# Patient Record
Sex: FEMALE | Race: WHITE | Employment: UNEMPLOYED | RURAL
[De-identification: names, ages, dates, MRNs, and addresses within clinical notes are randomized per-mention and may not be internally consistent; named-entity substitution may affect disease eponyms.]

---

## 2017-01-03 ENCOUNTER — OFFICE VISIT (OUTPATIENT)
Dept: FAMILY MEDICINE CLINIC | Age: 37
End: 2017-01-03

## 2017-01-03 VITALS
OXYGEN SATURATION: 95 % | BODY MASS INDEX: 34.49 KG/M2 | RESPIRATION RATE: 16 BRPM | SYSTOLIC BLOOD PRESSURE: 147 MMHG | DIASTOLIC BLOOD PRESSURE: 80 MMHG | HEART RATE: 100 BPM | TEMPERATURE: 98.6 F | WEIGHT: 207 LBS | HEIGHT: 65 IN

## 2017-01-03 DIAGNOSIS — J02.9 SORE THROAT: ICD-10-CM

## 2017-01-03 DIAGNOSIS — J01.10 ACUTE NON-RECURRENT FRONTAL SINUSITIS: Primary | ICD-10-CM

## 2017-01-03 DIAGNOSIS — R10.32 LEFT LOWER QUADRANT PAIN: ICD-10-CM

## 2017-01-03 LAB
S PYO AG THROAT QL: NEGATIVE
VALID INTERNAL CONTROL?: YES

## 2017-01-03 RX ORDER — CEFDINIR 300 MG/1
300 CAPSULE ORAL 2 TIMES DAILY
Qty: 20 CAP | Refills: 0 | Status: SHIPPED | OUTPATIENT
Start: 2017-01-03 | End: 2017-01-10 | Stop reason: ALTCHOICE

## 2017-01-03 NOTE — PATIENT INSTRUCTIONS
Sinusitis: Care Instructions  Your Care Instructions    Sinusitis is an infection of the lining of the sinus cavities in your head. Sinusitis often follows a cold. It causes pain and pressure in your head and face. In most cases, sinusitis gets better on its own in 1 to 2 weeks. But some mild symptoms may last for several weeks. Sometimes antibiotics are needed. Follow-up care is a key part of your treatment and safety. Be sure to make and go to all appointments, and call your doctor if you are having problems. It's also a good idea to know your test results and keep a list of the medicines you take. How can you care for yourself at home? · Take an over-the-counter pain medicine, such as acetaminophen (Tylenol), ibuprofen (Advil, Motrin), or naproxen (Aleve). Read and follow all instructions on the label. · If the doctor prescribed antibiotics, take them as directed. Do not stop taking them just because you feel better. You need to take the full course of antibiotics. · Be careful when taking over-the-counter cold or flu medicines and Tylenol at the same time. Many of these medicines have acetaminophen, which is Tylenol. Read the labels to make sure that you are not taking more than the recommended dose. Too much acetaminophen (Tylenol) can be harmful. · Breathe warm, moist air from a steamy shower, a hot bath, or a sink filled with hot water. Avoid cold, dry air. Using a humidifier in your home may help. Follow the directions for cleaning the machine. · Use saline (saltwater) nasal washes to help keep your nasal passages open and wash out mucus and bacteria. You can buy saline nose drops at a grocery store or drugstore. Or you can make your own at home by adding 1 teaspoon of salt and 1 teaspoon of baking soda to 2 cups of distilled water. If you make your own, fill a bulb syringe with the solution, insert the tip into your nostril, and squeeze gently. Aure Hals your nose.   · Put a hot, wet towel or a warm gel pack on your face 3 or 4 times a day for 5 to 10 minutes each time. · Try a decongestant nasal spray like oxymetazoline (Afrin). Do not use it for more than 3 days in a row. Using it for more than 3 days can make your congestion worse. When should you call for help? Call your doctor now or seek immediate medical care if:  · You have new or worse swelling or redness in your face or around your eyes. · You have a new or higher fever. Watch closely for changes in your health, and be sure to contact your doctor if:  · You have new or worse facial pain. · The mucus from your nose becomes thicker (like pus) or has new blood in it. · You are not getting better as expected. Where can you learn more? Go to http://hodan-eugene.info/. Enter T528 in the search box to learn more about \"Sinusitis: Care Instructions. \"  Current as of: July 29, 2016  Content Version: 11.1  © [id number] [employer]. Care instructions adapted under license by [employer] (which disclaims liability or warranty for this information). If you have questions about a medical condition or this instruction, always ask your healthcare professional. [place] [address] any warranty or liability for your use of this information.

## 2017-01-03 NOTE — PROGRESS NOTES
Identified pt with two pt identifiers(name and ). Chief Complaint   Patient presents with    Cold Symptoms    Sinus Pain    Sore Throat      Please print Rx's today. Health Maintenance Due   Topic    EYE EXAM RETINAL OR DILATED Q1     HEMOGLOBIN A1C Q6M        Wt Readings from Last 3 Encounters:   17 207 lb (93.9 kg)   16 207 lb (93.9 kg)   16 202 lb (91.6 kg)     Temp Readings from Last 3 Encounters:   17 98.6 °F (37 °C) (Oral)   16 98.6 °F (37 °C) (Oral)   16 98.3 °F (36.8 °C) (Oral)     BP Readings from Last 3 Encounters:   17 147/80   16 120/78   16 118/74     Pulse Readings from Last 3 Encounters:   17 100   16 (!) 112   16 (!) 114         Learning Assessment:  :     Learning Assessment 2014   PRIMARY LEARNER Patient   HIGHEST LEVEL OF EDUCATION - PRIMARY LEARNER  GRADUATED HIGH SCHOOL OR GED   BARRIERS PRIMARY LEARNER NONE   PRIMARY LANGUAGE ENGLISH   LEARNER PREFERENCE PRIMARY READING   ANSWERED BY patient   RELATIONSHIP SELF       Depression Screening:  :     PHQ 2 / 9, over the last two weeks 1/3/2017   Little interest or pleasure in doing things Not at all   Feeling down, depressed or hopeless Not at all   Total Score PHQ 2 0           Abuse Screening:  :     Abuse Screening Questionnaire 2016   Do you ever feel afraid of your partner? N   Are you in a relationship with someone who physically or mentally threatens you? N   Is it safe for you to go home? Y       Coordination of Care Questionnaire:  :     1) Have you been to an emergency room, urgent care clinic since your last visit? no   Hospitalized since your last visit? no             2) Have you seen or consulted any other health care providers outside of [address] [place] [address] since your last visit? no  (Include any pap smears or colon screenings in this section.)    3) Do you have an Advance Directive on file?  no  Are you interested in receiving information about Advance Directives? no    Patient is accompanied by self I have received verbal consent from Lupis Rucker to discuss any/all medical information while they are present in the room. Reviewed record in preparation for visit and have obtained necessary documentation. Medication reconciliation up to date and corrected with patient at this time.

## 2017-01-03 NOTE — MR AVS SNAPSHOT
Visit Information Date & Time Provider Department Dept. Phone Encounter #  
 1/3/2017  8:30 AM Zamzam Villa  [facility] [phone] [id number] Follow-up Instructions Return if symptoms worsen or fail to improve. Your Appointments 1/10/2017  8:00 AM  
ROUTINE CARE with Zamzam Villa  [facility] ([facility]) Appt Note: 3 month check; r/s from 12/22/16 - F/up DM, refill DM Rx,  Fasting blood work [place] [address] [address] [facility] [phone] [facility] 13 539 [address] Upcoming Health Maintenance Date Due  
 EYE EXAM RETINAL OR DILATED Q1 9/1/2014 HEMOGLOBIN A1C Q6M 1/14/2017 FOOT EXAM Q1 2/5/2017 MICROALBUMIN Q1 2/5/2017 LIPID PANEL Q1 7/14/2017 DTaP/Tdap/Td series (2 - Td) 1/1/2018 PAP AKA CERVICAL CYTOLOGY 6/15/2019 Allergies as of 1/3/2017  Review Complete On: 1/3/2017 By: Zamzam Villa NP Severity Noted Reaction Type Reactions Green Pepper  06/28/2012    Hives Current Immunizations  Reviewed on 3/18/2014 Name Date Influenza Vaccine 10/26/2013 TDAP Vaccine 1/1/2008 Not reviewed this visit You Were Diagnosed With   
  
 Codes Comments Acute non-recurrent frontal sinusitis    -  Primary ICD-10-CM: J01.10 ICD-9-CM: 665.9 Left lower quadrant pain     ICD-10-CM: R10.32 
ICD-9-CM: 789.04 Sore throat     ICD-10-CM: J02.9 ICD-9-CM: 604 Vitals BP Pulse Temp Resp Height(growth percentile) Weight(growth percentile) 147/80 100 98.6 °F (37 °C) (Oral) 16 5' 5\" (1.651 m) 207 lb (93.9 kg) LMP SpO2 BMI OB Status Smoking Status 01/25/2012 95% 34.45 kg/m2 Hysterectomy Never Smoker BMI and BSA Data Body Mass Index Body Surface Area 34.45 kg/m 2 2.08 m 2 Preferred Pharmacy Pharmacy Name Phone [facility] #[id number] - Amrik Flores - [address] [facility] [address] [phone] Your Updated Medication List  
  
   
This list is accurate as of: 1/3/17  8:35 AM.  Always use your most recent med list.  
  
  
  
  
 * albuterol 2.5 mg /3 mL (0.083 %) nebulizer solution Commonly known as:  PROVENTIL VENTOLIN  
3 mL by Nebulization route every four (4) hours as needed for Wheezing for up to 30 doses. * albuterol 90 mcg/actuation inhaler Commonly known as:  VENTOLIN HFA INHALE 2 PUFFS BY MOUTH EVERY 4 HOURS AS NEEDED FOR WHEEZING  
  
 atorvastatin 20 mg tablet Commonly known as:  LIPITOR  
TAKE 1 TABLET BY MOUTH EVERY DAY Blood-Glucose Meter monitoring kit Please give one kit. butalbital-acetaminophen-caffeine -40 mg per tablet Commonly known as:  Louanna Small Take 1 Tab by mouth every six (6) hours as needed for Pain. Max Daily Amount: 4 Tabs. cefdinir 300 mg capsule Commonly known as:  OMNICEF Take 1 Cap by mouth two (2) times a day for 10 days. ciprofloxacin HCl 250 mg tablet Commonly known as:  CIPRO Take 1 Tab by mouth every twelve (12) hours for 7 days. ergocalciferol 50,000 unit capsule Commonly known as:  ERGOCALCIFEROL Take 1 Cap by mouth every seven (7) days. fluticasone 50 mcg/actuation nasal spray Commonly known as:  FLONASE  
2 SQUIRTS DAILY AS NEEDED. INVOKANA 300 mg tablet Generic drug:  canagliflozin Take 1 Tab by mouth Daily (before breakfast). JANUVIA 100 mg tablet Generic drug:  SITagliptin Take 1 Tab by mouth once over twenty-four (24) hours. Lancets Misc Commonly known as:  MICROLET LANCET  
USE TO CHECK BLOOD SUGAR ONE TO TWO TIMES DAILY  
  
 metFORMIN 1,000 mg tablet Commonly known as:  GLUCOPHAGE  
TAKE 1 TABLET BY MOUTH TWICE A DAY  
  
 naratriptan 2.5 mg Tab Commonly known as:  Dearl Hallmark Take 1 Tab by mouth once as needed for up to 1 dose. For migraine. Can repeat 1 tab in 4 hours if needed. Limit: 2 tabs in 24 hours Nebulizer & Compressor machine Use as needed for wheezing, cough  
  
 omeprazole 20 mg capsule Commonly known as:  PRILOSEC Take 1 Cap by mouth two (2) times a day. ondansetron 8 mg disintegrating tablet Commonly known as:  ZOFRAN ODT Take 1 Tab by mouth every eight (8) hours as needed for Nausea. simvastatin 20 mg tablet Commonly known as:  ZOCOR Take  by mouth nightly. topiramate 50 mg tablet Commonly known as:  TOPAMAX Take 1 tab twice a day for migraine prevention  
  
 triamcinolone acetonide 0.1 % topical cream  
Commonly known as:  KENALOG Apply  to affected area two (2) times a day. use thin layer * TRUETEST TEST STRIPS strip Generic drug:  glucose blood VI test strips TO BE USED AS DIRECTED * glucose blood VI test strips strip Commonly known as:  Ascensia CONTOUR  
TO BE USED AS DIRECTED * Notice: This list has 4 medication(s) that are the same as other medications prescribed for you. Read the directions carefully, and ask your doctor or other care provider to review them with you. Prescriptions Printed Refills  
 cefdinir (OMNICEF) 300 mg capsule 0 Sig: Take 1 Cap by mouth two (2) times a day for 10 days. Class: Print Route: Oral  
  
Follow-up Instructions Return if symptoms worsen or fail to improve. Referral Information Referral ID Referred By Referred To  
  
 [id number] Maryan Rucker, [facility] [address] [place] [address] [phone] Ani Visits Status Start Date End Date 1 New Request 1/3/17 1/3/18 If your referral has a status of pending review or denied, additional information will be sent to support the outcome of this decision. Patient Instructions Sinusitis: Care Instructions Your Care Instructions Sinusitis is an infection of the lining of the sinus cavities in your head. Sinusitis often follows a cold. It causes pain and pressure in your head and face. In most cases, sinusitis gets better on its own in 1 to 2 weeks. But some mild symptoms may last for several weeks. Sometimes antibiotics are needed. Follow-up care is a key part of your treatment and safety. Be sure to make and go to all appointments, and call your doctor if you are having problems. It's also a good idea to know your test results and keep a list of the medicines you take. How can you care for yourself at home? · Take an over-the-counter pain medicine, such as acetaminophen (Tylenol), ibuprofen (Advil, Motrin), or naproxen (Aleve). Read and follow all instructions on the label. · If the doctor prescribed antibiotics, take them as directed. Do not stop taking them just because you feel better. You need to take the full course of antibiotics. · Be careful when taking over-the-counter cold or flu medicines and Tylenol at the same time. Many of these medicines have acetaminophen, which is Tylenol. Read the labels to make sure that you are not taking more than the recommended dose. Too much acetaminophen (Tylenol) can be harmful. · Breathe warm, moist air from a steamy shower, a hot bath, or a sink filled with hot water. Avoid cold, dry air. Using a humidifier in your home may help. Follow the directions for cleaning the machine. · Use saline (saltwater) nasal washes to help keep your nasal passages open and wash out mucus and bacteria. You can buy saline nose drops at a grocery store or drugstore. Or you can make your own at home by adding 1 teaspoon of salt and 1 teaspoon of baking soda to 2 cups of distilled water. If you make your own, fill a bulb syringe with the solution, insert the tip into your nostril, and squeeze gently. Margeorgea Zaldivar your nose. · Put a hot, wet towel or a warm gel pack on your face 3 or 4 times a day for 5 to 10 minutes each time. · Try a decongestant nasal spray like oxymetazoline (Afrin). Do not use it for more than 3 days in a row. Using it for more than 3 days can make your congestion worse. When should you call for help? Call your doctor now or seek immediate medical care if: 
· You have new or worse swelling or redness in your face or around your eyes. · You have a new or higher fever. Watch closely for changes in your health, and be sure to contact your doctor if: 
· You have new or worse facial pain. · The mucus from your nose becomes thicker (like pus) or has new blood in it. · You are not getting better as expected. Where can you learn more? Go to http://hodan-eugene.info/. Enter G608 in the search box to learn more about \"Sinusitis: Care Instructions. \" Current as of: July 29, 2016 Content Version: 11.1 © 2438-1389 [employer]. Care instructions adapted under license by [employer] (which disclaims liability or warranty for this information). If you have questions about a medical condition or this instruction, always ask your healthcare professional. [place] [address] any warranty or liability for your use of this information. Introducing [address]! [facility] introduces [employer] patient portal. Now you can access parts of your medical record, email your doctor's office, and request medication refills online. 1. In your internet browser, go to [email or web address] 2. Click on the First Time User? Click Here link in the Sign In box. You will see the New Member Sign Up page. 3. Enter your [employer] Access Code exactly as it appears below. You will not need to use this code after youve completed the sign-up process. If you do not sign up before the expiration date, you must request a new code. · [employer] Access Code: [email or web address] Expires: 3/29/2017  1:30 PM 
 
 4. Enter the last four digits of your Social Security Number (xxxx) and Date of Birth (mm/dd/yyyy) as indicated and click Submit. You will be taken to the next sign-up page. 5. Create a [employer] ID. This will be your [employer] login ID and cannot be changed, so think of one that is secure and easy to remember. 6. Create a [employer] password. You can change your password at any time. 7. Enter your Password Reset Question and Answer. This can be used at a later time if you forget your password. 8. Enter your e-mail address. You will receive e-mail notification when new information is available in [address] 9. Click Sign Up. You can now view and download portions of your medical record. 10. Click the Download Summary menu link to download a portable copy of your medical information. If you have questions, please visit the Frequently Asked Questions section of the [employer] website. Remember, [employer] is NOT to be used for urgent needs. For medical emergencies, dial 911. Now available from your iPhone and Android! Please provide this summary of care documentation to your next provider. Your primary care clinician is listed as [address]. If you have any questions after today's visit, please call [phone].

## 2017-01-03 NOTE — PROGRESS NOTES
HISTORY OF PRESENT ILLNESS  Tabitha Rodrigues is a 43 y.o. female. HPI  Pt presents with \"cold symptoms, sinus pain and sore throat\"    4 days ago, patient states that she woke up feeling horrible. Sinus congestion and pressire  Congestion has been working from the sinuses, into the chest.  Sore throat  Cough: she is coughing up mucous  Low grade fever, off and on all weekend. OTC: Maritza Wise sinus medicaiton    Pt was seen previously, on 12/29, for left lower quadrant pain. We believed that this may be a UTI, based on dysuria, and was placed on cipro. Pt states that she has been feeling better, with the cipro. Culture did return, and was negative. Review of Systems   Constitutional: Positive for fever. HENT: Positive for congestion and sore throat. Respiratory: Positive for cough and sputum production. Gastrointestinal: Negative for nausea and vomiting. Physical Exam   Constitutional: She is oriented to person, place, and time. She appears well-developed and well-nourished. HENT:   Head: Normocephalic and atraumatic. Right Ear: Hearing, tympanic membrane, external ear and ear canal normal.   Left Ear: Hearing, tympanic membrane, external ear and ear canal normal.   Nose: Mucosal edema present. Right sinus exhibits frontal sinus tenderness. Left sinus exhibits frontal sinus tenderness. Mouth/Throat: Oropharynx is clear and moist.   Neck: Normal range of motion. Neck supple. Cardiovascular: Normal rate, regular rhythm and normal heart sounds. Pulmonary/Chest: Effort normal and breath sounds normal. She has no decreased breath sounds. She has no wheezes. She has no rhonchi. She has no rales. Lymphadenopathy:     She has no cervical adenopathy. Neurological: She is alert and oriented to person, place, and time. Skin: Skin is warm and dry. Psychiatric: She has a normal mood and affect. Her behavior is normal.       ASSESSMENT and PLAN    ICD-10-CM ICD-9-CM    1.  Acute non-recurrent frontal sinusitis J01.10 461.1 cefdinir (OMNICEF) 300 mg capsule   2. Left lower quadrant pain R10.32 789.04 REFERRAL TO GASTROENTEROLOGY   3. Sore throat J02.9 462 AMB POC RAPID STREP A     Informed patient that I have sent medication to the pharmacy, and she should take as prescribed. Educated about taking with food, to decrease the risk of stomach upset. Educated about staying well hydrated, by pushing fluids as much as possible. Informed patient that should abdominal pain return, she should be seen by GI. Educated about calling should her symptoms return. Pt informed to return to office with worsening of symptoms, or PRN with any questions or concerns. Pt verbalizes understanding of plan of care and denies further questions or concerns at this time.

## 2017-01-10 ENCOUNTER — OFFICE VISIT (OUTPATIENT)
Dept: FAMILY MEDICINE CLINIC | Age: 37
End: 2017-01-10

## 2017-01-10 VITALS
HEIGHT: 65 IN | RESPIRATION RATE: 16 BRPM | WEIGHT: 206 LBS | DIASTOLIC BLOOD PRESSURE: 78 MMHG | SYSTOLIC BLOOD PRESSURE: 122 MMHG | TEMPERATURE: 98.2 F | HEART RATE: 78 BPM | OXYGEN SATURATION: 99 % | BODY MASS INDEX: 34.32 KG/M2

## 2017-01-10 DIAGNOSIS — I10 ESSENTIAL HYPERTENSION: ICD-10-CM

## 2017-01-10 DIAGNOSIS — Z13.29 SCREENING FOR THYROID DISORDER: ICD-10-CM

## 2017-01-10 DIAGNOSIS — E11.9 TYPE 2 DIABETES MELLITUS WITHOUT COMPLICATION, WITHOUT LONG-TERM CURRENT USE OF INSULIN (HCC): Primary | ICD-10-CM

## 2017-01-10 DIAGNOSIS — E78.5 DYSLIPIDEMIA (HIGH LDL; LOW HDL): ICD-10-CM

## 2017-01-10 DIAGNOSIS — Z91.09 ENVIRONMENTAL ALLERGIES: ICD-10-CM

## 2017-01-10 DIAGNOSIS — E55.9 VITAMIN D DEFICIENCY: ICD-10-CM

## 2017-01-10 RX ORDER — MONTELUKAST SODIUM 10 MG/1
10 TABLET ORAL DAILY
Qty: 90 TAB | Refills: 1 | Status: SHIPPED | OUTPATIENT
Start: 2017-01-10 | End: 2017-03-15 | Stop reason: SDUPTHER

## 2017-01-10 NOTE — PROGRESS NOTES
Subjective:     Tabitha Rodrigues is a 43 y.o. female seen for follow up of diabetes. She also has hypertension and hyperlipidemia. Diabetic Review of Systems - medication compliance: compliant all of the time, diabetic diet compliance: compliant most of the time. Other symptoms and concerns: Follow up and labs. Pt is focusing on losing weight, and is hoping to start losing some in the new year. Pt is requesting rx for allergy medication, in relation to chronic allergies and recurrent sinus infections. Patient Active Problem List    Diagnosis Date Noted    Screening for thyroid disorder 01/10/2017    Acute non-recurrent frontal sinusitis 01/03/2017    Left lower quadrant pain 12/29/2016    Left ear pain 12/02/2016    Strep throat 11/09/2016    Nausea 10/07/2016    Right acute serous otitis media 07/14/2016    Rash 07/14/2016    Gastroesophageal reflux disease without esophagitis 07/14/2016    Environmental allergies 07/14/2016    Right ear pain 06/21/2016    Diabetes ([address] 07/18/2014    Sore throat 05/06/2014    Migraine headache 01/03/2012    Vitamin D deficiency 11/06/2011    Dyslipidemia (high LDL; low HDL) 01/24/2011    Hypertension 01/06/2011    Asthma 01/06/2011     Current Outpatient Prescriptions   Medication Sig Dispense Refill    montelukast (SINGULAIR) 10 mg tablet Take 1 Tab by mouth daily. 90 Tab 1    naratriptan (AMERGE) 2.5 mg tab Take 1 Tab by mouth once as needed for up to 1 dose. For migraine. Can repeat 1 tab in 4 hours if needed. Limit: 2 tabs in 24 hours 9 Tab 1    simvastatin (ZOCOR) 20 mg tablet Take  by mouth nightly.  topiramate (TOPAMAX) 50 mg tablet Take 1 tab twice a day for migraine prevention 60 Tab 0    ondansetron (ZOFRAN ODT) 8 mg disintegrating tablet Take 1 Tab by mouth every eight (8) hours as needed for Nausea.  12 Tab 0    butalbital-acetaminophen-caffeine (FIORICET, ESGIC) -40 mg per tablet Take 1 Tab by mouth every six (6) hours as needed for Pain. Max Daily Amount: 4 Tabs. 20 Tab 0    metFORMIN (GLUCOPHAGE) 1,000 mg tablet TAKE 1 TABLET BY MOUTH TWICE A  Tab 1    INVOKANA 300 mg tablet Take 1 Tab by mouth Daily (before breakfast). 90 Tab 1    atorvastatin (LIPITOR) 20 mg tablet TAKE 1 TABLET BY MOUTH EVERY DAY 90 Tab 1    JANUVIA 100 mg tablet Take 1 Tab by mouth once over twenty-four (24) hours. 90 Tab 1    albuterol (PROVENTIL VENTOLIN) 2.5 mg /3 mL (0.083 %) nebulizer solution 3 mL by Nebulization route every four (4) hours as needed for Wheezing for up to 30 doses. 100 Each 2    fluticasone (FLONASE) 50 mcg/actuation nasal spray 2 SQUIRTS DAILY AS NEEDED. 16 g 3    albuterol (VENTOLIN HFA) 90 mcg/actuation inhaler INHALE 2 PUFFS BY MOUTH EVERY 4 HOURS AS NEEDED FOR WHEEZING 1 Inhaler 5    ergocalciferol (ERGOCALCIFEROL) 50,000 unit capsule Take 1 Cap by mouth every seven (7) days. 30 Cap 5    glucose blood VI test strips (ASCENSIA CONTOUR) strip TO BE USED AS DIRECTED 50 Strip 5    Nebulizer & Compressor machine Use as needed for wheezing, cough 1 Each 0    omeprazole (PRILOSEC) 20 mg capsule Take 1 Cap by mouth two (2) times a day. 180 Cap 1    triamcinolone acetonide (KENALOG) 0.1 % topical cream Apply  to affected area two (2) times a day. use thin layer 15 g 2    TRUETEST TEST STRIPS strip TO BE USED AS DIRECTED 50 Strip 5    Blood-Glucose Meter monitoring kit Please give one kit.  1 Kit 0    Lancets (MICROLET LANCET) Misc USE TO CHECK BLOOD SUGAR ONE TO TWO TIMES DAILY 100 Each 0     Allergies   Allergen Reactions    Green Pepper Hives     Past Medical History   Diagnosis Date    Anxiety     Asthma     Diabetes ([address] 2008    Headache     Headache(784.0)     Hypertension     Rash 7/14/2016    S/P dilatation and curettage      Past Surgical History   Procedure Laterality Date    Hx cholecystectomy  2001    Hx appendectomy  2/2008    Hx orthopaedic  9/2006     ruptured discs    Hx hernia repair 2/2009    Hx gyn  3/2008     tubal ligation    Hx dilation and curettage      Hx hysterectomy  03/2012    Hx lumbar diskectomy       2006     Family History   Problem Relation Age of Onset    Heart Disease Mother     Hypertension Father     Hypertension Sister      Social History   Substance Use Topics    Smoking status: Never Smoker    Smokeless tobacco: Never Used    Alcohol use No        Lab Results  Component Value Date/Time   WBC 11.0 07/14/2016 08:39 AM   HGB 15.1 07/14/2016 08:39 AM   HCT 46.4 07/14/2016 08:39 AM   PLATELET 425 17/70/0757 08:39 AM   MCV 87 07/14/2016 08:39 AM       Lab Results  Component Value Date/Time   Hemoglobin A1c 6.4 07/14/2016 08:39 AM   Hemoglobin A1c 7.1 02/05/2016 10:37 AM   Hemoglobin A1c 7.1 07/18/2014 09:05 AM   Glucose 113 07/14/2016 08:39 AM   Glucose (POC) 160 05/21/2014 10:29 AM   Glucose  09/05/2013 01:12 PM   Microalb/Creat ratio (ug/mg creat.) 9.6 11/19/2013 12:00 AM   LDL, calculated 69 07/14/2016 08:39 AM   Creatinine (POC) 0.7 06/04/2013 09:57 AM   Creatinine 0.73 07/14/2016 08:39 AM      Lab Results  Component Value Date/Time   Cholesterol, total 150 07/14/2016 08:39 AM   Cholesterol, Total 169 02/05/2016 10:37 AM   HDL Cholesterol 38 07/14/2016 08:39 AM   LDL, calculated 69 07/14/2016 08:39 AM   Triglyceride 214 07/14/2016 08:39 AM       Lab Results  Component Value Date/Time   GFR est  07/14/2016 08:39 AM   GFR est non- 07/14/2016 08:39 AM   Creatinine (POC) 0.7 06/04/2013 09:57 AM   Creatinine 0.73 07/14/2016 08:39 AM   BUN 8 07/14/2016 08:39 AM   Sodium 140 07/14/2016 08:39 AM   Potassium 4.6 07/14/2016 08:39 AM   Chloride 101 07/14/2016 08:39 AM   CO2 21 07/14/2016 08:39 AM         Review of Systems  Pertinent items are noted in HPI.     Objective:     Visit Vitals    /78    Pulse 78    Temp 98.2 °F (36.8 °C) (Oral)    Resp 16    Ht 5' 5\" (1.651 m)    Wt 206 lb (93.4 kg)    LMP 01/25/2012    SpO2 99%    BMI 34.28 kg/m2 Appearance: alert, well appearing, and in no distress. Exam: heart sounds normal rate, regular rhythm, normal S1, S2, no murmurs, rubs, clicks or gallops, no hepatosplenomegaly  Lab review: orders written for new lab studies as appropriate; see orders. Assessment/Plan:     diabetes stable, hypertension stable, hyperlipidemia stable. Diabetic issues reviewed with her: diabetic diet discussed in detail, written exchange diet given, low cholesterol diet, weight control and daily exercise discussed, home glucose monitoring emphasized and all medications, side effects and compliance discussed carefully. ICD-10-CM ICD-9-CM    1. Type 2 diabetes mellitus without complication, without long-term current use of insulin (HCC) E11.9 250.00 CBC W/O DIFF      METABOLIC PANEL, COMPREHENSIVE      HEMOGLOBIN A1C WITH EAG   2. Essential hypertension I10 401.9    3. Dyslipidemia (high LDL; low HDL) E78.4 272.4 LIPID PANEL   4. Vitamin D deficiency E55.9 268.9 VITAMIN D, 25 HYDROXY   5. Environmental allergies Z91.09 V15.09 montelukast (SINGULAIR) 10 mg tablet   6. Screening for thyroid disorder Z13.29 V77.0 THYROID CASCADE PROFILE     Informed patient that we will notify her when her labs return, and inform her of any change in plan of care at that time. Educated about focusing on low carb, mediterranean diet. Pt informed to return to office with worsening of symptoms, or PRN with any questions or concerns. Pt verbalizes understanding of plan of care and denies further questions or concerns at this time.

## 2017-01-10 NOTE — MR AVS SNAPSHOT
Visit Information Date & Time Provider Department Dept. Phone Encounter #  
 1/10/2017  8:00 AM Lobo RamirezObed [address] [phone] [id number] Follow-up Instructions Return in about 6 months (around 7/10/2017), or if symptoms worsen or fail to improve. Upcoming Health Maintenance Date Due  
 EYE EXAM RETINAL OR DILATED Q1 9/1/2014 HEMOGLOBIN A1C Q6M 1/14/2017 FOOT EXAM Q1 2/5/2017 MICROALBUMIN Q1 2/5/2017 LIPID PANEL Q1 7/14/2017 DTaP/Tdap/Td series (2 - Td) 1/1/2018 PAP AKA CERVICAL CYTOLOGY 6/15/2019 Allergies as of 1/10/2017  Review Complete On: 1/10/2017 By: Lobo Ramirez NP Severity Noted Reaction Type Reactions Green Pepper  06/28/2012    Hives Current Immunizations  Reviewed on 3/18/2014 Name Date Influenza Vaccine 10/26/2013 TDAP Vaccine 1/1/2008 Not reviewed this visit You Were Diagnosed With   
  
 Codes Comments Type 2 diabetes mellitus without complication, without long-term current use of insulin (HCC)    -  Primary ICD-10-CM: E11.9 ICD-9-CM: 250.00 Essential hypertension     ICD-10-CM: I10 
ICD-9-CM: 401.9 Dyslipidemia (high LDL; low HDL)     ICD-10-CM: E78.4 ICD-9-CM: 272.4 Vitamin D deficiency     ICD-10-CM: E55.9 ICD-9-CM: 268.9 Environmental allergies     ICD-10-CM: Z91.09 
ICD-9-CM: V15.09 Screening for thyroid disorder     ICD-10-CM: Z13.29 ICD-9-CM: V77.0 Vitals BP Pulse Temp Resp Height(growth percentile) Weight(growth percentile) 122/78 78 98.2 °F (36.8 °C) (Oral) 16 5' 5\" (1.651 m) 206 lb (93.4 kg) LMP SpO2 BMI OB Status Smoking Status 01/25/2012 99% 34.28 kg/m2 Hysterectomy Never Smoker BMI and BSA Data Body Mass Index Body Surface Area  
 34.28 kg/m 2 2.07 m 2 Preferred Pharmacy Pharmacy Name Phone [facility]ARMACY #[id number] - [place]  [address] [phone] Your Updated Medication List  
  
 This list is accurate as of: 1/10/17  8:22 AM.  Always use your most recent med list.  
  
  
  
  
 * albuterol 2.5 mg /3 mL (0.083 %) nebulizer solution Commonly known as:  PROVENTIL VENTOLIN  
3 mL by Nebulization route every four (4) hours as needed for Wheezing for up to 30 doses. * albuterol 90 mcg/actuation inhaler Commonly known as:  VENTOLIN HFA INHALE 2 PUFFS BY MOUTH EVERY 4 HOURS AS NEEDED FOR WHEEZING  
  
 atorvastatin 20 mg tablet Commonly known as:  LIPITOR  
TAKE 1 TABLET BY MOUTH EVERY DAY Blood-Glucose Meter monitoring kit Please give one kit. butalbital-acetaminophen-caffeine -40 mg per tablet Commonly known as:  [place] Pardon Take 1 Tab by mouth every six (6) hours as needed for Pain. Max Daily Amount: 4 Tabs.  
  
 ergocalciferol 50,000 unit capsule Commonly known as:  ERGOCALCIFEROL Take 1 Cap by mouth every seven (7) days. fluticasone 50 mcg/actuation nasal spray Commonly known as:  FLONASE  
2 SQUIRTS DAILY AS NEEDED. INVOKANA 300 mg tablet Generic drug:  canagliflozin Take 1 Tab by mouth Daily (before breakfast). JANUVIA 100 mg tablet Generic drug:  SITagliptin Take 1 Tab by mouth once over twenty-four (24) hours. Lancets Misc Commonly known as:  MICROLET LANCET  
USE TO CHECK BLOOD SUGAR ONE TO TWO TIMES DAILY  
  
 metFORMIN 1,000 mg tablet Commonly known as:  GLUCOPHAGE  
TAKE 1 TABLET BY MOUTH TWICE A DAY  
  
 naratriptan 2.5 mg Tab Commonly known as:  Joseph Sleek Take 1 Tab by mouth once as needed for up to 1 dose. For migraine. Can repeat 1 tab in 4 hours if needed. Limit: 2 tabs in 24 hours Nebulizer & Compressor machine Use as needed for wheezing, cough  
  
 omeprazole 20 mg capsule Commonly known as:  PRILOSEC Take 1 Cap by mouth two (2) times a day. ondansetron 8 mg disintegrating tablet Commonly known as:  ZOFRAN ODT  
 Take 1 Tab by mouth every eight (8) hours as needed for Nausea. simvastatin 20 mg tablet Commonly known as:  ZOCOR Take  by mouth nightly. topiramate 50 mg tablet Commonly known as:  TOPAMAX Take 1 tab twice a day for migraine prevention  
  
 triamcinolone acetonide 0.1 % topical cream  
Commonly known as:  KENALOG Apply  to affected area two (2) times a day. use thin layer * TRUETEST TEST STRIPS strip Generic drug:  glucose blood VI test strips TO BE USED AS DIRECTED * glucose blood VI test strips strip Commonly known as:  Ascensia CONTOUR  
TO BE USED AS DIRECTED * Notice: This list has 4 medication(s) that are the same as other medications prescribed for you. Read the directions carefully, and ask your doctor or other care provider to review them with you. We Performed the Following CBC W/O DIFF [56528 CPT(R)] HEMOGLOBIN A1C WITH EAG [65728 CPT(R)] LIPID PANEL [27972 CPT(R)] METABOLIC PANEL, COMPREHENSIVE [10303 CPT(R)] THYROID CASCADE PROFILE [[id number] Custom] VITAMIN D, 25 HYDROXY [id number] CPT(R)] Follow-up Instructions Return in about 6 months (around 7/10/2017), or if symptoms worsen or fail to improve. Patient Instructions Learning About Diabetes Food Guidelines Your Care Instructions Meal planning is important to manage diabetes. It helps keep your blood sugar at a target level (which you set with your doctor). You don't have to eat special foods. You can eat what your family eats, including sweets once in a while. But you do have to pay attention to how often you eat and how much you eat of certain foods. You may want to work with a dietitian or a certified diabetes educator (CDE) to help you plan meals and snacks. A dietitian or CDE can also help you lose weight if that is one of your goals. What should you know about eating carbs? Managing the amount of carbohydrate (carbs) you eat is an important part of healthy meals when you have diabetes. Carbohydrate is found in many foods. · Learn which foods have carbs. And learn the amounts of carbs in different foods. ¨ Bread, cereal, pasta, and rice have about 15 grams of carbs in a serving. A serving is 1 slice of bread (1 ounce), ½ cup of cooked cereal, or 1/3 cup of cooked pasta or rice. ¨ Fruits have 15 grams of carbs in a serving. A serving is 1 small fresh fruit, such as an apple or orange; ½ of a banana; ½ cup of cooked or canned fruit; ½ cup of fruit juice; 1 cup of melon or raspberries; or 2 tablespoons of dried fruit. ¨ Milk and no-sugar-added yogurt have 15 grams of carbs in a serving. A serving is 1 cup of milk or 2/3 cup of no-sugar-added yogurt. ¨ Starchy vegetables have 15 grams of carbs in a serving. A serving is ½ cup of mashed potatoes or sweet potato; 1 cup winter squash; ½ of a small baked potato; ½ cup of cooked beans; or ½ cup cooked corn or green peas. · Learn how much carbs to eat each day and at each meal. A dietitian or CDE can teach you how to keep track of the amount of carbs you eat. This is called carbohydrate counting. · If you are not sure how to count carbohydrate grams, use the Plate Method to plan meals. It is a good, quick way to make sure that you have a balanced meal. It also helps you spread carbs throughout the day. ¨ Divide your plate by types of foods. Put non-starchy vegetables on half the plate, meat or other protein food on one-quarter of the plate, and a grain or starchy vegetable in the final quarter of the plate. To this you can add a small piece of fruit and 1 cup of milk or yogurt, depending on how many carbs you are supposed to eat at a meal. 
· Try to eat about the same amount of carbs at each meal. Do not \"save up\" your daily allowance of carbs to eat at one meal. 
· Proteins have very little or no carbs per serving.  Examples of proteins are beef, chicken, turkey, fish, eggs, tofu, cheese, cottage cheese, and peanut butter. A serving size of meat is 3 ounces, which is about the size of a deck of cards. Examples of meat substitute serving sizes (equal to 1 ounce of meat) are 1/4 cup of cottage cheese, 1 egg, 1 tablespoon of peanut butter, and ½ cup of tofu. How can you eat out and still eat healthy? · Learn to estimate the serving sizes of foods that have carbohydrate. If you measure food at home, it will be easier to estimate the amount in a serving of restaurant food. · If the meal you order has too much carbohydrate (such as potatoes, corn, or baked beans), ask to have a low-carbohydrate food instead. Ask for a salad or green vegetables. · If you use insulin, check your blood sugar before and after eating out to help you plan how much to eat in the future. · If you eat more carbohydrate at a meal than you had planned, take a walk or do other exercise. This will help lower your blood sugar. What else should you know? · Limit saturated fat, such as the fat from meat and dairy products. This is a healthy choice because people who have diabetes are at higher risk of heart disease. So choose lean cuts of meat and nonfat or low-fat dairy products. Use olive or canola oil instead of butter or shortening when cooking. · Don't skip meals. Your blood sugar may drop too low if you skip meals and take insulin or certain medicines for diabetes. · Check with your doctor before you drink alcohol. Alcohol can cause your blood sugar to drop too low. Alcohol can also cause a bad reaction if you take certain diabetes medicines. Follow-up care is a key part of your treatment and safety. Be sure to make and go to all appointments, and call your doctor if you are having problems. It's also a good idea to know your test results and keep a list of the medicines you take. Where can you learn more? Go to http://hodan-eugene.info/. Enter K325 in the search box to learn more about \"Learning About Diabetes Food Guidelines. \" Current as of: May 23, 2016 Content Version: 11.1 © 3756-6910 [employer]. Care instructions adapted under license by [employer] (which disclaims liability or warranty for this information). If you have questions about a medical condition or this instruction, always ask your healthcare professional. [address] any warranty or liability for your use of this information. Introducing [facility]! Naun Peralta introduces Vig[employer] patient portal. Now you can access parts of your medical record, email your doctor's office, and request medication refills online. 1. In your internet browser, go to [email or web address] 2. Click on the First Time User? Click Here link in the Sign In box. You will see the New Member Sign Up page. 3. Enter your Vigster Access Code exactly as it appears below. You will not need to use this code after youve completed the sign-up process. If you do not sign up before the expiration date, you must request a new code. · [employer] Access Code: [email or web address] Expires: 3/29/2017  1:30 PM 
 
4. Enter the last four digits of your Social Security Number (xxxx) and Date of Birth (mm/dd/yyyy) as indicated and click Submit. You will be taken to the next sign-up page. 5. Create a Vigster ID. This will be your Vigster login ID and cannot be changed, so think of one that is secure and easy to remember. 6. Create a Vigster password. You can change your password at any time. 7. Enter your Password Reset Question and Answer. This can be used at a later time if you forget your password. 8. Enter your e-mail address. You will receive e-mail notification when new information is available in [address] 9. Click Sign Up. You can now view and download portions of your medical record. 10. Click the Download Summary menu link to download a portable copy of your medical information. If you have questions, please visit the Frequently Asked Questions section of the [employer] website. Remember, [employer] is NOT to be used for urgent needs. For medical emergencies, dial 911. Now available from your iPhone and Android! Please provide this summary of care documentation to your next provider. Your primary care clinician is listed as [address]. If you have any questions after today's visit, please call [phone].

## 2017-01-10 NOTE — PATIENT INSTRUCTIONS

## 2017-01-10 NOTE — PROGRESS NOTES
Identified pt with two pt identifiers(name and ). Chief Complaint   Patient presents with    Diabetes     follow up & fasting    Sinus Pain     cheeks & head        Health Maintenance Due   Topic    EYE EXAM RETINAL OR DILATED Q1     HEMOGLOBIN A1C Q6M     FOOT EXAM Q1     MICROALBUMIN Q1        Wt Readings from Last 3 Encounters:   01/10/17 206 lb (93.4 kg)   17 207 lb (93.9 kg)   16 207 lb (93.9 kg)     Temp Readings from Last 3 Encounters:   01/10/17 98.2 °F (36.8 °C) (Oral)   17 98.6 °F (37 °C) (Oral)   16 98.6 °F (37 °C) (Oral)     BP Readings from Last 3 Encounters:   01/10/17 122/78   17 147/80   16 120/78     Pulse Readings from Last 3 Encounters:   01/10/17 78   17 100   16 (!) 112         Learning Assessment:  :     Learning Assessment 2014   PRIMARY LEARNER Patient   HIGHEST LEVEL OF EDUCATION - PRIMARY LEARNER  GRADUATED HIGH SCHOOL OR GED   BARRIERS PRIMARY LEARNER NONE   PRIMARY LANGUAGE ENGLISH   LEARNER PREFERENCE PRIMARY READING   ANSWERED BY patient   RELATIONSHIP SELF       Depression Screening:  :     PHQ 2 / 9, over the last two weeks 1/10/2017   Little interest or pleasure in doing things Not at all   Feeling down, depressed or hopeless Not at all   Total Score PHQ 2 0           Abuse Screening:  :     Abuse Screening Questionnaire 2016   Do you ever feel afraid of your partner? N   Are you in a relationship with someone who physically or mentally threatens you? N   Is it safe for you to go home? Y       Coordination of Care Questionnaire:  :     1) Have you been to an emergency room, urgent care clinic since your last visit? no   Hospitalized since your last visit? no             2) Have you seen or consulted any other health care providers outside of [address] [place] [address] since your last visit? no  (Include any pap smears or colon screenings in this section.)    3) Do you have an Advance Directive on file?  no  Are you interested in receiving information about Advance Directives? no    Patient is accompanied by self I have received verbal consent from [facility] to discuss any/all medical information while they are present in the room. Reviewed record in preparation for visit and have obtained necessary documentation. Medication reconciliation up to date and corrected with patient at this time.

## 2017-01-11 ENCOUNTER — TELEPHONE (OUTPATIENT)
Dept: FAMILY MEDICINE CLINIC | Age: 37
End: 2017-01-11

## 2017-01-11 DIAGNOSIS — Z79.4 TYPE 2 DIABETES MELLITUS WITHOUT COMPLICATION, WITH LONG-TERM CURRENT USE OF INSULIN (HCC): ICD-10-CM

## 2017-01-11 DIAGNOSIS — E55.9 VITAMIN D DEFICIENCY: ICD-10-CM

## 2017-01-11 DIAGNOSIS — E11.9 TYPE 2 DIABETES MELLITUS WITHOUT COMPLICATION, UNSPECIFIED LONG TERM INSULIN USE STATUS: ICD-10-CM

## 2017-01-11 DIAGNOSIS — E11.9 TYPE 2 DIABETES MELLITUS WITHOUT COMPLICATION, WITH LONG-TERM CURRENT USE OF INSULIN (HCC): ICD-10-CM

## 2017-01-11 DIAGNOSIS — E78.5 DYSLIPIDEMIA (HIGH LDL; LOW HDL): ICD-10-CM

## 2017-01-11 DIAGNOSIS — J45.20 MILD INTERMITTENT ASTHMA WITHOUT COMPLICATION: ICD-10-CM

## 2017-01-11 LAB
25(OH)D3+25(OH)D2 SERPL-MCNC: 11 NG/ML (ref 30–100)
ALBUMIN SERPL-MCNC: 4.2 G/DL (ref 3.5–5.5)
ALBUMIN/GLOB SERPL: 1.6 {RATIO} (ref 1.1–2.5)
ALP SERPL-CCNC: 54 IU/L (ref 39–117)
ALT SERPL-CCNC: 23 IU/L (ref 0–32)
AST SERPL-CCNC: 16 IU/L (ref 0–40)
BILIRUB SERPL-MCNC: 0.4 MG/DL (ref 0–1.2)
BUN SERPL-MCNC: 8 MG/DL (ref 6–20)
BUN/CREAT SERPL: 14 (ref 8–20)
CALCIUM SERPL-MCNC: 9.4 MG/DL (ref 8.7–10.2)
CHLORIDE SERPL-SCNC: 101 MMOL/L (ref 96–106)
CHOLEST SERPL-MCNC: 174 MG/DL (ref 100–199)
CO2 SERPL-SCNC: 23 MMOL/L (ref 18–29)
CREAT SERPL-MCNC: 0.59 MG/DL (ref 0.57–1)
ERYTHROCYTE [DISTWIDTH] IN BLOOD BY AUTOMATED COUNT: 13.9 % (ref 12.3–15.4)
EST. AVERAGE GLUCOSE BLD GHB EST-MCNC: 146 MG/DL
GLOBULIN SER CALC-MCNC: 2.6 G/DL (ref 1.5–4.5)
GLUCOSE SERPL-MCNC: 116 MG/DL (ref 65–99)
HBA1C MFR BLD: 6.7 % (ref 4.8–5.6)
HCT VFR BLD AUTO: 43.5 % (ref 34–46.6)
HDLC SERPL-MCNC: 43 MG/DL
HGB BLD-MCNC: 15.4 G/DL (ref 11.1–15.9)
INTERPRETATION, 910389: NORMAL
LDLC SERPL CALC-MCNC: 84 MG/DL (ref 0–99)
MCH RBC QN AUTO: 29.5 PG (ref 26.6–33)
MCHC RBC AUTO-ENTMCNC: 35.4 G/DL (ref 31.5–35.7)
MCV RBC AUTO: 83 FL (ref 79–97)
PLATELET # BLD AUTO: 257 X10E3/UL (ref 150–379)
POTASSIUM SERPL-SCNC: 4.4 MMOL/L (ref 3.5–5.2)
PROT SERPL-MCNC: 6.8 G/DL (ref 6–8.5)
RBC # BLD AUTO: 5.22 X10E6/UL (ref 3.77–5.28)
SODIUM SERPL-SCNC: 140 MMOL/L (ref 134–144)
TRIGL SERPL-MCNC: 236 MG/DL (ref 0–149)
TSH SERPL DL<=0.005 MIU/L-ACNC: 1.03 UIU/ML (ref 0.45–4.5)
VLDLC SERPL CALC-MCNC: 47 MG/DL (ref 5–40)
WBC # BLD AUTO: 10.8 X10E3/UL (ref 3.4–10.8)

## 2017-01-11 RX ORDER — ALBUTEROL SULFATE 0.83 MG/ML
2.5 SOLUTION RESPIRATORY (INHALATION)
Qty: 100 EACH | Refills: 2
Start: 2017-01-11 | End: 2017-07-25 | Stop reason: SDUPTHER

## 2017-01-11 RX ORDER — OMEPRAZOLE 20 MG/1
20 CAPSULE, DELAYED RELEASE ORAL 2 TIMES DAILY
Qty: 180 CAP | Refills: 1 | Status: SHIPPED | OUTPATIENT
Start: 2017-01-11 | End: 2017-01-13 | Stop reason: SDUPTHER

## 2017-01-11 RX ORDER — ERGOCALCIFEROL 1.25 MG/1
50000 CAPSULE ORAL
Qty: 30 CAP | Refills: 5 | Status: SHIPPED | OUTPATIENT
Start: 2017-01-11 | End: 2017-03-15 | Stop reason: SDUPTHER

## 2017-01-11 RX ORDER — CANAGLIFLOZIN 300 MG/1
300 TABLET, FILM COATED ORAL
Qty: 90 TAB | Refills: 1 | Status: SHIPPED | OUTPATIENT
Start: 2017-01-11 | End: 2017-03-15 | Stop reason: SDUPTHER

## 2017-01-11 RX ORDER — METFORMIN HYDROCHLORIDE 1000 MG/1
TABLET ORAL
Qty: 180 TAB | Refills: 1 | Status: SHIPPED | OUTPATIENT
Start: 2017-01-11 | End: 2017-03-15 | Stop reason: SDUPTHER

## 2017-01-11 RX ORDER — ATORVASTATIN CALCIUM 20 MG/1
TABLET, FILM COATED ORAL
Qty: 90 TAB | Refills: 1 | Status: SHIPPED | OUTPATIENT
Start: 2017-01-11 | End: 2017-03-15 | Stop reason: SDUPTHER

## 2017-01-11 RX ORDER — SITAGLIPTIN 100 MG/1
100 TABLET, FILM COATED ORAL
Qty: 90 TAB | Refills: 1 | Status: SHIPPED | OUTPATIENT
Start: 2017-01-11 | End: 2017-03-15 | Stop reason: SDUPTHER

## 2017-01-11 NOTE — TELEPHONE ENCOUNTER
----- Message from Dheeraj Mesa NP sent at 1/11/2017  9:50 AM EST -----  Please call patient and let her know that her labs returned:  1.  HgbA1C increased a tiny bit, but not that surprising with Christine recently. She should continue to focus on diabetic diet, as discussed in office visit,  Decrease carbs! 2. Vit D is still super low. She must take rx strength Vit D, as previously prescribed. Thanks!

## 2017-01-11 NOTE — TELEPHONE ENCOUNTER
Advised pt lab results & recommendations per GURDEEP Alvarenga. Pt states she needs Rx Vit D sent to [facility] pharmacy.

## 2017-01-11 NOTE — PROGRESS NOTES
Please call patient and let her know that her labs returned:  1.  HgbA1C increased a tiny bit, but not that surprising with Christine recently. She should continue to focus on diabetic diet, as discussed in office visit,  Decrease carbs! 2. Vit D is still super low. She must take rx strength Vit D, as previously prescribed. Thanks!

## 2017-01-13 NOTE — TELEPHONE ENCOUNTER
Pt states insurance will only cover omeprozole 20mg once daily instead of BID. Advised pt new Rx will be sent to pharmacy.

## 2017-01-16 ENCOUNTER — DOCUMENTATION ONLY (OUTPATIENT)
Dept: FAMILY MEDICINE CLINIC | Age: 37
End: 2017-01-16

## 2017-01-16 RX ORDER — OMEPRAZOLE 20 MG/1
20 CAPSULE, DELAYED RELEASE ORAL DAILY
Qty: 90 CAP | Refills: 1 | Status: SHIPPED | OUTPATIENT
Start: 2017-01-16 | End: 2017-04-26

## 2017-01-26 ENCOUNTER — TELEPHONE (OUTPATIENT)
Dept: FAMILY MEDICINE CLINIC | Age: 37
End: 2017-01-26

## 2017-01-26 NOTE — TELEPHONE ENCOUNTER
See note below. Spoke with pt advised pt new Rx Omeprazole was sent to [facility] 1-16-17. Pt states she will call [facility] to see if Rx is ready for . advised pt will resend Rx if needed.

## 2017-02-07 ENCOUNTER — OFFICE VISIT (OUTPATIENT)
Dept: FAMILY MEDICINE CLINIC | Age: 37
End: 2017-02-07

## 2017-02-07 VITALS
WEIGHT: 206 LBS | OXYGEN SATURATION: 96 % | SYSTOLIC BLOOD PRESSURE: 136 MMHG | HEART RATE: 114 BPM | BODY MASS INDEX: 34.32 KG/M2 | RESPIRATION RATE: 16 BRPM | DIASTOLIC BLOOD PRESSURE: 84 MMHG | HEIGHT: 65 IN | TEMPERATURE: 99.9 F

## 2017-02-07 DIAGNOSIS — R68.89 FLU-LIKE SYMPTOMS: Primary | ICD-10-CM

## 2017-02-07 LAB
QUICKVUE INFLUENZA TEST: NEGATIVE
S PYO AG THROAT QL: NEGATIVE
VALID INTERNAL CONTROL?: YES
VALID INTERNAL CONTROL?: YES

## 2017-02-07 NOTE — PROGRESS NOTES
Identified pt with two pt identifiers(name and ). Chief Complaint   Patient presents with    Cold Symptoms    Sore Throat    Generalized Body Aches        Health Maintenance Due   Topic    EYE EXAM RETINAL OR DILATED Q1     FOOT EXAM Q1     MICROALBUMIN Q1        Wt Readings from Last 3 Encounters:   17 206 lb (93.4 kg)   01/10/17 206 lb (93.4 kg)   17 207 lb (93.9 kg)     Temp Readings from Last 3 Encounters:   17 99.9 °F (37.7 °C) (Oral)   01/10/17 98.2 °F (36.8 °C) (Oral)   17 98.6 °F (37 °C) (Oral)     BP Readings from Last 3 Encounters:   17 136/84   01/10/17 122/78   17 147/80     Pulse Readings from Last 3 Encounters:   17 (!) 114   01/10/17 78   17 100         Learning Assessment:  :     Learning Assessment 2014   PRIMARY LEARNER Patient   HIGHEST LEVEL OF EDUCATION - PRIMARY LEARNER  GRADUATED HIGH SCHOOL OR GED   BARRIERS PRIMARY LEARNER NONE   PRIMARY LANGUAGE ENGLISH   LEARNER PREFERENCE PRIMARY READING   ANSWERED BY patient   RELATIONSHIP SELF       Depression Screening:  :     PHQ 2 / 9, over the last two weeks 2017   Little interest or pleasure in doing things Not at all   Feeling down, depressed or hopeless Not at all   Total Score PHQ 2 0       Fall Risk Assessment:  :     No flowsheet data found. Abuse Screening:  :     Abuse Screening Questionnaire 2016   Do you ever feel afraid of your partner? N   Are you in a relationship with someone who physically or mentally threatens you? N   Is it safe for you to go home?  Y       Coordination of Care Questionnaire:  :     1) Have you been to an emergency room, urgent care clinic since your last visit? no   Hospitalized since your last visit? no             2) Have you seen or consulted any other health care providers outside of [address] [place] [address] since your last visit? no  (Include any pap smears or colon screenings in this section.)    3) Do you have an Advance Directive on file? no  Are you interested in receiving information about Advance Directives? no    Patient is accompanied by self I have received verbal consent from Jocelynn Lilly to discuss any/all medical information while they are present in the room. Reviewed record in preparation for visit and have obtained necessary documentation. Medication reconciliation up to date and corrected with patient at this time.

## 2017-02-07 NOTE — PROGRESS NOTES
HISTORY OF PRESENT ILLNESS  Alfreda White is a 43 y.o. female. HPI  Pt presents with \"cold symptoms, sore throat, and body aches\"    Symptoms started yesterday  Sinus congestion  Body aches  Stomach muscles are sore  Shoulder aches  Fever: off and on  No vomiting, no diarrhea  No cough  OTC: none  Review of Systems   Constitutional: Positive for chills and fever. HENT: Positive for congestion and ear pain. Gastrointestinal: Negative for diarrhea and vomiting. Physical Exam   Constitutional: She is oriented to person, place, and time. She appears well-developed and well-nourished. HENT:   Head: Normocephalic and atraumatic. Right Ear: Hearing, tympanic membrane, external ear and ear canal normal.   Left Ear: Hearing, tympanic membrane, external ear and ear canal normal.   Nose: Nose normal.   Mouth/Throat: Oropharynx is clear and moist.   Neck: Normal range of motion. Neck supple. Cardiovascular: Normal rate, regular rhythm and normal heart sounds. Pulmonary/Chest: Effort normal and breath sounds normal.   Lymphadenopathy:     She has no cervical adenopathy. Neurological: She is alert and oriented to person, place, and time. Skin: Skin is warm and dry. Psychiatric: She has a normal mood and affect. Her behavior is normal.       ASSESSMENT and PLAN    ICD-10-CM ICD-9-CM    1. Flu-like symptoms R68.89 780.99 AMB POC RAPID STREP A      AMB POC RAPID INFLUENZA TEST     Educated patient that I believe that symptoms are viral in origin, and will improve with time. Educated about monitoring symptoms, and returning to office with continued or worsening symptoms. Educated about supportive measures, and staying well hydrated. Pt informed to return to office with worsening of symptoms, or PRN with any questions or concerns. Pt verbalizes understanding of plan of care and denies further questions or concerns at this time.

## 2017-02-09 ENCOUNTER — DOCUMENTATION ONLY (OUTPATIENT)
Dept: FAMILY MEDICINE CLINIC | Age: 37
End: 2017-02-09

## 2017-02-09 NOTE — PROGRESS NOTES
PA completed via telephone with pt. insurance for Invokana 300mg.   PA approved 2/9/17 to 2/9/18 PA ref # [id number]  (PA approved only for 30 day supplies)

## 2017-02-14 ENCOUNTER — DOCUMENTATION ONLY (OUTPATIENT)
Dept: FAMILY MEDICINE CLINIC | Age: 37
End: 2017-02-14

## 2017-02-14 NOTE — PROGRESS NOTES
PA approved for omeprazole. PA# [id number]  approved from 2/14/17 to 5/14/17 per pt insurance company. Pt aware.

## 2017-02-28 ENCOUNTER — TELEPHONE (OUTPATIENT)
Dept: FAMILY MEDICINE CLINIC | Age: 37
End: 2017-02-28

## 2017-02-28 NOTE — TELEPHONE ENCOUNTER
[facility] on [address] called to check the status of (or if we have received)  a prior authorization for medication Protonix 40mg. It was faxed over originally on 2/16/2017. [facility] Stated they will fax over another copy.

## 2017-03-02 ENCOUNTER — APPOINTMENT (OUTPATIENT)
Dept: CT IMAGING | Age: 37
End: 2017-03-02
Attending: EMERGENCY MEDICINE
Payer: MEDICAID

## 2017-03-02 ENCOUNTER — HOSPITAL ENCOUNTER (EMERGENCY)
Age: 37
Discharge: HOME | End: 2017-03-02
Attending: EMERGENCY MEDICINE
Payer: MEDICAID

## 2017-03-02 VITALS
RESPIRATION RATE: 15 BRPM | OXYGEN SATURATION: 96 % | HEART RATE: 99 BPM | WEIGHT: 205.69 LBS | SYSTOLIC BLOOD PRESSURE: 125 MMHG | TEMPERATURE: 98.6 F | HEIGHT: 65 IN | BODY MASS INDEX: 34.27 KG/M2 | DIASTOLIC BLOOD PRESSURE: 68 MMHG

## 2017-03-02 DIAGNOSIS — R10.11 ABDOMINAL PAIN, RIGHT UPPER QUADRANT: Primary | ICD-10-CM

## 2017-03-02 LAB
ALBUMIN SERPL BCP-MCNC: 3.8 G/DL (ref 3.5–5)
ALBUMIN/GLOB SERPL: 1 {RATIO} (ref 1.1–2.2)
ALP SERPL-CCNC: 68 U/L (ref 45–117)
ALT SERPL-CCNC: 29 U/L (ref 12–78)
ANION GAP BLD CALC-SCNC: 11 MMOL/L (ref 5–15)
AST SERPL W P-5'-P-CCNC: 11 U/L (ref 15–37)
BASOPHILS # BLD AUTO: 0.1 K/UL (ref 0–0.1)
BASOPHILS # BLD: 0 % (ref 0–1)
BILIRUB SERPL-MCNC: 0.3 MG/DL (ref 0.2–1)
BUN SERPL-MCNC: 8 MG/DL (ref 6–20)
BUN/CREAT SERPL: 9 (ref 12–20)
CALCIUM SERPL-MCNC: 9.2 MG/DL (ref 8.5–10.1)
CHLORIDE SERPL-SCNC: 103 MMOL/L (ref 97–108)
CO2 SERPL-SCNC: 25 MMOL/L (ref 21–32)
CREAT SERPL-MCNC: 0.91 MG/DL (ref 0.55–1.02)
DIFFERENTIAL METHOD BLD: ABNORMAL
EOSINOPHIL # BLD: 0.2 K/UL (ref 0–0.4)
EOSINOPHIL NFR BLD: 1 % (ref 0–7)
ERYTHROCYTE [DISTWIDTH] IN BLOOD BY AUTOMATED COUNT: 12.8 % (ref 11.5–14.5)
GLOBULIN SER CALC-MCNC: 3.9 G/DL (ref 2–4)
GLUCOSE SERPL-MCNC: 154 MG/DL (ref 65–100)
HCT VFR BLD AUTO: 44.2 % (ref 35–47)
HGB BLD-MCNC: 15.1 G/DL (ref 11.5–16)
LIPASE SERPL-CCNC: 156 U/L (ref 73–393)
LYMPHOCYTES # BLD AUTO: 29 % (ref 12–49)
LYMPHOCYTES # BLD: 3.9 K/UL (ref 0.8–3.5)
MCH RBC QN AUTO: 28.7 PG (ref 26–34)
MCHC RBC AUTO-ENTMCNC: 34.2 G/DL (ref 30–36.5)
MCV RBC AUTO: 84 FL (ref 80–99)
MONOCYTES # BLD: 1.1 K/UL (ref 0–1)
MONOCYTES NFR BLD AUTO: 8 % (ref 5–13)
NEUTS SEG # BLD: 8.5 K/UL (ref 1.8–8)
NEUTS SEG NFR BLD AUTO: 62 % (ref 32–75)
PLATELET # BLD AUTO: 226 K/UL (ref 150–400)
POTASSIUM SERPL-SCNC: 4 MMOL/L (ref 3.5–5.1)
PROT SERPL-MCNC: 7.7 G/DL (ref 6.4–8.2)
RBC # BLD AUTO: 5.26 M/UL (ref 3.8–5.2)
SODIUM SERPL-SCNC: 139 MMOL/L (ref 136–145)
WBC # BLD AUTO: 13.7 K/UL (ref 3.6–11)

## 2017-03-02 PROCEDURE — 74011000250 HC RX REV CODE- 250: Performed by: EMERGENCY MEDICINE

## 2017-03-02 PROCEDURE — 36415 COLL VENOUS BLD VENIPUNCTURE: CPT | Performed by: EMERGENCY MEDICINE

## 2017-03-02 PROCEDURE — 80053 COMPREHEN METABOLIC PANEL: CPT | Performed by: EMERGENCY MEDICINE

## 2017-03-02 PROCEDURE — 85025 COMPLETE CBC W/AUTO DIFF WBC: CPT | Performed by: EMERGENCY MEDICINE

## 2017-03-02 PROCEDURE — 74011250636 HC RX REV CODE- 250/636: Performed by: EMERGENCY MEDICINE

## 2017-03-02 PROCEDURE — 96374 THER/PROPH/DIAG INJ IV PUSH: CPT

## 2017-03-02 PROCEDURE — 74177 CT ABD & PELVIS W/CONTRAST: CPT

## 2017-03-02 PROCEDURE — 83690 ASSAY OF LIPASE: CPT | Performed by: EMERGENCY MEDICINE

## 2017-03-02 PROCEDURE — 96361 HYDRATE IV INFUSION ADD-ON: CPT

## 2017-03-02 PROCEDURE — 74011636320 HC RX REV CODE- 636/320: Performed by: EMERGENCY MEDICINE

## 2017-03-02 PROCEDURE — 96375 TX/PRO/DX INJ NEW DRUG ADDON: CPT

## 2017-03-02 PROCEDURE — 99282 EMERGENCY DEPT VISIT SF MDM: CPT

## 2017-03-02 RX ORDER — SUCRALFATE 1 G/10ML
1 SUSPENSION ORAL 4 TIMES DAILY
Qty: 280 ML | Refills: 0 | Status: SHIPPED | OUTPATIENT
Start: 2017-03-02 | End: 2017-03-09

## 2017-03-02 RX ORDER — KETOROLAC TROMETHAMINE 30 MG/ML
30 INJECTION, SOLUTION INTRAMUSCULAR; INTRAVENOUS
Status: COMPLETED | OUTPATIENT
Start: 2017-03-02 | End: 2017-03-02

## 2017-03-02 RX ORDER — ONDANSETRON 4 MG/1
4 TABLET, ORALLY DISINTEGRATING ORAL
Qty: 12 TAB | Refills: 0 | Status: SHIPPED | OUTPATIENT
Start: 2017-03-02 | End: 2017-03-09 | Stop reason: ALTCHOICE

## 2017-03-02 RX ORDER — HYDROCODONE BITARTRATE AND ACETAMINOPHEN 7.5; 325 MG/1; MG/1
1 TABLET ORAL
Qty: 12 TAB | Refills: 0 | Status: SHIPPED | OUTPATIENT
Start: 2017-03-02 | End: 2017-03-09 | Stop reason: ALTCHOICE

## 2017-03-02 RX ORDER — HYDROMORPHONE HYDROCHLORIDE 1 MG/ML
1 INJECTION, SOLUTION INTRAMUSCULAR; INTRAVENOUS; SUBCUTANEOUS
Status: COMPLETED | OUTPATIENT
Start: 2017-03-02 | End: 2017-03-02

## 2017-03-02 RX ORDER — FAMOTIDINE 10 MG/ML
20 INJECTION INTRAVENOUS
Status: COMPLETED | OUTPATIENT
Start: 2017-03-02 | End: 2017-03-02

## 2017-03-02 RX ORDER — ONDANSETRON 2 MG/ML
4 INJECTION INTRAMUSCULAR; INTRAVENOUS
Status: COMPLETED | OUTPATIENT
Start: 2017-03-02 | End: 2017-03-02

## 2017-03-02 RX ADMIN — FAMOTIDINE 20 MG: 10 INJECTION, SOLUTION INTRAVENOUS at 18:04

## 2017-03-02 RX ADMIN — IOPAMIDOL 100 ML: 755 INJECTION, SOLUTION INTRAVENOUS at 18:48

## 2017-03-02 RX ADMIN — SODIUM CHLORIDE 1000 ML: 900 INJECTION, SOLUTION INTRAVENOUS at 18:04

## 2017-03-02 RX ADMIN — ONDANSETRON 4 MG: 2 INJECTION INTRAMUSCULAR; INTRAVENOUS at 18:04

## 2017-03-02 RX ADMIN — HYDROMORPHONE HYDROCHLORIDE 1 MG: 1 INJECTION, SOLUTION INTRAMUSCULAR; INTRAVENOUS; SUBCUTANEOUS at 18:05

## 2017-03-02 RX ADMIN — KETOROLAC TROMETHAMINE 30 MG: 30 INJECTION, SOLUTION INTRAMUSCULAR at 18:04

## 2017-03-02 NOTE — ED TRIAGE NOTES
Patient had abdominal pain that began at 1330 today, on the right side. Is balled up and crying in the bed. Has had multiple abdominal surgeries. Did eat yogurt and steak and cheese today. Nausea and no vomiting. No fever.

## 2017-03-03 NOTE — ED NOTES
Patient resting on the stretcher in less distress and currently without complaints. Call bell within reach; will continue to monitor.

## 2017-03-03 NOTE — ED PROVIDER NOTES
Patient is a 43 y.o. female presenting with abdominal pain. Abdominal Pain    Pertinent negatives include no diarrhea, no vomiting, no dysuria, no headaches and no back pain. Pt reports abrupt onset of RUQ/epigastric area pain today around 1pm She states that she had eaten a steak and cheese hot pocket around 12noon. Denies fever, cold symptoms, headache, neck pain, visual changes, focal weakness or rash. Denies any difficulty breathing, difficulty swallowing, SOB or chest pain. She reports nausea but denies any vomiting or diarrhea. Pt. Reports taking TUMS without relief. Past Medical History:   Diagnosis Date    Anxiety     Asthma     Diabetes ([facility] [address] 2008    Headache     Headache(784.0)     Hypertension     Rash 7/14/2016    S/P dilatation and curettage        Past Surgical History:   Procedure Laterality Date    HX APPENDECTOMY  2/2008    HX CHOLECYSTECTOMY  2001    HX DILATION AND CURETTAGE      HX GYN  3/2008    tubal ligation    HX HERNIA REPAIR  2/2009    HX HYSTERECTOMY  03/2012    HX LUMBAR DISKECTOMY      2006    HX ORTHOPAEDIC  9/2006    ruptured discs         Family History:   Problem Relation Age of Onset    Heart Disease Mother     Hypertension Father     Hypertension Sister        Social History     Social History    Marital status:      Spouse name: N/A    Number of children: N/A    Years of education: N/A     Occupational History    Not on file. Social History Main Topics    Smoking status: Never Smoker    Smokeless tobacco: Never Used    Alcohol use No    Drug use: No    Sexual activity: Yes     Partners: Male     Birth control/ protection: Surgical     Other Topics Concern    Not on file     Social History Narrative         ALLERGIES: Green pepper    Review of Systems   Constitutional: Negative for activity change and appetite change. HENT: Negative for facial swelling, sore throat and trouble swallowing. Eyes: Negative.     Respiratory: Negative for shortness of breath. Cardiovascular: Negative. Gastrointestinal: Positive for abdominal pain. Negative for diarrhea and vomiting. Genitourinary: Negative for dysuria. Musculoskeletal: Negative for back pain and neck pain. Skin: Negative for color change. Neurological: Negative for headaches. Psychiatric/Behavioral: Negative. Vitals:    03/02/17 1750   BP: (!) 151/94   Pulse: (!) 117   Resp: 20   Temp: 98.6 °F (37 °C)   SpO2: 98%   Weight: 93.3 kg (205 lb 11 oz)   Height: 5' 5\" (1.651 m)            Physical Exam   Constitutional: She is oriented to person, place, and time. Obese white female; non smoker   HENT:   Head: Normocephalic. Right Ear: External ear normal.   Left Ear: External ear normal.   Mouth/Throat: Oropharynx is clear and moist.   Eyes: Pupils are equal, round, and reactive to light. Neck: Normal range of motion. Neck supple. Cardiovascular: Normal rate and regular rhythm. Pulmonary/Chest: Effort normal and breath sounds normal.   Abdominal: Soft. Bowel sounds are normal. She exhibits no distension and no mass. There is tenderness. There is no rebound and no guarding. RUQ pain with palpation and walking   Musculoskeletal: Normal range of motion. Lymphadenopathy:     She has no cervical adenopathy. Neurological: She is alert and oriented to person, place, and time. Skin: Skin is warm and dry. No rash noted. Nursing note and vitals reviewed. [facility]  ED Course       Procedures    Dr. Joi Abraham examined the pt and discussed the plan of care. Pt has been re-examined and is feeling better. Discussed the need for close follow up with a gastroenterologist.  7:30 PM  Patient's results and plan of care have been reviewed with her.   Patient and/or family have verbally conveyed their understanding and agreement of the patient's signs, symptoms, diagnosis, treatment and prognosis and additionally agree to follow up as recommended or return to the Emergency Room should her condition change prior to follow-up. Discharge instructions have also been provided to the patient with some educational information regarding her diagnosis as well a list of reasons why she would want to return to the ER prior to her follow-up appointment should her condition change. Bianca Chen, NP

## 2017-03-03 NOTE — ED NOTES
The patient was discharged home by [facility], NP in stable condition. The patient is alert and oriented, in no respiratory distress and discharge vital signs obtained. The patient's diagnosis, condition and treatment were explained. The patient expressed understanding. A discharge plan has been developed. A  was not involved in the process. Aftercare instructions were given. Pt ambulatory out of the ED with family. \"I feel much better. Can I drink more fluids tonight? \" Talked with her about dietary needs for tonight and tomorrow.

## 2017-03-03 NOTE — DISCHARGE INSTRUCTIONS
We hope that we have addressed all of your medical concerns. The examination and treatment you received in the Emergency Department were for an emergent problem and were not intended as complete care. It is important that you follow up with your healthcare provider(s) for ongoing care. If your symptoms worsen or do not improve as expected, and you are unable to reach your usual health care provider(s), you should return to the Emergency Department. Today's healthcare is undergoing tremendous change, and patient satisfaction surveys are one of the many tools to assess the quality of medical care. You may receive a survey from the [employer] regarding your experience in the Emergency Department. I hope that your experience has been completely positive, particularly the medical care that I provided. As such, please participate in the survey; anything less than excellent does not meet my expectations or intentions. Former[facility] and [address] [place] [address] participate in nationally recognized quality of care measures. If your blood pressure is greater than 120/80, as reported below, we urge that you seek medical care to address the potential of high blood pressure, commonly known as hypertension. Hypertension can be hereditary or can be caused by certain medical conditions, pain, stress, or \"white coat syndrome. \"       Please make an appointment with your health care provider(s) for follow up of your Emergency Department visit. VITALS:   Patient Vitals for the past 8 hrs:   Temp Pulse Resp BP SpO2   03/02/17 1750 98.6 °F (37 °C) (!) 117 20 (!) 151/94 98 %          Thank you for allowing us to provide you with medical care today. We realize that you have many choices for your emergency care needs. Please choose us in the future for any continued health care needs.       Nikki Gonsalez NP    [facility].   Office: [phone]            Recent Results (from the past 24 hour(s))   CBC WITH AUTOMATED DIFF    Collection Time: 03/02/17  5:59 PM   Result Value Ref Range    WBC 13.7 (H) 3.6 - 11.0 K/uL    RBC 5.26 (H) 3.80 - 5.20 M/uL    HGB 15.1 11.5 - 16.0 g/dL    HCT 44.2 35.0 - 47.0 %    MCV 84.0 80.0 - 99.0 FL    MCH 28.7 26.0 - 34.0 PG    MCHC 34.2 30.0 - 36.5 g/dL    RDW 12.8 11.5 - 14.5 %    PLATELET 009 413 - 997 K/uL    NEUTROPHILS 62 32 - 75 %    LYMPHOCYTES 29 12 - 49 %    MONOCYTES 8 5 - 13 %    EOSINOPHILS 1 0 - 7 %    BASOPHILS 0 0 - 1 %    ABS. NEUTROPHILS 8.5 (H) 1.8 - 8.0 K/UL    ABS. LYMPHOCYTES 3.9 (H) 0.8 - 3.5 K/UL    ABS. MONOCYTES 1.1 (H) 0.0 - 1.0 K/UL    ABS. EOSINOPHILS 0.2 0.0 - 0.4 K/UL    ABS. BASOPHILS 0.1 0.0 - 0.1 K/UL    DF AUTOMATED     METABOLIC PANEL, COMPREHENSIVE    Collection Time: 03/02/17  5:59 PM   Result Value Ref Range    Sodium 139 136 - 145 mmol/L    Potassium 4.0 3.5 - 5.1 mmol/L    Chloride 103 97 - 108 mmol/L    CO2 25 21 - 32 mmol/L    Anion gap 11 5 - 15 mmol/L    Glucose 154 (H) 65 - 100 mg/dL    BUN 8 6 - 20 MG/DL    Creatinine 0.91 0.55 - 1.02 MG/DL    BUN/Creatinine ratio 9 (L) 12 - 20      GFR est AA >60 >60 ml/min/1.73m2    GFR est non-AA >60 >60 ml/min/1.73m2    Calcium 9.2 8.5 - 10.1 MG/DL    Bilirubin, total 0.3 0.2 - 1.0 MG/DL    ALT (SGPT) 29 12 - 78 U/L    AST (SGOT) 11 (L) 15 - 37 U/L    Alk. phosphatase 68 45 - 117 U/L    Protein, total 7.7 6.4 - 8.2 g/dL    Albumin 3.8 3.5 - 5.0 g/dL    Globulin 3.9 2.0 - 4.0 g/dL    A-G Ratio 1.0 (L) 1.1 - 2.2     LIPASE    Collection Time: 03/02/17  5:59 PM   Result Value Ref Range    Lipase 156 73 - 393 U/L       Ct Abd Pelv W Cont    Result Date: 3/2/2017  EXAM: CT ABDOMEN PELVIS WITH CONTRAST INDICATION:  Abdominal pain. COMPARISON: . CONTRAST: 100 mL of Isovue-370. The patient was instructed to discontinue metformin for 48 hours and check with her physician before resuming the medication.  TECHNIQUE: Multislice helical CT was performed from the diaphragm to the symphysis pubis during uneventful rapid bolus intravenous contrast administration. Oral contrast was not administered. Contiguous 5 mm axial images were reconstructed and lung and soft tissue windows were generated. Coronal and sagittal reformations were generated. CT dose reduction was achieved through use of a standardized protocol tailored for this examination and automatic exposure control for dose modulation. Adaptive statistical iterative reconstruction (ASIR) was utilized for this examination. FINDINGS: LOWER CHEST: The visualized portions of the lung bases are clear. ABDOMEN: Liver: The liver is normal in size and contour with no focal abnormality. The attenuation of the liver is decreased throughout. Gallbladder and bile ducts: There are clips in the gallbladder fossa and the gallbladder is absent. There is no biliary duct dilatation. Spleen: No abnormality. Pancreas: No abnormality. Adrenal glands: No abnormality. Kidneys: No abnormality. PELVIS: Reproductive organs: The uterus is absent. The ovaries are normal. Bladder: No abnormality. BOWEL AND MESENTERY: The small bowel is normal.  There is no mesenteric mass or adenopathy. The appendix is absent and there are surgical clips adjacent to the cecum. Mliss Ansari PERITONEUM: There is no ascites or free intraperitoneal air. RETROPERITONEUM: The aorta  tapers without aneurysm. There is no retroperitoneal adenopathy or mass. There is no pelvic mass or adenopathy. BONES AND SOFT TISSUES: The bones and soft tissues of the abdominal wall are within normal limits. IMPRESSION: 1. No acute abdominal or pelvic abnormality. 2. Status post cholecystectomy. 3. Hepatic steatosis. 4. Status post hysterectomy. 5. Status post appendectomy. .             Abdominal Pain: Care Instructions  Your Care Instructions    Abdominal pain has many possible causes. Some aren't serious and get better on their own in a few days.  Others need more testing and treatment. If your pain continues or gets worse, you need to be rechecked and may need more tests to find out what is wrong. You may need surgery to correct the problem. Don't ignore new symptoms, such as fever, nausea and vomiting, urination problems, pain that gets worse, and dizziness. These may be signs of a more serious problem. Your doctor may have recommended a follow-up visit in the next 8 to 12 hours. If you are not getting better, you may need more tests or treatment. The doctor has checked you carefully, but problems can develop later. If you notice any problems or new symptoms, get medical treatment right away. Follow-up care is a key part of your treatment and safety. Be sure to make and go to all appointments, and call your doctor if you are having problems. It's also a good idea to know your test results and keep a list of the medicines you take. How can you care for yourself at home? · Rest until you feel better. · To prevent dehydration, drink plenty of fluids, enough so that your urine is light yellow or clear like water. Choose water and other caffeine-free clear liquids until you feel better. If you have kidney, heart, or liver disease and have to limit fluids, talk with your doctor before you increase the amount of fluids you drink. · If your stomach is upset, eat mild foods, such as rice, dry toast or crackers, bananas, and applesauce. Try eating several small meals instead of two or three large ones. · Wait until 48 hours after all symptoms have gone away before you have spicy foods, alcohol, and drinks that contain caffeine. · Do not eat foods that are high in fat. · Avoid anti-inflammatory medicines such as aspirin, ibuprofen (Advil, Motrin), and naproxen (Aleve). These can cause stomach upset. Talk to your doctor if you take daily aspirin for another health problem. When should you call for help? Call 911 anytime you think you may need emergency care.  For example, call if:  · You passed out (lost consciousness). · You pass maroon or very bloody stools. · You vomit blood or what looks like coffee grounds. · You have new, severe belly pain. Call your doctor now or seek immediate medical care if:  · Your pain gets worse, especially if it becomes focused in one area of your belly. · You have a new or higher fever. · Your stools are black and look like tar, or they have streaks of blood. · You have unexpected vaginal bleeding. · You have symptoms of a urinary tract infection. These may include:  ¨ Pain when you urinate. ¨ Urinating more often than usual.  ¨ Blood in your urine. · You are dizzy or lightheaded, or you feel like you may faint. Watch closely for changes in your health, and be sure to contact your doctor if:  · You are not getting better after 1 day (24 hours). Where can you learn more? Go to http://hodan-eugene.info/. Enter G936 in the search box to learn more about \"Abdominal Pain: Care Instructions. \"  Current as of: May 27, 2016  Content Version: 11.1  © 1325-4729 [employer]. Care instructions adapted under license by [employer] (which disclaims liability or warranty for this information). If you have questions about a medical condition or this instruction, always ask your healthcare professional. [address] any warranty or liability for your use of this information.

## 2017-03-09 ENCOUNTER — OFFICE VISIT (OUTPATIENT)
Dept: FAMILY MEDICINE CLINIC | Age: 37
End: 2017-03-09

## 2017-03-09 ENCOUNTER — TELEPHONE (OUTPATIENT)
Dept: FAMILY MEDICINE CLINIC | Age: 37
End: 2017-03-09

## 2017-03-09 VITALS
TEMPERATURE: 98.4 F | BODY MASS INDEX: 34.16 KG/M2 | HEIGHT: 65 IN | HEART RATE: 112 BPM | OXYGEN SATURATION: 99 % | RESPIRATION RATE: 16 BRPM | DIASTOLIC BLOOD PRESSURE: 74 MMHG | SYSTOLIC BLOOD PRESSURE: 120 MMHG | WEIGHT: 205 LBS

## 2017-03-09 DIAGNOSIS — L30.9 DERMATITIS: Primary | ICD-10-CM

## 2017-03-09 RX ORDER — TRIAMCINOLONE ACETONIDE 1 MG/G
CREAM TOPICAL 2 TIMES DAILY
Qty: 15 G | Refills: 0 | Status: ON HOLD | OUTPATIENT
Start: 2017-03-09 | End: 2017-05-11

## 2017-03-09 NOTE — MR AVS SNAPSHOT
Visit Information Date & Time Provider Department Dept. Phone Encounter #  
 3/9/2017  1:15 PM Zamzam Villa NP [address] [facility] [id number] Follow-up Instructions Return if symptoms worsen or fail to improve. Upcoming Health Maintenance Date Due  
 EYE EXAM RETINAL OR DILATED Q1 9/1/2014 FOOT EXAM Q1 2/5/2017 MICROALBUMIN Q1 2/5/2017 HEMOGLOBIN A1C Q6M 7/10/2017 DTaP/Tdap/Td series (2 - Td) 1/1/2018 LIPID PANEL Q1 1/10/2018 PAP AKA CERVICAL CYTOLOGY 6/15/2019 Allergies as of 3/9/2017  Review Complete On: 3/9/2017 By: Zamzam Villa NP Severity Noted Reaction Type Reactions Green Pepper  06/28/2012    Hives Current Immunizations  Reviewed on 3/18/2014 Name Date Influenza Vaccine 10/26/2013 TDAP Vaccine 1/1/2008 Not reviewed this visit You Were Diagnosed With   
  
 Codes Comments Dermatitis    -  Primary ICD-10-CM: L30.9 ICD-9-CM: 692.9 Vitals BP Pulse Temp Resp Height(growth percentile) Weight(growth percentile) 120/74 (!) 112 98.4 °F (36.9 °C) (Oral) 16 5' 5\" (1.651 m) 205 lb (93 kg) LMP SpO2 BMI OB Status Smoking Status 01/25/2012 99% 34.11 kg/m2 Hysterectomy Never Smoker BMI and BSA Data Body Mass Index Body Surface Area  
 34.11 kg/m 2 2.07 m 2 Preferred Pharmacy Pharmacy Name Phone [facility]/PHARMACY #[id number] - Amrik Flores - [address] [phone] Your Updated Medication List  
  
   
This list is accurate as of: 3/9/17  1:19 PM.  Always use your most recent med list.  
  
  
  
  
 * albuterol 90 mcg/actuation inhaler Commonly known as:  VENTOLIN HFA INHALE 2 PUFFS BY MOUTH EVERY 4 HOURS AS NEEDED FOR WHEEZING  
  
 * albuterol 2.5 mg /3 mL (0.083 %) nebulizer solution Commonly known as:  PROVENTIL VENTOLIN  
3 mL by Nebulization route every four (4) hours as needed for Wheezing for up to 30 doses. atorvastatin 20 mg tablet Commonly known as:  LIPITOR  
TAKE 1 TABLET BY MOUTH EVERY DAY Blood-Glucose Meter monitoring kit Please give one kit. butalbital-acetaminophen-caffeine -40 mg per tablet Commonly known as:  Gennett Madura Take 1 Tab by mouth every six (6) hours as needed for Pain. Max Daily Amount: 4 Tabs.  
  
 ergocalciferol 50,000 unit capsule Commonly known as:  ERGOCALCIFEROL Take 1 Cap by mouth every seven (7) days. fluticasone 50 mcg/actuation nasal spray Commonly known as:  FLONASE  
2 SQUIRTS DAILY AS NEEDED. INVOKANA 300 mg tablet Generic drug:  canagliflozin Take 1 Tab by mouth Daily (before breakfast). JANUVIA 100 mg tablet Generic drug:  SITagliptin Take 1 Tab by mouth once over twenty-four (24) hours. Lancets Misc Commonly known as:  MICROLET LANCET  
USE TO CHECK BLOOD SUGAR ONE TO TWO TIMES DAILY  
  
 metFORMIN 1,000 mg tablet Commonly known as:  GLUCOPHAGE  
TAKE 1 TABLET BY MOUTH TWICE A DAY  
  
 montelukast 10 mg tablet Commonly known as:  SINGULAIR Take 1 Tab by mouth daily. naratriptan 2.5 mg Tab Commonly known as:  Christean Slough Take 1 Tab by mouth once as needed for up to 1 dose. For migraine. Can repeat 1 tab in 4 hours if needed. Limit: 2 tabs in 24 hours Nebulizer & Compressor machine Use as needed for wheezing, cough  
  
 omeprazole 20 mg capsule Commonly known as:  PRILOSEC Take 1 Cap by mouth daily. simvastatin 20 mg tablet Commonly known as:  ZOCOR Take  by mouth nightly. sucralfate 100 mg/mL suspension Commonly known as:  Anita Deep Take 10 mL by mouth four (4) times daily for 7 days. topiramate 50 mg tablet Commonly known as:  TOPAMAX Take 1 tab twice a day for migraine prevention * triamcinolone acetonide 0.1 % topical cream  
Commonly known as:  KENALOG Apply  to affected area two (2) times a day. use thin layer  * triamcinolone acetonide 0.1 % topical cream  
 Commonly known as:  KENALOG Apply  to affected area two (2) times a day. use thin layer * TRUETEST TEST STRIPS strip Generic drug:  glucose blood VI test strips TO BE USED AS DIRECTED * glucose blood VI test strips strip Commonly known as:  Ascensia CONTOUR  
TO BE USED AS DIRECTED * Notice: This list has 6 medication(s) that are the same as other medications prescribed for you. Read the directions carefully, and ask your doctor or other care provider to review them with you. Prescriptions Printed Refills  
 triamcinolone acetonide (KENALOG) 0.1 % topical cream 0 Sig: Apply  to affected area two (2) times a day. use thin layer Class: Print Route: Topical  
  
Follow-up Instructions Return if symptoms worsen or fail to improve. Patient Instructions Dermatitis: Care Instructions Your Care Instructions Dermatitis is the general name used for any rash or inflammation of the skin. Different kinds of dermatitis cause different kinds of rashes. Common causes of a rash include new medicines, plants (such as poison oak or poison ivy), heat, and stress. Certain illnesses can also cause a rash. An allergic reaction to something that touches your skin, such as latex, nickel, or poison ivy, is called contact dermatitis. Contact dermatitis may also be caused by something that irritates the skin, such as bleach, a chemical, or soap. These types of rashes cannot be spread from person to person. How long your rash will last depends on what caused it. Rashes may last a few days or months. Follow-up care is a key part of your treatment and safety. Be sure to make and go to all appointments, and call your doctor if you are having problems. It's also a good idea to know your test results and keep a list of the medicines you take. How can you care for yourself at home? · Do not scratch the rash. Cut your nails short, and file them smooth.  Or wear gloves if this helps keep you from scratching. · Wash the area with water only. Pat dry. · Put cold, wet cloths on the rash to reduce itching. · Keep cool, and stay out of the sun. · Leave the rash open to the air as much as possible. · If the rash itches, use hydrocortisone cream. Follow the directions on the label. Calamine lotion may help for plant rashes. · Take an over-the-counter antihistamine, such as diphenhydramine (Benadryl) or loratadine (Claritin), to help calm the itching. Read and follow all instructions on the label. · If your doctor prescribed a cream, use it as directed. If your doctor prescribed medicine, take it exactly as directed. When should you call for help? Call your doctor now or seek immediate medical care if: 
· You have symptoms of infection, such as: 
¨ Increased pain, swelling, warmth, or redness. ¨ Red streaks leading from the area. ¨ Pus draining from the area. ¨ A fever. · You have joint pain along with the rash. Watch closely for changes in your health, and be sure to contact your doctor if: 
· Your rash is changing or getting worse. · You are not getting better as expected. Where can you learn more? Go to http://hodan-eugene.info/. Enter [phone] in the search box to learn more about \"Dermatitis: Care Instructions. \" Current as of: May 10, 2016 Content Version: 11.1 © 4286-5504 [employer]. Care instructions adapted under license by [employer] (which disclaims liability or warranty for this information). If you have questions about a medical condition or this instruction, always ask your healthcare professional. [place] [address] any warranty or liability for your use of this information. Introducing [facility]! Romayne Duster introduces [employer] patient portal. Now you can access parts of your medical record, email your doctor's office, and request medication refills online. 1. In your internet browser, go to [email or web address] [email or web address] 2. Click on the First Time User? Click Here link in the Sign In box. You will see the New Member Sign Up page. 3. Enter your Just Soles Access Code exactly as it appears below. You will not need to use this code after youve completed the sign-up process. If you do not sign up before the expiration date, you must request a new code. · Just Soles Access Code: [email or web address] Expires: 3/29/2017  1:30 PM 
 
4. Enter the last four digits of your Social Security Number (xxxx) and Date of Birth (mm/dd/yyyy) as indicated and click Submit. You will be taken to the next sign-up page. 5. Create a Storeliftt ID. This will be your Just Soles login ID and cannot be changed, so think of one that is secure and easy to remember. 6. Create a Just Soles password. You can change your password at any time. 7. Enter your Password Reset Question and Answer. This can be used at a later time if you forget your password. 8. Enter your e-mail address. You will receive e-mail notification when new information is available in [address] 9. Click Sign Up. You can now view and download portions of your medical record. 10. Click the Download Summary menu link to download a portable copy of your medical information. If you have questions, please visit the Frequently Asked Questions section of the Just Soles website. Remember, Just Soles is NOT to be used for urgent needs. For medical emergencies, dial 911. Now available from your iPhone and Android! Please provide this summary of care documentation to your next provider. Your primary care clinician is listed as [address]. If you have any questions after today's visit, please call [phone].

## 2017-03-09 NOTE — PROGRESS NOTES
Identified pt with two pt identifiers(name and ). Chief Complaint   Patient presents with    Dry Skin     painful around toe        Health Maintenance Due   Topic    EYE EXAM RETINAL OR DILATED Q1     FOOT EXAM Q1     MICROALBUMIN Q1        Wt Readings from Last 3 Encounters:   17 205 lb (93 kg)   17 205 lb 11 oz (93.3 kg)   17 206 lb (93.4 kg)     Temp Readings from Last 3 Encounters:   17 98.4 °F (36.9 °C) (Oral)   17 98.6 °F (37 °C)   17 99.9 °F (37.7 °C) (Oral)     BP Readings from Last 3 Encounters:   17 120/74   17 125/68   17 136/84     Pulse Readings from Last 3 Encounters:   17 (!) 112   17 99   17 (!) 114         Learning Assessment:  :     Learning Assessment 2014   PRIMARY LEARNER Patient   HIGHEST LEVEL OF EDUCATION - PRIMARY LEARNER  GRADUATED HIGH SCHOOL OR GED   BARRIERS PRIMARY LEARNER NONE   PRIMARY LANGUAGE ENGLISH   LEARNER PREFERENCE PRIMARY READING   ANSWERED BY patient   RELATIONSHIP SELF       Depression Screening:  :     PHQ 2 / 9, over the last two weeks 3/9/2017   Little interest or pleasure in doing things Not at all   Feeling down, depressed or hopeless Not at all   Total Score PHQ 2 0       Fall Risk Assessment:  :     No flowsheet data found. Abuse Screening:  :     Abuse Screening Questionnaire 2016   Do you ever feel afraid of your partner? N   Are you in a relationship with someone who physically or mentally threatens you? N   Is it safe for you to go home? Y       Coordination of Care Questionnaire:  :     1) Have you been to an emergency room, urgent care clinic since your last visit? no   Hospitalized since your last visit? no             2) Have you seen or consulted any other health care providers outside of [address] [place] [address] since your last visit? no  (Include any pap smears or colon screenings in this section.)    3) Do you have an Advance Directive on file?  no  Are you interested in receiving information about Advance Directives? no    Patient is accompanied by self I have received verbal consent from [facility] to discuss any/all medical information while they are present in the room. Reviewed record in preparation for visit and have obtained necessary documentation. Medication reconciliation up to date and corrected with patient at this time.

## 2017-03-09 NOTE — TELEPHONE ENCOUNTER
See note below. Pt is having painful dry skin cracked skin around toes & feet. advised pt she will need to be seen. apt made today at 1:15 per pt request.

## 2017-03-09 NOTE — PATIENT INSTRUCTIONS
Dermatitis: Care Instructions  Your Care Instructions  Dermatitis is the general name used for any rash or inflammation of the skin. Different kinds of dermatitis cause different kinds of rashes. Common causes of a rash include new medicines, plants (such as poison oak or poison ivy), heat, and stress. Certain illnesses can also cause a rash. An allergic reaction to something that touches your skin, such as latex, nickel, or poison ivy, is called contact dermatitis. Contact dermatitis may also be caused by something that irritates the skin, such as bleach, a chemical, or soap. These types of rashes cannot be spread from person to person. How long your rash will last depends on what caused it. Rashes may last a few days or months. Follow-up care is a key part of your treatment and safety. Be sure to make and go to all appointments, and call your doctor if you are having problems. It's also a good idea to know your test results and keep a list of the medicines you take. How can you care for yourself at home? · Do not scratch the rash. Cut your nails short, and file them smooth. Or wear gloves if this helps keep you from scratching. · Wash the area with water only. Pat dry. · Put cold, wet cloths on the rash to reduce itching. · Keep cool, and stay out of the sun. · Leave the rash open to the air as much as possible. · If the rash itches, use hydrocortisone cream. Follow the directions on the label. Calamine lotion may help for plant rashes. · Take an over-the-counter antihistamine, such as diphenhydramine (Benadryl) or loratadine (Claritin), to help calm the itching. Read and follow all instructions on the label. · If your doctor prescribed a cream, use it as directed. If your doctor prescribed medicine, take it exactly as directed. When should you call for help?   Call your doctor now or seek immediate medical care if:  · You have symptoms of infection, such as:  ¨ Increased pain, swelling, warmth, or redness. ¨ Red streaks leading from the area. ¨ Pus draining from the area. ¨ A fever. · You have joint pain along with the rash. Watch closely for changes in your health, and be sure to contact your doctor if:  · Your rash is changing or getting worse. · You are not getting better as expected. Where can you learn more? Go to http://hodan-eugene.info/. Enter [phone] in the search box to learn more about \"Dermatitis: Care Instructions. \"  Current as of: May 10, 2016  Content Version: 11.1  © 1169-5501 [employer]. Care instructions adapted under license by [employer] (which disclaims liability or warranty for this information). If you have questions about a medical condition or this instruction, always ask your healthcare professional. [address] any warranty or liability for your use of this information.

## 2017-03-09 NOTE — PROGRESS NOTES
HISTORY OF PRESENT ILLNESS  Avery Colon is a 43 y.o. female. HPI  Pt presents with \"dry skin\"    Pt states that between her toes, her last two toes, she has extremely dry skin. Pt states that she has been using good diabetic lotion for her feet, to try and fix the issue, without any benefit. Pt states that this has been present for about 2 weeks. No pain or drainage noted from the area. No numbness or tingling in toes. Review of Systems   Constitutional: Negative for fever. HENT: Negative for congestion. Respiratory: Negative for cough. Gastrointestinal: Negative for diarrhea and vomiting. Physical Exam   Constitutional: She is oriented to person, place, and time. She appears well-developed and well-nourished. HENT:   Head: Normocephalic and atraumatic. Cardiovascular: Normal rate, regular rhythm and normal heart sounds. Pulmonary/Chest: Effort normal and breath sounds normal.   Neurological: She is alert and oriented to person, place, and time. Skin: Skin is warm and dry. Psychiatric: She has a normal mood and affect. Her behavior is normal.       ASSESSMENT and PLAN    ICD-10-CM ICD-9-CM    1. Dermatitis L30.9 692.9 triamcinolone acetonide (KENALOG) 0.1 % topical cream     Informed patient to apply cream to area. Educated about continuing to use lotion for hydration, and protecting feet with good socks and shoes    Pt informed to return to office with worsening of symptoms, or PRN with any questions or concerns. Pt verbalizes understanding of plan of care and denies further questions or concerns at this time.

## 2017-03-15 ENCOUNTER — OFFICE VISIT (OUTPATIENT)
Dept: FAMILY MEDICINE CLINIC | Age: 37
End: 2017-03-15

## 2017-03-15 ENCOUNTER — TELEPHONE (OUTPATIENT)
Dept: FAMILY MEDICINE CLINIC | Age: 37
End: 2017-03-15

## 2017-03-15 VITALS
DIASTOLIC BLOOD PRESSURE: 75 MMHG | TEMPERATURE: 97.1 F | HEART RATE: 101 BPM | WEIGHT: 207 LBS | OXYGEN SATURATION: 96 % | HEIGHT: 65 IN | RESPIRATION RATE: 18 BRPM | SYSTOLIC BLOOD PRESSURE: 106 MMHG | BODY MASS INDEX: 34.49 KG/M2

## 2017-03-15 DIAGNOSIS — Z91.09 ENVIRONMENTAL ALLERGIES: ICD-10-CM

## 2017-03-15 DIAGNOSIS — E78.5 DYSLIPIDEMIA (HIGH LDL; LOW HDL): ICD-10-CM

## 2017-03-15 DIAGNOSIS — R51.9 CHRONIC DAILY HEADACHE: ICD-10-CM

## 2017-03-15 DIAGNOSIS — G43.709 CHRONIC MIGRAINE WITHOUT AURA WITHOUT STATUS MIGRAINOSUS, NOT INTRACTABLE: ICD-10-CM

## 2017-03-15 DIAGNOSIS — E55.9 VITAMIN D DEFICIENCY: ICD-10-CM

## 2017-03-15 DIAGNOSIS — E11.9 TYPE 2 DIABETES MELLITUS WITHOUT COMPLICATION, WITH LONG-TERM CURRENT USE OF INSULIN (HCC): ICD-10-CM

## 2017-03-15 DIAGNOSIS — E11.9 TYPE 2 DIABETES MELLITUS WITHOUT COMPLICATION, UNSPECIFIED LONG TERM INSULIN USE STATUS: ICD-10-CM

## 2017-03-15 DIAGNOSIS — R63.5 WEIGHT GAIN: ICD-10-CM

## 2017-03-15 DIAGNOSIS — E11.9 TYPE 2 DIABETES MELLITUS WITHOUT COMPLICATION, WITHOUT LONG-TERM CURRENT USE OF INSULIN (HCC): Primary | ICD-10-CM

## 2017-03-15 DIAGNOSIS — G43.909 MIGRAINE WITHOUT STATUS MIGRAINOSUS, NOT INTRACTABLE, UNSPECIFIED MIGRAINE TYPE: ICD-10-CM

## 2017-03-15 DIAGNOSIS — Z79.4 TYPE 2 DIABETES MELLITUS WITHOUT COMPLICATION, WITH LONG-TERM CURRENT USE OF INSULIN (HCC): ICD-10-CM

## 2017-03-15 RX ORDER — CETIRIZINE HCL 10 MG
TABLET ORAL
Refills: 12 | COMMUNITY
Start: 2017-03-07 | End: 2017-06-27

## 2017-03-15 NOTE — PROGRESS NOTES
CC:  Chief Complaint   Patient presents with    Follow-up    Request For New Medication     HISTORY OF PRESENT ILLNESS  Avery Colon is a 43 y.o. female. HPI Comments: Who presents today for follow up of Type II DM, Hyperlipidemia and she is concerned about BMI and weight gain. She would like to try Contrave. She has heard a lot about this medication and trying to get her weight down for co-morbidities. Not successful with diet or other supplements. ROS:  Review of Systems   All other systems reviewed and are negative. OBJECTIVE:  Visit Vitals    /75 (BP 1 Location: Right arm, BP Patient Position: Sitting)  Comment: auto cuff    Pulse (!) 101    Temp 97.1 °F (36.2 °C) (Oral)    Resp 18    Ht 5' 5\" (1.651 m)    Wt 207 lb (93.9 kg)    LMP 01/25/2012    SpO2 96%    BMI 34.45 kg/m2   Physical Exam   Constitutional:   BMI 34   Cardiovascular: Normal rate and regular rhythm. Pulmonary/Chest: Effort normal and breath sounds normal.   Musculoskeletal: Normal range of motion. Neurological: She is alert. Skin: Skin is warm. Nursing note and vitals reviewed. ASSESSMENT and PLAN    ICD-10-CM ICD-9-CM    1. Type 2 diabetes mellitus without complication, without long-term current use of insulin ([facility]) E11.9 250.00 naltrexone-buPROPion (CONTRAVE) 8-90 mg TbER ER tablet       DIABETES FOOT EXAM      MICROALBUMIN, UR, RAND W/ MICROALBUMIN/CREA RATIO   2. BMI 34.0-34.9,adult Z68.34 V85.34 naltrexone-buPROPion (CONTRAVE) 8-90 mg TbER ER tablet   3. Weight gain R63.5 783.1 naltrexone-buPROPion (CONTRAVE) 8-90 mg TbER ER tablet   4. Dyslipidemia (high LDL; low HDL) E78.4 272.4 atorvastatin (LIPITOR) 20 mg tablet   5. Type 2 diabetes mellitus without complication, unspecified long term insulin use status ([facility]) E11.9 250.00 INVOKANA 300 mg tablet   6.  Type 2 diabetes mellitus without complication, with long-term current use of insulin ([facility]) E11.9 250.00 JANUVIA 100 mg tablet    Z79.4 V58.67 metFORMIN (GLUCOPHAGE) 1,000 mg tablet   7. Vitamin D deficiency E55.9 268.9 ergocalciferol (ERGOCALCIFEROL) 50,000 unit capsule   8. Environmental allergies Z91.09 V15.09 montelukast (SINGULAIR) 10 mg tablet   9. Chronic daily headache R51 784.0 topiramate (TOPAMAX) 50 mg tablet   10. Chronic migraine without aura without status migrainosus, not intractable G43.709 346.70 topiramate (TOPAMAX) 50 mg tablet   11. Migraine without status migrainosus, not intractable, unspecified migraine type G43.909 346.90 butalbital-acetaminophen-caffeine (FIORICET, ESGIC) -40 mg per tablet     36F who presents for routine follow up of type II DM - controlled. She also had HDL and this seems to be stable and controlled. She needs to have a refill of her medications and will return on a different day for fasting labs. She is mostly concerned about blossoming weight at this time. I have reviewed/discussed the above normal BMI with the patient. I have recommended the following interventions: dietary management education, guidance, and counseling, dietary needs education, encourage exercise, lifestyle education regarding diet and monitor weight . The plan is as follows: I have counseled this patient on diet and exercise regimens. Also, prescribed Contrave to see if this may help in her efforts. SE, use and pharmacology discussed in detail. Pt verbalizes understanding of plan of care and denies further questions or concerns at this time. Follow-up Disposition:  Return if symptoms worsen or fail to improve.

## 2017-03-15 NOTE — TELEPHONE ENCOUNTER
Patient called and stated that Contrave need prior auth on it. Please call [phone] and state that it is used for weight lost to help control diabetes.

## 2017-03-15 NOTE — MR AVS SNAPSHOT
Visit Information Date & Time Provider Department Dept. Phone Encounter #  
 3/15/2017 11:00 AM Zach Sheikh [phone] [id number] Upcoming Health Maintenance Date Due  
 EYE EXAM RETINAL OR DILATED Q1 9/1/2014 HEMOGLOBIN A1C Q6M 7/10/2017 DTaP/Tdap/Td series (2 - Td) 1/1/2018 LIPID PANEL Q1 1/10/2018 FOOT EXAM Q1 3/15/2018 MICROALBUMIN Q1 3/15/2018 PAP AKA CERVICAL CYTOLOGY 6/15/2019 Allergies as of 3/15/2017  Review Complete On: 3/15/2017 By: Ashlie Roberto LPN Severity Noted Reaction Type Reactions Green Pepper  06/28/2012    Hives Current Immunizations  Reviewed on 3/18/2014 Name Date Influenza Vaccine 10/26/2013 TDAP Vaccine 1/1/2008 Not reviewed this visit You Were Diagnosed With   
  
 Codes Comments Type 2 diabetes mellitus without complication, without long-term current use of insulin (HCC)    -  Primary ICD-10-CM: E11.9 ICD-9-CM: 250.00 BMI 34.0-34.9,adult     ICD-10-CM: M19.69 
ICD-9-CM: V85.34 Weight gain     ICD-10-CM: R63.5 ICD-9-CM: 783.1 Vitals BP Pulse Temp Resp Height(growth percentile) Weight(growth percentile) 106/75 (BP 1 Location: Right arm, BP Patient Position: Sitting) (!) 101 97.1 °F (36.2 °C) (Oral) 18 5' 5\" (1.651 m) 207 lb (93.9 kg) LMP SpO2 BMI OB Status Smoking Status 01/25/2012 96% 34.45 kg/m2 Hysterectomy Never Smoker BMI and BSA Data Body Mass Index Body Surface Area 34.45 kg/m 2 2.08 m 2 Preferred Pharmacy Pharmacy Name Phone [facility]/PHARMACY #[id number] - [place] - [address] [phone] Your Updated Medication List  
  
   
This list is accurate as of: 3/15/17 11:19 AM.  Always use your most recent med list.  
  
  
  
  
 * albuterol 90 mcg/actuation inhaler Commonly known as:  VENTOLIN HFA INHALE 2 PUFFS BY MOUTH EVERY 4 HOURS AS NEEDED FOR WHEEZING  
  
 * albuterol 2.5 mg /3 mL (0.083 %) nebulizer solution Commonly known as:  PROVENTIL VENTOLIN  
3 mL by Nebulization route every four (4) hours as needed for Wheezing for up to 30 doses. atorvastatin 20 mg tablet Commonly known as:  LIPITOR  
TAKE 1 TABLET BY MOUTH EVERY DAY Blood-Glucose Meter monitoring kit Please give one kit. butalbital-acetaminophen-caffeine -40 mg per tablet Commonly known as:  Connor Milla Take 1 Tab by mouth every six (6) hours as needed for Pain. Max Daily Amount: 4 Tabs. cetirizine 10 mg tablet Commonly known as:  ZYRTEC  
TAKE 1 TABLET BY MOUTH EVERY DAY  
  
 ergocalciferol 50,000 unit capsule Commonly known as:  ERGOCALCIFEROL Take 1 Cap by mouth every seven (7) days. fluticasone 50 mcg/actuation nasal spray Commonly known as:  FLONASE  
2 SQUIRTS DAILY AS NEEDED. INVOKANA 300 mg tablet Generic drug:  canagliflozin Take 1 Tab by mouth Daily (before breakfast). JANUVIA 100 mg tablet Generic drug:  SITagliptin Take 1 Tab by mouth once over twenty-four (24) hours. Lancets Misc Commonly known as:  MICROLET LANCET  
USE TO CHECK BLOOD SUGAR ONE TO TWO TIMES DAILY  
  
 metFORMIN 1,000 mg tablet Commonly known as:  GLUCOPHAGE  
TAKE 1 TABLET BY MOUTH TWICE A DAY  
  
 montelukast 10 mg tablet Commonly known as:  SINGULAIR Take 1 Tab by mouth daily. naltrexone-buPROPion 8-90 mg Tber ER tablet Commonly known as:  Amilcar Mckeon Week 1 1 tab PO QAM, Week 2 1QAM 1QHS, Week 3 2QAM 1 QHS, Week 4 & beyond 2QAM 2QHS  
  
 naratriptan 2.5 mg Tab Commonly known as:  Dany Smoker Take 1 Tab by mouth once as needed for up to 1 dose. For migraine. Can repeat 1 tab in 4 hours if needed. Limit: 2 tabs in 24 hours Nebulizer & Compressor machine Use as needed for wheezing, cough  
  
 omeprazole 20 mg capsule Commonly known as:  PRILOSEC Take 1 Cap by mouth daily. simvastatin 20 mg tablet Commonly known as:  ZOCOR Take  by mouth nightly. topiramate 50 mg tablet Commonly known as:  TOPAMAX Take 1 tab twice a day for migraine prevention * triamcinolone acetonide 0.1 % topical cream  
Commonly known as:  KENALOG Apply  to affected area two (2) times a day. use thin layer * triamcinolone acetonide 0.1 % topical cream  
Commonly known as:  KENALOG Apply  to affected area two (2) times a day. use thin layer * TRUETEST TEST STRIPS strip Generic drug:  glucose blood VI test strips TO BE USED AS DIRECTED * glucose blood VI test strips strip Commonly known as:  Ascensia CONTOUR  
TO BE USED AS DIRECTED * Notice: This list has 6 medication(s) that are the same as other medications prescribed for you. Read the directions carefully, and ask your doctor or other care provider to review them with you. Prescriptions Sent to Pharmacy Refills  
 naltrexone-buPROPion (CONTRAVE) 8-90 mg TbER ER tablet 0 Sig: Week 1 1 tab PO QAM, Week 2 1QAM 1QHS, Week 3 2QAM 1 QHS, Week 4 & beyond 2QAM 2QHS Class: Normal  
 Pharmacy: [facility]/pharmacy #[id number] - [place], [place] - [address] Ph #: [phone] We Performed the Following  DIABETES FOOT EXAM [[facility] Custom] MICROALBUMIN, UR, RAND W/ MICROALBUMIN/CREA RATIO [id number] CPT(R)] Patient Instructions Diabetes Foot Health: Care Instructions Your Care Instructions When you have diabetes, your feet need extra care and attention. Diabetes can damage the nerve endings and blood vessels in your feet, making you less likely to notice when your feet are injured. Diabetes also limits your body's ability to fight infection and get blood to areas that need it. If you get a minor foot injury, it could become an ulcer or a serious infection. With good foot care, you can prevent most of these problems. Caring for your feet can be quick and easy.  Most of the care can be done when you are bathing or getting ready for bed. Follow-up care is a key part of your treatment and safety. Be sure to make and go to all appointments, and call your doctor if you are having problems. Its also a good idea to know your test results and keep a list of the medicines you take. How can you care for yourself at home? · Keep your blood sugar close to normal by watching what and how much you eat, monitoring blood sugar, taking medicines if prescribed, and getting regular exercise. · Do not smoke. Smoking affects blood flow and can make foot problems worse. If you need help quitting, talk to your doctor about stop-smoking programs and medicines. These can increase your chances of quitting for good. · Eat a diet that is low in fats. High fat intake can cause fat to build up in your blood vessels and decrease blood flow. · Inspect your feet daily for blisters, cuts, cracks, or sores. If you cannot see well, use a mirror or have someone help you. · Take care of your feet: 
[facility] AUTHORITY your feet every day. Use warm (not hot) water. Check the water temperature with your wrists or other part of your body, not your feet. ¨ Dry your feet well. Pat them dry. Do not rub the skin on your feet too hard. Dry well between your toes. If the skin on your feet stays moist, bacteria or a fungus can grow, which can lead to infection. ¨ Keep your skin soft. Use moisturizing skin cream to keep the skin on your feet soft and prevent calluses and cracks. But do not put the cream between your toes, and stop using any cream that causes a rash. ¨ Clean underneath your toenails carefully. Do not use a sharp object to clean underneath your toenails. Use the blunt end of a nail file or other rounded tool. ¨ Trim and file your toenails straight across to prevent ingrown toenails. Use a nail clipper, not scissors. Use an emery board to smooth the edges. · Change socks daily.  Socks without seams are best, because seams often rub the feet. You can find socks for people with diabetes from specialty catalogs. · Look inside your shoes every day for things like gravel or torn linings, which could cause blisters or sores. · Buy shoes that fit well: 
¨ Look for shoes that have plenty of space around the toes. This helps prevent bunions and blisters. ¨ Try on shoes while wearing the kind of socks you will usually wear with the shoes. ¨ Avoid plastic shoes. They may rub your feet and cause blisters. Good shoes should be made of materials that are flexible and breathable, such as leather or cloth. ¨ Break in new shoes slowly by wearing them for no more than an hour a day for several days. Take extra time to check your feet for red areas, blisters, or other problems after you wear new shoes. · Do not go barefoot. Do not wear sandals, and do not wear shoes with very thin soles. Thin soles are easy to puncture. They also do not protect your feet from hot pavement or cold weather. · Have your doctor check your feet during each visit. If you have a foot problem, see your doctor. Do not try to treat an early foot problem at home. Home remedies or treatments that you can buy without a prescription (such as corn removers) can be harmful. · Always get early treatment for foot problems. A minor irritation can lead to a major problem if not properly cared for early. When should you call for help? Call your doctor now or seek immediate medical care if: 
· You have a foot sore, an ulcer or break in the skin that is not healing after 4 days, bleeding corns or calluses, or an ingrown toenail. · You have blue or black areas, which can mean bruising or blood flow problems. · You have peeling skin or tiny blisters between your toes or cracking or oozing of the skin. · You have a fever for more than 24 hours and a foot sore. · You have new numbness or tingling in your feet that does not go away after you move your feet or change positions. · You have unexplained or unusual swelling of the foot or ankle. Watch closely for changes in your health, and be sure to contact your doctor if: 
· You cannot do proper foot care. Where can you learn more? Go to http://hodan-eugene.info/. Enter A739 in the search box to learn more about \"Diabetes Foot Health: Care Instructions. \" Current as of: May 23, 2016 Content Version: 11.1 © 1318-6233 [employer]. Care instructions adapted under license by [employer] (which disclaims liability or warranty for this information). If you have questions about a medical condition or this instruction, always ask your healthcare professional. [address] any warranty or liability for your use of this information. Introducing [facility]! Sujit De La Cruz introduces [employer]uhook patient portal. Now you can access parts of your medical record, email your doctor's office, and request medication refills online. 1. In your internet browser, go to [email or web address] 2. Click on the First Time User? Click Here link in the Sign In box. You will see the New Member Sign Up page. 3. Enter your Nuhook Access Code exactly as it appears below. You will not need to use this code after youve completed the sign-up process. If you do not sign up before the expiration date, you must request a new code. · [employer]uhook Access Code: [email or web address] Expires: 3/29/2017  2:30 PM 
 
4. Enter the last four digits of your Social Security Number (xxxx) and Date of Birth (mm/dd/yyyy) as indicated and click Submit. You will be taken to the next sign-up page. 5. Create a Nuhook ID. This will be your Nuhook login ID and cannot be changed, so think of one that is secure and easy to remember. 6. Create a Nuhook password. You can change your password at any time. 7. Enter your Password Reset Question and Answer.  This can be used at a later time if you forget your password. 8. Enter your e-mail address. You will receive e-mail notification when new information is available in [address] 9. Click Sign Up. You can now view and download portions of your medical record. 10. Click the Download Summary menu link to download a portable copy of your medical information. If you have questions, please visit the Frequently Asked Questions section of the [employer] website. Remember, [employer] is NOT to be used for urgent needs. For medical emergencies, dial 911. Now available from your iPhone and Android! Please provide this summary of care documentation to your next provider. Your primary care clinician is listed as [address]. If you have any questions after today's visit, please call [phone].

## 2017-03-15 NOTE — PATIENT INSTRUCTIONS
Diabetes Foot Health: Care Instructions  Your Care Instructions    When you have diabetes, your feet need extra care and attention. Diabetes can damage the nerve endings and blood vessels in your feet, making you less likely to notice when your feet are injured. Diabetes also limits your body's ability to fight infection and get blood to areas that need it. If you get a minor foot injury, it could become an ulcer or a serious infection. With good foot care, you can prevent most of these problems. Caring for your feet can be quick and easy. Most of the care can be done when you are bathing or getting ready for bed. Follow-up care is a key part of your treatment and safety. Be sure to make and go to all appointments, and call your doctor if you are having problems. Its also a good idea to know your test results and keep a list of the medicines you take. How can you care for yourself at home? · Keep your blood sugar close to normal by watching what and how much you eat, monitoring blood sugar, taking medicines if prescribed, and getting regular exercise. · Do not smoke. Smoking affects blood flow and can make foot problems worse. If you need help quitting, talk to your doctor about stop-smoking programs and medicines. These can increase your chances of quitting for good. · Eat a diet that is low in fats. High fat intake can cause fat to build up in your blood vessels and decrease blood flow. · Inspect your feet daily for blisters, cuts, cracks, or sores. If you cannot see well, use a mirror or have someone help you. · Take care of your feet:  [facility] your feet every day. Use warm (not hot) water. Check the water temperature with your wrists or other part of your body, not your feet. ¨ Dry your feet well. Pat them dry. Do not rub the skin on your feet too hard. Dry well between your toes. If the skin on your feet stays moist, bacteria or a fungus can grow, which can lead to infection.   ¨ Keep your skin soft. Use moisturizing skin cream to keep the skin on your feet soft and prevent calluses and cracks. But do not put the cream between your toes, and stop using any cream that causes a rash. ¨ Clean underneath your toenails carefully. Do not use a sharp object to clean underneath your toenails. Use the blunt end of a nail file or other rounded tool. ¨ Trim and file your toenails straight across to prevent ingrown toenails. Use a nail clipper, not scissors. Use an emery board to smooth the edges. · Change socks daily. Socks without seams are best, because seams often rub the feet. You can find socks for people with diabetes from specialty catalogs. · Look inside your shoes every day for things like gravel or torn linings, which could cause blisters or sores. · Buy shoes that fit well:  ¨ Look for shoes that have plenty of space around the toes. This helps prevent bunions and blisters. ¨ Try on shoes while wearing the kind of socks you will usually wear with the shoes. ¨ Avoid plastic shoes. They may rub your feet and cause blisters. Good shoes should be made of materials that are flexible and breathable, such as leather or cloth. ¨ Break in new shoes slowly by wearing them for no more than an hour a day for several days. Take extra time to check your feet for red areas, blisters, or other problems after you wear new shoes. · Do not go barefoot. Do not wear sandals, and do not wear shoes with very thin soles. Thin soles are easy to puncture. They also do not protect your feet from hot pavement or cold weather. · Have your doctor check your feet during each visit. If you have a foot problem, see your doctor. Do not try to treat an early foot problem at home. Home remedies or treatments that you can buy without a prescription (such as corn removers) can be harmful. · Always get early treatment for foot problems. A minor irritation can lead to a major problem if not properly cared for early.   When should you call for help? Call your doctor now or seek immediate medical care if:  · You have a foot sore, an ulcer or break in the skin that is not healing after 4 days, bleeding corns or calluses, or an ingrown toenail. · You have blue or black areas, which can mean bruising or blood flow problems. · You have peeling skin or tiny blisters between your toes or cracking or oozing of the skin. · You have a fever for more than 24 hours and a foot sore. · You have new numbness or tingling in your feet that does not go away after you move your feet or change positions. · You have unexplained or unusual swelling of the foot or ankle. Watch closely for changes in your health, and be sure to contact your doctor if:  · You cannot do proper foot care. Where can you learn more? Go to http://hodan-eugene.info/. Enter A739 in the search box to learn more about \"Diabetes Foot Health: Care Instructions. \"  Current as of: May 23, 2016  Content Version: 11.1  © 4810-6696 [employer]. Care instructions adapted under license by [employer] (which disclaims liability or warranty for this information). If you have questions about a medical condition or this instruction, always ask your healthcare professional. [address] any warranty or liability for your use of this information.

## 2017-03-15 NOTE — LETTER
3/16/2017 2:40 PM 
 
Ms. Pascual Posada [address] [id number] Dear Pascual Albino: 
 
Please find your most recent results below. Resulted Orders MICROALBUMIN, UR, RAND W/ MICROALBUMIN/CREA RATIO Result Value Ref Range Creatinine, urine 37.5 Not Estab. mg/dL Microalbumin, urine <3.0 Not Estab. ug/mL Comment: **Verified by repeat analysis** Microalb/Creat ratio (ug/mg creat.) <8.0 0.0 - 30.0 mg/g creat Narrative Performed at:  [address]  [id number] : Marbin Reyez MD, Phone:  [phone] RECOMMENDATIONS: 
 
Microalbumin was PERFECT!! Keep up the good work!! See you again for your regular visit! Please call me if you have any questions: [phone] Sincerely, Alexia Albarado MD

## 2017-03-16 LAB
ALBUMIN/CREAT UR: <8 MG/G CREAT (ref 0–30)
CREAT UR-MCNC: 37.5 MG/DL
MICROALBUMIN UR-MCNC: <3 UG/ML

## 2017-03-16 NOTE — PROGRESS NOTES
Please let patient know that her microalbumin was perfect!! Showing improved blood sugar control at this time.    Keep up the good work!!

## 2017-03-17 RX ORDER — SIMVASTATIN 20 MG/1
20 TABLET, FILM COATED ORAL
Qty: 90 TAB | Refills: 1 | Status: ON HOLD | OUTPATIENT
Start: 2017-03-17 | End: 2017-05-11

## 2017-03-17 RX ORDER — ERGOCALCIFEROL 1.25 MG/1
50000 CAPSULE ORAL
Qty: 30 CAP | Refills: 5 | Status: ON HOLD | OUTPATIENT
Start: 2017-03-17 | End: 2017-05-11

## 2017-03-17 RX ORDER — CANAGLIFLOZIN 300 MG/1
300 TABLET, FILM COATED ORAL
Qty: 90 TAB | Refills: 1 | Status: SHIPPED | OUTPATIENT
Start: 2017-03-17 | End: 2017-07-25 | Stop reason: ALTCHOICE

## 2017-03-17 RX ORDER — ATORVASTATIN CALCIUM 20 MG/1
TABLET, FILM COATED ORAL
Qty: 90 TAB | Refills: 1 | Status: SHIPPED | OUTPATIENT
Start: 2017-03-17 | End: 2017-07-25 | Stop reason: ALTCHOICE

## 2017-03-17 RX ORDER — BUTALBITAL, ACETAMINOPHEN AND CAFFEINE 50; 325; 40 MG/1; MG/1; MG/1
1 TABLET ORAL
Qty: 20 TAB | Refills: 0 | Status: SHIPPED | OUTPATIENT
Start: 2017-03-17 | End: 2020-06-24 | Stop reason: SDUPTHER

## 2017-03-17 RX ORDER — METFORMIN HYDROCHLORIDE 1000 MG/1
TABLET ORAL
Qty: 180 TAB | Refills: 1 | Status: SHIPPED | OUTPATIENT
Start: 2017-03-17 | End: 2017-07-25 | Stop reason: SDUPTHER

## 2017-03-17 RX ORDER — MONTELUKAST SODIUM 10 MG/1
10 TABLET ORAL DAILY
Qty: 90 TAB | Refills: 1 | Status: ON HOLD | OUTPATIENT
Start: 2017-03-17 | End: 2017-05-11

## 2017-03-17 RX ORDER — SITAGLIPTIN 100 MG/1
100 TABLET, FILM COATED ORAL
Qty: 90 TAB | Refills: 1 | Status: SHIPPED | OUTPATIENT
Start: 2017-03-17 | End: 2017-07-25 | Stop reason: SDUPTHER

## 2017-03-17 RX ORDER — TOPIRAMATE 50 MG/1
TABLET, FILM COATED ORAL
Qty: 60 TAB | Refills: 0 | Status: SHIPPED | OUTPATIENT
Start: 2017-03-17 | End: 2020-06-24 | Stop reason: SDUPTHER

## 2017-03-17 NOTE — TELEPHONE ENCOUNTER
PA has been started. The PXL#[id number]. Pt is aware and reports that if they do not authorize the medication she has checked with her pharmacy and they will cover the two pills separately.

## 2017-03-17 NOTE — TELEPHONE ENCOUNTER
Patient called to check the status of prior auth for medication Contrave.  Please advise    Best contact: [phone]

## 2017-03-21 RX ORDER — BUPROPION HYDROCHLORIDE 100 MG/1
TABLET ORAL
Qty: 120 TAB | Refills: 0 | Status: SHIPPED | OUTPATIENT
Start: 2017-03-21 | End: 2017-04-18 | Stop reason: SDUPTHER

## 2017-03-21 RX ORDER — NALTREXONE HYDROCHLORIDE 50 MG/1
TABLET, FILM COATED ORAL
Qty: 60 TAB | Refills: 0 | Status: SHIPPED | OUTPATIENT
Start: 2017-03-21 | End: 2017-04-18 | Stop reason: SDUPTHER

## 2017-03-21 NOTE — TELEPHONE ENCOUNTER
Prior Auth was denied by [employer]. Per pharmacy pt's out of pocket cost even with the prescription savings card would be >$100. Can you please order the two medications separetley as pt said her pharmacy advised her they would be covered at no cost.

## 2017-04-18 ENCOUNTER — OFFICE VISIT (OUTPATIENT)
Dept: FAMILY MEDICINE CLINIC | Age: 37
End: 2017-04-18

## 2017-04-18 VITALS
HEART RATE: 102 BPM | OXYGEN SATURATION: 97 % | SYSTOLIC BLOOD PRESSURE: 140 MMHG | BODY MASS INDEX: 34.02 KG/M2 | WEIGHT: 204.2 LBS | DIASTOLIC BLOOD PRESSURE: 80 MMHG | TEMPERATURE: 98.2 F | HEIGHT: 65 IN | RESPIRATION RATE: 16 BRPM

## 2017-04-18 DIAGNOSIS — E66.9 BMI (BODY MASS INDEX), PEDIATRIC 95-99% FOR AGE, OBESE CHILD STRUCTURED WEIGHT MANAGEMENT/MULTIDISCIPLINARY INTERVENTION CATEGORY: ICD-10-CM

## 2017-04-18 DIAGNOSIS — J01.00 ACUTE MAXILLARY SINUSITIS, RECURRENCE NOT SPECIFIED: Primary | ICD-10-CM

## 2017-04-18 RX ORDER — NALTREXONE HYDROCHLORIDE 50 MG/1
50 TABLET, FILM COATED ORAL 2 TIMES DAILY
Qty: 60 TAB | Refills: 5 | Status: SHIPPED | OUTPATIENT
Start: 2017-04-18 | End: 2017-04-18 | Stop reason: SDUPTHER

## 2017-04-18 RX ORDER — BUPROPION HYDROCHLORIDE 100 MG/1
200 TABLET ORAL 2 TIMES DAILY
Qty: 120 TAB | Refills: 5 | Status: ON HOLD | OUTPATIENT
Start: 2017-04-18 | End: 2017-05-11

## 2017-04-18 RX ORDER — AZITHROMYCIN 250 MG/1
TABLET, FILM COATED ORAL
Qty: 6 TAB | Refills: 0 | Status: SHIPPED | OUTPATIENT
Start: 2017-04-18 | End: 2017-04-23

## 2017-04-18 NOTE — MR AVS SNAPSHOT
Visit Information Date & Time Provider Department Dept. Phone Encounter #  
 4/18/2017 11:15 AM Keily Marsh Zach Denny [phone] [id number] Follow-up Instructions Return in about 1 month (around 5/18/2017), or if symptoms worsen or fail to improve, for Weight management. Upcoming Health Maintenance Date Due  
 EYE EXAM RETINAL OR DILATED Q1 9/1/2014 HEMOGLOBIN A1C Q6M 7/10/2017 DTaP/Tdap/Td series (2 - Td) 1/1/2018 LIPID PANEL Q1 1/10/2018 FOOT EXAM Q1 3/15/2018 MICROALBUMIN Q1 3/15/2018 PAP AKA CERVICAL CYTOLOGY 6/15/2019 Allergies as of 4/18/2017  Review Complete On: 3/27/2017 By: Keily Marsh MD  
  
 Severity Noted Reaction Type Reactions Green Pepper  06/28/2012    Hives Current Immunizations  Reviewed on 3/18/2014 Name Date Influenza Vaccine 10/26/2013 TDAP Vaccine 1/1/2008 Not reviewed this visit You Were Diagnosed With   
  
 Codes Comments Acute maxillary sinusitis, recurrence not specified    -  Primary ICD-10-CM: J01.00 ICD-9-CM: 461.0 BMI (body mass index), pediatric 95-99% for age, obese child structured weight management/multidisciplinary intervention category     ICD-10-CM: Q90.26 ICD-9-CM: V85.54 Vitals BP Pulse Resp Height(growth percentile) Weight(growth percentile) LMP  
 140/80 (BP 1 Location: Right arm, BP Patient Position: Sitting) 88 16 5' 5\" (1.651 m) 204 lb 3.2 oz (92.6 kg) 01/25/2012 BMI OB Status Smoking Status 33.98 kg/m2 Hysterectomy Never Smoker BMI and BSA Data Body Mass Index Body Surface Area 33.98 kg/m 2 2.06 m 2 Preferred Pharmacy Pharmacy Name Phone [facility]/PHARMACY #[id number] - [place] [address] - [address] [phone] Your Updated Medication List  
  
   
This list is accurate as of: 4/18/17 11:58 AM.  Always use your most recent med list.  
  
  
  
  
 * albuterol 90 mcg/actuation inhaler Commonly known as:  VENTOLIN HFA INHALE 2 PUFFS BY MOUTH EVERY 4 HOURS AS NEEDED FOR WHEEZING  
  
 * albuterol 2.5 mg /3 mL (0.083 %) nebulizer solution Commonly known as:  PROVENTIL VENTOLIN  
3 mL by Nebulization route every four (4) hours as needed for Wheezing for up to 30 doses. atorvastatin 20 mg tablet Commonly known as:  LIPITOR  
TAKE 1 TABLET BY MOUTH EVERY DAY  
  
 azithromycin 250 mg tablet Commonly known as:  Gia Ness Take 2 tablets today, then take 1 tablet daily Blood-Glucose Meter monitoring kit Please give one kit. buPROPion 100 mg tablet Commonly known as:  [facility] Take 2 Tabs by mouth two (2) times a day for 30 days. butalbital-acetaminophen-caffeine -40 mg per tablet Commonly known as:  Karime Mitts Take 1 Tab by mouth every six (6) hours as needed for Pain. Max Daily Amount: 4 Tabs. cetirizine 10 mg tablet Commonly known as:  ZYRTEC  
TAKE 1 TABLET BY MOUTH EVERY DAY  
  
 ergocalciferol 50,000 unit capsule Commonly known as:  ERGOCALCIFEROL Take 1 Cap by mouth every seven (7) days. fluticasone 50 mcg/actuation nasal spray Commonly known as:  FLONASE  
2 SQUIRTS DAILY AS NEEDED. INVOKANA 300 mg tablet Generic drug:  canagliflozin Take 1 Tab by mouth Daily (before breakfast). JANUVIA 100 mg tablet Generic drug:  SITagliptin Take 1 Tab by mouth once over twenty-four (24) hours. Lancets Misc Commonly known as:  MICROLET LANCET  
USE TO CHECK BLOOD SUGAR ONE TO TWO TIMES DAILY  
  
 metFORMIN 1,000 mg tablet Commonly known as:  GLUCOPHAGE  
TAKE 1 TABLET BY MOUTH TWICE A DAY  
  
 montelukast 10 mg tablet Commonly known as:  SINGULAIR Take 1 Tab by mouth daily. naltrexone 50 mg tablet Commonly known as:  DEPADE Take 1 Tab by mouth two (2) times a day for 30 days. naltrexone-buPROPion 8-90 mg Tber ER tablet Commonly known as:  Reece Huggins Week 1 1 tab PO QAM, Week 2 1QAM 1QHS, Week 3 2QAM 1 QHS, Week 4 & beyond 2QAM 2QHS  
  
 naratriptan 2.5 mg Tab Commonly known as:  Whitney Екатерина Take 1 Tab by mouth once as needed for up to 1 dose. For migraine. Can repeat 1 tab in 4 hours if needed. Limit: 2 tabs in 24 hours Nebulizer & Compressor machine Use as needed for wheezing, cough  
  
 omeprazole 20 mg capsule Commonly known as:  PRILOSEC Take 1 Cap by mouth daily. simvastatin 20 mg tablet Commonly known as:  ZOCOR Take 1 Tab by mouth nightly. topiramate 50 mg tablet Commonly known as:  TOPAMAX Take 1 tab twice a day for migraine prevention * triamcinolone acetonide 0.1 % topical cream  
Commonly known as:  KENALOG Apply  to affected area two (2) times a day. use thin layer * triamcinolone acetonide 0.1 % topical cream  
Commonly known as:  KENALOG Apply  to affected area two (2) times a day. use thin layer * TRUETEST TEST STRIPS strip Generic drug:  glucose blood VI test strips TO BE USED AS DIRECTED * glucose blood VI test strips strip Commonly known as:  Ascensia CONTOUR  
TO BE USED AS DIRECTED * Notice: This list has 6 medication(s) that are the same as other medications prescribed for you. Read the directions carefully, and ask your doctor or other care provider to review them with you. Prescriptions Sent to Pharmacy Refills  
 naltrexone (DEPADE) 50 mg tablet 5 Sig: Take 1 Tab by mouth two (2) times a day for 30 days. Class: Normal  
 Pharmacy: [facility]/pharmacy #[id number] - [address] Ph #: [phone] Route: Oral  
 buPROPion (WELLBUTRIN) 100 mg tablet 5 Sig: Take 2 Tabs by mouth two (2) times a day for 30 days. Class: Normal  
 Pharmacy: [facility]/pharmacy #[id number] - [address] Ph #: [phone] Route: Oral  
 azithromycin (ZITHROMAX) 250 mg tablet 0 Sig: Take 2 tablets today, then take 1 tablet daily Class: Normal  
 Pharmacy: [facility]/pharmacy #[id number] - [address]  #: [phone] Follow-up Instructions Return in about 1 month (around 5/18/2017), or if symptoms worsen or fail to improve, for Weight management. Introducing [facility]! [facility] introduces [employer] patient portal. Now you can access parts of your medical record, email your doctor's office, and request medication refills online. 1. In your internet browser, go to [email or web address] 2. Click on the First Time User? Click Here link in the Sign In box. You will see the New Member Sign Up page. 3. Enter your [employer] Access Code exactly as it appears below. You will not need to use this code after youve completed the sign-up process. If you do not sign up before the expiration date, you must request a new code. · [employer] Access Code: [email or web address] Expires: 7/17/2017 11:52 AM 
 
4. Enter the last four digits of your Social Security Number (xxxx) and Date of Birth (mm/dd/yyyy) as indicated and click Submit. You will be taken to the next sign-up page. 5. Create a [employer] ID. This will be your [employer] login ID and cannot be changed, so think of one that is secure and easy to remember. 6. Create a [employer] password. You can change your password at any time. 7. Enter your Password Reset Question and Answer. This can be used at a later time if you forget your password. 8. Enter your e-mail address. You will receive e-mail notification when new information is available in [address] 9. Click Sign Up. You can now view and download portions of your medical record. 10. Click the Download Summary menu link to download a portable copy of your medical information. If you have questions, please visit the Frequently Asked Questions section of the [employer] website. Remember, [employer] is NOT to be used for urgent needs. For medical emergencies, dial 911. Now available from your iPhone and Android! Please provide this summary of care documentation to your next provider. Your primary care clinician is listed as [address]. If you have any questions after today's visit, please call [phone].

## 2017-04-26 ENCOUNTER — HOSPITAL ENCOUNTER (EMERGENCY)
Age: 37
Discharge: HOME | End: 2017-04-26
Attending: EMERGENCY MEDICINE
Payer: MEDICAID

## 2017-04-26 ENCOUNTER — OFFICE VISIT (OUTPATIENT)
Dept: FAMILY MEDICINE CLINIC | Age: 37
End: 2017-04-26

## 2017-04-26 VITALS
SYSTOLIC BLOOD PRESSURE: 126 MMHG | HEART RATE: 125 BPM | TEMPERATURE: 98.8 F | OXYGEN SATURATION: 96 % | RESPIRATION RATE: 18 BRPM | WEIGHT: 203 LBS | DIASTOLIC BLOOD PRESSURE: 76 MMHG | HEIGHT: 65 IN | BODY MASS INDEX: 33.82 KG/M2

## 2017-04-26 VITALS
RESPIRATION RATE: 22 BRPM | HEIGHT: 65 IN | DIASTOLIC BLOOD PRESSURE: 93 MMHG | OXYGEN SATURATION: 99 % | TEMPERATURE: 98.5 F | HEART RATE: 120 BPM | SYSTOLIC BLOOD PRESSURE: 167 MMHG

## 2017-04-26 DIAGNOSIS — R10.13 DYSPEPSIA: ICD-10-CM

## 2017-04-26 DIAGNOSIS — R10.13 EPIGASTRIC ABDOMINAL PAIN: Primary | ICD-10-CM

## 2017-04-26 DIAGNOSIS — R10.13 ABDOMINAL PAIN, EPIGASTRIC: ICD-10-CM

## 2017-04-26 DIAGNOSIS — K21.9 GASTROESOPHAGEAL REFLUX DISEASE, ESOPHAGITIS PRESENCE NOT SPECIFIED: Primary | ICD-10-CM

## 2017-04-26 LAB
ALBUMIN SERPL BCP-MCNC: 3.8 G/DL (ref 3.5–5)
ALBUMIN/GLOB SERPL: 0.9 {RATIO} (ref 1.1–2.2)
ALP SERPL-CCNC: 65 U/L (ref 45–117)
ALT SERPL-CCNC: 29 U/L (ref 12–78)
ANION GAP BLD CALC-SCNC: 16 MMOL/L (ref 5–15)
AST SERPL W P-5'-P-CCNC: 17 U/L (ref 15–37)
BASOPHILS # BLD AUTO: 0.1 K/UL (ref 0–0.1)
BASOPHILS # BLD: 1 % (ref 0–1)
BILIRUB SERPL-MCNC: 0.3 MG/DL (ref 0.2–1)
BILIRUB UR QL STRIP: NEGATIVE
BUN SERPL-MCNC: 11 MG/DL (ref 6–20)
BUN/CREAT SERPL: 12 (ref 12–20)
CALCIUM SERPL-MCNC: 9.1 MG/DL (ref 8.5–10.1)
CHLORIDE SERPL-SCNC: 101 MMOL/L (ref 97–108)
CO2 SERPL-SCNC: 20 MMOL/L (ref 21–32)
CREAT SERPL-MCNC: 0.93 MG/DL (ref 0.55–1.02)
EOSINOPHIL # BLD: 0.1 K/UL (ref 0–0.4)
EOSINOPHIL NFR BLD: 1 % (ref 0–7)
ERYTHROCYTE [DISTWIDTH] IN BLOOD BY AUTOMATED COUNT: 13.3 % (ref 11.5–14.5)
GLOBULIN SER CALC-MCNC: 4.1 G/DL (ref 2–4)
GLUCOSE POC: 239 MG/DL
GLUCOSE SERPL-MCNC: 235 MG/DL (ref 65–100)
GLUCOSE UR-MCNC: ABNORMAL MG/DL
HBA1C MFR BLD HPLC: 6.5 %
HCT VFR BLD AUTO: 46.3 % (ref 35–47)
HGB BLD-MCNC: 15.7 G/DL (ref 11.5–16)
KETONES P FAST UR STRIP-MCNC: NEGATIVE MG/DL
LIPASE SERPL-CCNC: 193 U/L (ref 73–393)
LYMPHOCYTES # BLD AUTO: 19 % (ref 12–49)
LYMPHOCYTES # BLD: 2.3 K/UL (ref 0.8–3.5)
MCH RBC QN AUTO: 29 PG (ref 26–34)
MCHC RBC AUTO-ENTMCNC: 33.9 G/DL (ref 30–36.5)
MCV RBC AUTO: 85.6 FL (ref 80–99)
MONOCYTES # BLD: 0.7 K/UL (ref 0–1)
MONOCYTES NFR BLD AUTO: 6 % (ref 5–13)
NEUTS SEG # BLD: 8.9 K/UL (ref 1.8–8)
NEUTS SEG NFR BLD AUTO: 73 % (ref 32–75)
PH UR STRIP: 5 [PH] (ref 4.6–8)
PLATELET # BLD AUTO: 240 K/UL (ref 150–400)
POTASSIUM SERPL-SCNC: 4.3 MMOL/L (ref 3.5–5.1)
PROT SERPL-MCNC: 7.9 G/DL (ref 6.4–8.2)
PROT UR QL STRIP: NEGATIVE MG/DL
RBC # BLD AUTO: 5.41 M/UL (ref 3.8–5.2)
SODIUM SERPL-SCNC: 137 MMOL/L (ref 136–145)
SP GR UR STRIP: 1 (ref 1–1.03)
UA UROBILINOGEN AMB POC: ABNORMAL (ref 0.2–1)
URINALYSIS CLARITY POC: CLEAR
URINALYSIS COLOR POC: YELLOW
URINE BLOOD POC: NEGATIVE
URINE LEUKOCYTES POC: NEGATIVE
URINE NITRITES POC: NEGATIVE
WBC # BLD AUTO: 12.1 K/UL (ref 3.6–11)

## 2017-04-26 PROCEDURE — 80053 COMPREHEN METABOLIC PANEL: CPT | Performed by: PHYSICIAN ASSISTANT

## 2017-04-26 PROCEDURE — 74011250636 HC RX REV CODE- 250/636: Performed by: PHYSICIAN ASSISTANT

## 2017-04-26 PROCEDURE — 85025 COMPLETE CBC W/AUTO DIFF WBC: CPT | Performed by: PHYSICIAN ASSISTANT

## 2017-04-26 PROCEDURE — 99284 EMERGENCY DEPT VISIT MOD MDM: CPT

## 2017-04-26 PROCEDURE — 74011250637 HC RX REV CODE- 250/637: Performed by: PHYSICIAN ASSISTANT

## 2017-04-26 PROCEDURE — 74011000250 HC RX REV CODE- 250: Performed by: PHYSICIAN ASSISTANT

## 2017-04-26 PROCEDURE — 36415 COLL VENOUS BLD VENIPUNCTURE: CPT | Performed by: PHYSICIAN ASSISTANT

## 2017-04-26 PROCEDURE — 83690 ASSAY OF LIPASE: CPT | Performed by: PHYSICIAN ASSISTANT

## 2017-04-26 PROCEDURE — 96374 THER/PROPH/DIAG INJ IV PUSH: CPT

## 2017-04-26 RX ORDER — FAMOTIDINE 10 MG/ML
20 INJECTION INTRAVENOUS
Status: COMPLETED | OUTPATIENT
Start: 2017-04-26 | End: 2017-04-26

## 2017-04-26 RX ORDER — FAMOTIDINE 20 MG/1
20 TABLET, FILM COATED ORAL 2 TIMES DAILY
Qty: 60 TAB | Refills: 0 | Status: SHIPPED | OUTPATIENT
Start: 2017-04-26 | End: 2017-05-26

## 2017-04-26 RX ORDER — SUCRALFATE 1 G/10ML
1 SUSPENSION ORAL 4 TIMES DAILY
Qty: 414 ML | Refills: 0 | Status: SHIPPED | OUTPATIENT
Start: 2017-04-26 | End: 2018-02-20 | Stop reason: ALTCHOICE

## 2017-04-26 RX ADMIN — FAMOTIDINE 20 MG: 10 INJECTION, SOLUTION INTRAVENOUS at 11:15

## 2017-04-26 RX ADMIN — SODIUM CHLORIDE 1000 ML: 900 INJECTION, SOLUTION INTRAVENOUS at 11:15

## 2017-04-26 RX ADMIN — LIDOCAINE HYDROCHLORIDE 40 ML: 20 SOLUTION ORAL; TOPICAL at 11:15

## 2017-04-26 NOTE — ED PROVIDER NOTES
HPI Comments: 43 y.o. female with past medical history significant for HTN, DM, asthma, and anxiety who presents to the ED via EMS with chief complaint of abdominal pain. Pt reports 2 nights ago after eating steak and potatoes for dinner she began having \"sharp, stabbing, burning\" epigastric pain that has continued since. Pt states her pain improves slightly to a more dull pain at times but becomes severe whenever she eats or drinks anything so she has been avoiding PO intake. Pt states she has been taking Tums for her sx without relief and says it makes her sx somewhat worse. Pt states she also takes Prilosec daily. Pt states she was seen by her PCP this morning for her sx and ate cereal for breakfast before she left, says by the time she arrived at her PCP's office she was having severe pain and was referred to the ED for further evaluation. Pt states she has hx of similar sx in early March and was seen at [facility] ED at that time, says she had a negative CT of her abdomen. Pt states she was told her sx were likely due to an ulcer and she was treated with Carafate. Pt states she followed up with GI later that week who thought her sx were more likely due to a GI bug, says she was taken off of the Carafate and did not have a scope done. Pt states she has had no issues since until 2 days ago. Pt states she has hx of cholecystectomy. Pt denies hx of pancreatitis. Pt denies vomiting. There are no other acute medical complaints voiced at this time. Social Hx: Denies smoking tobacco or use of EtOH. PCP: Chelita Navarro MD    Note written by Jaden Caputo, as dictated by DANIELLA Kendrick 11:25 AM     The history is provided by the patient.         Past Medical History:   Diagnosis Date    Anxiety     Asthma     Diabetes ([facility] [address] 2008    Headache     Headache     Hypertension     Rash 7/14/2016    S/P dilatation and curettage        Past Surgical History:   Procedure Laterality Date    HX APPENDECTOMY  2/2008    HX CHOLECYSTECTOMY  2001    HX DILATION AND CURETTAGE      HX GYN  3/2008    tubal ligation    HX HERNIA REPAIR  2/2009    HX HYSTERECTOMY  03/2012    HX LUMBAR DISKECTOMY      2006    HX ORTHOPAEDIC  9/2006    ruptured discs         Family History:   Problem Relation Age of Onset    Heart Disease Mother     Hypertension Father     Hypertension Sister        Social History     Social History    Marital status:      Spouse name: N/A    Number of children: N/A    Years of education: N/A     Occupational History    Not on file. Social History Main Topics    Smoking status: Never Smoker    Smokeless tobacco: Never Used    Alcohol use No    Drug use: No    Sexual activity: Yes     Partners: Male     Birth control/ protection: Surgical     Other Topics Concern    Not on file     Social History Narrative         ALLERGIES: Green pepper    Review of Systems   Constitutional: Positive for appetite change (decreased PO intake). Negative for chills, fatigue and fever. HENT: Negative. Negative for congestion and sore throat. Eyes: Negative. Negative for visual disturbance. Respiratory: Negative. Negative for cough and shortness of breath. Cardiovascular: Negative. Negative for chest pain, palpitations and leg swelling. Gastrointestinal: Positive for abdominal pain (epigastric). Negative for anal bleeding, blood in stool, constipation, diarrhea, nausea and vomiting. Genitourinary: Negative. Negative for dysuria, flank pain, hematuria, vaginal bleeding and vaginal discharge. Musculoskeletal: Negative. Negative for back pain and neck pain. Skin: Negative. Negative for rash. Neurological: Negative. Negative for dizziness, syncope, weakness, numbness and headaches. Hematological: Negative. Psychiatric/Behavioral: Negative. All other systems reviewed and are negative.       Vitals:    04/26/17 1029 04/26/17 1030 04/26/17 1045 04/26/17 1100 BP:  140/87 (!) 111/96 118/83   Pulse: (!) 125      Resp: 18      Temp: 98.8 °F (37.1 °C)      SpO2: 97% 98% 97% 98%   Weight: 92.1 kg (203 lb)      Height: 5' 5\" (1.651 m)               Physical Exam   Constitutional: She is oriented to person, place, and time. She appears well-developed and well-nourished. No distress. HENT:   Head: Normocephalic and atraumatic. Mouth/Throat: Oropharynx is clear and moist.   Neck: Normal range of motion. Cardiovascular: Normal rate, S1 normal, S2 normal, normal heart sounds, intact distal pulses and normal pulses. Exam reveals no gallop and no friction rub. No murmur heard. Pulses:       Dorsalis pedis pulses are 2+ on the right side, and 2+ on the left side. Pulmonary/Chest: Effort normal and breath sounds normal. No accessory muscle usage. No respiratory distress. She has no decreased breath sounds. She has no wheezes. She has no rhonchi. She has no rales. Abdominal: Soft. Normal appearance and bowel sounds are normal. She exhibits no distension. There is no hepatosplenomegaly. There is tenderness (epigastric TTP). There is no rebound, no guarding and no CVA tenderness. Musculoskeletal: Normal range of motion. She exhibits no edema or tenderness. Neurological: She is alert and oriented to person, place, and time. She has normal strength. No sensory deficit. Skin: Skin is warm and dry. No rash noted. She is not diaphoretic. No erythema. No pallor. Psychiatric: She has a normal mood and affect. Her behavior is normal.   Nursing note and vitals reviewed.        MDM  Number of Diagnoses or Management Options  Abdominal pain, epigastric:   Dyspepsia:   Gastroesophageal reflux disease, esophagitis presence not specified:   Diagnosis management comments: DDx: GERD, PUD, pancreatitis, gastroenteritis       Amount and/or Complexity of Data Reviewed  Clinical lab tests: ordered and reviewed  Review and summarize past medical records: yes  Discuss the patient with other providers: yes (Dr. Peng Pennington)    Patient Progress  Patient progress: stable    ED Course       Procedures                       10:54 AM   Andrea Lombardo PA-C discussed patient with No att. providers found who is in agreement with care plan as outlined. No further recommendations. Andrea Lombardo PA-C      11:24 AM  Pt has been reevaluated. There are no new complaints, changes, or physical findings at this time. Medications have been reviewed w/ pt and/or family. Pt and/or family's questions have been answered. Pt and/or family expressed good understanding of the dx/tx/rx and is in agreement with plan of care. Pt instructed and agreed to f/u w/ GI and to return to ED upon further deterioration. Pt is ready for discharge.     LABORATORY TESTS:  Recent Results (from the past 12 hour(s))   AMB POC URINALYSIS DIP STICK AUTO W/O MICRO    Collection Time: 04/26/17  9:29 AM   Result Value Ref Range    Color (UA POC) Yellow     Clarity (UA POC) Clear     Glucose (UA POC) 2+ Negative    Bilirubin (UA POC) Negative Negative    Ketones (UA POC) Negative Negative    Specific gravity (UA POC) 1.005 1.001 - 1.035    Blood (UA POC) Negative Negative    pH (UA POC) 5.0 4.6 - 8.0    Protein (UA POC) Negative Negative mg/dL    Urobilinogen (UA POC) 0.2 mg/dL 0.2 - 1    Nitrites (UA POC) Negative Negative    Leukocyte esterase (UA POC) Negative Negative   AMB POC GLUCOSE BLOOD, BY GLUCOSE MONITORING DEVICE    Collection Time: 04/26/17  9:30 AM   Result Value Ref Range    Glucose  mg/dL   AMB POC HEMOGLOBIN A1C    Collection Time: 04/26/17  9:31 AM   Result Value Ref Range    Hemoglobin A1c (POC) 6.5 %   CBC WITH AUTOMATED DIFF    Collection Time: 04/26/17 10:32 AM   Result Value Ref Range    WBC 12.1 (H) 3.6 - 11.0 K/uL    RBC 5.41 (H) 3.80 - 5.20 M/uL    HGB 15.7 11.5 - 16.0 g/dL    HCT 46.3 35.0 - 47.0 %    MCV 85.6 80.0 - 99.0 FL    MCH 29.0 26.0 - 34.0 PG    MCHC 33.9 30.0 - 36.5 g/dL    RDW 13.3 11.5 - 14.5 % PLATELET 475 099 - 833 K/uL    NEUTROPHILS 73 32 - 75 %    LYMPHOCYTES 19 12 - 49 %    MONOCYTES 6 5 - 13 %    EOSINOPHILS 1 0 - 7 %    BASOPHILS 1 0 - 1 %    ABS. NEUTROPHILS 8.9 (H) 1.8 - 8.0 K/UL    ABS. LYMPHOCYTES 2.3 0.8 - 3.5 K/UL    ABS. MONOCYTES 0.7 0.0 - 1.0 K/UL    ABS. EOSINOPHILS 0.1 0.0 - 0.4 K/UL    ABS. BASOPHILS 0.1 0.0 - 0.1 K/UL   METABOLIC PANEL, COMPREHENSIVE    Collection Time: 04/26/17 10:32 AM   Result Value Ref Range    Sodium 137 136 - 145 mmol/L    Potassium 4.3 3.5 - 5.1 mmol/L    Chloride 101 97 - 108 mmol/L    CO2 20 (L) 21 - 32 mmol/L    Anion gap 16 (H) 5 - 15 mmol/L    Glucose 235 (H) 65 - 100 mg/dL    BUN 11 6 - 20 MG/DL    Creatinine 0.93 0.55 - 1.02 MG/DL    BUN/Creatinine ratio 12 12 - 20      GFR est AA >60 >60 ml/min/1.73m2    GFR est non-AA >60 >60 ml/min/1.73m2    Calcium 9.1 8.5 - 10.1 MG/DL    Bilirubin, total 0.3 0.2 - 1.0 MG/DL    ALT (SGPT) 29 12 - 78 U/L    AST (SGOT) 17 15 - 37 U/L    Alk. phosphatase 65 45 - 117 U/L    Protein, total 7.9 6.4 - 8.2 g/dL    Albumin 3.8 3.5 - 5.0 g/dL    Globulin 4.1 (H) 2.0 - 4.0 g/dL    A-G Ratio 0.9 (L) 1.1 - 2.2     LIPASE    Collection Time: 04/26/17 10:32 AM   Result Value Ref Range    Lipase 193 73 - 393 U/L       IMAGING RESULTS:  No orders to display     No results found. MEDICATIONS GIVEN:  Medications   sodium chloride 0.9 % bolus infusion 1,000 mL (0 mL IntraVENous IV Completed 4/26/17 1141)   famotidine (PF) (PEPCID) injection 20 mg (20 mg IntraVENous Given 4/26/17 1115)   mylanta/viscous lidocaine (SARAN)(GI COCKTAIL) (40 mL Oral Given 4/26/17 1115)       IMPRESSION:  1. Gastroesophageal reflux disease, esophagitis presence not specified    2. Dyspepsia    3. Abdominal pain, epigastric        PLAN:  1. Discharge Medication List as of 4/26/2017 11:32 AM      START taking these medications    Details   sucralfate (CARAFATE) 100 mg/mL suspension Take 10 mL by mouth four (4) times daily. , Print, Disp-414 mL, R-0 famotidine (PEPCID) 20 mg tablet Take 1 Tab by mouth two (2) times a day for 30 days. , Print, Disp-60 Tab, R-0         CONTINUE these medications which have NOT CHANGED    Details   naltrexone (DEPADE) 50 mg tablet Take 1 Tab by mouth daily for 30 days. , Normal, Disp-60 Tab, R-5      buPROPion (WELLBUTRIN) 100 mg tablet Take 2 Tabs by mouth two (2) times a day for 30 days. , Normal, Disp-120 Tab, R-5      atorvastatin (LIPITOR) 20 mg tablet TAKE 1 TABLET BY MOUTH EVERY DAY, Normal, Disp-90 Tab, R-1      INVOKANA 300 mg tablet Take 1 Tab by mouth Daily (before breakfast). , Normal, Disp-90 Tab, R-1, BRITNEY      JANUVIA 100 mg tablet Take 1 Tab by mouth once over twenty-four (24) hours. , Normal, Disp-90 Tab, R-1, BRITNEY      metFORMIN (GLUCOPHAGE) 1,000 mg tablet TAKE 1 TABLET BY MOUTH TWICE A DAY, Normal, Disp-180 Tab, R-1      ergocalciferol (ERGOCALCIFEROL) 50,000 unit capsule Take 1 Cap by mouth every seven (7) days. , Normal, Disp-30 Cap, R-5      montelukast (SINGULAIR) 10 mg tablet Take 1 Tab by mouth daily. , Normal, Disp-90 Tab, R-1      simvastatin (ZOCOR) 20 mg tablet Take 1 Tab by mouth nightly., Normal, Disp-90 Tab, R-1      topiramate (TOPAMAX) 50 mg tablet Take 1 tab twice a day for migraine prevention, Normal, Disp-60 Tab, R-0      butalbital-acetaminophen-caffeine (FIORICET, ESGIC) -40 mg per tablet Take 1 Tab by mouth every six (6) hours as needed for Pain. Max Daily Amount: 4 Tabs., Print, Disp-20 Tab, R-0      cetirizine (ZYRTEC) 10 mg tablet TAKE 1 TABLET BY MOUTH EVERY DAY, Historical Med, R-12      naltrexone-buPROPion (CONTRAVE) 8-90 mg TbER ER tablet Week 1 1 tab PO QAM, Week 2 1QAM 1QHS, Week 3 2QAM 1 QHS, Week 4 & beyond 2QAM 2QHS, Normal, Disp-120 Tab, R-0      !! triamcinolone acetonide (KENALOG) 0.1 % topical cream Apply  to affected area two (2) times a day.  use thin layer, Print, Disp-15 g, R-0      albuterol (PROVENTIL VENTOLIN) 2.5 mg /3 mL (0.083 %) nebulizer solution 3 mL by Nebulization route every four (4) hours as needed for Wheezing for up to 30 doses. , No Print, Disp-100 Each, R-2      naratriptan (AMERGE) 2.5 mg tab Take 1 Tab by mouth once as needed for up to 1 dose. For migraine. Can repeat 1 tab in 4 hours if needed. Limit: 2 tabs in 24 hours, Normal, Disp-9 Tab, R-1      fluticasone (FLONASE) 50 mcg/actuation nasal spray 2 SQUIRTS DAILY AS NEEDED., Print, Disp-16 g, R-3      albuterol (VENTOLIN HFA) 90 mcg/actuation inhaler INHALE 2 PUFFS BY MOUTH EVERY 4 HOURS AS NEEDED FOR WHEEZING, Print, Disp-1 Inhaler, R-5      !! glucose blood VI test strips (ASCENSIA CONTOUR) strip TO BE USED AS DIRECTED, Print, Disp-50 Strip, R-5      Nebulizer & Compressor machine Use as needed for wheezing, cough, Print, Disp-1 Each, R-0      !! triamcinolone acetonide (KENALOG) 0.1 % topical cream Apply  to affected area two (2) times a day. use thin layer, Print, Disp-15 g, R-2      !! TRUETEST TEST STRIPS strip TO BE USED AS DIRECTED, Normal, Disp-50 Strip, R-5      Blood-Glucose Meter monitoring kit Please give one kit., Normal, Disp-1 Kit, R-0      Lancets (MICROLET LANCET) Misc USE TO CHECK BLOOD SUGAR ONE TO TWO TIMES DAILY, Normal, Disp-100 Each, R-0       !! - Potential duplicate medications found. Please discuss with provider.       STOP taking these medications       omeprazole (PRILOSEC) 20 mg capsule Comments:   Reason for Stoppin.   Follow-up Information     Follow up With Details Comments Jeromy Groves MD Call in 1 day GI; call and schedule outpatient follow up and endoscopy [address] [facility] [facility]      [facility] EMERGENCY DEPT  If symptoms worsen [address] [facility]  [phone]            Return to ED if worse

## 2017-04-26 NOTE — ED TRIAGE NOTES
Epigastric pain since Monday, non radiating. Denies n/v/d. Pain after eating. Seen here 4 weeks ago for same.

## 2017-04-26 NOTE — ED NOTES
Verbal shift change report given to Veena (oncoming nurse) by Luis A Olivarez (offgoing nurse). Report included the following information SBAR and ED Summary.

## 2017-04-26 NOTE — MR AVS SNAPSHOT
Visit Information Date & Time Provider Department Dept. Phone Encounter #  
 4/26/2017  9:45 AM Sai DardenZach [phone] [id number] Follow-up Instructions Return if symptoms worsen or fail to improve. Follow-up and Disposition History Your Appointments 4/26/2017  9:45 AM  
ACUTE CARE with Sai Darden MD  
[address] [place] [address] Road) Appt Note: recurring abdominal pain cp$0 sj 4.26.17  
 [address]  
  
   
 [address] 5/17/2017  8:30 AM  
PHYSICAL PRE OP with Sai Darden MD  
[address] [place] [address] Road) Appt Note: f/u  
 [address]  
  
   
 [address] Upcoming Health Maintenance Date Due  
 EYE EXAM RETINAL OR DILATED Q1 9/1/2014 HEMOGLOBIN A1C Q6M 7/10/2017 DTaP/Tdap/Td series (2 - Td) 1/1/2018 LIPID PANEL Q1 1/10/2018 FOOT EXAM Q1 3/15/2018 MICROALBUMIN Q1 3/15/2018 PAP AKA CERVICAL CYTOLOGY 6/15/2019 Allergies as of 4/26/2017  Review Complete On: 4/26/2017 By: Kj Blackburn LPN Severity Noted Reaction Type Reactions Green Pepper  06/28/2012    Hives Current Immunizations  Reviewed on 3/18/2014 Name Date Influenza Vaccine 10/26/2013 TDAP Vaccine 1/1/2008 Not reviewed this visit You Were Diagnosed With   
  
 Codes Comments Epigastric abdominal pain    -  Primary ICD-10-CM: R10.13 ICD-9-CM: 789.06 Vitals BP Pulse Temp Resp Height(growth percentile) LMP  
 (!) 167/93 (BP 1 Location: Left arm, BP Patient Position: Supine) (!) 120 98.5 °F (36.9 °C) (Temporal) 22 5' 5\" (1.651 m) 01/25/2012 SpO2 OB Status Smoking Status 99% Hysterectomy Never Smoker Vitals History Preferred Pharmacy Pharmacy Name Phone [facility]ARMACY #[id number] - [place] - [address] [facility] [address] [phone] Your Updated Medication List  
  
   
This list is accurate as of: 4/26/17  9:41 AM.  Always use your most recent med list.  
  
  
  
  
 * albuterol 90 mcg/actuation inhaler Commonly known as:  VENTOLIN HFA INHALE 2 PUFFS BY MOUTH EVERY 4 HOURS AS NEEDED FOR WHEEZING  
  
 * albuterol 2.5 mg /3 mL (0.083 %) nebulizer solution Commonly known as:  PROVENTIL VENTOLIN  
3 mL by Nebulization route every four (4) hours as needed for Wheezing for up to 30 doses. atorvastatin 20 mg tablet Commonly known as:  LIPITOR  
TAKE 1 TABLET BY MOUTH EVERY DAY Blood-Glucose Meter monitoring kit Please give one kit. buPROPion 100 mg tablet Commonly known as:  [facility] Take 2 Tabs by mouth two (2) times a day for 30 days. butalbital-acetaminophen-caffeine -40 mg per tablet Commonly known as:  Waynetta Booze Take 1 Tab by mouth every six (6) hours as needed for Pain. Max Daily Amount: 4 Tabs. cetirizine 10 mg tablet Commonly known as:  ZYRTEC  
TAKE 1 TABLET BY MOUTH EVERY DAY  
  
 ergocalciferol 50,000 unit capsule Commonly known as:  ERGOCALCIFEROL Take 1 Cap by mouth every seven (7) days. fluticasone 50 mcg/actuation nasal spray Commonly known as:  FLONASE  
2 SQUIRTS DAILY AS NEEDED. INVOKANA 300 mg tablet Generic drug:  canagliflozin Take 1 Tab by mouth Daily (before breakfast). JANUVIA 100 mg tablet Generic drug:  SITagliptin Take 1 Tab by mouth once over twenty-four (24) hours. Lancets Misc Commonly known as:  MICROLET LANCET  
USE TO CHECK BLOOD SUGAR ONE TO TWO TIMES DAILY  
  
 metFORMIN 1,000 mg tablet Commonly known as:  GLUCOPHAGE  
TAKE 1 TABLET BY MOUTH TWICE A DAY  
  
 montelukast 10 mg tablet Commonly known as:  SINGULAIR Take 1 Tab by mouth daily. naltrexone 50 mg tablet Commonly known as:  DEPADE  
 Take 1 Tab by mouth daily for 30 days. naltrexone-buPROPion 8-90 mg Tber ER tablet Commonly known as:  Donzell Hollow Week 1 1 tab PO QAM, Week 2 1QAM 1QHS, Week 3 2QAM 1 QHS, Week 4 & beyond 2QAM 2QHS  
  
 naratriptan 2.5 mg Tab Commonly known as:  Domonique Quiet Take 1 Tab by mouth once as needed for up to 1 dose. For migraine. Can repeat 1 tab in 4 hours if needed. Limit: 2 tabs in 24 hours Nebulizer & Compressor machine Use as needed for wheezing, cough  
  
 omeprazole 20 mg capsule Commonly known as:  PRILOSEC Take 1 Cap by mouth daily. simvastatin 20 mg tablet Commonly known as:  ZOCOR Take 1 Tab by mouth nightly. topiramate 50 mg tablet Commonly known as:  TOPAMAX Take 1 tab twice a day for migraine prevention * triamcinolone acetonide 0.1 % topical cream  
Commonly known as:  KENALOG Apply  to affected area two (2) times a day. use thin layer * triamcinolone acetonide 0.1 % topical cream  
Commonly known as:  KENALOG Apply  to affected area two (2) times a day. use thin layer * TRUETEST TEST STRIPS strip Generic drug:  glucose blood VI test strips TO BE USED AS DIRECTED * glucose blood VI test strips strip Commonly known as:  Ascensia CONTOUR  
TO BE USED AS DIRECTED * Notice: This list has 6 medication(s) that are the same as other medications prescribed for you. Read the directions carefully, and ask your doctor or other care provider to review them with you. We Performed the Following AMB POC GLUCOSE BLOOD, BY GLUCOSE MONITORING DEVICE [33387 CPT(R)] AMB POC HEMOGLOBIN A1C [45802 CPT(R)] AMB POC URINALYSIS DIP STICK AUTO W/O MICRO [47932 CPT(R)] CBC WITH AUTOMATED DIFF [48937 CPT(R)] LIPASE [id number] CPT(R)] METABOLIC PANEL, COMPREHENSIVE [83846 CPT(R)] Follow-up Instructions Return if symptoms worsen or fail to improve. To-Do List   
 04/26/2017 Imaging:  CT ABD W CONT 04/26/2017 1:00 PM  
  Appointment with [facility] CT 2 at [facility] CT ([phone]) CONTRAST STUDY: 1. The patient should not eat solid food four hours before the appointment but should be encouraged to drink clear liquids. 2.  If you have to drink oral contrast, please pick it up any weekday prior to your appointment, if you cannot please check in 2 hrs before appt time. 3.  The patient will require IV access for contrast administration. 4.  The patient should not take Ibuprofen (Advil, Motrin, etc.) and Naproxen Sodium (Aleve, etc.)  on the day of the exam. Stopping non-steroidal anti-inflammatory agents (NSAIDs) like Ibuprofen decreases the risk of kidney damage from the x-ray contrast (dye). 5.  Bring any non [facility] facility films/images pertaining to the area of interest with you on the day of appointment. 6.  Bring current lab work if available (within last 90 days CMP) ***If scheduled at [facility], iSTAT is not available, labs will need to be done before appointment*** 7. Check in at registration at least 30 minutes before appt time unless you were instructed to do otherwise. Introducing \[facility]\"" & HEALTH SERVICES! Reyna Reyez introduces ScanÃ¢â‚¬Â¢Jour patient portal. Now you can access parts of your medical record, email your doctor's office, and request medication refills online. 1. In your internet browser, go to [email or web address] 2. Click on the First Time User? Click Here link in the Sign In box. You will see the New Member Sign Up page. 3. Enter your ScanÃ¢â‚¬Â¢Jour Access Code exactly as it appears below. You will not need to use this code after youve completed the sign-up process. If you do not sign up before the expiration date, you must request a new code. · ScanÃ¢â‚¬Â¢Jour Access Code: [email or web address] Expires: 7/17/2017 11:52 AM 
 
4. Enter the last four digits of your Social Security Number (xxxx) and Date of Birth (mm/dd/yyyy) as indicated and click Submit.  You will be taken to the next sign-up page. 5. Create a Picarro ID. This will be your Picarro login ID and cannot be changed, so think of one that is secure and easy to remember. 6. Create a Picarro password. You can change your password at any time. 7. Enter your Password Reset Question and Answer. This can be used at a later time if you forget your password. 8. Enter your e-mail address. You will receive e-mail notification when new information is available in [address]. 9. Click Sign Up. You can now view and download portions of your medical record. 10. Click the Download Summary menu link to download a portable copy of your medical information. If you have questions, please visit the Frequently Asked Questions section of the Picarro website. Remember, Picarro is NOT to be used for urgent needs. For medical emergencies, dial 911. Now available from your iPhone and Android! Please provide this summary of care documentation to your next provider. Your primary care clinician is listed as [address]. If you have any questions after today's visit, please call [phone].

## 2017-04-26 NOTE — PROGRESS NOTES
CC:  Chief Complaint   Patient presents with    Abdominal Pain     HISTORY OF PRESENT ILLNESS  Karen Conn is a 43 y.o. female. HPI Comments: Who presents today for evaluation of recurrent epigastric pain that started on Monday. The pain has been worsening and keeping her up at night. She apparently was seen in the ER for this about 1-month ago and told that she had a stomach flu. She reports that the pain radiates from the epigastric area to her mid abdomen. The pain does not move across her abdomen, but from upper abdomen to lower abdomen. The pain is coming in waves. Very cramping and tight abdominal pain. The patient reports that she did have breakfast this morning (bowl of cereal). She had a normal bowel movement this morning. She denies nausea. She just has the pain. ROS:  Review of Systems   Gastrointestinal: Positive for abdominal pain (Epigastric pain). All other systems reviewed and are negative. OBJECTIVE:  Visit Vitals    BP (!) 167/93 (BP 1 Location: Left arm, BP Patient Position: Supine)    Pulse (!) 120    Temp 98.5 °F (36.9 °C) (Temporal)    Resp 22    LMP 01/25/2012    SpO2 99%   Physical Exam   Constitutional:   Patient lying on exam table. Uncomfortable, crying. Cardiovascular: Normal rate and regular rhythm. Pulmonary/Chest: Effort normal and breath sounds normal.   Abdominal: Soft. She exhibits no mass. There is tenderness (epigastric area. ). There is no rebound and no guarding. Diminished bowel sounds     Musculoskeletal: Normal range of motion. Neurological: She is alert. Skin: Skin is warm. Nursing note and vitals reviewed.       LABS:  Results for orders placed or performed in visit on 04/26/17   AMB POC HEMOGLOBIN A1C   Result Value Ref Range    Hemoglobin A1c (POC) 6.5 %   AMB POC URINALYSIS DIP STICK AUTO W/O MICRO   Result Value Ref Range    Color (UA POC) Yellow     Clarity (UA POC) Clear     Glucose (UA POC) 2+ Negative    Bilirubin (UA POC) Negative Negative    Ketones (UA POC) Negative Negative    Specific gravity (UA POC) 1.005 1.001 - 1.035    Blood (UA POC) Negative Negative    pH (UA POC) 5.0 4.6 - 8.0    Protein (UA POC) Negative Negative mg/dL    Urobilinogen (UA POC) 0.2 mg/dL 0.2 - 1    Nitrites (UA POC) Negative Negative    Leukocyte esterase (UA POC) Negative Negative   AMB POC GLUCOSE BLOOD, BY GLUCOSE MONITORING DEVICE   Result Value Ref Range    Glucose  mg/dL       ASSESSMENT and PLAN    ICD-10-CM ICD-9-CM    1. Epigastric abdominal pain R10.13 789.06 CT ABD W CONT      METABOLIC PANEL, COMPREHENSIVE      CBC WITH AUTOMATED DIFF      LIPASE     36F with onset of severe epigastric pain moving to her lower abdomen. The pain is coming in waves. She is wreathing on the exam table and in tears. Very uncomfortable. As the visit progressed, the patient's symptoms progressed. She did drive herself to the appointment, but was in pain for the whole time. In office labs obtained and showed the following: elevated glucose, but normal UA and HBA1C = 6.5. The patient is a type II DM. The pain is coming in waves. She did take her Omeprazole. The pain is like \"labor pains\". It comes and goes. When it is at the highest peak, 8-9/10. This last for about 3-minutes, subsides and then restarts. We did obtain some labs, but will send her emergently to the ER for evaluation in hospital as things are proceeding in the office. Pt verbalizes understanding of plan of care and denies further questions or concerns at this time. Follow-up Disposition:  Return if symptoms worsen or fail to improve.

## 2017-04-26 NOTE — ED NOTES
Bedside and Verbal shift change report given to james (oncoming nurse) by Jens Rivera (offgoing nurse). Report included the following information SBAR.

## 2017-04-26 NOTE — DISCHARGE INSTRUCTIONS
We hope that we have addressed all of your medical concerns. The examination and treatment you received in the Emergency Department were for an emergent problem and were not intended as complete care. It is important that you follow up with your healthcare provider(s) for ongoing care. If your symptoms worsen or do not improve as expected, and you are unable to reach your usual health care provider(s), you should return to the Emergency Department. Today's healthcare is undergoing tremendous change, and patient satisfaction surveys are one of the many tools to assess the quality of medical care. You may receive a survey from the [employer] organization regarding your experience in the Emergency Department. I hope that your experience has been completely positive, particularly the medical care that I provided. As such, please participate in the survey; anything less than excellent does not meet my expectations or intentions. [facility] and 8 [facility] participate in nationally recognized quality of care measures. If your blood pressure is greater than 120/80, as reported below, we urge that you seek medical care to address the potential of high blood pressure, commonly known as hypertension. Hypertension can be hereditary or can be caused by certain medical conditions, pain, stress, or \"white coat syndrome. \"       Please make an appointment with your health care provider(s) for follow up of your Emergency Department visit. VITALS:   Patient Vitals for the past 8 hrs:   Temp Pulse Resp BP SpO2   04/26/17 1100 - - - 118/83 98 %   04/26/17 1045 - - - (!) 111/96 97 %   04/26/17 1030 - - - 140/87 98 %   04/26/17 1029 98.8 °F (37.1 °C) (!) 125 18 - 97 %          Thank you for allowing us to provide you with medical care today. We realize that you have many choices for your emergency care needs.   Please choose us in the future for any continued health care needs. Nati Yu, [facility].   Office: [phone]            Recent Results (from the past 24 hour(s))   AMB POC URINALYSIS DIP STICK AUTO W/O MICRO    Collection Time: 04/26/17  9:29 AM   Result Value Ref Range    Color (UA POC) Yellow     Clarity (UA POC) Clear     Glucose (UA POC) 2+ Negative    Bilirubin (UA POC) Negative Negative    Ketones (UA POC) Negative Negative    Specific gravity (UA POC) 1.005 1.001 - 1.035    Blood (UA POC) Negative Negative    pH (UA POC) 5.0 4.6 - 8.0    Protein (UA POC) Negative Negative mg/dL    Urobilinogen (UA POC) 0.2 mg/dL 0.2 - 1    Nitrites (UA POC) Negative Negative    Leukocyte esterase (UA POC) Negative Negative   AMB POC GLUCOSE BLOOD, BY GLUCOSE MONITORING DEVICE    Collection Time: 04/26/17  9:30 AM   Result Value Ref Range    Glucose  mg/dL   AMB POC HEMOGLOBIN A1C    Collection Time: 04/26/17  9:31 AM   Result Value Ref Range    Hemoglobin A1c (POC) 6.5 %   CBC WITH AUTOMATED DIFF    Collection Time: 04/26/17 10:32 AM   Result Value Ref Range    WBC 12.1 (H) 3.6 - 11.0 K/uL    RBC 5.41 (H) 3.80 - 5.20 M/uL    HGB 15.7 11.5 - 16.0 g/dL    HCT 46.3 35.0 - 47.0 %    MCV 85.6 80.0 - 99.0 FL    MCH 29.0 26.0 - 34.0 PG    MCHC 33.9 30.0 - 36.5 g/dL    RDW 13.3 11.5 - 14.5 %    PLATELET 861 335 - 252 K/uL    NEUTROPHILS 73 32 - 75 %    LYMPHOCYTES 19 12 - 49 %    MONOCYTES 6 5 - 13 %    EOSINOPHILS 1 0 - 7 %    BASOPHILS 1 0 - 1 %    ABS. NEUTROPHILS 8.9 (H) 1.8 - 8.0 K/UL    ABS. LYMPHOCYTES 2.3 0.8 - 3.5 K/UL    ABS. MONOCYTES 0.7 0.0 - 1.0 K/UL    ABS. EOSINOPHILS 0.1 0.0 - 0.4 K/UL    ABS.  BASOPHILS 0.1 0.0 - 0.1 K/UL   METABOLIC PANEL, COMPREHENSIVE    Collection Time: 04/26/17 10:32 AM   Result Value Ref Range    Sodium 137 136 - 145 mmol/L    Potassium 4.3 3.5 - 5.1 mmol/L    Chloride 101 97 - 108 mmol/L    CO2 20 (L) 21 - 32 mmol/L    Anion gap 16 (H) 5 - 15 mmol/L    Glucose 235 (H) 65 - 100 mg/dL    BUN 11 6 - 20 MG/DL    Creatinine 0.93 0.55 - 1.02 MG/DL    BUN/Creatinine ratio 12 12 - 20      GFR est AA >60 >60 ml/min/1.73m2    GFR est non-AA >60 >60 ml/min/1.73m2    Calcium 9.1 8.5 - 10.1 MG/DL    Bilirubin, total 0.3 0.2 - 1.0 MG/DL    ALT (SGPT) 29 12 - 78 U/L    AST (SGOT) 17 15 - 37 U/L    Alk. phosphatase 65 45 - 117 U/L    Protein, total 7.9 6.4 - 8.2 g/dL    Albumin 3.8 3.5 - 5.0 g/dL    Globulin 4.1 (H) 2.0 - 4.0 g/dL    A-G Ratio 0.9 (L) 1.1 - 2.2     LIPASE    Collection Time: 04/26/17 10:32 AM   Result Value Ref Range    Lipase 193 73 - 393 U/L       No results found. Indigestion (Dyspepsia or Heartburn): Care Instructions  Your Care Instructions  Sometimes it can be hard to pinpoint the cause of indigestion (dyspepsia or heartburn). Most cases of an upset stomach with bloating, burning, burping, and nausea are minor and go away within several hours. Home treatment and over-the-counter medicine often are able to control symptoms. But if you take medicine to relieve your indigestion without making diet and lifestyle changes, your symptoms are likely to return again and again. If you get indigestion often, it may be a sign of a more serious medical problem. Be sure to follow up with your doctor, who may want to do tests to be sure of the cause of your indigestion. Follow-up care is a key part of your treatment and safety. Be sure to make and go to all appointments, and call your doctor if you are having problems. It's also a good idea to know your test results and keep a list of the medicines you take. How can you care for yourself at home? · Your doctor may recommend over-the-counter medicine. For mild or occasional indigestion, antacids such as Tums, Gaviscon, Mylanta, or Maalox may help. Be careful when you take over-the-counter antacid medicines. Many of these medicines have aspirin in them.  Read the label to make sure that you are not taking more than the recommended dose. Too much aspirin can be harmful. · Your doctor also may recommend over-the-counter acid reducers, such as Pepcid AC, Tagamet HB, Zantac 75, or Prilosec. Read and follow all instructions on the label. If you use these medicines often, talk with your doctor. · Change your eating habits. ¨ It's best to eat several small meals instead of two or three large meals. ¨ After you eat, wait 2 to 3 hours before you lie down. ¨ Chocolate, mint, and alcohol can make GERD worse. ¨ Spicy foods, foods that have a lot of acid (like tomatoes and oranges), and coffee can make GERD symptoms worse in some people. If your symptoms are worse after you eat a certain food, you may want to stop eating that food to see if your symptoms get better. · Do not smoke or chew tobacco. Smoking can make GERD worse. If you need help quitting, talk to your doctor about stop-smoking programs and medicines. These can increase your chances of quitting for good. · If you have GERD symptoms at night, raise the head of your bed 6 to 8 inches by putting the frame on blocks or placing a foam wedge under the head of your mattress. (Adding extra pillows does not work.)  · Do not wear tight clothing around your middle. · Lose weight if you need to. Losing just 5 to 10 pounds can help. · Do not take anti-inflammatory medicines, such as aspirin, ibuprofen (Advil, Motrin), or naproxen (Aleve). These can irritate the stomach. If you need a pain medicine, try acetaminophen (Tylenol), which does not cause stomach upset. When should you call for help? Call 911 anytime you think you may need emergency care. For example, call if:  · You passed out (lost consciousness). · You vomit blood or what looks like coffee grounds. · You pass maroon or very bloody stools. · You have chest pain or pressure. This may occur with:  ¨ Sweating. ¨ Shortness of breath. ¨ Nausea or vomiting.   ¨ Pain that spreads from the chest to the neck, jaw, or one or both shoulders or arms. ¨ Feeling dizzy or lightheaded. ¨ A fast or uneven pulse. After calling 911, chew 1 adult-strength aspirin. Wait for an ambulance. Do not try to drive yourself. Call your doctor now or seek immediate medical care if:  · You have severe belly pain. · Your stools are black and tarlike or have streaks of blood. · You have trouble swallowing. · You are losing weight and do not know why. Watch closely for changes in your health, and be sure to contact your doctor if:  · You do not get better as expected. Where can you learn more? Go to http://hodan-eugene.info/. Enter A808 in the search box to learn more about \"Indigestion (Dyspepsia or Heartburn): Care Instructions. \"  Current as of: November 16, 2016  Content Version: 11.2  © 7734-7587 [employer]. Care instructions adapted under license by [employer] (which disclaims liability or warranty for this information). If you have questions about a medical condition or this instruction, always ask your healthcare professional. [place] [address] any warranty or liability for your use of this information. Gastroesophageal Reflux Disease (GERD): Care Instructions  Your Care Instructions    Gastroesophageal reflux disease (GERD) is the backward flow of stomach acid into the esophagus. The esophagus is the tube that leads from your throat to your stomach. A one-way valve prevents the stomach acid from moving up into this tube. When you have GERD, this valve does not close tightly enough. If you have mild GERD symptoms including heartburn, you may be able to control the problem with antacids or over-the-counter medicine. Changing your diet, losing weight, and making other lifestyle changes can also help reduce symptoms. Follow-up care is a key part of your treatment and safety. Be sure to make and go to all appointments, and call your doctor if you are having problems.  Its also a good idea to know your test results and keep a list of the medicines you take. How can you care for yourself at home? · Take your medicines exactly as prescribed. Call your doctor if you think you are having a problem with your medicine. · Your doctor may recommend over-the-counter medicine. For mild or occasional indigestion, antacids, such as Tums, Gaviscon, Mylanta, or Maalox, may help. Your doctor also may recommend over-the-counter acid reducers, such as Pepcid AC, Tagamet HB, Zantac 75, or Prilosec. Read and follow all instructions on the label. If you use these medicines often, talk with your doctor. · Change your eating habits. ¨ Its best to eat several small meals instead of two or three large meals. ¨ After you eat, wait 2 to 3 hours before you lie down. ¨ Chocolate, mint, and alcohol can make GERD worse. ¨ Spicy foods, foods that have a lot of acid (like tomatoes and oranges), and coffee can make GERD symptoms worse in some people. If your symptoms are worse after you eat a certain food, you may want to stop eating that food to see if your symptoms get better. · Do not smoke or chew tobacco. Smoking can make GERD worse. If you need help quitting, talk to your doctor about stop-smoking programs and medicines. These can increase your chances of quitting for good. · If you have GERD symptoms at night, raise the head of your bed 6 to 8 inches by putting the frame on blocks or placing a foam wedge under the head of your mattress. (Adding extra pillows does not work.)  · Do not wear tight clothing around your middle. · Lose weight if you need to. Losing just 5 to 10 pounds can help. When should you call for help? Call your doctor now or seek immediate medical care if:  · You have new or different belly pain. · Your stools are black and tarlike or have streaks of blood.   Watch closely for changes in your health, and be sure to contact your doctor if:  · Your symptoms have not improved after 2 days.  · Food seems to catch in your throat or chest.  Where can you learn more? Go to http://hodan-eugene.info/. Enter D089 in the search box to learn more about \"Gastroesophageal Reflux Disease (GERD): Care Instructions. \"  Current as of: August 9, 2016  Content Version: 11.2  © 6713-3233 [employer]. Care instructions adapted under license by [employer] (which disclaims liability or warranty for this information). If you have questions about a medical condition or this instruction, always ask your healthcare professional. [place] [address] any warranty or liability for your use of this information. Peptic Ulcer Disease: Care Instructions  Your Care Instructions    Peptic ulcers are sores on the inside of the stomach or the small intestine. They are usually caused by an infection with bacteria or from use of nonsteroidal anti-inflammatory drugs (NSAIDs). NSAIDs include aspirin, ibuprofen (Advil), and naproxen (Aleve). Your doctor may have prescribed medicine to reduce stomach acid. You also may need to take antibiotics if your peptic ulcers are caused by an infection. You can help your stomach heal and keep ulcers from coming back by making some changes in your lifestyle. Quit smoking, limit caffeine and alcohol, and reduce stress. Follow-up care is a key part of your treatment and safety. Be sure to make and go to all appointments, and call your doctor if you are having problems. Its also a good idea to know your test results and keep a list of the medicines you take. How can you care for yourself at home? · Take your medicines exactly as prescribed. Call your doctor if you think you are having a problem with your medicine. · Do not take aspirin or other NSAIDs such as ibuprofen (Advil or Motrin) or naproxen (Aleve). Ask your doctor what you can take for pain. · Do not smoke. Smoking can make ulcers worse.  If you need help quitting, talk to your doctor about stop-smoking programs and medicines. These can increase your chances of quitting for good. · Drink in moderation or avoid drinking alcohol. · Do not drink beverages that have caffeine if they bother your stomach. These include coffee, tea, and soda. · Eat a balanced diet of small, frequent meals. Make an appointment with a dietitian if you need help planning your meals. · Reduce stress. Avoid people and places that make you feel anxious, if you can. Learn ways to reduce stress, such as biofeedback, guided imagery, and meditation. When should you call for help? Call 911 anytime you think you may need emergency care. For example, call if:  · You passed out (lost consciousness). · You vomit blood or what looks like coffee grounds. · You pass maroon or very bloody stools. Call your doctor now or seek immediate medical care if:  · You have severe pain in your belly, back, or shoulders. · You have new or worsening belly pain. · You are dizzy or lightheaded, or you feel like you may faint. · Your stools are black and tarlike or have streaks of blood. Watch closely for changes in your health, and be sure to contact your doctor if:  · You have new symptoms such as weight loss, nausea or vomiting. · You do not feel better as expected. Where can you learn more? Go to http://hodan-eugene.info/. Enter N011 in the search box to learn more about \"Peptic Ulcer Disease: Care Instructions. \"  Current as of: August 9, 2016  Content Version: 11.2  © 6892-7085 [employer]. Care instructions adapted under license by [employer] (which disclaims liability or warranty for this information). If you have questions about a medical condition or this instruction, always ask your healthcare professional. [address] any warranty or liability for your use of this information.

## 2017-04-27 NOTE — PROGRESS NOTES
HISTORY OF PRESENT ILLNESS  Macy Schmid is a 43 y.o. female. HPI Comments: C/o nasal congestion. Sinus pressure headache. Pain behind the eyes and copious amounts of nasal secretions. Has some mild allergies, but this is worse. Follow-up     Cold           ROS:  Review of Systems   All other systems reviewed and are negative. OBJECTIVE:  Visit Vitals    /80 (BP 1 Location: Right arm, BP Patient Position: Sitting)  Comment (BP Patient Position): sitting    Pulse (!) 102    Temp 98.2 °F (36.8 °C)    Resp 16    Ht 5' 5\" (1.651 m)    Wt 204 lb 3.2 oz (92.6 kg)    LMP 01/25/2012    SpO2 97%    BMI 33.98 kg/m2   Physical Exam   HENT:   Head: Normocephalic. Eyes: Pupils are equal, round, and reactive to light. Neck: Normal range of motion. Cardiovascular: Normal rate and regular rhythm. Pulmonary/Chest: Effort normal and breath sounds normal.   Musculoskeletal: Normal range of motion. Nursing note and vitals reviewed. ASSESSMENT and PLAN    ICD-10-CM ICD-9-CM    1. Acute maxillary sinusitis, recurrence not specified J01.00 461.0 azithromycin (ZITHROMAX) 250 mg tablet   2. 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 Please take the antibiotics (Z-simi) as directed. Take 2 tablets today, then 1 tablet daily x 4 days. 2. Please use the FLONASE (nasal spray) 2 squirts in each nostril just 1 x per day. 3. Please use NORMAL SALINE nasal spray (buy this over the counter) use 2-3 squirts in each nostril every 1-2 hours while awake. This will help to really clear up and move the secretions down. Pt verbalizes understanding of plan of care and denies further questions or concerns at this time. Follow-up Disposition:  Return in about 1 month (around 5/18/2017), or if symptoms worsen or fail to improve, for Weight management.

## 2017-05-11 ENCOUNTER — HOSPITAL ENCOUNTER (OUTPATIENT)
Age: 37
Setting detail: OUTPATIENT SURGERY
Discharge: HOME | End: 2017-05-11
Attending: INTERNAL MEDICINE | Admitting: INTERNAL MEDICINE
Payer: MEDICAID

## 2017-05-11 ENCOUNTER — ANESTHESIA (OUTPATIENT)
Dept: ENDOSCOPY | Age: 37
End: 2017-05-11
Payer: MEDICAID

## 2017-05-11 ENCOUNTER — ANESTHESIA EVENT (OUTPATIENT)
Dept: ENDOSCOPY | Age: 37
End: 2017-05-11
Payer: MEDICAID

## 2017-05-11 VITALS
TEMPERATURE: 98.3 F | HEART RATE: 97 BPM | BODY MASS INDEX: 33.49 KG/M2 | WEIGHT: 201 LBS | HEIGHT: 65 IN | DIASTOLIC BLOOD PRESSURE: 66 MMHG | OXYGEN SATURATION: 98 % | SYSTOLIC BLOOD PRESSURE: 130 MMHG | RESPIRATION RATE: 19 BRPM

## 2017-05-11 LAB
GLUCOSE BLD STRIP.AUTO-MCNC: 133 MG/DL (ref 65–100)
H PYLORI FROM TISSUE: NEGATIVE
KIT LOT NO., HCLOLOT: NORMAL
NEGATIVE CONTROL: NEGATIVE
POSITIVE CONTROL: POSITIVE
SERVICE CMNT-IMP: ABNORMAL

## 2017-05-11 PROCEDURE — 74011250636 HC RX REV CODE- 250/636: Performed by: INTERNAL MEDICINE

## 2017-05-11 PROCEDURE — 76060000031 HC ANESTHESIA FIRST 0.5 HR: Performed by: INTERNAL MEDICINE

## 2017-05-11 PROCEDURE — 76040000019: Performed by: INTERNAL MEDICINE

## 2017-05-11 PROCEDURE — 77030009426 HC FCPS BIOP ENDOSC BSC -B: Performed by: INTERNAL MEDICINE

## 2017-05-11 PROCEDURE — 82962 GLUCOSE BLOOD TEST: CPT

## 2017-05-11 PROCEDURE — 88305 TISSUE EXAM BY PATHOLOGIST: CPT | Performed by: INTERNAL MEDICINE

## 2017-05-11 PROCEDURE — 87077 CULTURE AEROBIC IDENTIFY: CPT | Performed by: INTERNAL MEDICINE

## 2017-05-11 PROCEDURE — 74011250636 HC RX REV CODE- 250/636

## 2017-05-11 RX ORDER — FLUMAZENIL 0.1 MG/ML
0.2 INJECTION INTRAVENOUS
Status: DISCONTINUED | OUTPATIENT
Start: 2017-05-11 | End: 2017-05-11 | Stop reason: HOSPADM

## 2017-05-11 RX ORDER — DEXTROMETHORPHAN/PSEUDOEPHED 2.5-7.5/.8
1.2 DROPS ORAL
Status: DISCONTINUED | OUTPATIENT
Start: 2017-05-11 | End: 2017-05-11 | Stop reason: HOSPADM

## 2017-05-11 RX ORDER — SODIUM CHLORIDE 9 MG/ML
50 INJECTION, SOLUTION INTRAVENOUS CONTINUOUS
Status: DISCONTINUED | OUTPATIENT
Start: 2017-05-11 | End: 2017-05-11 | Stop reason: HOSPADM

## 2017-05-11 RX ORDER — PROPOFOL 10 MG/ML
INJECTION, EMULSION INTRAVENOUS AS NEEDED
Status: DISCONTINUED | OUTPATIENT
Start: 2017-05-11 | End: 2017-05-11 | Stop reason: HOSPADM

## 2017-05-11 RX ORDER — EPINEPHRINE 0.1 MG/ML
1 INJECTION INTRACARDIAC; INTRAVENOUS
Status: DISCONTINUED | OUTPATIENT
Start: 2017-05-11 | End: 2017-05-11 | Stop reason: HOSPADM

## 2017-05-11 RX ORDER — NALOXONE HYDROCHLORIDE 0.4 MG/ML
0.4 INJECTION, SOLUTION INTRAMUSCULAR; INTRAVENOUS; SUBCUTANEOUS
Status: DISCONTINUED | OUTPATIENT
Start: 2017-05-11 | End: 2017-05-11 | Stop reason: HOSPADM

## 2017-05-11 RX ORDER — MIDAZOLAM HYDROCHLORIDE 1 MG/ML
.25-5 INJECTION, SOLUTION INTRAMUSCULAR; INTRAVENOUS
Status: DISCONTINUED | OUTPATIENT
Start: 2017-05-11 | End: 2017-05-11 | Stop reason: HOSPADM

## 2017-05-11 RX ORDER — ATROPINE SULFATE 0.1 MG/ML
0.5 INJECTION INTRAVENOUS
Status: DISCONTINUED | OUTPATIENT
Start: 2017-05-11 | End: 2017-05-11 | Stop reason: HOSPADM

## 2017-05-11 RX ADMIN — PROPOFOL 50 MG: 10 INJECTION, EMULSION INTRAVENOUS at 09:50

## 2017-05-11 RX ADMIN — SODIUM CHLORIDE 50 ML/HR: 900 INJECTION, SOLUTION INTRAVENOUS at 09:04

## 2017-05-11 RX ADMIN — PROPOFOL 150 MG: 10 INJECTION, EMULSION INTRAVENOUS at 09:48

## 2017-05-11 NOTE — PROGRESS NOTES
Sixto Calderon  1980  [id number]    Situation:  Verbal report received from: SHELL Lawson  Procedure: Procedure(s):  ESOPHAGOGASTRODUODENOSCOPY (EGD)  ESOPHAGOGASTRODUODENAL (EGD) BIOPSY    Background:    Preoperative diagnosis: ABDOMINAL PAIN,GERD  Postoperative diagnosis: Normal    :  Dr. Natalia Hanna  Assistant(s): Endoscopy Technician-1: Liang Taylor  Endoscopy RN-1: Lulu Ruiz RN    Specimens:   ID Type Source Tests Collected by Time Destination   1 :  Duoodenum Biopsies Preservative   Jennifer Wright MD 5/11/2017 8761 Pathology   2 : Esophageal biopsies Preservative   Jennifer Wright MD 5/11/2017 9098 Pathology     H. Pylori  yes    Assessment:  Intra-procedure medications     Anesthesia gave intra-procedure sedation and medications, see anesthesia flow sheet yes    Intravenous fluids: NS@ KVO     Vital signs stable   yes    Abdominal assessment: round and soft   yes    Recommendation:  Discharge patient per MD order  yes.   Return to floor   outpatient  Family or Friend   spouse  Permission to share finding with family or friend yes

## 2017-05-11 NOTE — ANESTHESIA POSTPROCEDURE EVALUATION
Post-Anesthesia Evaluation and Assessment    Patient: Aguila Galvez MRN: [id number]  SSN: [id number]    YOB: 1980  Age: 43 y.o. Sex: female       Cardiovascular Function/Vital Signs  Visit Vitals    /89    Pulse (!) 116    Temp 36.8 °C (98.2 °F)    Resp 17    Ht 5' 5\" (1.651 m)    Wt 91.2 kg (201 lb)    SpO2 98%    Breastfeeding No    BMI 33.45 kg/m2       Patient is status post MAC anesthesia for Procedure(s):  ESOPHAGOGASTRODUODENOSCOPY (EGD)  ESOPHAGOGASTRODUODENAL (EGD) BIOPSY. Nausea/Vomiting: None    Postoperative hydration reviewed and adequate. Pain:  Pain Scale 1: Numeric (0 - 10) (05/11/17 0844)  Pain Intensity 1: 0 (05/11/17 0844)   Managed    Neurological Status: At baseline    Mental Status and Level of Consciousness: Arousable    Pulmonary Status:   O2 Device: Room air (05/11/17 0843)   Adequate oxygenation and airway patent    Complications related to anesthesia: None    Post-anesthesia assessment completed.  No concerns    Signed By: Adore Jones MD     May 11, 2017

## 2017-05-11 NOTE — DISCHARGE INSTRUCTIONS
[address] [facility]  [place] , [address] [facility]  [phone]                EGD  [address] [facility]  [id number]  1980    DISCOMFORT:  Sore throat- throat lozenges or warm salt water gargle  redness at IV site- apply warm compress to area; if redness or soreness persist- contact your physician  Gaseous discomfort- walking, belching will help relieve any discomfort  You may not operate a vehicle for 12 hours  You may not engage in an occupation involving machinery or appliances for rest of today  You may not drink alcoholic beverages for at least 12 hours  Avoid making any critical decisions for at least 24 hour  DIET  You may eat and drink after you leave. You may resume your regular diet - however -  remember your colon is empty and a heavy meal will produce gas. Avoid these foods:  vegetables, fried / greasy foods, carbonated drinks        ACTIVITY  You may resume your normal daily activities   Spend the remainder of the day resting -  avoid any strenuous activity. CALL M.D. ANY SIGN OF   Increasing pain, nausea, vomiting  Abdominal distension (swelling)  New increased bleeding (oral or rectal)  Fever (chills)  Pain in chest area  Bloody discharge from nose or mouth  Shortness of breath    Follow-up Instructions:   Call Dr. Coy Dover for any questions or problems. If we took a biopsy please call the office within 2 weeks to discuss your                             pathology results. Telephone # [phone]   Continue same medications.     ENDOSCOPY FINDINGS:   Your endoscopy showed normal upper endoscopy

## 2017-05-11 NOTE — PERIOP NOTES
Patient tolerated procedure without problems. Abdomen soft and patient arousable and voices no complaints Report received from CRNA, see anesthesia note. Patient transported to endoscopy recovery area.  Bedside report given to Kd Rasmussen

## 2017-05-11 NOTE — PROCEDURES
[address] Lindsey Silva MD  [phone]           2017                EGD Operative Report  Essence Baird  :  1980  Aquilino Doll Medical Record Number:  [id number]      Indication:  Abdominal pain, epigastric     : Jennifer Carbajal MD    Referring Provider:  Sai Oliveira MD      Anesthesia/Sedation:  MAC anesthesia Propofol    Airway assessment: No airway problems anticipated    Pre-Procedural Exam:      Airway: clear, no airway problems anticipated  Heart: RRR, without gallops or rubs  Lungs: clear bilaterally without wheezes, crackles, or rhonchi  Abdomen: soft, nontender, nondistended, bowel sounds present  Mental Status: awake, alert and oriented to person, place and time       Procedure Details     After infomed consent was obtained for the procedure, with all risks and benefits of procedure explained the patient was taken to the endoscopy suite and placed in the left lateral decubitus position. Following sequential administration of sedation as per above, the endoscope was inserted into the mouth and advanced under direct vision to second portion of the duodenum. A careful inspection was made as the gastroscope was withdrawn, including a retroflexed view of the proximal stomach; findings and interventions are described below. Findings:   Esophagus:normal mucosa. BX taken to r/o EoEo  Stomach: normal mucosa. Bx take for SONYA  Duodenum/jejunum: normal Random bx taken     Therapies:  biopsy of esophagus  biopsy of stomach antrum  biopsy of duodenal random    Specimens: as above           Complications:   None; patient tolerated the procedure well. EBL:  None. Impression:    Normal upper endoscopy    Recommendations:    -Continue acid suppression. ,   -Await SONYA test result and treat for Helicobacter pylori if positive. ,   -GERD diet: avoid fried and fatty foods.  peppermint, chocolate, alcohol, coffee, citrus fruits and juices, tomoato products; avoid lying down for 2 to 3 hours after eating.   -Follow up in th office as previously scheduled    Markos Cutler MD

## 2017-05-11 NOTE — ANESTHESIA PREPROCEDURE EVALUATION
Anesthetic History   No history of anesthetic complications            Review of Systems / Medical History  Patient summary reviewed, nursing notes reviewed and pertinent labs reviewed    Pulmonary            Asthma        Neuro/Psych         Headaches     Cardiovascular    Hypertension              Exercise tolerance: >4 METS     GI/Hepatic/Renal  Within defined limits              Endo/Other    Diabetes         Other Findings            Physical Exam    Airway  Mallampati: II  TM Distance: 4 - 6 cm  Neck ROM: normal range of motion   Mouth opening: Normal     Cardiovascular    Rhythm: regular  Rate: normal         Dental    Dentition: Lower dentition intact and Upper dentition intact     Pulmonary  Breath sounds clear to auscultation               Abdominal         Other Findings            Anesthetic Plan    ASA: 3  Anesthesia type: MAC          Induction: Intravenous  Anesthetic plan and risks discussed with: Patient

## 2017-05-11 NOTE — IP AVS SNAPSHOT
Josh Collazo 
 
 
 [address] 
[phone] Patient: Jeison Mead MRN: [id number]:[id number] You are allergic to the following Allergen Reactions Green Pepper Hives Recent Documentation Height Weight Breastfeeding? BMI OB Status Smoking Status 1.651 m 91.2 kg No 33.45 kg/m2 Hysterectomy Never Smoker Emergency Contacts Name Discharge Info Relation Home Work Mobile Jay Jay Lopez  Other Relative [6] [phone] About your hospitalization You were admitted on:  May 11, 2017 You last received care in the:  [facility] ENDOSCOPY You were discharged on:  May 11, 2017 Unit phone number:  [phone] Why you were hospitalized Your primary diagnosis was:  Not on File Providers Seen During Your Hospitalizations Provider Role Specialty Primary office phone Zac Selby MD Attending Provider Gastroenterology [phone] Your Primary Care Physician (PCP) Primary Care Physician Office Phone Office Fax Osei Truong [phone] Follow-up Information None Your Appointments Wednesday May 17, 2017  8:30 AM EDT PHYSICAL PRE OP with Yulia Green MD  
[address] [facility]) [facility] [address] [place] [address]  
[phone] Current Discharge Medication List  
  
CONTINUE these medications which have NOT CHANGED Dose & Instructions Dispensing Information Comments Morning Noon Evening Bedtime * albuterol 90 mcg/actuation inhaler Commonly known as:  VENTOLIN HFA Your last dose was: Your next dose is:    
   
   
 INHALE 2 PUFFS BY MOUTH EVERY 4 HOURS AS NEEDED FOR WHEEZING Quantity:  1 Inhaler Refills:  5  
     
   
   
   
  
 * albuterol 2.5 mg /3 mL (0.083 %) nebulizer solution Commonly known as:  PROVENTIL VENTOLIN Your last dose was: Your next dose is:    
   
   
 Dose:  2.5 mg  
3 mL by Nebulization route every four (4) hours as needed for Wheezing for up to 30 doses. Quantity:  100 Each Refills:  2  
     
   
   
   
  
 atorvastatin 20 mg tablet Commonly known as:  LIPITOR Your last dose was: Your next dose is: TAKE 1 TABLET BY MOUTH EVERY DAY Quantity:  90 Tab Refills:  1 Blood-Glucose Meter monitoring kit Your last dose was: Your next dose is: Please give one kit. Quantity:  1 Kit Refills:  0  
     
   
   
   
  
 butalbital-acetaminophen-caffeine -40 mg per tablet Commonly known as:  Patito Wixom Your last dose was: Your next dose is:    
   
   
 Dose:  1 Tab Take 1 Tab by mouth every six (6) hours as needed for Pain. Max Daily Amount: 4 Tabs. Quantity:  20 Tab Refills:  0  
     
   
   
   
  
 cetirizine 10 mg tablet Commonly known as:  ZYRTEC Your last dose was: Your next dose is: TAKE 1 TABLET BY MOUTH EVERY DAY Refills:  12  
     
   
   
   
  
 famotidine 20 mg tablet Commonly known as:  PEPCID Your last dose was: Your next dose is:    
   
   
 Dose:  20 mg Take 1 Tab by mouth two (2) times a day for 30 days. Quantity:  60 Tab Refills:  0  
     
   
   
   
  
 fluticasone 50 mcg/actuation nasal spray Commonly known as:  Ala Lips Your last dose was: Your next dose is:    
   
   
 2 SQUIRTS DAILY AS NEEDED. Quantity:  16 g Refills:  3 INVOKANA 300 mg tablet Generic drug:  canagliflozin Your last dose was: Your next dose is:    
   
   
 Dose:  300 mg Take 1 Tab by mouth Daily (before breakfast). Quantity:  90 Tab Refills:  1 JANUVIA 100 mg tablet Generic drug:  SITagliptin Your last dose was: Your next dose is:    
   
   
 Dose:  100 mg Take 1 Tab by mouth once over twenty-four (24) hours. Quantity:  90 Tab Refills:  1 Lancets Misc Commonly known as:  Javier Ingraham Your last dose was: Your next dose is:    
   
   
 USE TO CHECK BLOOD SUGAR ONE TO TWO TIMES DAILY Quantity:  100 Each Refills:  0 The requested Dispensed Quantity of 100 exceeded the Quantity Remaining for this Rx.  
    
   
   
   
  
 metFORMIN 1,000 mg tablet Commonly known as:  GLUCOPHAGE Your last dose was: Your next dose is: TAKE 1 TABLET BY MOUTH TWICE A DAY Quantity:  180 Tab Refills:  1  
     
   
   
   
  
 naltrexone-buPROPion 8-90 mg Tber ER tablet Commonly known as:  Sebastian Lomeli Your last dose was: Your next dose is:    
   
   
 Week 1 1 tab PO QAM, Week 2 1QAM 1QHS, Week 3 2QAM 1 QHS, Week 4 & beyond 2QAM 2QHS Quantity:  120 Tab Refills:  0  
     
   
   
   
  
 naratriptan 2.5 mg Tab Commonly known as:  Sanam Dheeraj Your last dose was: Your next dose is:    
   
   
 Dose:  2.5 mg Take 1 Tab by mouth once as needed for up to 1 dose. For migraine. Can repeat 1 tab in 4 hours if needed. Limit: 2 tabs in 24 hours Quantity:  9 Tab Refills:  1 Nebulizer & Compressor machine Your last dose was: Your next dose is:    
   
   
 Use as needed for wheezing, cough Quantity:  1 Each Refills:  0  
     
   
   
   
  
 sucralfate 100 mg/mL suspension Commonly known as:  Marcsamina Wick Your last dose was: Your next dose is:    
   
   
 Dose:  1 g Take 10 mL by mouth four (4) times daily. Quantity:  414 mL Refills:  0  
     
   
   
   
  
 topiramate 50 mg tablet Commonly known as:  TOPAMAX Your last dose was: Your next dose is: Take 1 tab twice a day for migraine prevention Quantity:  60 Tab Refills:  0  
     
   
   
   
  
 * TRUETEST TEST STRIPS strip Generic drug:  glucose blood VI test strips Your last dose was: Your next dose is:    
   
   
 TO BE USED AS DIRECTED Quantity:  50 Strip Refills:  5  
     
   
   
   
  
 * glucose blood VI test strips strip Commonly known as:  Ascensia CONTOUR Your last dose was: Your next dose is:    
   
   
 TO BE USED AS DIRECTED Quantity:  50 Strip Refills:  5 VITAMIN D2 PO Your last dose was: Your next dose is: Take  by mouth. Refills:  0  
     
   
   
   
  
 * Notice: This list has 4 medication(s) that are the same as other medications prescribed for you. Read the directions carefully, and ask your doctor or other care provider to review them with you. Discharge Instructions [address] [facility] [address] [facility], [address] [facility] 
[phone] EGD  DISCHARGE INSTRUCTIONS Julio Cbonnie Crys [id number] 
1980 DISCOMFORT: 
Sore throat- throat lozenges or warm salt water gargle 
redness at IV site- apply warm compress to area; if redness or soreness persist- contact your physician Gaseous discomfort- walking, belching will help relieve any discomfort You may not operate a vehicle for 12 hours You may not engage in an occupation involving machinery or appliances for rest of today You may not drink alcoholic beverages for at least 12 hours Avoid making any critical decisions for at least 24 hour DIET You may eat and drink after you leave. You may resume your regular diet  however -  remember your colon is empty and a heavy meal will produce gas. Avoid these foods:  vegetables, fried / greasy foods, carbonated drinks ACTIVITY You may resume your normal daily activities Spend the remainder of the day resting -  avoid any strenuous activity. CALL M.D. ANY SIGN OF Increasing pain, nausea, vomiting Abdominal distension (swelling) New increased bleeding (oral or rectal) Fever (chills) Pain in chest area Bloody discharge from nose or mouth Shortness of breath Follow-up Instructions: 
 Call Dr. Megan Riddle for any questions or problems. If we took a biopsy please call the office within 2 weeks to discuss your                             pathology results. Telephone # [phone] Continue same medications. ENDOSCOPY FINDINGS: 
 Your endoscopy showed normal upper endoscopy Discharge Orders None Introducing [facility]! Andrew Lackey introduces [employer] patient portal. Now you can access parts of your medical record, email your doctor's office, and request medication refills online. 1. In your internet browser, go to [email or web address] 2. Click on the First Time User? Click Here link in the Sign In box. You will see the New Member Sign Up page. 3. Enter your [employer] Access Code exactly as it appears below. You will not need to use this code after youve completed the sign-up process. If you do not sign up before the expiration date, you must request a new code. · [employer] Access Code: [email or web address] Expires: 7/17/2017 11:52 AM 
 
4. Enter the last four digits of your Social Security Number (xxxx) and Date of Birth (mm/dd/yyyy) as indicated and click Submit. You will be taken to the next sign-up page. 5. Create a [employer] ID. This will be your [employer] login ID and cannot be changed, so think of one that is secure and easy to remember. 6. Create a [employer] password. You can change your password at any time. 7. Enter your Password Reset Question and Answer. This can be used at a later time if you forget your password. 8. Enter your e-mail address. You will receive e-mail notification when new information is available in [address] 9. Click Sign Up. You can now view and download portions of your medical record. 10. Click the Download Summary menu link to download a portable copy of your medical information. If you have questions, please visit the Frequently Asked Questions section of the [employer] website. Remember, [employer] is NOT to be used for urgent needs. For medical emergencies, dial 911. Now available from your iPhone and Android! General Information Please provide this summary of care documentation to your next provider. Patient Signature:  ____________________________________________________________ Date:  ____________________________________________________________  
  
[facility] Provider Signature:  ____________________________________________________________ Date:  ____________________________________________________________

## 2017-05-11 NOTE — H&P
[address] Count includes the [facility] [address]  [address] [facility], [address] [facility]  [phone]                 History and Physical     NAME: Codie Schaefer   :  1980   MRN:  [id number]     HPI:  The patient is a 43year old female who presents with a complaint of Abdominal Pain. The patient presents for new symptoms. The onset of the abdominal pain has been sudden and has been occurring in an intermittent pattern with a recurrent course . The abdominal pain is described as stabbing and  is described as being located in the epigastrium . Clarisa Bynum has been no associated black stools, constipation, diarrhea or melena. Previous diagnostic tests have included CT scan (3/2017 negative except for hepatic steatosis). Note for \"Abdominal Pain\":She went to her PCP a week ago after pain was severe after eating cereal. She had been on Prilosec previously and this was helping until recently. She feels a pain under her sternum and radiates up into chest and down into upper abdomen. Denies NSAID use. Uses Tylenol for back. Denies black stools. She is now on Carafate (TID) and Pepcid since ED visit and is able to eat a bit now. Reports controlled DM and denies any vomiting of undigested food.      Past Surgical History:   Procedure Laterality Date    HX APPENDECTOMY  2008    HX CHOLECYSTECTOMY      HX DILATION AND CURETTAGE      HX GYN  3/2008    tubal ligation    HX HERNIA REPAIR  2009    HX HYSTERECTOMY  2012    HX LUMBAR DISKECTOMY          HX ORTHOPAEDIC  2006    ruptured discs     Past Medical History:   Diagnosis Date    Anxiety     Asthma     Diabetes ([address]     Headache     Headache     Hypertension     Rash 2016    S/P dilatation and curettage      Social History   Substance Use Topics    Smoking status: Never Smoker    Smokeless tobacco: Never Used    Alcohol use No     Allergies   Allergen Reactions    Green Pepper Hives     Family History   Problem Relation Age of Onset    Heart Disease Mother     Hypertension Father     Hypertension Sister      No current facility-administered medications for this encounter. Current Outpatient Prescriptions   Medication Sig    sucralfate (CARAFATE) 100 mg/mL suspension Take 10 mL by mouth four (4) times daily.  famotidine (PEPCID) 20 mg tablet Take 1 Tab by mouth two (2) times a day for 30 days.  naltrexone (DEPADE) 50 mg tablet Take 1 Tab by mouth daily for 30 days.  buPROPion (WELLBUTRIN) 100 mg tablet Take 2 Tabs by mouth two (2) times a day for 30 days.  atorvastatin (LIPITOR) 20 mg tablet TAKE 1 TABLET BY MOUTH EVERY DAY    INVOKANA 300 mg tablet Take 1 Tab by mouth Daily (before breakfast).  JANUVIA 100 mg tablet Take 1 Tab by mouth once over twenty-four (24) hours.  metFORMIN (GLUCOPHAGE) 1,000 mg tablet TAKE 1 TABLET BY MOUTH TWICE A DAY    ergocalciferol (ERGOCALCIFEROL) 50,000 unit capsule Take 1 Cap by mouth every seven (7) days.  montelukast (SINGULAIR) 10 mg tablet Take 1 Tab by mouth daily.  simvastatin (ZOCOR) 20 mg tablet Take 1 Tab by mouth nightly.  topiramate (TOPAMAX) 50 mg tablet Take 1 tab twice a day for migraine prevention    butalbital-acetaminophen-caffeine (FIORICET, ESGIC) -40 mg per tablet Take 1 Tab by mouth every six (6) hours as needed for Pain. Max Daily Amount: 4 Tabs.  cetirizine (ZYRTEC) 10 mg tablet TAKE 1 TABLET BY MOUTH EVERY DAY    naltrexone-buPROPion (CONTRAVE) 8-90 mg TbER ER tablet Week 1 1 tab PO QAM, Week 2 1QAM 1QHS, Week 3 2QAM 1 QHS, Week 4 & beyond 2QAM 2QHS    triamcinolone acetonide (KENALOG) 0.1 % topical cream Apply  to affected area two (2) times a day. use thin layer    albuterol (PROVENTIL VENTOLIN) 2.5 mg /3 mL (0.083 %) nebulizer solution 3 mL by Nebulization route every four (4) hours as needed for Wheezing for up to 30 doses.  naratriptan (AMERGE) 2.5 mg tab Take 1 Tab by mouth once as needed for up to 1 dose. For migraine. Can repeat 1 tab in 4 hours if needed. Limit: 2 tabs in 24 hours    fluticasone (FLONASE) 50 mcg/actuation nasal spray 2 SQUIRTS DAILY AS NEEDED.  albuterol (VENTOLIN HFA) 90 mcg/actuation inhaler INHALE 2 PUFFS BY MOUTH EVERY 4 HOURS AS NEEDED FOR WHEEZING    glucose blood VI test strips (ASCENSIA CONTOUR) strip TO BE USED AS DIRECTED    Nebulizer & Compressor machine Use as needed for wheezing, cough    triamcinolone acetonide (KENALOG) 0.1 % topical cream Apply  to affected area two (2) times a day. use thin layer    TRUETEST TEST STRIPS strip TO BE USED AS DIRECTED    Blood-Glucose Meter monitoring kit Please give one kit.  Lancets (MICROLET LANCET) Misc USE TO CHECK BLOOD SUGAR ONE TO TWO TIMES DAILY         PHYSICAL EXAM:  General: WD, WN. Alert, cooperative, no acute distress    HEENT: NC, Atraumatic. PERRLA, EOMI. Anicteric sclerae. Lungs:  CTA Bilaterally. No Wheezing/Rhonchi/Rales. Heart:  Regular  rhythm,  No murmur, No Rubs, No Gallops  Abdomen: Soft, Non distended, Non tender.  +Bowel sounds, no HSM  Extremities: No c/c/e  Neurologic:  CN 2-12 gi, Alert and oriented X 3. No acute neurological distress   Psych:   Good insight. Not anxious nor agitated. Assessment:   I have reviewed with the patient +/- family alternatives,benefits and risks for the procedure, as well as potential complications(with emphasis on, but not limited to, bleeding, perforation, cardiovascular/cerebrovascular/pulmonary events, reactions to the medications, infection, risk of missing a lesions/a cancer, and the imponderables including death), alternative options, and patient/family voices understanding.       Plan:   · Endoscopic procedure  · Conscious sedation or MAC

## 2017-05-17 ENCOUNTER — OFFICE VISIT (OUTPATIENT)
Dept: FAMILY MEDICINE CLINIC | Age: 37
End: 2017-05-17

## 2017-05-17 VITALS
SYSTOLIC BLOOD PRESSURE: 117 MMHG | WEIGHT: 204 LBS | OXYGEN SATURATION: 97 % | BODY MASS INDEX: 33.99 KG/M2 | RESPIRATION RATE: 16 BRPM | DIASTOLIC BLOOD PRESSURE: 80 MMHG | TEMPERATURE: 96.4 F | HEART RATE: 66 BPM | HEIGHT: 65 IN

## 2017-05-17 DIAGNOSIS — K20.90 ESOPHAGITIS: Primary | ICD-10-CM

## 2017-05-17 RX ORDER — BUPROPION HYDROCHLORIDE 100 MG/1
200 TABLET ORAL 2 TIMES DAILY
Refills: 5 | COMMUNITY
Start: 2017-04-18 | End: 2017-05-17 | Stop reason: SDUPTHER

## 2017-05-17 RX ORDER — NALTREXONE HYDROCHLORIDE 50 MG/1
50 TABLET, FILM COATED ORAL
COMMUNITY
Start: 2017-05-15 | End: 2017-05-17 | Stop reason: SDUPTHER

## 2017-05-17 RX ORDER — OMEPRAZOLE 20 MG/1
20 CAPSULE, DELAYED RELEASE ORAL
COMMUNITY
Start: 2017-04-21 | End: 2018-02-07 | Stop reason: ALTCHOICE

## 2017-05-17 RX ORDER — BUPROPION HYDROCHLORIDE 100 MG/1
200 TABLET ORAL 2 TIMES DAILY
Qty: 120 TAB | Refills: 1 | Status: SHIPPED | OUTPATIENT
Start: 2017-05-17 | End: 2017-07-14 | Stop reason: SDUPTHER

## 2017-05-17 RX ORDER — NALTREXONE HYDROCHLORIDE 50 MG/1
50 TABLET, FILM COATED ORAL
Qty: 30 TAB | Refills: 1 | Status: SHIPPED | OUTPATIENT
Start: 2017-05-17 | End: 2017-07-25 | Stop reason: SINTOL

## 2017-05-17 RX ORDER — ERGOCALCIFEROL 1.25 MG/1
1 CAPSULE ORAL
COMMUNITY
Start: 2017-05-15 | End: 2018-04-12 | Stop reason: SDUPTHER

## 2017-05-17 NOTE — PROGRESS NOTES
Identified pt with two pt identifiers(name and ). Chief Complaint   Patient presents with    Weight Management        Health Maintenance Due   Topic    EYE EXAM RETINAL OR DILATED Q1        Wt Readings from Last 3 Encounters:   17 204 lb (92.5 kg)   17 201 lb (91.2 kg)   17 203 lb (92.1 kg)     Temp Readings from Last 3 Encounters:   17 96.4 °F (35.8 °C) (Oral)   17 98.3 °F (36.8 °C)   17 98.8 °F (37.1 °C)     BP Readings from Last 3 Encounters:   17 130/66   17 126/76   17 (!) 167/93     Pulse Readings from Last 3 Encounters:   17 66   17 97   17 (!) 125         Learning Assessment:  :     Learning Assessment 2014   PRIMARY LEARNER Patient   HIGHEST LEVEL OF EDUCATION - PRIMARY LEARNER  GRADUATED HIGH SCHOOL OR GED   BARRIERS PRIMARY LEARNER NONE   PRIMARY LANGUAGE ENGLISH   LEARNER PREFERENCE PRIMARY READING   ANSWERED BY patient   RELATIONSHIP SELF       Depression Screening:  :     PHQ over the last two weeks 3/9/2017   Little interest or pleasure in doing things Not at all   Feeling down, depressed or hopeless Not at all   Total Score PHQ 2 0       Fall Risk Assessment:  :     No flowsheet data found. Abuse Screening:  :     Abuse Screening Questionnaire 2016   Do you ever feel afraid of your partner? N   Are you in a relationship with someone who physically or mentally threatens you? N   Is it safe for you to go home? Y       Coordination of Care Questionnaire:  :     1) Have you been to an emergency room, urgent care clinic since your last visit? Yes, for abdominal pain  Hospitalized since your last visit? no             2) Have you seen or consulted any other health care providers outside of [address] [place] [address] since your last visit? no  (Include any pap smears or colon screenings in this section.)    3) Do you have an Advance Directive on file?  no  Are you interested in receiving information about Advance Directives? no    Patient is accompanied by self. Reviewed record in preparation for visit and have obtained necessary documentation. Medication reconciliation up to date and corrected with patient at this time.

## 2017-05-17 NOTE — MR AVS SNAPSHOT
Visit Information Date & Time Provider Department Dept. Phone Encounter #  
 5/17/2017  8:30 AM Zach Duran [phone] [id number] Upcoming Health Maintenance Date Due  
 EYE EXAM RETINAL OR DILATED Q1 9/1/2014 INFLUENZA AGE 9 TO ADULT 8/1/2017 HEMOGLOBIN A1C Q6M 10/26/2017 DTaP/Tdap/Td series (2 - Td) 1/1/2018 LIPID PANEL Q1 1/10/2018 FOOT EXAM Q1 3/15/2018 MICROALBUMIN Q1 3/15/2018 PAP AKA CERVICAL CYTOLOGY 6/15/2019 Allergies as of 5/17/2017  Review Complete On: 5/17/2017 By: Radha Recinos LPN Severity Noted Reaction Type Reactions Green Pepper  06/28/2012    Hives Current Immunizations  Reviewed on 3/18/2014 Name Date Influenza Vaccine 10/26/2013 TDAP Vaccine 1/1/2008 Not reviewed this visit You Were Diagnosed With   
  
 Codes Comments Esophagitis    -  Primary ICD-10-CM: K20.9 ICD-9-CM: 530.10 BMI 33.0-33.9,adult     ICD-10-CM: V14.70 
ICD-9-CM: V85.33 Vitals BP Pulse Temp Resp Height(growth percentile) Weight(growth percentile) 117/80 (BP 1 Location: Right arm, BP Patient Position: Sitting) 66 96.4 °F (35.8 °C) (Oral) 16 5' 5\" (1.651 m) 204 lb (92.5 kg) LMP SpO2 BMI OB Status Smoking Status 01/25/2012 97% 33.95 kg/m2 Hysterectomy Never Smoker Vitals History BMI and BSA Data Body Mass Index Body Surface Area 33.95 kg/m 2 2.06 m 2 Preferred Pharmacy Pharmacy Name Phone [facility]/PHARMACY #[id number] - [place] Arm[place] - [address] [facility] [address] [phone] Your Updated Medication List  
  
   
This list is accurate as of: 5/17/17  9:11 AM.  Always use your most recent med list.  
  
  
  
  
 * albuterol 90 mcg/actuation inhaler Commonly known as:  VENTOLIN HFA INHALE 2 PUFFS BY MOUTH EVERY 4 HOURS AS NEEDED FOR WHEEZING  
  
 * albuterol 2.5 mg /3 mL (0.083 %) nebulizer solution Commonly known as:  PROVENTIL VENTOLIN  
 3 mL by Nebulization route every four (4) hours as needed for Wheezing for up to 30 doses. atorvastatin 20 mg tablet Commonly known as:  LIPITOR  
TAKE 1 TABLET BY MOUTH EVERY DAY Blood-Glucose Meter monitoring kit Please give one kit. buPROPion 100 mg tablet Commonly known as:  [facility] Take 2 Tabs by mouth two (2) times a day for 30 days. butalbital-acetaminophen-caffeine -40 mg per tablet Commonly known as:  Erik Constable Take 1 Tab by mouth every six (6) hours as needed for Pain. Max Daily Amount: 4 Tabs. cetirizine 10 mg tablet Commonly known as:  ZYRTEC  
TAKE 1 TABLET BY MOUTH EVERY DAY  
  
 ergocalciferol 50,000 unit capsule Commonly known as:  ERGOCALCIFEROL Take 1 Cap by mouth every seven (7) days. famotidine 20 mg tablet Commonly known as:  PEPCID Take 1 Tab by mouth two (2) times a day for 30 days. fluticasone 50 mcg/actuation nasal spray Commonly known as:  FLONASE  
2 SQUIRTS DAILY AS NEEDED. INVOKANA 300 mg tablet Generic drug:  canagliflozin Take 1 Tab by mouth Daily (before breakfast). JANUVIA 100 mg tablet Generic drug:  SITagliptin Take 1 Tab by mouth once over twenty-four (24) hours. Lancets Misc Commonly known as:  MICROLET LANCET  
USE TO CHECK BLOOD SUGAR ONE TO TWO TIMES DAILY  
  
 metFORMIN 1,000 mg tablet Commonly known as:  GLUCOPHAGE  
TAKE 1 TABLET BY MOUTH TWICE A DAY  
  
 naltrexone 50 mg tablet Commonly known as:  DEPADE Take 1 Tab by mouth once over twenty-four (24) hours. naratriptan 2.5 mg Tab Commonly known as:  Juliaette Cinnamon Take 1 Tab by mouth once as needed for up to 1 dose. For migraine. Can repeat 1 tab in 4 hours if needed. Limit: 2 tabs in 24 hours Nebulizer & Compressor machine Use as needed for wheezing, cough  
  
 omeprazole 20 mg capsule Commonly known as:  PRILOSEC Take 20 mg by mouth once over twenty-four (24) hours. sucralfate 100 mg/mL suspension Commonly known as:  Eamon Pore Take 10 mL by mouth four (4) times daily. topiramate 50 mg tablet Commonly known as:  TOPAMAX Take 1 tab twice a day for migraine prevention * TRUETEST TEST STRIPS strip Generic drug:  glucose blood VI test strips TO BE USED AS DIRECTED * glucose blood VI test strips strip Commonly known as:  Ascensia CONTOUR  
TO BE USED AS DIRECTED * Notice: This list has 4 medication(s) that are the same as other medications prescribed for you. Read the directions carefully, and ask your doctor or other care provider to review them with you. Prescriptions Sent to Pharmacy Refills buPROPion (WELLBUTRIN) 100 mg tablet 1 Sig: Take 2 Tabs by mouth two (2) times a day for 30 days. Class: Normal  
 Pharmacy: [facility]/pharmacy #[id number] - [address]. Ph #: [phone] Route: Oral  
 naltrexone (DEPADE) 50 mg tablet 1 Sig: Take 1 Tab by mouth once over twenty-four (24) hours. Class: Normal  
 Pharmacy: [facility]/pharmacy #[id number] - [address] Ph #: [phone] Route: Oral  
  
We Performed the Following CELIAC ANTIBODY PROFILE [[id number] Custom] Introducing [facility] & HEALTH SERVICES! Clinton Zabala introduces NTN Buzztime patient portal. Now you can access parts of your medical record, email your doctor's office, and request medication refills online. 1. In your internet browser, go to [email or web address] ([email or web address] [email or web address] Therapeuticst 2. Click on the First Time User? Click Here link in the Sign In box. You will see the New Member Sign Up page. 3. Enter your NTN Buzztime Access Code exactly as it appears below. You will not need to use this code after youve completed the sign-up process. If you do not sign up before the expiration date, you must request a new code. · NTN Buzztime Access Code: [email or web address] Expires: 7/17/2017 11:52 AM 
 
 4. Enter the last four digits of your Social Security Number (xxxx) and Date of Birth (mm/dd/yyyy) as indicated and click Submit. You will be taken to the next sign-up page. 5. Create a Fitwall ID. This will be your Fitwall login ID and cannot be changed, so think of one that is secure and easy to remember. 6. Create a Fitwall password. You can change your password at any time. 7. Enter your Password Reset Question and Answer. This can be used at a later time if you forget your password. 8. Enter your e-mail address. You will receive e-mail notification when new information is available in [address] 9. Click Sign Up. You can now view and download portions of your medical record. 10. Click the Download Summary menu link to download a portable copy of your medical information. If you have questions, please visit the Frequently Asked Questions section of the Fitwall website. Remember, Fitwall is NOT to be used for urgent needs. For medical emergencies, dial 911. Now available from your iPhone and Android! Please provide this summary of care documentation to your next provider. Your primary care clinician is listed as [address]. If you have any questions after today's visit, please call [phone].

## 2017-05-17 NOTE — PROGRESS NOTES
CC:  Chief Complaint   Patient presents with    Weight Management     HISTORY OF PRESENT ILLNESS  Brian Ochoa is a 43 y.o. female. HPI Comments: Who presents today for follow up after recent ER visit and endsocopy for onset of sudden and acute abdominal pain. She had a significant work up and found to have a mild gastritis. I have reviewed her pathology. H. Pylori was negative and the biopsies were pretty much normal.     She reports no recurrent pain. She is back on Contrave and this has been helpful. She needs a refill of the medication today. Only other testing to consider is a Celiac panel. However, in the pathology, her villi were noted to be of normal height and not blunted. Weight Management         ROS:  Review of Systems   Constitutional: Positive for weight gain. All other systems reviewed and are negative. OBJECTIVE:  Visit Vitals    /80 (BP 1 Location: Right arm, BP Patient Position: Sitting)    Pulse 66    Temp 96.4 °F (35.8 °C) (Oral)    Resp 16    Ht 5' 5\" (1.651 m)    Wt 204 lb (92.5 kg)    LMP 01/25/2012    SpO2 97%    BMI 33.95 kg/m2   Physical Exam   Cardiovascular: Normal rate and regular rhythm. Pulmonary/Chest: Effort normal and breath sounds normal.   Musculoskeletal: Normal range of motion. Nursing note and vitals reviewed. ASSESSMENT and PLAN    ICD-10-CM ICD-9-CM    1. Esophagitis K20.9 530.10 CELIAC ANTIBODY PROFILE   2. BMI 33.0-33.9,adult Z68.33 V85.33 buPROPion (WELLBUTRIN) 100 mg tablet      naltrexone (DEPADE) 50 mg tablet     36F with onset of acute epigastric abdominal pain. This was so severe that she was transported from our office via EMS to the ER. While there, she had a work up that was essentially negative and pain did subside with a GI cocktail. In addition to the other testing, I will send a Celiac panel. We also refilled the equivalent of Contrave.  Due to expense,  the Wellbutrin and Naltrexone (active ingredients in [place]). Patient is tolerating well. No adverse side effects. Pt verbalizes understanding of plan of care and denies further questions or concerns at this time. Follow-up Disposition:  Return if symptoms worsen or fail to improve.

## 2017-05-22 LAB
GLIADIN PEPTIDE IGA SER-ACNC: 3 UNITS (ref 0–19)
GLIADIN PEPTIDE IGG SER-ACNC: 2 UNITS (ref 0–19)
IGA SERPL-MCNC: 374 MG/DL (ref 87–352)
TTG IGA SER-ACNC: <2 U/ML (ref 0–3)
TTG IGG SER-ACNC: <2 U/ML (ref 0–5)

## 2017-05-22 RX ORDER — ALBUTEROL SULFATE 90 UG/1
AEROSOL, METERED RESPIRATORY (INHALATION)
Qty: 18 INHALER | Refills: 5 | Status: SHIPPED | OUTPATIENT
Start: 2017-05-22 | End: 2017-10-19 | Stop reason: SDUPTHER

## 2017-06-23 ENCOUNTER — OFFICE VISIT (OUTPATIENT)
Dept: FAMILY MEDICINE CLINIC | Age: 37
End: 2017-06-23

## 2017-06-23 VITALS
OXYGEN SATURATION: 100 % | SYSTOLIC BLOOD PRESSURE: 120 MMHG | WEIGHT: 203 LBS | TEMPERATURE: 97.8 F | RESPIRATION RATE: 16 BRPM | HEART RATE: 125 BPM | HEIGHT: 65 IN | BODY MASS INDEX: 33.82 KG/M2 | DIASTOLIC BLOOD PRESSURE: 68 MMHG

## 2017-06-23 DIAGNOSIS — H65.111 ACUTE MUCOID OTITIS MEDIA OF RIGHT EAR: Primary | ICD-10-CM

## 2017-06-23 RX ORDER — AMOXICILLIN AND CLAVULANATE POTASSIUM 875; 125 MG/1; MG/1
1 TABLET, FILM COATED ORAL 2 TIMES DAILY
Qty: 20 TAB | Refills: 0 | Status: SHIPPED | OUTPATIENT
Start: 2017-06-23 | End: 2017-07-03

## 2017-06-23 NOTE — PROGRESS NOTES
HISTORY OF PRESENT ILLNESS  Lenore Trevizo is a 43 y.o. female. HPI  Pt presents with \"ear pain\"    Right ear pain  It has been hurting for about week  Symptoms worsened last night  No nasal congestion or sinus pain  No sore throat  Fever: none  OTC: Tylenol  Review of Systems   Constitutional: Negative for fever. HENT: Positive for ear pain. Negative for congestion and sore throat. Respiratory: Negative for cough. Gastrointestinal: Negative for diarrhea and vomiting. Physical Exam   Constitutional: She is oriented to person, place, and time. She appears well-developed and well-nourished. HENT:   Head: Normocephalic and atraumatic. Right Ear: Hearing, external ear and ear canal normal. Tympanic membrane is erythematous and bulging. Left Ear: Hearing, tympanic membrane, external ear and ear canal normal.   Nose: Mucosal edema and rhinorrhea present. Mouth/Throat: Oropharynx is clear and moist.   Neck: Normal range of motion. Neck supple. Cardiovascular: Normal rate, regular rhythm and normal heart sounds. Pulmonary/Chest: Effort normal and breath sounds normal.   Lymphadenopathy:     She has no cervical adenopathy. Neurological: She is alert and oriented to person, place, and time. Skin: Skin is warm and dry. Psychiatric: She has a normal mood and affect. Her behavior is normal.       ASSESSMENT and PLAN    ICD-10-CM ICD-9-CM    1. Acute mucoid otitis media of right ear H65.111 381.02 amoxicillin-clavulanate (AUGMENTIN) 875-125 mg per tablet     Educated about taking medication as prescribed, with food. Educated about staying well hydrated, and taking Tylenol as needed for pain. Pt informed to return to office with worsening of symptoms, or PRN with any questions or concerns. Pt verbalizes understanding of plan of care and denies further questions or concerns at this time.

## 2017-06-23 NOTE — PATIENT INSTRUCTIONS
Ear Infection (Otitis Media): Care Instructions  Your Care Instructions    An ear infection may start with a cold and affect the middle ear (otitis media). It can hurt a lot. Most ear infections clear up on their own in a couple of days. Most often you will not need antibiotics. This is because many ear infections are caused by a virus. Antibiotics don't work against a virus. Regular doses of pain medicines are the best way to reduce your fever and help you feel better. Follow-up care is a key part of your treatment and safety. Be sure to make and go to all appointments, and call your doctor if you are having problems. It's also a good idea to know your test results and keep a list of the medicines you take. How can you care for yourself at home? · Take pain medicines exactly as directed. ¨ If the doctor gave you a prescription medicine for pain, take it as prescribed. ¨ If you are not taking a prescription pain medicine, take an over-the-counter medicine, such as acetaminophen (Tylenol), ibuprofen (Advil, Motrin), or naproxen (Aleve). Read and follow all instructions on the label. ¨ Do not take two or more pain medicines at the same time unless the doctor told you to. Many pain medicines have acetaminophen, which is Tylenol. Too much acetaminophen (Tylenol) can be harmful. · Plan to take a full dose of pain reliever before bedtime. Getting enough sleep will help you get better. · Try a warm, moist washcloth on the ear. It may help relieve pain. · If your doctor prescribed antibiotics, take them as directed. Do not stop taking them just because you feel better. You need to take the full course of antibiotics. When should you call for help? Call your doctor now or seek immediate medical care if:  · You have new or increasing ear pain. · You have new or increasing pus or blood draining from your ear. · You have a fever with a stiff neck or a severe headache.   Watch closely for changes in your health, and be sure to contact your doctor if:  · You have new or worse symptoms. · You are not getting better after taking an antibiotic for 2 days. Where can you learn more? Go to http://hodan-eugene.info/. Enter G135 in the search box to learn more about \"Ear Infection (Otitis Media): Care Instructions. \"  Current as of: May 4, 2017  Content Version: 11.3  © 0581-8248 [employer]Save [employer]. Care instructions adapted under license by [employer] (which disclaims liability or warranty for this information). If you have questions about a medical condition or this instruction, always ask your healthcare professional. [address] any warranty or liability for your use of this information.

## 2017-06-23 NOTE — MR AVS SNAPSHOT
Visit Information Date & Time Provider Department Dept. Phone Encounter #  
 6/23/2017 10:45 AM Elizabeth Tobias  [address] [phone] [id number] Follow-up Instructions Return if symptoms worsen or fail to improve. Upcoming Health Maintenance Date Due  
 EYE EXAM RETINAL OR DILATED Q1 9/1/2014 INFLUENZA AGE 9 TO ADULT 8/1/2017 HEMOGLOBIN A1C Q6M 10/26/2017 DTaP/Tdap/Td series (2 - Td) 1/1/2018 LIPID PANEL Q1 1/10/2018 FOOT EXAM Q1 3/15/2018 MICROALBUMIN Q1 3/15/2018 PAP AKA CERVICAL CYTOLOGY 6/15/2019 Allergies as of 6/23/2017  Review Complete On: 6/23/2017 By: Elizabeth Tobias NP Severity Noted Reaction Type Reactions Green Pepper  06/28/2012    Hives Current Immunizations  Reviewed on 3/18/2014 Name Date Influenza Vaccine 10/26/2013 TDAP Vaccine 1/1/2008 Not reviewed this visit You Were Diagnosed With   
  
 Codes Comments Acute mucoid otitis media of right ear    -  Primary ICD-10-CM: H65.111 ICD-9-CM: 381.02 Vitals BP Pulse Temp Resp Height(growth percentile) Weight(growth percentile) 120/68 (!) 125 97.8 °F (36.6 °C) (Oral) 16 5' 5\" (1.651 m) 203 lb (92.1 kg) LMP SpO2 BMI OB Status Smoking Status 01/25/2012 100% 33.78 kg/m2 Hysterectomy Never Smoker BMI and BSA Data Body Mass Index Body Surface Area 33.78 kg/m 2 2.06 m 2 Preferred Pharmacy Pharmacy Name Phone [facility]/PHARMACY #[id number] - [place] - [address] [facility] [address] [phone] Your Updated Medication List  
  
   
This list is accurate as of: 6/23/17 11:16 AM.  Always use your most recent med list.  
  
  
  
  
 * albuterol 2.5 mg /3 mL (0.083 %) nebulizer solution Commonly known as:  PROVENTIL VENTOLIN  
3 mL by Nebulization route every four (4) hours as needed for Wheezing for up to 30 doses. * VENTOLIN HFA 90 mcg/actuation inhaler Generic drug:  albuterol INHALE 2 PUFFS BY MOUTH EVERY 4 HOURS AS NEEDED FOR WHEEZING  
  
 amoxicillin-clavulanate 875-125 mg per tablet Commonly known as:  AUGMENTIN Take 1 Tab by mouth two (2) times a day for 10 days. atorvastatin 20 mg tablet Commonly known as:  LIPITOR  
TAKE 1 TABLET BY MOUTH EVERY DAY Blood-Glucose Meter monitoring kit Please give one kit. butalbital-acetaminophen-caffeine -40 mg per tablet Commonly known as:  Fayetta Wisam Take 1 Tab by mouth every six (6) hours as needed for Pain. Max Daily Amount: 4 Tabs. cetirizine 10 mg tablet Commonly known as:  ZYRTEC  
TAKE 1 TABLET BY MOUTH EVERY DAY  
  
 ergocalciferol 50,000 unit capsule Commonly known as:  ERGOCALCIFEROL Take 1 Cap by mouth every seven (7) days. fluticasone 50 mcg/actuation nasal spray Commonly known as:  FLONASE  
2 SQUIRTS DAILY AS NEEDED. INVOKANA 300 mg tablet Generic drug:  canagliflozin Take 1 Tab by mouth Daily (before breakfast). JANUVIA 100 mg tablet Generic drug:  SITagliptin Take 1 Tab by mouth once over twenty-four (24) hours. Lancets Misc Commonly known as:  MICROLET LANCET  
USE TO CHECK BLOOD SUGAR ONE TO TWO TIMES DAILY  
  
 metFORMIN 1,000 mg tablet Commonly known as:  GLUCOPHAGE  
TAKE 1 TABLET BY MOUTH TWICE A DAY  
  
 naltrexone 50 mg tablet Commonly known as:  DEPADE Take 1 Tab by mouth once over twenty-four (24) hours. naratriptan 2.5 mg Tab Commonly known as:  Vernida Sole Take 1 Tab by mouth once as needed for up to 1 dose. For migraine. Can repeat 1 tab in 4 hours if needed. Limit: 2 tabs in 24 hours Nebulizer & Compressor machine Use as needed for wheezing, cough  
  
 omeprazole 20 mg capsule Commonly known as:  PRILOSEC Take 20 mg by mouth once over twenty-four (24) hours. sucralfate 100 mg/mL suspension Commonly known as:  Rosenbaum Basques Take 10 mL by mouth four (4) times daily. topiramate 50 mg tablet Commonly known as:  TOPAMAX Take 1 tab twice a day for migraine prevention * TRUETEST TEST STRIPS strip Generic drug:  glucose blood VI test strips TO BE USED AS DIRECTED * glucose blood VI test strips strip Commonly known as:  Ascensia CONTOUR  
TO BE USED AS DIRECTED * Notice: This list has 4 medication(s) that are the same as other medications prescribed for you. Read the directions carefully, and ask your doctor or other care provider to review them with you. Prescriptions Sent to Pharmacy Refills  
 amoxicillin-clavulanate (AUGMENTIN) 875-125 mg per tablet 0 Sig: Take 1 Tab by mouth two (2) times a day for 10 days. Class: Normal  
 Pharmacy: [facility]/pharmacy #[id number] - [facility] - [address]  #: [phone] Route: Oral  
  
Follow-up Instructions Return if symptoms worsen or fail to improve. Patient Instructions Ear Infection (Otitis Media): Care Instructions Your Care Instructions An ear infection may start with a cold and affect the middle ear (otitis media). It can hurt a lot. Most ear infections clear up on their own in a couple of days. Most often you will not need antibiotics. This is because many ear infections are caused by a virus. Antibiotics don't work against a virus. Regular doses of pain medicines are the best way to reduce your fever and help you feel better. Follow-up care is a key part of your treatment and safety. Be sure to make and go to all appointments, and call your doctor if you are having problems. It's also a good idea to know your test results and keep a list of the medicines you take. How can you care for yourself at home? · Take pain medicines exactly as directed. ¨ If the doctor gave you a prescription medicine for pain, take it as prescribed.  
¨ If you are not taking a prescription pain medicine, take an over-the-counter medicine, such as acetaminophen (Tylenol), ibuprofen (Advil, Motrin), or naproxen (Aleve). Read and follow all instructions on the label. ¨ Do not take two or more pain medicines at the same time unless the doctor told you to. Many pain medicines have acetaminophen, which is Tylenol. Too much acetaminophen (Tylenol) can be harmful. · Plan to take a full dose of pain reliever before bedtime. Getting enough sleep will help you get better. · Try a warm, moist washcloth on the ear. It may help relieve pain. · If your doctor prescribed antibiotics, take them as directed. Do not stop taking them just because you feel better. You need to take the full course of antibiotics. When should you call for help? Call your doctor now or seek immediate medical care if: 
· You have new or increasing ear pain. · You have new or increasing pus or blood draining from your ear. · You have a fever with a stiff neck or a severe headache. Watch closely for changes in your health, and be sure to contact your doctor if: 
· You have new or worse symptoms. · You are not getting better after taking an antibiotic for 2 days. Where can you learn more? Go to http://hodan-eugene.info/. Enter W766 in the search box to learn more about \"Ear Infection (Otitis Media): Care Instructions. \" Current as of: May 4, 2017 Content Version: 11.3 © 8658-2692 [employer]ac[employer]. Care instructions adapted under license by [employer] (which disclaims liability or warranty for this information). If you have questions about a medical condition or this instruction, always ask your healthcare professional. [place] [address] any warranty or liability for your use of this information. Introducing \[facility]\"" & HEALTH SERVICES! Susie Gutierrez introduces [employer] patient portal. Now you can access parts of your medical record, email your doctor's office, and request medication refills online.    
 
1. In your internet browser, go to [email or web address] 2. Click on the First Time User? Click Here link in the Sign In box. You will see the New Member Sign Up page. 3. Enter your Carma Access Code exactly as it appears below. You will not need to use this code after youve completed the sign-up process. If you do not sign up before the expiration date, you must request a new code. · Carma Access Code: [email or web address] Expires: 7/17/2017 11:52 AM 
 
4. Enter the last four digits of your Social Security Number (xxxx) and Date of Birth (mm/dd/yyyy) as indicated and click Submit. You will be taken to the next sign-up page. 5. Create a IRX Therapeuticst ID. This will be your Carma login ID and cannot be changed, so think of one that is secure and easy to remember. 6. Create a Carma password. You can change your password at any time. 7. Enter your Password Reset Question and Answer. This can be used at a later time if you forget your password. 8. Enter your e-mail address. You will receive e-mail notification when new information is available in [address] 9. Click Sign Up. You can now view and download portions of your medical record. 10. Click the Download Summary menu link to download a portable copy of your medical information. If you have questions, please visit the Frequently Asked Questions section of the Carma website. Remember, Carma is NOT to be used for urgent needs. For medical emergencies, dial 911. Now available from your iPhone and Android! Please provide this summary of care documentation to your next provider. Your primary care clinician is listed as [address]. If you have any questions after today's visit, please call [phone].

## 2017-06-23 NOTE — PROGRESS NOTES
Identified pt with two pt identifiers(name and ). Chief Complaint   Patient presents with    Ear Pain     right ear x1 week        Health Maintenance Due   Topic    EYE EXAM RETINAL OR DILATED Q1        Wt Readings from Last 3 Encounters:   17 203 lb (92.1 kg)   17 204 lb (92.5 kg)   17 201 lb (91.2 kg)     Temp Readings from Last 3 Encounters:   17 97.8 °F (36.6 °C) (Oral)   17 96.4 °F (35.8 °C) (Oral)   17 98.3 °F (36.8 °C)     BP Readings from Last 3 Encounters:   17 120/68   17 117/80   17 130/66     Pulse Readings from Last 3 Encounters:   17 (!) 125   17 66   17 97         Learning Assessment:  :     Learning Assessment 2014   PRIMARY LEARNER Patient   HIGHEST LEVEL OF EDUCATION - PRIMARY LEARNER  GRADUATED HIGH SCHOOL OR GED   BARRIERS PRIMARY LEARNER NONE   PRIMARY LANGUAGE ENGLISH   LEARNER PREFERENCE PRIMARY READING   ANSWERED BY patient   RELATIONSHIP SELF       Depression Screening:  :     PHQ over the last two weeks 2017   Little interest or pleasure in doing things Not at all   Feeling down, depressed or hopeless Not at all   Total Score PHQ 2 0         Abuse Screening:  :     Abuse Screening Questionnaire 2016   Do you ever feel afraid of your partner? N   Are you in a relationship with someone who physically or mentally threatens you? N   Is it safe for you to go home? Y       Coordination of Care Questionnaire:  :     1) Have you been to an emergency room, urgent care clinic since your last visit? no   Hospitalized since your last visit? no             2) Have you seen or consulted any other health care providers outside of [address] [place] [address] since your last visit? no  (Include any pap smears or colon screenings in this section.)    3) Do you have an Advance Directive on file?  no  Are you interested in receiving information about Advance Directives? no    Patient is accompanied by self I have received verbal consent from Dmitriy Way to discuss any/all medical information while they are present in the room. Reviewed record in preparation for visit and have obtained necessary documentation. Medication reconciliation up to date and corrected with patient at this time.

## 2017-06-27 ENCOUNTER — OFFICE VISIT (OUTPATIENT)
Dept: FAMILY MEDICINE CLINIC | Age: 37
End: 2017-06-27

## 2017-06-27 VITALS
TEMPERATURE: 98.3 F | DIASTOLIC BLOOD PRESSURE: 64 MMHG | SYSTOLIC BLOOD PRESSURE: 102 MMHG | OXYGEN SATURATION: 98 % | HEART RATE: 94 BPM | BODY MASS INDEX: 34.32 KG/M2 | WEIGHT: 206 LBS | RESPIRATION RATE: 16 BRPM | HEIGHT: 65 IN

## 2017-06-27 DIAGNOSIS — R69 ILLNESS: Primary | ICD-10-CM

## 2017-06-27 DIAGNOSIS — H92.01 OTALGIA OF RIGHT EAR: Primary | ICD-10-CM

## 2017-06-27 RX ORDER — FAMOTIDINE 40 MG/1
TABLET, FILM COATED ORAL
Refills: 12 | COMMUNITY
Start: 2017-06-02 | End: 2019-08-02 | Stop reason: SDUPTHER

## 2017-06-27 RX ORDER — MONTELUKAST SODIUM 10 MG/1
TABLET ORAL
Refills: 5 | COMMUNITY
Start: 2017-06-10 | End: 2018-04-01

## 2017-06-27 NOTE — PROGRESS NOTES
Chief Complaint   Patient presents with    Neck Pain     right side of neck has been hurting. was seen for ear infection last week. still taking antibiotics     \"REVIEWED RECORD IN PREPARATION FOR VISIT AND HAVE OBTAINED THE NECESSARY DOCUMENTATION\"  1. Have you been to the ER, urgent care clinic since your last visit? Hospitalized since your last visit? No    2. Have you seen or consulted any other health care providers outside of the [address] [place] [address] since your last visit? Include any pap smears or colon screening. No  Patient does not have advanced directives.

## 2017-06-29 ENCOUNTER — TELEPHONE (OUTPATIENT)
Dept: FAMILY MEDICINE CLINIC | Age: 37
End: 2017-06-29

## 2017-06-29 NOTE — TELEPHONE ENCOUNTER
Patient is calling and has a question regarding omeprazole medication.     Best contact: [phone]

## 2017-07-06 ENCOUNTER — DOCUMENTATION ONLY (OUTPATIENT)
Dept: FAMILY MEDICINE CLINIC | Age: 37
End: 2017-07-06

## 2017-07-06 ENCOUNTER — TELEPHONE (OUTPATIENT)
Dept: FAMILY MEDICINE CLINIC | Age: 37
End: 2017-07-06

## 2017-07-06 NOTE — PROGRESS NOTES
Pt aware PA completed & approved for Omeprazole 20 mg.  PA approved from 7/6/17 to 10/4/17  [id number]

## 2017-07-06 NOTE — TELEPHONE ENCOUNTER
PA completed & approved for Omeprazole 20 mg per pt insurance company from 7/6/17 to 10/4/17.   [id number]

## 2017-07-14 RX ORDER — BUPROPION HYDROCHLORIDE 100 MG/1
TABLET ORAL
Qty: 120 TAB | Refills: 1 | Status: SHIPPED | OUTPATIENT
Start: 2017-07-14 | End: 2017-07-25 | Stop reason: SINTOL

## 2017-07-25 ENCOUNTER — OFFICE VISIT (OUTPATIENT)
Dept: FAMILY MEDICINE CLINIC | Age: 37
End: 2017-07-25

## 2017-07-25 VITALS
SYSTOLIC BLOOD PRESSURE: 114 MMHG | DIASTOLIC BLOOD PRESSURE: 84 MMHG | RESPIRATION RATE: 16 BRPM | BODY MASS INDEX: 33.66 KG/M2 | WEIGHT: 202 LBS | HEART RATE: 100 BPM | HEIGHT: 65 IN | TEMPERATURE: 98 F | OXYGEN SATURATION: 97 %

## 2017-07-25 DIAGNOSIS — Z91.09 ENVIRONMENTAL ALLERGIES: ICD-10-CM

## 2017-07-25 DIAGNOSIS — G43.709 CHRONIC MIGRAINE WITHOUT AURA WITHOUT STATUS MIGRAINOSUS, NOT INTRACTABLE: ICD-10-CM

## 2017-07-25 DIAGNOSIS — R51.9 CHRONIC DAILY HEADACHE: ICD-10-CM

## 2017-07-25 DIAGNOSIS — E78.5 DYSLIPIDEMIA (HIGH LDL; LOW HDL): Primary | ICD-10-CM

## 2017-07-25 DIAGNOSIS — E11.9 TYPE 2 DIABETES MELLITUS WITHOUT COMPLICATION, WITH LONG-TERM CURRENT USE OF INSULIN (HCC): ICD-10-CM

## 2017-07-25 DIAGNOSIS — Z79.4 TYPE 2 DIABETES MELLITUS WITHOUT COMPLICATION, WITH LONG-TERM CURRENT USE OF INSULIN (HCC): ICD-10-CM

## 2017-07-25 DIAGNOSIS — J45.20 MILD INTERMITTENT ASTHMA WITHOUT COMPLICATION: ICD-10-CM

## 2017-07-25 RX ORDER — ALBUTEROL SULFATE 0.83 MG/ML
2.5 SOLUTION RESPIRATORY (INHALATION)
Qty: 100 EACH | Refills: 2 | Status: SHIPPED | OUTPATIENT
Start: 2017-07-25 | End: 2017-10-19 | Stop reason: SDUPTHER

## 2017-07-25 RX ORDER — FLUTICASONE PROPIONATE 50 MCG
SPRAY, SUSPENSION (ML) NASAL
Qty: 16 G | Refills: 3 | Status: SHIPPED | OUTPATIENT
Start: 2017-07-25 | End: 2017-11-30 | Stop reason: SDUPTHER

## 2017-07-25 RX ORDER — SIMVASTATIN 20 MG/1
20 TABLET, FILM COATED ORAL
Qty: 90 TAB | Refills: 1 | Status: SHIPPED | OUTPATIENT
Start: 2017-07-25 | End: 2017-11-07

## 2017-07-25 RX ORDER — SITAGLIPTIN 100 MG/1
100 TABLET, FILM COATED ORAL
Qty: 90 TAB | Refills: 1 | Status: SHIPPED | OUTPATIENT
Start: 2017-07-25 | End: 2017-11-07 | Stop reason: SDUPTHER

## 2017-07-25 RX ORDER — METFORMIN HYDROCHLORIDE 1000 MG/1
TABLET ORAL
Qty: 180 TAB | Refills: 1 | Status: SHIPPED | OUTPATIENT
Start: 2017-07-25 | End: 2017-11-07 | Stop reason: SDUPTHER

## 2017-07-25 NOTE — PATIENT INSTRUCTIONS

## 2017-07-25 NOTE — PROGRESS NOTES
Chief Complaint   Patient presents with    Diabetes     follow up. pt is fasting today      \"REVIEWED RECORD IN PREPARATION FOR VISIT AND HAVE OBTAINED THE NECESSARY DOCUMENTATION\"  1. Have you been to the ER, urgent care clinic since your last visit? Hospitalized since your last visit? No    2. Have you seen or consulted any other health care providers outside of the [facility] since your last visit? Include any pap smears or colon screening. No  Patient does not have advanced directives.

## 2017-07-25 NOTE — PROGRESS NOTES
Subjective:     Elsi Koch is a 43 y.o. female seen for follow up of diabetes. She also has hyperlipidemia. Diabetic Review of Systems - medication compliance: compliant all of the time. Other symptoms and concerns: Follow up and fasting labs. Pt is interested in changing some of her medications. Pt has been doing a lot of reading about a new diabetes medication called Jardiance, and would like to try this. Pt states that she does get worried about taking the same medications over and over, as she is worried that her body is going to to get used to them. Pt is interested in starting the Jardiance, in replace of one of her other diabetes medications, if possible. In addition, patient would like to change her cholesterol medication from lipitor, back to simvastatin. Again, patient likes to change up her medications so that she \"does not get used to them\". Patient Active Problem List    Diagnosis Date Noted    Acute mucoid otitis media of right ear 06/23/2017    Dermatitis 03/09/2017    Screening for thyroid disorder 01/10/2017    Acute non-recurrent frontal sinusitis 01/03/2017    Left lower quadrant pain 12/29/2016    Left ear pain 12/02/2016    Strep throat 11/09/2016    Nausea 10/07/2016    Right acute serous otitis media 07/14/2016    Rash 07/14/2016    Gastroesophageal reflux disease without esophagitis 07/14/2016    Environmental allergies 07/14/2016    Right ear pain 06/21/2016    Diabetes ([facility] [address] 07/18/2014    Sore throat 05/06/2014    Migraine headache 01/03/2012    Vitamin D deficiency 11/06/2011    Dyslipidemia (high LDL; low HDL) 01/24/2011    Hypertension 01/06/2011    Asthma 01/06/2011     Current Outpatient Prescriptions   Medication Sig Dispense Refill    simvastatin (ZOCOR) 20 mg tablet Take 1 Tab by mouth nightly. 90 Tab 1    empagliflozin (JARDIANCE) 10 mg tablet Take 1 Tab by mouth daily.  90 Tab 1    JANUVIA 100 mg tablet Take 1 Tab by mouth once over twenty-four (24) hours. 90 Tab 1    metFORMIN (GLUCOPHAGE) 1,000 mg tablet TAKE 1 TABLET BY MOUTH TWICE A  Tab 1    albuterol (PROVENTIL VENTOLIN) 2.5 mg /3 mL (0.083 %) nebulizer solution 3 mL by Nebulization route every four (4) hours as needed for Wheezing for up to 30 doses. 100 Each 2    glucose blood VI test strips (ASCENSIA CONTOUR) strip TO BE USED AS DIRECTED 50 Strip 5    fluticasone (FLONASE) 50 mcg/actuation nasal spray 2 SQUIRTS DAILY AS NEEDED. 16 g 3    famotidine (PEPCID) 40 mg tablet TAKE 1 TABLET BY MOUTH EVERY DAY  12    montelukast (SINGULAIR) 10 mg tablet TAKE 1 TABLET BY MOUTH EACH EVENING  5    VENTOLIN HFA 90 mcg/actuation inhaler INHALE 2 PUFFS BY MOUTH EVERY 4 HOURS AS NEEDED FOR WHEEZING 18 Inhaler 5    omeprazole (PRILOSEC) 20 mg capsule Take 20 mg by mouth once over twenty-four (24) hours.  ergocalciferol (ERGOCALCIFEROL) 50,000 unit capsule Take 1 Cap by mouth every seven (7) days.  butalbital-acetaminophen-caffeine (FIORICET, ESGIC) -40 mg per tablet Take 1 Tab by mouth every six (6) hours as needed for Pain. Max Daily Amount: 4 Tabs. 20 Tab 0    naratriptan (AMERGE) 2.5 mg tab Take 1 Tab by mouth once as needed for up to 1 dose. For migraine. Can repeat 1 tab in 4 hours if needed. Limit: 2 tabs in 24 hours 9 Tab 1    Nebulizer & Compressor machine Use as needed for wheezing, cough 1 Each 0    Blood-Glucose Meter monitoring kit Please give one kit. 1 Kit 0    Lancets (MICROLET LANCET) Misc USE TO CHECK BLOOD SUGAR ONE TO TWO TIMES DAILY 100 Each 0    sucralfate (CARAFATE) 100 mg/mL suspension Take 10 mL by mouth four (4) times daily.  414 mL 0    topiramate (TOPAMAX) 50 mg tablet Take 1 tab twice a day for migraine prevention 60 Tab 0     Allergies   Allergen Reactions    Green Pepper Hives     Past Medical History:   Diagnosis Date    Anxiety     Asthma     Diabetes ([facility] [address] 2008    Headache     Headache     Hypertension     Rash 7/14/2016  S/P dilatation and curettage     Tachycardia      Past Surgical History:   Procedure Laterality Date    HX APPENDECTOMY  2/2008    HX CHOLECYSTECTOMY  2001    HX DILATION AND CURETTAGE      HX GYN  3/2008    tubal ligation    HX HERNIA REPAIR  2/2009    HX HYSTERECTOMY  03/2012    HX LUMBAR DISKECTOMY      2006    HX ORTHOPAEDIC  9/2006    ruptured discs    UPPER GI ENDOSCOPY,BIOPSY  5/11/2017          Family History   Problem Relation Age of Onset    Heart Disease Mother     Hypertension Father     Hypertension Sister      Social History   Substance Use Topics    Smoking status: Never Smoker    Smokeless tobacco: Never Used    Alcohol use No        Lab Results  Component Value Date/Time   WBC 12.1 04/26/2017 10:32 AM   HGB 15.7 04/26/2017 10:32 AM   HCT 46.3 04/26/2017 10:32 AM   PLATELET 223 [birth year]1983 10:32 AM   MCV 85.6 04/26/2017 10:32 AM     Lab Results  Component Value Date/Time   Hemoglobin A1c 6.7 01/10/2017 08:25 AM   Hemoglobin A1c 6.4 07/14/2016 08:39 AM   Hemoglobin A1c 7.1 02/05/2016 10:37 AM   Glucose 235 04/26/2017 10:32 AM   Glucose (POC) 133 05/11/2017 08:53 AM   Microalb/Creat ratio (ug/mg creat.) <8.0 03/15/2017 12:05 PM   LDL, calculated 84 01/10/2017 08:25 AM   Creatinine (POC) 0.7 06/04/2013 09:57 AM   Creatinine 0.93 04/26/2017 10:32 AM      Lab Results  Component Value Date/Time   Cholesterol, total 174 01/10/2017 08:25 AM   HDL Cholesterol 43 01/10/2017 08:25 AM   LDL, calculated 84 01/10/2017 08:25 AM   Triglyceride 236 01/10/2017 08:25 AM   Lab Results  Component Value Date/Time   GFR est non-AA >60 04/26/2017 10:32 AM   GFRNA, POC >60 06/04/2013 09:57 AM   GFR est AA >60 04/26/2017 10:32 AM   GFRAA, POC >60 06/04/2013 09:57 AM   Creatinine 0.93 04/26/2017 10:32 AM   Creatinine (POC) 0.7 06/04/2013 09:57 AM   BUN 11 04/26/2017 10:32 AM   Sodium 137 04/26/2017 10:32 AM   Potassium 4.3 04/26/2017 10:32 AM   Chloride 101 04/26/2017 10:32 AM   CO2 20 04/26/2017 10:32 AM Review of Systems  A comprehensive review of systems was negative. Objective:   Visit Vitals    /84 (BP 1 Location: Left arm, BP Patient Position: Sitting)    Pulse 100    Temp 98 °F (36.7 °C) (Oral)    Resp 16    Ht 5' 5\" (1.651 m)    Wt 202 lb (91.6 kg)    LMP 01/25/2012    SpO2 97%    BMI 33.61 kg/m2     Appearance: alert, well appearing, and in no distress. Exam: heart sounds normal rate, regular rhythm, normal S1, S2, no murmurs, rubs, clicks or gallops, no carotid bruits  Lab review: orders written for new lab studies as appropriate; see orders. Assessment/Plan:     diabetes stable. Diabetic issues reviewed with her: diabetic diet discussed in detail, written exchange diet given, low cholesterol diet, weight control and daily exercise discussed and home glucose monitoring emphasized. ICD-10-CM ICD-9-CM    1. Dyslipidemia (high LDL; low HDL) E78.4 272.4 LIPID PANEL   2. Type 2 diabetes mellitus without complication, with long-term current use of insulin (HCC) E11.9 250.00 JANUVIA 100 mg tablet    Z79.4 V58.67 metFORMIN (GLUCOPHAGE) 1,000 mg tablet      CBC W/O DIFF      METABOLIC PANEL, COMPREHENSIVE      HEMOGLOBIN A1C WITH EAG   3. Chronic daily headache R51 784.0    4. Chronic migraine without aura without status migrainosus, not intractable G43.709 346.70    5. Mild intermittent asthma without complication B14.15 511.99 albuterol (PROVENTIL VENTOLIN) 2.5 mg /3 mL (0.083 %) nebulizer solution   6. Environmental allergies Z91.09 V15.09 fluticasone (FLONASE) 50 mcg/actuation nasal spray     Medications refilled. Informed patient that we can swap the [facility] for the new jardiance, as they work in same manner. Educated about taking medications as prescribed, and we will notify her when her labs return, and inform her of any change in plan of care at that time. Pt informed to return to office with worsening of symptoms, or PRN with any questions or concerns.   Pt verbalizes understanding of plan of care and denies further questions or concerns at this time.

## 2017-07-25 NOTE — MR AVS SNAPSHOT
Visit Information Date & Time Provider Department Dept. Phone Encounter #  
 7/25/2017  8:00 AM Sita NolenZach [phone] [id number] Follow-up Instructions Return in about 6 months (around 1/25/2018), or if symptoms worsen or fail to improve. Upcoming Health Maintenance Date Due  
 EYE EXAM RETINAL OR DILATED Q1 9/1/2014 INFLUENZA AGE 9 TO ADULT 8/1/2017 HEMOGLOBIN A1C Q6M 10/26/2017 DTaP/Tdap/Td series (2 - Td) 1/1/2018 LIPID PANEL Q1 1/10/2018 FOOT EXAM Q1 3/15/2018 MICROALBUMIN Q1 3/15/2018 PAP AKA CERVICAL CYTOLOGY 6/15/2019 Allergies as of 7/25/2017  Review Complete On: 7/25/2017 By: Sita Nolen NP Severity Noted Reaction Type Reactions Green Pepper  06/28/2012    Hives Current Immunizations  Reviewed on 3/18/2014 Name Date Influenza Vaccine 10/26/2013 TDAP Vaccine 1/1/2008 Not reviewed this visit You Were Diagnosed With   
  
 Codes Comments Dyslipidemia (high LDL; low HDL)    -  Primary ICD-10-CM: E78.4 ICD-9-CM: 272.4 Type 2 diabetes mellitus without complication, with long-term current use of insulin (HCC)     ICD-10-CM: E11.9, Z79.4 ICD-9-CM: 250.00, V58.67 Chronic daily headache     ICD-10-CM: R51 ICD-9-CM: 697. 0 Chronic migraine without aura without status migrainosus, not intractable     ICD-10-CM: F23.368 ICD-9-CM: 346.70 Mild intermittent asthma without complication     DGP-45-GW: J45.20 ICD-9-CM: 493.90 Environmental allergies     ICD-10-CM: Z91.09 
ICD-9-CM: V15.09 Vitals BP Pulse Temp Resp Height(growth percentile) Weight(growth percentile) 114/84 (BP 1 Location: Left arm, BP Patient Position: Sitting) 100 98 °F (36.7 °C) (Oral) 16 5' 5\" (1.651 m) 202 lb (91.6 kg) LMP SpO2 BMI OB Status Smoking Status 01/25/2012 97% 33.61 kg/m2 Hysterectomy Never Smoker BMI and BSA Data Body Mass Index Body Surface Area 33.61 kg/m 2 2.05 m 2 Preferred Pharmacy Pharmacy Name Phone [facility] #[id number] - [place] [phone] Your Updated Medication List  
  
   
This list is accurate as of: 7/25/17  8:19 AM.  Always use your most recent med list.  
  
  
  
  
 Blood-Glucose Meter monitoring kit Please give one kit. buPROPion 100 mg tablet Commonly known as:  WELLBUTRIN  
TAKE 2 TABLETS BY MOUTH TWICE DAILY FOR 30 DAYS  
  
 butalbital-acetaminophen-caffeine -40 mg per tablet Commonly known as:  Madeleine Sails Take 1 Tab by mouth every six (6) hours as needed for Pain. Max Daily Amount: 4 Tabs. empagliflozin 10 mg tablet Commonly known as:  Jaime Philip Take 1 Tab by mouth daily. ergocalciferol 50,000 unit capsule Commonly known as:  ERGOCALCIFEROL Take 1 Cap by mouth every seven (7) days. famotidine 40 mg tablet Commonly known as:  PEPCID  
TAKE 1 TABLET BY MOUTH EVERY DAY  
  
 fluticasone 50 mcg/actuation nasal spray Commonly known as:  FLONASE  
2 SQUIRTS DAILY AS NEEDED. glucose blood VI test strips strip Commonly known as:  Ascensia CONTOUR  
TO BE USED AS DIRECTED  
  
 JANUVIA 100 mg tablet Generic drug:  SITagliptin Take 1 Tab by mouth once over twenty-four (24) hours. Lancets Misc Commonly known as:  MICROLET LANCET  
USE TO CHECK BLOOD SUGAR ONE TO TWO TIMES DAILY  
  
 metFORMIN 1,000 mg tablet Commonly known as:  GLUCOPHAGE  
TAKE 1 TABLET BY MOUTH TWICE A DAY  
  
 montelukast 10 mg tablet Commonly known as:  SINGULAIR  
TAKE 1 TABLET BY MOUTH EACH EVENING  
  
 naltrexone 50 mg tablet Commonly known as:  DEPADE Take 1 Tab by mouth once over twenty-four (24) hours. naratriptan 2.5 mg Tab Commonly known as:  Clearance Jeff Take 1 Tab by mouth once as needed for up to 1 dose. For migraine. Can repeat 1 tab in 4 hours if needed. Limit: 2 tabs in 24 hours Nebulizer & Compressor machine Use as needed for wheezing, cough  
  
 omeprazole 20 mg capsule Commonly known as:  PRILOSEC Take 20 mg by mouth once over twenty-four (24) hours. simvastatin 20 mg tablet Commonly known as:  ZOCOR Take 1 Tab by mouth nightly. sucralfate 100 mg/mL suspension Commonly known as:  Patti Savory Take 10 mL by mouth four (4) times daily. topiramate 50 mg tablet Commonly known as:  TOPAMAX Take 1 tab twice a day for migraine prevention * VENTOLIN HFA 90 mcg/actuation inhaler Generic drug:  albuterol INHALE 2 PUFFS BY MOUTH EVERY 4 HOURS AS NEEDED FOR WHEEZING  
  
 * albuterol 2.5 mg /3 mL (0.083 %) nebulizer solution Commonly known as:  PROVENTIL VENTOLIN  
3 mL by Nebulization route every four (4) hours as needed for Wheezing for up to 30 doses. * Notice: This list has 2 medication(s) that are the same as other medications prescribed for you. Read the directions carefully, and ask your doctor or other care provider to review them with you. Prescriptions Printed Refills  
 simvastatin (ZOCOR) 20 mg tablet 1 Sig: Take 1 Tab by mouth nightly. Class: Print Route: Oral  
 empagliflozin (JARDIANCE) 10 mg tablet 1 Sig: Take 1 Tab by mouth daily. Class: Print Route: Oral  
 JANUVIA 100 mg tablet 1 Sig: Take 1 Tab by mouth once over twenty-four (24) hours. Class: Print Route: Oral  
 metFORMIN (GLUCOPHAGE) 1,000 mg tablet 1 Sig: TAKE 1 TABLET BY MOUTH TWICE A DAY Class: Print  
 albuterol (PROVENTIL VENTOLIN) 2.5 mg /3 mL (0.083 %) nebulizer solution 2 Sig: 3 mL by Nebulization route every four (4) hours as needed for Wheezing for up to 30 doses. Class: Print Route: Nebulization  
 glucose blood VI test strips (ASCENSIA CONTOUR) strip 5 Sig: TO BE USED AS DIRECTED Class: Print  
 fluticasone (FLONASE) 50 mcg/actuation nasal spray 3 Si SQUIRTS DAILY AS NEEDED. Class: Print We Performed the Following CBC W/O DIFF [81421 CPT(R)] HEMOGLOBIN A1C WITH EAG [97213 CPT(R)] LIPID PANEL [22972 CPT(R)] METABOLIC PANEL, COMPREHENSIVE [47114 CPT(R)] Follow-up Instructions Return in about 6 months (around 1/25/2018), or if symptoms worsen or fail to improve. Patient Instructions Learning About Diabetes Food Guidelines Your Care Instructions Meal planning is important to manage diabetes. It helps keep your blood sugar at a target level (which you set with your doctor). You don't have to eat special foods. You can eat what your family eats, including sweets once in a while. But you do have to pay attention to how often you eat and how much you eat of certain foods. You may want to work with a dietitian or a certified diabetes educator (CDE) to help you plan meals and snacks. A dietitian or CDE can also help you lose weight if that is one of your goals. What should you know about eating carbs? Managing the amount of carbohydrate (carbs) you eat is an important part of healthy meals when you have diabetes. Carbohydrate is found in many foods. · Learn which foods have carbs. And learn the amounts of carbs in different foods. ¨ Bread, cereal, pasta, and rice have about 15 grams of carbs in a serving. A serving is 1 slice of bread (1 ounce), ½ cup of cooked cereal, or 1/3 cup of cooked pasta or rice. ¨ Fruits have 15 grams of carbs in a serving. A serving is 1 small fresh fruit, such as an apple or orange; ½ of a banana; ½ cup of cooked or canned fruit; ½ cup of fruit juice; 1 cup of melon or raspberries; or 2 tablespoons of dried fruit. ¨ Milk and no-sugar-added yogurt have 15 grams of carbs in a serving. A serving is 1 cup of milk or 2/3 cup of no-sugar-added yogurt. ¨ Starchy vegetables have 15 grams of carbs in a serving.  A serving is ½ cup of mashed potatoes or sweet potato; 1 cup winter squash; ½ of a small baked potato; ½ cup of cooked beans; or ½ cup cooked corn or green peas. · Learn how much carbs to eat each day and at each meal. A dietitian or CDE can teach you how to keep track of the amount of carbs you eat. This is called carbohydrate counting. · If you are not sure how to count carbohydrate grams, use the Plate Method to plan meals. It is a good, quick way to make sure that you have a balanced meal. It also helps you spread carbs throughout the day. ¨ Divide your plate by types of foods. Put non-starchy vegetables on half the plate, meat or other protein food on one-quarter of the plate, and a grain or starchy vegetable in the final quarter of the plate. To this you can add a small piece of fruit and 1 cup of milk or yogurt, depending on how many carbs you are supposed to eat at a meal. 
· Try to eat about the same amount of carbs at each meal. Do not \"save up\" your daily allowance of carbs to eat at one meal. 
· Proteins have very little or no carbs per serving. Examples of proteins are beef, chicken, turkey, fish, eggs, tofu, cheese, cottage cheese, and peanut butter. A serving size of meat is 3 ounces, which is about the size of a deck of cards. Examples of meat substitute serving sizes (equal to 1 ounce of meat) are 1/4 cup of cottage cheese, 1 egg, 1 tablespoon of peanut butter, and ½ cup of tofu. How can you eat out and still eat healthy? · Learn to estimate the serving sizes of foods that have carbohydrate. If you measure food at home, it will be easier to estimate the amount in a serving of restaurant food. · If the meal you order has too much carbohydrate (such as potatoes, corn, or baked beans), ask to have a low-carbohydrate food instead. Ask for a salad or green vegetables. · If you use insulin, check your blood sugar before and after eating out to help you plan how much to eat in the future.  
· If you eat more carbohydrate at a meal than you had planned, take a walk or do other exercise. This will help lower your blood sugar. What else should you know? · Limit saturated fat, such as the fat from meat and dairy products. This is a healthy choice because people who have diabetes are at higher risk of heart disease. So choose lean cuts of meat and nonfat or low-fat dairy products. Use olive or canola oil instead of butter or shortening when cooking. · Don't skip meals. Your blood sugar may drop too low if you skip meals and take insulin or certain medicines for diabetes. · Check with your doctor before you drink alcohol. Alcohol can cause your blood sugar to drop too low. Alcohol can also cause a bad reaction if you take certain diabetes medicines. Follow-up care is a key part of your treatment and safety. Be sure to make and go to all appointments, and call your doctor if you are having problems. It's also a good idea to know your test results and keep a list of the medicines you take. Where can you learn more? Go to http://hodan-eugene.info/. Enter P660 in the search box to learn more about \"Learning About Diabetes Food Guidelines. \" Current as of: March 13, 2017 Content Version: 11.3 © 5773-5455 [employer]. Care instructions adapted under license by [employer] (which disclaims liability or warranty for this information). If you have questions about a medical condition or this instruction, always ask your healthcare professional. [address] any warranty or liability for your use of this information. Learning About Diabetes Food Guidelines Your Care Instructions Meal planning is important to manage diabetes. It helps keep your blood sugar at a target level (which you set with your doctor). You don't have to eat special foods. You can eat what your family eats, including sweets once in a while. But you do have to pay attention to how often you eat and how much you eat of certain foods. You may want to work with a dietitian or a certified diabetes educator (CDE) to help you plan meals and snacks. A dietitian or CDE can also help you lose weight if that is one of your goals. What should you know about eating carbs? Managing the amount of carbohydrate (carbs) you eat is an important part of healthy meals when you have diabetes. Carbohydrate is found in many foods. · Learn which foods have carbs. And learn the amounts of carbs in different foods. ¨ Bread, cereal, pasta, and rice have about 15 grams of carbs in a serving. A serving is 1 slice of bread (1 ounce), ½ cup of cooked cereal, or 1/3 cup of cooked pasta or rice. ¨ Fruits have 15 grams of carbs in a serving. A serving is 1 small fresh fruit, such as an apple or orange; ½ of a banana; ½ cup of cooked or canned fruit; ½ cup of fruit juice; 1 cup of melon or raspberries; or 2 tablespoons of dried fruit. ¨ Milk and no-sugar-added yogurt have 15 grams of carbs in a serving. A serving is 1 cup of milk or 2/3 cup of no-sugar-added yogurt. ¨ Starchy vegetables have 15 grams of carbs in a serving. A serving is ½ cup of mashed potatoes or sweet potato; 1 cup winter squash; ½ of a small baked potato; ½ cup of cooked beans; or ½ cup cooked corn or green peas. · Learn how much carbs to eat each day and at each meal. A dietitian or CDE can teach you how to keep track of the amount of carbs you eat. This is called carbohydrate counting. · If you are not sure how to count carbohydrate grams, use the Plate Method to plan meals. It is a good, quick way to make sure that you have a balanced meal. It also helps you spread carbs throughout the day. ¨ Divide your plate by types of foods. Put non-starchy vegetables on half the plate, meat or other protein food on one-quarter of the plate, and a grain or starchy vegetable in the final quarter of the plate.  To this you can add a small piece of fruit and 1 cup of milk or yogurt, depending on how many carbs you are supposed to eat at a meal. 
· Try to eat about the same amount of carbs at each meal. Do not \"save up\" your daily allowance of carbs to eat at one meal. 
· Proteins have very little or no carbs per serving. Examples of proteins are beef, chicken, turkey, fish, eggs, tofu, cheese, cottage cheese, and peanut butter. A serving size of meat is 3 ounces, which is about the size of a deck of cards. Examples of meat substitute serving sizes (equal to 1 ounce of meat) are 1/4 cup of cottage cheese, 1 egg, 1 tablespoon of peanut butter, and ½ cup of tofu. How can you eat out and still eat healthy? · Learn to estimate the serving sizes of foods that have carbohydrate. If you measure food at home, it will be easier to estimate the amount in a serving of restaurant food. · If the meal you order has too much carbohydrate (such as potatoes, corn, or baked beans), ask to have a low-carbohydrate food instead. Ask for a salad or green vegetables. · If you use insulin, check your blood sugar before and after eating out to help you plan how much to eat in the future. · If you eat more carbohydrate at a meal than you had planned, take a walk or do other exercise. This will help lower your blood sugar. What else should you know? · Limit saturated fat, such as the fat from meat and dairy products. This is a healthy choice because people who have diabetes are at higher risk of heart disease. So choose lean cuts of meat and nonfat or low-fat dairy products. Use olive or canola oil instead of butter or shortening when cooking. · Don't skip meals. Your blood sugar may drop too low if you skip meals and take insulin or certain medicines for diabetes. · Check with your doctor before you drink alcohol. Alcohol can cause your blood sugar to drop too low. Alcohol can also cause a bad reaction if you take certain diabetes medicines. Follow-up care is a key part of your treatment and safety.  Be sure to make and go to all appointments, and call your doctor if you are having problems. It's also a good idea to know your test results and keep a list of the medicines you take. Where can you learn more? Go to http://hodan-eugene.info/. Enter V925 in the search box to learn more about \"Learning About Diabetes Food Guidelines. \" Current as of: March 13, 2017 Content Version: 11.3 © 3318-3580 [employer]. Care instructions adapted under license by [employer] (which disclaims liability or warranty for this information). If you have questions about a medical condition or this instruction, always ask your healthcare professional. [address] any warranty or liability for your use of this information. Introducing [facility]! Kylee Vale introduces [employer] patient portal. Now you can access parts of your medical record, email your doctor's office, and request medication refills online. 1. In your internet browser, go to [email or web address] 2. Click on the First Time User? Click Here link in the Sign In box. You will see the New Member Sign Up page. 3. Enter your [employer] Access Code exactly as it appears below. You will not need to use this code after youve completed the sign-up process. If you do not sign up before the expiration date, you must request a new code. · [employer] Access Code: [email or web address] Expires: 10/23/2017  8:19 AM 
 
4. Enter the last four digits of your Social Security Number (xxxx) and Date of Birth (mm/dd/yyyy) as indicated and click Submit. You will be taken to the next sign-up page. 5. Create a BadAbroadt ID. This will be your [employer] login ID and cannot be changed, so think of one that is secure and easy to remember. 6. Create a [employer] password. You can change your password at any time. 7. Enter your Password Reset Question and Answer.  This can be used at a later time if you forget your password. 8. Enter your e-mail address. You will receive e-mail notification when new information is available in [address] 9. Click Sign Up. You can now view and download portions of your medical record. 10. Click the Download Summary menu link to download a portable copy of your medical information. If you have questions, please visit the Frequently Asked Questions section of the sliceX website. Remember, sliceX is NOT to be used for urgent needs. For medical emergencies, dial 911. Now available from your iPhone and Android! Please provide this summary of care documentation to your next provider. Your primary care clinician is listed as [address]. If you have any questions after today's visit, please call [phone].

## 2017-07-26 ENCOUNTER — TELEPHONE (OUTPATIENT)
Dept: FAMILY MEDICINE CLINIC | Age: 37
End: 2017-07-26

## 2017-07-26 LAB
ALBUMIN SERPL-MCNC: 4.5 G/DL (ref 3.5–5.5)
ALBUMIN/GLOB SERPL: 1.7 {RATIO} (ref 1.2–2.2)
ALP SERPL-CCNC: 57 IU/L (ref 39–117)
ALT SERPL-CCNC: 21 IU/L (ref 0–32)
AST SERPL-CCNC: 20 IU/L (ref 0–40)
BILIRUB SERPL-MCNC: 0.3 MG/DL (ref 0–1.2)
BUN SERPL-MCNC: 10 MG/DL (ref 6–20)
BUN/CREAT SERPL: 14 (ref 9–23)
CALCIUM SERPL-MCNC: 9.3 MG/DL (ref 8.7–10.2)
CHLORIDE SERPL-SCNC: 101 MMOL/L (ref 96–106)
CHOLEST SERPL-MCNC: 160 MG/DL (ref 100–199)
CO2 SERPL-SCNC: 21 MMOL/L (ref 18–29)
CREAT SERPL-MCNC: 0.72 MG/DL (ref 0.57–1)
ERYTHROCYTE [DISTWIDTH] IN BLOOD BY AUTOMATED COUNT: 13.7 % (ref 12.3–15.4)
EST. AVERAGE GLUCOSE BLD GHB EST-MCNC: 140 MG/DL
GLOBULIN SER CALC-MCNC: 2.6 G/DL (ref 1.5–4.5)
GLUCOSE SERPL-MCNC: 108 MG/DL (ref 65–99)
HBA1C MFR BLD: 6.5 % (ref 4.8–5.6)
HCT VFR BLD AUTO: 45.1 % (ref 34–46.6)
HDLC SERPL-MCNC: 40 MG/DL
HGB BLD-MCNC: 14.9 G/DL (ref 11.1–15.9)
INTERPRETATION, 910389: NORMAL
LDLC SERPL CALC-MCNC: 77 MG/DL (ref 0–99)
Lab: NORMAL
MCH RBC QN AUTO: 29 PG (ref 26.6–33)
MCHC RBC AUTO-ENTMCNC: 33 G/DL (ref 31.5–35.7)
MCV RBC AUTO: 88 FL (ref 79–97)
PLATELET # BLD AUTO: 250 X10E3/UL (ref 150–379)
POTASSIUM SERPL-SCNC: 4.6 MMOL/L (ref 3.5–5.2)
PROT SERPL-MCNC: 7.1 G/DL (ref 6–8.5)
RBC # BLD AUTO: 5.14 X10E6/UL (ref 3.77–5.28)
SODIUM SERPL-SCNC: 141 MMOL/L (ref 134–144)
TRIGL SERPL-MCNC: 213 MG/DL (ref 0–149)
VLDLC SERPL CALC-MCNC: 43 MG/DL (ref 5–40)
WBC # BLD AUTO: 10.7 X10E3/UL (ref 3.4–10.8)

## 2017-07-26 NOTE — TELEPHONE ENCOUNTER
----- Message from Rayna Parham NP sent at 7/26/2017 10:51 AM EDT -----  Please call patient and let her know that her labs returned:  1.  HgbA1C is stable at 6.5.  2.  Cholesterol is stable. Triglycerides are high. Continue cholesterol medication and work on decreasing fatty, fried foods. Return in 6 months  Thanks!

## 2017-07-26 NOTE — PROGRESS NOTES
Please call patient and let her know that her labs returned:  1.  HgbA1C is stable at 6.5.  2.  Cholesterol is stable. Triglycerides are high. Continue cholesterol medication and work on decreasing fatty, fried foods. Return in 6 months  Thanks!

## 2017-08-01 ENCOUNTER — OFFICE VISIT (OUTPATIENT)
Dept: FAMILY MEDICINE CLINIC | Age: 37
End: 2017-08-01

## 2017-08-01 VITALS
HEART RATE: 95 BPM | WEIGHT: 203.8 LBS | TEMPERATURE: 98.1 F | OXYGEN SATURATION: 95 % | BODY MASS INDEX: 33.95 KG/M2 | HEIGHT: 65 IN | SYSTOLIC BLOOD PRESSURE: 122 MMHG | RESPIRATION RATE: 18 BRPM | DIASTOLIC BLOOD PRESSURE: 76 MMHG

## 2017-08-01 DIAGNOSIS — E78.1 HYPERTRIGLYCERIDEMIA: ICD-10-CM

## 2017-08-01 DIAGNOSIS — I10 ESSENTIAL HYPERTENSION: ICD-10-CM

## 2017-08-01 DIAGNOSIS — Z79.4 TYPE 2 DIABETES MELLITUS WITHOUT COMPLICATION, WITH LONG-TERM CURRENT USE OF INSULIN (HCC): Primary | ICD-10-CM

## 2017-08-01 DIAGNOSIS — E11.9 TYPE 2 DIABETES MELLITUS WITHOUT COMPLICATION, WITH LONG-TERM CURRENT USE OF INSULIN (HCC): Primary | ICD-10-CM

## 2017-08-01 RX ORDER — FENOFIBRATE 160 MG/1
160 TABLET ORAL DAILY
Qty: 30 TAB | Refills: 5 | Status: SHIPPED | OUTPATIENT
Start: 2017-08-01 | End: 2017-11-07 | Stop reason: SDUPTHER

## 2017-08-01 RX ORDER — ASPIRIN 81 MG/1
81 TABLET ORAL DAILY
Qty: 30 TAB | Refills: 5 | Status: SHIPPED | OUTPATIENT
Start: 2017-08-01 | End: 2018-02-01 | Stop reason: SDUPTHER

## 2017-08-01 NOTE — PROGRESS NOTES
Subjective:     Earnestine Crawley is a 43 y.o. female seen for follow up of diabetes. She also has hypertension and hyperlipidemia. Diabetic Review of Systems - medication compliance: compliant most of the time, diabetic diet compliance: compliant most of the time, home glucose monitoring: is performed regularly. Other symptoms and concerns:   She would like to switch from Invokana to [place] due to the cardiac protection of this medication and some of the worrisome side effects of the Invokana. We discussed in detail risk/benefits and pharmacology of Jardiance. It is quickly becoming a preferred medication and has the added benefit in helping with some weight loss. Questions answered. Patient Active Problem List   Diagnosis Code    Hypertension I10    Asthma J45.909    Dyslipidemia (high LDL; low HDL) E78.4    Vitamin D deficiency E55.9    Migraine headache G43.909    Sore throat J02.9    Diabetes (HCC) E11.9    Right ear pain H92.01    Right acute serous otitis media H65.01    Rash R21    Gastroesophageal reflux disease without esophagitis K21.9    Environmental allergies Z91.09    Nausea R11.0    Strep throat J02.0    Left ear pain H92.02    Left lower quadrant pain R10.32    Acute non-recurrent frontal sinusitis J01.10    Screening for thyroid disorder Z13.29    Dermatitis L30.9    Acute mucoid otitis media of right ear H65.111     Current Outpatient Prescriptions   Medication Sig Dispense Refill    empagliflozin (JARDIANCE) 25 mg tablet Take 1 Tab by mouth daily for 30 days. 30 Tab 5    fenofibrate (LOFIBRA) 160 mg tablet Take 1 Tab by mouth daily. 30 Tab 5    aspirin delayed-release 81 mg tablet Take 1 Tab by mouth daily for 30 days. 30 Tab 5    simvastatin (ZOCOR) 20 mg tablet Take 1 Tab by mouth nightly. 90 Tab 1    JANUVIA 100 mg tablet Take 1 Tab by mouth once over twenty-four (24) hours.  90 Tab 1    metFORMIN (GLUCOPHAGE) 1,000 mg tablet TAKE 1 TABLET BY MOUTH TWICE A  Tab 1    albuterol (PROVENTIL VENTOLIN) 2.5 mg /3 mL (0.083 %) nebulizer solution 3 mL by Nebulization route every four (4) hours as needed for Wheezing for up to 30 doses. 100 Each 2    fluticasone (FLONASE) 50 mcg/actuation nasal spray 2 SQUIRTS DAILY AS NEEDED. 16 g 3    famotidine (PEPCID) 40 mg tablet TAKE 1 TABLET BY MOUTH EVERY DAY  12    montelukast (SINGULAIR) 10 mg tablet TAKE 1 TABLET BY MOUTH EACH EVENING  5    VENTOLIN HFA 90 mcg/actuation inhaler INHALE 2 PUFFS BY MOUTH EVERY 4 HOURS AS NEEDED FOR WHEEZING 18 Inhaler 5    omeprazole (PRILOSEC) 20 mg capsule Take 20 mg by mouth once over twenty-four (24) hours.  ergocalciferol (ERGOCALCIFEROL) 50,000 unit capsule Take 1 Cap by mouth every seven (7) days.  butalbital-acetaminophen-caffeine (FIORICET, ESGIC) -40 mg per tablet Take 1 Tab by mouth every six (6) hours as needed for Pain. Max Daily Amount: 4 Tabs. 20 Tab 0    naratriptan (AMERGE) 2.5 mg tab Take 1 Tab by mouth once as needed for up to 1 dose. For migraine. Can repeat 1 tab in 4 hours if needed. Limit: 2 tabs in 24 hours 9 Tab 1    glucose blood VI test strips (ASCENSIA CONTOUR) strip TO BE USED AS DIRECTED 50 Strip 5    sucralfate (CARAFATE) 100 mg/mL suspension Take 10 mL by mouth four (4) times daily. 414 mL 0    topiramate (TOPAMAX) 50 mg tablet Take 1 tab twice a day for migraine prevention 60 Tab 0    Nebulizer & Compressor machine Use as needed for wheezing, cough 1 Each 0    Blood-Glucose Meter monitoring kit Please give one kit.  1 Kit 0    Lancets (MICROLET LANCET) Misc USE TO CHECK BLOOD SUGAR ONE TO TWO TIMES DAILY 100 Each 0     Allergies   Allergen Reactions    Green Pepper Hives     Past Medical History:   Diagnosis Date    Anxiety     Asthma     Diabetes ([facility] [address] 2008    Headache     Headache     Hypertension     Rash 7/14/2016    S/P dilatation and curettage     Tachycardia      Past Surgical History:   Procedure Laterality Date    HX APPENDECTOMY  2/2008    HX CHOLECYSTECTOMY  2001    HX DILATION AND CURETTAGE      HX GYN  3/2008    tubal ligation    HX HERNIA REPAIR  2/2009    HX HYSTERECTOMY  03/2012    HX LUMBAR DISKECTOMY      2006    HX ORTHOPAEDIC  9/2006    ruptured discs    UPPER GI ENDOSCOPY,BIOPSY  5/11/2017          Family History   Problem Relation Age of Onset    Heart Disease Mother     Hypertension Father     Hypertension Sister      Social History   Substance Use Topics    Smoking status: Never Smoker    Smokeless tobacco: Never Used    Alcohol use No        LABS:  Lab Results   Component Value Date/Time    Hemoglobin A1c 6.5 07/25/2017 08:25 AM    Hemoglobin A1c (POC) 6.5 04/26/2017 09:31 AM        Review of Systems  Pertinent items are noted in HPI. Objective:     Visit Vitals    /76    Pulse 95    Temp 98.1 °F (36.7 °C) (Oral)    Resp 18    Ht 5' 5\" (1.651 m)    Wt 203 lb 12.8 oz (92.4 kg)    LMP 01/25/2012    SpO2 95%    BMI 33.91 kg/m2     Appearance: alert, well appearing, and in no distress and overweight. Exam: heart sounds normal rate, regular rhythm, normal S1, S2, no murmurs, rubs, clicks or gallops, S1 and S2 normal, chest clear, no hepatosplenomegaly, no carotid bruits  Lab review: orders written for new lab studies as appropriate; see orders. Assessment/Plan:       ICD-10-CM ICD-9-CM    1. Type 2 diabetes mellitus without complication, with long-term current use of insulin (HCC) E11.9 250.00 empagliflozin (JARDIANCE) 25 mg tablet    Z79.4 V58.67 aspirin delayed-release 81 mg tablet      HEMOGLOBIN A1C WITH EAG   2. Hypertriglyceridemia E78.1 272.1 fenofibrate (LOFIBRA) 160 mg tablet      aspirin delayed-release 81 mg tablet      LIPID PANEL      CRP, HIGH SENSITIVITY      METABOLIC PANEL, COMPREHENSIVE   3. Essential hypertension I10 401.9 aspirin delayed-release 81 mg tablet     Diabetes:  the following changes in treatment are made: Addition of Jardiance.    lab results and schedule of future lab studies reviewed with patient  reviewed diet, exercise and weight control  cardiovascular risk and specific lipid/LDL goals reviewed  reviewed medications and side effects in detail  specific diabetic recommendations: diabetic diet discussed in detail, written exchange diet given, low cholesterol diet, weight control and daily exercise discussed and all medications, side effects and compliance discussed carefully  use of aspirin to prevent MI and TIA's discussed. I have discussed the diagnosis with the patient and the intended treatment plan as seen in the above orders. The patient has received an after-visit summary and questions were answered concerning future plans. Asked to return should symptoms worsen or not improve with treatment. Any pending labs and studies will be relayed to patient when they become available. Pt verbalizes understanding of plan of care and denies further questions or concerns at this time. Follow-up Disposition:  Return if symptoms worsen or fail to improve.

## 2017-08-01 NOTE — MR AVS SNAPSHOT
Visit Information Date & Time Provider Department Dept. Phone Encounter #  
 8/1/2017  1:00 PM Marilin Valencia Zach Denny [phone] Upcoming Health Maintenance Date Due  
 EYE EXAM RETINAL OR DILATED Q1 9/1/2014 INFLUENZA AGE 9 TO ADULT 8/1/2017 DTaP/Tdap/Td series (2 - Td) 1/1/2018 HEMOGLOBIN A1C Q6M 1/25/2018 FOOT EXAM Q1 3/15/2018 MICROALBUMIN Q1 3/15/2018 LIPID PANEL Q1 7/25/2018 PAP AKA CERVICAL CYTOLOGY 6/15/2019 Allergies as of 8/1/2017  Review Complete On: 8/1/2017 By: Marilin Valencia MD  
  
 Severity Noted Reaction Type Reactions Green Pepper  06/28/2012    Hives Current Immunizations  Reviewed on 3/18/2014 Name Date Influenza Vaccine 10/26/2013 TDAP Vaccine 1/1/2008 Not reviewed this visit You Were Diagnosed With   
  
 Codes Comments Type 2 diabetes mellitus without complication, with long-term current use of insulin (HCC)    -  Primary ICD-10-CM: E11.9, Z79.4 ICD-9-CM: 250.00, V58.67 Hypertriglyceridemia     ICD-10-CM: E78.1 ICD-9-CM: 272.1 Essential hypertension     ICD-10-CM: I10 
ICD-9-CM: 401.9 Vitals BP Pulse Temp Resp Height(growth percentile) Weight(growth percentile) 122/76 95 98.1 °F (36.7 °C) (Oral) 18 5' 5\" (1.651 m) 203 lb 12.8 oz (92.4 kg) LMP SpO2 BMI OB Status Smoking Status 01/25/2012 95% 33.91 kg/m2 Hysterectomy Never Smoker Vitals History BMI and BSA Data Body Mass Index Body Surface Area  
 33.91 kg/m 2 2.06 m 2 Preferred Pharmacy Pharmacy Name Phone [id number] - [place] - [address] [phone] Your Updated Medication List  
  
   
This list is accurate as of: 8/1/17  2:00 PM.  Always use your most recent med list.  
  
  
  
  
 aspirin delayed-release 81 mg tablet Take 1 Tab by mouth daily for 30 days. Blood-Glucose Meter monitoring kit Please give one kit. butalbital-acetaminophen-caffeine -40 mg per tablet Commonly known as:  Leigh Rosenberg Take 1 Tab by mouth every six (6) hours as needed for Pain. Max Daily Amount: 4 Tabs. empagliflozin 25 mg tablet Commonly known as:  Isaiah Choi Take 1 Tab by mouth daily for 30 days. ergocalciferol 50,000 unit capsule Commonly known as:  ERGOCALCIFEROL Take 1 Cap by mouth every seven (7) days. famotidine 40 mg tablet Commonly known as:  PEPCID  
TAKE 1 TABLET BY MOUTH EVERY DAY  
  
 fenofibrate 160 mg tablet Commonly known as:  LOFIBRA Take 1 Tab by mouth daily. fluticasone 50 mcg/actuation nasal spray Commonly known as:  FLONASE  
2 SQUIRTS DAILY AS NEEDED. glucose blood VI test strips strip Commonly known as:  Ascensia CONTOUR  
TO BE USED AS DIRECTED  
  
 JANUVIA 100 mg tablet Generic drug:  SITagliptin Take 1 Tab by mouth once over twenty-four (24) hours. Lancets Misc Commonly known as:  MICROLET LANCET  
USE TO CHECK BLOOD SUGAR ONE TO TWO TIMES DAILY  
  
 metFORMIN 1,000 mg tablet Commonly known as:  GLUCOPHAGE  
TAKE 1 TABLET BY MOUTH TWICE A DAY  
  
 montelukast 10 mg tablet Commonly known as:  SINGULAIR  
TAKE 1 TABLET BY MOUTH EACH EVENING  
  
 naratriptan 2.5 mg Tab Commonly known as:  Fern Cheese Take 1 Tab by mouth once as needed for up to 1 dose. For migraine. Can repeat 1 tab in 4 hours if needed. Limit: 2 tabs in 24 hours Nebulizer & Compressor machine Use as needed for wheezing, cough  
  
 omeprazole 20 mg capsule Commonly known as:  PRILOSEC Take 20 mg by mouth once over twenty-four (24) hours. simvastatin 20 mg tablet Commonly known as:  ZOCOR Take 1 Tab by mouth nightly. sucralfate 100 mg/mL suspension Commonly known as:  Donzell Hanly Take 10 mL by mouth four (4) times daily. topiramate 50 mg tablet Commonly known as:  TOPAMAX Take 1 tab twice a day for migraine prevention * VENTOLIN HFA 90 mcg/actuation inhaler Generic drug:  albuterol INHALE 2 PUFFS BY MOUTH EVERY 4 HOURS AS NEEDED FOR WHEEZING  
  
 * albuterol 2.5 mg /3 mL (0.083 %) nebulizer solution Commonly known as:  PROVENTIL VENTOLIN  
3 mL by Nebulization route every four (4) hours as needed for Wheezing for up to 30 doses. * Notice: This list has 2 medication(s) that are the same as other medications prescribed for you. Read the directions carefully, and ask your doctor or other care provider to review them with you. Prescriptions Sent to Pharmacy Refills  
 empagliflozin (JARDIANCE) 25 mg tablet 5 Sig: Take 1 Tab by mouth daily for 30 days. Class: Normal  
 Pharmacy: [facility] #[id number] - [place] Ph #: [phone] Route: Oral  
 fenofibrate (LOFIBRA) 160 mg tablet 5 Sig: Take 1 Tab by mouth daily. Class: Normal  
 [id number] Ph #: [phone] Route: Oral  
 aspirin delayed-release 81 mg tablet 5 Sig: Take 1 Tab by mouth daily for 30 days. Class: Normal  
 [id number] Ph #: [phone] Route: Oral  
  
We Performed the Following CRP, HIGH SENSITIVITY [ CPT(R)] HEMOGLOBIN A1C WITH EAG [53990 CPT(R)] LIPID PANEL [38078 CPT(R)] METABOLIC PANEL, COMPREHENSIVE [72545 CPT(R)] Introducing [facility]! Billie Ho introduces [employer] patient portal. Now you can access parts of your medical record, email your doctor's office, and request medication refills online. 1. In your internet browser, go to [email or web address] 2. Click on the First Time User? Click Here link in the Sign In box. You will see the New Member Sign Up page. 3. Enter your [employer] Access Code exactly as it appears below. You will not need to use this code after youve completed the sign-up process.  If you do not sign up before the expiration date, you must request a new code. · [employer] Access Code: [email or web address] Expires: 10/23/2017  8:19 AM 
 
4. Enter the last four digits of your Social Security Number (xxxx) and Date of Birth (mm/dd/yyyy) as indicated and click Submit. You will be taken to the next sign-up page. 5. Create a [employer] ID. This will be your [employer] login ID and cannot be changed, so think of one that is secure and easy to remember. 6. Create a [employer] password. You can change your password at any time. 7. Enter your Password Reset Question and Answer. This can be used at a later time if you forget your password. 8. Enter your e-mail address. You will receive e-mail notification when new information is available in [address] 9. Click Sign Up. You can now view and download portions of your medical record. 10. Click the Download Summary menu link to download a portable copy of your medical information. If you have questions, please visit the Frequently Asked Questions section of the [employer] website. Remember, [employer] is NOT to be used for urgent needs. For medical emergencies, dial 911. Now available from your iPhone and Android! Please provide this summary of care documentation to your next provider. Your primary care clinician is listed as Mary [address]. If you have any questions after today's visit, please call [phone].

## 2017-09-19 ENCOUNTER — DOCUMENTATION ONLY (OUTPATIENT)
Dept: FAMILY MEDICINE CLINIC | Age: 37
End: 2017-09-19

## 2017-09-19 NOTE — PROGRESS NOTES
Received PA for Contour test strips. PA initiated and approved for 1 year. 9/19/17 - 9/19/18. [facility] notified.

## 2017-09-21 NOTE — TELEPHONE ENCOUNTER
Pt is asking for new RX for new glucose meter, lancets, and Trutest strips. [facility] [address] Insurance will not cover Accu-check.

## 2017-09-26 ENCOUNTER — OFFICE VISIT (OUTPATIENT)
Dept: FAMILY MEDICINE CLINIC | Age: 37
End: 2017-09-26

## 2017-09-26 VITALS
BODY MASS INDEX: 34.16 KG/M2 | HEART RATE: 100 BPM | OXYGEN SATURATION: 97 % | SYSTOLIC BLOOD PRESSURE: 99 MMHG | HEIGHT: 65 IN | RESPIRATION RATE: 16 BRPM | DIASTOLIC BLOOD PRESSURE: 65 MMHG | WEIGHT: 205 LBS | TEMPERATURE: 98.5 F

## 2017-09-26 DIAGNOSIS — E11.9 TYPE 2 DIABETES MELLITUS WITHOUT COMPLICATION, WITHOUT LONG-TERM CURRENT USE OF INSULIN (HCC): ICD-10-CM

## 2017-09-26 DIAGNOSIS — J02.9 SORE THROAT: ICD-10-CM

## 2017-09-26 DIAGNOSIS — M54.6 ACUTE MIDLINE THORACIC BACK PAIN: ICD-10-CM

## 2017-09-26 DIAGNOSIS — J02.0 STREP THROAT: Primary | ICD-10-CM

## 2017-09-26 LAB
S PYO AG THROAT QL: POSITIVE
VALID INTERNAL CONTROL?: YES

## 2017-09-26 RX ORDER — BLOOD-GLUCOSE METER
EACH MISCELLANEOUS
Qty: 1 EACH | Refills: 0 | Status: SHIPPED | OUTPATIENT
Start: 2017-09-26 | End: 2018-03-23

## 2017-09-26 RX ORDER — AMOXICILLIN 500 MG/1
500 CAPSULE ORAL 2 TIMES DAILY
Qty: 20 CAP | Refills: 0 | Status: SHIPPED | OUTPATIENT
Start: 2017-09-26 | End: 2017-10-06

## 2017-09-26 RX ORDER — EMPAGLIFLOZIN 10 MG/1
TABLET, FILM COATED ORAL
COMMUNITY
Start: 2017-09-24 | End: 2017-10-31 | Stop reason: SDUPTHER

## 2017-09-26 RX ORDER — EMPAGLIFLOZIN 25 MG/1
TABLET, FILM COATED ORAL
COMMUNITY
Start: 2017-09-24 | End: 2017-11-07 | Stop reason: SDUPTHER

## 2017-09-26 RX ORDER — CYCLOBENZAPRINE HCL 10 MG
10 TABLET ORAL
Qty: 30 TAB | Refills: 0 | Status: SHIPPED | OUTPATIENT
Start: 2017-09-26 | End: 2017-10-19 | Stop reason: SDUPTHER

## 2017-09-26 RX ORDER — ASPIRIN 81 MG/1
TABLET ORAL
COMMUNITY
Start: 2017-09-24 | End: 2018-02-20 | Stop reason: SDUPTHER

## 2017-09-26 NOTE — MR AVS SNAPSHOT
Visit Information Date & Time Provider Department Dept. Phone Encounter #  
 9/26/2017  8:15 AM Awa Barry  [facility] [phone] [id number] Follow-up Instructions Return if symptoms worsen or fail to improve. Your Appointments 10/31/2017  9:00 AM  
ROUTINE CARE with Evangelista Muñoz MD  
[address] [facility]) Appt Note: follow up, fasting lab  
 [place] [address] [address] [facility] 860 [id number] [facility] 13 [address] Upcoming Health Maintenance Date Due  
 EYE EXAM RETINAL OR DILATED Q1 9/1/2014 INFLUENZA AGE 9 TO ADULT 8/1/2017 DTaP/Tdap/Td series (2 - Td) 1/1/2018 HEMOGLOBIN A1C Q6M 1/25/2018 FOOT EXAM Q1 3/15/2018 MICROALBUMIN Q1 3/15/2018 LIPID PANEL Q1 7/25/2018 PAP AKA CERVICAL CYTOLOGY 6/15/2019 Allergies as of 9/26/2017  Review Complete On: 9/26/2017 By: Awa Barry NP Severity Noted Reaction Type Reactions Green Pepper  06/28/2012    Hives Current Immunizations  Reviewed on 3/18/2014 Name Date Influenza Vaccine 10/26/2013 TDAP Vaccine 1/1/2008 Not reviewed this visit You Were Diagnosed With   
  
 Codes Comments Strep throat    -  Primary ICD-10-CM: J02.0 ICD-9-CM: 034.0 Sore throat     ICD-10-CM: J02.9 ICD-9-CM: 270 Acute midline thoracic back pain     ICD-10-CM: M54.6 ICD-9-CM: 724.1 Type 2 diabetes mellitus without complication, without long-term current use of insulin (HCC)     ICD-10-CM: E11.9 ICD-9-CM: 250.00 Vitals BP Pulse Temp Resp Height(growth percentile) Weight(growth percentile) 99/65 (BP 1 Location: Left arm, BP Patient Position: Sitting) 100 98.5 °F (36.9 °C) (Oral) 16 5' 5\" (1.651 m) 205 lb (93 kg) LMP SpO2 BMI OB Status Smoking Status 01/25/2012 97% 34.11 kg/m2 Hysterectomy Never Smoker BMI and BSA Data Body Mass Index Body Surface Area  
 34.11 kg/m 2 2.07 m 2 Preferred Pharmacy Pharmacy Name Phone [facility]Y #[id number] - [place] - [address] [phone] Your Updated Medication List  
  
   
This list is accurate as of: 9/26/17  8:39 AM.  Always use your most recent med list.  
  
  
  
  
 amoxicillin 500 mg capsule Commonly known as:  AMOXIL Take 1 Cap by mouth two (2) times a day for 10 days. aspirin delayed-release 81 mg tablet * Blood-Glucose Meter monitoring kit Please give one kit. * Blood-Glucose Meter Misc Commonly known as:  Shogether IQ METER To use to check blood sugar daily. Dx: E11.9.  
  
 butalbital-acetaminophen-caffeine -40 mg per tablet Commonly known as:  Vernette Agent Take 1 Tab by mouth every six (6) hours as needed for Pain. Max Daily Amount: 4 Tabs. cyclobenzaprine 10 mg tablet Commonly known as:  FLEXERIL Take 1 Tab by mouth three (3) times daily as needed for Muscle Spasm(s). ergocalciferol 50,000 unit capsule Commonly known as:  ERGOCALCIFEROL Take 1 Cap by mouth every seven (7) days. famotidine 40 mg tablet Commonly known as:  PEPCID  
TAKE 1 TABLET BY MOUTH EVERY DAY  
  
 fenofibrate 160 mg tablet Commonly known as:  LOFIBRA Take 1 Tab by mouth daily. fluticasone 50 mcg/actuation nasal spray Commonly known as:  FLONASE  
2 SQUIRTS DAILY AS NEEDED. * glucose blood VI test strips strip Commonly known as:  Ascensia CONTOUR  
TO BE USED AS DIRECTED * glucose blood VI test strips strip Commonly known as:  ASCENSIA AUTODISC VI, ONE TOUCH ULTRA TEST VI One touch Ultra Test strip. To use daily, to check blood sugar. Dx: E11.9. JANUVIA 100 mg tablet Generic drug:  SITagliptin Take 1 Tab by mouth once over twenty-four (24) hours. * JARDIANCE 10 mg tablet Generic drug:  empagliflozin * JARDIANCE 25 mg tablet Generic drug:  empagliflozin Lancets Misc Commonly known as:  MICROLET LANCET  
USE TO CHECK BLOOD SUGAR ONE TO TWO TIMES DAILY  
  
 metFORMIN 1,000 mg tablet Commonly known as:  GLUCOPHAGE  
TAKE 1 TABLET BY MOUTH TWICE A DAY  
  
 montelukast 10 mg tablet Commonly known as:  SINGULAIR  
TAKE 1 TABLET BY MOUTH EACH EVENING  
  
 naratriptan 2.5 mg Tab Commonly known as:  Freya Sharp Take 1 Tab by mouth once as needed for up to 1 dose. For migraine. Can repeat 1 tab in 4 hours if needed. Limit: 2 tabs in 24 hours Nebulizer & Compressor machine Use as needed for wheezing, cough  
  
 omeprazole 20 mg capsule Commonly known as:  PRILOSEC Take 20 mg by mouth once over twenty-four (24) hours. simvastatin 20 mg tablet Commonly known as:  ZOCOR Take 1 Tab by mouth nightly. sucralfate 100 mg/mL suspension Commonly known as:  Coy Footman Take 10 mL by mouth four (4) times daily. topiramate 50 mg tablet Commonly known as:  TOPAMAX Take 1 tab twice a day for migraine prevention * VENTOLIN HFA 90 mcg/actuation inhaler Generic drug:  albuterol INHALE 2 PUFFS BY MOUTH EVERY 4 HOURS AS NEEDED FOR WHEEZING  
  
 * albuterol 2.5 mg /3 mL (0.083 %) nebulizer solution Commonly known as:  PROVENTIL VENTOLIN  
3 mL by Nebulization route every four (4) hours as needed for Wheezing for up to 30 doses. * Notice: This list has 8 medication(s) that are the same as other medications prescribed for you. Read the directions carefully, and ask your doctor or other care provider to review them with you. Prescriptions Sent to Pharmacy Refills  
 glucose blood VI test strips (ASCENSIA AUTODISC VI, ONE TOUCH ULTRA TEST VI) strip 1 Sig: One touch Ultra Test strip. To use daily, to check blood sugar. Dx: E11.9. Class: Normal  
 Pharmacy: [facility]/pharmacy #[id number] - [place] [facility] Ph #: [phone] Blood-Glucose Meter (ONETOUCH VERIO IQ METER) misc 0 Sig: To use to check blood sugar daily. Dx: E11.9. Class: Normal  
 Pharmacy: [facility]/pharmacy #[id number] - [place] [address] Ph #: [phone]  
 amoxicillin (AMOXIL) 500 mg capsule 0 Sig: Take 1 Cap by mouth two (2) times a day for 10 days. Class: Normal  
 Pharmacy: [facility] #[id number] - [place] Ph #: [phone] Route: Oral  
 cyclobenzaprine (FLEXERIL) 10 mg tablet 0 Sig: Take 1 Tab by mouth three (3) times daily as needed for Muscle Spasm(s). Class: Normal  
 Pharmacy: [facility] #[id number] - [place] Ph #: [phone] Route: Oral  
  
We Performed the Following AMB POC RAPID STREP A [68846 CPT(R)] Follow-up Instructions Return if symptoms worsen or fail to improve. Patient Instructions Strep Throat: Care Instructions Your Care Instructions Strep throat is a bacterial infection that causes sudden, severe sore throat and fever. Strep throat, which is caused by bacteria called streptococcus, is treated with antibiotics. Sometimes a strep test is necessary to tell if the sore throat is caused by strep bacteria. Treatment can help ease symptoms and may prevent future problems. Follow-up care is a key part of your treatment and safety. Be sure to make and go to all appointments, and call your doctor if you are having problems. It's also a good idea to know your test results and keep a list of the medicines you take. How can you care for yourself at home? · Take your antibiotics as directed. Do not stop taking them just because you feel better. You need to take the full course of antibiotics. · Strep throat can spread to others until 24 hours after you begin taking antibiotics. During this time, you should avoid contact with other people at work or home, especially infants and children.  Do not sneeze or cough on others, and wash your hands often. Keep your drinking glass and eating utensils separate from those of others, and wash these items well in hot, soapy water. · Gargle with warm salt water at least once each hour to help reduce swelling and make your throat feel better. Use 1 teaspoon of salt mixed in 8 fluid ounces of warm water. · Take an over-the-counter pain medication, such as acetaminophen (Tylenol), ibuprofen (Advil, Motrin), or naproxen (Aleve). Read and follow all instructions on the label. · Try an over-the-counter anesthetic throat spray or throat lozenges, which may help relieve throat pain. · Drink plenty of fluids. Fluids may help soothe an irritated throat. Hot fluids, such as tea or soup, may help your throat feel better. · Eat soft solids and drink plenty of clear liquids. Flavored ice pops, ice cream, scrambled eggs, sherbet, and gelatin dessert (such as Jell-O) may also soothe the throat. · Get lots of rest. 
· Do not smoke, and avoid secondhand smoke. If you need help quitting, talk to your doctor about stop-smoking programs and medicines. These can increase your chances of quitting for good. · Use a vaporizer or humidifier to add moisture to the air in your bedroom. Follow the directions for cleaning the machine. When should you call for help? Call your doctor now or seek immediate medical care if: 
· You have a new or higher fever. · You have a fever with a stiff neck or severe headache. · You have new or worse trouble swallowing. · Your sore throat gets much worse on one side. · Your pain becomes much worse on one side of your throat. Watch closely for changes in your health, and be sure to contact your doctor if: 
· You are not getting better after 2 days (48 hours). · You do not get better as expected. Where can you learn more? Go to http://hodan-eugene.info/. Enter K625 in the search box to learn more about \"Strep Throat: Care Instructions. \" 
 Current as of: July 29, 2016 Content Version: 11.3 © 2778-3350 [employer], [employer]. Care instructions adapted under license by [employer] (which disclaims liability or warranty for this information). If you have questions about a medical condition or this instruction, always ask your healthcare professional. [address] any warranty or liability for your use of this information. Introducing [facility]! Angélica Short introduces VenJuvo patient portal. Now you can access parts of your medical record, email your doctor's office, and request medication refills online. 1. In your internet browser, go to [email or web address] 2. Click on the First Time User? Click Here link in the Sign In box. You will see the New Member Sign Up page. 3. Enter your VenJuvo Access Code exactly as it appears below. You will not need to use this code after youve completed the sign-up process. If you do not sign up before the expiration date, you must request a new code. · VenJuvo Access Code: [email or web address] Expires: 10/23/2017  8:19 AM 
 
4. Enter the last four digits of your Social Security Number (xxxx) and Date of Birth (mm/dd/yyyy) as indicated and click Submit. You will be taken to the next sign-up page. 5. Create a VenJuvo ID. This will be your VenJuvo login ID and cannot be changed, so think of one that is secure and easy to remember. 6. Create a VenJuvo password. You can change your password at any time. 7. Enter your Password Reset Question and Answer. This can be used at a later time if you forget your password. 8. Enter your e-mail address. You will receive e-mail notification when new information is available in [address] 9. Click Sign Up. You can now view and download portions of your medical record. 10. Click the Download Summary menu link to download a portable copy of your medical information. If you have questions, please visit the Frequently Asked Questions section of the PhotoSpotLandt website. Remember, Unfold is NOT to be used for urgent needs. For medical emergencies, dial 911. Now available from your iPhone and Android! Please provide this summary of care documentation to your next provider. Your primary care clinician is listed as [address]. If you have any questions after today's visit, please call [phone].

## 2017-09-26 NOTE — PROGRESS NOTES
Chief Complaint   Patient presents with    Sore Throat     started yesterday with sore throat    Ear Pain     right ear hurts    Back Pain     mid back pain that started yesterday. worse today     \"REVIEWED RECORD IN PREPARATION FOR VISIT AND HAVE OBTAINED THE NECESSARY DOCUMENTATION\"  1. Have you been to the ER, urgent care clinic since your last visit? Hospitalized since your last visit? No    2. Have you seen or consulted any other health care providers outside of the [address] [place] [address] since your last visit? Include any pap smears or colon screening. No  Patient does not have advanced directives.

## 2017-09-26 NOTE — PROGRESS NOTES
HISTORY OF PRESENT ILLNESS  Dariusz Cantrell is a 43 y.o. female. HPI  Pt presents with \"sore throat, ear pain, and back pain\"    Symptoms started yesterday  Sore throat  Ear pain  No fever  Pt's daughter was diagnosed with strep throat 3 days ago, and she is worried that she may have caught this from her. In addition, starting yesterday, she developed midline thoracic back pain. Pt states that she sometimes has issues with her lower back, but the pain is not normally this high. Pt describes the pain as an ache, with a shooting pain at times. No burning with urination, no blood in urine. No numbness or tingling in hands or feet. Review of Systems   Constitutional: Negative for fever. HENT: Positive for ear pain and sore throat. Negative for congestion. Respiratory: Negative for cough. Musculoskeletal: Positive for back pain. Physical Exam   Constitutional: She is oriented to person, place, and time. She appears well-developed and well-nourished. HENT:   Head: Normocephalic and atraumatic. Right Ear: Hearing, tympanic membrane, external ear and ear canal normal.   Left Ear: Hearing, tympanic membrane, external ear and ear canal normal.   Nose: Mucosal edema and rhinorrhea present. Mouth/Throat: Posterior oropharyngeal edema and posterior oropharyngeal erythema present. Neck: Normal range of motion. Neck supple. Cardiovascular: Normal rate, regular rhythm and normal heart sounds. Pulmonary/Chest: Effort normal and breath sounds normal.   Musculoskeletal:        Thoracic back: She exhibits pain and spasm. She exhibits no tenderness and no swelling. Lymphadenopathy:     She has cervical adenopathy. Neurological: She is alert and oriented to person, place, and time. Skin: Skin is warm and dry. Psychiatric: She has a normal mood and affect.  Her behavior is normal.       ASSESSMENT and PLAN    ICD-10-CM ICD-9-CM    1. Strep throat J02.0 034.0 amoxicillin (AMOXIL) 500 mg capsule 2. Sore throat J02.9 462 AMB POC RAPID STREP A   3. Acute midline thoracic back pain M54.6 724.1 cyclobenzaprine (FLEXERIL) 10 mg tablet   4. Type 2 diabetes mellitus without complication, without long-term current use of insulin ([facility]) E11.9 250.00 glucose blood VI test strips (ASCENSIA AUTODISC VI, ONE TOUCH ULTRA TEST VI) strip      Blood-Glucose Meter (ONETOUCH VERIO IQ METER) misc     Informed patient that I have sent medication to the pharmacy, and she should take as prescribed. Educated about taking with food, to decrease the risk of stomach upset. Educated about staying well hydrated, by pushing fluids as much as possible. Pt informed to return to office with worsening of symptoms, or PRN with any questions or concerns. Pt verbalizes understanding of plan of care and denies further questions or concerns at this time.

## 2017-09-26 NOTE — PATIENT INSTRUCTIONS
Strep Throat: Care Instructions  Your Care Instructions    Strep throat is a bacterial infection that causes sudden, severe sore throat and fever. Strep throat, which is caused by bacteria called streptococcus, is treated with antibiotics. Sometimes a strep test is necessary to tell if the sore throat is caused by strep bacteria. Treatment can help ease symptoms and may prevent future problems. Follow-up care is a key part of your treatment and safety. Be sure to make and go to all appointments, and call your doctor if you are having problems. It's also a good idea to know your test results and keep a list of the medicines you take. How can you care for yourself at home? · Take your antibiotics as directed. Do not stop taking them just because you feel better. You need to take the full course of antibiotics. · Strep throat can spread to others until 24 hours after you begin taking antibiotics. During this time, you should avoid contact with other people at work or home, especially infants and children. Do not sneeze or cough on others, and wash your hands often. Keep your drinking glass and eating utensils separate from those of others, and wash these items well in hot, soapy water. · Gargle with warm salt water at least once each hour to help reduce swelling and make your throat feel better. Use 1 teaspoon of salt mixed in 8 fluid ounces of warm water. · Take an over-the-counter pain medication, such as acetaminophen (Tylenol), ibuprofen (Advil, Motrin), or naproxen (Aleve). Read and follow all instructions on the label. · Try an over-the-counter anesthetic throat spray or throat lozenges, which may help relieve throat pain. · Drink plenty of fluids. Fluids may help soothe an irritated throat. Hot fluids, such as tea or soup, may help your throat feel better. · Eat soft solids and drink plenty of clear liquids.  Flavored ice pops, ice cream, scrambled eggs, sherbet, and gelatin dessert (such as Jell-O) may also soothe the throat. · Get lots of rest.  · Do not smoke, and avoid secondhand smoke. If you need help quitting, talk to your doctor about stop-smoking programs and medicines. These can increase your chances of quitting for good. · Use a vaporizer or humidifier to add moisture to the air in your bedroom. Follow the directions for cleaning the machine. When should you call for help? Call your doctor now or seek immediate medical care if:  · You have a new or higher fever. · You have a fever with a stiff neck or severe headache. · You have new or worse trouble swallowing. · Your sore throat gets much worse on one side. · Your pain becomes much worse on one side of your throat. Watch closely for changes in your health, and be sure to contact your doctor if:  · You are not getting better after 2 days (48 hours). · You do not get better as expected. Where can you learn more? Go to http://hodan-eugene.info/. Enter K625 in the search box to learn more about \"Strep Throat: Care Instructions. \"  Current as of: July 29, 2016  Content Version: 11.3  © 1749-9876 [employer]. Care instructions adapted under license by [employer] (which disclaims liability or warranty for this information). If you have questions about a medical condition or this instruction, always ask your healthcare professional. [address] any warranty or liability for your use of this information.

## 2017-10-19 DIAGNOSIS — J45.20 MILD INTERMITTENT ASTHMA WITHOUT COMPLICATION: ICD-10-CM

## 2017-10-19 DIAGNOSIS — M54.6 ACUTE MIDLINE THORACIC BACK PAIN: ICD-10-CM

## 2017-10-19 RX ORDER — ALBUTEROL SULFATE 90 UG/1
AEROSOL, METERED RESPIRATORY (INHALATION)
Qty: 18 INHALER | Refills: 5 | Status: SHIPPED | OUTPATIENT
Start: 2017-10-19 | End: 2018-02-16 | Stop reason: SDUPTHER

## 2017-10-19 RX ORDER — ALBUTEROL SULFATE 0.83 MG/ML
SOLUTION RESPIRATORY (INHALATION)
Qty: 300 EACH | Refills: 2 | Status: SHIPPED | OUTPATIENT
Start: 2017-10-19 | End: 2018-04-08 | Stop reason: SDUPTHER

## 2017-10-19 RX ORDER — CYCLOBENZAPRINE HCL 10 MG
TABLET ORAL
Qty: 30 TAB | Refills: 0 | Status: SHIPPED | OUTPATIENT
Start: 2017-10-19 | End: 2017-12-21

## 2017-10-27 ENCOUNTER — OFFICE VISIT (OUTPATIENT)
Dept: FAMILY MEDICINE CLINIC | Age: 37
End: 2017-10-27

## 2017-10-27 VITALS
TEMPERATURE: 98.2 F | HEIGHT: 65 IN | BODY MASS INDEX: 34.32 KG/M2 | DIASTOLIC BLOOD PRESSURE: 65 MMHG | OXYGEN SATURATION: 97 % | HEART RATE: 92 BPM | SYSTOLIC BLOOD PRESSURE: 125 MMHG | RESPIRATION RATE: 16 BRPM | WEIGHT: 206 LBS

## 2017-10-27 DIAGNOSIS — R10.30 LOWER ABDOMINAL PAIN: ICD-10-CM

## 2017-10-27 DIAGNOSIS — J02.9 SORE THROAT: Primary | ICD-10-CM

## 2017-10-27 LAB
BILIRUB UR QL STRIP: NEGATIVE
GLUCOSE UR-MCNC: NORMAL MG/DL
KETONES P FAST UR STRIP-MCNC: NORMAL MG/DL
PH UR STRIP: 5 [PH] (ref 4.6–8)
PROT UR QL STRIP: NEGATIVE MG/DL
S PYO AG THROAT QL: NEGATIVE
SP GR UR STRIP: 1.01 (ref 1–1.03)
UA UROBILINOGEN AMB POC: NORMAL (ref 0.2–1)
URINALYSIS CLARITY POC: CLEAR
URINALYSIS COLOR POC: NORMAL
URINE BLOOD POC: NEGATIVE
URINE LEUKOCYTES POC: NEGATIVE
URINE NITRITES POC: NEGATIVE
VALID INTERNAL CONTROL?: YES

## 2017-10-27 RX ORDER — CIPROFLOXACIN 250 MG/1
250 TABLET, FILM COATED ORAL EVERY 12 HOURS
Qty: 10 TAB | Refills: 0 | Status: SHIPPED | OUTPATIENT
Start: 2017-10-27 | End: 2018-02-01 | Stop reason: SDUPTHER

## 2017-10-27 NOTE — MR AVS SNAPSHOT
Visit Information Date & Time Provider Department Dept. Phone Encounter #  
 10/27/2017  8:15 AM Zach Martinez Denny [phone] [id number] Follow-up Instructions Return if symptoms worsen or fail to improve. Your Appointments 10/31/2017  9:00 AM  
ROUTINE CARE with Nura Rodriguez MD  
[address] [facility]) Appt Note: follow up, fasting lab  
 [facility] [address] [facility]  
  
   
 [address] Upcoming Health Maintenance Date Due  
 EYE EXAM RETINAL OR DILATED Q1 9/1/2014 INFLUENZA AGE 9 TO ADULT 8/1/2017 DTaP/Tdap/Td series (2 - Td) 1/1/2018 HEMOGLOBIN A1C Q6M 1/25/2018 FOOT EXAM Q1 3/15/2018 MICROALBUMIN Q1 3/15/2018 LIPID PANEL Q1 7/25/2018 PAP AKA CERVICAL CYTOLOGY 6/15/2019 Allergies as of 10/27/2017  Review Complete On: 10/27/2017 By: Joyce Fabian NP Severity Noted Reaction Type Reactions Green Pepper  06/28/2012    Hives Current Immunizations  Reviewed on 3/18/2014 Name Date Influenza Vaccine 10/26/2013 TDAP Vaccine 1/1/2008 Not reviewed this visit You Were Diagnosed With   
  
 Codes Comments Sore throat    -  Primary ICD-10-CM: J02.9 ICD-9-CM: 830 Lower abdominal pain     ICD-10-CM: R10.30 ICD-9-CM: 789.09 Vitals BP Pulse Temp Resp Height(growth percentile) Weight(growth percentile) 125/65 92 98.2 °F (36.8 °C) (Oral) 16 5' 5\" (1.651 m) 206 lb (93.4 kg) LMP SpO2 BMI OB Status Smoking Status 01/25/2012 97% 34.28 kg/m2 Hysterectomy Never Smoker Vitals History BMI and BSA Data Body Mass Index Body Surface Area  
 34.28 kg/m 2 2.07 m 2 Preferred Pharmacy Pharmacy Name Phone [id number] [place] [facility] [address] [facility] [address] [phone] Your Updated Medication List  
  
   
 This list is accurate as of: 10/27/17  8:26 AM.  Always use your most recent med list.  
  
  
  
  
 aspirin delayed-release 81 mg tablet * Blood-Glucose Meter monitoring kit Please give one kit. * Blood-Glucose Meter Misc Commonly known as:  Kathie Smiryan IQ METER To use to check blood sugar daily. Dx: E11.9.  
  
 butalbital-acetaminophen-caffeine -40 mg per tablet Commonly known as:  Markel Player Take 1 Tab by mouth every six (6) hours as needed for Pain. Max Daily Amount: 4 Tabs. ciprofloxacin HCl 250 mg tablet Commonly known as:  CIPRO Take 1 Tab by mouth every twelve (12) hours for 5 days. cyclobenzaprine 10 mg tablet Commonly known as:  FLEXERIL  
TAKE 1 TAB BY MOUTH THREE (3) TIMES DAILY AS NEEDED FOR MUSCLE SPASM(S). ergocalciferol 50,000 unit capsule Commonly known as:  ERGOCALCIFEROL Take 1 Cap by mouth every seven (7) days. famotidine 40 mg tablet Commonly known as:  PEPCID  
TAKE 1 TABLET BY MOUTH EVERY DAY  
  
 fenofibrate 160 mg tablet Commonly known as:  LOFIBRA Take 1 Tab by mouth daily. fluticasone 50 mcg/actuation nasal spray Commonly known as:  FLONASE  
2 SQUIRTS DAILY AS NEEDED. * glucose blood VI test strips strip Commonly known as:  Ascensia CONTOUR  
TO BE USED AS DIRECTED * glucose blood VI test strips strip Commonly known as:  ASCENSIA AUTODISC VI, ONE TOUCH ULTRA TEST VI One touch Ultra Test strip. To use daily, to check blood sugar. Dx: E11.9. JANUVIA 100 mg tablet Generic drug:  SITagliptin Take 1 Tab by mouth once over twenty-four (24) hours. * JARDIANCE 10 mg tablet Generic drug:  empagliflozin * JARDIANCE 25 mg tablet Generic drug:  empagliflozin Lancets Misc Commonly known as:  MICROLET LANCET  
USE TO CHECK BLOOD SUGAR ONE TO TWO TIMES DAILY  
  
 metFORMIN 1,000 mg tablet Commonly known as:  GLUCOPHAGE  
TAKE 1 TABLET BY MOUTH TWICE A DAY  
  
 montelukast 10 mg tablet Commonly known as:  SINGULAIR  
TAKE 1 TABLET BY MOUTH EACH EVENING  
  
 naratriptan 2.5 mg Tab Commonly known as:  Dany Smoker Take 1 Tab by mouth once as needed for up to 1 dose. For migraine. Can repeat 1 tab in 4 hours if needed. Limit: 2 tabs in 24 hours Nebulizer & Compressor machine Use as needed for wheezing, cough  
  
 omeprazole 20 mg capsule Commonly known as:  PRILOSEC Take 20 mg by mouth once over twenty-four (24) hours. simvastatin 20 mg tablet Commonly known as:  ZOCOR Take 1 Tab by mouth nightly. sucralfate 100 mg/mL suspension Commonly known as:  Benna Sami Take 10 mL by mouth four (4) times daily. topiramate 50 mg tablet Commonly known as:  TOPAMAX Take 1 tab twice a day for migraine prevention * VENTOLIN HFA 90 mcg/actuation inhaler Generic drug:  albuterol INHALE 2 PUFFS BY MOUTH EVERY 4 HOURS AS NEEDED FOR WHEEZING  
  
 * albuterol 2.5 mg /3 mL (0.083 %) nebulizer solution Commonly known as:  PROVENTIL VENTOLIN  
USE ONE VIAL IN NEBULIZER EVERY 4 HOURS AS NEEDED FOR WHEEZING FOR  UP  TO  30  DOSES * Notice: This list has 8 medication(s) that are the same as other medications prescribed for you. Read the directions carefully, and ask your doctor or other care provider to review them with you. Prescriptions Sent to Pharmacy Refills  
 ciprofloxacin HCl (CIPRO) 250 mg tablet 0 Sig: Take 1 Tab by mouth every twelve (12) hours for 5 days. Class: Normal  
 Pharmacy: [id number] - [address] Ph #: [phone] Route: Oral  
  
We Performed the Following AMB POC RAPID STREP A [68569 CPT(R)] AMB POC URINALYSIS DIP STICK AUTO W/O MICRO [78825 CPT(R)] CULTURE, URINE [id number] CPT(R)] Follow-up Instructions Return if symptoms worsen or fail to improve. Patient Instructions Abdominal Pain: Care Instructions Your Care Instructions Abdominal pain has many possible causes. Some aren't serious and get better on their own in a few days. Others need more testing and treatment. If your pain continues or gets worse, you need to be rechecked and may need more tests to find out what is wrong. You may need surgery to correct the problem. Don't ignore new symptoms, such as fever, nausea and vomiting, urination problems, pain that gets worse, and dizziness. These may be signs of a more serious problem. Your doctor may have recommended a follow-up visit in the next 8 to 12 hours. If you are not getting better, you may need more tests or treatment. The doctor has checked you carefully, but problems can develop later. If you notice any problems or new symptoms, get medical treatment right away. Follow-up care is a key part of your treatment and safety. Be sure to make and go to all appointments, and call your doctor if you are having problems. It's also a good idea to know your test results and keep a list of the medicines you take. How can you care for yourself at home? · Rest until you feel better. · To prevent dehydration, drink plenty of fluids, enough so that your urine is light yellow or clear like water. Choose water and other caffeine-free clear liquids until you feel better. If you have kidney, heart, or liver disease and have to limit fluids, talk with your doctor before you increase the amount of fluids you drink. · If your stomach is upset, eat mild foods, such as rice, dry toast or crackers, bananas, and applesauce. Try eating several small meals instead of two or three large ones. · Wait until 48 hours after all symptoms have gone away before you have spicy foods, alcohol, and drinks that contain caffeine. · Do not eat foods that are high in fat. · Avoid anti-inflammatory medicines such as aspirin, ibuprofen (Advil, Motrin), and naproxen (Aleve). These can cause stomach upset.  Talk to your doctor if you take daily aspirin for another health problem. When should you call for help? Call 911 anytime you think you may need emergency care. For example, call if: 
? · You passed out (lost consciousness). ? · You pass maroon or very bloody stools. ? · You vomit blood or what looks like coffee grounds. ? · You have new, severe belly pain. ?Call your doctor now or seek immediate medical care if: 
? · Your pain gets worse, especially if it becomes focused in one area of your belly. ? · You have a new or higher fever. ? · Your stools are black and look like tar, or they have streaks of blood. ? · You have unexpected vaginal bleeding. ? · You have symptoms of a urinary tract infection. These may include: 
¨ Pain when you urinate. ¨ Urinating more often than usual. 
¨ Blood in your urine. ? · You are dizzy or lightheaded, or you feel like you may faint. ? Watch closely for changes in your health, and be sure to contact your doctor if: 
? · You are not getting better after 1 day (24 hours). Where can you learn more? Go to http://hodan-eugene.info/. Enter C835 in the search box to learn more about \"Abdominal Pain: Care Instructions. \" Current as of: March 20, 2017 Content Version: 11.4 © 4305-3347 [employer]. Care instructions adapted under license by [employer] (which disclaims liability or warranty for this information). If you have questions about a medical condition or this instruction, always ask your healthcare professional. [place] [address] any warranty or liability for your use of this information. Introducing [facility]! Benjamin Chambers introduces [employer] patient portal. Now you can access parts of your medical record, email your doctor's office, and request medication refills online. 1. In your internet browser, go to [email or web address] 2. Click on the First Time User? Click Here link in the Sign In box. You will see the New Member Sign Up page. 3. Enter your Booker Access Code exactly as it appears below. You will not need to use this code after youve completed the sign-up process. If you do not sign up before the expiration date, you must request a new code. · Booker Access Code: [email or web address] Expires: 1/25/2018  8:26 AM 
 
4. Enter the last four digits of your Social Security Number (xxxx) and Date of Birth (mm/dd/yyyy) as indicated and click Submit. You will be taken to the next sign-up page. 5. Create a Booker ID. This will be your Booker login ID and cannot be changed, so think of one that is secure and easy to remember. 6. Create a Booker password. You can change your password at any time. 7. Enter your Password Reset Question and Answer. This can be used at a later time if you forget your password. 8. Enter your e-mail address. You will receive e-mail notification when new information is available in [address] 9. Click Sign Up. You can now view and download portions of your medical record. 10. Click the Download Summary menu link to download a portable copy of your medical information. If you have questions, please visit the Frequently Asked Questions section of the Booker website. Remember, Booker is NOT to be used for urgent needs. For medical emergencies, dial 911. Now available from your iPhone and Android! Please provide this summary of care documentation to your next provider. Your primary care clinician is listed as [address]. If you have any questions after today's visit, please call [phone].

## 2017-10-27 NOTE — PROGRESS NOTES
Chief Complaint   Patient presents with    Ear Pain     right ear pain radiating down right side of neck. child in family has strep    Abdominal Pain     lower abdominal pain that started last night     \"REVIEWED RECORD IN PREPARATION FOR VISIT AND HAVE OBTAINED THE NECESSARY DOCUMENTATION\"  1. Have you been to the ER, urgent care clinic since your last visit? Hospitalized since your last visit? No    2. Have you seen or consulted any other health care providers outside of the [address] [place] [address] since your last visit? Include any pap smears or colon screening. No  Patient does not have advanced directives.

## 2017-10-27 NOTE — PATIENT INSTRUCTIONS
Abdominal Pain: Care Instructions  Your Care Instructions    Abdominal pain has many possible causes. Some aren't serious and get better on their own in a few days. Others need more testing and treatment. If your pain continues or gets worse, you need to be rechecked and may need more tests to find out what is wrong. You may need surgery to correct the problem. Don't ignore new symptoms, such as fever, nausea and vomiting, urination problems, pain that gets worse, and dizziness. These may be signs of a more serious problem. Your doctor may have recommended a follow-up visit in the next 8 to 12 hours. If you are not getting better, you may need more tests or treatment. The doctor has checked you carefully, but problems can develop later. If you notice any problems or new symptoms, get medical treatment right away. Follow-up care is a key part of your treatment and safety. Be sure to make and go to all appointments, and call your doctor if you are having problems. It's also a good idea to know your test results and keep a list of the medicines you take. How can you care for yourself at home? · Rest until you feel better. · To prevent dehydration, drink plenty of fluids, enough so that your urine is light yellow or clear like water. Choose water and other caffeine-free clear liquids until you feel better. If you have kidney, heart, or liver disease and have to limit fluids, talk with your doctor before you increase the amount of fluids you drink. · If your stomach is upset, eat mild foods, such as rice, dry toast or crackers, bananas, and applesauce. Try eating several small meals instead of two or three large ones. · Wait until 48 hours after all symptoms have gone away before you have spicy foods, alcohol, and drinks that contain caffeine. · Do not eat foods that are high in fat. · Avoid anti-inflammatory medicines such as aspirin, ibuprofen (Advil, Motrin), and naproxen (Aleve).  These can cause stomach upset. Talk to your doctor if you take daily aspirin for another health problem. When should you call for help? Call 911 anytime you think you may need emergency care. For example, call if:  ? · You passed out (lost consciousness). ? · You pass maroon or very bloody stools. ? · You vomit blood or what looks like coffee grounds. ? · You have new, severe belly pain. ?Call your doctor now or seek immediate medical care if:  ? · Your pain gets worse, especially if it becomes focused in one area of your belly. ? · You have a new or higher fever. ? · Your stools are black and look like tar, or they have streaks of blood. ? · You have unexpected vaginal bleeding. ? · You have symptoms of a urinary tract infection. These may include:  ¨ Pain when you urinate. ¨ Urinating more often than usual.  ¨ Blood in your urine. ? · You are dizzy or lightheaded, or you feel like you may faint. ? Watch closely for changes in your health, and be sure to contact your doctor if:  ? · You are not getting better after 1 day (24 hours). Where can you learn more? Go to http://hodan-eugene.info/. Enter G168 in the search box to learn more about \"Abdominal Pain: Care Instructions. \"  Current as of: March 20, 2017  Content Version: 11.4  © 1175-0482 [employer] Care instructions adapted under license by [employer] (which disclaims liability or warranty for this information). If you have questions about a medical condition or this instruction, always ask your healthcare professional. [place] [address] any warranty or liability for your use of this information.

## 2017-10-27 NOTE — PROGRESS NOTES
HISTORY OF PRESENT ILLNESS  James Nolan is a 43 y.o. female. HPI  Pt presents with \"ear pain and abdominal pain\"    Pt states that her right ear has been bothering her for about a day. 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 Pt just wanted to ensure that she did not have strep throat, due to the ear pain. No fever  No nasal congestion  No cough    In addition, patient states that the lower right side of her abdomen is hurting. This started last night  Pt states that she knows that she has an ovarian cyst there, but wants to rule out a UTI since it is Friday. When she voids, she does have pain and pressure in her lower abdomen  No fever  No nausea, no vomiting, no diarrhea  Review of Systems   Constitutional: Negative for fever. HENT: Positive for ear pain. Negative for congestion and sore throat. Respiratory: Negative for cough. Gastrointestinal: Positive for abdominal pain. Negative for diarrhea and vomiting. Genitourinary: Positive for dysuria. Physical Exam   Constitutional: She is oriented to person, place, and time. She appears well-developed and well-nourished. HENT:   Head: Normocephalic and atraumatic. Right Ear: Hearing, tympanic membrane, external ear and ear canal normal.   Left Ear: Hearing, tympanic membrane, external ear and ear canal normal.   Nose: Nose normal.   Mouth/Throat: Oropharynx is clear and moist.   Neck: Normal range of motion. Neck supple. Cardiovascular: Normal rate, regular rhythm and normal heart sounds. Pulmonary/Chest: Effort normal and breath sounds normal.   Abdominal: Soft. Bowel sounds are normal. She exhibits no distension and no mass. There is no tenderness. There is no rebound, no guarding and no CVA tenderness. Lymphadenopathy:     She has no cervical adenopathy. Neurological: She is alert and oriented to person, place, and time. Skin: Skin is warm and dry.    Psychiatric: She has a normal mood and affect. Her behavior is normal.       ASSESSMENT and PLAN    ICD-10-CM ICD-9-CM    1. Sore throat J02.9 462 AMB POC RAPID STREP A   2. Lower abdominal pain R10.30 789.09 AMB POC URINALYSIS DIP STICK AUTO W/O MICRO      CULTURE, URINE      ciprofloxacin HCl (CIPRO) 250 mg tablet     Informed patient that we will culture her urine and notify her when this returns. In the meantime, informed patient to take antibiotics as prescribed. Educated about always going to the ER with any worsening of symptoms, fever, vomiting. Pt informed to return to office with worsening of symptoms, or PRN with any questions or concerns. Pt verbalizes understanding of plan of care and denies further questions or concerns at this time.

## 2017-10-29 LAB
BACTERIA UR CULT: NORMAL
BACTERIA UR CULT: NORMAL

## 2017-10-30 ENCOUNTER — TELEPHONE (OUTPATIENT)
Dept: FAMILY MEDICINE CLINIC | Age: 37
End: 2017-10-30

## 2017-10-30 NOTE — TELEPHONE ENCOUNTER
----- Message from Alexa Ramos NP sent at 10/30/2017  8:01 AM EDT -----  Please call patient and let her know that her culture returns and is normal.  NO UTI. She can stop antibiotics. Should return to office with continued or worsening symptoms. Thanks!

## 2017-10-30 NOTE — PROGRESS NOTES
Please call patient and let her know that her culture returns and is normal.  NO UTI. She can stop antibiotics. Should return to office with continued or worsening symptoms. Thanks!

## 2017-10-31 ENCOUNTER — OFFICE VISIT (OUTPATIENT)
Dept: FAMILY MEDICINE CLINIC | Age: 37
End: 2017-10-31

## 2017-10-31 VITALS
TEMPERATURE: 98.4 F | HEART RATE: 87 BPM | OXYGEN SATURATION: 97 % | DIASTOLIC BLOOD PRESSURE: 83 MMHG | WEIGHT: 208 LBS | SYSTOLIC BLOOD PRESSURE: 123 MMHG | RESPIRATION RATE: 18 BRPM | BODY MASS INDEX: 34.66 KG/M2 | HEIGHT: 65 IN

## 2017-10-31 DIAGNOSIS — E78.00 PURE HYPERCHOLESTEROLEMIA: ICD-10-CM

## 2017-10-31 DIAGNOSIS — E55.9 VITAMIN D DEFICIENCY: ICD-10-CM

## 2017-10-31 DIAGNOSIS — E11.9 TYPE 2 DIABETES MELLITUS WITHOUT COMPLICATION, WITHOUT LONG-TERM CURRENT USE OF INSULIN (HCC): Primary | ICD-10-CM

## 2017-10-31 NOTE — PATIENT INSTRUCTIONS
Diabetes Foot Health: Care Instructions  Your Care Instructions    When you have diabetes, your feet need extra care and attention. Diabetes can damage the nerve endings and blood vessels in your feet, making you less likely to notice when your feet are injured. Diabetes also limits your body's ability to fight infection and get blood to areas that need it. If you get a minor foot injury, it could become an ulcer or a serious infection. With good foot care, you can prevent most of these problems. Caring for your feet can be quick and easy. Most of the care can be done when you are bathing or getting ready for bed. Follow-up care is a key part of your treatment and safety. Be sure to make and go to all appointments, and call your doctor if you are having problems. It's also a good idea to know your test results and keep a list of the medicines you take. How can you care for yourself at home? · Keep your blood sugar close to normal by watching what and how much you eat, monitoring blood sugar, taking medicines if prescribed, and getting regular exercise. · Do not smoke. Smoking affects blood flow and can make foot problems worse. If you need help quitting, talk to your doctor about stop-smoking programs and medicines. These can increase your chances of quitting for good. · Eat a diet that is low in fats. High fat intake can cause fat to build up in your blood vessels and decrease blood flow. · Inspect your feet daily for blisters, cuts, cracks, or sores. If you cannot see well, use a mirror or have someone help you. · Take care of your feet:  [facility] your feet every day. Use warm (not hot) water. Check the water temperature with your wrists or other part of your body, not your feet. ¨ Dry your feet well. Pat them dry. Do not rub the skin on your feet too hard. Dry well between your toes. If the skin on your feet stays moist, bacteria or a fungus can grow, which can lead to infection.   ¨ Keep your skin soft. Use moisturizing skin cream to keep the skin on your feet soft and prevent calluses and cracks. But do not put the cream between your toes, and stop using any cream that causes a rash. ¨ Clean underneath your toenails carefully. Do not use a sharp object to clean underneath your toenails. Use the blunt end of a nail file or other rounded tool. ¨ Trim and file your toenails straight across to prevent ingrown toenails. Use a nail clipper, not scissors. Use an emery board to smooth the edges. · Change socks daily. Socks without seams are best, because seams often rub the feet. You can find socks for people with diabetes from specialty catalogs. · Look inside your shoes every day for things like gravel or torn linings, which could cause blisters or sores. · Buy shoes that fit well:  ¨ Look for shoes that have plenty of space around the toes. This helps prevent bunions and blisters. ¨ Try on shoes while wearing the kind of socks you will usually wear with the shoes. ¨ Avoid plastic shoes. They may rub your feet and cause blisters. Good shoes should be made of materials that are flexible and breathable, such as leather or cloth. ¨ Break in new shoes slowly by wearing them for no more than an hour a day for several days. Take extra time to check your feet for red areas, blisters, or other problems after you wear new shoes. · Do not go barefoot. Do not wear sandals, and do not wear shoes with very thin soles. Thin soles are easy to puncture. They also do not protect your feet from hot pavement or cold weather. · Have your doctor check your feet during each visit. If you have a foot problem, see your doctor. Do not try to treat an early foot problem at home. Home remedies or treatments that you can buy without a prescription (such as corn removers) can be harmful. · Always get early treatment for foot problems. A minor irritation can lead to a major problem if not properly cared for early.   When should you call for help? Call your doctor now or seek immediate medical care if:  ? · You have a foot sore, an ulcer or break in the skin that is not healing after 4 days, bleeding corns or calluses, or an ingrown toenail. ? · You have blue or black areas, which can mean bruising or blood flow problems. ? · You have peeling skin or tiny blisters between your toes or cracking or oozing of the skin. ? · You have a fever for more than 24 hours and a foot sore. ? · You have new numbness or tingling in your feet that does not go away after you move your feet or change positions. ? · You have unexplained or unusual swelling of the foot or ankle. ? Watch closely for changes in your health, and be sure to contact your doctor if:  ? · You cannot do proper foot care. Where can you learn more? Go to http://hodan-eugene.info/. Enter A739 in the search box to learn more about \"Diabetes Foot Health: Care Instructions. \"  Current as of: March 13, 2017  Content Version: 11.4  © 9648-1366 [employer]. Care instructions adapted under license by [employer] (which disclaims liability or warranty for this information). If you have questions about a medical condition or this instruction, always ask your healthcare professional. [address] any warranty or liability for your use of this information.

## 2017-10-31 NOTE — PROGRESS NOTES
Chief Complaint   Patient presents with    Diabetes     follow up    Labs    Medication Refill     1. Have you been to the ER, urgent care clinic since your last visit? Hospitalized since your last visit? No    2. Have you seen or consulted any other health care providers outside of the [address] [place] [address] since your last visit? Include any pap smears or colon screening.  No

## 2017-10-31 NOTE — MR AVS SNAPSHOT
Visit Information Date & Time Provider Department Dept. Phone Encounter #  
 10/31/2017  9:00 AM Zach Marmolejo Denny [phone] [id number] Upcoming Health Maintenance Date Due  
 EYE EXAM RETINAL OR DILATED Q1 9/1/2014 DTaP/Tdap/Td series (2 - Td) 1/1/2018 HEMOGLOBIN A1C Q6M 1/25/2018 FOOT EXAM Q1 3/15/2018 MICROALBUMIN Q1 3/15/2018 LIPID PANEL Q1 7/25/2018 PAP AKA CERVICAL CYTOLOGY 6/15/2019 Allergies as of 10/31/2017  Review Complete On: 10/31/2017 By: Meredith Guerra MD  
  
 Severity Noted Reaction Type Reactions Green Pepper  06/28/2012    Hives Current Immunizations  Reviewed on 3/18/2014 Name Date Influenza Vaccine 10/26/2013 TDAP Vaccine 1/1/2008 Not reviewed this visit You Were Diagnosed With   
  
 Codes Comments Type 2 diabetes mellitus without complication, without long-term current use of insulin (HCC)    -  Primary ICD-10-CM: E11.9 ICD-9-CM: 250.00 Vitamin D deficiency     ICD-10-CM: E55.9 ICD-9-CM: 268.9 Pure hypercholesterolemia     ICD-10-CM: E78.00 ICD-9-CM: 272.0 Vitals BP Pulse Temp Resp Height(growth percentile) Weight(growth percentile) 123/83 87 98.4 °F (36.9 °C) (Oral) 18 5' 5\" (1.651 m) 208 lb (94.3 kg) LMP SpO2 BMI OB Status Smoking Status 01/25/2012 97% 34.61 kg/m2 Hysterectomy Never Smoker Vitals History BMI and BSA Data Body Mass Index Body Surface Area  
 34.61 kg/m 2 2.08 m 2 Preferred Pharmacy Pharmacy Name Phone [facility]/PHARMACY #[id number] - [place] [id number] [facility] [address] [phone] Your Updated Medication List  
  
   
This list is accurate as of: 10/31/17  9:31 AM.  Always use your most recent med list.  
  
  
  
  
 aspirin delayed-release 81 mg tablet * Blood-Glucose Meter monitoring kit Please give one kit. * Blood-Glucose Meter Misc Commonly known as:  HotPadsles IQ METER  
 To use to check blood sugar daily. Dx: E11.9.  
  
 butalbital-acetaminophen-caffeine -40 mg per tablet Commonly known as:  Shaka Wright Take 1 Tab by mouth every six (6) hours as needed for Pain. Max Daily Amount: 4 Tabs. ciprofloxacin HCl 250 mg tablet Commonly known as:  CIPRO Take 1 Tab by mouth every twelve (12) hours for 5 days. cyclobenzaprine 10 mg tablet Commonly known as:  FLEXERIL  
TAKE 1 TAB BY MOUTH THREE (3) TIMES DAILY AS NEEDED FOR MUSCLE SPASM(S). ergocalciferol 50,000 unit capsule Commonly known as:  ERGOCALCIFEROL Take 1 Cap by mouth every seven (7) days. famotidine 40 mg tablet Commonly known as:  PEPCID  
TAKE 1 TABLET BY MOUTH EVERY DAY  
  
 fenofibrate 160 mg tablet Commonly known as:  LOFIBRA Take 1 Tab by mouth daily. fluticasone 50 mcg/actuation nasal spray Commonly known as:  FLONASE  
2 SQUIRTS DAILY AS NEEDED. * glucose blood VI test strips strip Commonly known as:  Ascensia CONTOUR  
TO BE USED AS DIRECTED * glucose blood VI test strips strip Commonly known as:  ASCENSIA AUTODISC VI, ONE TOUCH ULTRA TEST VI One touch Ultra Test strip. To use daily, to check blood sugar. Dx: E11.9. JANUVIA 100 mg tablet Generic drug:  SITagliptin Take 1 Tab by mouth once over twenty-four (24) hours. JARDIANCE 25 mg tablet Generic drug:  empagliflozin Lancets Misc Commonly known as:  MICROLET LANCET  
USE TO CHECK BLOOD SUGAR ONE TO TWO TIMES DAILY  
  
 metFORMIN 1,000 mg tablet Commonly known as:  GLUCOPHAGE  
TAKE 1 TABLET BY MOUTH TWICE A DAY  
  
 montelukast 10 mg tablet Commonly known as:  SINGULAIR  
TAKE 1 TABLET BY MOUTH EACH EVENING  
  
 naratriptan 2.5 mg Tab Commonly known as:  Brinda Im Take 1 Tab by mouth once as needed for up to 1 dose. For migraine. Can repeat 1 tab in 4 hours if needed. Limit: 2 tabs in 24 hours Nebulizer & Compressor machine Use as needed for wheezing, cough  
  
 omeprazole 20 mg capsule Commonly known as:  PRILOSEC Take 20 mg by mouth once over twenty-four (24) hours. simvastatin 20 mg tablet Commonly known as:  ZOCOR Take 1 Tab by mouth nightly. sucralfate 100 mg/mL suspension Commonly known as:  Ressie Flavin Take 10 mL by mouth four (4) times daily. topiramate 50 mg tablet Commonly known as:  TOPAMAX Take 1 tab twice a day for migraine prevention * VENTOLIN HFA 90 mcg/actuation inhaler Generic drug:  albuterol INHALE 2 PUFFS BY MOUTH EVERY 4 HOURS AS NEEDED FOR WHEEZING  
  
 * albuterol 2.5 mg /3 mL (0.083 %) nebulizer solution Commonly known as:  PROVENTIL VENTOLIN  
USE ONE VIAL IN NEBULIZER EVERY 4 HOURS AS NEEDED FOR WHEEZING FOR  UP  TO  30  DOSES * Notice: This list has 6 medication(s) that are the same as other medications prescribed for you. Read the directions carefully, and ask your doctor or other care provider to review them with you. We Performed the Following CBC WITH AUTOMATED DIFF [72850 CPT(R)] CRP, HIGH SENSITIVITY [35944 CPT(R)] HEMOGLOBIN A1C WITH EAG [22174 CPT(R)] LIPID PANEL [42373 CPT(R)] METABOLIC PANEL, COMPREHENSIVE [19436 CPT(R)] Patient Instructions Diabetes Foot Health: Care Instructions Your Care Instructions When you have diabetes, your feet need extra care and attention. Diabetes can damage the nerve endings and blood vessels in your feet, making you less likely to notice when your feet are injured. Diabetes also limits your body's ability to fight infection and get blood to areas that need it. If you get a minor foot injury, it could become an ulcer or a serious infection. With good foot care, you can prevent most of these problems. Caring for your feet can be quick and easy. Most of the care can be done when you are bathing or getting ready for bed. Follow-up care is a key part of your treatment and safety. Be sure to make and go to all appointments, and call your doctor if you are having problems. It's also a good idea to know your test results and keep a list of the medicines you take. How can you care for yourself at home? · Keep your blood sugar close to normal by watching what and how much you eat, monitoring blood sugar, taking medicines if prescribed, and getting regular exercise. · Do not smoke. Smoking affects blood flow and can make foot problems worse. If you need help quitting, talk to your doctor about stop-smoking programs and medicines. These can increase your chances of quitting for good. · Eat a diet that is low in fats. High fat intake can cause fat to build up in your blood vessels and decrease blood flow. · Inspect your feet daily for blisters, cuts, cracks, or sores. If you cannot see well, use a mirror or have someone help you. · Take care of your feet: 
[facility] AUTHORITY your feet every day. Use warm (not hot) water. Check the water temperature with your wrists or other part of your body, not your feet. ¨ Dry your feet well. Pat them dry. Do not rub the skin on your feet too hard. Dry well between your toes. If the skin on your feet stays moist, bacteria or a fungus can grow, which can lead to infection. ¨ Keep your skin soft. Use moisturizing skin cream to keep the skin on your feet soft and prevent calluses and cracks. But do not put the cream between your toes, and stop using any cream that causes a rash. ¨ Clean underneath your toenails carefully. Do not use a sharp object to clean underneath your toenails. Use the blunt end of a nail file or other rounded tool. ¨ Trim and file your toenails straight across to prevent ingrown toenails. Use a nail clipper, not scissors. Use an emery board to smooth the edges. · Change socks daily.  Socks without seams are best, because seams often rub the feet. You can find socks for people with diabetes from specialty catalogs. · Look inside your shoes every day for things like gravel or torn linings, which could cause blisters or sores. · Buy shoes that fit well: 
¨ Look for shoes that have plenty of space around the toes. This helps prevent bunions and blisters. ¨ Try on shoes while wearing the kind of socks you will usually wear with the shoes. ¨ Avoid plastic shoes. They may rub your feet and cause blisters. Good shoes should be made of materials that are flexible and breathable, such as leather or cloth. ¨ Break in new shoes slowly by wearing them for no more than an hour a day for several days. Take extra time to check your feet for red areas, blisters, or other problems after you wear new shoes. · Do not go barefoot. Do not wear sandals, and do not wear shoes with very thin soles. Thin soles are easy to puncture. They also do not protect your feet from hot pavement or cold weather. · Have your doctor check your feet during each visit. If you have a foot problem, see your doctor. Do not try to treat an early foot problem at home. Home remedies or treatments that you can buy without a prescription (such as corn removers) can be harmful. · Always get early treatment for foot problems. A minor irritation can lead to a major problem if not properly cared for early. When should you call for help? Call your doctor now or seek immediate medical care if: 
? · You have a foot sore, an ulcer or break in the skin that is not healing after 4 days, bleeding corns or calluses, or an ingrown toenail. ? · You have blue or black areas, which can mean bruising or blood flow problems. ? · You have peeling skin or tiny blisters between your toes or cracking or oozing of the skin. ? · You have a fever for more than 24 hours and a foot sore.   
? · You have new numbness or tingling in your feet that does not go away after you move your feet or change positions. ? · You have unexplained or unusual swelling of the foot or ankle. ? Watch closely for changes in your health, and be sure to contact your doctor if: 
? · You cannot do proper foot care. Where can you learn more? Go to http://hodan-eugene.info/. Enter A739 in the search box to learn more about \"Diabetes Foot Health: Care Instructions. \" Current as of: March 13, 2017 Content Version: 11.4 © 3352-6907 [employer]. Care instructions adapted under license by [employer] (which disclaims liability or warranty for this information). If you have questions about a medical condition or this instruction, always ask your healthcare professional. [address] any warranty or liability for your use of this information. Introducing hospitals & HEALTH SERVICES! Cesar Barnett introduces [employer] patient portal. Now you can access parts of your medical record, email your doctor's office, and request medication refills online. 1. In your internet browser, go to [email or web address] 2. Click on the First Time User? Click Here link in the Sign In box. You will see the New Member Sign Up page. 3. Enter your [employer] Access Code exactly as it appears below. You will not need to use this code after youve completed the sign-up process. If you do not sign up before the expiration date, you must request a new code. · [employer] Access Code: [email or web address] Expires: 1/25/2018  8:26 AM 
 
4. Enter the last four digits of your Social Security Number (xxxx) and Date of Birth (mm/dd/yyyy) as indicated and click Submit. You will be taken to the next sign-up page. 5. Create a A[employer] ID. This will be your [employer] login ID and cannot be changed, so think of one that is secure and easy to remember. 6. Create a [employer]oma password. You can change your password at any time. 7. Enter your Password Reset Question and Answer. This can be used at a later time if you forget your password. 8. Enter your e-mail address. You will receive e-mail notification when new information is available in [address] 9. Click Sign Up. You can now view and download portions of your medical record. 10. Click the Download Summary menu link to download a portable copy of your medical information. If you have questions, please visit the Frequently Asked Questions section of the [employer] website. Remember, [employer] is NOT to be used for urgent needs. For medical emergencies, dial 911. Now available from your iPhone and Android! Please provide this summary of care documentation to your next provider. Your primary care clinician is listed as [address]. If you have any questions after today's visit, please call [phone].

## 2017-10-31 NOTE — PROGRESS NOTES
Subjective:     Angela Churchill is a 43 y.o. female seen for follow up of diabetes. She also has hypertension and hyperlipidemia. Diabetic Review of Systems - medication compliance: compliant all of the time, diabetic diet compliance: compliant all of the time, home glucose monitoring: is performed regularly. The patient is worried that the [place] has increased her weight gain. She notices since being on the medication she is gaining weight. She denies any dietary indiscretions. She has been under a great deal of stress as well. She needs fasting labs and medication refills.      Other symptoms and concerns:   Chief Complaint   Patient presents with    Diabetes     follow up    Labs    Medication Refill       Past Medical History:  Patient Active Problem List    Diagnosis Date Noted    Acute midline thoracic back pain 09/26/2017    Acute mucoid otitis media of right ear 06/23/2017    Dermatitis 03/09/2017    Screening for thyroid disorder 01/10/2017    Acute non-recurrent frontal sinusitis 01/03/2017    Left lower quadrant pain 12/29/2016    Left ear pain 12/02/2016    Strep throat 11/09/2016    Nausea 10/07/2016    Right acute serous otitis media 07/14/2016    Rash 07/14/2016    Gastroesophageal reflux disease without esophagitis 07/14/2016    Environmental allergies 07/14/2016    Right ear pain 06/21/2016    Diabetes ([facility] [address] 07/18/2014    Sore throat 05/06/2014    Migraine headache 01/03/2012    Vitamin D deficiency 11/06/2011    Dyslipidemia (high LDL; low HDL) 01/24/2011    Hypertension 01/06/2011    Asthma 01/06/2011       Medications:  Current Outpatient Prescriptions   Medication Sig Dispense Refill    VENTOLIN HFA 90 mcg/actuation inhaler INHALE 2 PUFFS BY MOUTH EVERY 4 HOURS AS NEEDED FOR WHEEZING 18 Inhaler 5    albuterol (PROVENTIL VENTOLIN) 2.5 mg /3 mL (0.083 %) nebulizer solution USE ONE VIAL IN NEBULIZER EVERY 4 HOURS AS NEEDED FOR WHEEZING FOR  UP  TO  30  DOSES 300 Each 2    aspirin delayed-release 81 mg tablet       JARDIANCE 25 mg tablet       glucose blood VI test strips (ASCENSIA AUTODISC VI, ONE TOUCH ULTRA TEST VI) strip One touch Ultra Test strip. To use daily, to check blood sugar. Dx: E11.9. 100 Strip 1    fenofibrate (LOFIBRA) 160 mg tablet Take 1 Tab by mouth daily. 30 Tab 5    simvastatin (ZOCOR) 20 mg tablet Take 1 Tab by mouth nightly. 90 Tab 1    JANUVIA 100 mg tablet Take 1 Tab by mouth once over twenty-four (24) hours. 90 Tab 1    metFORMIN (GLUCOPHAGE) 1,000 mg tablet TAKE 1 TABLET BY MOUTH TWICE A  Tab 1    glucose blood VI test strips (ASCENSIA CONTOUR) strip TO BE USED AS DIRECTED 50 Strip 5    fluticasone (FLONASE) 50 mcg/actuation nasal spray 2 SQUIRTS DAILY AS NEEDED. 16 g 3    famotidine (PEPCID) 40 mg tablet TAKE 1 TABLET BY MOUTH EVERY DAY  12    montelukast (SINGULAIR) 10 mg tablet TAKE 1 TABLET BY MOUTH EACH EVENING  5    omeprazole (PRILOSEC) 20 mg capsule Take 20 mg by mouth once over twenty-four (24) hours.  ergocalciferol (ERGOCALCIFEROL) 50,000 unit capsule Take 1 Cap by mouth every seven (7) days.  butalbital-acetaminophen-caffeine (FIORICET, ESGIC) -40 mg per tablet Take 1 Tab by mouth every six (6) hours as needed for Pain. Max Daily Amount: 4 Tabs. 20 Tab 0    naratriptan (AMERGE) 2.5 mg tab Take 1 Tab by mouth once as needed for up to 1 dose. For migraine. Can repeat 1 tab in 4 hours if needed. Limit: 2 tabs in 24 hours 9 Tab 1    Nebulizer & Compressor machine Use as needed for wheezing, cough 1 Each 0    Blood-Glucose Meter monitoring kit Please give one kit. 1 Kit 0    Lancets (MICROLET LANCET) Misc USE TO CHECK BLOOD SUGAR ONE TO TWO TIMES DAILY 100 Each 0    ciprofloxacin HCl (CIPRO) 250 mg tablet Take 1 Tab by mouth every twelve (12) hours for 5 days. 10 Tab 0    cyclobenzaprine (FLEXERIL) 10 mg tablet TAKE 1 TAB BY MOUTH THREE (3) TIMES DAILY AS NEEDED FOR MUSCLE SPASM(S).  30 Tab 0    Blood-Glucose Meter (ONETOUCH VERIO IQ METER) misc To use to check blood sugar daily. Dx: E11.9. 1 Each 0    sucralfate (CARAFATE) 100 mg/mL suspension Take 10 mL by mouth four (4) times daily. 414 mL 0    topiramate (TOPAMAX) 50 mg tablet Take 1 tab twice a day for migraine prevention 60 Tab 0       Allergies: Allergies   Allergen Reactions    Green Pepper Hives       Past Surgical History:  Past Surgical History:   Procedure Laterality Date    HX APPENDECTOMY  2/2008    HX CHOLECYSTECTOMY  2001    HX DILATION AND CURETTAGE      HX GYN  3/2008    tubal ligation    HX HERNIA REPAIR  2/2009    HX HYSTERECTOMY  03/2012    HX LUMBAR DISKECTOMY      2006    HX ORTHOPAEDIC  9/2006    ruptured discs    UPPER GI ENDOSCOPY,BIOPSY  5/11/2017            Family History:  Family History   Problem Relation Age of Onset    Heart Disease Mother     Hypertension Father     Hypertension Sister        Social History:  Social History   Substance Use Topics    Smoking status: Never Smoker    Smokeless tobacco: Never Used    Alcohol use No         Review of Systems  Pertinent items are noted in HPI. Objective:     Visit Vitals    /83    Pulse 87    Temp 98.4 °F (36.9 °C) (Oral)    Resp 18    Ht 5' 5\" (1.651 m)    Wt 208 lb (94.3 kg)    LMP 01/25/2012    SpO2 97%    BMI 34.61 kg/m2     Appearance: alert, well appearing, and in no distress. Exam: heart sounds normal rate, regular rhythm, normal S1, S2, no murmurs, rubs, clicks or gallops, S1 and S2 normal, chest clear, no hepatosplenomegaly, no carotid bruits  Lab review: orders written for new lab studies as appropriate; see orders. Assessment/Plan:        ICD-10-CM ICD-9-CM    1. Type 2 diabetes mellitus without complication, without long-term current use of insulin (HCC) E11.9 250.00 HEMOGLOBIN A1C WITH EAG   2. Vitamin D deficiency E55.9 268.9    3.  Pure hypercholesterolemia E78.00 272.0 LIPID PANEL      CRP, HIGH SENSITIVITY      METABOLIC PANEL, COMPREHENSIVE      CBC WITH AUTOMATED DIFF     36F who presents for follow up and fasting labs. It is not clear that the Jardiance is causing the weight gain. In fact, the side effects are just the opposite. We will send for labs and see how things look. We discussed the possibility of Victoza and explained that this was an injectable. I have discussed the diagnosis with the patient and the intended treatment plan as seen in the above orders. The patient has received an after-visit summary and questions were answered concerning future plans. Asked to return should symptoms worsen or not improve with treatment. Any pending labs and studies will be relayed to patient when they become available. Pt verbalizes understanding of plan of care and denies further questions or concerns at this time. Follow-up Disposition:  Return if symptoms worsen or fail to improve.

## 2017-11-01 LAB
ALBUMIN SERPL-MCNC: 4.6 G/DL (ref 3.5–5.5)
ALBUMIN/GLOB SERPL: 1.7 {RATIO} (ref 1.2–2.2)
ALP SERPL-CCNC: 56 IU/L (ref 39–117)
ALT SERPL-CCNC: 32 IU/L (ref 0–32)
AST SERPL-CCNC: 24 IU/L (ref 0–40)
BASOPHILS # BLD AUTO: 0.1 X10E3/UL (ref 0–0.2)
BASOPHILS NFR BLD AUTO: 1 %
BILIRUB SERPL-MCNC: 0.4 MG/DL (ref 0–1.2)
BUN SERPL-MCNC: 9 MG/DL (ref 6–20)
BUN/CREAT SERPL: 12 (ref 9–23)
CALCIUM SERPL-MCNC: 9.5 MG/DL (ref 8.7–10.2)
CHLORIDE SERPL-SCNC: 101 MMOL/L (ref 96–106)
CHOLEST SERPL-MCNC: 165 MG/DL (ref 100–199)
CO2 SERPL-SCNC: 22 MMOL/L (ref 18–29)
CREAT SERPL-MCNC: 0.75 MG/DL (ref 0.57–1)
CRP SERPL HS-MCNC: 5.07 MG/L (ref 0–3)
EOSINOPHIL # BLD AUTO: 0.1 X10E3/UL (ref 0–0.4)
EOSINOPHIL NFR BLD AUTO: 1 %
ERYTHROCYTE [DISTWIDTH] IN BLOOD BY AUTOMATED COUNT: 14.2 % (ref 12.3–15.4)
EST. AVERAGE GLUCOSE BLD GHB EST-MCNC: 157 MG/DL
GFR SERPLBLD CREATININE-BSD FMLA CKD-EPI: 101 ML/MIN/1.73
GFR SERPLBLD CREATININE-BSD FMLA CKD-EPI: 117 ML/MIN/1.73
GLOBULIN SER CALC-MCNC: 2.7 G/DL (ref 1.5–4.5)
GLUCOSE SERPL-MCNC: 134 MG/DL (ref 65–99)
HBA1C MFR BLD: 7.1 % (ref 4.8–5.6)
HCT VFR BLD AUTO: 46.6 % (ref 34–46.6)
HDLC SERPL-MCNC: 43 MG/DL
HGB BLD-MCNC: 15.6 G/DL (ref 11.1–15.9)
IMM GRANULOCYTES # BLD: 0.1 X10E3/UL (ref 0–0.1)
IMM GRANULOCYTES NFR BLD: 1 %
INTERPRETATION, 910389: NORMAL
LDLC SERPL CALC-MCNC: 76 MG/DL (ref 0–99)
LYMPHOCYTES # BLD AUTO: 2.9 X10E3/UL (ref 0.7–3.1)
LYMPHOCYTES NFR BLD AUTO: 25 %
Lab: NORMAL
MCH RBC QN AUTO: 28.7 PG (ref 26.6–33)
MCHC RBC AUTO-ENTMCNC: 33.5 G/DL (ref 31.5–35.7)
MCV RBC AUTO: 86 FL (ref 79–97)
MONOCYTES # BLD AUTO: 0.7 X10E3/UL (ref 0.1–0.9)
MONOCYTES NFR BLD AUTO: 6 %
NEUTROPHILS # BLD AUTO: 8.1 X10E3/UL (ref 1.4–7)
NEUTROPHILS NFR BLD AUTO: 66 %
PLATELET # BLD AUTO: 229 X10E3/UL (ref 150–379)
POTASSIUM SERPL-SCNC: 4.7 MMOL/L (ref 3.5–5.2)
PROT SERPL-MCNC: 7.3 G/DL (ref 6–8.5)
RBC # BLD AUTO: 5.44 X10E6/UL (ref 3.77–5.28)
SODIUM SERPL-SCNC: 139 MMOL/L (ref 134–144)
TRIGL SERPL-MCNC: 231 MG/DL (ref 0–149)
VLDLC SERPL CALC-MCNC: 46 MG/DL (ref 5–40)
WBC # BLD AUTO: 12 X10E3/UL (ref 3.4–10.8)

## 2017-11-07 ENCOUNTER — OFFICE VISIT (OUTPATIENT)
Dept: FAMILY MEDICINE CLINIC | Age: 37
End: 2017-11-07

## 2017-11-07 VITALS
WEIGHT: 208.2 LBS | HEART RATE: 106 BPM | DIASTOLIC BLOOD PRESSURE: 70 MMHG | BODY MASS INDEX: 34.69 KG/M2 | HEIGHT: 65 IN | OXYGEN SATURATION: 97 % | RESPIRATION RATE: 14 BRPM | SYSTOLIC BLOOD PRESSURE: 133 MMHG | TEMPERATURE: 99.3 F

## 2017-11-07 DIAGNOSIS — E78.00 PURE HYPERCHOLESTEROLEMIA: ICD-10-CM

## 2017-11-07 DIAGNOSIS — M79.605 BILATERAL LEG PAIN: ICD-10-CM

## 2017-11-07 DIAGNOSIS — Z79.4 TYPE 2 DIABETES MELLITUS WITHOUT COMPLICATION, WITH LONG-TERM CURRENT USE OF INSULIN (HCC): ICD-10-CM

## 2017-11-07 DIAGNOSIS — E11.9 TYPE 2 DIABETES MELLITUS WITHOUT COMPLICATION, WITH LONG-TERM CURRENT USE OF INSULIN (HCC): ICD-10-CM

## 2017-11-07 DIAGNOSIS — D72.829 LEUKOCYTOSIS, UNSPECIFIED TYPE: ICD-10-CM

## 2017-11-07 DIAGNOSIS — E11.9 TYPE 2 DIABETES MELLITUS WITHOUT COMPLICATION, WITHOUT LONG-TERM CURRENT USE OF INSULIN (HCC): Primary | ICD-10-CM

## 2017-11-07 DIAGNOSIS — J02.9 SORE THROAT: ICD-10-CM

## 2017-11-07 DIAGNOSIS — E78.1 HYPERTRIGLYCERIDEMIA: ICD-10-CM

## 2017-11-07 DIAGNOSIS — M79.604 BILATERAL LEG PAIN: ICD-10-CM

## 2017-11-07 LAB
S PYO AG THROAT QL: NEGATIVE
VALID INTERNAL CONTROL?: YES

## 2017-11-07 RX ORDER — EMPAGLIFLOZIN 25 MG/1
25 TABLET, FILM COATED ORAL DAILY
Qty: 30 TAB | Refills: 5 | Status: SHIPPED | OUTPATIENT
Start: 2017-11-07 | End: 2018-08-01 | Stop reason: SDUPTHER

## 2017-11-07 RX ORDER — FENOFIBRATE 160 MG/1
160 TABLET ORAL DAILY
Qty: 30 TAB | Refills: 5 | Status: SHIPPED | OUTPATIENT
Start: 2017-11-07 | End: 2018-08-26 | Stop reason: SDUPTHER

## 2017-11-07 RX ORDER — METFORMIN HYDROCHLORIDE 1000 MG/1
1000 TABLET ORAL 2 TIMES DAILY WITH MEALS
Qty: 180 TAB | Refills: 5 | Status: SHIPPED | OUTPATIENT
Start: 2017-11-07 | End: 2018-08-01 | Stop reason: SDUPTHER

## 2017-11-07 RX ORDER — ATORVASTATIN CALCIUM 20 MG/1
20 TABLET, FILM COATED ORAL DAILY
Qty: 30 TAB | Refills: 5 | Status: SHIPPED | OUTPATIENT
Start: 2017-11-07 | End: 2017-12-07

## 2017-11-07 RX ORDER — SITAGLIPTIN 100 MG/1
100 TABLET, FILM COATED ORAL
Qty: 30 TAB | Refills: 5 | Status: SHIPPED | OUTPATIENT
Start: 2017-11-07 | End: 2018-03-03 | Stop reason: SDUPTHER

## 2017-11-07 NOTE — LETTER
11/7/2017 11:31 AM 
 
Ms. Pascual Posada [address] [place] [id number] Dear Pascual Posada: 
 
Please find your most recent results below. Resulted Orders LIPID PANEL Result Value Ref Range Cholesterol, total 165 100 - 199 mg/dL Triglyceride 231 (H) 0 - 149 mg/dL HDL Cholesterol 43 >39 mg/dL VLDL, calculated 46 (H) 5 - 40 mg/dL LDL, calculated 76 0 - 99 mg/dL Narrative Performed at:  [address]  [id number] : Marbin Reyez MD, Phone:  [phone] CRP, HIGH SENSITIVITY Result Value Ref Range C-Reactive Protein, Cardiac 5.07 (H) 0.00 - 3.00 mg/L Comment:  
            Relative Risk for Future Cardiovascular Event Low                 <1.00 Average       1.00 - 3.00 High                >3.00 Narrative Performed at:  [address]  [id number] : Marbin Reyez MD, Phone:  [phone] HEMOGLOBIN A1C WITH EAG Result Value Ref Range Hemoglobin A1c 7.1 (H) 4.8 - 5.6 % Comment:  
            Pre-diabetes: 5.7 - 6.4 Diabetes: >6.4 Glycemic control for adults with diabetes: <7.0 Estimated average glucose 157 mg/dL Narrative Performed at:  [address]  [id number] : Marbin Reyez MD, Phone:  [phone] METABOLIC PANEL, COMPREHENSIVE Result Value Ref Range Glucose 134 (H) 65 - 99 mg/dL BUN 9 6 - 20 mg/dL Creatinine 0.75 0.57 - 1.00 mg/dL GFR est non- >59 mL/min/1.73 GFR est  >59 mL/min/1.73  
 BUN/Creatinine ratio 12 9 - 23 Sodium 139 134 - 144 mmol/L Potassium 4.7 3.5 - 5.2 mmol/L Chloride 101 96 - 106 mmol/L  
 CO2 22 18 - 29 mmol/L Calcium 9.5 8.7 - 10.2 mg/dL Protein, total 7.3 6.0 - 8.5 g/dL Albumin 4.6 3.5 - 5.5 g/dL GLOBULIN, TOTAL 2.7 1.5 - 4.5 g/dL A-G Ratio 1.7 1.2 - 2.2 Bilirubin, total 0.4 0.0 - 1.2 mg/dL Alk. phosphatase 56 39 - 117 IU/L  
 AST (SGOT) 24 0 - 40 IU/L  
 ALT (SGPT) 32 0 - 32 IU/L Narrative Performed at:  [address]  [id number] : Jon Valdez MD, Phone:  [phone] CBC WITH AUTOMATED DIFF Result Value Ref Range WBC 12.0 (H) 3.4 - 10.8 x10E3/uL  
 RBC 5.44 (H) 3.77 - 5.28 x10E6/uL HGB 15.6 11.1 - 15.9 g/dL HCT 46.6 34.0 - 46.6 % MCV 86 79 - 97 fL  
 MCH 28.7 26.6 - 33.0 pg  
 MCHC 33.5 31.5 - 35.7 g/dL  
 RDW 14.2 12.3 - 15.4 % PLATELET 548 431 - 720 x10E3/uL NEUTROPHILS 66 Not Estab. % Lymphocytes 25 Not Estab. % MONOCYTES 6 Not Estab. % EOSINOPHILS 1 Not Estab. % BASOPHILS 1 Not Estab. %  
 ABS. NEUTROPHILS 8.1 (H) 1.4 - 7.0 x10E3/uL Abs Lymphocytes 2.9 0.7 - 3.1 x10E3/uL  
 ABS. MONOCYTES 0.7 0.1 - 0.9 x10E3/uL  
 ABS. EOSINOPHILS 0.1 0.0 - 0.4 x10E3/uL  
 ABS. BASOPHILS 0.1 0.0 - 0.2 x10E3/uL IMMATURE GRANULOCYTES 1 Not Estab. %  
 ABS. IMM. GRANS. 0.1 0.0 - 0.1 x10E3/uL Narrative Performed at:  [address]  [id number] : Jon Valdez MD, Phone:  [phone] CVD REPORT Result Value Ref Range INTERPRETATION Note Comment:  
   Supplemental report is available. Narrative Performed at:  [address] [place] [address]  [id number] : Cuauhtemoc Huffman PhD, Phone:  [phone] DIABETES PATIENT EDUCATION Result Value Ref Range PDF Image Not applicable Narrative Performed at:  [address] [place] [address]  [id number] : Cuauhtemoc Huffman PhD, Phone:  [phone] RECOMMENDATIONS: 
 
Dear Paradise Barron, The labs are as we discussed during your visit.  Will repeat your CBC-D.  
 Will change simvastatin to Lipitor. Continue to make attempts to improve diet and exercise patterns to aid in medical management of this problem. Will add Vida Omega-3 daily. Will add Magnesium 250-500 mgs daily. Repeat labs in 2-months. Please call me if you have any questions: [phone] Sincerely, Dilma Stone MD

## 2017-11-07 NOTE — MR AVS SNAPSHOT
Visit Information Date & Time Provider Department Dept. Phone Encounter #  
 11/7/2017 11:00 AM Zach Rasheed [phone] [id number] Upcoming Health Maintenance Date Due  
 EYE EXAM RETINAL OR DILATED Q1 9/1/2014 DTaP/Tdap/Td series (2 - Td) 1/1/2018 FOOT EXAM Q1 3/15/2018 MICROALBUMIN Q1 3/15/2018 HEMOGLOBIN A1C Q6M 4/30/2018 LIPID PANEL Q1 10/31/2018 PAP AKA CERVICAL CYTOLOGY 6/15/2019 Allergies as of 11/7/2017  Review Complete On: 11/7/2017 By: Rodriguez Santana Severity Noted Reaction Type Reactions Green Pepper  06/28/2012    Hives Current Immunizations  Reviewed on 3/18/2014 Name Date Influenza Vaccine 10/26/2013 TDAP Vaccine 1/1/2008 Not reviewed this visit You Were Diagnosed With   
  
 Codes Comments Type 2 diabetes mellitus without complication, without long-term current use of insulin (HCC)    -  Primary ICD-10-CM: E11.9 ICD-9-CM: 250.00 Pure hypercholesterolemia     ICD-10-CM: E78.00 ICD-9-CM: 272.0 Bilateral leg pain     ICD-10-CM: M79.604, M79.605 ICD-9-CM: 729.5 Hypertriglyceridemia     ICD-10-CM: E78.1 ICD-9-CM: 272.1 Type 2 diabetes mellitus without complication, with long-term current use of insulin (HCC)     ICD-10-CM: E11.9, Z79.4 ICD-9-CM: 250.00, V58.67 Sore throat     ICD-10-CM: J02.9 ICD-9-CM: 369 Leukocytosis, unspecified type     ICD-10-CM: D72.829 ICD-9-CM: 288.60 Vitals BP Pulse Temp Resp Height(growth percentile) Weight(growth percentile) 133/70 (BP 1 Location: Right arm, BP Patient Position: Sitting) (!) 106 99.3 °F (37.4 °C) (Oral) 14 5' 5\" (1.651 m) 208 lb 3.2 oz (94.4 kg) LMP SpO2 BMI OB Status Smoking Status 01/25/2012 97% 34.65 kg/m2 Hysterectomy Never Smoker Vitals History BMI and BSA Data Body Mass Index Body Surface Area  
 34.65 kg/m 2 2.08 m 2 Preferred Pharmacy Pharmacy Name Phone [facility]/PHARMACY #[id number] - Lizbeth Carmen  [address] [facility] [address] [phone] Your Updated Medication List  
  
   
This list is accurate as of: 11/7/17 11:32 AM.  Always use your most recent med list.  
  
  
  
  
 aspirin delayed-release 81 mg tablet  
  
 atorvastatin 20 mg tablet Commonly known as:  LIPITOR Take 1 Tab by mouth daily for 30 days. * Blood-Glucose Meter monitoring kit Please give one kit. * Blood-Glucose Meter Misc Commonly known as:  [place] IQ METER To use to check blood sugar daily. Dx: E11.9.  
  
 butalbital-acetaminophen-caffeine -40 mg per tablet Commonly known as:  Armando Apoorva Take 1 Tab by mouth every six (6) hours as needed for Pain. Max Daily Amount: 4 Tabs. cyclobenzaprine 10 mg tablet Commonly known as:  FLEXERIL  
TAKE 1 TAB BY MOUTH THREE (3) TIMES DAILY AS NEEDED FOR MUSCLE SPASM(S). ergocalciferol 50,000 unit capsule Commonly known as:  ERGOCALCIFEROL Take 1 Cap by mouth every seven (7) days. famotidine 40 mg tablet Commonly known as:  PEPCID  
TAKE 1 TABLET BY MOUTH EVERY DAY  
  
 fenofibrate 160 mg tablet Commonly known as:  LOFIBRA Take 1 Tab by mouth daily. fluticasone 50 mcg/actuation nasal spray Commonly known as:  FLONASE  
2 SQUIRTS DAILY AS NEEDED. * glucose blood VI test strips strip Commonly known as:  Ascensia CONTOUR  
TO BE USED AS DIRECTED * glucose blood VI test strips strip Commonly known as:  ASCENSIA AUTODISC VI, ONE TOUCH ULTRA TEST VI One touch Ultra Test strip. To use daily, to check blood sugar. Dx: E11.9. JANUVIA 100 mg tablet Generic drug:  SITagliptin Take 1 Tab by mouth once over twenty-four (24) hours. JARDIANCE 25 mg tablet Generic drug:  empagliflozin Take 1 Tab by mouth daily. Lancets Misc Commonly known as:  MICROLET LANCET  
USE TO CHECK BLOOD SUGAR ONE TO TWO TIMES DAILY  
  
 metFORMIN 1,000 mg tablet Commonly known as:  GLUCOPHAGE Take 1 Tab by mouth two (2) times daily (with meals) for 90 days. TAKE 1 TABLET BY MOUTH TWICE A DAY  
  
 montelukast 10 mg tablet Commonly known as:  SINGULAIR  
TAKE 1 TABLET BY MOUTH EACH EVENING  
  
 naratriptan 2.5 mg Tab Commonly known as:  Rashawn Vega Take 1 Tab by mouth once as needed for up to 1 dose. For migraine. Can repeat 1 tab in 4 hours if needed. Limit: 2 tabs in 24 hours Nebulizer & Compressor machine Use as needed for wheezing, cough  
  
 omeprazole 20 mg capsule Commonly known as:  PRILOSEC Take 20 mg by mouth once over twenty-four (24) hours. sucralfate 100 mg/mL suspension Commonly known as:  Eh Pickerel Take 10 mL by mouth four (4) times daily. topiramate 50 mg tablet Commonly known as:  TOPAMAX Take 1 tab twice a day for migraine prevention * VENTOLIN HFA 90 mcg/actuation inhaler Generic drug:  albuterol INHALE 2 PUFFS BY MOUTH EVERY 4 HOURS AS NEEDED FOR WHEEZING  
  
 * albuterol 2.5 mg /3 mL (0.083 %) nebulizer solution Commonly known as:  PROVENTIL VENTOLIN  
USE ONE VIAL IN NEBULIZER EVERY 4 HOURS AS NEEDED FOR WHEEZING FOR  UP  TO  30  DOSES * Notice: This list has 6 medication(s) that are the same as other medications prescribed for you. Read the directions carefully, and ask your doctor or other care provider to review them with you. Prescriptions Sent to Pharmacy Refills  
 atorvastatin (LIPITOR) 20 mg tablet 5 Sig: Take 1 Tab by mouth daily for 30 days. Class: Normal  
 Pharmacy: [facility]/pharmacy #[id number] [address] Ph #: [phone] Route: Oral  
 JARDIANCE 25 mg tablet 5 Sig: Take 1 Tab by mouth daily. Class: Normal  
 Pharmacy: [facility]/pharmacy #[id number] - [address] Ph #: [phone] Route: Oral  
 fenofibrate (LOFIBRA) 160 mg tablet 5 Sig: Take 1 Tab by mouth daily.   
 Class: Normal  
 [facility] [address], [address] Ph #: [phone] Route: Oral  
 JANUVIA 100 mg tablet 5 Sig: Take 1 Tab by mouth once over twenty-four (24) hours. Class: Normal  
 [id number] [address] Ph #: [phone] Route: Oral  
 metFORMIN (GLUCOPHAGE) 1,000 mg tablet 5 Sig: Take 1 Tab by mouth two (2) times daily (with meals) for 90 days. TAKE 1 TABLET BY MOUTH TWICE A DAY Class: Normal  
 [id number] - [address] Ph #: [phone] Route: Oral  
  
We Performed the Following AMB POC RAPID STREP A [83425 CPT(R)] CBC WITH AUTOMATED DIFF [37832 CPT(R)] To-Do List   
 11/14/2017 Imaging:  [facility] Introducing hospitals & HEALTH SERVICES! [employer] introduces [employer] patient portal. Now you can access parts of your medical record, email your doctor's office, and request medication refills online. 1. In your internet browser, go to [email or web address] 2. Click on the First Time User? Click Here link in the Sign In box. You will see the New Member Sign Up page. 3. Enter your [employer] Access Code exactly as it appears below. You will not need to use this code after youve completed the sign-up process. If you do not sign up before the expiration date, you must request a new code. · [employer] Access Code: [email or web address] Expires: 1/25/2018  7:26 AM 
 
4. Enter the last four digits of your Social Security Number (xxxx) and Date of Birth (mm/dd/yyyy) as indicated and click Submit. You will be taken to the next sign-up page. 5. Create a [employer] ID. This will be your [employer] login ID and cannot be changed, so think of one that is secure and easy to remember. 6. Create a [employer] password. You can change your password at any time. 7. Enter your Password Reset Question and Answer. This can be used at a later time if you forget your password. 8. Enter your e-mail address. You will receive e-mail notification when new information is available in [address] 9. Click Sign Up. You can now view and download portions of your medical record. 10. Click the Download Summary menu link to download a portable copy of your medical information. If you have questions, please visit the Frequently Asked Questions section of the [facility] website. Remember, [facility] is NOT to be used for urgent needs. For medical emergencies, dial 911. Now available from your iPhone and Android! Please provide this summary of care documentation to your next provider. Your primary care clinician is listed as [address]. If you have any questions after today's visit, please call [phone].

## 2017-11-07 NOTE — PROGRESS NOTES
Chief Complaint   Patient presents with    Results     discuss recent labs     Health Maintenance Due   Topic Date Due    EYE EXAM RETINAL OR DILATED Q1  09/01/2014    Went to [facility] in March 2017 for eye exam    Coordination of Care Questions    1. Have you been to the ER, urgent care clinic since your last visit? No       Hospitalized since your last visit? No    2. Have you seen or consulted any other health care providers outside of the [facility] since your last visit? Include any pap smears or colon screening.  No

## 2017-11-08 LAB
BASOPHILS # BLD AUTO: 0.1 X10E3/UL (ref 0–0.2)
BASOPHILS NFR BLD AUTO: 1 %
EOSINOPHIL # BLD AUTO: 0.1 X10E3/UL (ref 0–0.4)
EOSINOPHIL NFR BLD AUTO: 1 %
ERYTHROCYTE [DISTWIDTH] IN BLOOD BY AUTOMATED COUNT: 14.3 % (ref 12.3–15.4)
HCT VFR BLD AUTO: 42.1 % (ref 34–46.6)
HGB BLD-MCNC: 14.5 G/DL (ref 11.1–15.9)
IMM GRANULOCYTES # BLD: 0.1 X10E3/UL (ref 0–0.1)
IMM GRANULOCYTES NFR BLD: 1 %
LYMPHOCYTES # BLD AUTO: 2.9 X10E3/UL (ref 0.7–3.1)
LYMPHOCYTES NFR BLD AUTO: 25 %
MCH RBC QN AUTO: 28.9 PG (ref 26.6–33)
MCHC RBC AUTO-ENTMCNC: 34.4 G/DL (ref 31.5–35.7)
MCV RBC AUTO: 84 FL (ref 79–97)
MONOCYTES # BLD AUTO: 0.7 X10E3/UL (ref 0.1–0.9)
MONOCYTES NFR BLD AUTO: 6 %
NEUTROPHILS # BLD AUTO: 7.6 X10E3/UL (ref 1.4–7)
NEUTROPHILS NFR BLD AUTO: 66 %
PLATELET # BLD AUTO: 205 X10E3/UL (ref 150–379)
RBC # BLD AUTO: 5.01 X10E6/UL (ref 3.77–5.28)
WBC # BLD AUTO: 11.5 X10E3/UL (ref 3.4–10.8)

## 2017-11-09 ENCOUNTER — TELEPHONE (OUTPATIENT)
Dept: FAMILY MEDICINE CLINIC | Age: 37
End: 2017-11-09

## 2017-11-09 NOTE — TELEPHONE ENCOUNTER
The pt is calling to follow up and get her recent blood work results.  Best call back number is [phone]

## 2017-11-15 ENCOUNTER — HOSPITAL ENCOUNTER (OUTPATIENT)
Dept: VASCULAR SURGERY | Age: 37
Discharge: HOME | End: 2017-11-15
Attending: INTERNAL MEDICINE
Payer: MEDICAID

## 2017-11-15 DIAGNOSIS — M79.605 BILATERAL LEG PAIN: ICD-10-CM

## 2017-11-15 DIAGNOSIS — M79.604 BILATERAL LEG PAIN: ICD-10-CM

## 2017-11-15 PROCEDURE — 93922 UPR/L XTREMITY ART 2 LEVELS: CPT

## 2017-11-15 NOTE — PROCEDURES
[address]  *** FINAL REPORT ***    Name: Kimberli Menard  MRN: [id number]    Outpatient  : 1980  HIS Order #: [id number]  [address] [facility] Visit #: [id number]  Date: 15 Nov 2017    TYPE OF TEST: Peripheral Arterial Testing    REASON FOR TEST  Rest pain (both sides)    Right Leg  Segmentals: Normal                     mmHg  Brachial         132  High thigh  Low thigh  Calf  Posterior tibial 141  Dorsalis pedis   138  Peroneal  Metatarsal  Toe pressure     122  Doppler:    Normal  PVR:  Ankle/Brachial: 1.03    Left Leg  Segmentals: Normal                     mmHg  Brachial         137  High thigh  Low thigh  Calf  Posterior tibial 139  Dorsalis pedis   145  Peroneal  Metatarsal  Toe pressure     117  Doppler:    Normal  PVR:  Ankle/Brachial: 1.06    INTERPRETATION/FINDINGS  PROCEDURE:  Evaluation of lower extremity arteries with systolic blood   pressure measurement at the ankle and brachial level and calculation  of the ankle/brachial pressure index (INOCENCIO). The exam may also include   pulse volume recording (PVR) plethysmography at the ankle level. IMPRESSION:  1. No evidence of significant peripheral arterial disease at rest in  the right leg. 2. No evidence of significant peripheral arterial disease at rest in  the left leg. 3. The right ankle/brachial index is 1.03 and the left ankle/brachial  index is 1.06.  4. The right toe/brachial index is 0.89 and the left toe/brachial  index is 0.85. ADDITIONAL COMMENTS    I have personally reviewed the data relevant to the interpretation of  this  study. TECHNOLOGIST: Kady Obrien RDCS  Signed: 11/15/2017 09:28 AM    PHYSICIAN: Deny Fajardo.  Renzo Perry MD  Signed: 2017 09:11 AM

## 2017-11-29 RX ORDER — OMEPRAZOLE 20 MG/1
CAPSULE, DELAYED RELEASE ORAL
Qty: 30 CAP | Refills: 5 | Status: SHIPPED | OUTPATIENT
Start: 2017-11-29 | End: 2018-06-27 | Stop reason: SDUPTHER

## 2017-11-29 NOTE — PROGRESS NOTES
HISTORY OF PRESENT ILLNESS  Micheline Hurley is a 42 y.o. female. HPI Comments: 37F who presents today for follow up of recent labs which showed continued elevated WBC. She has had URI symptoms and sore throat. No fever per se. She also is here to discuss her labs in general. She has a h/o Type II DM, HTN and HLD. Sh denies any other symptoms or concerns at this time. Results         ROS:  Review of Systems   All other systems reviewed and are negative. OBJECTIVE:  /70 (BP 1 Location: Right arm, BP Patient Position: Sitting)  Pulse (!) 106  Temp 99.3 °F (37.4 °C) (Oral)   Resp 14  Ht 5' 5\" (1.651 m)  Wt 208 lb 3.2 oz (94.4 kg)  LMP 01/25/2012  SpO2 97%  BMI 34.65 kg/m2  Physical Exam   Constitutional: She is oriented to person, place, and time. Eyes: Pupils are equal, round, and reactive to light. Neck: Normal range of motion. Cardiovascular: Normal rate and regular rhythm. Pulmonary/Chest: Effort normal and breath sounds normal.   Musculoskeletal: Normal range of motion. Neurological: She is alert and oriented to person, place, and time. She has normal reflexes. Skin: Skin is warm. Psychiatric: She has a normal mood and affect. Her behavior is normal.   Nursing note and vitals reviewed. Labs:  Results for orders placed or performed in visit on 11/07/17   CBC WITH AUTOMATED DIFF   Result Value Ref Range    WBC 11.5 (H) 3.4 - 10.8 x10E3/uL    RBC 5.01 3.77 - 5.28 x10E6/uL    HGB 14.5 11.1 - 15.9 g/dL    HCT 42.1 34.0 - 46.6 %    MCV 84 79 - 97 fL    MCH 28.9 26.6 - 33.0 pg    MCHC 34.4 31.5 - 35.7 g/dL    RDW 14.3 12.3 - 15.4 %    PLATELET 271 897 - 794 x10E3/uL    NEUTROPHILS 66 Not Estab. %    Lymphocytes 25 Not Estab. %    MONOCYTES 6 Not Estab. %    EOSINOPHILS 1 Not Estab. %    BASOPHILS 1 Not Estab. %    ABS. NEUTROPHILS 7.6 (H) 1.4 - 7.0 x10E3/uL    Abs Lymphocytes 2.9 0.7 - 3.1 x10E3/uL    ABS. MONOCYTES 0.7 0.1 - 0.9 x10E3/uL    ABS.  EOSINOPHILS 0.1 0.0 - 0.4 x10E3/uL    ABS. BASOPHILS 0.1 0.0 - 0.2 x10E3/uL    IMMATURE GRANULOCYTES 1 Not Estab. %    ABS. IMM. GRANS. 0.1 0.0 - 0.1 x10E3/uL   AMB POC RAPID STREP A   Result Value Ref Range    VALID INTERNAL CONTROL POC Yes     Group A Strep Ag Negative Negative       ASSESSMENT and PLAN    ICD-10-CM ICD-9-CM    1. Type 2 diabetes mellitus without complication, without long-term current use of insulin (HCC) E11.9 250.00 JARDIANCE 25 mg tablet      JANUVIA 100 mg tablet      metFORMIN (GLUCOPHAGE) 1,000 mg tablet   2. Pure hypercholesterolemia E78.00 272.0 atorvastatin (LIPITOR) 20 mg tablet   3. Bilateral leg pain M79.604 729.5 ANKLE BRACHIAL INDEX    M79.605     4. Hypertriglyceridemia E78.1 272.1 fenofibrate (LOFIBRA) 160 mg tablet   5. Type 2 diabetes mellitus without complication, with long-term current use of insulin (HCC) E11.9 250.00 JANUVIA 100 mg tablet    Z79.4 V58.67 metFORMIN (GLUCOPHAGE) 1,000 mg tablet   6. Sore throat J02.9 462 AMB POC RAPID STREP A   7. Leukocytosis, unspecified type D72.829 288.60 CBC WITH AUTOMATED DIFF     37F who presents for follow up of her recent labs. I note that her strep was negative. WBC still slightly elevated, but not as much as previously. Will monitor. More than likely, this is a viral illness. I have discussed the diagnosis with the patient and the intended treatment plan as seen in the above orders. The patient has received an after-visit summary and questions were answered concerning future plans. Asked to return should symptoms worsen or not improve with treatment. Any pending labs and studies will be relayed to patient when they become available. Pt verbalizes understanding of plan of care and denies further questions or concerns at this time. Follow-up Disposition:  Return if symptoms worsen or fail to improve.

## 2017-11-30 DIAGNOSIS — Z91.09 ENVIRONMENTAL ALLERGIES: ICD-10-CM

## 2017-11-30 RX ORDER — FLUTICASONE PROPIONATE 50 MCG
SPRAY, SUSPENSION (ML) NASAL
Qty: 1 BOTTLE | Refills: 3 | Status: SHIPPED | OUTPATIENT
Start: 2017-11-30 | End: 2018-05-14 | Stop reason: SDUPTHER

## 2017-12-21 ENCOUNTER — OFFICE VISIT (OUTPATIENT)
Dept: FAMILY MEDICINE CLINIC | Age: 37
End: 2017-12-21

## 2017-12-21 VITALS
HEART RATE: 94 BPM | TEMPERATURE: 97.2 F | DIASTOLIC BLOOD PRESSURE: 67 MMHG | OXYGEN SATURATION: 98 % | RESPIRATION RATE: 20 BRPM | BODY MASS INDEX: 34.05 KG/M2 | HEIGHT: 65 IN | SYSTOLIC BLOOD PRESSURE: 103 MMHG | WEIGHT: 204.4 LBS

## 2017-12-21 DIAGNOSIS — R11.0 NAUSEA: ICD-10-CM

## 2017-12-21 DIAGNOSIS — E78.5 DYSLIPIDEMIA (HIGH LDL; LOW HDL): ICD-10-CM

## 2017-12-21 DIAGNOSIS — G43.709 CHRONIC MIGRAINE WITHOUT AURA WITHOUT STATUS MIGRAINOSUS, NOT INTRACTABLE: Primary | ICD-10-CM

## 2017-12-21 RX ORDER — ATORVASTATIN CALCIUM 20 MG/1
20 TABLET, FILM COATED ORAL DAILY
Qty: 30 TAB | Refills: 5 | Status: SHIPPED | OUTPATIENT
Start: 2017-12-21 | End: 2018-07-10 | Stop reason: ALTCHOICE

## 2017-12-21 RX ORDER — TIZANIDINE 2 MG/1
TABLET ORAL
Qty: 15 TAB | Refills: 1 | Status: SHIPPED | OUTPATIENT
Start: 2017-12-21 | End: 2018-03-03 | Stop reason: SDUPTHER

## 2017-12-21 RX ORDER — IBUPROFEN 800 MG/1
800 TABLET ORAL
Qty: 30 TAB | Refills: 3 | Status: SHIPPED | OUTPATIENT
Start: 2017-12-21 | End: 2018-03-03 | Stop reason: SDUPTHER

## 2017-12-21 RX ORDER — KETOROLAC TROMETHAMINE 30 MG/ML
60 INJECTION, SOLUTION INTRAMUSCULAR; INTRAVENOUS ONCE
Qty: 1 VIAL | Refills: 0
Start: 2017-12-21 | End: 2017-12-21

## 2017-12-21 RX ORDER — SUMATRIPTAN 50 MG/1
TABLET, FILM COATED ORAL
Qty: 9 TAB | Refills: 3 | Status: SHIPPED | OUTPATIENT
Start: 2017-12-21 | End: 2018-04-08 | Stop reason: SDUPTHER

## 2017-12-21 RX ORDER — ONDANSETRON 8 MG/1
8 TABLET, ORALLY DISINTEGRATING ORAL
Qty: 15 TAB | Refills: 0 | Status: SHIPPED | OUTPATIENT
Start: 2017-12-21 | End: 2018-02-01 | Stop reason: SDUPTHER

## 2017-12-21 NOTE — PROGRESS NOTES
Subjective:      Patient : This patient is a 42 y.o. female with a chief complaint of headache. The patient has had history of headaches. The headache is typical of previous headaches. The patient says that the headache was gradual  in onset. The pain unchanged. . The pain is moderate. The pain isglobal and is a tightness. The pain does migrate to back of her head. The headache is accompanied by photophobia. The headaches usually last 3-4 days. They are triggered by stress. Thereis a family history of headaches. There is not a family history of cerebral aneurysm or SAH. The patient has not been diagnosed with polymyalgia rheumatica. Objective:     Visit Vitals    /67 (BP 1 Location: Right arm, BP Patient Position: Sitting)    Pulse 94    Temp 97.2 °F (36.2 °C) (Temporal)    Resp 20    Ht 5' 5\" (1.651 m)    Wt 204 lb 6.4 oz (92.7 kg)    LMP 01/25/2012    SpO2 98%    BMI 34.01 kg/m2       Skin:  warm and normal turgor  HEENT:  MARY, EOMI , TMI:  and Neck  Heart regular rhythm  Lungs: clear to auscultation and percussion throughout both lung fields  CV:normal  Abdomen  normal  Extremeties:no clubbing, cyanosis, edema  Neuro alert, oriented x 3, no focal deficits, no sensory deficits and no cerebella dysfunction     Past Medical History:  Past Medical History:   Diagnosis Date    Anxiety     Asthma     Diabetes ([facility] [address] 2008    Headache     Headache(784.0)     Hypertension     Rash 7/14/2016    S/P dilatation and curettage     Tachycardia        Family History:  Family History   Problem Relation Age of Onset    Heart Disease Mother     Hypertension Father     Hypertension Sister        Medications:  Current Outpatient Prescriptions   Medication Sig Dispense Refill    atorvastatin (LIPITOR) 20 mg tablet Take 1 Tab by mouth daily. 30 Tab 5    ketorolac tromethamine (TORADOL) 60 mg/2 mL soln 2 mL by IntraMUSCular route once for 1 dose.  1 Vial 0    tiZANidine (ZANAFLEX) 2 mg tablet 1-2 tablets 1-2 hours before bedtime as needed. 15 Tab 1    ibuprofen (MOTRIN) 800 mg tablet Take 1 Tab by mouth every eight (8) hours as needed for Pain for up to 30 days. 30 Tab 3    SUMAtriptan (IMITREX) 50 mg tablet Take 1 tablet at the onset of migraine headache, repeat in 1 hour if needed. No more than 2-tablets in 24 hours. 9 Tab 3    ondansetron (ZOFRAN ODT) 8 mg disintegrating tablet Take 1 Tab by mouth every eight (8) hours as needed for Nausea. 15 Tab 0    fluticasone (FLONASE) 50 mcg/actuation nasal spray USE 2 SQUIRTS DAILY AS NEEDED 1 Bottle 3    omeprazole (PRILOSEC) 20 mg capsule TAKE ONE CAPSULE BY MOUTH ONCE DAILY 30 Cap 5    JARDIANCE 25 mg tablet Take 1 Tab by mouth daily. 30 Tab 5    fenofibrate (LOFIBRA) 160 mg tablet Take 1 Tab by mouth daily. 30 Tab 5    JANUVIA 100 mg tablet Take 1 Tab by mouth once over twenty-four (24) hours. 30 Tab 5    metFORMIN (GLUCOPHAGE) 1,000 mg tablet Take 1 Tab by mouth two (2) times daily (with meals) for 90 days. TAKE 1 TABLET BY MOUTH TWICE A  Tab 5    VENTOLIN HFA 90 mcg/actuation inhaler INHALE 2 PUFFS BY MOUTH EVERY 4 HOURS AS NEEDED FOR WHEEZING 18 Inhaler 5    albuterol (PROVENTIL VENTOLIN) 2.5 mg /3 mL (0.083 %) nebulizer solution USE ONE VIAL IN NEBULIZER EVERY 4 HOURS AS NEEDED FOR WHEEZING FOR  UP  TO  30  DOSES 300 Each 2    aspirin delayed-release 81 mg tablet       glucose blood VI test strips (ASCENSIA AUTODISC VI, ONE TOUCH ULTRA TEST VI) strip One touch Ultra Test strip. To use daily, to check blood sugar. Dx: E11.9. 100 Strip 1    Blood-Glucose Meter (ONETOUCH VERIO IQ METER) misc To use to check blood sugar daily. Dx: E11.9. 1 Each 0    famotidine (PEPCID) 40 mg tablet TAKE 1 TABLET BY MOUTH EVERY DAY  12    montelukast (SINGULAIR) 10 mg tablet TAKE 1 TABLET BY MOUTH EACH EVENING  5    ergocalciferol (ERGOCALCIFEROL) 50,000 unit capsule Take 1 Cap by mouth every seven (7) days.       Nebulizer & Compressor machine Use as needed for wheezing, cough 1 Each 0    Blood-Glucose Meter monitoring kit Please give one kit. 1 Kit 0    Lancets (MICROLET LANCET) Misc USE TO CHECK BLOOD SUGAR ONE TO TWO TIMES DAILY 100 Each 0    glucose blood VI test strips (ASCENSIA CONTOUR) strip TO BE USED AS DIRECTED 50 Strip 5    omeprazole (PRILOSEC) 20 mg capsule Take 20 mg by mouth once over twenty-four (24) hours.  sucralfate (CARAFATE) 100 mg/mL suspension Take 10 mL by mouth four (4) times daily. 414 mL 0    topiramate (TOPAMAX) 50 mg tablet Take 1 tab twice a day for migraine prevention 60 Tab 0    butalbital-acetaminophen-caffeine (FIORICET, ESGIC) -40 mg per tablet Take 1 Tab by mouth every six (6) hours as needed for Pain. Max Daily Amount: 4 Tabs. 20 Tab 0       Allergies: Allergies   Allergen Reactions    Green Pepper Hives       Social History:  Social History    Marital status:      Spouse name: N/A    Number of children: N/A    Years of education: N/A     Occupational History    Day Care provider. Social History Main Topics    Smoking status: Never Smoker    Smokeless tobacco: Never Used    Alcohol use No    Drug use: No    Sexual activity: Yes     Partners: Male     Birth control/ protection: Surgical         Assessment:       ICD-10-CM ICD-9-CM    1. Chronic migraine without aura without status migrainosus, not intractable G43.709 346.70 ketorolac tromethamine (TORADOL) 60 mg/2 mL soln      KETOROLAC TROMETHAMINE INJ      CO THER/PROPH/DIAG INJECTION, SUBCUT/IM      tiZANidine (ZANAFLEX) 2 mg tablet      ibuprofen (MOTRIN) 800 mg tablet      SUMAtriptan (IMITREX) 50 mg tablet   2. Dyslipidemia (high LDL; low HDL) E78.4 272.4 atorvastatin (LIPITOR) 20 mg tablet   3. Nausea R11.0 787.02 ondansetron (ZOFRAN ODT) 8 mg disintegrating tablet     I have discussed the diagnosis with the patient and the intended treatment plan as seen in the above orders.  The patient has received an after-visit summary and questions were answered concerning future plans. Asked to return should symptoms worsen or not improve with treatment. Any pending labs and studies will be relayed to patient when they become available. Pt verbalizes understanding of plan of care and denies further questions or concerns at this time. Follow-up Disposition:  Return if symptoms worsen or fail to improve. Merlos Junk

## 2017-12-21 NOTE — PATIENT INSTRUCTIONS
Recurring Migraine Headache: Care Instructions  Your Care Instructions  Migraines are painful, throbbing headaches. They often start on one side of the head. They may cause nausea and vomiting and make you sensitive to light, sound, or smell. Some people may have only a few migraines throughout life. Others have them as often as several times a month. The goal of treatment is to reduce the number of migraines you have and relieve your symptoms. Even with treatment, you may continue to have migraines. You play an important role in dealing with your headaches. Work on avoiding things that seem to trigger your migraines. When you feel a headache coming on, act quickly to stop it before it gets worse. Follow-up care is a key part of your treatment and safety. Be sure to make and go to all appointments, and call your doctor if you are having problems. It's also a good idea to know your test results and keep a list of the medicines you take. How can you care for yourself at home? · Do not drive if you have taken a prescription pain medicine. · Rest in a quiet, dark room until your headache is gone. Close your eyes and try to relax or go to sleep. Do not watch TV or read. · Put a cold, moist cloth or cold pack on the painful area for 10 to 20 minutes at a time. Put a thin cloth between the cold pack and your skin. · Have someone gently massage your neck and shoulders. · Take your medicines exactly as prescribed. Call your doctor if you think you are having a problem with your medicine. You will get more details on the specific medicines your doctor prescribes. To prevent migraines  · Keep a headache diary so you can figure out what triggers your headaches. Avoiding triggers may help you prevent headaches. Record when each headache began, how long it lasted, and what the pain was like. Use words like throbbing, aching, stabbing, or dull. Write down any other symptoms you had with the headache.  These may include nausea, flashing lights or dark spots, or sensitivity to bright light or loud noise. Note if the headache occurred near your period. List anything that might have triggered the headache. Triggers may include certain foods (chocolate, cheese, wine) or odors, smoke, bright light, stress, or lack of sleep. · If your doctor has prescribed medicine for your migraines, take it as directed. You may have medicine that you take only when you get a migraine and medicine that you take all the time to help prevent migraines. ¨ If your doctor has prescribed medicine for when you get a headache, take it at the first sign of a migraine, unless your doctor has given you other instructions. ¨ If your doctor has prescribed medicine to prevent migraines, take it exactly as prescribed. Call your doctor if you think you are having a problem with your medicine. · Find healthy ways to deal with stress. Migraines are most common during or right after stressful times. Take time to relax before and after you do something that has caused a migraine in the past.  · Try to keep your muscles relaxed by keeping good posture. Check your jaw, face, neck, and shoulder muscles for tension. Try to relax them. When sitting at a desk, change positions often. Stretch for 30 seconds each hour. · Get regular sleep and exercise. · Eat regular meals, and avoid foods and drinks that often trigger migraines. These include chocolate and alcohol, especially red wine and port. Chemicals used in food, such as aspartame and monosodium glutamate (MSG), also can trigger migraines. So can some food additives, such as those found in hot dogs, garcia, cold cuts, aged cheeses, and pickled foods. · Limit caffeine by not drinking too much coffee, tea, or soda. Do not quit caffeine suddenly, because that can also give you migraines. · Do not smoke or allow others to smoke around you.  If you need help quitting, talk to your doctor about stop-smoking programs and medicines. These can increase your chances of quitting for good. · If you are taking birth control pills or hormone therapy, talk to your doctor about whether they are triggering your migraines. When should you call for help? Call 911 anytime you think you may need emergency care. For example, call if:  ? · You have symptoms of a stroke. These may include:  ¨ Sudden numbness, tingling, weakness, or loss of movement in your face, arm, or leg, especially on only one side of your body. ¨ Sudden vision changes. ¨ Sudden trouble speaking. ¨ Sudden confusion or trouble understanding simple statements. ¨ Sudden problems with walking or balance. ¨ A sudden, severe headache that is different from past headaches. ?Call your doctor now or seek immediate medical care if:  ? · You develop a fever and a stiff neck. ? · You have new nausea and vomiting, or you cannot keep down food or liquids. ? Watch closely for changes in your health, and be sure to contact your doctor if:  ? · You have a headache that does not get better within 1 or 2 days. ? · Your headaches get worse or happen more often. Where can you learn more? Go to http://hodan-eugene.info/. Enter V975 in the search box to learn more about \"Recurring Migraine Headache: Care Instructions. \"  Current as of: October 14, 2016  Content Version: 11.4  © 6921-7617 [employer]. Care instructions adapted under license by [employer] (which disclaims liability or warranty for this information). If you have questions about a medical condition or this instruction, always ask your healthcare professional. [place] [address] any warranty or liability for your use of this information.

## 2017-12-21 NOTE — PROGRESS NOTES
Identified pt with two pt identifiers(name and ). Chief Complaint   Patient presents with    Headache     Wake up with it sometimes, nausea at times, 5-6 this week        Health Maintenance Due   Topic    Pneumococcal 19-64 Highest Risk (1 of 3 - PCV13)    EYE EXAM RETINAL OR DILATED Q1        Wt Readings from Last 3 Encounters:   17 204 lb 6.4 oz (92.7 kg)   17 208 lb 3.2 oz (94.4 kg)   10/31/17 208 lb (94.3 kg)     Temp Readings from Last 3 Encounters:   17 97.2 °F (36.2 °C) (Temporal)   17 99.3 °F (37.4 °C) (Oral)   10/31/17 98.4 °F (36.9 °C) (Oral)     BP Readings from Last 3 Encounters:   17 103/67   17 133/70   10/31/17 123/83     Pulse Readings from Last 3 Encounters:   17 94   17 (!) 106   10/31/17 87         Learning Assessment:  :     Learning Assessment 2017   PRIMARY LEARNER Patient Patient   HIGHEST LEVEL OF EDUCATION - PRIMARY LEARNER  GRADUATED HIGH SCHOOL OR GED GRADUATED HIGH SCHOOL OR GED   BARRIERS PRIMARY LEARNER NONE NONE   PRIMARY LANGUAGE ENGLISH ENGLISH   LEARNER PREFERENCE PRIMARY READING READING   ANSWERED BY patient patient   RELATIONSHIP SELF SELF       Depression Screening:  :     PHQ over the last two weeks 2017   Little interest or pleasure in doing things Not at all   Feeling down, depressed or hopeless Not at all   Total Score PHQ 2 0       Fall Risk Assessment:  :     Fall Risk Assessment, last 12 mths 2017   Able to walk? Yes   Fall in past 12 months? No       Abuse Screening:  :     Abuse Screening Questionnaire 2017   Do you ever feel afraid of your partner? N N   Are you in a relationship with someone who physically or mentally threatens you? N N   Is it safe for you to go home?  Y Y       Coordination of Care Questionnaire:  :     1) Have you been to an emergency room, urgent care clinic since your last visit? no   Hospitalized since your last visit? no             2) Have you seen or consulted any other health care providers outside of [address] [place] [address] since your last visit? no  (Include any pap smears or colon screenings in this section.)    3) Do you have an Advance Directive on file? yes  Are you interested in receiving information about Advance Directives? no    Reviewed record in preparation for visit and have obtained necessary documentation. Medication reconciliation up to date and corrected with patient at this time. Pt reports headaches 5-6 times this week. Reports headaches much worse than usual. H/o Migraines.

## 2017-12-21 NOTE — MR AVS SNAPSHOT
Visit Information Date & Time Provider Department Dept. Phone Encounter #  
 12/21/2017  9:00 AM Zach Diaz Denny [phone] Follow-up Instructions Return if symptoms worsen or fail to improve. Upcoming Health Maintenance Date Due  
 EYE EXAM RETINAL OR DILATED Q1 9/1/2014 DTaP/Tdap/Td series (2 - Td) 1/1/2018 FOOT EXAM Q1 3/15/2018 MICROALBUMIN Q1 3/15/2018 HEMOGLOBIN A1C Q6M 4/30/2018 LIPID PANEL Q1 10/31/2018 Pneumococcal 19-64 Highest Risk (2 of 3 - PCV13) 12/21/2018 PAP AKA CERVICAL CYTOLOGY 6/15/2019 Allergies as of 12/21/2017  Review Complete On: 12/21/2017 By: Peña Linda MD  
  
 Severity Noted Reaction Type Reactions Green Pepper  06/28/2012    Hives Current Immunizations  Reviewed on 3/18/2014 Name Date Influenza Vaccine 10/26/2013 TDAP Vaccine 1/1/2008 Not reviewed this visit You Were Diagnosed With   
  
 Codes Comments Chronic migraine without aura without status migrainosus, not intractable    -  Primary ICD-10-CM: C42.423 ICD-9-CM: 346.70 Dyslipidemia (high LDL; low HDL)     ICD-10-CM: E78.4 ICD-9-CM: 272.4 Nausea     ICD-10-CM: R11.0 ICD-9-CM: 787.02 Vitals BP Pulse Temp Resp Height(growth percentile) Weight(growth percentile) 103/67 (BP 1 Location: Right arm, BP Patient Position: Sitting) 94 97.2 °F (36.2 °C) (Temporal) 20 5' 5\" (1.651 m) 204 lb 6.4 oz (92.7 kg) LMP SpO2 BMI OB Status Smoking Status 01/25/2012 98% 34.01 kg/m2 Hysterectomy Never Smoker BMI and BSA Data Body Mass Index Body Surface Area 34.01 kg/m 2 2.06 m 2 Preferred Pharmacy Pharmacy Name Phone [id number] - [address] [facility] [address] [phone] Your Updated Medication List  
  
   
This list is accurate as of: 12/21/17  9:30 AM.  Always use your most recent med list.  
  
  
  
  
 aspirin delayed-release 81 mg tablet  
  
 atorvastatin 20 mg tablet Commonly known as:  LIPITOR Take 1 Tab by mouth daily. * Blood-Glucose Meter monitoring kit Please give one kit. * Blood-Glucose Meter Misc Commonly known as:  Ricardo Gooden IQ METER To use to check blood sugar daily. Dx: E11.9.  
  
 butalbital-acetaminophen-caffeine -40 mg per tablet Commonly known as:  Ludivina Reasoner Take 1 Tab by mouth every six (6) hours as needed for Pain. Max Daily Amount: 4 Tabs.  
  
 ergocalciferol 50,000 unit capsule Commonly known as:  ERGOCALCIFEROL Take 1 Cap by mouth every seven (7) days. famotidine 40 mg tablet Commonly known as:  PEPCID  
TAKE 1 TABLET BY MOUTH EVERY DAY  
  
 fenofibrate 160 mg tablet Commonly known as:  LOFIBRA Take 1 Tab by mouth daily. fluticasone 50 mcg/actuation nasal spray Commonly known as:  FLONASE  
USE 2 SQUIRTS DAILY AS NEEDED  
  
 * glucose blood VI test strips strip Commonly known as:  Ascensia CONTOUR  
TO BE USED AS DIRECTED * glucose blood VI test strips strip Commonly known as:  ASCENSIA AUTODISC VI, ONE TOUCH ULTRA TEST VI One touch Ultra Test strip. To use daily, to check blood sugar. Dx: E11.9.  
  
 ibuprofen 800 mg tablet Commonly known as:  MOTRIN Take 1 Tab by mouth every eight (8) hours as needed for Pain for up to 30 days. JANUVIA 100 mg tablet Generic drug:  SITagliptin Take 1 Tab by mouth once over twenty-four (24) hours. JARDIANCE 25 mg tablet Generic drug:  empagliflozin Take 1 Tab by mouth daily. ketorolac tromethamine 60 mg/2 mL Soln Commonly known as:  TORADOL  
2 mL by IntraMUSCular route once for 1 dose. Lancets Misc Commonly known as:  MICROLET LANCET  
USE TO CHECK BLOOD SUGAR ONE TO TWO TIMES DAILY  
  
 metFORMIN 1,000 mg tablet Commonly known as:  GLUCOPHAGE Take 1 Tab by mouth two (2) times daily (with meals) for 90 days.  TAKE 1 TABLET BY MOUTH TWICE A DAY  
  
 montelukast 10 mg tablet Commonly known as:  SINGULAIR  
TAKE 1 TABLET BY MOUTH EACH EVENING Nebulizer & Compressor machine Use as needed for wheezing, cough * omeprazole 20 mg capsule Commonly known as:  PRILOSEC Take 20 mg by mouth once over twenty-four (24) hours. * omeprazole 20 mg capsule Commonly known as:  PRILOSEC  
TAKE ONE CAPSULE BY MOUTH ONCE DAILY  
  
 ondansetron 8 mg disintegrating tablet Commonly known as:  ZOFRAN ODT Take 1 Tab by mouth every eight (8) hours as needed for Nausea. sucralfate 100 mg/mL suspension Commonly known as:  Adah Dory Take 10 mL by mouth four (4) times daily. SUMAtriptan 50 mg tablet Commonly known as:  IMITREX Take 1 tablet at the onset of migraine headache, repeat in 1 hour if needed. No more than 2-tablets in 24 hours. tiZANidine 2 mg tablet Commonly known as:  ZANAFLEX  
1-2 tablets 1-2 hours before bedtime as needed. topiramate 50 mg tablet Commonly known as:  TOPAMAX Take 1 tab twice a day for migraine prevention * VENTOLIN HFA 90 mcg/actuation inhaler Generic drug:  albuterol INHALE 2 PUFFS BY MOUTH EVERY 4 HOURS AS NEEDED FOR WHEEZING  
  
 * albuterol 2.5 mg /3 mL (0.083 %) nebulizer solution Commonly known as:  PROVENTIL VENTOLIN  
USE ONE VIAL IN NEBULIZER EVERY 4 HOURS AS NEEDED FOR WHEEZING FOR  UP  TO  30  DOSES * Notice: This list has 8 medication(s) that are the same as other medications prescribed for you. Read the directions carefully, and ask your doctor or other care provider to review them with you. Prescriptions Sent to Pharmacy Refills  
 atorvastatin (LIPITOR) 20 mg tablet 5 Sig: Take 1 Tab by mouth daily. Class: Normal  
 Pharmacy: [facility] #[id number] - [place] - [address] Ph #: [phone]  Route: Oral  
 tiZANidine (ZANAFLEX) 2 mg tablet 1  
 Si-2 tablets 1-2 hours before bedtime as needed. Class: Normal  
 Pharmacy: [facility]/pharmacy #[id number] - [address] Ph #: [phone]  
 ibuprofen (MOTRIN) 800 mg tablet 3 Sig: Take 1 Tab by mouth every eight (8) hours as needed for Pain for up to 30 days. Class: Normal  
 Pharmacy: [facility]/pharmacy #[id number] - [address] Ph #: [phone] Route: Oral  
 SUMAtriptan (IMITREX) 50 mg tablet 3 Sig: Take 1 tablet at the onset of migraine headache, repeat in 1 hour if needed. No more than 2-tablets in 24 hours. Class: Normal  
 Pharmacy: [facility]/pharmacy #[id number] - [address] Ph #: [phone]  
 ondansetron (ZOFRAN ODT) 8 mg disintegrating tablet 0 Sig: Take 1 Tab by mouth every eight (8) hours as needed for Nausea. Class: Normal  
 [id number] [address] Ph #: [phone] Route: Oral  
  
We Performed the Following KETOROLAC TROMETHAMINE INJ [ [facility]] [place] THER/PROPH/DIAG INJECTION, SUBCUT/IM [id number] CPT(R)] Follow-up Instructions Return if symptoms worsen or fail to improve. Patient Instructions Recurring Migraine Headache: Care Instructions Your Care Instructions Migraines are painful, throbbing headaches. They often start on one side of the head. They may cause nausea and vomiting and make you sensitive to light, sound, or smell. Some people may have only a few migraines throughout life. Others have them as often as several times a month. The goal of treatment is to reduce the number of migraines you have and relieve your symptoms. Even with treatment, you may continue to have migraines. You play an important role in dealing with your headaches. Work on avoiding things that seem to trigger your migraines. When you feel a headache coming on, act quickly to stop it before it gets worse. Follow-up care is a key part of your treatment and safety.  Be sure to make and go to all appointments, and call your doctor if you are having problems. It's also a good idea to know your test results and keep a list of the medicines you take. How can you care for yourself at home? · Do not drive if you have taken a prescription pain medicine. · Rest in a quiet, dark room until your headache is gone. Close your eyes and try to relax or go to sleep. Do not watch TV or read. · Put a cold, moist cloth or cold pack on the painful area for 10 to 20 minutes at a time. Put a thin cloth between the cold pack and your skin. · Have someone gently massage your neck and shoulders. · Take your medicines exactly as prescribed. Call your doctor if you think you are having a problem with your medicine. You will get more details on the specific medicines your doctor prescribes. To prevent migraines · Keep a headache diary so you can figure out what triggers your headaches. Avoiding triggers may help you prevent headaches. Record when each headache began, how long it lasted, and what the pain was like. Use words like throbbing, aching, stabbing, or dull. Write down any other symptoms you had with the headache. These may include nausea, flashing lights or dark spots, or sensitivity to bright light or loud noise. Note if the headache occurred near your period. List anything that might have triggered the headache. Triggers may include certain foods (chocolate, cheese, wine) or odors, smoke, bright light, stress, or lack of sleep. · If your doctor has prescribed medicine for your migraines, take it as directed. You may have medicine that you take only when you get a migraine and medicine that you take all the time to help prevent migraines. ¨ If your doctor has prescribed medicine for when you get a headache, take it at the first sign of a migraine, unless your doctor has given you other instructions.  
¨ If your doctor has prescribed medicine to prevent migraines, take it exactly as prescribed. Call your doctor if you think you are having a problem with your medicine. · Find healthy ways to deal with stress. Migraines are most common during or right after stressful times. Take time to relax before and after you do something that has caused a migraine in the past. 
· Try to keep your muscles relaxed by keeping good posture. Check your jaw, face, neck, and shoulder muscles for tension. Try to relax them. When sitting at a desk, change positions often. Stretch for 30 seconds each hour. · Get regular sleep and exercise. · Eat regular meals, and avoid foods and drinks that often trigger migraines. These include chocolate and alcohol, especially red wine and port. Chemicals used in food, such as aspartame and monosodium glutamate (MSG), also can trigger migraines. So can some food additives, such as those found in hot dogs, garcia, cold cuts, aged cheeses, and pickled foods. · Limit caffeine by not drinking too much coffee, tea, or soda. Do not quit caffeine suddenly, because that can also give you migraines. · Do not smoke or allow others to smoke around you. If you need help quitting, talk to your doctor about stop-smoking programs and medicines. These can increase your chances of quitting for good. · If you are taking birth control pills or hormone therapy, talk to your doctor about whether they are triggering your migraines. When should you call for help? Call 911 anytime you think you may need emergency care. For example, call if: 
? · You have symptoms of a stroke. These may include: 
¨ Sudden numbness, tingling, weakness, or loss of movement in your face, arm, or leg, especially on only one side of your body. ¨ Sudden vision changes. ¨ Sudden trouble speaking. ¨ Sudden confusion or trouble understanding simple statements. ¨ Sudden problems with walking or balance. ¨ A sudden, severe headache that is different from past headaches. ?Call your doctor now or seek immediate medical care if: 
? · You develop a fever and a stiff neck. ? · You have new nausea and vomiting, or you cannot keep down food or liquids. ? Watch closely for changes in your health, and be sure to contact your doctor if: 
? · You have a headache that does not get better within 1 or 2 days. ? · Your headaches get worse or happen more often. Where can you learn more? Go to http://hodan-eugene.info/. Enter V975 in the search box to learn more about \"Recurring Migraine Headache: Care Instructions. \" Current as of: October 14, 2016 Content Version: 11.4 © 9777-4956 [employer]. Care instructions adapted under license by [employer] (which disclaims liability or warranty for this information). If you have questions about a medical condition or this instruction, always ask your healthcare professional. [address] any warranty or liability for your use of this information. Introducing [facility]! Emely Tomlin introduces [facility] patient portal. Now you can access parts of your medical record, email your doctor's office, and request medication refills online. 1. In your internet browser, go to [email or web address] 2. Click on the First Time User? Click Here link in the Sign In box. You will see the New Member Sign Up page. 3. Enter your [facility] Access Code exactly as it appears below. You will not need to use this code after youve completed the sign-up process. If you do not sign up before the expiration date, you must request a new code. · [facility] Access Code: [email or web address] Expires: 1/25/2018  7:26 AM 
 
4. Enter the last four digits of your Social Security Number (xxxx) and Date of Birth (mm/dd/yyyy) as indicated and click Submit. You will be taken to the next sign-up page. 5. Create a [facility] ID.  This will be your [facility] login ID and cannot be changed, so think of one that is secure and easy to remember. 6. Create a [facility] password. You can change your password at any time. 7. Enter your Password Reset Question and Answer. This can be used at a later time if you forget your password. 8. Enter your e-mail address. You will receive e-mail notification when new information is available in [address] 9. Click Sign Up. You can now view and download portions of your medical record. 10. Click the Download Summary menu link to download a portable copy of your medical information. If you have questions, please visit the Frequently Asked Questions section of the [facility] website. Remember, [facility] is NOT to be used for urgent needs. For medical emergencies, dial 911. Now available from your iPhone and Android! Please provide this summary of care documentation to your next provider. Your primary care clinician is listed as [address]. If you have any questions after today's visit, please call [phone].

## 2018-02-01 DIAGNOSIS — E11.9 TYPE 2 DIABETES MELLITUS WITHOUT COMPLICATION, WITH LONG-TERM CURRENT USE OF INSULIN (HCC): ICD-10-CM

## 2018-02-01 DIAGNOSIS — R10.30 LOWER ABDOMINAL PAIN: ICD-10-CM

## 2018-02-01 DIAGNOSIS — E78.1 HYPERTRIGLYCERIDEMIA: ICD-10-CM

## 2018-02-01 DIAGNOSIS — M54.6 ACUTE MIDLINE THORACIC BACK PAIN: ICD-10-CM

## 2018-02-01 DIAGNOSIS — R11.0 NAUSEA: ICD-10-CM

## 2018-02-01 DIAGNOSIS — I10 ESSENTIAL HYPERTENSION: ICD-10-CM

## 2018-02-01 DIAGNOSIS — Z79.4 TYPE 2 DIABETES MELLITUS WITHOUT COMPLICATION, WITH LONG-TERM CURRENT USE OF INSULIN (HCC): ICD-10-CM

## 2018-02-02 RX ORDER — ONDANSETRON 8 MG/1
TABLET, ORALLY DISINTEGRATING ORAL
Qty: 15 TAB | Refills: 0 | Status: SHIPPED | OUTPATIENT
Start: 2018-02-02 | End: 2018-03-23 | Stop reason: ALTCHOICE

## 2018-02-02 RX ORDER — CYCLOBENZAPRINE HCL 10 MG
TABLET ORAL
Qty: 30 TAB | Refills: 0 | Status: SHIPPED | OUTPATIENT
Start: 2018-02-02 | End: 2018-03-23 | Stop reason: ALTCHOICE

## 2018-02-02 RX ORDER — SIMVASTATIN 20 MG/1
TABLET, FILM COATED ORAL
Qty: 90 TAB | Refills: 1 | Status: SHIPPED | OUTPATIENT
Start: 2018-02-02 | End: 2018-03-23 | Stop reason: ALTCHOICE

## 2018-02-02 RX ORDER — ASPIRIN 81 MG/1
TABLET ORAL
Qty: 30 TAB | Refills: 5 | Status: SHIPPED | OUTPATIENT
Start: 2018-02-02 | End: 2018-08-01 | Stop reason: SDUPTHER

## 2018-02-02 RX ORDER — CIPROFLOXACIN 250 MG/1
TABLET, FILM COATED ORAL
Qty: 10 TAB | Refills: 0 | Status: SHIPPED | OUTPATIENT
Start: 2018-02-02 | End: 2018-02-07 | Stop reason: ALTCHOICE

## 2018-02-07 ENCOUNTER — OFFICE VISIT (OUTPATIENT)
Dept: FAMILY MEDICINE CLINIC | Age: 38
End: 2018-02-07

## 2018-02-07 VITALS
OXYGEN SATURATION: 97 % | SYSTOLIC BLOOD PRESSURE: 108 MMHG | RESPIRATION RATE: 16 BRPM | WEIGHT: 210 LBS | HEIGHT: 65 IN | TEMPERATURE: 98.5 F | DIASTOLIC BLOOD PRESSURE: 80 MMHG | HEART RATE: 93 BPM | BODY MASS INDEX: 34.99 KG/M2

## 2018-02-07 DIAGNOSIS — J02.9 SORE THROAT: ICD-10-CM

## 2018-02-07 DIAGNOSIS — H65.111 ACUTE MUCOID OTITIS MEDIA OF RIGHT EAR: Primary | ICD-10-CM

## 2018-02-07 LAB
S PYO AG THROAT QL: NEGATIVE
VALID INTERNAL CONTROL?: YES

## 2018-02-07 RX ORDER — AMOXICILLIN AND CLAVULANATE POTASSIUM 875; 125 MG/1; MG/1
1 TABLET, FILM COATED ORAL 2 TIMES DAILY
Qty: 20 TAB | Refills: 0 | Status: SHIPPED | OUTPATIENT
Start: 2018-02-07 | End: 2018-02-17

## 2018-02-07 RX ORDER — EMPAGLIFLOZIN 10 MG/1
TABLET, FILM COATED ORAL
Refills: 1 | COMMUNITY
Start: 2017-11-27 | End: 2018-02-07 | Stop reason: ALTCHOICE

## 2018-02-07 NOTE — PATIENT INSTRUCTIONS
Ear Infection (Otitis Media): Care Instructions  Your Care Instructions    An ear infection may start with a cold and affect the middle ear (otitis media). It can hurt a lot. Most ear infections clear up on their own in a couple of days. Most often you will not need antibiotics. This is because many ear infections are caused by a virus. Antibiotics don't work against a virus. Regular doses of pain medicines are the best way to reduce your fever and help you feel better. Follow-up care is a key part of your treatment and safety. Be sure to make and go to all appointments, and call your doctor if you are having problems. It's also a good idea to know your test results and keep a list of the medicines you take. How can you care for yourself at home? · Take pain medicines exactly as directed. ¨ If the doctor gave you a prescription medicine for pain, take it as prescribed. ¨ If you are not taking a prescription pain medicine, take an over-the-counter medicine, such as acetaminophen (Tylenol), ibuprofen (Advil, Motrin), or naproxen (Aleve). Read and follow all instructions on the label. ¨ Do not take two or more pain medicines at the same time unless the doctor told you to. Many pain medicines have acetaminophen, which is Tylenol. Too much acetaminophen (Tylenol) can be harmful. · Plan to take a full dose of pain reliever before bedtime. Getting enough sleep will help you get better. · Try a warm, moist washcloth on the ear. It may help relieve pain. · If your doctor prescribed antibiotics, take them as directed. Do not stop taking them just because you feel better. You need to take the full course of antibiotics. When should you call for help? Call your doctor now or seek immediate medical care if:  ? · You have new or increasing ear pain. ? · You have new or increasing pus or blood draining from your ear. ? · You have a fever with a stiff neck or a severe headache. ? Watch closely for changes in your health, and be sure to contact your doctor if:  ? · You have new or worse symptoms. ? · You are not getting better after taking an antibiotic for 2 days. Where can you learn more? Go to http://hodan-eugene.info/. Enter W635 in the search box to learn more about \"Ear Infection (Otitis Media): Care Instructions. \"  Current as of: May 12, 2017  Content Version: 11.4  © 2293-4166 [employer]. Care instructions adapted under license by [employer] (which disclaims liability or warranty for this information). If you have questions about a medical condition or this instruction, always ask your healthcare professional. [place] [address] any warranty or liability for your use of this information.

## 2018-02-07 NOTE — MR AVS SNAPSHOT
[address] [address] [address] [facility] 
[phone] Patient: Gilford Moron MRN: [id number] Visit Information Date & Time Provider Department Dept. Phone Encounter #  
 2/7/2018  9:45 AM Obed Rouse 108 [phone] [id number] Follow-up Instructions Return if symptoms worsen or fail to improve. Upcoming Health Maintenance Date Due  
 EYE EXAM RETINAL OR DILATED Q1 9/1/2014 DTaP/Tdap/Td series (2 - Td) 1/1/2018 FOOT EXAM Q1 3/15/2018 MICROALBUMIN Q1 3/15/2018 HEMOGLOBIN A1C Q6M 4/30/2018 LIPID PANEL Q1 10/31/2018 Pneumococcal 19-64 Highest Risk (2 of 3 - PCV13) 12/21/2018 PAP AKA CERVICAL CYTOLOGY 6/15/2019 Allergies as of 2/7/2018  Review Complete On: 2/7/2018 By: Seble Bhatti NP Severity Noted Reaction Type Reactions Green Pepper  06/28/2012    Hives Current Immunizations  Reviewed on 3/18/2014 Name Date Influenza Vaccine 10/26/2013 TDAP Vaccine 1/1/2008 Not reviewed this visit You Were Diagnosed With   
  
 Codes Comments Acute mucoid otitis media of right ear    -  Primary ICD-10-CM: H65.111 ICD-9-CM: 381.02 Sore throat     ICD-10-CM: J02.9 ICD-9-CM: 787 Vitals BP Pulse Temp Resp Height(growth percentile) Weight(growth percentile) 108/80 93 98.5 °F (36.9 °C) (Oral) 16 5' 5\" (1.651 m) 210 lb (95.3 kg) LMP SpO2 BMI OB Status Smoking Status 01/25/2012 97% 34.95 kg/m2 Hysterectomy Never Smoker Vitals History BMI and BSA Data Body Mass Index Body Surface Area 34.95 kg/m 2 2.09 m 2 Preferred Pharmacy Pharmacy Name Phone [id number] [address] [phone] Your Updated Medication List  
  
   
This list is accurate as of: 2/7/18 10:09 AM.  Always use your most recent med list.  
  
  
  
  
 amoxicillin-clavulanate 875-125 mg per tablet Commonly known as:  AUGMENTIN Take 1 Tab by mouth two (2) times a day for 10 days. * aspirin delayed-release 81 mg tablet * aspirin delayed-release 81 mg tablet TAKE 1 TABLET BY MOUTH EVERY DAY  
  
 atorvastatin 20 mg tablet Commonly known as:  LIPITOR Take 1 Tab by mouth daily. * Blood-Glucose Meter monitoring kit Please give one kit. * Blood-Glucose Meter Misc Commonly known as:  [employer] IQ METER To use to check blood sugar daily. Dx: E11.9.  
  
 butalbital-acetaminophen-caffeine -40 mg per tablet Commonly known as:  Glenice Drummer Take 1 Tab by mouth every six (6) hours as needed for Pain. Max Daily Amount: 4 Tabs. cyclobenzaprine 10 mg tablet Commonly known as:  FLEXERIL  
TAKE 1 TAB BY MOUTH THREE (3) TIMES DAILY AS NEEDED FOR MUSCLE SPASM(S). ergocalciferol 50,000 unit capsule Commonly known as:  ERGOCALCIFEROL Take 1 Cap by mouth every seven (7) days. famotidine 40 mg tablet Commonly known as:  PEPCID  
TAKE 1 TABLET BY MOUTH EVERY DAY  
  
 fenofibrate 160 mg tablet Commonly known as:  LOFIBRA Take 1 Tab by mouth daily. fluticasone 50 mcg/actuation nasal spray Commonly known as:  FLONASE  
USE 2 SQUIRTS DAILY AS NEEDED  
  
 * glucose blood VI test strips strip Commonly known as:  Ascensia CONTOUR  
TO BE USED AS DIRECTED * glucose blood VI test strips strip Commonly known as:  ASCENSIA AUTODISC VI, ONE TOUCH ULTRA TEST VI One touch Ultra Test strip. To use daily, to check blood sugar. Dx: E11.9. JANUVIA 100 mg tablet Generic drug:  SITagliptin Take 1 Tab by mouth once over twenty-four (24) hours. JARDIANCE 25 mg tablet Generic drug:  empagliflozin Take 1 Tab by mouth daily. Lancets Misc Commonly known as:  MICROLET LANCET  
USE TO CHECK BLOOD SUGAR ONE TO TWO TIMES DAILY  
  
 montelukast 10 mg tablet Commonly known as:  SINGULAIR  
TAKE 1 TABLET BY MOUTH EACH EVENING Nebulizer & Compressor machine Use as needed for wheezing, cough  
  
 omeprazole 20 mg capsule Commonly known as:  PRILOSEC  
TAKE ONE CAPSULE BY MOUTH ONCE DAILY  
  
 ondansetron 8 mg disintegrating tablet Commonly known as:  ZOFRAN ODT  
TAKE 1 TABLET BY MOUTH EVERY 8 HOURS AS NEEDED FOR NAUSEA  
  
 simvastatin 20 mg tablet Commonly known as:  ZOCOR  
TAKE 1 TABLET BY MOUTH EVERY DAY  
  
 sucralfate 100 mg/mL suspension Commonly known as:  Miranda Anton Take 10 mL by mouth four (4) times daily. SUMAtriptan 50 mg tablet Commonly known as:  IMITREX Take 1 tablet at the onset of migraine headache, repeat in 1 hour if needed. No more than 2-tablets in 24 hours. tiZANidine 2 mg tablet Commonly known as:  ZANAFLEX  
1-2 tablets 1-2 hours before bedtime as needed. topiramate 50 mg tablet Commonly known as:  TOPAMAX Take 1 tab twice a day for migraine prevention * VENTOLIN HFA 90 mcg/actuation inhaler Generic drug:  albuterol INHALE 2 PUFFS BY MOUTH EVERY 4 HOURS AS NEEDED FOR WHEEZING  
  
 * albuterol 2.5 mg /3 mL (0.083 %) nebulizer solution Commonly known as:  PROVENTIL VENTOLIN  
USE ONE VIAL IN NEBULIZER EVERY 4 HOURS AS NEEDED FOR WHEEZING FOR  UP  TO  30  DOSES * Notice: This list has 8 medication(s) that are the same as other medications prescribed for you. Read the directions carefully, and ask your doctor or other care provider to review them with you. Prescriptions Printed Refills  
 amoxicillin-clavulanate (AUGMENTIN) 875-125 mg per tablet 0 Sig: Take 1 Tab by mouth two (2) times a day for 10 days. Class: Print Route: Oral  
  
We Performed the Following AMB POC RAPID STREP A [54634 CPT(R)] Follow-up Instructions Return if symptoms worsen or fail to improve. Patient Instructions Ear Infection (Otitis Media): Care Instructions Your Care Instructions An ear infection may start with a cold and affect the middle ear (otitis media). It can hurt a lot. Most ear infections clear up on their own in a couple of days. Most often you will not need antibiotics. This is because many ear infections are caused by a virus. Antibiotics don't work against a virus. Regular doses of pain medicines are the best way to reduce your fever and help you feel better. Follow-up care is a key part of your treatment and safety. Be sure to make and go to all appointments, and call your doctor if you are having problems. It's also a good idea to know your test results and keep a list of the medicines you take. How can you care for yourself at home? · Take pain medicines exactly as directed. ¨ If the doctor gave you a prescription medicine for pain, take it as prescribed. ¨ If you are not taking a prescription pain medicine, take an over-the-counter medicine, such as acetaminophen (Tylenol), ibuprofen (Advil, Motrin), or naproxen (Aleve). Read and follow all instructions on the label. ¨ Do not take two or more pain medicines at the same time unless the doctor told you to. Many pain medicines have acetaminophen, which is Tylenol. Too much acetaminophen (Tylenol) can be harmful. · Plan to take a full dose of pain reliever before bedtime. Getting enough sleep will help you get better. · Try a warm, moist washcloth on the ear. It may help relieve pain. · If your doctor prescribed antibiotics, take them as directed. Do not stop taking them just because you feel better. You need to take the full course of antibiotics. When should you call for help? Call your doctor now or seek immediate medical care if: 
? · You have new or increasing ear pain. ? · You have new or increasing pus or blood draining from your ear. ? · You have a fever with a stiff neck or a severe headache. ?Watch closely for changes in your health, and be sure to contact your doctor if: 
? · You have new or worse symptoms. ? · You are not getting better after taking an antibiotic for 2 days. Where can you learn more? Go to http://hodan-eugene.info/. Enter P926 in the search box to learn more about \"Ear Infection (Otitis Media): Care Instructions. \" Current as of: May 12, 2017 Content Version: 11.4 © 7567-8497 [employer]. Care instructions adapted under license by [employer] (which disclaims liability or warranty for this information). If you have questions about a medical condition or this instruction, always ask your healthcare professional. [address] any warranty or liability for your use of this information. Introducing [facility]! Edita Malloy introduces [employer] patient portal. Now you can access parts of your medical record, email your doctor's office, and request medication refills online. 1. In your internet browser, go to [email or web address] 2. Click on the First Time User? Click Here link in the Sign In box. You will see the New Member Sign Up page. 3. Enter your [employer] Access Code exactly as it appears below. You will not need to use this code after youve completed the sign-up process. If you do not sign up before the expiration date, you must request a new code. · [employer] Access Code: [email or web address] Expires: 5/8/2018 10:09 AM 
 
4. Enter the last four digits of your Social Security Number (xxxx) and Date of Birth (mm/dd/yyyy) as indicated and click Submit. You will be taken to the next sign-up page. 5. Create a [employer] ID. This will be your [employer] login ID and cannot be changed, so think of one that is secure and easy to remember. 6. Create a [employer] password. You can change your password at any time. 7. Enter your Password Reset Question and Answer.  This can be used at a later time if you forget your password. 8. Enter your e-mail address. You will receive e-mail notification when new information is available in [address] 9. Click Sign Up. You can now view and download portions of your medical record. 10. Click the Download Summary menu link to download a portable copy of your medical information. If you have questions, please visit the Frequently Asked Questions section of the [employer] website. Remember, [employer] is NOT to be used for urgent needs. For medical emergencies, dial 911. Now available from your iPhone and Android! Please provide this summary of care documentation to your next provider. Your primary care clinician is listed as [address]. If you have any questions after today's visit, please call [phone].

## 2018-02-07 NOTE — PROGRESS NOTES
HISTORY OF PRESENT ILLNESS  Jordy Ruelas is a 42 y.o. female. HPI  Pt presents with \"sore throat, ear pain, and body aches\"    Symptoms started 2 days ago  Sore throat  Right ear pain  Body aches  Fever: fever this morning 100.5  Fatigue    No nausea, no vomiting, no diarrhea  OTC: Tylenol  Review of Systems   Constitutional: Positive for fever and malaise/fatigue. HENT: Positive for ear pain and sore throat. Respiratory: Negative for cough. Gastrointestinal: Negative for abdominal pain, diarrhea and vomiting. Physical Exam   Constitutional: She is oriented to person, place, and time. She appears well-developed and well-nourished. HENT:   Head: Normocephalic and atraumatic. Right Ear: Hearing, external ear and ear canal normal. Tympanic membrane is erythematous and retracted. Left Ear: Hearing, tympanic membrane, external ear and ear canal normal.   Nose: Mucosal edema present. Mouth/Throat: Oropharynx is clear and moist.   Neck: Normal range of motion. Neck supple. Cardiovascular: Normal rate, regular rhythm and normal heart sounds. Pulmonary/Chest: Effort normal and breath sounds normal.   Lymphadenopathy:     She has cervical adenopathy. Right cervical: Superficial cervical adenopathy present. Left cervical: Superficial cervical adenopathy present. Neurological: She is alert and oriented to person, place, and time. Skin: Skin is warm and dry. Psychiatric: She has a normal mood and affect. Her behavior is normal.       ASSESSMENT and PLAN    ICD-10-CM ICD-9-CM    1. Acute mucoid otitis media of right ear H65.111 381.02 amoxicillin-clavulanate (AUGMENTIN) 875-125 mg per tablet   2. Sore throat J02.9 462 AMB POC RAPID STREP A     Informed patient that I have sent medication to the pharmacy, and she should take as prescribed. Educated about taking with food, to decrease the risk of stomach upset.   Educated about staying well hydrated, and treating fever as needed. Pt informed to return to office with worsening of symptoms, or PRN with any questions or concerns. Pt verbalizes understanding of plan of care and denies further questions or concerns at this time.

## 2018-02-07 NOTE — PROGRESS NOTES
Chief Complaint   Patient presents with    Sore Throat     started 2 days ago. hurts at night.  Ear Pain     right ear pain that radiates down right ear and neck    Generalized Body Aches     \"REVIEWED RECORD IN PREPARATION FOR VISIT AND HAVE OBTAINED THE NECESSARY DOCUMENTATION\"  1. Have you been to the ER, urgent care clinic since your last visit? Hospitalized since your last visit? No    2. Have you seen or consulted any other health care providers outside of the [address] [place] [address] since your last visit? Include any pap smears or colon screening. No  Patient does not have advanced directives.

## 2018-02-20 ENCOUNTER — OFFICE VISIT (OUTPATIENT)
Dept: FAMILY MEDICINE CLINIC | Age: 38
End: 2018-02-20

## 2018-02-20 VITALS
SYSTOLIC BLOOD PRESSURE: 125 MMHG | RESPIRATION RATE: 20 BRPM | BODY MASS INDEX: 34.99 KG/M2 | HEART RATE: 130 BPM | DIASTOLIC BLOOD PRESSURE: 82 MMHG | OXYGEN SATURATION: 98 % | TEMPERATURE: 100 F | HEIGHT: 65 IN | WEIGHT: 210 LBS

## 2018-02-20 DIAGNOSIS — J02.9 SORE THROAT: ICD-10-CM

## 2018-02-20 DIAGNOSIS — J40 BRONCHITIS: Primary | ICD-10-CM

## 2018-02-20 LAB
S PYO AG THROAT QL: NEGATIVE
VALID INTERNAL CONTROL?: YES

## 2018-02-20 RX ORDER — BENZONATATE 100 MG/1
100 CAPSULE ORAL
Qty: 21 CAP | Refills: 0 | Status: SHIPPED | OUTPATIENT
Start: 2018-02-20 | End: 2018-02-27

## 2018-02-20 RX ORDER — CEFDINIR 300 MG/1
300 CAPSULE ORAL 2 TIMES DAILY
Qty: 20 CAP | Refills: 0 | Status: SHIPPED | OUTPATIENT
Start: 2018-02-20 | End: 2018-03-02

## 2018-02-20 RX ORDER — CODEINE PHOSPHATE AND GUAIFENESIN 10; 100 MG/5ML; MG/5ML
5 SOLUTION ORAL
Qty: 118 ML | Refills: 0 | Status: SHIPPED | OUTPATIENT
Start: 2018-02-20 | End: 2018-03-23 | Stop reason: ALTCHOICE

## 2018-02-20 RX ORDER — ALBUTEROL SULFATE 90 UG/1
AEROSOL, METERED RESPIRATORY (INHALATION)
Qty: 18 INHALER | Refills: 5 | Status: SHIPPED | OUTPATIENT
Start: 2018-02-20 | End: 2018-05-14 | Stop reason: CLARIF

## 2018-02-20 RX ORDER — IBUPROFEN 800 MG/1
TABLET ORAL
Refills: 3 | COMMUNITY
Start: 2018-02-01 | End: 2018-04-01

## 2018-02-20 RX ORDER — CIPROFLOXACIN 250 MG/1
TABLET, FILM COATED ORAL
Refills: 0 | COMMUNITY
Start: 2018-02-02 | End: 2018-02-20 | Stop reason: ALTCHOICE

## 2018-02-20 NOTE — PATIENT INSTRUCTIONS
Bronchitis: Care Instructions  Your Care Instructions    Bronchitis is inflammation of the bronchial tubes, which carry air to the lungs. The tubes swell and produce mucus, or phlegm. The mucus and inflamed bronchial tubes make you cough. You may have trouble breathing. Most cases of bronchitis are caused by viruses like those that cause colds. Antibiotics usually do not help and they may be harmful. Bronchitis usually develops rapidly and lasts about 2 to 3 weeks in otherwise healthy people. Follow-up care is a key part of your treatment and safety. Be sure to make and go to all appointments, and call your doctor if you are having problems. It's also a good idea to know your test results and keep a list of the medicines you take. How can you care for yourself at home? · Take all medicines exactly as prescribed. Call your doctor if you think you are having a problem with your medicine. · Get some extra rest.  · Take an over-the-counter pain medicine, such as acetaminophen (Tylenol), ibuprofen (Advil, Motrin), or naproxen (Aleve) to reduce fever and relieve body aches. Read and follow all instructions on the label. · Do not take two or more pain medicines at the same time unless the doctor told you to. Many pain medicines have acetaminophen, which is Tylenol. Too much acetaminophen (Tylenol) can be harmful. · Take an over-the-counter cough medicine that contains dextromethorphan to help quiet a dry, hacking cough so that you can sleep. Avoid cough medicines that have more than one active ingredient. Read and follow all instructions on the label. · Breathe moist air from a humidifier, hot shower, or sink filled with hot water. The heat and moisture will thin mucus so you can cough it out. · Do not smoke. Smoking can make bronchitis worse. If you need help quitting, talk to your doctor about stop-smoking programs and medicines. These can increase your chances of quitting for good.   When should you call for help? Call 911 anytime you think you may need emergency care. For example, call if:  ? · You have severe trouble breathing. ?Call your doctor now or seek immediate medical care if:  ? · You have new or worse trouble breathing. ? · You cough up dark brown or bloody mucus (sputum). ? · You have a new or higher fever. ? · You have a new rash. ? Watch closely for changes in your health, and be sure to contact your doctor if:  ? · You cough more deeply or more often, especially if you notice more mucus or a change in the color of your mucus. ? · You are not getting better as expected. Where can you learn more? Go to http://hodan-eugene.info/. Enter H333 in the search box to learn more about \"Bronchitis: Care Instructions. \"  Current as of: May 12, 2017  Content Version: 11.4  © 4124-1229 [employer]. Care instructions adapted under license by [employer] (which disclaims liability or warranty for this information). If you have questions about a medical condition or this instruction, always ask your healthcare professional. [address] any warranty or liability for your use of this information.

## 2018-02-20 NOTE — MR AVS SNAPSHOT
[address] [address] [address] [facility] 
[phone] Patient: Gulshan Atkinson MRN: [id number] BFC:21[birth year]40 Visit Information Date & Time Provider Department Dept. Phone Encounter #  
 2/20/2018  9:30 AM Zach Godinez Denny [phone] [id number] Follow-up Instructions Return if symptoms worsen or fail to improve. Upcoming Health Maintenance Date Due  
 EYE EXAM RETINAL OR DILATED Q1 9/1/2014 DTaP/Tdap/Td series (2 - Td) 1/1/2018 FOOT EXAM Q1 3/15/2018 MICROALBUMIN Q1 3/15/2018 HEMOGLOBIN A1C Q6M 4/30/2018 LIPID PANEL Q1 10/31/2018 Pneumococcal 19-64 Highest Risk (2 of 3 - PCV13) 12/21/2018 PAP AKA CERVICAL CYTOLOGY 6/15/2019 Allergies as of 2/20/2018  Review Complete On: 2/20/2018 By: Nabeel Plummer NP Severity Noted Reaction Type Reactions Green Pepper  06/28/2012    Hives Current Immunizations  Reviewed on 3/18/2014 Name Date Influenza Vaccine 10/26/2013 TDAP Vaccine 1/1/2008 Not reviewed this visit You Were Diagnosed With   
  
 Codes Comments Bronchitis    -  Primary ICD-10-CM: S19 ICD-9-CM: 458 Sore throat     ICD-10-CM: J02.9 ICD-9-CM: 909 Vitals BP Pulse Temp Resp Height(growth percentile) Weight(growth percentile) 125/82 (!) 130 100 °F (37.8 °C) (Oral) 20 5' 5\" (1.651 m) 210 lb (95.3 kg) LMP SpO2 BMI OB Status Smoking Status 01/25/2012 98% 34.95 kg/m2 Hysterectomy Never Smoker BMI and BSA Data Body Mass Index Body Surface Area 34.95 kg/m 2 2.09 m 2 Preferred Pharmacy Pharmacy Name Phone [id number] [address] [address] [phone] Your Updated Medication List  
  
   
This list is accurate as of: 2/20/18  9:57 AM.  Always use your most recent med list.  
  
  
  
  
 * albuterol 2.5 mg /3 mL (0.083 %) nebulizer solution Commonly known as:  PROVENTIL VENTOLIN  
USE ONE VIAL IN NEBULIZER EVERY 4 HOURS AS NEEDED FOR WHEEZING FOR  UP  TO  30  DOSES * VENTOLIN HFA 90 mcg/actuation inhaler Generic drug:  albuterol INHALE 2 PUFFS BY MOUTH EVERY 4 HOURS AS NEEDED FOR WHEEZING  
  
 aspirin delayed-release 81 mg tablet TAKE 1 TABLET BY MOUTH EVERY DAY  
  
 atorvastatin 20 mg tablet Commonly known as:  LIPITOR Take 1 Tab by mouth daily. benzonatate 100 mg capsule Commonly known as:  TESSALON PERLES Take 1 Cap by mouth three (3) times daily as needed for Cough for up to 7 days. * Blood-Glucose Meter monitoring kit Please give one kit. * Blood-Glucose Meter Misc Commonly known as:  Avitide IQ METER To use to check blood sugar daily. Dx: E11.9.  
  
 butalbital-acetaminophen-caffeine -40 mg per tablet Commonly known as:  Pecola Aloe Take 1 Tab by mouth every six (6) hours as needed for Pain. Max Daily Amount: 4 Tabs. cefdinir 300 mg capsule Commonly known as:  OMNICEF Take 1 Cap by mouth two (2) times a day for 10 days. cyclobenzaprine 10 mg tablet Commonly known as:  FLEXERIL  
TAKE 1 TAB BY MOUTH THREE (3) TIMES DAILY AS NEEDED FOR MUSCLE SPASM(S). ergocalciferol 50,000 unit capsule Commonly known as:  ERGOCALCIFEROL Take 1 Cap by mouth every seven (7) days. famotidine 40 mg tablet Commonly known as:  PEPCID  
TAKE 1 TABLET BY MOUTH EVERY DAY  
  
 fenofibrate 160 mg tablet Commonly known as:  LOFIBRA Take 1 Tab by mouth daily. fluticasone 50 mcg/actuation nasal spray Commonly known as:  FLONASE  
USE 2 SQUIRTS DAILY AS NEEDED  
  
 * glucose blood VI test strips strip Commonly known as:  Ascensia CONTOUR  
TO BE USED AS DIRECTED * glucose blood VI test strips strip Commonly known as:  ASCENSIA AUTODISC VI, ONE TOUCH ULTRA TEST VI One touch Ultra Test strip. To use daily, to check blood sugar. Dx: E11.9. ibuprofen 800 mg tablet Commonly known as:  MOTRIN  
TAKE 1 TABLET BY MOUTH EVERY 8 HOURS AS NEEDED FOR PAIN  
  
 JANUVIA 100 mg tablet Generic drug:  SITagliptin Take 1 Tab by mouth once over twenty-four (24) hours. JARDIANCE 25 mg tablet Generic drug:  empagliflozin Take 1 Tab by mouth daily. Lancets Misc Commonly known as:  MICROLET LANCET  
USE TO CHECK BLOOD SUGAR ONE TO TWO TIMES DAILY  
  
 montelukast 10 mg tablet Commonly known as:  SINGULAIR  
TAKE 1 TABLET BY MOUTH EACH EVENING Nebulizer & Compressor machine Use as needed for wheezing, cough  
  
 omeprazole 20 mg capsule Commonly known as:  PRILOSEC  
TAKE ONE CAPSULE BY MOUTH ONCE DAILY  
  
 ondansetron 8 mg disintegrating tablet Commonly known as:  ZOFRAN ODT  
TAKE 1 TABLET BY MOUTH EVERY 8 HOURS AS NEEDED FOR NAUSEA  
  
 simvastatin 20 mg tablet Commonly known as:  ZOCOR  
TAKE 1 TABLET BY MOUTH EVERY DAY  
  
 SUMAtriptan 50 mg tablet Commonly known as:  IMITREX Take 1 tablet at the onset of migraine headache, repeat in 1 hour if needed. No more than 2-tablets in 24 hours. tiZANidine 2 mg tablet Commonly known as:  ZANAFLEX  
1-2 tablets 1-2 hours before bedtime as needed. topiramate 50 mg tablet Commonly known as:  TOPAMAX Take 1 tab twice a day for migraine prevention * Notice: This list has 6 medication(s) that are the same as other medications prescribed for you. Read the directions carefully, and ask your doctor or other care provider to review them with you. Prescriptions Printed Refills  
 cefdinir (OMNICEF) 300 mg capsule 0 Sig: Take 1 Cap by mouth two (2) times a day for 10 days. Class: Print Route: Oral  
 benzonatate (TESSALON PERLES) 100 mg capsule 0 Sig: Take 1 Cap by mouth three (3) times daily as needed for Cough for up to 7 days. Class: Print Route: Oral  
  
We Performed the Following AMB POC RAPID STREP A [13545 CPT(R)] Follow-up Instructions Return if symptoms worsen or fail to improve. Patient Instructions Bronchitis: Care Instructions Your Care Instructions Bronchitis is inflammation of the bronchial tubes, which carry air to the lungs. The tubes swell and produce mucus, or phlegm. The mucus and inflamed bronchial tubes make you cough. You may have trouble breathing. Most cases of bronchitis are caused by viruses like those that cause colds. Antibiotics usually do not help and they may be harmful. Bronchitis usually develops rapidly and lasts about 2 to 3 weeks in otherwise healthy people. Follow-up care is a key part of your treatment and safety. Be sure to make and go to all appointments, and call your doctor if you are having problems. It's also a good idea to know your test results and keep a list of the medicines you take. How can you care for yourself at home? · Take all medicines exactly as prescribed. Call your doctor if you think you are having a problem with your medicine. · Get some extra rest. 
· Take an over-the-counter pain medicine, such as acetaminophen (Tylenol), ibuprofen (Advil, Motrin), or naproxen (Aleve) to reduce fever and relieve body aches. Read and follow all instructions on the label. · Do not take two or more pain medicines at the same time unless the doctor told you to. Many pain medicines have acetaminophen, which is Tylenol. Too much acetaminophen (Tylenol) can be harmful. · Take an over-the-counter cough medicine that contains dextromethorphan to help quiet a dry, hacking cough so that you can sleep. Avoid cough medicines that have more than one active ingredient. Read and follow all instructions on the label. · Breathe moist air from a humidifier, hot shower, or sink filled with hot water. The heat and moisture will thin mucus so you can cough it out. · Do not smoke. Smoking can make bronchitis worse.  If you need help quitting, talk to your doctor about stop-smoking programs and medicines. These can increase your chances of quitting for good. When should you call for help? Call 911 anytime you think you may need emergency care. For example, call if: 
? · You have severe trouble breathing. ?Call your doctor now or seek immediate medical care if: 
? · You have new or worse trouble breathing. ? · You cough up dark brown or bloody mucus (sputum). ? · You have a new or higher fever. ? · You have a new rash. ? Watch closely for changes in your health, and be sure to contact your doctor if: 
? · You cough more deeply or more often, especially if you notice more mucus or a change in the color of your mucus. ? · You are not getting better as expected. Where can you learn more? Go to http://hodan-eugene.info/. Enter H333 in the search box to learn more about \"Bronchitis: Care Instructions. \" Current as of: May 12, 2017 Content Version: 11.4 © 5310-0217 [employer]. Care instructions adapted under license by [employer] (which disclaims liability or warranty for this information). If you have questions about a medical condition or this instruction, always ask your healthcare professional. [address] any warranty or liability for your use of this information. Introducing [facility]! Estefania Evans introduces InfoDif patient portal. Now you can access parts of your medical record, email your doctor's office, and request medication refills online. 1. In your internet browser, go to [email or web address] 2. Click on the First Time User? Click Here link in the Sign In box. You will see the New Member Sign Up page. 3. Enter your InfoDif Access Code exactly as it appears below. You will not need to use this code after youve completed the sign-up process.  If you do not sign up before the expiration date, you must request a new code. 
 
· EverCharge Access Code: [email or web address] Expires: 5/8/2018 10:09 AM 
 
4. Enter the last four digits of your Social Security Number (xxxx) and Date of Birth (mm/dd/yyyy) as indicated and click Submit. You will be taken to the next sign-up page. 5. Create a EverCharge ID. This will be your EverCharge login ID and cannot be changed, so think of one that is secure and easy to remember. 6. Create a EverCharge password. You can change your password at any time. 7. Enter your Password Reset Question and Answer. This can be used at a later time if you forget your password. 8. Enter your e-mail address. You will receive e-mail notification when new information is available in [address] 9. Click Sign Up. You can now view and download portions of your medical record. 10. Click the Download Summary menu link to download a portable copy of your medical information. If you have questions, please visit the Frequently Asked Questions section of the EverCharge website. Remember, EverCharge is NOT to be used for urgent needs. For medical emergencies, dial 911. Now available from your iPhone and Android! Please provide this summary of care documentation to your next provider. Your primary care clinician is listed as [address]. If you have any questions after today's visit, please call [phone].

## 2018-02-20 NOTE — PROGRESS NOTES
HISTORY OF PRESENT ILLNESS  Gulshan Atkinson is a 42 y.o. female. HPI  Pt presents with \"cough, fever and cold symptoms\"    Symptoms started a couple days ago, with what she thought was a basic cold  Then yesterday, symptoms seemed to worsen drastically  Headache  Hot and cold chills  Fever: T max 103.6  Cough: chest is hurting due to cough, productive at times  Nasal congestion  OTC: Tylenol  Review of Systems   Constitutional: Positive for chills, fever and malaise/fatigue. HENT: Positive for congestion. Respiratory: Positive for cough, sputum production and wheezing. Gastrointestinal: Negative for abdominal pain, diarrhea, nausea and vomiting. Neurological: Positive for headaches. Physical Exam   Constitutional: She is oriented to person, place, and time. She appears well-developed and well-nourished. HENT:   Head: Normocephalic and atraumatic. Right Ear: Hearing, tympanic membrane, external ear and ear canal normal.   Left Ear: Hearing, tympanic membrane, external ear and ear canal normal.   Nose: Mucosal edema and rhinorrhea present. Mouth/Throat: Posterior oropharyngeal erythema present. Neck: Normal range of motion. Neck supple. Cardiovascular: Normal rate, regular rhythm and normal heart sounds. Pulmonary/Chest: Effort normal. She has no wheezes. She has rhonchi in the right lower field and the left lower field. Lymphadenopathy:     She has no cervical adenopathy. Neurological: She is alert and oriented to person, place, and time. Skin: Skin is warm and dry. Psychiatric: She has a normal mood and affect. Her behavior is normal.       ASSESSMENT and PLAN    ICD-10-CM ICD-9-CM    1. Bronchitis J40 490 cefdinir (OMNICEF) 300 mg capsule      benzonatate (TESSALON PERLES) 100 mg capsule      guaiFENesin-codeine (ROBITUSSIN AC) 100-10 mg/5 mL solution   2.  Sore throat J02.9 462 AMB POC RAPID STREP A     Informed patient that I have sent medication to the pharmacy, and she should take as prescribed. Educated about taking with food, to decrease the risk of stomach upset. Educated about staying well hydrated, and treating fever as needed. Educated about using nebulizer as prescribed, to aid in wheezing/cough. Pt informed to return to office with worsening of symptoms, or PRN with any questions or concerns. Pt verbalizes understanding of plan of care and denies further questions or concerns at this time.

## 2018-02-20 NOTE — PROGRESS NOTES
Chief Complaint   Patient presents with    Cough     some wheezing yesterday and chest tighness with cough    Fever     temp  99 yesterday  then 102 yesterday    Cold Symptoms     coughing, headaches, sore neck, sneezing since yesterday     \"REVIEWED RECORD IN PREPARATION FOR VISIT AND HAVE OBTAINED THE NECESSARY DOCUMENTATION\"  1. Have you been to the ER, urgent care clinic since your last visit? Hospitalized since your last visit? No    2. Have you seen or consulted any other health care providers outside of the [address] [place] [address] since your last visit? Include any pap smears or colon screening. No  Patient does not have advanced directives.

## 2018-03-03 DIAGNOSIS — Z79.4 TYPE 2 DIABETES MELLITUS WITHOUT COMPLICATION, WITH LONG-TERM CURRENT USE OF INSULIN (HCC): ICD-10-CM

## 2018-03-03 DIAGNOSIS — E11.9 TYPE 2 DIABETES MELLITUS WITHOUT COMPLICATION, WITHOUT LONG-TERM CURRENT USE OF INSULIN (HCC): ICD-10-CM

## 2018-03-03 DIAGNOSIS — G43.709 CHRONIC MIGRAINE WITHOUT AURA WITHOUT STATUS MIGRAINOSUS, NOT INTRACTABLE: ICD-10-CM

## 2018-03-03 DIAGNOSIS — E11.9 TYPE 2 DIABETES MELLITUS WITHOUT COMPLICATION, WITH LONG-TERM CURRENT USE OF INSULIN (HCC): ICD-10-CM

## 2018-03-05 RX ORDER — BLOOD SUGAR DIAGNOSTIC
STRIP MISCELLANEOUS
Qty: 50 STRIP | Refills: 5 | Status: SHIPPED | OUTPATIENT
Start: 2018-03-05 | End: 2018-09-26 | Stop reason: SDUPTHER

## 2018-03-05 RX ORDER — SITAGLIPTIN 100 MG/1
TABLET, FILM COATED ORAL
Qty: 90 TAB | Refills: 1 | Status: SHIPPED | OUTPATIENT
Start: 2018-03-05 | End: 2018-07-10 | Stop reason: ALTCHOICE

## 2018-03-07 RX ORDER — TIZANIDINE 2 MG/1
TABLET ORAL
Qty: 15 TAB | Refills: 1 | Status: SHIPPED | OUTPATIENT
Start: 2018-03-07 | End: 2018-07-27

## 2018-03-07 RX ORDER — IBUPROFEN 800 MG/1
TABLET ORAL
Qty: 30 TAB | Refills: 3 | Status: SHIPPED | OUTPATIENT
Start: 2018-03-07 | End: 2018-03-23 | Stop reason: SDUPTHER

## 2018-03-23 ENCOUNTER — OFFICE VISIT (OUTPATIENT)
Dept: FAMILY MEDICINE CLINIC | Age: 38
End: 2018-03-23

## 2018-03-23 VITALS
WEIGHT: 207 LBS | DIASTOLIC BLOOD PRESSURE: 79 MMHG | SYSTOLIC BLOOD PRESSURE: 121 MMHG | OXYGEN SATURATION: 97 % | RESPIRATION RATE: 16 BRPM | TEMPERATURE: 97.1 F | HEART RATE: 118 BPM | HEIGHT: 65 IN | BODY MASS INDEX: 34.49 KG/M2

## 2018-03-23 DIAGNOSIS — R19.09 ABDOMINAL MASS OF OTHER SITE: Primary | ICD-10-CM

## 2018-03-23 RX ORDER — METFORMIN HYDROCHLORIDE 1000 MG/1
1000 TABLET ORAL
Refills: 5 | COMMUNITY
Start: 2018-02-16 | End: 2018-09-14 | Stop reason: SDUPTHER

## 2018-03-23 NOTE — PROGRESS NOTES
Subjective:   37F with h/o Type II DM, HTN and HLD who presents with a new concern. She had a previous hernia repair years ago (does not remember when) and she was told that mesh was used. Recently, she has noticed pain and swelling in the area of her hernia repair and then lower down on her belly under the belly fold, is a tender somewhat well circumscribed area that has been worsening for the past month. There is no superficial skin discoloration. She denies any abdominal trauma. Patient Active Problem List    Diagnosis Date Noted    Acute midline thoracic back pain 09/26/2017    Acute mucoid otitis media of right ear 06/23/2017    Dermatitis 03/09/2017    Screening for thyroid disorder 01/10/2017    Acute non-recurrent frontal sinusitis 01/03/2017    Left lower quadrant pain 12/29/2016    Left ear pain 12/02/2016    Strep throat 11/09/2016    Nausea 10/07/2016    Right acute serous otitis media 07/14/2016    Rash 07/14/2016    Gastroesophageal reflux disease without esophagitis 07/14/2016    Environmental allergies 07/14/2016    Right ear pain 06/21/2016    Diabetes ([address] 07/18/2014    Sore throat 05/06/2014    Migraine headache 01/03/2012    Vitamin D deficiency 11/06/2011    Dyslipidemia (high LDL; low HDL) 01/24/2011    Hypertension 01/06/2011    Asthma 01/06/2011     Current Outpatient Prescriptions   Medication Sig Dispense Refill    metFORMIN (GLUCOPHAGE) 1,000 mg tablet Take 1,000 mg by mouth Before breakfast and dinner.   5    ASCENSIA CONTOUR strip USE AS DIRECTED 50 Strip 5    JANUVIA 100 mg tablet TAKE 1 TABLET BY MOUTH EVERY DAY 90 Tab 1    VENTOLIN HFA 90 mcg/actuation inhaler INHALE 2 PUFFS BY MOUTH EVERY 4 HOURS AS NEEDED FOR WHEEZING 18 Inhaler 5    ibuprofen (MOTRIN) 800 mg tablet TAKE 1 TABLET BY MOUTH EVERY 8 HOURS AS NEEDED FOR PAIN  3    aspirin delayed-release 81 mg tablet TAKE 1 TABLET BY MOUTH EVERY DAY 30 Tab 5    atorvastatin (LIPITOR) 20 mg tablet Take 1 Tab by mouth daily. 30 Tab 5    SUMAtriptan (IMITREX) 50 mg tablet Take 1 tablet at the onset of migraine headache, repeat in 1 hour if needed. No more than 2-tablets in 24 hours. 9 Tab 3    fluticasone (FLONASE) 50 mcg/actuation nasal spray USE 2 SQUIRTS DAILY AS NEEDED 1 Bottle 3    omeprazole (PRILOSEC) 20 mg capsule TAKE ONE CAPSULE BY MOUTH ONCE DAILY 30 Cap 5    JARDIANCE 25 mg tablet Take 1 Tab by mouth daily. 30 Tab 5    fenofibrate (LOFIBRA) 160 mg tablet Take 1 Tab by mouth daily. 30 Tab 5    albuterol (PROVENTIL VENTOLIN) 2.5 mg /3 mL (0.083 %) nebulizer solution USE ONE VIAL IN NEBULIZER EVERY 4 HOURS AS NEEDED FOR WHEEZING FOR  UP  TO  30  DOSES 300 Each 2    famotidine (PEPCID) 40 mg tablet TAKE 1 TABLET BY MOUTH EVERY DAY  12    montelukast (SINGULAIR) 10 mg tablet TAKE 1 TABLET BY MOUTH EACH EVENING  5    ergocalciferol (ERGOCALCIFEROL) 50,000 unit capsule Take 1 Cap by mouth every seven (7) days.  topiramate (TOPAMAX) 50 mg tablet Take 1 tab twice a day for migraine prevention 60 Tab 0    butalbital-acetaminophen-caffeine (FIORICET, ESGIC) -40 mg per tablet Take 1 Tab by mouth every six (6) hours as needed for Pain. Max Daily Amount: 4 Tabs.  20 Tab 0    Lancets (MICROLET LANCET) Misc USE TO CHECK BLOOD SUGAR ONE TO TWO TIMES DAILY 100 Each 0    tiZANidine (ZANAFLEX) 2 mg tablet 1-2 TABLETS 1-2 HOURS BEFORE BEDTIME AS NEEDED. 15 Tab 1     Allergies   Allergen Reactions    Green Pepper Hives     Past Medical History:   Diagnosis Date    Anxiety     Asthma     Diabetes ([address] 2008    Headache     Headache(784.0)     Hypertension     Rash 7/14/2016    S/P dilatation and curettage     Tachycardia      Past Surgical History:   Procedure Laterality Date    HX APPENDECTOMY  2/2008    HX CHOLECYSTECTOMY  2001    HX DILATION AND CURETTAGE      HX GYN  3/2008    tubal ligation    HX HERNIA REPAIR  2/2009    HX HYSTERECTOMY  03/2012    HX LUMBAR DISKECTOMY      2006    HX ORTHOPAEDIC  9/2006    ruptured discs    UPPER GI ENDOSCOPY,BIOPSY  5/11/2017          Family History   Problem Relation Age of Onset    Heart Disease Mother     Hypertension Father     Hypertension Sister      Social History   Substance Use Topics    Smoking status: Never Smoker    Smokeless tobacco: Never Used    Alcohol use No      Review of Systems  Pertinent items are noted in HPI. Objective:   /79 (BP 1 Location: Right arm, BP Patient Position: Sitting)  Pulse (!) 118  Temp 97.1 °F (36.2 °C) (Oral)   Resp 16  Ht 5' 5\" (1.651 m)  Wt 207 lb (93.9 kg)  LMP 01/25/2012  SpO2 97%  BMI 34.45 kg/m2     Physical Exam   Abdominal:       Nursing note and vitals reviewed. Assessment/Plan:       ICD-10-CM ICD-9-CM    1. Abdominal mass of other site R19.09 789.39 CT ABD PELV W WO CONT     37F with bulging around the periumbilical area and concern about possible recurrent abdominal hernia. Will send for CT-scan and evaluate any possible recurrence. Worrisome symptoms discussed. I have discussed the diagnosis with the patient and the intended treatment plan as seen in the above orders. The patient has received an after-visit summary and questions were answered concerning future plans. Asked to return should symptoms worsen or not improve with treatment. Any pending labs and studies will be relayed to patient when they become available. Pt verbalizes understanding of plan of care and denies further questions or concerns at this time. Follow-up Disposition:  Return if symptoms worsen or fail to improve.

## 2018-03-23 NOTE — PROGRESS NOTES
Identified pt with two pt identifiers(name and ). Chief Complaint   Patient presents with    Cyst     on c/s scar on the left side that has been present for approx one month and is tender to palpation    Umbilical Hernia     possible hernia where she has had one in the past - tender to palpation and with BM         Health Maintenance Due   Topic    EYE EXAM RETINAL OR DILATED Q1     DTaP/Tdap/Td series (2 - Td)    FOOT EXAM Q1     MICROALBUMIN Q1        Wt Readings from Last 3 Encounters:   18 207 lb (93.9 kg)   18 210 lb (95.3 kg)   18 210 lb (95.3 kg)     Temp Readings from Last 3 Encounters:   18 97.1 °F (36.2 °C) (Oral)   18 100 °F (37.8 °C) (Oral)   18 98.5 °F (36.9 °C) (Oral)     BP Readings from Last 3 Encounters:   18 121/79   18 125/82   18 108/80     Pulse Readings from Last 3 Encounters:   18 (!) 118   18 (!) 130   18 93         Learning Assessment:  :     Learning Assessment 2017   PRIMARY LEARNER Patient Patient   HIGHEST LEVEL OF EDUCATION - PRIMARY LEARNER  GRADUATED HIGH SCHOOL OR GED GRADUATED HIGH SCHOOL OR GED   BARRIERS PRIMARY LEARNER NONE NONE   PRIMARY LANGUAGE ENGLISH ENGLISH   LEARNER PREFERENCE PRIMARY READING READING   ANSWERED BY patient patient   RELATIONSHIP SELF SELF       Depression Screening:  :     PHQ over the last two weeks 2018   Little interest or pleasure in doing things Not at all   Feeling down, depressed or hopeless Not at all   Total Score PHQ 2 0       Fall Risk Assessment:  :     Fall Risk Assessment, last 12 mths 2017   Able to walk? Yes   Fall in past 12 months? No       Abuse Screening:  :     Abuse Screening Questionnaire 2017   Do you ever feel afraid of your partner? N N   Are you in a relationship with someone who physically or mentally threatens you? N N   Is it safe for you to go home?  Y Y       Coordination of Care Questionnaire:  :     1) Have you been to an emergency room, urgent care clinic since your last visit? no   Hospitalized since your last visit? no             2) Have you seen or consulted any other health care providers outside of [facility] since your last visit? no  (Include any pap smears or colon screenings in this section.)    3) Do you have an Advance Directive on file? no  Are you interested in receiving information about Advance Directives? no    Patient is accompanied by self. Reviewed record in preparation for visit and have obtained necessary documentation. Medication reconciliation up to date and corrected with patient at this time.

## 2018-03-23 NOTE — MR AVS SNAPSHOT
[address] [address] [address] [facility] 
[phone] Patient: Teresa Rivera MRN: [id number] Visit Information Date & Time Provider Department Dept. Phone Encounter #  
 3/23/2018 11:30 AM Zach Piper [phone] [id number] Upcoming Health Maintenance Date Due  
 EYE EXAM RETINAL OR DILATED Q1 9/1/2014 DTaP/Tdap/Td series (2 - Td) 1/1/2018 FOOT EXAM Q1 3/15/2018 MICROALBUMIN Q1 3/15/2018 HEMOGLOBIN A1C Q6M 4/30/2018 LIPID PANEL Q1 10/31/2018 Pneumococcal 19-64 Highest Risk (2 of 3 - PCV13) 12/21/2018 PAP AKA CERVICAL CYTOLOGY 6/15/2019 Allergies as of 3/23/2018  Review Complete On: 3/23/2018 By: Arcelia Magaña LPN Severity Noted Reaction Type Reactions Green Pepper  06/28/2012    Hives Current Immunizations  Reviewed on 3/18/2014 Name Date Influenza Vaccine 10/26/2013 TDAP Vaccine 1/1/2008 Not reviewed this visit You Were Diagnosed With   
  
 Codes Comments Abdominal mass of other site    -  Primary ICD-10-CM: R19.09 
ICD-9-CM: 789.39 Vitals BP Pulse Temp Resp Height(growth percentile) Weight(growth percentile) 121/79 (BP 1 Location: Right arm, BP Patient Position: Sitting) (!) 118 97.1 °F (36.2 °C) (Oral) 16 5' 5\" (1.651 m) 207 lb (93.9 kg) LMP SpO2 BMI OB Status Smoking Status 01/25/2012 97% 34.45 kg/m2 Hysterectomy Never Smoker Vitals History BMI and BSA Data Body Mass Index Body Surface Area 34.45 kg/m 2 2.08 m 2 Preferred Pharmacy Pharmacy Name Phone [id number] [address] [phone] Your Updated Medication List  
  
   
This list is accurate as of 3/23/18 12:03 PM.  Always use your most recent med list.  
  
  
  
  
 * albuterol 2.5 mg /3 mL (0.083 %) nebulizer solution Commonly known as:  PROVENTIL VENTOLIN  
 USE ONE VIAL IN NEBULIZER EVERY 4 HOURS AS NEEDED FOR WHEEZING FOR  UP  TO  30  DOSES * VENTOLIN HFA 90 mcg/actuation inhaler Generic drug:  albuterol INHALE 2 PUFFS BY MOUTH EVERY 4 HOURS AS NEEDED FOR WHEEZING [employer] CONTOUR strip Generic drug:  glucose blood VI test strips USE AS DIRECTED  
  
 aspirin delayed-release 81 mg tablet TAKE 1 TABLET BY MOUTH EVERY DAY  
  
 atorvastatin 20 mg tablet Commonly known as:  LIPITOR Take 1 Tab by mouth daily. butalbital-acetaminophen-caffeine -40 mg per tablet Commonly known as:  Madeleine Sails Take 1 Tab by mouth every six (6) hours as needed for Pain. Max Daily Amount: 4 Tabs.  
  
 ergocalciferol 50,000 unit capsule Commonly known as:  ERGOCALCIFEROL Take 1 Cap by mouth every seven (7) days. famotidine 40 mg tablet Commonly known as:  PEPCID  
TAKE 1 TABLET BY MOUTH EVERY DAY  
  
 fenofibrate 160 mg tablet Commonly known as:  LOFIBRA Take 1 Tab by mouth daily. fluticasone 50 mcg/actuation nasal spray Commonly known as:  FLONASE  
USE 2 SQUIRTS DAILY AS NEEDED  
  
 ibuprofen 800 mg tablet Commonly known as:  MOTRIN  
TAKE 1 TABLET BY MOUTH EVERY 8 HOURS AS NEEDED FOR PAIN  
  
 JANUVIA 100 mg tablet Generic drug:  SITagliptin TAKE 1 TABLET BY MOUTH EVERY DAY  
  
 JARDIANCE 25 mg tablet Generic drug:  empagliflozin Take 1 Tab by mouth daily. Lancets Misc Commonly known as:  MICROLET LANCET  
USE TO CHECK BLOOD SUGAR ONE TO TWO TIMES DAILY  
  
 metFORMIN 1,000 mg tablet Commonly known as:  GLUCOPHAGE Take 1,000 mg by mouth Before breakfast and dinner. montelukast 10 mg tablet Commonly known as:  SINGULAIR  
TAKE 1 TABLET BY MOUTH EACH EVENING  
  
 omeprazole 20 mg capsule Commonly known as:  PRILOSEC  
TAKE ONE CAPSULE BY MOUTH ONCE DAILY  
  
 SUMAtriptan 50 mg tablet Commonly known as:  IMITREX Take 1 tablet at the onset of migraine headache, repeat in 1 hour if needed. No more than 2-tablets in 24 hours. tiZANidine 2 mg tablet Commonly known as:  ZANAFLEX  
1-2 TABLETS 1-2 HOURS BEFORE BEDTIME AS NEEDED. topiramate 50 mg tablet Commonly known as:  TOPAMAX Take 1 tab twice a day for migraine prevention * Notice: This list has 2 medication(s) that are the same as other medications prescribed for you. Read the directions carefully, and ask your doctor or other care provider to review them with you. To-Do List   
 03/26/2018 Imaging:  CT ABD PELV W WO CONT Introducing [facility]! [employer] introduces Tindie patient portal. Now you can access parts of your medical record, email your doctor's office, and request medication refills online. 1. In your internet browser, go to [email or web address]. [email or web address] 2. Click on the First Time User? Click Here link in the Sign In box. You will see the New Member Sign Up page. 3. Enter your Tindie Access Code exactly as it appears below. You will not need to use this code after youve completed the sign-up process. If you do not sign up before the expiration date, you must request a new code. · Tindie Access Code: [email or web address] Expires: 5/8/2018 11:09 AM 
 
4. Enter the last four digits of your Social Security Number (xxxx) and Date of Birth (mm/dd/yyyy) as indicated and click Submit. You will be taken to the next sign-up page. 5. Create a Tindie ID. This will be your Tindie login ID and cannot be changed, so think of one that is secure and easy to remember. 6. Create a Tindie password. You can change your password at any time. 7. Enter your Password Reset Question and Answer. This can be used at a later time if you forget your password. 8. Enter your e-mail address. You will receive e-mail notification when new information is available in [address]. 9. Click Sign Up. You can now view and download portions of your medical record. 10. Click the Download Summary menu link to download a portable copy of your medical information. If you have questions, please visit the Frequently Asked Questions section of the [employer] website. Remember, [employer] is NOT to be used for urgent needs. For medical emergencies, dial 911. Now available from your iPhone and Android! Please provide this summary of care documentation to your next provider. Your primary care clinician is listed as [address]. If you have any questions after today's visit, please call [phone].

## 2018-04-01 ENCOUNTER — APPOINTMENT (OUTPATIENT)
Dept: GENERAL RADIOLOGY | Age: 38
End: 2018-04-01
Attending: EMERGENCY MEDICINE
Payer: MEDICAID

## 2018-04-01 ENCOUNTER — HOSPITAL ENCOUNTER (EMERGENCY)
Age: 38
Discharge: HOME | End: 2018-04-01
Attending: EMERGENCY MEDICINE
Payer: MEDICAID

## 2018-04-01 VITALS
BODY MASS INDEX: 34.89 KG/M2 | DIASTOLIC BLOOD PRESSURE: 67 MMHG | WEIGHT: 209.44 LBS | HEIGHT: 65 IN | OXYGEN SATURATION: 95 % | HEART RATE: 98 BPM | SYSTOLIC BLOOD PRESSURE: 116 MMHG | TEMPERATURE: 98.7 F | RESPIRATION RATE: 16 BRPM

## 2018-04-01 DIAGNOSIS — S46.911A SHOULDER STRAIN, RIGHT, INITIAL ENCOUNTER: Primary | ICD-10-CM

## 2018-04-01 PROCEDURE — 73030 X-RAY EXAM OF SHOULDER: CPT

## 2018-04-01 PROCEDURE — 99283 EMERGENCY DEPT VISIT LOW MDM: CPT

## 2018-04-01 PROCEDURE — 74011250637 HC RX REV CODE- 250/637: Performed by: EMERGENCY MEDICINE

## 2018-04-01 RX ORDER — IBUPROFEN 600 MG/1
600 TABLET ORAL
Qty: 30 TAB | Refills: 0 | Status: SHIPPED | OUTPATIENT
Start: 2018-04-01 | End: 2018-07-27

## 2018-04-01 RX ORDER — IBUPROFEN 200 MG
400 CAPSULE ORAL
COMMUNITY
End: 2018-04-01

## 2018-04-01 RX ORDER — IBUPROFEN 600 MG/1
600 TABLET ORAL
Status: COMPLETED | OUTPATIENT
Start: 2018-04-01 | End: 2018-04-01

## 2018-04-01 RX ADMIN — IBUPROFEN 600 MG: 600 TABLET, FILM COATED ORAL at 11:03

## 2018-04-01 NOTE — ED TRIAGE NOTES
Pt presents to ED [facility] c/o right shoulder pain since yesterday. Pain is worse with movement and raising the arm. Pt states she has had some numbness and tingling in the arm and hand. The pt was playing kickball with her children on 3/29/2018 and caught a bal with both arms above her head that hussain her shoulder. Pt states no pain until yesterday. Color is pink in extremity with normal sensation at present.

## 2018-04-01 NOTE — DISCHARGE INSTRUCTIONS
Shoulder Pain: Care Instructions  Your Care Instructions    You can hurt your shoulder by using it too much during an activity, such as fishing or baseball. It can also happen as part of the everyday wear and tear of getting older. Shoulder injuries can be slow to heal, but your shoulder should get better with time. Your doctor may recommend a sling to rest your shoulder. If you have injured your shoulder, you may need testing and treatment. Follow-up care is a key part of your treatment and safety. Be sure to make and go to all appointments, and call your doctor if you are having problems. It's also a good idea to know your test results and keep a list of the medicines you take. How can you care for yourself at home? · Take pain medicines exactly as directed. ¨ If the doctor gave you a prescription medicine for pain, take it as prescribed. ¨ If you are not taking a prescription pain medicine, ask your doctor if you can take an over-the-counter medicine. ¨ Do not take two or more pain medicines at the same time unless the doctor told you to. Many pain medicines contain acetaminophen, which is Tylenol. Too much acetaminophen (Tylenol) can be harmful. · If your doctor recommends that you wear a sling, use it as directed. Do not take it off before your doctor tells you to. · Put ice or a cold pack on the sore area for 10 to 20 minutes at a time. Put a thin cloth between the ice and your skin. · If there is no swelling, you can put moist heat, a heating pad, or a warm cloth on your shoulder. Some doctors suggest alternating between hot and cold. · Rest your shoulder for a few days. If your doctor recommends it, you can then begin gentle exercise of the shoulder, but do not lift anything heavy. When should you call for help? Call 911 anytime you think you may need emergency care. For example, call if:  ? · You have chest pain or pressure. This may occur with:  ¨ Sweating.   ¨ Shortness of breath. ¨ Nausea or vomiting. ¨ Pain that spreads from the chest to the neck, jaw, or one or both shoulders or arms. ¨ Dizziness or lightheadedness. ¨ A fast or uneven pulse. After calling 911, chew 1 adult-strength aspirin. Wait for an ambulance. Do not try to drive yourself. ? · Your arm or hand is cool or pale or changes color. ?Call your doctor now or seek immediate medical care if:  ? · You have signs of infection, such as:  ¨ Increased pain, swelling, warmth, or redness in your shoulder. ¨ Red streaks leading from a place on your shoulder. ¨ Pus draining from an area of your shoulder. ¨ Swollen lymph nodes in your neck, armpits, or groin. ¨ A fever. ? Watch closely for changes in your health, and be sure to contact your doctor if:  ? · You cannot use your shoulder. ? · Your shoulder does not get better as expected. Where can you learn more? Go to http://hodan-eugene.info/. Enter C006 in the search box to learn more about \"Shoulder Pain: Care Instructions. \"  Current as of: March 21, 2017  Content Version: 11.4  © 3657-2706 [employer]. Care instructions adapted under license by [employer] (which disclaims liability or warranty for this information). If you have questions about a medical condition or this instruction, always ask your healthcare professional. [address] [address] any warranty or liability for your use of this information. We hope that we have addressed all of your medical concerns. The examination and treatment you received in the Emergency Department were for an emergent problem and were not intended as complete care. It is important that you follow up with your healthcare provider(s) for ongoing care. If your symptoms worsen or do not improve as expected, and you are unable to reach your usual health care provider(s), you should return to the Emergency Department.       Today's healthcare is undergoing tremendous change, and patient satisfaction surveys are one of the many tools to assess the quality of medical care. You may receive a survey from the [employer] organization regarding your experience in the Emergency Department. I hope that your experience has been completely positive, particularly the medical care that I provided. As such, please participate in the survey; anything less than excellent does not meet my expectations or intentions. [facility] participate in nationally recognized quality of care measures. If your blood pressure is greater than 120/80, as reported below, we urge that you seek medical care to address the potential of high blood pressure, commonly known as hypertension. Hypertension can be hereditary or can be caused by certain medical conditions, pain, stress, or \"white coat syndrome. \"       Please make an appointment with your health care provider(s) for follow up of your Emergency Department visit. VITALS:   Patient Vitals for the past 8 hrs:   Temp Pulse Resp BP SpO2   04/01/18 1045 98.7 °F (37.1 °C) 94 16 123/71 97 %          Thank you for allowing us to provide you with medical care today. We realize that you have many choices for your emergency care needs. Please choose us in the future for any continued health care needs. Khadra Story, [facility]: [phone]            No results found for this or any previous visit (from the past 24 hour(s)). Xr Shoulder Rt Ap/lat Min 2 V    Result Date: 4/1/2018  EXAM:  XR SHOULDER RT AP/LAT MIN 2 V INDICATION:   Trauma. COMPARISON: None. FINDINGS: Three views of the right shoulder demonstrate no fracture, dislocation or other acute abnormality. Mild DJD AC joint     IMPRESSION:  No acute abnormality.

## 2018-04-01 NOTE — ED PROVIDER NOTES
HPI Comments: 'was playing kickball Thursday evening/ caught a ball/ thought I may have injured my R shoulder/ was fine Friday/ started gradually worsening sat/ worse this am/ has numbness/ tingling running down my R arm to my fingers'; pt denies HA, vison changes, diff swallowing, CP, SOB, Abd pain, F/Ch, N/V, D/Cons or other current systemic complaints  Pt 'last took ibu this am at 0630 (it helps)'; The history is provided by the patient. Past Medical History:   Diagnosis Date    Anxiety     Asthma     Diabetes ([address] 2008    Headache     Headache(784.0)     Hypertension     Rash 7/14/2016    S/P dilatation and curettage     Tachycardia        Past Surgical History:   Procedure Laterality Date    HX APPENDECTOMY  2/2008    HX CHOLECYSTECTOMY  2001    HX DILATION AND CURETTAGE      HX GYN  3/2008    tubal ligation    HX HERNIA REPAIR  2/2009    HX HYSTERECTOMY  03/2012    HX LUMBAR DISKECTOMY      2006    HX ORTHOPAEDIC  9/2006    ruptured discs    UPPER GI ENDOSCOPY,BIOPSY  5/11/2017              Family History:   Problem Relation Age of Onset    Heart Disease Mother     Hypertension Father     Hypertension Sister        Social History     Social History    Marital status:      Spouse name: N/A    Number of children: N/A    Years of education: N/A     Occupational History    Not on file. Social History Main Topics    Smoking status: Never Smoker    Smokeless tobacco: Never Used    Alcohol use No    Drug use: No    Sexual activity: Yes     Partners: Male     Birth control/ protection: Surgical     Other Topics Concern    Not on file     Social History Narrative         ALLERGIES: Green pepper    Review of Systems   Musculoskeletal: Positive for arthralgias and myalgias. All other systems reviewed and are negative. There were no vitals filed for this visit. Physical Exam   Constitutional: She is oriented to person, place, and time.  She appears well-developed and well-nourished. NAD, AxOx4, speaking in complete sentences  R handed      Eyes: Conjunctivae are normal. Pupils are equal, round, and reactive to light. Right eye exhibits no discharge. No scleral icterus. Neck: Normal range of motion. Neck supple. No JVD present. No tracheal deviation present. Cardiovascular: Normal rate, regular rhythm, normal heart sounds and intact distal pulses. Exam reveals no gallop and no friction rub. No murmur heard. Pulmonary/Chest: Effort normal and breath sounds normal. No respiratory distress. She has no wheezes. She has no rales. She exhibits no tenderness. Abdominal: Soft. Bowel sounds are normal. There is no tenderness. There is no rebound and no guarding. nttp     Genitourinary: No vaginal discharge found. Genitourinary Comments: Pt denies urinary/ vaginal complaints   Musculoskeletal: Normal range of motion. She exhibits no edema or tenderness. R shoulder - min ant clavicle ttp > prox humerus/ no post shoulder ttp; no c-spine central spinous/ paraspinal ttp/ spasm; Pt able to lfex/ extend elbow/ wrist/ sup / pro hand;  R =  L; M/R/U nerves intact; pt has distal motor/ CV/ Sensation grossly intact all 5 R fingers   Neurological: She is alert and oriented to person, place, and time. She has normal reflexes. No cranial nerve deficit. She exhibits normal muscle tone. Coordination normal.   Skin: Skin is warm and dry. No rash noted. No erythema. No pallor. Psychiatric: She has a normal mood and affect. Her behavior is normal. Thought content normal.   Nursing note and vitals reviewed. [facility]      ED Course       Procedures Pt w/ 'injured my R shoulder playing kickball thurs evening'; will image/ treat discomforts;     11:16 AM 'feels a bit better'; told results/ agrees w/ plans;   Gabriela Soto  results have been reviewed with her. She has been counseled regarding her diagnosis.   She verbally conveys understanding and agreement of the signs, symptoms, diagnosis, treatment and prognosis and additionally agrees to Call/ Arrange follow up as recommended with Ortho (Dr Filiberto Lord) / Dr. Lauren Blank MD in 24 - 48 hours. She also agrees with the care-plan and conveys that all of her questions have been answered. I have also put together some discharge instructions for her that include: 1) educational information regarding their diagnosis, 2) how to care for their diagnosis at home, as well a 3) list of reasons why they would want to return to the ED prior to their follow-up appointment, should their condition change or for concerns.

## 2018-04-08 DIAGNOSIS — G43.709 CHRONIC MIGRAINE WITHOUT AURA WITHOUT STATUS MIGRAINOSUS, NOT INTRACTABLE: ICD-10-CM

## 2018-04-08 DIAGNOSIS — J45.20 MILD INTERMITTENT ASTHMA WITHOUT COMPLICATION: ICD-10-CM

## 2018-04-08 DIAGNOSIS — M54.6 ACUTE MIDLINE THORACIC BACK PAIN: ICD-10-CM

## 2018-04-08 DIAGNOSIS — R11.0 NAUSEA: ICD-10-CM

## 2018-04-08 DIAGNOSIS — E55.9 VITAMIN D DEFICIENCY: ICD-10-CM

## 2018-04-08 DIAGNOSIS — R10.30 LOWER ABDOMINAL PAIN: ICD-10-CM

## 2018-04-09 RX ORDER — ALBUTEROL SULFATE 0.83 MG/ML
SOLUTION RESPIRATORY (INHALATION)
Qty: 300 EACH | Refills: 2 | Status: SHIPPED | OUTPATIENT
Start: 2018-04-09 | End: 2018-12-27 | Stop reason: ALTCHOICE

## 2018-04-10 RX ORDER — ONDANSETRON 8 MG/1
TABLET, ORALLY DISINTEGRATING ORAL
Qty: 15 TAB | Refills: 0 | Status: SHIPPED | OUTPATIENT
Start: 2018-04-10 | End: 2018-07-27

## 2018-04-10 RX ORDER — CYCLOBENZAPRINE HCL 10 MG
TABLET ORAL
Qty: 30 TAB | Refills: 0 | Status: SHIPPED | OUTPATIENT
Start: 2018-04-10 | End: 2018-07-10 | Stop reason: ALTCHOICE

## 2018-04-10 RX ORDER — CIPROFLOXACIN 250 MG/1
TABLET, FILM COATED ORAL
Qty: 10 TAB | Refills: 0 | Status: SHIPPED | OUTPATIENT
Start: 2018-04-10 | End: 2018-04-12 | Stop reason: ALTCHOICE

## 2018-04-10 RX ORDER — ERGOCALCIFEROL 1.25 MG/1
CAPSULE ORAL
Qty: 30 CAP | Refills: 4 | Status: SHIPPED | OUTPATIENT
Start: 2018-04-10 | End: 2019-05-10 | Stop reason: SDUPTHER

## 2018-04-10 RX ORDER — SUMATRIPTAN 50 MG/1
TABLET, FILM COATED ORAL
Qty: 9 TAB | Refills: 3 | Status: SHIPPED | OUTPATIENT
Start: 2018-04-10 | End: 2018-11-26 | Stop reason: SDUPTHER

## 2018-04-12 ENCOUNTER — OFFICE VISIT (OUTPATIENT)
Dept: FAMILY MEDICINE CLINIC | Age: 38
End: 2018-04-12

## 2018-04-12 ENCOUNTER — TELEPHONE (OUTPATIENT)
Dept: FAMILY MEDICINE CLINIC | Age: 38
End: 2018-04-12

## 2018-04-12 VITALS
SYSTOLIC BLOOD PRESSURE: 104 MMHG | HEART RATE: 98 BPM | DIASTOLIC BLOOD PRESSURE: 78 MMHG | RESPIRATION RATE: 16 BRPM | BODY MASS INDEX: 34.82 KG/M2 | OXYGEN SATURATION: 98 % | HEIGHT: 65 IN | WEIGHT: 209 LBS | TEMPERATURE: 98.5 F

## 2018-04-12 DIAGNOSIS — J02.9 SORE THROAT: ICD-10-CM

## 2018-04-12 DIAGNOSIS — Z91.09 ENVIRONMENTAL ALLERGIES: Primary | ICD-10-CM

## 2018-04-12 DIAGNOSIS — H92.01 RIGHT EAR PAIN: ICD-10-CM

## 2018-04-12 LAB
S PYO AG THROAT QL: NEGATIVE
VALID INTERNAL CONTROL?: YES

## 2018-04-12 RX ORDER — LEVOCETIRIZINE DIHYDROCHLORIDE 5 MG/1
5 TABLET, FILM COATED ORAL DAILY
Qty: 30 TAB | Refills: 2 | Status: SHIPPED | OUTPATIENT
Start: 2018-04-12 | End: 2018-08-31

## 2018-04-12 RX ORDER — AMOXICILLIN AND CLAVULANATE POTASSIUM 875; 125 MG/1; MG/1
1 TABLET, FILM COATED ORAL 2 TIMES DAILY
Qty: 20 TAB | Refills: 0 | Status: SHIPPED | OUTPATIENT
Start: 2018-04-12 | End: 2018-04-22

## 2018-04-12 NOTE — TELEPHONE ENCOUNTER
Pt was [facility]. She had forgotten [facility] was the pharmacy she has on file in the chart. She reports [facility] is sending over a PA for xyzal script. Pt was advised that most likely her insurance will not cover that medication as it is brand only and we would let her know once PA returns.

## 2018-04-12 NOTE — PATIENT INSTRUCTIONS
Earache: Care Instructions  Your Care Instructions    Even though infection is a common cause of ear pain, not all ear pain means an infection. If you have ear pain and don't have an infection, it could be because of a jaw problem, such as temporomandibular joint (TMJ) pain. Or it could be because of a neck problem. When ear discomfort or pain is mild or comes and goes without other symptoms, home treatment may be all you need. Follow-up care is a key part of your treatment and safety. Be sure to make and go to all appointments, and call your doctor if you are having problems. It's also a good idea to know your test results and keep a list of the medicines you take. How can you care for yourself at home? · Apply heat on the ear to ease pain. To apply heat, put a warm water bottle, a heating pad set on low, or a warm cloth on your ear. Do not go to sleep with a heating pad on your skin. · Take an over-the-counter pain medicine, such as acetaminophen (Tylenol), ibuprofen (Advil, Motrin), or naproxen (Aleve). Be safe with medicines. Read and follow all instructions on the label. · Do not take two or more pain medicines at the same time unless the doctor told you to. Many pain medicines have acetaminophen, which is Tylenol. Too much acetaminophen (Tylenol) can be harmful. · Never insert anything, such as a cotton swab or a leonie pin, into the ear. When should you call for help? Call your doctor now or seek immediate medical care if:  ? · You have new or worse symptoms of infection, such as:  ¨ Increased pain, swelling, warmth, or redness. ¨ Red streaks leading from the area. ¨ Pus draining from the area. ¨ A fever. ? Watch closely for changes in your health, and be sure to contact your doctor if:  ? · You have new or worse discharge coming from the ear. ? · You do not get better as expected. Where can you learn more? Go to http://hodan-eugene.info/.   Enter G198 in the search box to learn more about \"Earache: Care Instructions. \"  Current as of: May 12, 2017  Content Version: 11.4  © 2652-7715 [employer], [employer]. Care instructions adapted under license by [employer] (which disclaims liability or warranty for this information). If you have questions about a medical condition or this instruction, always ask your healthcare professional. [address] any warranty or liability for your use of this information.

## 2018-04-12 NOTE — PROGRESS NOTES
HISTORY OF PRESENT ILLNESS  Dariusz Cantrell is a 42 y.o. female. HPI  Pt presents with \"ear pain, and sore throat\"    Pt states that she woke up last night with right ear pain. Pt has history of recurrent ear infections, and has been seen by ENT for this. This morning, she noted a scratchy throat  No headache  Sneezing  No fever  OTC: none    Pt has to go to a  this weekend, and is worried about being sick. Review of Systems   Constitutional: Negative for fever. HENT: Positive for ear pain and sore throat. Negative for congestion. Respiratory: Negative for cough. Gastrointestinal: Negative for diarrhea and vomiting. Neurological: Negative for headaches. Physical Exam   Constitutional: She is oriented to person, place, and time. She appears well-developed and well-nourished. HENT:   Head: Normocephalic and atraumatic. Right Ear: Hearing, tympanic membrane, external ear and ear canal normal.   Left Ear: Hearing, tympanic membrane, external ear and ear canal normal.   Nose: Mucosal edema and rhinorrhea present. Mouth/Throat: Oropharynx is clear and moist.   Neck: Normal range of motion. Neck supple. Cardiovascular: Normal rate, regular rhythm and normal heart sounds. Pulmonary/Chest: Effort normal and breath sounds normal.   Lymphadenopathy:     She has no cervical adenopathy. Neurological: She is alert and oriented to person, place, and time. Skin: Skin is warm and dry. Psychiatric: She has a normal mood and affect. Her behavior is normal.       ASSESSMENT and PLAN    ICD-10-CM ICD-9-CM    1. Environmental allergies Z91.09 V15.09 levocetirizine (XYZAL) 5 mg tablet   2. Sore throat J02.9 462 AMB POC RAPID STREP A   3. Right ear pain H92.01 388.70 amoxicillin-clavulanate (AUGMENTIN) 875-125 mg per tablet     Informed patient that I believe that symptoms are allergy related. Educated about taking Xyzal as prescribed, to aid in symptoms.   Printed rx of Augmentin given, in case symptoms seem to worsen over the weekend. Pt informed to return to office with worsening of symptoms, or PRN with any questions or concerns. Pt verbalizes understanding of plan of care and denies further questions or concerns at this time.

## 2018-04-12 NOTE — PROGRESS NOTES
Chief Complaint   Patient presents with    Ear Pain     right ear started hurting last night    Sore Throat     scratchy throat this morning     \"REVIEWED RECORD IN PREPARATION FOR VISIT AND HAVE OBTAINED THE NECESSARY DOCUMENTATION\"  1. Have you been to the ER, urgent care clinic since your last visit? Hospitalized since your last visit? No    2. Have you seen or consulted any other health care providers outside of the [address] [place] [address] since your last visit? Include any pap smears or colon screening. No  Patient does not have advanced directives.

## 2018-04-12 NOTE — TELEPHONE ENCOUNTER
----- Message from Naomi Moritz sent at 4/12/2018 11:21 AM EDT -----  Regarding: / Telephone  The pt is requesting \"Zizol\" be sent to [address] [facility] in [facility], [address] [place] [facility]. Best Contact [phone].

## 2018-04-12 NOTE — MR AVS SNAPSHOT
[address] [facility] 
 
 
 [facility] [address] [facility] 
[phone] Patient: Mary Leon MRN: [id number] Visit Information Date & Time Provider Department Dept. Phone Encounter #  
 4/12/2018 10:00 AM John Decker  [facility] [phone] [id number] Follow-up Instructions Return if symptoms worsen or fail to improve. Upcoming Health Maintenance Date Due  
 EYE EXAM RETINAL OR DILATED Q1 9/1/2014 DTaP/Tdap/Td series (2 - Td) 1/1/2018 FOOT EXAM Q1 3/15/2018 MICROALBUMIN Q1 3/15/2018 HEMOGLOBIN A1C Q6M 4/30/2018 LIPID PANEL Q1 10/31/2018 Pneumococcal 19-64 Highest Risk (2 of 3 - PCV13) 12/21/2018 PAP AKA CERVICAL CYTOLOGY 6/15/2019 Allergies as of 4/12/2018  Review Complete On: 4/12/2018 By: John Decker NP Severity Noted Reaction Type Reactions Green Pepper  06/28/2012    Hives Current Immunizations  Reviewed on 3/18/2014 Name Date Influenza Vaccine 10/26/2013 TDAP Vaccine 1/1/2008 Not reviewed this visit You Were Diagnosed With   
  
 Codes Comments Environmental allergies    -  Primary ICD-10-CM: Z91.09 
ICD-9-CM: V15.09 Sore throat     ICD-10-CM: J02.9 ICD-9-CM: 664 Right ear pain     ICD-10-CM: H92.01 
ICD-9-CM: 388.70 Vitals BP Pulse Temp Resp Height(growth percentile) Weight(growth percentile) 104/78 98 98.5 °F (36.9 °C) (Oral) 16 5' 5\" (1.651 m) 209 lb (94.8 kg) LMP SpO2 BMI OB Status Smoking Status 01/25/2012 98% 34.78 kg/m2 Hysterectomy Never Smoker BMI and BSA Data Body Mass Index Body Surface Area 34.78 kg/m 2 2.09 m 2 Preferred Pharmacy Pharmacy Name Phone [address] [phone] Your Updated Medication List  
  
   
This list is accurate as of 4/12/18 10:13 AM.  Always use your most recent med list.  
  
  
  
  
 amoxicillin-clavulanate 875-125 mg per tablet Commonly known as:  AUGMENTIN Take 1 Tab by mouth two (2) times a day for 10 days. Ascensia CONTOUR strip Generic drug:  glucose blood VI test strips USE AS DIRECTED  
  
 aspirin delayed-release 81 mg tablet TAKE 1 TABLET BY MOUTH EVERY DAY  
  
 atorvastatin 20 mg tablet Commonly known as:  LIPITOR Take 1 Tab by mouth daily. butalbital-acetaminophen-caffeine -40 mg per tablet Commonly known as:  Rowdy Fee Take 1 Tab by mouth every six (6) hours as needed for Pain. Max Daily Amount: 4 Tabs. cyclobenzaprine 10 mg tablet Commonly known as:  FLEXERIL  
TAKE 1 TAB BY MOUTH THREE (3) TIMES DAILY AS NEEDED FOR MUSCLE SPASM(S). ergocalciferol 50,000 unit capsule Commonly known as:  ERGOCALCIFEROL  
TAKE 1 CAP BY MOUTH EVERY SEVEN (7) DAYS. famotidine 40 mg tablet Commonly known as:  PEPCID  
TAKE 1 TABLET BY MOUTH EVERY DAY  
  
 fenofibrate 160 mg tablet Commonly known as:  LOFIBRA Take 1 Tab by mouth daily. fluticasone 50 mcg/actuation nasal spray Commonly known as:  FLONASE  
USE 2 SQUIRTS DAILY AS NEEDED  
  
 ibuprofen 600 mg tablet Commonly known as:  MOTRIN Take 1 Tab by mouth every eight (8) hours as needed for Pain. JANUVIA 100 mg tablet Generic drug:  SITagliptin TAKE 1 TABLET BY MOUTH EVERY DAY  
  
 JARDIANCE 25 mg tablet Generic drug:  empagliflozin Take 1 Tab by mouth daily. Lancets Misc Commonly known as:  MICROLET LANCET  
USE TO CHECK BLOOD SUGAR ONE TO TWO TIMES DAILY  
  
 levocetirizine 5 mg tablet Commonly known as:  Fredrica Dheeraj Take 1 Tab by mouth daily. metFORMIN 1,000 mg tablet Commonly known as:  GLUCOPHAGE Take 1,000 mg by mouth Before breakfast and dinner. omeprazole 20 mg capsule Commonly known as:  PRILOSEC  
TAKE ONE CAPSULE BY MOUTH ONCE DAILY  
  
 ondansetron 8 mg disintegrating tablet Commonly known as:  ZOFRAN ODT  
 TAKE 1 TABLET BY MOUTH EVERY 8 HOURS AS NEEDED FOR NAUSEA  
  
 SUMAtriptan 50 mg tablet Commonly known as:  IMITREX  
TAKE 1 TAB AT ONSET OF MIGRAINE HEADACHE, REPEAT IN 1 HOUR IF NEEDED. NO MORE THAN 2 TABS PER 24 HOUR  
  
 tiZANidine 2 mg tablet Commonly known as:  ZANAFLEX  
1-2 TABLETS 1-2 HOURS BEFORE BEDTIME AS NEEDED. topiramate 50 mg tablet Commonly known as:  TOPAMAX Take 1 tab twice a day for migraine prevention * VENTOLIN HFA 90 mcg/actuation inhaler Generic drug:  albuterol INHALE 2 PUFFS BY MOUTH EVERY 4 HOURS AS NEEDED FOR WHEEZING  
  
 * albuterol 2.5 mg /3 mL (0.083 %) nebulizer solution Commonly known as:  PROVENTIL VENTOLIN  
USE ONE VIAL IN NEBULIZER EVERY 4 HOURS AS NEEDED FOR WHEEZING FOR  UP  TO  30  DOSES * Notice: This list has 2 medication(s) that are the same as other medications prescribed for you. Read the directions carefully, and ask your doctor or other care provider to review them with you. Prescriptions Printed Refills  
 amoxicillin-clavulanate (AUGMENTIN) 875-125 mg per tablet 0 Sig: Take 1 Tab by mouth two (2) times a day for 10 days. Class: Print Route: Oral  
  
Prescriptions Sent to Pharmacy Refills  
 levocetirizine (XYZAL) 5 mg tablet 2 Sig: Take 1 Tab by mouth daily. Class: Normal  
 Pharmacy: [address] #: [phone] Route: Oral  
  
We Performed the Following AMB POC RAPID STREP A [84377 CPT(R)] Follow-up Instructions Return if symptoms worsen or fail to improve. Patient Instructions Earache: Care Instructions Your Care Instructions Even though infection is a common cause of ear pain, not all ear pain means an infection. If you have ear pain and don't have an infection, it could be because of a jaw problem, such as temporomandibular joint (TMJ) pain. Or it could be because of a neck problem. When ear discomfort or pain is mild or comes and goes without other symptoms, home treatment may be all you need. Follow-up care is a key part of your treatment and safety. Be sure to make and go to all appointments, and call your doctor if you are having problems. It's also a good idea to know your test results and keep a list of the medicines you take. How can you care for yourself at home? · Apply heat on the ear to ease pain. To apply heat, put a warm water bottle, a heating pad set on low, or a warm cloth on your ear. Do not go to sleep with a heating pad on your skin. · Take an over-the-counter pain medicine, such as acetaminophen (Tylenol), ibuprofen (Advil, Motrin), or naproxen (Aleve). Be safe with medicines. Read and follow all instructions on the label. · Do not take two or more pain medicines at the same time unless the doctor told you to. Many pain medicines have acetaminophen, which is Tylenol. Too much acetaminophen (Tylenol) can be harmful. · Never insert anything, such as a cotton swab or a leonie pin, into the ear. When should you call for help? Call your doctor now or seek immediate medical care if: 
? · You have new or worse symptoms of infection, such as: 
¨ Increased pain, swelling, warmth, or redness. ¨ Red streaks leading from the area. ¨ Pus draining from the area. ¨ A fever. ? Watch closely for changes in your health, and be sure to contact your doctor if: 
? · You have new or worse discharge coming from the ear. ? · You do not get better as expected. Where can you learn more? Go to http://hodan-eugene.info/. Enter F816 in the search box to learn more about \"Earache: Care Instructions. \" Current as of: May 12, 2017 Content Version: 11.4 © 1410-3976 [employer]. Care instructions adapted under license by [employer] (which disclaims liability or warranty for this information).  If you have questions about a medical condition or this instruction, always ask your healthcare professional. [address] any warranty or liability for your use of this information. Introducing [facility]! Kylie Ag introduces Sharethrough patient portal. Now you can access parts of your medical record, email your doctor's office, and request medication refills online. 1. In your internet browser, go to [email or web address] 2. Click on the First Time User? Click Here link in the Sign In box. You will see the New Member Sign Up page. 3. Enter your Sharethrough Access Code exactly as it appears below. You will not need to use this code after youve completed the sign-up process. If you do not sign up before the expiration date, you must request a new code. · Sharethrough Access Code: [email or web address] Expires: 5/8/2018 11:09 AM 
 
4. Enter the last four digits of your Social Security Number (xxxx) and Date of Birth (mm/dd/yyyy) as indicated and click Submit. You will be taken to the next sign-up page. 5. Create a Sharethrough ID. This will be your Sharethrough login ID and cannot be changed, so think of one that is secure and easy to remember. 6. Create a Sharethrough password. You can change your password at any time. 7. Enter your Password Reset Question and Answer. This can be used at a later time if you forget your password. 8. Enter your e-mail address. You will receive e-mail notification when new information is available in [address] 9. Click Sign Up. You can now view and download portions of your medical record. 10. Click the Download Summary menu link to download a portable copy of your medical information. If you have questions, please visit the Frequently Asked Questions section of the Sharethrough website. Remember, Sharethrough is NOT to be used for urgent needs. For medical emergencies, dial 911. Now available from your iPhone and Android! Please provide this summary of care documentation to your next provider. Your primary care clinician is listed as [address]. If you have any questions after today's visit, please call [phone].

## 2018-04-16 ENCOUNTER — DOCUMENTATION ONLY (OUTPATIENT)
Dept: FAMILY MEDICINE CLINIC | Age: 38
End: 2018-04-16

## 2018-04-16 NOTE — PROGRESS NOTES
Completed PA for Zyzal and faxed to Cover my meds. Waiting on reply. Pt uses [employer] [phone].

## 2018-05-10 ENCOUNTER — OFFICE VISIT (OUTPATIENT)
Dept: FAMILY MEDICINE CLINIC | Age: 38
End: 2018-05-10

## 2018-05-10 VITALS
DIASTOLIC BLOOD PRESSURE: 81 MMHG | OXYGEN SATURATION: 97 % | BODY MASS INDEX: 34.82 KG/M2 | HEART RATE: 96 BPM | RESPIRATION RATE: 20 BRPM | SYSTOLIC BLOOD PRESSURE: 111 MMHG | WEIGHT: 209 LBS | TEMPERATURE: 98.7 F | HEIGHT: 65 IN

## 2018-05-10 DIAGNOSIS — J01.00 ACUTE NON-RECURRENT MAXILLARY SINUSITIS: Primary | ICD-10-CM

## 2018-05-10 DIAGNOSIS — Z91.09 ENVIRONMENTAL ALLERGIES: ICD-10-CM

## 2018-05-10 RX ORDER — SIMVASTATIN 20 MG/1
TABLET, FILM COATED ORAL
Refills: 1 | COMMUNITY
Start: 2018-04-26 | End: 2018-05-10 | Stop reason: ALTCHOICE

## 2018-05-10 RX ORDER — TRIAMCINOLONE ACETONIDE 55 UG/1
2 SPRAY, METERED NASAL DAILY
Qty: 1 BOTTLE | Refills: 5 | Status: SHIPPED | OUTPATIENT
Start: 2018-05-10 | End: 2018-07-10 | Stop reason: ALTCHOICE

## 2018-05-10 RX ORDER — CEFDINIR 300 MG/1
300 CAPSULE ORAL 2 TIMES DAILY
Qty: 20 CAP | Refills: 0 | Status: SHIPPED | OUTPATIENT
Start: 2018-05-10 | End: 2018-05-18 | Stop reason: ALTCHOICE

## 2018-05-10 NOTE — MR AVS SNAPSHOT
[address] [address] [address] [facility] 
[phone] Patient: Jarad Noland MRN: [id number] Visit Information Date & Time Provider Department Dept. Phone Encounter #  
 5/10/2018 10:00 AM Henri Bautista  [address] [phone] [id number] Follow-up Instructions Return if symptoms worsen or fail to improve. Upcoming Health Maintenance Date Due  
 EYE EXAM RETINAL OR DILATED Q1 9/1/2014 DTaP/Tdap/Td series (2 - Td) 1/1/2018 FOOT EXAM Q1 3/15/2018 MICROALBUMIN Q1 3/15/2018 HEMOGLOBIN A1C Q6M 4/30/2018 Influenza Age 5 to Adult 8/1/2018 LIPID PANEL Q1 10/31/2018 Pneumococcal 19-64 Highest Risk (2 of 3 - PCV13) 12/21/2018 PAP AKA CERVICAL CYTOLOGY 6/15/2019 Allergies as of 5/10/2018  Review Complete On: 5/10/2018 By: Henri Bautista NP Severity Noted Reaction Type Reactions Green Pepper  06/28/2012    Hives Current Immunizations  Reviewed on 3/18/2014 Name Date Influenza Vaccine 10/26/2013 TDAP Vaccine 1/1/2008 Not reviewed this visit You Were Diagnosed With   
  
 Codes Comments Acute non-recurrent maxillary sinusitis    -  Primary ICD-10-CM: J01.00 ICD-9-CM: 461.0 Environmental allergies     ICD-10-CM: Z91.09 
ICD-9-CM: V15.09 Vitals BP Pulse Temp Resp Height(growth percentile) Weight(growth percentile) 111/81 96 98.7 °F (37.1 °C) (Oral) 20 5' 5\" (1.651 m) 209 lb (94.8 kg) LMP SpO2 BMI OB Status Smoking Status 01/25/2012 97% 34.78 kg/m2 Hysterectomy Never Smoker BMI and BSA Data Body Mass Index Body Surface Area 34.78 kg/m 2 2.09 m 2 Preferred Pharmacy Pharmacy Name Phone [id number] [address] [phone] Your Updated Medication List  
  
   
This list is accurate as of 5/10/18 10:13 AM.  Always use your most recent med list.  
  
 Ascensia CONTOUR strip Generic drug:  glucose blood VI test strips USE AS DIRECTED  
  
 aspirin delayed-release 81 mg tablet TAKE 1 TABLET BY MOUTH EVERY DAY  
  
 atorvastatin 20 mg tablet Commonly known as:  LIPITOR Take 1 Tab by mouth daily. butalbital-acetaminophen-caffeine -40 mg per tablet Commonly known as:  Ricci Click Take 1 Tab by mouth every six (6) hours as needed for Pain. Max Daily Amount: 4 Tabs. cefdinir 300 mg capsule Commonly known as:  OMNICEF Take 1 Cap by mouth two (2) times a day for 10 days. cyclobenzaprine 10 mg tablet Commonly known as:  FLEXERIL  
TAKE 1 TAB BY MOUTH THREE (3) TIMES DAILY AS NEEDED FOR MUSCLE SPASM(S). ergocalciferol 50,000 unit capsule Commonly known as:  ERGOCALCIFEROL  
TAKE 1 CAP BY MOUTH EVERY SEVEN (7) DAYS. famotidine 40 mg tablet Commonly known as:  PEPCID  
TAKE 1 TABLET BY MOUTH EVERY DAY  
  
 fenofibrate 160 mg tablet Commonly known as:  LOFIBRA Take 1 Tab by mouth daily. fluticasone 50 mcg/actuation nasal spray Commonly known as:  FLONASE  
USE 2 SQUIRTS DAILY AS NEEDED  
  
 ibuprofen 600 mg tablet Commonly known as:  MOTRIN Take 1 Tab by mouth every eight (8) hours as needed for Pain. JANUVIA 100 mg tablet Generic drug:  SITagliptin TAKE 1 TABLET BY MOUTH EVERY DAY  
  
 JARDIANCE 25 mg tablet Generic drug:  empagliflozin Take 1 Tab by mouth daily. Lancets Misc Commonly known as:  MICROLET LANCET  
USE TO CHECK BLOOD SUGAR ONE TO TWO TIMES DAILY  
  
 levocetirizine 5 mg tablet Commonly known as:  Dewanda Gather Take 1 Tab by mouth daily. metFORMIN 1,000 mg tablet Commonly known as:  GLUCOPHAGE Take 1,000 mg by mouth Before breakfast and dinner. omeprazole 20 mg capsule Commonly known as:  PRILOSEC  
TAKE ONE CAPSULE BY MOUTH ONCE DAILY  
  
 ondansetron 8 mg disintegrating tablet Commonly known as:  ZOFRAN ODT  
 TAKE 1 TABLET BY MOUTH EVERY 8 HOURS AS NEEDED FOR NAUSEA  
  
 SUMAtriptan 50 mg tablet Commonly known as:  IMITREX  
TAKE 1 TAB AT ONSET OF MIGRAINE HEADACHE, REPEAT IN 1 HOUR IF NEEDED. NO MORE THAN 2 TABS PER 24 HOUR  
  
 tiZANidine 2 mg tablet Commonly known as:  ZANAFLEX  
1-2 TABLETS 1-2 HOURS BEFORE BEDTIME AS NEEDED. topiramate 50 mg tablet Commonly known as:  TOPAMAX Take 1 tab twice a day for migraine prevention  
  
 triamcinolone 55 mcg nasal inhaler Commonly known as:  NASACORT AQ  
2 Sprays by Both Nostrils route daily. * VENTOLIN HFA 90 mcg/actuation inhaler Generic drug:  albuterol INHALE 2 PUFFS BY MOUTH EVERY 4 HOURS AS NEEDED FOR WHEEZING  
  
 * albuterol 2.5 mg /3 mL (0.083 %) nebulizer solution Commonly known as:  PROVENTIL VENTOLIN  
USE ONE VIAL IN NEBULIZER EVERY 4 HOURS AS NEEDED FOR WHEEZING FOR  UP  TO  30  DOSES * Notice: This list has 2 medication(s) that are the same as other medications prescribed for you. Read the directions carefully, and ask your doctor or other care provider to review them with you. Prescriptions Sent to Pharmacy Refills  
 triamcinolone (NASACORT AQ) 55 mcg nasal inhaler 5 Si Sprays by Both Nostrils route daily. Class: Normal  
 Pharmacy: [facility]  [address], [address] [place] [address] Ph #: [phone] Route: Both Nostrils  
 cefdinir (OMNICEF) 300 mg capsule 0 Sig: Take 1 Cap by mouth two (2) times a day for 10 days. Class: Normal  
 Pharmacy: [facility]/pharmacy  [address], [address] [place] [address] Ph #: [phone] Route: Oral  
  
Follow-up Instructions Return if symptoms worsen or fail to improve. Patient Instructions Sinusitis: Care Instructions Your Care Instructions Sinusitis is an infection of the lining of the sinus cavities in your head. Sinusitis often follows a cold. It causes pain and pressure in your head and face. In most cases, sinusitis gets better on its own in 1 to 2 weeks. But some mild symptoms may last for several weeks. Sometimes antibiotics are needed. Follow-up care is a key part of your treatment and safety. Be sure to make and go to all appointments, and call your doctor if you are having problems. It's also a good idea to know your test results and keep a list of the medicines you take. How can you care for yourself at home? · Take an over-the-counter pain medicine, such as acetaminophen (Tylenol), ibuprofen (Advil, Motrin), or naproxen (Aleve). Read and follow all instructions on the label. · If the doctor prescribed antibiotics, take them as directed. Do not stop taking them just because you feel better. You need to take the full course of antibiotics. · Be careful when taking over-the-counter cold or flu medicines and Tylenol at the same time. Many of these medicines have acetaminophen, which is Tylenol. Read the labels to make sure that you are not taking more than the recommended dose. Too much acetaminophen (Tylenol) can be harmful. · Breathe warm, moist air from a steamy shower, a hot bath, or a sink filled with hot water. Avoid cold, dry air. Using a humidifier in your home may help. Follow the directions for cleaning the machine. · Use saline (saltwater) nasal washes to help keep your nasal passages open and wash out mucus and bacteria. You can buy saline nose drops at a grocery store or drugstore. Or you can make your own at home by adding 1 teaspoon of salt and 1 teaspoon of baking soda to 2 cups of distilled water. If you make your own, fill a bulb syringe with the solution, insert the tip into your nostril, and squeeze gently. Aubery Neat your nose. · Put a hot, wet towel or a warm gel pack on your face 3 or 4 times a day for 5 to 10 minutes each time. · Try a decongestant nasal spray like oxymetazoline (Afrin).  Do not use it for more than 3 days in a row. Using it for more than 3 days can make your congestion worse. When should you call for help? Call your doctor now or seek immediate medical care if: 
? · You have new or worse swelling or redness in your face or around your eyes. ? · You have a new or higher fever. ? Watch closely for changes in your health, and be sure to contact your doctor if: 
? · You have new or worse facial pain. ? · The mucus from your nose becomes thicker (like pus) or has new blood in it. ? · You are not getting better as expected. Where can you learn more? Go to http://hodan-eugene.info/. Enter U867 in the search box to learn more about \"Sinusitis: Care Instructions. \" Current as of: May 12, 2017 Content Version: 11.4 © 1564-8741 ED01. Care instructions adapted under license by [employer] (which disclaims liability or warranty for this information). If you have questions about a medical condition or this instruction, always ask your healthcare professional. [address] any warranty or liability for your use of this information. Introducing [facility]! Nicky Graves introduces Primary Data patient portal. Now you can access parts of your medical record, email your doctor's office, and request medication refills online. 1. In your internet browser, go to [email or web address] 2. Click on the First Time User? Click Here link in the Sign In box. You will see the New Member Sign Up page. 3. Enter your Primary Data Access Code exactly as it appears below. You will not need to use this code after youve completed the sign-up process. If you do not sign up before the expiration date, you must request a new code. · Primary Data Access Code: [email or web address] Expires: 8/8/2018 10:13 AM 
 
4.  Enter the last four digits of your Social Security Number (xxxx) and Date of Birth (mm/dd/yyyy) as indicated and click Submit. You will be taken to the next sign-up page. 5. Create a CorTec ID. This will be your CorTec login ID and cannot be changed, so think of one that is secure and easy to remember. 6. Create a CorTec password. You can change your password at any time. 7. Enter your Password Reset Question and Answer. This can be used at a later time if you forget your password. 8. Enter your e-mail address. You will receive e-mail notification when new information is available in [address] 9. Click Sign Up. You can now view and download portions of your medical record. 10. Click the Download Summary menu link to download a portable copy of your medical information. If you have questions, please visit the Frequently Asked Questions section of the CorTec website. Remember, CorTec is NOT to be used for urgent needs. For medical emergencies, dial 911. Now available from your iPhone and Android! Please provide this summary of care documentation to your next provider. Your primary care clinician is listed as [address]. If you have any questions after today's visit, please call [phone].

## 2018-05-10 NOTE — PROGRESS NOTES
Chief Complaint   Patient presents with    Sinus Pain     started 3 days ago with sinus pain and pressure. pt states she had one nose bleed this week also. pt taking Zyzal     \"REVIEWED RECORD IN PREPARATION FOR VISIT AND HAVE OBTAINED THE NECESSARY DOCUMENTATION\"  1. Have you been to the ER, urgent care clinic since your last visit? Hospitalized since your last visit? No    2. Have you seen or consulted any other health care providers outside of the [address] [place] [address] since your last visit? Include any pap smears or colon screening. No  Patient does not have advanced directives.

## 2018-05-10 NOTE — PATIENT INSTRUCTIONS
Sinusitis: Care Instructions  Your Care Instructions    Sinusitis is an infection of the lining of the sinus cavities in your head. Sinusitis often follows a cold. It causes pain and pressure in your head and face. In most cases, sinusitis gets better on its own in 1 to 2 weeks. But some mild symptoms may last for several weeks. Sometimes antibiotics are needed. Follow-up care is a key part of your treatment and safety. Be sure to make and go to all appointments, and call your doctor if you are having problems. It's also a good idea to know your test results and keep a list of the medicines you take. How can you care for yourself at home? · Take an over-the-counter pain medicine, such as acetaminophen (Tylenol), ibuprofen (Advil, Motrin), or naproxen (Aleve). Read and follow all instructions on the label. · If the doctor prescribed antibiotics, take them as directed. Do not stop taking them just because you feel better. You need to take the full course of antibiotics. · Be careful when taking over-the-counter cold or flu medicines and Tylenol at the same time. Many of these medicines have acetaminophen, which is Tylenol. Read the labels to make sure that you are not taking more than the recommended dose. Too much acetaminophen (Tylenol) can be harmful. · Breathe warm, moist air from a steamy shower, a hot bath, or a sink filled with hot water. Avoid cold, dry air. Using a humidifier in your home may help. Follow the directions for cleaning the machine. · Use saline (saltwater) nasal washes to help keep your nasal passages open and wash out mucus and bacteria. You can buy saline nose drops at a grocery store or drugstore. Or you can make your own at home by adding 1 teaspoon of salt and 1 teaspoon of baking soda to 2 cups of distilled water. If you make your own, fill a bulb syringe with the solution, insert the tip into your nostril, and squeeze gently. Dami Irons your nose.   · Put a hot, wet towel or a warm gel pack on your face 3 or 4 times a day for 5 to 10 minutes each time. · Try a decongestant nasal spray like oxymetazoline (Afrin). Do not use it for more than 3 days in a row. Using it for more than 3 days can make your congestion worse. When should you call for help? Call your doctor now or seek immediate medical care if:  ? · You have new or worse swelling or redness in your face or around your eyes. ? · You have a new or higher fever. ? Watch closely for changes in your health, and be sure to contact your doctor if:  ? · You have new or worse facial pain. ? · The mucus from your nose becomes thicker (like pus) or has new blood in it. ? · You are not getting better as expected. Where can you learn more? Go to http://hodan-eugene.info/. Enter K696 in the search box to learn more about \"Sinusitis: Care Instructions. \"  Current as of: May 12, 2017  Content Version: 11.4  © 8914-3251 [employer], [employer]. Care instructions adapted under license by [employer] (which disclaims liability or warranty for this information). If you have questions about a medical condition or this instruction, always ask your healthcare professional. [address] any warranty or liability for your use of this information.

## 2018-05-10 NOTE — PROGRESS NOTES
HISTORY OF PRESENT ILLNESS  Austin Fuentes is a 42 y.o. female. HPI  Pt presents with \"sinus pain\"    Pt states that she believes that she has a sinus infection. She believes that her symptoms started as allergies, and she has been taking Xyzal daily. Now, she has intense sinus pain and pressure. Thick nasal congestion  Headaches  No fever  OTC: Xyzal, Flonase    Pt does not believe that the flonase is helping her nasal congestion. Review of Systems   Constitutional: Negative for fever. HENT: Positive for congestion. Respiratory: Positive for cough. Gastrointestinal: Negative for diarrhea and vomiting. Physical Exam   Constitutional: She is oriented to person, place, and time. She appears well-developed and well-nourished. HENT:   Head: Normocephalic and atraumatic. Right Ear: Hearing, tympanic membrane, external ear and ear canal normal.   Left Ear: Hearing, tympanic membrane, external ear and ear canal normal.   Nose: Mucosal edema and rhinorrhea present. Right sinus exhibits maxillary sinus tenderness. Left sinus exhibits maxillary sinus tenderness. Mouth/Throat: Posterior oropharyngeal erythema present. Neck: Normal range of motion. Neck supple. Cardiovascular: Normal rate, regular rhythm and normal heart sounds. Pulmonary/Chest: Effort normal and breath sounds normal.   Lymphadenopathy:     She has no cervical adenopathy. Neurological: She is alert and oriented to person, place, and time. Skin: Skin is warm and dry. Psychiatric: She has a normal mood and affect. Her behavior is normal.       ASSESSMENT and PLAN    ICD-10-CM ICD-9-CM    1. Acute non-recurrent maxillary sinusitis J01.00 461.0 cefdinir (OMNICEF) 300 mg capsule   2. Environmental allergies Z91.09 V15.09 triamcinolone (NASACORT AQ) 55 mcg nasal inhaler     Informed patient that I have sent medication to the pharmacy, and she should take as prescribed.   Educated about staying well hydrated, and pushing fluids as much as possible. Educated about monitoring fever, and treating as needed. Pt informed to return to office with worsening of symptoms, or PRN with any questions or concerns. Pt verbalizes understanding of plan of care and denies further questions or concerns at this time.

## 2018-05-14 NOTE — TELEPHONE ENCOUNTER
Venessa from [facility] called about an alternative to ventolin. Requesting pro-air. Best contact: [phone]    thank you.

## 2018-05-14 NOTE — TELEPHONE ENCOUNTER
[facility] is calling stating that nasacort is not covered and if Baltazar-nase could be sent in place of this.

## 2018-05-15 DIAGNOSIS — Z91.09 ENVIRONMENTAL ALLERGIES: Primary | ICD-10-CM

## 2018-05-15 RX ORDER — ALBUTEROL SULFATE 90 UG/1
2 AEROSOL, METERED RESPIRATORY (INHALATION)
Qty: 3 INHALER | Refills: 3 | OUTPATIENT
Start: 2018-05-15

## 2018-05-15 RX ORDER — FLUTICASONE PROPIONATE 50 MCG
SPRAY, SUSPENSION (ML) NASAL
Qty: 1 BOTTLE | OUTPATIENT
Start: 2018-05-15

## 2018-05-15 RX ORDER — ALBUTEROL SULFATE 90 UG/1
2 AEROSOL, METERED RESPIRATORY (INHALATION)
Qty: 1 INHALER | Refills: 2 | Status: SHIPPED | OUTPATIENT
Start: 2018-05-15 | End: 2018-12-27 | Stop reason: ALTCHOICE

## 2018-05-15 RX ORDER — FLUTICASONE PROPIONATE 50 MCG
2 SPRAY, SUSPENSION (ML) NASAL DAILY
Qty: 1 BOTTLE | Refills: 2 | Status: SHIPPED | OUTPATIENT
Start: 2018-05-15 | End: 2019-08-01

## 2018-05-18 ENCOUNTER — OFFICE VISIT (OUTPATIENT)
Dept: FAMILY MEDICINE CLINIC | Age: 38
End: 2018-05-18

## 2018-05-18 VITALS
RESPIRATION RATE: 18 BRPM | SYSTOLIC BLOOD PRESSURE: 102 MMHG | TEMPERATURE: 97.9 F | HEIGHT: 65 IN | WEIGHT: 209 LBS | DIASTOLIC BLOOD PRESSURE: 66 MMHG | HEART RATE: 88 BPM | OXYGEN SATURATION: 99 % | BODY MASS INDEX: 34.82 KG/M2

## 2018-05-18 DIAGNOSIS — F41.9 ANXIETY: Primary | ICD-10-CM

## 2018-05-18 DIAGNOSIS — G47.00 INSOMNIA, UNSPECIFIED TYPE: ICD-10-CM

## 2018-05-18 RX ORDER — ZOLPIDEM TARTRATE 5 MG/1
5 TABLET ORAL
Qty: 30 TAB | Refills: 0 | Status: SHIPPED | OUTPATIENT
Start: 2018-05-18 | End: 2018-07-10 | Stop reason: ALTCHOICE

## 2018-05-18 NOTE — PROGRESS NOTES
HISTORY OF PRESENT ILLNESS  Dominga King is a 42 y.o. female. HPI  Pt presents with \"anxiety and sleeping problem\"    Pt states that she has been dealing with a lot of stress and anxiety, due to her father having stage 4 lung cancer. 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 This has caused a lot of stress and anxiety. Pt states that she has recently been unable to sleep at night, due to everything that is going on. She is able to fall asleep, but is unable to stay asleep. She has tried every OTC sleep aid available, without any help. Pt has never been on a sleep aid in the past.  Review of Systems   Constitutional: Negative for fever. HENT: Negative for congestion. Respiratory: Negative for cough. Psychiatric/Behavioral: The patient has insomnia. Physical Exam   Constitutional: She is oriented to person, place, and time. She appears well-developed and well-nourished. HENT:   Head: Normocephalic and atraumatic. Cardiovascular: Normal rate, regular rhythm and normal heart sounds. Pulmonary/Chest: Effort normal and breath sounds normal.   Neurological: She is alert and oriented to person, place, and time. Skin: Skin is warm and dry. Psychiatric: She has a normal mood and affect. Her behavior is normal.       ASSESSMENT and PLAN    ICD-10-CM ICD-9-CM    1. Anxiety F41.9 300.00 zolpidem (AMBIEN) 5 mg tablet   2. Insomnia, unspecified type G47.00 780.52 zolpidem (AMBIEN) 5 mg tablet     Informed patient about Ambien, risks of this medication, and common side effects. Educated about only taking this as needed,and the importance of not driving while taking this medication. Pt informed to return to office with worsening of symptoms, or PRN with any questions or concerns. Pt verbalizes understanding of plan of care and denies further questions or concerns at this time.

## 2018-05-18 NOTE — PROGRESS NOTES
Chief Complaint   Patient presents with    Anxiety    Sleep Problem     pt states she falls to sleep but can not stay asleep.    worried about her fathers health. \"REVIEWED RECORD IN PREPARATION FOR VISIT AND HAVE OBTAINED THE NECESSARY DOCUMENTATION\"  1. Have you been to the ER, urgent care clinic since your last visit? Hospitalized since your last visit? No    2. Have you seen or consulted any other health care providers outside of the [facility] since your last visit? Include any pap smears or colon screening. No  Patient does not have advanced directives.

## 2018-05-18 NOTE — PATIENT INSTRUCTIONS

## 2018-05-18 NOTE — MR AVS SNAPSHOT
[address] [address] [address] [facility] 
[phone] Patient: Indu Victoria MRN: [id number] PSM:[id number] Visit Information Date & Time Provider Department Dept. Phone Encounter #  
 5/18/2018  9:15 AM Obed Rosen [address] [phone] [id number] Follow-up Instructions Return if symptoms worsen or fail to improve. Upcoming Health Maintenance Date Due  
 EYE EXAM RETINAL OR DILATED Q1 9/1/2014 DTaP/Tdap/Td series (2 - Td) 1/1/2018 FOOT EXAM Q1 3/15/2018 MICROALBUMIN Q1 3/15/2018 HEMOGLOBIN A1C Q6M 4/30/2018 Influenza Age 5 to Adult 8/1/2018 LIPID PANEL Q1 10/31/2018 Pneumococcal 19-64 Highest Risk (2 of 3 - PCV13) 12/21/2018 PAP AKA CERVICAL CYTOLOGY 6/15/2019 Allergies as of 5/18/2018  Review Complete On: 5/18/2018 By: Vince Gavin NP Severity Noted Reaction Type Reactions Green Pepper  06/28/2012    Hives Current Immunizations  Reviewed on 3/18/2014 Name Date Influenza Vaccine 10/26/2013 TDAP Vaccine 1/1/2008 Not reviewed this visit You Were Diagnosed With   
  
 Codes Comments Anxiety    -  Primary ICD-10-CM: F41.9 ICD-9-CM: 300.00 Insomnia, unspecified type     ICD-10-CM: G47.00 ICD-9-CM: 780.52 Vitals BP Pulse Temp Resp Height(growth percentile) Weight(growth percentile) 102/66 88 97.9 °F (36.6 °C) (Oral) 18 5' 5\" (1.651 m) 209 lb (94.8 kg) LMP SpO2 BMI OB Status Smoking Status 01/25/2012 99% 34.78 kg/m2 Hysterectomy Never Smoker BMI and BSA Data Body Mass Index Body Surface Area 34.78 kg/m 2 2.09 m 2 Preferred Pharmacy Pharmacy Name Phone [id number] [address] [phone] Your Updated Medication List  
  
   
This list is accurate as of 5/18/18  9:36 AM.  Always use your most recent med list.  
  
  
  
  
 * albuterol 2.5 mg /3 mL (0.083 %) nebulizer solution Commonly known as:  PROVENTIL VENTOLIN  
USE ONE VIAL IN NEBULIZER EVERY 4 HOURS AS NEEDED FOR WHEEZING FOR  UP  TO  30  DOSES * albuterol 90 mcg/actuation inhaler Commonly known as:  PROVENTIL HFA, VENTOLIN HFA, PROAIR HFA Take 2 Puffs by inhalation every four (4) hours as needed for Wheezing. Dispense proair. [employer] CONTOUR strip Generic drug:  glucose blood VI test strips USE AS DIRECTED  
  
 aspirin delayed-release 81 mg tablet TAKE 1 TABLET BY MOUTH EVERY DAY  
  
 atorvastatin 20 mg tablet Commonly known as:  LIPITOR Take 1 Tab by mouth daily. butalbital-acetaminophen-caffeine -40 mg per tablet Commonly known as:  Lulu Pata Take 1 Tab by mouth every six (6) hours as needed for Pain. Max Daily Amount: 4 Tabs. cyclobenzaprine 10 mg tablet Commonly known as:  FLEXERIL  
TAKE 1 TAB BY MOUTH THREE (3) TIMES DAILY AS NEEDED FOR MUSCLE SPASM(S). ergocalciferol 50,000 unit capsule Commonly known as:  ERGOCALCIFEROL  
TAKE 1 CAP BY MOUTH EVERY SEVEN (7) DAYS. famotidine 40 mg tablet Commonly known as:  PEPCID  
TAKE 1 TABLET BY MOUTH EVERY DAY  
  
 fenofibrate 160 mg tablet Commonly known as:  LOFIBRA Take 1 Tab by mouth daily. fluticasone 50 mcg/actuation nasal spray Commonly known as:  Marine Knack 2 Sprays by Both Nostrils route daily. ibuprofen 600 mg tablet Commonly known as:  MOTRIN Take 1 Tab by mouth every eight (8) hours as needed for Pain. JANUVIA 100 mg tablet Generic drug:  SITagliptin TAKE 1 TABLET BY MOUTH EVERY DAY  
  
 JARDIANCE 25 mg tablet Generic drug:  empagliflozin Take 1 Tab by mouth daily. Lancets Misc Commonly known as:  MICROLET LANCET  
USE TO CHECK BLOOD SUGAR ONE TO TWO TIMES DAILY  
  
 levocetirizine 5 mg tablet Commonly known as:  Susannah Sandeep Take 1 Tab by mouth daily. metFORMIN 1,000 mg tablet Commonly known as:  GLUCOPHAGE Take 1,000 mg by mouth Before breakfast and dinner. omeprazole 20 mg capsule Commonly known as:  PRILOSEC  
TAKE ONE CAPSULE BY MOUTH ONCE DAILY  
  
 ondansetron 8 mg disintegrating tablet Commonly known as:  ZOFRAN ODT  
TAKE 1 TABLET BY MOUTH EVERY 8 HOURS AS NEEDED FOR NAUSEA  
  
 SUMAtriptan 50 mg tablet Commonly known as:  IMITREX  
TAKE 1 TAB AT ONSET OF MIGRAINE HEADACHE, REPEAT IN 1 HOUR IF NEEDED. NO MORE THAN 2 TABS PER 24 HOUR  
  
 tiZANidine 2 mg tablet Commonly known as:  ZANAFLEX  
1-2 TABLETS 1-2 HOURS BEFORE BEDTIME AS NEEDED. topiramate 50 mg tablet Commonly known as:  TOPAMAX Take 1 tab twice a day for migraine prevention  
  
 triamcinolone 55 mcg nasal inhaler Commonly known as:  NASACORT AQ  
2 Sprays by Both Nostrils route daily. zolpidem 5 mg tablet Commonly known as:  AMBIEN Take 1 Tab by mouth nightly as needed for Sleep. Max Daily Amount: 5 mg.  
  
 * Notice: This list has 2 medication(s) that are the same as other medications prescribed for you. Read the directions carefully, and ask your doctor or other care provider to review them with you. Prescriptions Printed Refills  
 zolpidem (AMBIEN) 5 mg tablet 0 Sig: Take 1 Tab by mouth nightly as needed for Sleep. Max Daily Amount: 5 mg. Class: Print Route: Oral  
  
Follow-up Instructions Return if symptoms worsen or fail to improve. Patient Instructions Insomnia: Care Instructions Your Care Instructions Insomnia is the inability to sleep well. It is a common problem for most people at some time. Insomnia may make it hard for you to get to sleep, stay asleep, or sleep as long as you need to. This can make you tired and grouchy during the day. It can also make you forgetful, less effective at work, and unhappy. Insomnia can be caused by conditions such as depression or anxiety.  Pain can also affect your ability to sleep. When these problems are solved, the insomnia usually clears up. But sometimes bad sleep habits can cause insomnia. If insomnia is affecting your work or your enjoyment of life, you can take steps to improve your sleep. Follow-up care is a key part of your treatment and safety. Be sure to make and go to all appointments, and call your doctor if you are having problems. It's also a good idea to know your test results and keep a list of the medicines you take. How can you care for yourself at home? What to avoid · Do not have drinks with caffeine, such as coffee or black tea, for 8 hours before bed. · Do not smoke or use other types of tobacco near bedtime. Nicotine is a stimulant and can keep you awake. · Avoid drinking alcohol late in the evening, because it can cause you to wake in the middle of the night. · Do not eat a big meal close to bedtime. If you are hungry, eat a light snack. · Do not drink a lot of water close to bedtime, because the need to urinate may wake you up during the night. · Do not read or watch TV in bed. Use the bed only for sleeping and sexual activity. What to try · Go to bed at the same time every night, and wake up at the same time every morning. Do not take naps during the day. · Keep your bedroom quiet, dark, and cool. · Sleep on a comfortable pillow and mattress. · If watching the clock makes you anxious, turn it facing away from you so you cannot see the time. · If you worry when you lie down, start a worry book. Well before bedtime, write down your worries, and then set the book and your concerns aside. · Try meditation or other relaxation techniques before you go to bed. · If you cannot fall asleep, get up and go to another room until you feel sleepy. Do something relaxing. Repeat your bedtime routine before you go to bed again. · Make your house quiet and calm about an hour before bedtime.  Turn down the lights, turn off the TV, log off the computer, and turn down the volume on music. This can help you relax after a busy day. When should you call for help? Watch closely for changes in your health, and be sure to contact your doctor if: 
? · Your efforts to improve your sleep do not work. ? · Your insomnia gets worse. ? · You have been feeling down, depressed, or hopeless or have lost interest in things that you usually enjoy. Where can you learn more? Go to http://hodan-eugene.info/. Enter P513 in the search box to learn more about \"Insomnia: Care Instructions. \" Current as of: July 26, 2016 Content Version: 11.4 © 8752-9114 [employer]. Care instructions adapted under license by [employer] (which disclaims liability or warranty for this information). If you have questions about a medical condition or this instruction, always ask your healthcare professional. [address] any warranty or liability for your use of this information. Introducing [facility]! [facility] introduces [employer]ia patient portal. Now you can access parts of your medical record, email your doctor's office, and request medication refills online. 1. In your internet browser, go to [email or web address] 2. Click on the First Time User? Click Here link in the Sign In box. You will see the New Member Sign Up page. 3. Enter your Farmia Access Code exactly as it appears below. You will not need to use this code after youve completed the sign-up process. If you do not sign up before the expiration date, you must request a new code. · [employer]ia Access Code: [email or web address] Expires: 8/8/2018 10:13 AM 
 
4. Enter the last four digits of your Social Security Number (xxxx) and Date of Birth (mm/dd/yyyy) as indicated and click Submit. You will be taken to the next sign-up page. 5. Create a HitFix ID. This will be your HitFix login ID and cannot be changed, so think of one that is secure and easy to remember. 6. Create a HitFix password. You can change your password at any time. 7. Enter your Password Reset Question and Answer. This can be used at a later time if you forget your password. 8. Enter your e-mail address. You will receive e-mail notification when new information is available in [address] 9. Click Sign Up. You can now view and download portions of your medical record. 10. Click the Download Summary menu link to download a portable copy of your medical information. If you have questions, please visit the Frequently Asked Questions section of the HitFix website. Remember, HitFix is NOT to be used for urgent needs. For medical emergencies, dial 911. Now available from your iPhone and Android! Please provide this summary of care documentation to your next provider. Your primary care clinician is listed as [address]. If you have any questions after today's visit, please call [phone].

## 2018-06-27 RX ORDER — OMEPRAZOLE 20 MG/1
CAPSULE, DELAYED RELEASE ORAL
Qty: 30 CAP | Refills: 5 | Status: SHIPPED | OUTPATIENT
Start: 2018-06-27 | End: 2018-09-12 | Stop reason: SDUPTHER

## 2018-07-03 DIAGNOSIS — Z91.09 ENVIRONMENTAL ALLERGIES: ICD-10-CM

## 2018-07-03 RX ORDER — FLUTICASONE PROPIONATE 50 MCG
SPRAY, SUSPENSION (ML) NASAL
Qty: 1 BOTTLE | Refills: 3 | Status: SHIPPED | OUTPATIENT
Start: 2018-07-03 | End: 2018-07-10 | Stop reason: SDUPTHER

## 2018-07-10 ENCOUNTER — OFFICE VISIT (OUTPATIENT)
Dept: FAMILY MEDICINE CLINIC | Age: 38
End: 2018-07-10

## 2018-07-10 VITALS
HEART RATE: 98 BPM | RESPIRATION RATE: 16 BRPM | TEMPERATURE: 98.1 F | DIASTOLIC BLOOD PRESSURE: 78 MMHG | SYSTOLIC BLOOD PRESSURE: 123 MMHG | WEIGHT: 204 LBS | BODY MASS INDEX: 33.99 KG/M2 | OXYGEN SATURATION: 97 % | HEIGHT: 65 IN

## 2018-07-10 DIAGNOSIS — E78.5 DYSLIPIDEMIA (HIGH LDL; LOW HDL): ICD-10-CM

## 2018-07-10 DIAGNOSIS — I10 ESSENTIAL HYPERTENSION: ICD-10-CM

## 2018-07-10 DIAGNOSIS — F41.9 ANXIETY: ICD-10-CM

## 2018-07-10 DIAGNOSIS — E11.9 TYPE 2 DIABETES MELLITUS WITHOUT COMPLICATION, WITHOUT LONG-TERM CURRENT USE OF INSULIN (HCC): Primary | ICD-10-CM

## 2018-07-10 LAB
ALBUMIN UR QL STRIP: 10 MG/L
CREATININE, URINE POC: 100 MG/DL
MICROALBUMIN/CREAT RATIO POC: <30 MG/G

## 2018-07-10 RX ORDER — MONTELUKAST SODIUM 10 MG/1
10 TABLET ORAL
Refills: 0 | COMMUNITY
Start: 2018-06-04 | End: 2018-07-10 | Stop reason: ALTCHOICE

## 2018-07-10 RX ORDER — SIMVASTATIN 20 MG/1
20 TABLET, FILM COATED ORAL
Refills: 1 | COMMUNITY
Start: 2018-07-02 | End: 2018-08-31

## 2018-07-10 RX ORDER — ALPRAZOLAM 0.5 MG/1
0.5 TABLET ORAL
Qty: 15 TAB | Refills: 0 | Status: SHIPPED | OUTPATIENT
Start: 2018-07-10 | End: 2018-07-27

## 2018-07-10 NOTE — MR AVS SNAPSHOT
[address] [facility] 
 
 
 [address] [facility] 
[phone] Patient: Miguel Side MRN: [id number] Visit Information Date & Time Provider Department Dept. Phone Encounter #  
 7/10/2018  8:00 AM Denzel Domínguez  [facility] [phone] [id number] Follow-up Instructions Return in about 6 months (around 1/10/2019), or if symptoms worsen or fail to improve. Follow-up and Disposition History Upcoming Health Maintenance Date Due  
 EYE EXAM RETINAL OR DILATED Q1 9/1/2014 DTaP/Tdap/Td series (2 - Td) 1/1/2018 FOOT EXAM Q1 3/15/2018 Influenza Age 5 to Adult 10/1/2019* Pneumococcal 19-64 Highest Risk (2 of 3 - PCV13) 12/21/2018 HEMOGLOBIN A1C Q6M 1/10/2019 PAP AKA CERVICAL CYTOLOGY 6/15/2019 MICROALBUMIN Q1 7/10/2019 LIPID PANEL Q1 7/10/2019 *Topic was postponed. The date shown is not the original due date. Allergies as of 7/10/2018  Review Complete On: 7/10/2018 By: Denzel Domínguez NP Severity Noted Reaction Type Reactions Green Pepper  06/28/2012    Hives Current Immunizations  Reviewed on 3/18/2014 Name Date Influenza Vaccine 10/26/2013 TDAP Vaccine 1/1/2008 Not reviewed this visit You Were Diagnosed With   
  
 Codes Comments Type 2 diabetes mellitus without complication, without long-term current use of insulin (HCC)    -  Primary ICD-10-CM: E11.9 ICD-9-CM: 250.00 Essential hypertension     ICD-10-CM: I10 
ICD-9-CM: 401.9 Dyslipidemia (high LDL; low HDL)     ICD-10-CM: E78.5 ICD-9-CM: 272.4 Anxiety     ICD-10-CM: F41.9 ICD-9-CM: 300.00 Vitals BP Pulse Temp Resp Height(growth percentile) Weight(growth percentile) 123/78 (BP 1 Location: Right arm, BP Patient Position: Sitting) 98 98.1 °F (36.7 °C) (Oral) 16 5' 5\" (1.651 m) 204 lb (92.5 kg) LMP SpO2 BMI OB Status Smoking Status 01/25/2012 97% 33.95 kg/m2 Hysterectomy Never Smoker Vitals History BMI and BSA Data Body Mass Index Body Surface Area 33.95 kg/m 2 2.06 m 2 Preferred Pharmacy Pharmacy Name Phone [id number] [address] [phone] Your Updated Medication List  
  
   
This list is accurate as of 7/10/18 11:59 PM.  Always use your most recent med list.  
  
  
  
  
 * albuterol 2.5 mg /3 mL (0.083 %) nebulizer solution Commonly known as:  PROVENTIL VENTOLIN  
USE ONE VIAL IN NEBULIZER EVERY 4 HOURS AS NEEDED FOR WHEEZING FOR  UP  TO  30  DOSES * albuterol 90 mcg/actuation inhaler Commonly known as:  PROVENTIL HFA, VENTOLIN HFA, PROAIR HFA Take 2 Puffs by inhalation every four (4) hours as needed for Wheezing. Dispense proair. ALPRAZolam 0.5 mg tablet Commonly known as:  Sharyon Kida Take 1 Tab by mouth nightly as needed for Anxiety. Max Daily Amount: 0.5 mg.  
  
 [employer] CONTOUR strip Generic drug:  glucose blood VI test strips USE AS DIRECTED  
  
 aspirin delayed-release 81 mg tablet TAKE 1 TABLET BY MOUTH EVERY DAY  
  
 butalbital-acetaminophen-caffeine -40 mg per tablet Commonly known as:  [place] P[place] Take 1 Tab by mouth every six (6) hours as needed for Pain. Max Daily Amount: 4 Tabs.  
  
 ergocalciferol 50,000 unit capsule Commonly known as:  ERGOCALCIFEROL  
TAKE 1 CAP BY MOUTH EVERY SEVEN (7) DAYS. famotidine 40 mg tablet Commonly known as:  PEPCID  
TAKE 1 TABLET BY MOUTH EVERY DAY  
  
 fenofibrate 160 mg tablet Commonly known as:  LOFIBRA Take 1 Tab by mouth daily. fluticasone 50 mcg/actuation nasal spray Commonly known as:  Hermann Sacks 2 Sprays by Both Nostrils route daily. JARDIANCE 25 mg tablet Generic drug:  empagliflozin Take 1 Tab by mouth daily. Lancets Misc Commonly known as:  MICROLET LANCET  
USE TO CHECK BLOOD SUGAR ONE TO TWO TIMES DAILY metFORMIN 1,000 mg tablet Commonly known as:  GLUCOPHAGE Take 1,000 mg by mouth Before breakfast and dinner. omeprazole 20 mg capsule Commonly known as:  PRILOSEC  
TAKE ONE CAPSULE BY MOUTH ONCE DAILY  
  
 SUMAtriptan 50 mg tablet Commonly known as:  IMITREX  
TAKE 1 TAB AT ONSET OF MIGRAINE HEADACHE, REPEAT IN 1 HOUR IF NEEDED. NO MORE THAN 2 TABS PER 24 HOUR  
  
 topiramate 50 mg tablet Commonly known as:  TOPAMAX Take 1 tab twice a day for migraine prevention * Notice: This list has 2 medication(s) that are the same as other medications prescribed for you. Read the directions carefully, and ask your doctor or other care provider to review them with you. Prescriptions Printed Refills ALPRAZolam (XANAX) 0.5 mg tablet (Discontinued) 0 Sig: Take 1 Tab by mouth nightly as needed for Anxiety. Max Daily Amount: 0.5 mg.  
 Class: Print Route: Oral  
 Reason for Discontinue: Not A Current Medication We Performed the Following AMB POC URINE, MICROALBUMIN, SEMIQUANT (3 RESULTS) [71811 CPT(R)] CBC W/O DIFF [67914 CPT(R)] CVD REPORT [[id number] Custom] DIABETES PATIENT EDUCATION [[id number] Custom] HEMOGLOBIN A1C WITH EAG [62582 CPT(R)] LIPID PANEL [73861 CPT(R)] METABOLIC PANEL, COMPREHENSIVE [82775 CPT(R)] Follow-up Instructions Return in about 6 months (around 1/10/2019), or if symptoms worsen or fail to improve. Patient Instructions Anxiety Disorder: Care Instructions Your Care Instructions Anxiety is a normal reaction to stress. Difficult situations can cause you to have symptoms such as sweaty palms and a nervous feeling. In an anxiety disorder, the symptoms are far more severe. Constant worry, muscle tension, trouble sleeping, nausea and diarrhea, and other symptoms can make normal daily activities difficult or impossible.  These symptoms may occur for no reason, and they can affect your work, school, or social life. Medicines, counseling, and self-care can all help. Follow-up care is a key part of your treatment and safety. Be sure to make and go to all appointments, and call your doctor if you are having problems. It's also a good idea to know your test results and keep a list of the medicines you take. How can you care for yourself at home? · Take medicines exactly as directed. Call your doctor if you think you are having a problem with your medicine. · Go to your counseling sessions and follow-up appointments. · Recognize and accept your anxiety. Then, when you are in a situation that makes you anxious, say to yourself, \"This is not an emergency. I feel uncomfortable, but I am not in danger. I can keep going even if I feel anxious. \" · Be kind to your body: ¨ Relieve tension with exercise or a massage. ¨ Get enough rest. 
¨ Avoid alcohol, caffeine, nicotine, and illegal drugs. They can increase your anxiety level and cause sleep problems. ¨ Learn and do relaxation techniques. See below for more about these techniques. · Engage your mind. Get out and do something you enjoy. Go to a funny movie, or take a walk or hike. Plan your day. Having too much or too little to do can make you anxious. · Keep a record of your symptoms. Discuss your fears with a good friend or family member, or join a support group for people with similar problems. Talking to others sometimes relieves stress. · Get involved in social groups, or volunteer to help others. Being alone sometimes makes things seem worse than they are. · Get at least 30 minutes of exercise on most days of the week to relieve stress. Walking is a good choice. You also may want to do other activities, such as running, swimming, cycling, or playing tennis or team sports. Relaxation techniques Do relaxation exercises 10 to 20 minutes a day.  You can play soothing, relaxing music while you do them, if you wish. · Tell others in your house that you are going to do your relaxation exercises. Ask them not to disturb you. · Find a comfortable place, away from all distractions and noise. · Lie down on your back, or sit with your back straight. · Focus on your breathing. Make it slow and steady. · Breathe in through your nose. Breathe out through either your nose or mouth. · Breathe deeply, filling up the area between your navel and your rib cage. Breathe so that your belly goes up and down. · Do not hold your breath. · Breathe like this for 5 to 10 minutes. Notice the feeling of calmness throughout your whole body. As you continue to breathe slowly and deeply, relax by doing the following for another 5 to 10 minutes: · Tighten and relax each muscle group in your body. You can begin at your toes and work your way up to your head. · Imagine your muscle groups relaxing and becoming heavy. · Empty your mind of all thoughts. · Let yourself relax more and more deeply. · Become aware of the state of calmness that surrounds you. · When your relaxation time is over, you can bring yourself back to alertness by moving your fingers and toes and then your hands and feet and then stretching and moving your entire body. Sometimes people fall asleep during relaxation, but they usually wake up shortly afterward. · Always give yourself time to return to full alertness before you drive a car or do anything that might cause an accident if you are not fully alert. Never play a relaxation tape while you drive a car. When should you call for help? Call 911 anytime you think you may need emergency care. For example, call if: 
? · You feel you cannot stop from hurting yourself or someone else. ? Keep the numbers for these national suicide hotlines: 2-209-253-TALK (2-987.218.2530) and 5-649-LGZGKSC (8-314.258.7969).  If you or someone you know talks about suicide or feeling hopeless, get help right away. ? Watch closely for changes in your health, and be sure to contact your doctor if: 
? · You have anxiety or fear that affects your life. ? · You have symptoms of anxiety that are new or different from those you had before. Where can you learn more? Go to http://hodan-eugene.info/. Enter P754 in the search box to learn more about \"Anxiety Disorder: Care Instructions. \" Current as of: May 12, 2017 Content Version: 11.4 © 4397-8899 [employer]. Care instructions adapted under license by [employer] (which disclaims liability or warranty for this information). If you have questions about a medical condition or this instruction, always ask your healthcare professional. [address] any warranty or liability for your use of this information. Patient Instructions History Introducing [facility]! [facility] introduces [employer] patient portal. Now you can access parts of your medical record, email your doctor's office, and request medication refills online. 1. In your internet browser, go to [email or web address] 2. Click on the First Time User? Click Here link in the Sign In box. You will see the New Member Sign Up page. 3. Enter your [employer] Access Code exactly as it appears below. You will not need to use this code after youve completed the sign-up process. If you do not sign up before the expiration date, you must request a new code. · [employer] Access Code: [email or web address] Expires: 11/29/2018 11:42 AM 
 
4. Enter the last four digits of your Social Security Number (xxxx) and Date of Birth (mm/dd/yyyy) as indicated and click Submit. You will be taken to the next sign-up page. 5. Create a [employer] ID. This will be your [employer] login ID and cannot be changed, so think of one that is secure and easy to remember. 6. Create a [employer] password. You can change your password at any time. 7. Enter your Password Reset Question and Answer. This can be used at a later time if you forget your password. 8. Enter your e-mail address. You will receive e-mail notification when new information is available in [address] 9. Click Sign Up. You can now view and download portions of your medical record. 10. Click the Download Summary menu link to download a portable copy of your medical information. If you have questions, please visit the Frequently Asked Questions section of the [employer] website. Remember, [employer] is NOT to be used for urgent needs. For medical emergencies, dial 911. Now available from your iPhone and Android! Please provide this summary of care documentation to your next provider. Your primary care clinician is listed as Selina Morgan. If you have any questions after today's visit, please call [phone].

## 2018-07-10 NOTE — PROGRESS NOTES
Identified pt with two pt identifiers(name and ). Chief Complaint   Patient presents with    Diabetes    Labs        Health Maintenance Due   Topic    EYE EXAM RETINAL OR DILATED Q1     DTaP/Tdap/Td series (2 - Td)    FOOT EXAM Q1     MICROALBUMIN Q1     HEMOGLOBIN A1C Q6M        Wt Readings from Last 3 Encounters:   07/10/18 204 lb (92.5 kg)   18 209 lb (94.8 kg)   05/10/18 209 lb (94.8 kg)     Temp Readings from Last 3 Encounters:   07/10/18 98.1 °F (36.7 °C) (Oral)   18 97.9 °F (36.6 °C) (Oral)   05/10/18 98.7 °F (37.1 °C) (Oral)     BP Readings from Last 3 Encounters:   07/10/18 123/78   18 102/66   05/10/18 111/81     Pulse Readings from Last 3 Encounters:   07/10/18 98   18 88   05/10/18 96         Learning Assessment:  :     Learning Assessment 2017   PRIMARY LEARNER Patient Patient   HIGHEST LEVEL OF EDUCATION - PRIMARY LEARNER  GRADUATED HIGH SCHOOL OR GED GRADUATED HIGH SCHOOL OR GED   BARRIERS PRIMARY LEARNER NONE NONE   PRIMARY LANGUAGE ENGLISH ENGLISH   LEARNER PREFERENCE PRIMARY READING READING   ANSWERED BY patient patient   RELATIONSHIP SELF SELF       Depression Screening:  :     PHQ over the last two weeks 2018   Little interest or pleasure in doing things Not at all   Feeling down, depressed or hopeless Not at all   Total Score PHQ 2 0       Fall Risk Assessment:  :     Fall Risk Assessment, last 12 mths 2017   Able to walk? Yes   Fall in past 12 months? No       Abuse Screening:  :     Abuse Screening Questionnaire 2017   Do you ever feel afraid of your partner? N N   Are you in a relationship with someone who physically or mentally threatens you? N N   Is it safe for you to go home?  Y Y       Coordination of Care Questionnaire:  :     1) Have you been to an emergency room, urgent care clinic since your last visit? no   Hospitalized since your last visit? no             2) Have you seen or consulted any other health care providers outside of [address] [place] [address] since your last visit? no  (Include any pap smears or colon screenings in this section.)    3) Do you have an Advance Directive on file? no  Are you interested in receiving information about Advance Directives? no    Patient is accompanied by self. Reviewed record in preparation for visit and have obtained necessary documentation. Medication reconciliation up to date and corrected with patient at this time.

## 2018-07-10 NOTE — PROGRESS NOTES
Subjective:     Angel Briscoe is a 42 y.o. female seen for follow up of diabetes. She also has hypertension and hyperlipidemia. Diabetic Review of Systems - medication compliance: compliant all of the time, diabetic diet compliance: compliant most of the time. Other symptoms and concerns: Pt has no concerns about her diabetes and chronic conditions. She has continued to have difficulty sleeping. 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  Pt states that she saw him pass away, and will wake up with vivid dreams of his passing. We tried a short term prescription of Ambien, but she did not get relief with this. Pt feels as though her symptoms are in relation to anxiety, but would not really like to take a medication daily. Patient Active Problem List    Diagnosis Date Noted    Acute midline thoracic back pain 09/26/2017    Acute mucoid otitis media of right ear 06/23/2017    Dermatitis 03/09/2017    Screening for thyroid disorder 01/10/2017    Acute non-recurrent frontal sinusitis 01/03/2017    Left lower quadrant pain 12/29/2016    Left ear pain 12/02/2016    Strep throat 11/09/2016    Nausea 10/07/2016    Right acute serous otitis media 07/14/2016    Rash 07/14/2016    Gastroesophageal reflux disease without esophagitis 07/14/2016    Environmental allergies 07/14/2016    Right ear pain 06/21/2016    Diabetes ([facility] [address] 07/18/2014    Sore throat 05/06/2014    Migraine headache 01/03/2012    Vitamin D deficiency 11/06/2011    Dyslipidemia (high LDL; low HDL) 01/24/2011    Hypertension 01/06/2011    Asthma 01/06/2011     Current Outpatient Prescriptions   Medication Sig Dispense Refill    simvastatin (ZOCOR) 20 mg tablet Take 20 mg by mouth nightly. 1    ALPRAZolam (XANAX) 0.5 mg tablet Take 1 Tab by mouth nightly as needed for Anxiety.  Max Daily Amount: 0.5 mg. 15 Tab 0    omeprazole (PRILOSEC) 20 mg capsule TAKE ONE CAPSULE BY MOUTH ONCE DAILY 30 Cap 5    albuterol (PROVENTIL HFA, VENTOLIN HFA, PROAIR HFA) 90 mcg/actuation inhaler Take 2 Puffs by inhalation every four (4) hours as needed for Wheezing. Dispense proair. 1 Inhaler 2    fluticasone (FLONASE) 50 mcg/actuation nasal spray 2 Sprays by Both Nostrils route daily. 1 Bottle 2    levocetirizine (XYZAL) 5 mg tablet Take 1 Tab by mouth daily. 30 Tab 2    SUMAtriptan (IMITREX) 50 mg tablet TAKE 1 TAB AT ONSET OF MIGRAINE HEADACHE, REPEAT IN 1 HOUR IF NEEDED. NO MORE THAN 2 TABS PER 24 HOUR 9 Tab 3    ergocalciferol (ERGOCALCIFEROL) 50,000 unit capsule TAKE 1 CAP BY MOUTH EVERY SEVEN (7) DAYS. 30 Cap 4    ondansetron (ZOFRAN ODT) 8 mg disintegrating tablet TAKE 1 TABLET BY MOUTH EVERY 8 HOURS AS NEEDED FOR NAUSEA 15 Tab 0    albuterol (PROVENTIL VENTOLIN) 2.5 mg /3 mL (0.083 %) nebulizer solution USE ONE VIAL IN NEBULIZER EVERY 4 HOURS AS NEEDED FOR WHEEZING FOR  UP  TO  30  DOSES 300 Each 2    ibuprofen (MOTRIN) 600 mg tablet Take 1 Tab by mouth every eight (8) hours as needed for Pain. 30 Tab 0    metFORMIN (GLUCOPHAGE) 1,000 mg tablet Take 1,000 mg by mouth Before breakfast and dinner. 5    tiZANidine (ZANAFLEX) 2 mg tablet 1-2 TABLETS 1-2 HOURS BEFORE BEDTIME AS NEEDED. 15 Tab 1    [employer] CONTOUR strip USE AS DIRECTED 50 Strip 5    aspirin delayed-release 81 mg tablet TAKE 1 TABLET BY MOUTH EVERY DAY 30 Tab 5    JARDIANCE 25 mg tablet Take 1 Tab by mouth daily. 30 Tab 5    fenofibrate (LOFIBRA) 160 mg tablet Take 1 Tab by mouth daily. 30 Tab 5    famotidine (PEPCID) 40 mg tablet TAKE 1 TABLET BY MOUTH EVERY DAY  12    topiramate (TOPAMAX) 50 mg tablet Take 1 tab twice a day for migraine prevention 60 Tab 0    butalbital-acetaminophen-caffeine (FIORICET, ESGIC) -40 mg per tablet Take 1 Tab by mouth every six (6) hours as needed for Pain. Max Daily Amount: 4 Tabs.  20 Tab 0    Lancets (MICROLET LANCET) Misc USE TO CHECK BLOOD SUGAR ONE TO TWO TIMES DAILY 100 Each 0 Allergies   Allergen Reactions    Green Pepper Hives     Past Medical History:   Diagnosis Date    Anxiety     Asthma     Diabetes ([address] 2008    Headache     Headache(784.0)     Hypertension     Rash 7/14/2016    S/P dilatation and curettage     Tachycardia      Past Surgical History:   Procedure Laterality Date    HX APPENDECTOMY  2/2008    HX CHOLECYSTECTOMY  2001    HX DILATION AND CURETTAGE      HX GYN  3/2008    tubal ligation    HX HERNIA REPAIR  2/2009    HX HYSTERECTOMY  03/2012    HX LUMBAR DISKECTOMY      2006    HX ORTHOPAEDIC  9/2006    ruptured discs    UPPER GI ENDOSCOPY,BIOPSY  5/11/2017          Family History   Problem Relation Age of Onset    Heart Disease Mother     Hypertension Father     Hypertension Sister      Social History   Substance Use Topics    Smoking status: Never Smoker    Smokeless tobacco: Never Used    Alcohol use No        Lab Results  Component Value Date/Time   WBC 11.5 (H) 11/07/2017 11:41 AM   HGB 14.5 11/07/2017 11:41 AM   HCT 42.1 11/07/2017 11:41 AM   PLATELET 187 18/21/6947 11:41 AM   MCV 84 11/07/2017 11:41 AM     Lab Results  Component Value Date/Time   Hemoglobin A1c 7.1 (H) 10/31/2017 09:43 AM   Hemoglobin A1c 6.5 (H) 07/25/2017 08:25 AM   Hemoglobin A1c 6.7 (H) 01/10/2017 08:25 AM   Glucose 134 (H) 10/31/2017 09:43 AM   Glucose (POC) 133 (H) 05/11/2017 08:53 AM   Microalb/Creat ratio (ug/mg creat.) <8.0 03/15/2017 12:05 PM   LDL, calculated 76 10/31/2017 09:43 AM   Creatinine (POC) 0.7 06/04/2013 09:57 AM   Creatinine 0.75 10/31/2017 09:43 AM      Lab Results  Component Value Date/Time   Cholesterol, total 165 10/31/2017 09:43 AM   HDL Cholesterol 43 10/31/2017 09:43 AM   LDL, calculated 76 10/31/2017 09:43 AM   Triglyceride 231 (H) 10/31/2017 09:43 AM     Lab Results  Component Value Date/Time   GFR est non- 10/31/2017 09:43 AM   GFRNA, POC >60 06/04/2013 09:57 AM   GFR est  10/31/2017 09:43 AM   GFRAA, POC >60 06/04/2013 09:57 AM   Creatinine 0.75 10/31/2017 09:43 AM   Creatinine (POC) 0.7 06/04/2013 09:57 AM   BUN 9 10/31/2017 09:43 AM   Sodium 139 10/31/2017 09:43 AM   Potassium 4.7 10/31/2017 09:43 AM   Chloride 101 10/31/2017 09:43 AM   CO2 22 10/31/2017 09:43 AM        Review of Systems  A comprehensive review of systems was negative. Objective:     Visit Vitals    /78 (BP 1 Location: Right arm, BP Patient Position: Sitting)    Pulse 98    Temp 98.1 °F (36.7 °C) (Oral)    Resp 16    Ht 5' 5\" (1.651 m)    Wt 204 lb (92.5 kg)    LMP 01/25/2012    SpO2 97%    BMI 33.95 kg/m2     Appearance: alert, well appearing, and in no distress. Exam: heart sounds normal rate, regular rhythm, normal S1, S2, no murmurs, rubs, clicks or gallops, chest clear  Lab review: orders written for new lab studies as appropriate; see orders. Assessment/Plan:     diabetes stable. Diabetic issues reviewed with her: diabetic diet discussed in detail, written exchange diet given. ICD-10-CM ICD-9-CM    1. Type 2 diabetes mellitus without complication, without long-term current use of insulin (HCC) E11.9 250.00 AMB POC URINE, MICROALBUMIN, SEMIQUANT (3 RESULTS)      CBC W/O DIFF      METABOLIC PANEL, COMPREHENSIVE      HEMOGLOBIN A1C WITH EAG   2. Essential hypertension I10 401.9 CBC W/O DIFF      METABOLIC PANEL, COMPREHENSIVE   3. Dyslipidemia (high LDL; low HDL) E78.5 272.4 LIPID PANEL   4. Anxiety F41.9 300.00 ALPRAZolam (XANAX) 0.5 mg tablet     Informed patient that we will notify her when her labs return, and inform her of any change in plan of care at that time. Educated about trying short course of Xanax, at night as needed, as patient would not like to be on daily medication  Educated about risks of taking this medication continuously, and should she need something long term, will try something else. Pt informed to return to office with worsening of symptoms, or PRN with any questions or concerns.   Pt verbalizes understanding of plan of care and denies further questions or concerns at this time.

## 2018-07-10 NOTE — PATIENT INSTRUCTIONS
Anxiety Disorder: Care Instructions  Your Care Instructions    Anxiety is a normal reaction to stress. Difficult situations can cause you to have symptoms such as sweaty palms and a nervous feeling. In an anxiety disorder, the symptoms are far more severe. Constant worry, muscle tension, trouble sleeping, nausea and diarrhea, and other symptoms can make normal daily activities difficult or impossible. These symptoms may occur for no reason, and they can affect your work, school, or social life. Medicines, counseling, and self-care can all help. Follow-up care is a key part of your treatment and safety. Be sure to make and go to all appointments, and call your doctor if you are having problems. It's also a good idea to know your test results and keep a list of the medicines you take. How can you care for yourself at home? · Take medicines exactly as directed. Call your doctor if you think you are having a problem with your medicine. · Go to your counseling sessions and follow-up appointments. · Recognize and accept your anxiety. Then, when you are in a situation that makes you anxious, say to yourself, \"This is not an emergency. I feel uncomfortable, but I am not in danger. I can keep going even if I feel anxious. \"  · Be kind to your body:  ¨ Relieve tension with exercise or a massage. ¨ Get enough rest.  ¨ Avoid alcohol, caffeine, nicotine, and illegal drugs. They can increase your anxiety level and cause sleep problems. ¨ Learn and do relaxation techniques. See below for more about these techniques. · Engage your mind. Get out and do something you enjoy. Go to a funny movie, or take a walk or hike. Plan your day. Having too much or too little to do can make you anxious. · Keep a record of your symptoms. Discuss your fears with a good friend or family member, or join a support group for people with similar problems. Talking to others sometimes relieves stress.   · Get involved in social groups, or volunteer to help others. Being alone sometimes makes things seem worse than they are. · Get at least 30 minutes of exercise on most days of the week to relieve stress. Walking is a good choice. You also may want to do other activities, such as running, swimming, cycling, or playing tennis or team sports. Relaxation techniques  Do relaxation exercises 10 to 20 minutes a day. You can play soothing, relaxing music while you do them, if you wish. · Tell others in your house that you are going to do your relaxation exercises. Ask them not to disturb you. · Find a comfortable place, away from all distractions and noise. · Lie down on your back, or sit with your back straight. · Focus on your breathing. Make it slow and steady. · Breathe in through your nose. Breathe out through either your nose or mouth. · Breathe deeply, filling up the area between your navel and your rib cage. Breathe so that your belly goes up and down. · Do not hold your breath. · Breathe like this for 5 to 10 minutes. Notice the feeling of calmness throughout your whole body. As you continue to breathe slowly and deeply, relax by doing the following for another 5 to 10 minutes:  · Tighten and relax each muscle group in your body. You can begin at your toes and work your way up to your head. · Imagine your muscle groups relaxing and becoming heavy. · Empty your mind of all thoughts. · Let yourself relax more and more deeply. · Become aware of the state of calmness that surrounds you. · When your relaxation time is over, you can bring yourself back to alertness by moving your fingers and toes and then your hands and feet and then stretching and moving your entire body. Sometimes people fall asleep during relaxation, but they usually wake up shortly afterward. · Always give yourself time to return to full alertness before you drive a car or do anything that might cause an accident if you are not fully alert.  Never play a relaxation tape while you drive a car. When should you call for help? Call 911 anytime you think you may need emergency care. For example, call if:  ? · You feel you cannot stop from hurting yourself or someone else. ? Keep the numbers for these national suicide hotlines: 5-355-411-TALK (9-820.808.7984) and 4-623-PLRRNII (0-716.829.9655). If you or someone you know talks about suicide or feeling hopeless, get help right away. ? Watch closely for changes in your health, and be sure to contact your doctor if:  ? · You have anxiety or fear that affects your life. ? · You have symptoms of anxiety that are new or different from those you had before. Where can you learn more? Go to http://hodan-eugene.info/. Enter P754 in the search box to learn more about \"Anxiety Disorder: Care Instructions. \"  Current as of: May 12, 2017  Content Version: 11.4  © 0183-8187 [employer]. Care instructions adapted under license by [employer] (which disclaims liability or warranty for this information). If you have questions about a medical condition or this instruction, always ask your healthcare professional. [address] any warranty or liability for your use of this information.

## 2018-07-11 LAB
ALBUMIN SERPL-MCNC: 4.7 G/DL (ref 3.5–5.5)
ALBUMIN/GLOB SERPL: 1.7 {RATIO} (ref 1.2–2.2)
ALP SERPL-CCNC: 58 IU/L (ref 39–117)
ALT SERPL-CCNC: 21 IU/L (ref 0–32)
AST SERPL-CCNC: 17 IU/L (ref 0–40)
BILIRUB SERPL-MCNC: 0.3 MG/DL (ref 0–1.2)
BUN SERPL-MCNC: 11 MG/DL (ref 6–20)
BUN/CREAT SERPL: 13 (ref 9–23)
CALCIUM SERPL-MCNC: 9.5 MG/DL (ref 8.7–10.2)
CHLORIDE SERPL-SCNC: 103 MMOL/L (ref 96–106)
CHOLEST SERPL-MCNC: 176 MG/DL (ref 100–199)
CO2 SERPL-SCNC: 18 MMOL/L (ref 20–29)
CREAT SERPL-MCNC: 0.83 MG/DL (ref 0.57–1)
ERYTHROCYTE [DISTWIDTH] IN BLOOD BY AUTOMATED COUNT: 14.7 % (ref 12.3–15.4)
EST. AVERAGE GLUCOSE BLD GHB EST-MCNC: 171 MG/DL
GLOBULIN SER CALC-MCNC: 2.7 G/DL (ref 1.5–4.5)
GLUCOSE SERPL-MCNC: 158 MG/DL (ref 65–99)
HBA1C MFR BLD: 7.6 % (ref 4.8–5.6)
HCT VFR BLD AUTO: 47.2 % (ref 34–46.6)
HDLC SERPL-MCNC: 41 MG/DL
HGB BLD-MCNC: 15.2 G/DL (ref 11.1–15.9)
INTERPRETATION, 910389: NORMAL
LDLC SERPL CALC-MCNC: 95 MG/DL (ref 0–99)
Lab: NORMAL
MCH RBC QN AUTO: 28.7 PG (ref 26.6–33)
MCHC RBC AUTO-ENTMCNC: 32.2 G/DL (ref 31.5–35.7)
MCV RBC AUTO: 89 FL (ref 79–97)
PLATELET # BLD AUTO: 252 X10E3/UL (ref 150–379)
POTASSIUM SERPL-SCNC: 4.5 MMOL/L (ref 3.5–5.2)
PROT SERPL-MCNC: 7.4 G/DL (ref 6–8.5)
RBC # BLD AUTO: 5.29 X10E6/UL (ref 3.77–5.28)
SODIUM SERPL-SCNC: 140 MMOL/L (ref 134–144)
TRIGL SERPL-MCNC: 198 MG/DL (ref 0–149)
VLDLC SERPL CALC-MCNC: 40 MG/DL (ref 5–40)
WBC # BLD AUTO: 9.3 X10E3/UL (ref 3.4–10.8)

## 2018-07-11 NOTE — PROGRESS NOTES
Contact Information: If you have any questions, concerns, pended studies, tests and/or procedures, or emergencies regarding your inpatient behavioral health visit  Please contact [place] [facility] [phone] and ask to speak to a , nurse or physician  A contact is available 24 hours/ 7 days a week at this number  Summary of Procedures Performed During your Stay:  Below is a list of major procedures performed during your hospital stay and a summary of results:  - No major procedures performed  Pending Studies     None        If studies are pending at discharge, follow up with your PCP and/or referring provider  Subjective:      Lorriane Hatchet is a 43 y.o. female here with pain in the right neck with radiation into the back of the head x 2 days. She reports that she has some pain in the right ear. She is currently on Augmentin therapy (started on Friday). She has had headache. Denies fever, chills, ear drainage, ear trauma, tinnitus. Current Outpatient Prescriptions   Medication Sig Dispense Refill    famotidine (PEPCID) 40 mg tablet TAKE 1 TABLET BY MOUTH EVERY DAY  12    montelukast (SINGULAIR) 10 mg tablet TAKE 1 TABLET BY MOUTH EACH EVENING  5    amoxicillin-clavulanate (AUGMENTIN) 875-125 mg per tablet Take 1 Tab by mouth two (2) times a day for 10 days. 20 Tab 0    VENTOLIN HFA 90 mcg/actuation inhaler INHALE 2 PUFFS BY MOUTH EVERY 4 HOURS AS NEEDED FOR WHEEZING 18 Inhaler 5    omeprazole (PRILOSEC) 20 mg capsule Take 20 mg by mouth once over twenty-four (24) hours.  ergocalciferol (ERGOCALCIFEROL) 50,000 unit capsule Take 1 Cap by mouth every seven (7) days.  naltrexone (DEPADE) 50 mg tablet Take 1 Tab by mouth once over twenty-four (24) hours. 30 Tab 1    sucralfate (CARAFATE) 100 mg/mL suspension Take 10 mL by mouth four (4) times daily. 414 mL 0    atorvastatin (LIPITOR) 20 mg tablet TAKE 1 TABLET BY MOUTH EVERY DAY 90 Tab 1    INVOKANA 300 mg tablet Take 1 Tab by mouth Daily (before breakfast). 90 Tab 1    JANUVIA 100 mg tablet Take 1 Tab by mouth once over twenty-four (24) hours. 90 Tab 1    metFORMIN (GLUCOPHAGE) 1,000 mg tablet TAKE 1 TABLET BY MOUTH TWICE A  Tab 1    topiramate (TOPAMAX) 50 mg tablet Take 1 tab twice a day for migraine prevention 60 Tab 0    butalbital-acetaminophen-caffeine (FIORICET, ESGIC) -40 mg per tablet Take 1 Tab by mouth every six (6) hours as needed for Pain. Max Daily Amount: 4 Tabs.  20 Tab 0    albuterol (PROVENTIL VENTOLIN) 2.5 mg /3 mL (0.083 %) nebulizer solution 3 mL by Nebulization route every four (4) hours as needed for Wheezing for up to 30 doses. 100 Each 2    naratriptan (AMERGE) 2.5 mg tab Take 1 Tab by mouth once as needed for up to 1 dose. For migraine. Can repeat 1 tab in 4 hours if needed. Limit: 2 tabs in 24 hours 9 Tab 1    fluticasone (FLONASE) 50 mcg/actuation nasal spray 2 SQUIRTS DAILY AS NEEDED. 16 g 3    glucose blood VI test strips (ASCENSIA CONTOUR) strip TO BE USED AS DIRECTED 50 Strip 5    Nebulizer & Compressor machine Use as needed for wheezing, cough 1 Each 0    TRUETEST TEST STRIPS strip TO BE USED AS DIRECTED 50 Strip 5    Blood-Glucose Meter monitoring kit Please give one kit. 1 Kit 0    Lancets (MICROLET LANCET) Misc USE TO CHECK BLOOD SUGAR ONE TO TWO TIMES DAILY 100 Each 0       Allergies   Allergen Reactions    Green Pepper Hives       Past Medical History:   Diagnosis Date    Anxiety     Asthma     Diabetes ([address] 2008    Headache     Headache     Hypertension     Rash 7/14/2016    S/P dilatation and curettage     Tachycardia        Social History   Substance Use Topics    Smoking status: Never Smoker    Smokeless tobacco: Never Used    Alcohol use No        Review of Systems  Pertinent items are noted in HPI.      Objective:     Visit Vitals    /64 (BP 1 Location: Left arm, BP Patient Position: Sitting)    Pulse 94    Temp 98.3 °F (36.8 °C) (Oral)    Resp 16    Ht 5' 5\" (1.651 m)    Wt 206 lb (93.4 kg)    LMP 01/25/2012    SpO2 98%    BMI 34.28 kg/m2      General appearance - alert, well appearing, and in no distress  Eyes - pupils equal and reactive, extraocular eye movements intact, sclera anicteric  Ears - right TM erythematous with serous fluid, left TM okay   Oropharyngx - mucous membranes moist, pharynx normal without lesions  Neck - right posterior cervical lymphadenopathy   Chest - clear to auscultation, no wheezes, rales or rhonchi, symmetric air entry  Heart - normal rate, regular rhythm, normal S1, S2, no murmurs, rubs, clicks or gallops    Assessment/Plan:   Kyree Foster Den Schaefer is a 43 y.o. female seen for:     1. Otalgia of right ear: secondary to otitis media which she is currently being treated for. Will continue with previously prescribed therapy. Advised that she may place moist warm compresses to the neck to help with neck discomfort. Signs/symptoms that would warrant re-evaluation were discussed. I have discussed the diagnosis with the patient and the intended plan as seen in the above orders. The patient has received an after-visit summary and questions were answered concerning future plans. I have discussed medication side effects and warnings with the patient as well. Patient verbalizes understanding of plan of care and denies further questions or concerns at this time. Informed patient to return to the office if symptoms worsen or if new symptoms arise.     Follow-up Disposition: Not on File

## 2018-07-11 NOTE — PROGRESS NOTES
Please call patient and let her know that her labs returned:  Diabetes is slightly worse, HgbA1C is 7.6. I would like for her to focus on diabetic diet, and return for re-check in 3 months. If still elevated at that time, we would need to add in new medication as she is on max of both of her oral medications at this time. Thanks!

## 2018-07-27 ENCOUNTER — APPOINTMENT (OUTPATIENT)
Dept: GENERAL RADIOLOGY | Age: 38
End: 2018-07-27
Attending: EMERGENCY MEDICINE
Payer: MEDICAID

## 2018-07-27 ENCOUNTER — HOSPITAL ENCOUNTER (EMERGENCY)
Age: 38
Discharge: HOME | End: 2018-07-28
Attending: EMERGENCY MEDICINE
Payer: MEDICAID

## 2018-07-27 DIAGNOSIS — R07.89 ATYPICAL CHEST PAIN: ICD-10-CM

## 2018-07-27 DIAGNOSIS — M25.512 PAIN IN JOINT OF LEFT SHOULDER: ICD-10-CM

## 2018-07-27 DIAGNOSIS — M26.609 TMJ (TEMPOROMANDIBULAR JOINT SYNDROME): Primary | ICD-10-CM

## 2018-07-27 LAB
ALBUMIN SERPL-MCNC: 4 G/DL (ref 3.5–5)
ALBUMIN/GLOB SERPL: 1 {RATIO} (ref 1.1–2.2)
ALP SERPL-CCNC: 66 U/L (ref 45–117)
ALT SERPL-CCNC: 50 U/L (ref 12–78)
ANION GAP SERPL CALC-SCNC: 12 MMOL/L (ref 5–15)
APPEARANCE UR: ABNORMAL
AST SERPL-CCNC: 13 U/L (ref 15–37)
BACTERIA URNS QL MICRO: ABNORMAL /HPF
BASOPHILS # BLD: 0 K/UL (ref 0–0.1)
BASOPHILS NFR BLD: 0 % (ref 0–1)
BILIRUB SERPL-MCNC: 0.4 MG/DL (ref 0.2–1)
BILIRUB UR QL: NEGATIVE
BUN SERPL-MCNC: 10 MG/DL (ref 6–20)
BUN/CREAT SERPL: 11 (ref 12–20)
CALCIUM SERPL-MCNC: 9.7 MG/DL (ref 8.5–10.1)
CHLORIDE SERPL-SCNC: 101 MMOL/L (ref 97–108)
CO2 SERPL-SCNC: 29 MMOL/L (ref 21–32)
COLOR UR: ABNORMAL
CREAT SERPL-MCNC: 0.88 MG/DL (ref 0.55–1.02)
D DIMER PPP FEU-MCNC: <0.17 MG/L FEU (ref 0–0.65)
DIFFERENTIAL METHOD BLD: ABNORMAL
EOSINOPHIL # BLD: 0.2 K/UL (ref 0–0.4)
EOSINOPHIL NFR BLD: 2 % (ref 0–7)
EPITH CASTS URNS QL MICRO: ABNORMAL /LPF
ERYTHROCYTE [DISTWIDTH] IN BLOOD BY AUTOMATED COUNT: 13.7 % (ref 11.5–14.5)
GLOBULIN SER CALC-MCNC: 4.2 G/DL (ref 2–4)
GLUCOSE SERPL-MCNC: 147 MG/DL (ref 65–100)
GLUCOSE UR STRIP.AUTO-MCNC: >1000 MG/DL
HCG UR QL: NEGATIVE
HCT VFR BLD AUTO: 46.4 % (ref 35–47)
HGB BLD-MCNC: 16 G/DL (ref 11.5–16)
HGB UR QL STRIP: NEGATIVE
IMM GRANULOCYTES # BLD: 0 K/UL (ref 0–0.04)
IMM GRANULOCYTES NFR BLD AUTO: 0 % (ref 0–0.5)
KETONES UR QL STRIP.AUTO: NEGATIVE MG/DL
LEUKOCYTE ESTERASE UR QL STRIP.AUTO: NEGATIVE
LYMPHOCYTES # BLD: 3.3 K/UL (ref 0.8–3.5)
LYMPHOCYTES NFR BLD: 28 % (ref 12–49)
MCH RBC QN AUTO: 29.2 PG (ref 26–34)
MCHC RBC AUTO-ENTMCNC: 34.5 G/DL (ref 30–36.5)
MCV RBC AUTO: 84.7 FL (ref 80–99)
MONOCYTES # BLD: 1.2 K/UL (ref 0–1)
MONOCYTES NFR BLD: 10 % (ref 5–13)
NEUTS BAND NFR BLD MANUAL: 1 %
NEUTS SEG # BLD: 7.2 K/UL (ref 1.8–8)
NEUTS SEG NFR BLD: 59 % (ref 32–75)
NITRITE UR QL STRIP.AUTO: NEGATIVE
PH UR STRIP: 6 [PH] (ref 5–8)
PLATELET # BLD AUTO: 244 K/UL (ref 150–400)
PMV BLD AUTO: 11.8 FL (ref 8.9–12.9)
POTASSIUM SERPL-SCNC: 3.5 MMOL/L (ref 3.5–5.1)
PROT SERPL-MCNC: 8.2 G/DL (ref 6.4–8.2)
PROT UR STRIP-MCNC: NEGATIVE MG/DL
RBC # BLD AUTO: 5.48 M/UL (ref 3.8–5.2)
RBC #/AREA URNS HPF: ABNORMAL /HPF (ref 0–5)
RBC MORPH BLD: ABNORMAL
SODIUM SERPL-SCNC: 142 MMOL/L (ref 136–145)
SP GR UR REFRACTOMETRY: 1.02 (ref 1–1.03)
TROPONIN I SERPL-MCNC: <0.05 NG/ML
UR CULT HOLD, URHOLD: NORMAL
UROBILINOGEN UR QL STRIP.AUTO: 0.2 EU/DL (ref 0.2–1)
WBC # BLD AUTO: 11.9 K/UL (ref 3.6–11)
WBC MORPH BLD: ABNORMAL
WBC URNS QL MICRO: ABNORMAL /HPF (ref 0–4)
XXWBCSUS: 1
YEAST URNS QL MICRO: PRESENT

## 2018-07-27 PROCEDURE — 85025 COMPLETE CBC W/AUTO DIFF WBC: CPT | Performed by: EMERGENCY MEDICINE

## 2018-07-27 PROCEDURE — 80053 COMPREHEN METABOLIC PANEL: CPT | Performed by: EMERGENCY MEDICINE

## 2018-07-27 PROCEDURE — 99285 EMERGENCY DEPT VISIT HI MDM: CPT

## 2018-07-27 PROCEDURE — 85379 FIBRIN DEGRADATION QUANT: CPT | Performed by: EMERGENCY MEDICINE

## 2018-07-27 PROCEDURE — 74011000250 HC RX REV CODE- 250: Performed by: EMERGENCY MEDICINE

## 2018-07-27 PROCEDURE — 96361 HYDRATE IV INFUSION ADD-ON: CPT

## 2018-07-27 PROCEDURE — 81001 URINALYSIS AUTO W/SCOPE: CPT | Performed by: EMERGENCY MEDICINE

## 2018-07-27 PROCEDURE — 74011250637 HC RX REV CODE- 250/637: Performed by: EMERGENCY MEDICINE

## 2018-07-27 PROCEDURE — 36415 COLL VENOUS BLD VENIPUNCTURE: CPT | Performed by: EMERGENCY MEDICINE

## 2018-07-27 PROCEDURE — 93005 ELECTROCARDIOGRAM TRACING: CPT

## 2018-07-27 PROCEDURE — 72050 X-RAY EXAM NECK SPINE 4/5VWS: CPT

## 2018-07-27 PROCEDURE — 84484 ASSAY OF TROPONIN QUANT: CPT | Performed by: EMERGENCY MEDICINE

## 2018-07-27 PROCEDURE — 74011250636 HC RX REV CODE- 250/636: Performed by: EMERGENCY MEDICINE

## 2018-07-27 PROCEDURE — 96374 THER/PROPH/DIAG INJ IV PUSH: CPT

## 2018-07-27 PROCEDURE — 81025 URINE PREGNANCY TEST: CPT

## 2018-07-27 RX ORDER — GUAIFENESIN 100 MG/5ML
324 LIQUID (ML) ORAL
Status: COMPLETED | OUTPATIENT
Start: 2018-07-27 | End: 2018-07-27

## 2018-07-27 RX ORDER — KETOROLAC TROMETHAMINE 30 MG/ML
30 INJECTION, SOLUTION INTRAMUSCULAR; INTRAVENOUS
Status: COMPLETED | OUTPATIENT
Start: 2018-07-27 | End: 2018-07-27

## 2018-07-27 RX ADMIN — LIDOCAINE HYDROCHLORIDE 40 ML: 20 SOLUTION ORAL; TOPICAL at 22:15

## 2018-07-27 RX ADMIN — KETOROLAC TROMETHAMINE 30 MG: 30 INJECTION, SOLUTION INTRAMUSCULAR at 22:46

## 2018-07-27 RX ADMIN — ASPIRIN 81 MG 324 MG: 81 TABLET ORAL at 23:22

## 2018-07-27 RX ADMIN — SODIUM CHLORIDE 1000 ML: 900 INJECTION, SOLUTION INTRAVENOUS at 22:15

## 2018-07-28 VITALS
OXYGEN SATURATION: 96 % | BODY MASS INDEX: 34.09 KG/M2 | HEIGHT: 65 IN | SYSTOLIC BLOOD PRESSURE: 115 MMHG | DIASTOLIC BLOOD PRESSURE: 84 MMHG | TEMPERATURE: 98.2 F | RESPIRATION RATE: 18 BRPM | WEIGHT: 204.59 LBS | HEART RATE: 97 BPM

## 2018-07-28 LAB — TROPONIN I SERPL-MCNC: <0.05 NG/ML

## 2018-07-28 PROCEDURE — 36415 COLL VENOUS BLD VENIPUNCTURE: CPT | Performed by: EMERGENCY MEDICINE

## 2018-07-28 PROCEDURE — 84484 ASSAY OF TROPONIN QUANT: CPT | Performed by: EMERGENCY MEDICINE

## 2018-07-28 NOTE — ED NOTES
AIDET communication provided and informed of purposeful rounding to include collaboration of entire care team; patient acknowledged understanding. IVF infusing without difficulty. Call bell within reach. Pt refused pillow, blanket or lights dimmed at this time. Fiance remains at bedside. Pt remains on continuous pulse ox and BP. Will continue to monitor.

## 2018-07-28 NOTE — ED NOTES
shift change report given to Corinne Jointer by SHELL Lawson. Report included the following information SBAR. Repeat Troponin to be drawn at [address]. Pt aware. No further needs at this time. Call bell within reach.

## 2018-07-28 NOTE — ED TRIAGE NOTES
\"I've been having a lot of ear pain past couple days, now in left ear, was in both. \" some tingling to left arm which started when laying down around 2030. Upper back pain x 2 days. No injury noted, but states she has \"lower back problems. \"  No loss of control of bowel or bladder. Fiance at bedside. Pt ambulated to room without difficulty

## 2018-07-28 NOTE — ED NOTES
Patient resting on stretcher in no apparent distress. Repeat troponin obtained from existing IV line. Patient tolerated well. Patient updated on plan of care and time constraints for lab results. Patient verbalized understanding. Will continue to monitor. Call bell within reach.

## 2018-07-28 NOTE — ED PROVIDER NOTES
HPI Comments: Laquita Cisse is a 41 yo F with history of asthma, HTN and diabetes who presents to the ED with complaints of ear/jaw pain, neck pain radiating to left arm with paresthesias and indigestion. She states that for the last couple of days she has had pain around there TMJ, at first the pain was bilateral but now it is just occurring on her left and the pain radiates to her left shoulder. This evening around 8pm she started to feel chest discomfort which she attributed to indigestion after eating [place] food so she took pepcid and went to lay down but then she developed tingling in her left arm. She also mentions that she has had upper back pain for the past 2 days, she denies recent falls or trauma as well as no loss of bowel or bladder control. Her shoulder pain does not increase with movement. Sometimes her jaw pain increases with opening and closing her jaw but other times she says that it is not affected. Patient denies history of smoking. Past Medical History:  
Diagnosis Date  Anxiety  Asthma  Diabetes ([address] 2008  Headache   
 Headache(784.0)  Hypertension  Rash 7/14/2016  S/P dilatation and curettage  Tachycardia Past Surgical History:  
Procedure Laterality Date  HX APPENDECTOMY  2/2008  HX CHOLECYSTECTOMY  2001  HX DILATION AND CURETTAGE    
 HX GYN  3/2008  
 tubal ligation  HX HERNIA REPAIR  2/2009  HX HYSTERECTOMY  03/2012  HX LUMBAR DISKECTOMY    
 2006  HX ORTHOPAEDIC  9/2006  
 ruptured discs  UPPER GI ENDOSCOPY,BIOPSY  5/11/2017 Family History:  
Problem Relation Age of Onset  Heart Disease Mother  Hypertension Father  Hypertension Sister Social History Social History  Marital status:  Spouse name: N/A  
 Number of children: N/A  
 Years of education: N/A Occupational History  Not on file. Social History Main Topics  Smoking status: Never Smoker  Smokeless tobacco: Never Used  Alcohol use No  
 Drug use: No  
 Sexual activity: Yes  
  Partners: Male Birth control/ protection: Surgical  
 
Other Topics Concern  Not on file Social History Narrative ALLERGIES: Green pepper Review of Systems Constitutional: Negative for fever. HENT: Positive for ear pain. Negative for sore throat. Eyes: Negative for visual disturbance. Respiratory: Negative for cough and shortness of breath. Cardiovascular: Positive for chest pain. Gastrointestinal: Negative for abdominal pain and nausea. Genitourinary: Negative for difficulty urinating and dysuria. Musculoskeletal: Positive for back pain and neck pain. Skin: Negative for rash. Neurological: Positive for numbness (left arm). Negative for headaches. Vitals:  
 07/27/18 2345 07/28/18 0001 07/28/18 0030 07/28/18 0045 BP: 125/57 136/80 124/64 115/84 Pulse:      
Resp:      
Temp:      
SpO2: 97% 97% 97% 96% Weight:      
Height:      
      
 
Physical Exam  
Constitutional: She appears well-developed and well-nourished. No distress. HENT:  
Head: Normocephalic and atraumatic. Right Ear: Tympanic membrane normal. No middle ear effusion. Left Ear: Tympanic membrane normal.  No middle ear effusion. Mouth/Throat: Oropharynx is clear and moist.  
Eyes: Conjunctivae and EOM are normal.  
Neck: Normal range of motion and phonation normal. Neck supple. Cardiovascular: Normal rate and intact distal pulses. No murmur heard. Pulmonary/Chest: Effort normal. No respiratory distress. She has no wheezes. Abdominal: Soft. She exhibits no distension. Musculoskeletal: Normal range of motion. She exhibits no tenderness. Left shoulder: She exhibits normal range of motion and no tenderness. Cervical back: She exhibits no tenderness and no spasm. Neurological: She is alert. She has normal strength. She is not disoriented.  No cranial nerve deficit or sensory deficit. She exhibits normal muscle tone. Coordination and gait normal.  
Skin: Skin is warm and dry. Nursing note and vitals reviewed. ED EKG interpretation: 
Rhythm: sinus tachycardia; and regular . Rate (approx.): 110; Axis: normal; P wave: normal; QRS interval: normal ; ST/T wave: normal; Other findings: artifact limits interpretation. This EKG was interpreted by Chaim Eldrideg MD,ED Provider. MDM Jaw pain, left neck pain radiating to shoulder, epigastric/ chest pain. Consider ACS, PE, dissection also TMJ, MSK pain, dyspepsia  Check labs including, troponin, ddimer, xr c spine ED Course Labs reviewed, ddimer undetectable do not suspect PE or dissection, trop x 2 normal.  Cervical XR normal.  PAtient states pain is resolved. Will discharge home. Procedures

## 2018-07-28 NOTE — ED NOTES
Report received from April, RN. Assumed care of pt. Bed in locked and in low position with call bell within reach. Using AIDET - Introduced self as primary nurse and plan of care discussed with pt. Pt verbalizes understanding. Pt denies any additional complaints at this time. White board updated. Patient advised that medical information will be discussed and it is their responsibility to tell this nurse if such conversation should not take place in the presence of visitors. Pt verbalizes understanding. Patient medicated per order. Patient tolerated well. Will continue to monitor. Call bell within reach.

## 2018-07-28 NOTE — DISCHARGE INSTRUCTIONS
Chest Pain: Care Instructions  Your Care Instructions    There are many things that can cause chest pain. Some are not serious and will get better on their own in a few days. But some kinds of chest pain need more testing and treatment. Your doctor may have recommended a follow-up visit in the next 8 to 12 hours. If you are not getting better, you may need more tests or treatment. Even though your doctor has released you, you still need to watch for any problems. The doctor carefully checked you, but sometimes problems can develop later. If you have new symptoms or if your symptoms do not get better, get medical care right away. If you have worse or different chest pain or pressure that lasts more than 5 minutes or you passed out (lost consciousness), call 911 or seek other emergency help right away. A medical visit is only one step in your treatment. Even if you feel better, you still need to do what your doctor recommends, such as going to all suggested follow-up appointments and taking medicines exactly as directed. This will help you recover and help prevent future problems. How can you care for yourself at home? · Rest until you feel better. · Take your medicine exactly as prescribed. Call your doctor if you think you are having a problem with your medicine. · Do not drive after taking a prescription pain medicine. When should you call for help? Call 911 if:    · You passed out (lost consciousness).     · You have severe difficulty breathing.     · You have symptoms of a heart attack. These may include:  ¨ Chest pain or pressure, or a strange feeling in your chest.  ¨ Sweating. ¨ Shortness of breath. ¨ Nausea or vomiting. ¨ Pain, pressure, or a strange feeling in your back, neck, jaw, or upper belly or in one or both shoulders or arms. ¨ Lightheadedness or sudden weakness. ¨ A fast or irregular heartbeat.   After you call 911, the  may tell you to chew 1 adult-strength or 2 to 4 low-dose aspirin. Wait for an ambulance. Do not try to drive yourself.    Call your doctor today if:    · You have any trouble breathing.     · Your chest pain gets worse.     · You are dizzy or lightheaded, or you feel like you may faint.     · You are not getting better as expected.     · You are having new or different chest pain. Where can you learn more? Go to http://hodan-eugene.info/. Enter A120 in the search box to learn more about \"Chest Pain: Care Instructions. \"  Current as of: November 20, 2017  Content Version: 11.7  © 3109-1099 [employer]. Care instructions adapted under license by [employer] (which disclaims liability or warranty for this information). If you have questions about a medical condition or this instruction, always ask your healthcare professional. [address] any warranty or liability for your use of this information. Temporomandibular Disorder: Care Instructions  Your Care Instructions    Temporomandibular (TM) disorders are a problem with the muscles and joints that connect your jaw to your skull. They cause pain when you open your mouth, chew, or yawn. You may feel this pain on one or both sides. TM disorders are often caused by tight jaw muscles. The tightness can be caused by clenching or grinding your teeth. This may happen when you have a lot of stress in your life. If you lower your stress, you may be able to stop clenching or grinding your teeth. This will help relax your jaw and reduce your pain. You may also be able to do some things at home to feel better. But if none of this works, your doctor may prescribe medicine to help relax your muscles and control the pain. Follow-up care is a key part of your treatment and safety. Be sure to make and go to all appointments, and call your doctor if you are having problems.  It's also a good idea to know your test results and keep a list of the medicines you take. How can you care for yourself at home? · Put a warm, moist cloth or heating pad set on low on your jaw. Do this for 10 to 20 minutes at a time. Put a thin cloth between the heating pad and your skin. · Avoid hard or chewy foods that cause your jaws to work very hard. Examples include popcorn, jerky, tough meats, chewy breads, gum, and raw apples and carrots. · Choose softer foods that are easy to chew. These include eggs, yogurt, and soup. · Cut your food into small pieces. Chew slowly. · If your jaw gets too painful to chew, or if it locks, you may need to puree your food for a few days or weeks. · To relax your jaw, repeat this exercise for a few minutes every morning and evening. Watch yourself in a mirror. Gently open and close your mouth. Move your jaw straight up and down. But don't do this if it makes your pain worse. · Get at least 30 minutes of exercise on most days of the week to relieve stress. Walking is a good choice. You also may want to do other activities, such as running, swimming, cycling, or playing tennis or team sports. · Do not:  ¨ Hold a phone between your shoulder and your jaw. ¨ Open your mouth all the way, like when you sing loudly or yawn. ¨ Clench or grind your teeth, bite your lips, or chew your fingernails. ¨ Clench things such as pens, pipes, or cigars between your teeth. When should you call for help? Call your doctor now or seek immediate medical care if:    · Your jaw is locked open or shut or it is hard to move your jaw.    Watch closely for changes in your health, and be sure to contact your doctor if:    · Your jaw pain gets worse.     · Your face is swollen.     · You do not get better as expected. Where can you learn more? Go to http://hodan-eugene.info/. Enter O665 in the search box to learn more about \"Temporomandibular Disorder: Care Instructions. \"  Current as of:  May 12, 2017  Content Version: 11.7  © 9958-3674 [employer]. Care instructions adapted under license by [employer] (which disclaims liability or warranty for this information). If you have questions about a medical condition or this instruction, always ask your healthcare professional. [address] any warranty or liability for your use of this information. Shoulder Pain: Care Instructions  Your Care Instructions    You can hurt your shoulder by using it too much during an activity, such as fishing or baseball. It can also happen as part of the everyday wear and tear of getting older. Shoulder injuries can be slow to heal, but your shoulder should get better with time. Your doctor may recommend a sling to rest your shoulder. If you have injured your shoulder, you may need testing and treatment. Follow-up care is a key part of your treatment and safety. Be sure to make and go to all appointments, and call your doctor if you are having problems. It's also a good idea to know your test results and keep a list of the medicines you take. How can you care for yourself at home? · Take pain medicines exactly as directed. ¨ If the doctor gave you a prescription medicine for pain, take it as prescribed. ¨ If you are not taking a prescription pain medicine, ask your doctor if you can take an over-the-counter medicine. ¨ Do not take two or more pain medicines at the same time unless the doctor told you to. Many pain medicines contain acetaminophen, which is Tylenol. Too much acetaminophen (Tylenol) can be harmful. · If your doctor recommends that you wear a sling, use it as directed. Do not take it off before your doctor tells you to. · Put ice or a cold pack on the sore area for 10 to 20 minutes at a time. Put a thin cloth between the ice and your skin. · If there is no swelling, you can put moist heat, a heating pad, or a warm cloth on your shoulder. Some doctors suggest alternating between hot and cold.   · Rest your shoulder for a few days. If your doctor recommends it, you can then begin gentle exercise of the shoulder, but do not lift anything heavy. When should you call for help? Call 911 anytime you think you may need emergency care. For example, call if:    · You have chest pain or pressure. This may occur with:  ¨ Sweating. ¨ Shortness of breath. ¨ Nausea or vomiting. ¨ Pain that spreads from the chest to the neck, jaw, or one or both shoulders or arms. ¨ Dizziness or lightheadedness. ¨ A fast or uneven pulse. After calling 911, chew 1 adult-strength aspirin. Wait for an ambulance. Do not try to drive yourself.     · Your arm or hand is cool or pale or changes color.    Call your doctor now or seek immediate medical care if:    · You have signs of infection, such as:  ¨ Increased pain, swelling, warmth, or redness in your shoulder. ¨ Red streaks leading from a place on your shoulder. ¨ Pus draining from an area of your shoulder. ¨ Swollen lymph nodes in your neck, armpits, or groin. ¨ A fever.    Watch closely for changes in your health, and be sure to contact your doctor if:    · You cannot use your shoulder.     · Your shoulder does not get better as expected. Where can you learn more? Go to http://hodan-eugene.info/. Enter X748 in the search box to learn more about \"Shoulder Pain: Care Instructions. \"  Current as of: November 29, 2017  Content Version: 11.7  © 6834-5712 [employer]. Care instructions adapted under license by [employer] (which disclaims liability or warranty for this information). If you have questions about a medical condition or this instruction, always ask your healthcare professional. [place] [address] any warranty or liability for your use of this information.

## 2018-07-29 LAB
ATRIAL RATE: 110 BPM
CALCULATED P AXIS, ECG09: 43 DEGREES
CALCULATED R AXIS, ECG10: 35 DEGREES
CALCULATED T AXIS, ECG11: 22 DEGREES
DIAGNOSIS, 93000: NORMAL
P-R INTERVAL, ECG05: 130 MS
Q-T INTERVAL, ECG07: 352 MS
QRS DURATION, ECG06: 78 MS
QTC CALCULATION (BEZET), ECG08: 476 MS
VENTRICULAR RATE, ECG03: 110 BPM

## 2018-07-30 LAB
ATRIAL RATE: 108 BPM
CALCULATED P AXIS, ECG09: 60 DEGREES
CALCULATED R AXIS, ECG10: 50 DEGREES
CALCULATED T AXIS, ECG11: -22 DEGREES
DIAGNOSIS, 93000: NORMAL
P-R INTERVAL, ECG05: 132 MS
Q-T INTERVAL, ECG07: 352 MS
QRS DURATION, ECG06: 78 MS
QTC CALCULATION (BEZET), ECG08: 471 MS
VENTRICULAR RATE, ECG03: 108 BPM

## 2018-08-01 DIAGNOSIS — E78.1 HYPERTRIGLYCERIDEMIA: ICD-10-CM

## 2018-08-01 DIAGNOSIS — E11.9 TYPE 2 DIABETES MELLITUS WITHOUT COMPLICATION, WITH LONG-TERM CURRENT USE OF INSULIN (HCC): ICD-10-CM

## 2018-08-01 DIAGNOSIS — I10 ESSENTIAL HYPERTENSION: ICD-10-CM

## 2018-08-01 DIAGNOSIS — Z79.4 TYPE 2 DIABETES MELLITUS WITHOUT COMPLICATION, WITH LONG-TERM CURRENT USE OF INSULIN (HCC): ICD-10-CM

## 2018-08-01 DIAGNOSIS — E11.9 TYPE 2 DIABETES MELLITUS WITHOUT COMPLICATION, WITHOUT LONG-TERM CURRENT USE OF INSULIN (HCC): ICD-10-CM

## 2018-08-01 RX ORDER — SITAGLIPTIN 100 MG/1
TABLET, FILM COATED ORAL
Qty: 90 TAB | Refills: 0 | Status: SHIPPED | OUTPATIENT
Start: 2018-08-01 | End: 2018-10-24 | Stop reason: SDUPTHER

## 2018-08-02 RX ORDER — EMPAGLIFLOZIN 25 MG/1
TABLET, FILM COATED ORAL
Qty: 30 TAB | Refills: 5 | Status: SHIPPED | OUTPATIENT
Start: 2018-08-02 | End: 2018-10-17 | Stop reason: ALTCHOICE

## 2018-08-02 RX ORDER — METFORMIN HYDROCHLORIDE 1000 MG/1
TABLET ORAL
Qty: 180 TAB | Refills: 4 | Status: SHIPPED | OUTPATIENT
Start: 2018-08-02 | End: 2019-08-13 | Stop reason: SDUPTHER

## 2018-08-02 RX ORDER — ASPIRIN 81 MG/1
TABLET ORAL
Qty: 30 TAB | Refills: 5 | Status: SHIPPED | OUTPATIENT
Start: 2018-08-02 | End: 2019-01-23 | Stop reason: SDUPTHER

## 2018-08-26 DIAGNOSIS — E78.1 HYPERTRIGLYCERIDEMIA: ICD-10-CM

## 2018-08-27 RX ORDER — FENOFIBRATE 160 MG/1
TABLET ORAL
Qty: 30 TAB | Refills: 5 | Status: SHIPPED | OUTPATIENT
Start: 2018-08-27 | End: 2018-12-10 | Stop reason: ALTCHOICE

## 2018-08-27 RX ORDER — SIMVASTATIN 20 MG/1
TABLET, FILM COATED ORAL
Qty: 90 TAB | Refills: 1 | Status: SHIPPED | OUTPATIENT
Start: 2018-08-27 | End: 2019-03-15 | Stop reason: SDUPTHER

## 2018-08-31 ENCOUNTER — OFFICE VISIT (OUTPATIENT)
Dept: FAMILY MEDICINE CLINIC | Age: 38
End: 2018-08-31

## 2018-08-31 VITALS
SYSTOLIC BLOOD PRESSURE: 102 MMHG | RESPIRATION RATE: 18 BRPM | HEIGHT: 65 IN | HEART RATE: 98 BPM | BODY MASS INDEX: 34.16 KG/M2 | OXYGEN SATURATION: 100 % | TEMPERATURE: 98.2 F | DIASTOLIC BLOOD PRESSURE: 80 MMHG | WEIGHT: 205 LBS

## 2018-08-31 DIAGNOSIS — G47.9 SLEEP DISTURBANCE: ICD-10-CM

## 2018-08-31 DIAGNOSIS — G44.89 OTHER HEADACHE SYNDROME: Primary | ICD-10-CM

## 2018-08-31 RX ORDER — DOXEPIN HYDROCHLORIDE 6 MG/1
TABLET ORAL
Qty: 30 TAB | Refills: 0 | Status: SHIPPED | OUTPATIENT
Start: 2018-08-31 | End: 2018-09-07 | Stop reason: CLARIF

## 2018-08-31 NOTE — PROGRESS NOTES
Identified pt with two pt identifiers(name and ). Chief Complaint   Patient presents with    Headache     Began about a month ago. within the past 7 days has had 6 days of spontaneous headaches. Pressure pain located at base of neck and radiating into back of head,.  Fatigue        Health Maintenance Due   Topic    EYE EXAM RETINAL OR DILATED Q1     DTaP/Tdap/Td series (2 - Td)    FOOT EXAM Q1     Influenza Age 5 to Adult        Wt Readings from Last 3 Encounters:   18 205 lb (93 kg)   18 204 lb 9.4 oz (92.8 kg)   07/10/18 204 lb (92.5 kg)     Temp Readings from Last 3 Encounters:   18 98.2 °F (36.8 °C) (Oral)   18 98.2 °F (36.8 °C)   07/10/18 98.1 °F (36.7 °C) (Oral)     BP Readings from Last 3 Encounters:   18 102/80   18 115/84   07/10/18 123/78     Pulse Readings from Last 3 Encounters:   18 98   18 97   07/10/18 98         Learning Assessment:  :     Learning Assessment 2017   PRIMARY LEARNER Patient Patient   HIGHEST LEVEL OF EDUCATION - PRIMARY LEARNER  GRADUATED HIGH SCHOOL OR GED GRADUATED HIGH SCHOOL OR GED   BARRIERS PRIMARY LEARNER NONE NONE   PRIMARY LANGUAGE ENGLISH ENGLISH   LEARNER PREFERENCE PRIMARY READING READING   ANSWERED BY patient patient   RELATIONSHIP SELF SELF       Depression Screening:  :     PHQ over the last two weeks 2018   Little interest or pleasure in doing things Not at all   Feeling down, depressed, irritable, or hopeless Not at all   Total Score PHQ 2 0       Fall Risk Assessment:  :     Fall Risk Assessment, last 12 mths 2017   Able to walk? Yes   Fall in past 12 months? No       Abuse Screening:  :     Abuse Screening Questionnaire 2018   Do you ever feel afraid of your partner? N N N   Are you in a relationship with someone who physically or mentally threatens you? N N N   Is it safe for you to go home?  [place]       Coordination of Care Questionnaire:  :     1) Have you been to an emergency room, urgent care clinic since your last visit? no   Hospitalized since your last visit? no             2) Have you seen or consulted any other health care providers outside of [facility] since your last visit? no  (Include any pap smears or colon screenings in this section.)    3) Do you have an Advance Directive on file? no  Are you interested in receiving information about Advance Directives? no    Reviewed record in preparation for visit and have obtained necessary documentation. Medication reconciliation up to date and corrected with patient at this time.

## 2018-08-31 NOTE — PROGRESS NOTES
Subjective:      Renata Colby is a 42 y.o. female here with complaint of \"I've been getting a lot of headaches\". Started about a month ago and this week alone has had headache daily. Pain located in the back of the head and radiates up to the mid-scalp. Pain is described as a pressure and it's \"just there\". She awakens with a headache daily. She will take something for pain and headache will resolve by mid-morning. She states that this is different from her migraine pain. Denies visual disturbance, neck pain. She has been under stress as she just lost her father and has been helping her stepmother out. She is not sleeping well at night; has issues with falling asleep and staying asleep. She is finding that she is staying tired during the day. Has been going on for the past month. Has had previous DALE evaluation which was negative per her report. She does not have a headache at this time. Treatment to date: changing pillows on the bed, not sleeping with hair in a bun, Tylenol, Excedrin with no improvement noted     She is in the bed by 8:30 PM, lays in the bed for 2-3 hours before falling asleep. Room is dark and cool, no TV or cell phone use. Reports melatonin, Unisom, Ambien without effectiveness. Does not consume excessive caffeine. Current Outpatient Prescriptions   Medication Sig Dispense Refill    fenofibrate (LOFIBRA) 160 mg tablet TAKE 1 TAB BY MOUTH DAILY.  30 Tab 5    simvastatin (ZOCOR) 20 mg tablet TAKE 1 TABLET BY MOUTH EVERY DAY 90 Tab 1    metFORMIN (GLUCOPHAGE) 1,000 mg tablet TAKE 1 TABLET BY MOUTH TWICE A  Tab 4    JARDIANCE 25 mg tablet TAKE 1 TABLET BY MOUTH DAILY 30 Tab 5    aspirin delayed-release 81 mg tablet TAKE 1 TABLET BY MOUTH EVERY DAY 30 Tab 5    JANUVIA 100 mg tablet TAKE 1 TABLET BY MOUTH EVERY DAY 90 Tab 0    omeprazole (PRILOSEC) 20 mg capsule TAKE ONE CAPSULE BY MOUTH ONCE DAILY 30 Cap 5    albuterol (PROVENTIL HFA, VENTOLIN HFA, PROAIR HFA) 90 mcg/actuation inhaler Take 2 Puffs by inhalation every four (4) hours as needed for Wheezing. Dispense proair. 1 Inhaler 2    fluticasone (FLONASE) 50 mcg/actuation nasal spray 2 Sprays by Both Nostrils route daily. 1 Bottle 2    SUMAtriptan (IMITREX) 50 mg tablet TAKE 1 TAB AT ONSET OF MIGRAINE HEADACHE, REPEAT IN 1 HOUR IF NEEDED. NO MORE THAN 2 TABS PER 24 HOUR 9 Tab 3    ergocalciferol (ERGOCALCIFEROL) 50,000 unit capsule TAKE 1 CAP BY MOUTH EVERY SEVEN (7) DAYS. 30 Cap 4    albuterol (PROVENTIL VENTOLIN) 2.5 mg /3 mL (0.083 %) nebulizer solution USE ONE VIAL IN NEBULIZER EVERY 4 HOURS AS NEEDED FOR WHEEZING FOR  UP  TO  30  DOSES 300 Each 2    metFORMIN (GLUCOPHAGE) 1,000 mg tablet Take 1,000 mg by mouth Before breakfast and dinner. 5    ASCENSIA CONTOUR strip USE AS DIRECTED 50 Strip 5    famotidine (PEPCID) 40 mg tablet TAKE 1 TABLET BY MOUTH EVERY DAY  12    topiramate (TOPAMAX) 50 mg tablet Take 1 tab twice a day for migraine prevention 60 Tab 0    butalbital-acetaminophen-caffeine (FIORICET, ESGIC) -40 mg per tablet Take 1 Tab by mouth every six (6) hours as needed for Pain. Max Daily Amount: 4 Tabs. 21 Tab 0    Lancets (MICROLET LANCET) Misc USE TO CHECK BLOOD SUGAR ONE TO TWO TIMES DAILY 100 Each 0       Allergies   Allergen Reactions    Green Pepper Hives       Past Medical History:   Diagnosis Date    Anxiety     Asthma     Diabetes ([facility] [address] 2008    Headache     Headache(784.0)     Hypertension     Rash 7/14/2016    S/P dilatation and curettage     Tachycardia        Social History   Substance Use Topics    Smoking status: Never Smoker    Smokeless tobacco: Never Used    Alcohol use No        Review of Systems  Pertinent items are noted in HPI. Objective:     Visit Vitals    /80 (BP 1 Location: Right arm, BP Patient Position: Sitting)  Comment: Manual    Pulse 98    Temp 98.2 °F (36.8 °C) (Oral)  Comment: .     Resp 18    Ht 5' 5\" (1.651 m)    Wt 205 lb (93 kg)  LMP 01/25/2012    SpO2 100%    BMI 34.11 kg/m2      General appearance - alert, well appearing, and in no distress  Eyes - pupils equal and reactive, extraocular eye movements intact, sclera anicteric  Neck - supple, no significant adenopathy  Chest - clear to auscultation, no wheezes, rales or rhonchi, symmetric air entry, no tachypnea, retractions or cyanosis  Heart - normal rate, regular rhythm, normal S1, S2, no murmurs, rubs, clicks or gallops  Neurological - alert, oriented, normal speech, no focal findings or movement disorder noted  Musculoskeletal - no C spine tenderness, FROM of C-spine, mild tenderness to palpation of upper trapezius muscles     Assessment/Plan:   Tate Suarez is a 42 y.o. female seen for:     1. Other headache syndrome: tension vs secondary to insomnia vs undiagnosed DALE. At this time favor tension vs insomnia related. Good sleep hygiene discussed, keep headache journal, and trial of doxepin for sleep. Inform me of her progress with this therapy. Consider head imaging for worsening symptoms or if new symptoms arise. 2. Sleep disturbance  - Doxepin 6 mg tab; Take 1 tablet by mouth 30 minutes before bed. Indications: Insomnia  Dispense: 30 Tab; Refill: 0    I have discussed the diagnosis with the patient and the intended plan as seen in the above orders. The patient has received an after-visit summary and questions were answered concerning future plans. I have discussed medication side effects and warnings with the patient as well. Patient verbalizes understanding of plan of care and denies further questions or concerns at this time. Informed patient to return to the office if symptoms worsen or if new symptoms arise. Follow-up Disposition:  Return if symptoms worsen or fail to improve.

## 2018-08-31 NOTE — MR AVS SNAPSHOT
[facility] [address] [facility] [place] 
[phone] Patient: Hollie Dodd MRN: [id number] NRS:[id number] Visit Information Date & Time Provider Department Dept. Phone Encounter #  
 8/31/2018 10:45 AM Shivani SilveiraZach [phone] [id number] Follow-up Instructions Return if symptoms worsen or fail to improve. Upcoming Health Maintenance Date Due  
 EYE EXAM RETINAL OR DILATED Q1 9/1/2014 DTaP/Tdap/Td series (2 - Td) 1/1/2018 FOOT EXAM Q1 3/15/2018 Influenza Age 5 to Adult 8/1/2018 Pneumococcal 19-64 Highest Risk (2 of 3 - PCV13) 12/21/2018 HEMOGLOBIN A1C Q6M 1/10/2019 PAP AKA CERVICAL CYTOLOGY 6/15/2019 MICROALBUMIN Q1 7/10/2019 LIPID PANEL Q1 7/10/2019 Allergies as of 8/31/2018  Review Complete On: 8/31/2018 By: Shivani Silveira MD  
  
 Severity Noted Reaction Type Reactions Green Pepper  06/28/2012    Hives Current Immunizations  Reviewed on 3/18/2014 Name Date Influenza Vaccine 10/26/2013 TDAP Vaccine 1/1/2008 Not reviewed this visit You Were Diagnosed With   
  
 Codes Comments Other headache syndrome    -  Primary ICD-10-CM: G44.89 ICD-9-CM: 339.89 Sleep disturbance     ICD-10-CM: G47.9 ICD-9-CM: 780.50 Vitals BP Pulse Temp Resp Height(growth percentile) Weight(growth percentile) 102/80 (BP 1 Location: Right arm, BP Patient Position: Sitting) 98 98.2 °F (36.8 °C) (Oral) 18 5' 5\" (1.651 m) 205 lb (93 kg) LMP SpO2 BMI OB Status Smoking Status 01/25/2012 100% 34.11 kg/m2 Hysterectomy Never Smoker Vitals History BMI and BSA Data Body Mass Index Body Surface Area  
 34.11 kg/m 2 2.07 m 2 Preferred Pharmacy Pharmacy Name Phone [id number] [address] [phone] Your Updated Medication List  
  
   
 This list is accurate as of 8/31/18 11:42 AM.  Always use your most recent med list.  
  
  
  
  
 * albuterol 2.5 mg /3 mL (0.083 %) nebulizer solution Commonly known as:  PROVENTIL VENTOLIN  
USE ONE VIAL IN NEBULIZER EVERY 4 HOURS AS NEEDED FOR WHEEZING FOR  UP  TO  30  DOSES * albuterol 90 mcg/actuation inhaler Commonly known as:  PROVENTIL HFA, VENTOLIN HFA, PROAIR HFA Take 2 Puffs by inhalation every four (4) hours as needed for Wheezing. Dispense proair. [employer] CONTOUR strip Generic drug:  glucose blood VI test strips USE AS DIRECTED  
  
 aspirin delayed-release 81 mg tablet TAKE 1 TABLET BY MOUTH EVERY DAY  
  
 butalbital-acetaminophen-caffeine -40 mg per tablet Commonly known as:  Kym Lewandowsky Take 1 Tab by mouth every six (6) hours as needed for Pain. Max Daily Amount: 4 Tabs. Doxepin 6 mg Tab Take 1 tablet by mouth 30 minutes before bed. Indications: Insomnia  
  
 ergocalciferol 50,000 unit capsule Commonly known as:  ERGOCALCIFEROL  
TAKE 1 CAP BY MOUTH EVERY SEVEN (7) DAYS. famotidine 40 mg tablet Commonly known as:  PEPCID  
TAKE 1 TABLET BY MOUTH EVERY DAY  
  
 fenofibrate 160 mg tablet Commonly known as:  LOFIBRA TAKE 1 TAB BY MOUTH DAILY. fluticasone 50 mcg/actuation nasal spray Commonly known as:  Lella Solum 2 Sprays by Both Nostrils route daily. JANUVIA 100 mg tablet Generic drug:  SITagliptin TAKE 1 TABLET BY MOUTH EVERY DAY  
  
 JARDIANCE 25 mg tablet Generic drug:  empagliflozin TAKE 1 TABLET BY MOUTH DAILY Lancets Misc Commonly known as:  MICROLET LANCET  
USE TO CHECK BLOOD SUGAR ONE TO TWO TIMES DAILY * metFORMIN 1,000 mg tablet Commonly known as:  GLUCOPHAGE Take 1,000 mg by mouth Before breakfast and dinner. * metFORMIN 1,000 mg tablet Commonly known as:  GLUCOPHAGE  
TAKE 1 TABLET BY MOUTH TWICE A DAY  
  
 omeprazole 20 mg capsule Commonly known as:  PRILOSEC  
 TAKE ONE CAPSULE BY MOUTH ONCE DAILY  
  
 simvastatin 20 mg tablet Commonly known as:  ZOCOR  
TAKE 1 TABLET BY MOUTH EVERY DAY  
  
 SUMAtriptan 50 mg tablet Commonly known as:  IMITREX  
TAKE 1 TAB AT ONSET OF MIGRAINE HEADACHE, REPEAT IN 1 HOUR IF NEEDED. NO MORE THAN 2 TABS PER 24 HOUR  
  
 topiramate 50 mg tablet Commonly known as:  TOPAMAX Take 1 tab twice a day for migraine prevention * Notice: This list has 4 medication(s) that are the same as other medications prescribed for you. Read the directions carefully, and ask your doctor or other care provider to review them with you. Prescriptions Sent to Pharmacy Refills Doxepin 6 mg tab 0 Sig: Take 1 tablet by mouth 30 minutes before bed. Indications: Insomnia Class: Normal  
 Pharmacy: [facility]/pharmacy [address], [address] [place] [address] #: [phone] Follow-up Instructions Return if symptoms worsen or fail to improve. Patient Instructions Headache: Care Instructions Your Care Instructions Headaches have many possible causes. Most headaches aren't a sign of a more serious problem, and they will get better on their own. Home treatment may help you feel better faster. The doctor has checked you carefully, but problems can develop later. If you notice any problems or new symptoms, get medical treatment right away. Follow-up care is a key part of your treatment and safety. Be sure to make and go to all appointments, and call your doctor if you are having problems. It's also a good idea to know your test results and keep a list of the medicines you take. How can you care for yourself at home? · Do not drive if you have taken a prescription pain medicine. · Rest in a quiet, dark room until your headache is gone. Close your eyes and try to relax or go to sleep. Don't watch TV or read.  
· Put a cold, moist cloth or cold pack on the painful area for 10 to 20 minutes at a time. Put a thin cloth between the cold pack and your skin. · Use a warm, moist towel or a heating pad set on low to relax tight shoulder and neck muscles. · Have someone gently massage your neck and shoulders. · Take pain medicines exactly as directed. ¨ If the doctor gave you a prescription medicine for pain, take it as prescribed. ¨ If you are not taking a prescription pain medicine, ask your doctor if you can take an over-the-counter medicine. · Be careful not to take pain medicine more often than the instructions allow, because you may get worse or more frequent headaches when the medicine wears off. · Do not ignore new symptoms that occur with a headache, such as a fever, weakness or numbness, vision changes, or confusion. These may be signs of a more serious problem. To prevent headaches · Keep a headache diary so you can figure out what triggers your headaches. Avoiding triggers may help you prevent headaches. Record when each headache began, how long it lasted, and what the pain was like (throbbing, aching, stabbing, or dull). Write down any other symptoms you had with the headache, such as nausea, flashing lights or dark spots, or sensitivity to bright light or loud noise. Note if the headache occurred near your period. List anything that might have triggered the headache, such as certain foods (chocolate, cheese, wine) or odors, smoke, bright light, stress, or lack of sleep. · Find healthy ways to deal with stress. Headaches are most common during or right after stressful times. Take time to relax before and after you do something that has caused a headache in the past. 
· Try to keep your muscles relaxed by keeping good posture. Check your jaw, face, neck, and shoulder muscles for tension, and try relaxing them. When sitting at a desk, change positions often, and stretch for 30 seconds each hour. · Get plenty of sleep and exercise. · Eat regularly and well. Long periods without food can trigger a headache. · Treat yourself to a massage. Some people find that regular massages are very helpful in relieving tension. · Limit caffeine by not drinking too much coffee, tea, or soda. But don't quit caffeine suddenly, because that can also give you headaches. · Reduce eyestrain from computers by blinking frequently and looking away from the computer screen every so often. Make sure you have proper eyewear and that your monitor is set up properly, about an arm's length away. · Seek help if you have depression or anxiety. Your headaches may be linked to these conditions. Treatment can both prevent headaches and help with symptoms of anxiety or depression. When should you call for help? Call 911 anytime you think you may need emergency care. For example, call if: 
  · You have signs of a stroke. These may include: 
¨ Sudden numbness, paralysis, or weakness in your face, arm, or leg, especially on only one side of your body. ¨ Sudden vision changes. ¨ Sudden trouble speaking. ¨ Sudden confusion or trouble understanding simple statements. ¨ Sudden problems with walking or balance. ¨ A sudden, severe headache that is different from past headaches.  
 Call your doctor now or seek immediate medical care if: 
  · You have a new or worse headache.  
  · Your headache gets much worse. Where can you learn more? Go to http://hodan-eugene.info/. Enter M271 in the search box to learn more about \"Headache: Care Instructions. \" Current as of: October 9, 2017 Content Version: 11.7 © 4549-6408 [employer]wise, Incorporated. Care instructions adapted under license by [employer] (which disclaims liability or warranty for this information).  If you have questions about a medical condition or this instruction, always ask your healthcare professional. [place] [address] any warranty or liability for your use of this information. Introducing [facility]! [facility] introduces [employer] patient portal. Now you can access parts of your medical record, email your doctor's office, and request medication refills online. 1. In your internet browser, go to [email or web address] 2. Click on the First Time User? Click Here link in the Sign In box. You will see the New Member Sign Up page. 3. Enter your [employer] Access Code exactly as it appears below. You will not need to use this code after youve completed the sign-up process. If you do not sign up before the expiration date, you must request a new code. · [employer] Access Code: [email or web address] Expires: 11/29/2018 11:42 AM 
 
4. Enter the last four digits of your Social Security Number (xxxx) and Date of Birth (mm/dd/yyyy) as indicated and click Submit. You will be taken to the next sign-up page. 5. Create a [employer] ID. This will be your [employer] login ID and cannot be changed, so think of one that is secure and easy to remember. 6. Create a [employer] password. You can change your password at any time. 7. Enter your Password Reset Question and Answer. This can be used at a later time if you forget your password. 8. Enter your e-mail address. You will receive e-mail notification when new information is available in [address] 9. Click Sign Up. You can now view and download portions of your medical record. 10. Click the Download Summary menu link to download a portable copy of your medical information. If you have questions, please visit the Frequently Asked Questions section of the [employer] website. Remember, [employer] is NOT to be used for urgent needs. For medical emergencies, dial 911. Now available from your iPhone and Android! Please provide this summary of care documentation to your next provider. Your primary care clinician is listed as Selina Morgan.  If you have any questions after today's visit, please call [phone].

## 2018-08-31 NOTE — PATIENT INSTRUCTIONS

## 2018-09-06 ENCOUNTER — TELEPHONE (OUTPATIENT)
Dept: FAMILY MEDICINE CLINIC | Age: 38
End: 2018-09-06

## 2018-09-06 DIAGNOSIS — G47.00 INSOMNIA, UNSPECIFIED TYPE: Primary | ICD-10-CM

## 2018-09-06 NOTE — TELEPHONE ENCOUNTER
The pt is calling because the medication she was prescribed her last visit was not covered by her insurance. She did not give the name of it.

## 2018-09-07 RX ORDER — ZOLPIDEM TARTRATE 5 MG/1
5 TABLET ORAL
Qty: 30 TAB | Refills: 0 | OUTPATIENT
Start: 2018-09-07 | End: 2018-10-11 | Stop reason: ALTCHOICE

## 2018-09-07 NOTE — TELEPHONE ENCOUNTER
Doxepin not covered. Discussed other alternatives and she would like to retry Ambien. Prescription to be called into pharmacy.

## 2018-09-07 NOTE — TELEPHONE ENCOUNTER
Patient is calling back to check status of change in medication it is doxepin 6 mgs that her insurance will not cover please send over script for something else.

## 2018-09-12 RX ORDER — OMEPRAZOLE 20 MG/1
CAPSULE, DELAYED RELEASE ORAL
Qty: 90 CAP | Refills: 1 | Status: SHIPPED | OUTPATIENT
Start: 2018-09-12 | End: 2019-05-17 | Stop reason: SDUPTHER

## 2018-09-14 ENCOUNTER — OFFICE VISIT (OUTPATIENT)
Dept: FAMILY MEDICINE CLINIC | Age: 38
End: 2018-09-14

## 2018-09-14 VITALS
RESPIRATION RATE: 20 BRPM | TEMPERATURE: 98.5 F | DIASTOLIC BLOOD PRESSURE: 78 MMHG | SYSTOLIC BLOOD PRESSURE: 110 MMHG | HEIGHT: 65 IN | BODY MASS INDEX: 34.42 KG/M2 | WEIGHT: 206.6 LBS | HEART RATE: 105 BPM | OXYGEN SATURATION: 98 %

## 2018-09-14 DIAGNOSIS — G44.89 OTHER HEADACHE SYNDROME: Primary | ICD-10-CM

## 2018-09-14 NOTE — PROGRESS NOTES
Subjective:      Kady Bradley is a 42 y.o. female here with complaint of headache. Has had 7 headaches in the last 2 weeks. Still not sleeping well. Headaches occur upon awakening with resolution by mid-morning. Pain located in the back of the head and radiates up to the mid-scalp. Pain is described as a pressure and it's \"just there\". Denies visual disturbance, neck pain. Reports that AM headache did trigger migraine headache earlier this week. Current Outpatient Prescriptions   Medication Sig Dispense Refill    omeprazole (PRILOSEC) 20 mg capsule TAKE ONE CAPSULE BY MOUTH ONCE DAILY 90 Cap 1    zolpidem (AMBIEN) 5 mg tablet Take 1 Tab by mouth nightly as needed for Sleep. Max Daily Amount: 5 mg. 30 Tab 0    fenofibrate (LOFIBRA) 160 mg tablet TAKE 1 TAB BY MOUTH DAILY. 30 Tab 5    simvastatin (ZOCOR) 20 mg tablet TAKE 1 TABLET BY MOUTH EVERY DAY 90 Tab 1    metFORMIN (GLUCOPHAGE) 1,000 mg tablet TAKE 1 TABLET BY MOUTH TWICE A  Tab 4    JARDIANCE 25 mg tablet TAKE 1 TABLET BY MOUTH DAILY 30 Tab 5    aspirin delayed-release 81 mg tablet TAKE 1 TABLET BY MOUTH EVERY DAY 30 Tab 5    JANUVIA 100 mg tablet TAKE 1 TABLET BY MOUTH EVERY DAY 90 Tab 0    ergocalciferol (ERGOCALCIFEROL) 50,000 unit capsule TAKE 1 CAP BY MOUTH EVERY SEVEN (7) DAYS. 30 Cap 4    [employer]ENS[employer] CONTOUR strip USE AS DIRECTED 50 Strip 5    famotidine (PEPCID) 40 mg tablet TAKE 1 TABLET BY MOUTH EVERY DAY  12    topiramate (TOPAMAX) 50 mg tablet Take 1 tab twice a day for migraine prevention 60 Tab 0    Lancets (MICROLET LANCET) Misc USE TO CHECK BLOOD SUGAR ONE TO TWO TIMES DAILY 100 Each 0    albuterol (PROVENTIL HFA, VENTOLIN HFA, PROAIR HFA) 90 mcg/actuation inhaler Take 2 Puffs by inhalation every four (4) hours as needed for Wheezing. Dispense proair. 1 Inhaler 2    fluticasone (FLONASE) 50 mcg/actuation nasal spray 2 Sprays by Both Nostrils route daily.  1 Bottle 2    SUMAtriptan (IMITREX) 50 mg tablet TAKE 1 TAB AT ONSET OF MIGRAINE HEADACHE, REPEAT IN 1 HOUR IF NEEDED. NO MORE THAN 2 TABS PER 24 HOUR 9 Tab 3    albuterol (PROVENTIL VENTOLIN) 2.5 mg /3 mL (0.083 %) nebulizer solution USE ONE VIAL IN NEBULIZER EVERY 4 HOURS AS NEEDED FOR WHEEZING FOR  UP  TO  30  DOSES 300 Each 2    butalbital-acetaminophen-caffeine (FIORICET, ESGIC) -40 mg per tablet Take 1 Tab by mouth every six (6) hours as needed for Pain. Max Daily Amount: 4 Tabs. 20 Tab 0       Allergies   Allergen Reactions    Green Pepper Hives       Past Medical History:   Diagnosis Date    Anxiety     Asthma     Diabetes ([address] 2008    Headache     Headache(784.0)     Hypertension     Rash 7/14/2016    S/P dilatation and curettage     Tachycardia        Social History   Substance Use Topics    Smoking status: Never Smoker    Smokeless tobacco: Never Used    Alcohol use No        Review of Systems  Pertinent items are noted in HPI. Objective:     Visit Vitals    /78 (BP 1 Location: Right arm, BP Patient Position: Sitting)  Comment: manual    Pulse (!) 105    Temp 98.5 °F (36.9 °C) (Oral)    Resp 20    Ht 5' 5\" (1.651 m)    Wt 206 lb 9.6 oz (93.7 kg)    LMP 01/25/2012    SpO2 98%    BMI 34.38 kg/m2      General appearance - alert, well appearing, and in no distress  Eyes - pupils equal and reactive, extraocular eye movements intact, sclera anicteric  Chest - clear to auscultation, no wheezes, rales or rhonchi, symmetric air entry, no tachypnea, retractions or cyanosis  Heart - normal rate, regular rhythm, normal S1, S2, no murmurs, rubs, clicks or gallops  Neurological - alert, oriented, normal speech, no focal findings or movement disorder noted, grossly normal     Assessment/Plan:   Bishop Hinojosa is a 42 y.o. female seen for:     1. Other headache syndrome: no improvement noted. Will check head CT for further evaluation.    - CT HEAD WO CONT; Future    I have discussed the diagnosis with the patient and the intended plan as seen in the above orders. The patient has received an after-visit summary and questions were answered concerning future plans. I have discussed medication side effects and warnings with the patient as well. Patient verbalizes understanding of plan of care and denies further questions or concerns at this time. Informed patient to return to the office if symptoms worsen or if new symptoms arise. Follow-up Disposition:  Return if symptoms worsen or fail to improve.

## 2018-09-14 NOTE — PATIENT INSTRUCTIONS

## 2018-09-14 NOTE — MR AVS SNAPSHOT
[address] [facility] 
[phone] Patient: Abbey Hunter MRN: [id number] Visit Information Date & Time Provider Department Dept. Phone Encounter #  
 9/14/2018  8:30 AM Shivani SilveiraZach [phone] [id number] Follow-up Instructions Return if symptoms worsen or fail to improve. Upcoming Health Maintenance Date Due  
 EYE EXAM RETINAL OR DILATED Q1 9/1/2014 DTaP/Tdap/Td series (2 - Td) 1/1/2018 FOOT EXAM Q1 3/15/2018 Influenza Age 5 to Adult 8/1/2018 Pneumococcal 19-64 Highest Risk (2 of 3 - PCV13) 12/21/2018 HEMOGLOBIN A1C Q6M 1/10/2019 PAP AKA CERVICAL CYTOLOGY 6/15/2019 MICROALBUMIN Q1 7/10/2019 LIPID PANEL Q1 7/10/2019 Allergies as of 9/14/2018  Review Complete On: 9/14/2018 By: Shivani Silveira MD  
  
 Severity Noted Reaction Type Reactions Green Pepper  06/28/2012    Hives Current Immunizations  Reviewed on 3/18/2014 Name Date Influenza Vaccine 10/26/2013 TDAP Vaccine 1/1/2008 Not reviewed this visit You Were Diagnosed With   
  
 Codes Comments Other headache syndrome    -  Primary ICD-10-CM: G44.89 ICD-9-CM: 339.89 Vitals BP Pulse Temp Resp Height(growth percentile) Weight(growth percentile) 110/78 (BP 1 Location: Right arm, BP Patient Position: Sitting) (!) 105 98.5 °F (36.9 °C) (Oral) 20 5' 5\" (1.651 m) 206 lb 9.6 oz (93.7 kg) LMP SpO2 BMI OB Status Smoking Status 01/25/2012 98% 34.38 kg/m2 Hysterectomy Never Smoker BMI and BSA Data Body Mass Index Body Surface Area  
 34.38 kg/m 2 2.07 m 2 Preferred Pharmacy Pharmacy Name Phone [id number] [address] [phone] Your Updated Medication List  
  
   
This list is accurate as of 9/14/18  9:05 AM.  Always use your most recent med list.  
  
  
  
  
 * albuterol 2.5 mg /3 mL (0.083 %) nebulizer solution Commonly known as:  PROVENTIL VENTOLIN  
USE ONE VIAL IN NEBULIZER EVERY 4 HOURS AS NEEDED FOR WHEEZING FOR  UP  TO  30  DOSES * albuterol 90 mcg/actuation inhaler Commonly known as:  PROVENTIL HFA, VENTOLIN HFA, PROAIR HFA Take 2 Puffs by inhalation every four (4) hours as needed for Wheezing. Dispense proair. Ascensia CONTOUR strip Generic drug:  glucose blood VI test strips USE AS DIRECTED  
  
 aspirin delayed-release 81 mg tablet TAKE 1 TABLET BY MOUTH EVERY DAY  
  
 butalbital-acetaminophen-caffeine -40 mg per tablet Commonly known as:  Sean Gift Take 1 Tab by mouth every six (6) hours as needed for Pain. Max Daily Amount: 4 Tabs.  
  
 ergocalciferol 50,000 unit capsule Commonly known as:  ERGOCALCIFEROL  
TAKE 1 CAP BY MOUTH EVERY SEVEN (7) DAYS. famotidine 40 mg tablet Commonly known as:  PEPCID  
TAKE 1 TABLET BY MOUTH EVERY DAY  
  
 fenofibrate 160 mg tablet Commonly known as:  LOFIBRA TAKE 1 TAB BY MOUTH DAILY. fluticasone 50 mcg/actuation nasal spray Commonly known as:  Alric Eaves 2 Sprays by Both Nostrils route daily. JANUVIA 100 mg tablet Generic drug:  SITagliptin TAKE 1 TABLET BY MOUTH EVERY DAY  
  
 JARDIANCE 25 mg tablet Generic drug:  empagliflozin TAKE 1 TABLET BY MOUTH DAILY Lancets Misc Commonly known as:  MICROLET LANCET  
USE TO CHECK BLOOD SUGAR ONE TO TWO TIMES DAILY  
  
 metFORMIN 1,000 mg tablet Commonly known as:  GLUCOPHAGE  
TAKE 1 TABLET BY MOUTH TWICE A DAY  
  
 omeprazole 20 mg capsule Commonly known as:  PRILOSEC  
TAKE ONE CAPSULE BY MOUTH ONCE DAILY  
  
 simvastatin 20 mg tablet Commonly known as:  ZOCOR  
TAKE 1 TABLET BY MOUTH EVERY DAY  
  
 SUMAtriptan 50 mg tablet Commonly known as:  IMITREX  
TAKE 1 TAB AT ONSET OF MIGRAINE HEADACHE, REPEAT IN 1 HOUR IF NEEDED. NO MORE THAN 2 TABS PER 24 HOUR  
  
 topiramate 50 mg tablet Commonly known as:  TOPAMAX Take 1 tab twice a day for migraine prevention  
  
 zolpidem 5 mg tablet Commonly known as:  AMBIEN Take 1 Tab by mouth nightly as needed for Sleep. Max Daily Amount: 5 mg.  
  
 * Notice: This list has 2 medication(s) that are the same as other medications prescribed for you. Read the directions carefully, and ask your doctor or other care provider to review them with you. Follow-up Instructions Return if symptoms worsen or fail to improve. To-Do List   
 09/14/2018 Imaging:  CT HEAD WO CONT Referral Information Referral ID Referred By Referred To  
  
 [id number] Luz Bey Not Available Visits Status Start Date End Date 1 New Request 9/14/18 9/14/19 If your referral has a status of pending review or denied, additional information will be sent to support the outcome of this decision. Patient Instructions Headache: Care Instructions Your Care Instructions Headaches have many possible causes. Most headaches aren't a sign of a more serious problem, and they will get better on their own. Home treatment may help you feel better faster. The doctor has checked you carefully, but problems can develop later. If you notice any problems or new symptoms, get medical treatment right away. Follow-up care is a key part of your treatment and safety. Be sure to make and go to all appointments, and call your doctor if you are having problems. It's also a good idea to know your test results and keep a list of the medicines you take. How can you care for yourself at home? · Do not drive if you have taken a prescription pain medicine. · Rest in a quiet, dark room until your headache is gone. Close your eyes and try to relax or go to sleep. Don't watch TV or read. · Put a cold, moist cloth or cold pack on the painful area for 10 to 20 minutes at a time. Put a thin cloth between the cold pack and your skin. · Use a warm, moist towel or a heating pad set on low to relax tight shoulder and neck muscles. · Have someone gently massage your neck and shoulders. · Take pain medicines exactly as directed. ¨ If the doctor gave you a prescription medicine for pain, take it as prescribed. ¨ If you are not taking a prescription pain medicine, ask your doctor if you can take an over-the-counter medicine. · Be careful not to take pain medicine more often than the instructions allow, because you may get worse or more frequent headaches when the medicine wears off. · Do not ignore new symptoms that occur with a headache, such as a fever, weakness or numbness, vision changes, or confusion. These may be signs of a more serious problem. To prevent headaches · Keep a headache diary so you can figure out what triggers your headaches. Avoiding triggers may help you prevent headaches. Record when each headache began, how long it lasted, and what the pain was like (throbbing, aching, stabbing, or dull). Write down any other symptoms you had with the headache, such as nausea, flashing lights or dark spots, or sensitivity to bright light or loud noise. Note if the headache occurred near your period. List anything that might have triggered the headache, such as certain foods (chocolate, cheese, wine) or odors, smoke, bright light, stress, or lack of sleep. · Find healthy ways to deal with stress. Headaches are most common during or right after stressful times. Take time to relax before and after you do something that has caused a headache in the past. 
· Try to keep your muscles relaxed by keeping good posture. Check your jaw, face, neck, and shoulder muscles for tension, and try relaxing them. When sitting at a desk, change positions often, and stretch for 30 seconds each hour. · Get plenty of sleep and exercise. · Eat regularly and well. Long periods without food can trigger a headache. · Treat yourself to a massage. Some people find that regular massages are very helpful in relieving tension. · Limit caffeine by not drinking too much coffee, tea, or soda. But don't quit caffeine suddenly, because that can also give you headaches. · Reduce eyestrain from computers by blinking frequently and looking away from the computer screen every so often. Make sure you have proper eyewear and that your monitor is set up properly, about an arm's length away. · Seek help if you have depression or anxiety. Your headaches may be linked to these conditions. Treatment can both prevent headaches and help with symptoms of anxiety or depression. When should you call for help? Call 911 anytime you think you may need emergency care. For example, call if: 
  · You have signs of a stroke. These may include: 
¨ Sudden numbness, paralysis, or weakness in your face, arm, or leg, especially on only one side of your body. ¨ Sudden vision changes. ¨ Sudden trouble speaking. ¨ Sudden confusion or trouble understanding simple statements. ¨ Sudden problems with walking or balance. ¨ A sudden, severe headache that is different from past headaches.  
 Call your doctor now or seek immediate medical care if: 
  · You have a new or worse headache.  
  · Your headache gets much worse. Where can you learn more? Go to http://hodan-eugene.info/. Enter M271 in the search box to learn more about \"Headache: Care Instructions. \" Current as of: October 9, 2017 Content Version: 11.7 © 6739-6934 [employer]. Care instructions adapted under license by [employer] (which disclaims liability or warranty for this information). If you have questions about a medical condition or this instruction, always ask your healthcare professional. [place] [address] any warranty or liability for your use of this information. Introducing [facility]! Simin Mccarthy introduces [employer] patient portal. Now you can access parts of your medical record, email your doctor's office, and request medication refills online. 1. In your internet browser, go to https://"[employer] energy/"[employer]" 2. Click on the First Time User? Click Here link in the Sign In box. You will see the New Member Sign Up page. 3. Enter your [employer] Access Code exactly as it appears below. You will not need to use this code after youve completed the sign-up process. If you do not sign up before the expiration date, you must request a new code. · [employer] Access Code: [email or web address] Expires: 11/29/2018 11:42 AM 
 
4. Enter the last four digits of your Social Security Number (xxxx) and Date of Birth (mm/dd/yyyy) as indicated and click Submit. You will be taken to the next sign-up page. 5. Create a [employer] ID. This will be your [employer] login ID and cannot be changed, so think of one that is secure and easy to remember. 6. Create a [employer] password. You can change your password at any time. 7. Enter your Password Reset Question and Answer. This can be used at a later time if you forget your password. 8. Enter your e-mail address. You will receive e-mail notification when new information is available in [address] 9. Click Sign Up. You can now view and download portions of your medical record. 10. Click the Download Summary menu link to download a portable copy of your medical information. If you have questions, please visit the Frequently Asked Questions section of the [employer] website. Remember, [employer] is NOT to be used for urgent needs. For medical emergencies, dial 911. Now available from your iPhone and Android! Please provide this summary of care documentation to your next provider. Your primary care clinician is listed as Selina Morgan. If you have any questions after today's visit, please call [phone].

## 2018-09-14 NOTE — PROGRESS NOTES
Identified pt with two pt identifiers(name and ). Chief Complaint   Patient presents with    Headache     still has headaches        Health Maintenance Due   Topic    EYE EXAM RETINAL OR DILATED Q1     DTaP/Tdap/Td series (2 - Td)    FOOT EXAM Q1     Influenza Age 5 to Adult    [employer] Best    Wt Readings from Last 3 Encounters:   18 206 lb 9.6 oz (93.7 kg)   18 205 lb (93 kg)   18 204 lb 9.4 oz (92.8 kg)     Temp Readings from Last 3 Encounters:   18 98.5 °F (36.9 °C) (Oral)   18 98.2 °F (36.8 °C) (Oral)   18 98.2 °F (36.8 °C)     BP Readings from Last 3 Encounters:   18 110/78   18 102/80   18 115/84     Pulse Readings from Last 3 Encounters:   18 (!) 105   18 98   18 97         Learning Assessment:  :     Learning Assessment 2017   PRIMARY LEARNER Patient Patient   HIGHEST LEVEL OF EDUCATION - PRIMARY LEARNER  GRADUATED HIGH SCHOOL OR GED GRADUATED HIGH SCHOOL OR GED   BARRIERS PRIMARY LEARNER NONE NONE   PRIMARY LANGUAGE ENGLISH ENGLISH   LEARNER PREFERENCE PRIMARY READING READING   ANSWERED BY patient patient   RELATIONSHIP SELF SELF       Depression Screening:  :     PHQ over the last two weeks 2018   Little interest or pleasure in doing things Not at all   Feeling down, depressed, irritable, or hopeless Not at all   Total Score PHQ 2 0       Fall Risk Assessment:  :     Fall Risk Assessment, last 12 mths 2017   Able to walk? Yes   Fall in past 12 months? No       Abuse Screening:  :     Abuse Screening Questionnaire 2018   Do you ever feel afraid of your partner? N N N   Are you in a relationship with someone who physically or mentally threatens you? N N N   Is it safe for you to go home?  Y Y Y       Coordination of Care Questionnaire:  :     1) Have you been to an emergency room, urgent care clinic since your last visit? no   Hospitalized since your last visit? no             2) Have you seen or consulted any other health care providers outside of [address] [place] [address] since your last visit? no  (Include any pap smears or colon screenings in this section.)    3) Do you have an Advance Directive on file? no  Are you interested in receiving information about Advance Directives? no    Reviewed record in preparation for visit and have obtained necessary documentation. Medication reconciliation up to date and corrected with patient at this time.

## 2018-09-21 ENCOUNTER — HOSPITAL ENCOUNTER (OUTPATIENT)
Dept: CT IMAGING | Age: 38
Discharge: HOME | End: 2018-09-21
Attending: FAMILY MEDICINE
Payer: MEDICAID

## 2018-09-21 ENCOUNTER — OFFICE VISIT (OUTPATIENT)
Dept: FAMILY MEDICINE CLINIC | Age: 38
End: 2018-09-21

## 2018-09-21 VITALS
SYSTOLIC BLOOD PRESSURE: 108 MMHG | HEIGHT: 65 IN | TEMPERATURE: 98 F | BODY MASS INDEX: 34.66 KG/M2 | DIASTOLIC BLOOD PRESSURE: 78 MMHG | WEIGHT: 208 LBS | OXYGEN SATURATION: 98 % | HEART RATE: 78 BPM | RESPIRATION RATE: 18 BRPM

## 2018-09-21 DIAGNOSIS — G44.89 OTHER HEADACHE SYNDROME: ICD-10-CM

## 2018-09-21 DIAGNOSIS — J01.00 ACUTE NON-RECURRENT MAXILLARY SINUSITIS: Primary | ICD-10-CM

## 2018-09-21 DIAGNOSIS — J02.9 SORE THROAT: ICD-10-CM

## 2018-09-21 LAB
S PYO AG THROAT QL: NEGATIVE
VALID INTERNAL CONTROL?: YES

## 2018-09-21 PROCEDURE — 70450 CT HEAD/BRAIN W/O DYE: CPT

## 2018-09-21 RX ORDER — AMOXICILLIN AND CLAVULANATE POTASSIUM 875; 125 MG/1; MG/1
1 TABLET, FILM COATED ORAL EVERY 12 HOURS
Qty: 20 TAB | Refills: 0 | Status: SHIPPED | OUTPATIENT
Start: 2018-09-21 | End: 2018-10-01

## 2018-09-21 NOTE — PATIENT INSTRUCTIONS
Sinusitis: Care Instructions  Your Care Instructions    Sinusitis is an infection of the lining of the sinus cavities in your head. Sinusitis often follows a cold. It causes pain and pressure in your head and face. In most cases, sinusitis gets better on its own in 1 to 2 weeks. But some mild symptoms may last for several weeks. Sometimes antibiotics are needed. Follow-up care is a key part of your treatment and safety. Be sure to make and go to all appointments, and call your doctor if you are having problems. It's also a good idea to know your test results and keep a list of the medicines you take. How can you care for yourself at home? · Take an over-the-counter pain medicine, such as acetaminophen (Tylenol), ibuprofen (Advil, Motrin), or naproxen (Aleve). Read and follow all instructions on the label. · If the doctor prescribed antibiotics, take them as directed. Do not stop taking them just because you feel better. You need to take the full course of antibiotics. · Be careful when taking over-the-counter cold or flu medicines and Tylenol at the same time. Many of these medicines have acetaminophen, which is Tylenol. Read the labels to make sure that you are not taking more than the recommended dose. Too much acetaminophen (Tylenol) can be harmful. · Breathe warm, moist air from a steamy shower, a hot bath, or a sink filled with hot water. Avoid cold, dry air. Using a humidifier in your home may help. Follow the directions for cleaning the machine. · Use saline (saltwater) nasal washes to help keep your nasal passages open and wash out mucus and bacteria. You can buy saline nose drops at a grocery store or drugstore. Or you can make your own at home by adding 1 teaspoon of salt and 1 teaspoon of baking soda to 2 cups of distilled water. If you make your own, fill a bulb syringe with the solution, insert the tip into your nostril, and squeeze gently. Jackson Cornea your nose.   · Put a hot, wet towel or a warm gel pack on your face 3 or 4 times a day for 5 to 10 minutes each time. · Try a decongestant nasal spray like oxymetazoline (Afrin). Do not use it for more than 3 days in a row. Using it for more than 3 days can make your congestion worse. When should you call for help? Call your doctor now or seek immediate medical care if:    · You have new or worse swelling or redness in your face or around your eyes.     · You have a new or higher fever.    Watch closely for changes in your health, and be sure to contact your doctor if:    · You have new or worse facial pain.     · The mucus from your nose becomes thicker (like pus) or has new blood in it.     · You are not getting better as expected. Where can you learn more? Go to http://hodan-eugene.info/. Enter K836 in the search box to learn more about \"Sinusitis: Care Instructions. \"  Current as of: May 12, 2017  Content Version: 11.7  © 8701-5929 [employer]. Care instructions adapted under license by [employer] (which disclaims liability or warranty for this information). If you have questions about a medical condition or this instruction, always ask your healthcare professional. [address] any warranty or liability for your use of this information.

## 2018-09-21 NOTE — PROGRESS NOTES
Subjective:      Jocelynn Lilly is a 42 y.o. female here with complaint of right sinus pressure, sore throat, right ear pain, nasal congestion for 3  days. Associated with fever (Tmax 101 degrees F yesterday). She denies a history of nausea, shortness of breath. Positive strep contacts. Evaluation to date: none   Treatment to date: Flonase, Tylenol (last dose ~12:30 PM)      Current Outpatient Prescriptions   Medication Sig Dispense Refill    omeprazole (PRILOSEC) 20 mg capsule TAKE ONE CAPSULE BY MOUTH ONCE DAILY 90 Cap 1    zolpidem (AMBIEN) 5 mg tablet Take 1 Tab by mouth nightly as needed for Sleep. Max Daily Amount: 5 mg. 30 Tab 0    fenofibrate (LOFIBRA) 160 mg tablet TAKE 1 TAB BY MOUTH DAILY. 30 Tab 5    simvastatin (ZOCOR) 20 mg tablet TAKE 1 TABLET BY MOUTH EVERY DAY 90 Tab 1    metFORMIN (GLUCOPHAGE) 1,000 mg tablet TAKE 1 TABLET BY MOUTH TWICE A  Tab 4    JARDIANCE 25 mg tablet TAKE 1 TABLET BY MOUTH DAILY 30 Tab 5    aspirin delayed-release 81 mg tablet TAKE 1 TABLET BY MOUTH EVERY DAY 30 Tab 5    JANUVIA 100 mg tablet TAKE 1 TABLET BY MOUTH EVERY DAY 90 Tab 0    albuterol (PROVENTIL HFA, VENTOLIN HFA, PROAIR HFA) 90 mcg/actuation inhaler Take 2 Puffs by inhalation every four (4) hours as needed for Wheezing. Dispense proair. 1 Inhaler 2    fluticasone (FLONASE) 50 mcg/actuation nasal spray 2 Sprays by Both Nostrils route daily. 1 Bottle 2    SUMAtriptan (IMITREX) 50 mg tablet TAKE 1 TAB AT ONSET OF MIGRAINE HEADACHE, REPEAT IN 1 HOUR IF NEEDED. NO MORE THAN 2 TABS PER 24 HOUR 9 Tab 3    ergocalciferol (ERGOCALCIFEROL) 50,000 unit capsule TAKE 1 CAP BY MOUTH EVERY SEVEN (7) DAYS.  30 Cap 4    albuterol (PROVENTIL VENTOLIN) 2.5 mg /3 mL (0.083 %) nebulizer solution USE ONE VIAL IN NEBULIZER EVERY 4 HOURS AS NEEDED FOR WHEEZING FOR  UP  TO  30  DOSES 300 Each 2    ASCENSIA CONTOUR strip USE AS DIRECTED 50 Strip 5    famotidine (PEPCID) 40 mg tablet TAKE 1 TABLET BY MOUTH EVERY DAY 12    topiramate (TOPAMAX) 50 mg tablet Take 1 tab twice a day for migraine prevention 60 Tab 0    butalbital-acetaminophen-caffeine (FIORICET, ESGIC) -40 mg per tablet Take 1 Tab by mouth every six (6) hours as needed for Pain. Max Daily Amount: 4 Tabs. 21 Tab 0    Lancets (MICROLET LANCET) Misc USE TO CHECK BLOOD SUGAR ONE TO TWO TIMES DAILY 100 Each 0       Allergies   Allergen Reactions    Green Pepper Hives       Past Medical History:   Diagnosis Date    Anxiety     Asthma     Diabetes ([address] 2008    Headache     Headache(784.0)     Hypertension     Rash 7/14/2016    S/P dilatation and curettage     Tachycardia        Social History   Substance Use Topics    Smoking status: Never Smoker    Smokeless tobacco: Never Used    Alcohol use No        Review of Systems  Pertinent items are noted in HPI. Objective:     Visit Vitals    /78 (BP 1 Location: Right arm, BP Patient Position: Sitting)  Comment: Manual    Pulse 78    Temp 98 °F (36.7 °C) (Oral)  Comment: .     Resp 18    Ht 5' 5\" (1.651 m)    Wt 208 lb (94.3 kg)    LMP 01/25/2012    SpO2 98%    BMI 34.61 kg/m2      General appearance - alert, well appearing, and in no distress  Eyes - pupils equal and reactive, extraocular eye movements intact, sclera anicteric  Ears - bilateral TM's and external ear canals normal  Nose - mucosal congestion, mucosal erythema and sinus tenderness noted right maxillary sinus   Oropharyngx - mucous membranes moist, pharynx normal without lesions  Neck - bilateral symmetric anterior adenopathy  Chest - clear to auscultation, no wheezes, rales or rhonchi, symmetric air entry, no tachypnea, retractions or cyanosis  Heart - normal rate, regular rhythm, normal S1, S2, no murmurs, rubs, clicks or gallops    Recent Results (from the past 12 hour(s))   AMB POC RAPID STREP A    Collection Time: 09/21/18  3:18 PM   Result Value Ref Range    VALID INTERNAL CONTROL POC Yes     Group A Strep Ag Negative Negative       Assessment/Plan:   Kady Bradley is a 42 y.o. female seen for:     1. Acute non-recurrent maxillary sinusitis  - amoxicillin-clavulanate (AUGMENTIN) 875-125 mg per tablet; Take 1 Tab by mouth every twelve (12) hours for 10 days. Dispense: 20 Tab; Refill: 0  - Symptomatic therapy suggested: push fluids, rest, gargle warm salt water and apply heat to sinuses prn    2. Sore throat  - AMB POC RAPID STREP A    I have discussed the diagnosis with the patient and the intended plan as seen in the above orders. The patient has received an after-visit summary and questions were answered concerning future plans. I have discussed medication side effects and warnings with the patient as well. Patient verbalizes understanding of plan of care and denies further questions or concerns at this time. Informed patient to return to the office if symptoms worsen or if new symptoms arise. Follow-up Disposition:  Return if symptoms worsen or fail to improve.

## 2018-09-21 NOTE — PROGRESS NOTES
Identified pt with two pt identifiers(name and ). Chief Complaint   Patient presents with    Sinus Pain     symptoms began Wed.  Ear Pain     Right ear    Sore Throat        Health Maintenance Due   Topic    EYE EXAM RETINAL OR DILATED Q1     DTaP/Tdap/Td series (2 - Td)    FOOT EXAM Q1        Wt Readings from Last 3 Encounters:   18 208 lb (94.3 kg)   18 206 lb 9.6 oz (93.7 kg)   18 205 lb (93 kg)     Temp Readings from Last 3 Encounters:   18 98 °F (36.7 °C) (Oral)   18 98.5 °F (36.9 °C) (Oral)   18 98.2 °F (36.8 °C) (Oral)     BP Readings from Last 3 Encounters:   18 108/78   18 110/78   18 102/80     Pulse Readings from Last 3 Encounters:   18 78   18 (!) 105   18 98         Learning Assessment:  :     Learning Assessment 2017   PRIMARY LEARNER Patient Patient   HIGHEST LEVEL OF EDUCATION - PRIMARY LEARNER  GRADUATED HIGH SCHOOL OR GED GRADUATED HIGH SCHOOL OR GED   BARRIERS PRIMARY LEARNER NONE NONE   PRIMARY LANGUAGE ENGLISH ENGLISH   LEARNER PREFERENCE PRIMARY READING READING   ANSWERED BY patient patient   RELATIONSHIP SELF SELF       Depression Screening:  :     PHQ over the last two weeks 2018   Little interest or pleasure in doing things Not at all   Feeling down, depressed, irritable, or hopeless Not at all   Total Score PHQ 2 0       Fall Risk Assessment:  :     Fall Risk Assessment, last 12 mths 2017   Able to walk? Yes   Fall in past 12 months? No       Abuse Screening:  :     Abuse Screening Questionnaire 2018   Do you ever feel afraid of your partner? N N N   Are you in a relationship with someone who physically or mentally threatens you? N N N   Is it safe for you to go home?  Y Y Y       Coordination of Care Questionnaire:  :     1) Have you been to an emergency room, urgent care clinic since your last visit? no   Hospitalized since your last visit? no             2) Have you seen or consulted any other health care providers outside of [address] [place] [address] since your last visit? no  (Include any pap smears or colon screenings in this section.)    3) Do you have an Advance Directive on file? no  Are you interested in receiving information about Advance Directives? no    Reviewed record in preparation for visit and have obtained necessary documentation. Medication reconciliation up to date and corrected with patient at this time.

## 2018-09-26 RX ORDER — BLOOD SUGAR DIAGNOSTIC
STRIP MISCELLANEOUS
Qty: 50 STRIP | Refills: 5 | Status: SHIPPED | OUTPATIENT
Start: 2018-09-26 | End: 2020-06-24 | Stop reason: SDUPTHER

## 2018-09-28 ENCOUNTER — TELEPHONE (OUTPATIENT)
Dept: FAMILY MEDICINE CLINIC | Age: 38
End: 2018-09-28

## 2018-10-11 ENCOUNTER — OFFICE VISIT (OUTPATIENT)
Dept: FAMILY MEDICINE CLINIC | Age: 38
End: 2018-10-11

## 2018-10-11 VITALS
DIASTOLIC BLOOD PRESSURE: 70 MMHG | WEIGHT: 201 LBS | HEART RATE: 94 BPM | BODY MASS INDEX: 33.49 KG/M2 | TEMPERATURE: 98.2 F | SYSTOLIC BLOOD PRESSURE: 100 MMHG | HEIGHT: 65 IN | OXYGEN SATURATION: 98 % | RESPIRATION RATE: 18 BRPM

## 2018-10-11 DIAGNOSIS — H65.01 RIGHT ACUTE SEROUS OTITIS MEDIA, RECURRENCE NOT SPECIFIED: Primary | ICD-10-CM

## 2018-10-11 RX ORDER — AMOXICILLIN AND CLAVULANATE POTASSIUM 875; 125 MG/1; MG/1
1 TABLET, FILM COATED ORAL EVERY 12 HOURS
Qty: 20 TAB | Refills: 0 | Status: SHIPPED | OUTPATIENT
Start: 2018-10-11 | End: 2018-10-21

## 2018-10-11 NOTE — PROGRESS NOTES
Subjective:      Estella Xiong is a 42 y.o. female here with complaint of whooshing sound in the right ear. Worse with breathing through her nose. Started on Tuesday and has gradually worsened. Associatd with nasal congestion. Denies fever, chills, ear drainage, ear injury or trauma. Current Outpatient Prescriptions   Medication Sig Dispense Refill    ASCENSIA CONTOUR strip USE AS DIRECTED 50 Strip 5    omeprazole (PRILOSEC) 20 mg capsule TAKE ONE CAPSULE BY MOUTH ONCE DAILY 90 Cap 1    fenofibrate (LOFIBRA) 160 mg tablet TAKE 1 TAB BY MOUTH DAILY. 30 Tab 5    simvastatin (ZOCOR) 20 mg tablet TAKE 1 TABLET BY MOUTH EVERY DAY 90 Tab 1    metFORMIN (GLUCOPHAGE) 1,000 mg tablet TAKE 1 TABLET BY MOUTH TWICE A  Tab 4    JARDIANCE 25 mg tablet TAKE 1 TABLET BY MOUTH DAILY 30 Tab 5    aspirin delayed-release 81 mg tablet TAKE 1 TABLET BY MOUTH EVERY DAY 30 Tab 5    JANUVIA 100 mg tablet TAKE 1 TABLET BY MOUTH EVERY DAY 90 Tab 0    albuterol (PROVENTIL HFA, VENTOLIN HFA, PROAIR HFA) 90 mcg/actuation inhaler Take 2 Puffs by inhalation every four (4) hours as needed for Wheezing. Dispense proair. 1 Inhaler 2    fluticasone (FLONASE) 50 mcg/actuation nasal spray 2 Sprays by Both Nostrils route daily. 1 Bottle 2    SUMAtriptan (IMITREX) 50 mg tablet TAKE 1 TAB AT ONSET OF MIGRAINE HEADACHE, REPEAT IN 1 HOUR IF NEEDED. NO MORE THAN 2 TABS PER 24 HOUR 9 Tab 3    ergocalciferol (ERGOCALCIFEROL) 50,000 unit capsule TAKE 1 CAP BY MOUTH EVERY SEVEN (7) DAYS.  30 Cap 4    albuterol (PROVENTIL VENTOLIN) 2.5 mg /3 mL (0.083 %) nebulizer solution USE ONE VIAL IN NEBULIZER EVERY 4 HOURS AS NEEDED FOR WHEEZING FOR  UP  TO  30  DOSES 300 Each 2    famotidine (PEPCID) 40 mg tablet TAKE 1 TABLET BY MOUTH EVERY DAY  12    topiramate (TOPAMAX) 50 mg tablet Take 1 tab twice a day for migraine prevention 60 Tab 0    butalbital-acetaminophen-caffeine (FIORICET, ESGIC) -40 mg per tablet Take 1 Tab by mouth every six (6) hours as needed for Pain. Max Daily Amount: 4 Tabs. 21 Tab 0    Lancets (MICROLET LANCET) Misc USE TO CHECK BLOOD SUGAR ONE TO TWO TIMES DAILY 100 Each 0       Allergies   Allergen Reactions    Green Pepper Hives       Past Medical History:   Diagnosis Date    Anxiety     Asthma     Diabetes ([address] 2008    Headache     Headache(784.0)     Hypertension     Rash 7/14/2016    S/P dilatation and curettage     Tachycardia        Social History   Substance Use Topics    Smoking status: Never Smoker    Smokeless tobacco: Never Used    Alcohol use No        Review of Systems  Pertinent items are noted in HPI. Objective:     Visit Vitals    /70 (BP 1 Location: Right arm, BP Patient Position: Sitting)  Comment: Manual    Pulse 94    Temp 98.2 °F (36.8 °C) (Oral)  Comment: .  Resp 18    Ht 5' 5\" (1.651 m)    Wt 201 lb (91.2 kg)    LMP 01/25/2012    SpO2 98%    BMI 33.45 kg/m2      General appearance - alert, well appearing, and in no distress  Eyes - pupils equal and reactive, extraocular eye movements intact, sclera anicteric  Ears - right TM with serous fluid, left TM normal, bilateral canals normal   Oropharyngx - mucous membranes moist, pharynx normal without lesions  Neck - supple, no significant adenopathy  Chest - clear to auscultation, no wheezes, rales or rhonchi, symmetric air entry, no tachypnea, retractions or cyanosis  Heart - normal rate, regular rhythm, normal S1, S2, no murmurs, rubs, clicks or gallops    Assessment/Plan:   Adore Ochoa is a 42 y.o. female seen for:     1. Right acute serous otitis media, recurrence not specified  - amoxicillin-clavulanate (AUGMENTIN) 875-125 mg per tablet; Take 1 Tab by mouth every twelve (12) hours for 10 days. Dispense: 20 Tab; Refill: 0  - nasal saline as needed for congestion, OTC analgesic of choice for pain     I have discussed the diagnosis with the patient and the intended plan as seen in the above orders.  The patient has received an after-visit summary and questions were answered concerning future plans. I have discussed medication side effects and warnings with the patient as well. Patient verbalizes understanding of plan of care and denies further questions or concerns at this time. Informed patient to return to the office if symptoms worsen or if new symptoms arise. Follow-up Disposition:  Return if symptoms worsen or fail to improve.

## 2018-10-11 NOTE — PATIENT INSTRUCTIONS
Middle Ear Fluid: Care Instructions  Your Care Instructions    Fluid often builds up inside the ear during a cold or allergies. Usually the fluid drains away, but sometimes a small tube in the ear, called the eustachian tube, stays blocked for months. Symptoms of fluid buildup may include:  · Popping, ringing, or a feeling of fullness or pressure in the ear. · Trouble hearing. · Balance problems and dizziness. In most cases, you can treat yourself at home. Follow-up care is a key part of your treatment and safety. Be sure to make and go to all appointments, and call your doctor if you are having problems. It's also a good idea to know your test results and keep a list of the medicines you take. How can you care for yourself at home? · In most cases, the fluid clears up within a few months without treatment. You may need more tests if the fluid does not clear up after 3 months. · If your doctor prescribed antibiotics, take them as directed. Do not stop taking them just because you feel better. You need to take the full course of antibiotics. When should you call for help? Call your doctor now or seek immediate medical care if:    · You have symptoms of infection, such as:  ¨ Increased pain, swelling, warmth, or redness. ¨ Pus draining from the area. ¨ A fever.    Watch closely for changes in your health, and be sure to contact your doctor if:    · You notice changes in hearing.     · You do not get better as expected. Where can you learn more? Go to http://hodan-eugene.info/. Enter Z285 in the search box to learn more about \"Middle Ear Fluid: Care Instructions. \"  Current as of: March 28, 2018  Content Version: 11.8  © 0725-3866 [employer]. Care instructions adapted under license by [employer] (which disclaims liability or warranty for this information).  If you have questions about a medical condition or this instruction, always ask your healthcare professional. [address] any warranty or liability for your use of this information.

## 2018-10-11 NOTE — PROGRESS NOTES
Identified pt with two pt identifiers(name and ). Chief Complaint   Patient presents with    Ear Pain     wooshing sound in right ear very painful. off and on. Began tuesday just getting worse. Health Maintenance Due   Topic    EYE EXAM RETINAL OR DILATED Q1     DTaP/Tdap/Td series (2 - Td)    FOOT EXAM Q1        Wt Readings from Last 3 Encounters:   10/11/18 201 lb (91.2 kg)   18 208 lb (94.3 kg)   18 206 lb 9.6 oz (93.7 kg)     Temp Readings from Last 3 Encounters:   10/11/18 98.2 °F (36.8 °C) (Oral)   18 98 °F (36.7 °C) (Oral)   18 98.5 °F (36.9 °C) (Oral)     BP Readings from Last 3 Encounters:   10/11/18 100/70   18 108/78   18 110/78     Pulse Readings from Last 3 Encounters:   10/11/18 94   18 78   18 (!) 105         Learning Assessment:  :     Learning Assessment 2017   PRIMARY LEARNER Patient Patient   HIGHEST LEVEL OF EDUCATION - PRIMARY LEARNER  GRADUATED HIGH SCHOOL OR GED GRADUATED HIGH SCHOOL OR GED   BARRIERS PRIMARY LEARNER NONE NONE   PRIMARY LANGUAGE ENGLISH ENGLISH   LEARNER PREFERENCE PRIMARY READING READING   ANSWERED BY patient patient   RELATIONSHIP SELF SELF       Depression Screening:  :     PHQ over the last two weeks 2018   Little interest or pleasure in doing things Not at all   Feeling down, depressed, irritable, or hopeless Not at all   Total Score PHQ 2 0       Fall Risk Assessment:  :     Fall Risk Assessment, last 12 mths 2017   Able to walk? Yes   Fall in past 12 months? No       Abuse Screening:  :     Abuse Screening Questionnaire 2018   Do you ever feel afraid of your partner? N N N   Are you in a relationship with someone who physically or mentally threatens you? N N N   Is it safe for you to go home?  Angella Peer       Coordination of Care Questionnaire:  :     1) Have you been to an emergency room, urgent care clinic since your last visit? no   Hospitalized since your last visit? no             2) Have you seen or consulted any other health care providers outside of [address] [place] [address] since your last visit? no  (Include any pap smears or colon screenings in this section.)    3) Do you have an Advance Directive on file? no  Are you interested in receiving information about Advance Directives? no    Reviewed record in preparation for visit and have obtained necessary documentation. Medication reconciliation up to date and corrected with patient at this time.

## 2018-10-11 NOTE — MR AVS SNAPSHOT
[address] [facility] 
 
 
 [address] [facility] 
[phone] Patient: Mihir Lew MRN: [id number] DHB:[id number] Visit Information Date & Time Provider Department Dept. Phone Encounter #  
 10/11/2018  8:45 AM Zach Arriaza  Follow-up Instructions Return if symptoms worsen or fail to improve. Your Appointments 10/17/2018  8:00 AM  
ROUTINE CARE with Sheryl Sethi MD  
[address] [facility]) Appt Note: [facility] [address] [facility] [phone] [facility]  
  
   
 [address] [place] [address] Upcoming Health Maintenance Date Due  
 EYE EXAM RETINAL OR DILATED Q1 9/1/2014 DTaP/Tdap/Td series (2 - Td) 1/1/2018 FOOT EXAM Q1 3/15/2018 Influenza Age 5 to Adult 10/1/2019* Pneumococcal 19-64 Highest Risk (2 of 3 - PCV13) 12/21/2018 HEMOGLOBIN A1C Q6M 1/10/2019 PAP AKA CERVICAL CYTOLOGY 6/15/2019 MICROALBUMIN Q1 7/10/2019 LIPID PANEL Q1 7/10/2019 *Topic was postponed. The date shown is not the original due date. Allergies as of 10/11/2018  Review Complete On: 10/11/2018 By: Sheryl Sethi MD  
  
 Severity Noted Reaction Type Reactions Green Pepper  06/28/2012    Hives Current Immunizations  Reviewed on 3/18/2014 Name Date Influenza Vaccine 10/26/2013 TDAP Vaccine 1/1/2008 Not reviewed this visit You Were Diagnosed With   
  
 Codes Comments Right acute serous otitis media, recurrence not specified    -  Primary ICD-10-CM: H65.01 
ICD-9-CM: 381.01 Vitals BP Pulse Temp Resp Height(growth percentile) Weight(growth percentile) 100/70 (BP 1 Location: Right arm, BP Patient Position: Sitting) 94 98.2 °F (36.8 °C) (Oral) 18 5' 5\" (1.651 m) 201 lb (91.2 kg) LMP SpO2 BMI OB Status Smoking Status 01/25/2012 98% 33.45 kg/m2 Hysterectomy Never Smoker Vitals History BMI and BSA Data Body Mass Index Body Surface Area  
 33.45 kg/m 2 2.05 m 2 Preferred Pharmacy Pharmacy Name Phone [id number] [phone] Your Updated Medication List  
  
   
This list is accurate as of 10/11/18  9:30 AM.  Always use your most recent med list.  
  
  
  
  
 * albuterol 2.5 mg /3 mL (0.083 %) nebulizer solution Commonly known as:  PROVENTIL VENTOLIN  
USE ONE VIAL IN NEBULIZER EVERY 4 HOURS AS NEEDED FOR WHEEZING FOR  UP  TO  30  DOSES * albuterol 90 mcg/actuation inhaler Commonly known as:  PROVENTIL HFA, VENTOLIN HFA, PROAIR HFA Take 2 Puffs by inhalation every four (4) hours as needed for Wheezing. Dispense proair. amoxicillin-clavulanate 875-125 mg per tablet Commonly known as:  AUGMENTIN Take 1 Tab by mouth every twelve (12) hours for 10 days. Ascensia CONTOUR strip Generic drug:  glucose blood VI test strips USE AS DIRECTED  
  
 aspirin delayed-release 81 mg tablet TAKE 1 TABLET BY MOUTH EVERY DAY  
  
 butalbital-acetaminophen-caffeine -40 mg per tablet Commonly known as:  Jacki Loveless Take 1 Tab by mouth every six (6) hours as needed for Pain. Max Daily Amount: 4 Tabs.  
  
 ergocalciferol 50,000 unit capsule Commonly known as:  ERGOCALCIFEROL  
TAKE 1 CAP BY MOUTH EVERY SEVEN (7) DAYS. famotidine 40 mg tablet Commonly known as:  PEPCID  
TAKE 1 TABLET BY MOUTH EVERY DAY  
  
 fenofibrate 160 mg tablet Commonly known as:  LOFIBRA TAKE 1 TAB BY MOUTH DAILY. fluticasone 50 mcg/actuation nasal spray Commonly known as:  Leidy Osman 2 Sprays by Both Nostrils route daily. JANUVIA 100 mg tablet Generic drug:  SITagliptin TAKE 1 TABLET BY MOUTH EVERY DAY  
  
 JARDIANCE 25 mg tablet Generic drug:  empagliflozin TAKE 1 TABLET BY MOUTH DAILY Lancets Mis Commonly known as:  MICROLET LANCET  
USE TO CHECK BLOOD SUGAR ONE TO TWO TIMES DAILY  
  
 metFORMIN 1,000 mg tablet Commonly known as:  GLUCOPHAGE  
TAKE 1 TABLET BY MOUTH TWICE A DAY  
  
 omeprazole 20 mg capsule Commonly known as:  PRILOSEC  
TAKE ONE CAPSULE BY MOUTH ONCE DAILY  
  
 simvastatin 20 mg tablet Commonly known as:  ZOCOR  
TAKE 1 TABLET BY MOUTH EVERY DAY  
  
 SUMAtriptan 50 mg tablet Commonly known as:  IMITREX  
TAKE 1 TAB AT ONSET OF MIGRAINE HEADACHE, REPEAT IN 1 HOUR IF NEEDED. NO MORE THAN 2 TABS PER 24 HOUR  
  
 topiramate 50 mg tablet Commonly known as:  TOPAMAX Take 1 tab twice a day for migraine prevention * Notice: This list has 2 medication(s) that are the same as other medications prescribed for you. Read the directions carefully, and ask your doctor or other care provider to review them with you. Prescriptions Sent to Pharmacy Refills  
 amoxicillin-clavulanate (AUGMENTIN) 875-125 mg per tablet 0 Sig: Take 1 Tab by mouth every twelve (12) hours for 10 days. Class: Normal  
 Pharmacy[facility] [address], [address] [place] [address] #: [phone] Route: Oral  
  
Follow-up Instructions Return if symptoms worsen or fail to improve. Patient Instructions Middle Ear Fluid: Care Instructions Your Care Instructions Fluid often builds up inside the ear during a cold or allergies. Usually the fluid drains away, but sometimes a small tube in the ear, called the eustachian tube, stays blocked for months. Symptoms of fluid buildup may include: · Popping, ringing, or a feeling of fullness or pressure in the ear. · Trouble hearing. · Balance problems and dizziness. In most cases, you can treat yourself at home. Follow-up care is a key part of your treatment and safety.  Be sure to make and go to all appointments, and call your doctor if you are having problems. It's also a good idea to know your test results and keep a list of the medicines you take. How can you care for yourself at home? · In most cases, the fluid clears up within a few months without treatment. You may need more tests if the fluid does not clear up after 3 months. · If your doctor prescribed antibiotics, take them as directed. Do not stop taking them just because you feel better. You need to take the full course of antibiotics. When should you call for help? Call your doctor now or seek immediate medical care if: 
  · You have symptoms of infection, such as: 
¨ Increased pain, swelling, warmth, or redness. ¨ Pus draining from the area. ¨ A fever.  
 Watch closely for changes in your health, and be sure to contact your doctor if: 
  · You notice changes in hearing.  
  · You do not get better as expected. Where can you learn more? Go to http://hodan-eugene.info/. Enter S414 in the search box to learn more about \"Middle Ear Fluid: Care Instructions. \" Current as of: March 28, 2018 Content Version: 11.8 © 2643-9114 [employer]. Care instructions adapted under license by [employer] (which disclaims liability or warranty for this information). If you have questions about a medical condition or this instruction, always ask your healthcare professional. [address] any warranty or liability for your use of this information. Introducing hospitals & HEALTH SERVICES! [facility] introduces [email or web address] patient portal. Now you can access parts of your medical record, email your doctor's office, and request medication refills online. 1. In your internet browser, go to [email or web address] 2. Click on the First Time User? Click Here link in the Sign In box. You will see the New Member Sign Up page. 3. Enter your [email or web address] Access Code exactly as it appears below.  You will not need to use this code after youve completed the sign-up process. If you do not sign up before the expiration date, you must request a new code. · [facility] Access Code: [email or web address] Expires: 11/29/2018 11:42 AM 
 
4. Enter the last four digits of your Social Security Number (xxxx) and Date of Birth (mm/dd/yyyy) as indicated and click Submit. You will be taken to the next sign-up page. 5. Create a [facility] ID. This will be your [facility] login ID and cannot be changed, so think of one that is secure and easy to remember. 6. Create a [facility] password. You can change your password at any time. 7. Enter your Password Reset Question and Answer. This can be used at a later time if you forget your password. 8. Enter your e-mail address. You will receive e-mail notification when new information is available in [address]. 9. Click Sign Up. You can now view and download portions of your medical record. 10. Click the Download Summary menu link to download a portable copy of your medical information. If you have questions, please visit the Frequently Asked Questions section of the [facility] website. Remember, [facility] is NOT to be used for urgent needs. For medical emergencies, dial 911. Now available from your iPhone and Android! Please provide this summary of care documentation to your next provider. Your primary care clinician is listed as [address]. If you have any questions after today's visit, please call [phone].

## 2018-10-17 ENCOUNTER — OFFICE VISIT (OUTPATIENT)
Dept: FAMILY MEDICINE CLINIC | Age: 38
End: 2018-10-17

## 2018-10-17 VITALS
TEMPERATURE: 98.4 F | OXYGEN SATURATION: 98 % | HEART RATE: 98 BPM | BODY MASS INDEX: 35.49 KG/M2 | WEIGHT: 213 LBS | DIASTOLIC BLOOD PRESSURE: 68 MMHG | RESPIRATION RATE: 18 BRPM | SYSTOLIC BLOOD PRESSURE: 98 MMHG | HEIGHT: 65 IN

## 2018-10-17 DIAGNOSIS — E11.9 TYPE 2 DIABETES MELLITUS WITHOUT COMPLICATION, WITHOUT LONG-TERM CURRENT USE OF INSULIN (HCC): Primary | ICD-10-CM

## 2018-10-17 PROBLEM — E66.01 SEVERE OBESITY (HCC): Status: ACTIVE | Noted: 2018-10-17

## 2018-10-17 NOTE — PROGRESS NOTES
Subjective:     Aranza Guadalupe is a 42 y.o. female who presents for follow up of diabetes. Diabetes mellitus:   - Home BG monitoring: intermittently, fasting values hav ranged in the 150s-200s, reports that her levels are better throughout the day   - Hypoglycemia: none reported   - Has previously been on Invokana and would like to go back to therapy. She felt as though her A1c was better controlled with this therapy. She has been working on her diet and increasing her level of physical activity. Cardiovascular ROS: taking medications as instructed, no medication side effects noted, no TIA's, no chest pain on exertion, no dyspnea on exertion, no swelling of ankles. Current Outpatient Medications   Medication Sig Dispense Refill    amoxicillin-clavulanate (AUGMENTIN) 875-125 mg per tablet Take 1 Tab by mouth every twelve (12) hours for 10 days. 20 Tab 0    ASCENSIA CONTOUR strip USE AS DIRECTED 50 Strip 5    omeprazole (PRILOSEC) 20 mg capsule TAKE ONE CAPSULE BY MOUTH ONCE DAILY 90 Cap 1    fenofibrate (LOFIBRA) 160 mg tablet TAKE 1 TAB BY MOUTH DAILY. 30 Tab 5    simvastatin (ZOCOR) 20 mg tablet TAKE 1 TABLET BY MOUTH EVERY DAY 90 Tab 1    metFORMIN (GLUCOPHAGE) 1,000 mg tablet TAKE 1 TABLET BY MOUTH TWICE A  Tab 4    JARDIANCE 25 mg tablet TAKE 1 TABLET BY MOUTH DAILY 30 Tab 5    aspirin delayed-release 81 mg tablet TAKE 1 TABLET BY MOUTH EVERY DAY 30 Tab 5    JANUVIA 100 mg tablet TAKE 1 TABLET BY MOUTH EVERY DAY 90 Tab 0    albuterol (PROVENTIL HFA, VENTOLIN HFA, PROAIR HFA) 90 mcg/actuation inhaler Take 2 Puffs by inhalation every four (4) hours as needed for Wheezing. Dispense proair. 1 Inhaler 2    fluticasone (FLONASE) 50 mcg/actuation nasal spray 2 Sprays by Both Nostrils route daily. 1 Bottle 2    SUMAtriptan (IMITREX) 50 mg tablet TAKE 1 TAB AT ONSET OF MIGRAINE HEADACHE, REPEAT IN 1 HOUR IF NEEDED. NO MORE THAN 2 TABS PER 24 HOUR 9 Tab 3    ergocalciferol (ERGOCALCIFEROL) 50,000 unit capsule TAKE 1 CAP BY MOUTH EVERY SEVEN (7) DAYS. 30 Cap 4    albuterol (PROVENTIL VENTOLIN) 2.5 mg /3 mL (0.083 %) nebulizer solution USE ONE VIAL IN NEBULIZER EVERY 4 HOURS AS NEEDED FOR WHEEZING FOR  UP  TO  30  DOSES 300 Each 2    famotidine (PEPCID) 40 mg tablet TAKE 1 TABLET BY MOUTH EVERY DAY  12    topiramate (TOPAMAX) 50 mg tablet Take 1 tab twice a day for migraine prevention 60 Tab 0    butalbital-acetaminophen-caffeine (FIORICET, ESGIC) -40 mg per tablet Take 1 Tab by mouth every six (6) hours as needed for Pain. Max Daily Amount: 4 Tabs. 21 Tab 0    Lancets (MICROLET LANCET) Misc USE TO CHECK BLOOD SUGAR ONE TO TWO TIMES DAILY 100 Each 0       Allergies   Allergen Reactions    Green Pepper Hives       Past Medical History:   Diagnosis Date    Anxiety     Asthma     Diabetes ([address] 2008    Headache     Headache(784.0)     Hypertension     Rash 7/14/2016    S/P dilatation and curettage     Tachycardia        Social History     Tobacco Use    Smoking status: Never Smoker    Smokeless tobacco: Never Used   Substance Use Topics    Alcohol use: No        Lab Results   Component Value Date/Time    Cholesterol, total 176 07/10/2018 08:48 AM    HDL Cholesterol 41 07/10/2018 08:48 AM    LDL, calculated 95 07/10/2018 08:48 AM    Triglyceride 198 (H) 07/10/2018 08:48 AM     Lab Results   Component Value Date/Time    Sodium 142 07/27/2018 10:12 PM    Potassium 3.5 07/27/2018 10:12 PM    Chloride 101 07/27/2018 10:12 PM    CO2 29 07/27/2018 10:12 PM    Anion gap 12 07/27/2018 10:12 PM    Glucose 147 (H) 07/27/2018 10:12 PM    BUN 10 07/27/2018 10:12 PM    Creatinine 0.88 07/27/2018 10:12 PM    BUN/Creatinine ratio 11 (L) 07/27/2018 10:12 PM    GFR est AA >60 07/27/2018 10:12 PM    GFR est non-AA >60 07/27/2018 10:12 PM    Calcium 9.7 07/27/2018 10:12 PM    Bilirubin, total 0.4 07/27/2018 10:12 PM    ALT (SGPT) 50 07/27/2018 10:12 PM    AST (SGOT) 13 (L) 07/27/2018 10:12 PM    Alk. phosphatase 66 07/27/2018 10:12 PM    Protein, total 8.2 07/27/2018 10:12 PM    Albumin 4.0 07/27/2018 10:12 PM    Globulin 4.2 (H) 07/27/2018 10:12 PM    A-G Ratio 1.0 (L) 07/27/2018 10:12 PM      Lab Results   Component Value Date/Time    Hemoglobin A1c 7.6 (H) 07/10/2018 08:48 AM    Hemoglobin A1c (POC) 6.5 04/26/2017 09:31 AM         Review of Systems, additional:  Pertinent items are noted in HPI. Objective:     Visit Vitals  BP 98/68 (BP 1 Location: Right arm, BP Patient Position: Sitting) Comment: Manual   Pulse 98   Temp 98.4 °F (36.9 °C) (Oral) Comment: .   Resp 18   Ht 5' 5\" (1.651 m)   Wt 213 lb (96.6 kg)   LMP 01/25/2012   SpO2 98%   BMI 35.45 kg/m²     General appearance - alert, well appearing, and in no distress  Mental status - alert, oriented to person, place, and time, normal mood, behavior, speech, dress, motor activity, and thought processes  Chest - clear to auscultation, no wheezes, rales or rhonchi, symmetric air entry, no tachypnea, retractions or cyanosis  Heart - normal rate, regular rhythm, normal S1, S2, no murmurs, rubs, clicks or gallops  Neurological - alert, oriented, normal speech, no focal findings or movement disorder noted  Extremities - peripheral pulses normal, no pedal edema, no clubbing or cyanosis    Lab review: orders written for new lab studies as appropriate; see orders. Assessment/Plan:   Akash Soto is a 42 y.o. female seen today for:     1. Type 2 diabetes mellitus without complication, without long-term current use of insulin [facility]): check labs. Discontinue Jardiance and restart Invokana. Continue with other medications as prescribed. Continue with diabetic diet. Follow up in 3 months.   - HEMOGLOBIN A1C WITH EAG  - METABOLIC PANEL, COMPREHENSIVE  - canagliflozin (INVOKANA) 300 mg tablet; Take 1 Tab by mouth Daily (before breakfast). Dispense: 30 Tab;  Refill: 5    I have discussed the diagnosis with the patient and the intended plan as seen in the above orders. The patient has received an after-visit summary and questions were answered concerning future plans. I have discussed medication side effects and warnings with the patient as well. Patient verbalizes understanding of plan of care and denies further questions or concerns at this time. Informed patient to return to the office if symptoms worsen or if new symptoms arise. Follow-up Disposition:  Return in about 3 months (around 1/17/2019) for diabetes follow up  or sooner as needed.

## 2018-10-17 NOTE — PROGRESS NOTES
Identified pt with two pt identifiers(name and ). Chief Complaint   Patient presents with    Diabetes    Labs     fasting for lab work        Health Maintenance Due   Topic    EYE EXAM RETINAL OR DILATED Q1     DTaP/Tdap/Td series (2 - Td)    FOOT EXAM Q1        Wt Readings from Last 3 Encounters:   10/17/18 213 lb (96.6 kg)   10/11/18 201 lb (91.2 kg)   18 208 lb (94.3 kg)     Temp Readings from Last 3 Encounters:   10/17/18 98.4 °F (36.9 °C) (Oral)   10/11/18 98.2 °F (36.8 °C) (Oral)   18 98 °F (36.7 °C) (Oral)     BP Readings from Last 3 Encounters:   10/17/18 98/68   10/11/18 100/70   18 108/78     Pulse Readings from Last 3 Encounters:   10/17/18 98   10/11/18 94   18 78         Learning Assessment:  :     Learning Assessment 2017   PRIMARY LEARNER Patient Patient   HIGHEST LEVEL OF EDUCATION - PRIMARY LEARNER  GRADUATED HIGH SCHOOL OR GED GRADUATED HIGH SCHOOL OR GED   BARRIERS PRIMARY LEARNER NONE NONE   PRIMARY LANGUAGE ENGLISH ENGLISH   LEARNER PREFERENCE PRIMARY READING READING   ANSWERED BY patient patient   RELATIONSHIP SELF SELF       Depression Screening:  :     PHQ over the last two weeks 2018   Little interest or pleasure in doing things Not at all   Feeling down, depressed, irritable, or hopeless Not at all   Total Score PHQ 2 0       Fall Risk Assessment:  :     Fall Risk Assessment, last 12 mths 2017   Able to walk? Yes   Fall in past 12 months? No       Abuse Screening:  :     Abuse Screening Questionnaire 2018   Do you ever feel afraid of your partner? N N N   Are you in a relationship with someone who physically or mentally threatens you? N N N   Is it safe for you to go home?  Y Y Y       Coordination of Care Questionnaire:  :     1) Have you been to an emergency room, urgent care clinic since your last visit? no   Hospitalized since your last visit? no             2) Have you seen or consulted any other health care providers outside of [facility] since your last visit? no  (Include any pap smears or colon screenings in this section.)    3) Do you have an Advance Directive on file? no  Are you interested in receiving information about Advance Directives? no    Reviewed record in preparation for visit and have obtained necessary documentation. Medication reconciliation up to date and corrected with patient at this time.

## 2018-10-17 NOTE — LETTER
10/19/2018 9:14 AM 
 
Ms. Armando Heredia [address] [phone] Dear Armando Heredia: 
 
Please find your most recent results below. - Electrolytes, kidney function and liver function are normal.  
- Diabetes with okay control with A1c of 7.3% which has improved. Continue with current medications and follow up in 3 months for diabetes follow up and labs. Resulted Orders METABOLIC PANEL, COMPREHENSIVE Result Value Ref Range Glucose 142 (H) 65 - 99 mg/dL BUN 8 6 - 20 mg/dL Creatinine 0.85 0.57 - 1.00 mg/dL GFR est non-AA 87 >59 mL/min/1.73 GFR est  >59 mL/min/1.73  
 BUN/Creatinine ratio 9 9 - 23 Sodium 141 134 - 144 mmol/L Potassium 4.5 3.5 - 5.2 mmol/L Chloride 103 96 - 106 mmol/L  
 CO2 19 (L) 20 - 29 mmol/L Calcium 9.5 8.7 - 10.2 mg/dL Protein, total 7.7 6.0 - 8.5 g/dL Albumin 4.7 3.5 - 5.5 g/dL GLOBULIN, TOTAL 3.0 1.5 - 4.5 g/dL A-G Ratio 1.6 1.2 - 2.2 Bilirubin, total 0.3 0.0 - 1.2 mg/dL Alk. phosphatase 51 39 - 117 IU/L  
 AST (SGOT) 26 0 - 40 IU/L  
 ALT (SGPT) 29 0 - 32 IU/L Narrative Performed at:  [address]  [id number] : Hayde Bunch MD, Phone:  [phone] HEMOGLOBIN A1C W/O EAG Result Value Ref Range Hemoglobin A1c 7.3 (H) 4.8 - 5.6 % Comment:  
            Prediabetes: 5.7 - 6.4 Diabetes: >6.4 Glycemic control for adults with diabetes: <7.0 Narrative Performed at:  [address]  [id number] : Hayde Bunch MD, Phone:  [phone] Please call me if you have any questions: [phone] Sincerely, Brian Johnson MD

## 2018-10-18 LAB
ALBUMIN SERPL-MCNC: 4.7 G/DL (ref 3.5–5.5)
ALBUMIN/GLOB SERPL: 1.6 {RATIO} (ref 1.2–2.2)
ALP SERPL-CCNC: 51 IU/L (ref 39–117)
ALT SERPL-CCNC: 29 IU/L (ref 0–32)
AST SERPL-CCNC: 26 IU/L (ref 0–40)
BILIRUB SERPL-MCNC: 0.3 MG/DL (ref 0–1.2)
BUN SERPL-MCNC: 8 MG/DL (ref 6–20)
BUN/CREAT SERPL: 9 (ref 9–23)
CALCIUM SERPL-MCNC: 9.5 MG/DL (ref 8.7–10.2)
CHLORIDE SERPL-SCNC: 103 MMOL/L (ref 96–106)
CO2 SERPL-SCNC: 19 MMOL/L (ref 20–29)
CREAT SERPL-MCNC: 0.85 MG/DL (ref 0.57–1)
GLOBULIN SER CALC-MCNC: 3 G/DL (ref 1.5–4.5)
GLUCOSE SERPL-MCNC: 142 MG/DL (ref 65–99)
HBA1C MFR BLD: 7.3 % (ref 4.8–5.6)
POTASSIUM SERPL-SCNC: 4.5 MMOL/L (ref 3.5–5.2)
PROT SERPL-MCNC: 7.7 G/DL (ref 6–8.5)
SODIUM SERPL-SCNC: 141 MMOL/L (ref 134–144)

## 2018-10-19 ENCOUNTER — TELEPHONE (OUTPATIENT)
Dept: FAMILY MEDICINE CLINIC | Age: 38
End: 2018-10-19

## 2018-10-19 NOTE — PROGRESS NOTES
Inform patient of the following:   - Electrolytes, kidney function and liver function are normal.   - Diabetes with okay control with A1c of 7.3% which has improved. Continue with current medications and follow up in 3 months for diabetes follow up and labs. Letter sent as well.

## 2018-10-19 NOTE — TELEPHONE ENCOUNTER
----- Message from Steven Rico MD sent at 10/19/2018  9:14 AM EDT -----  Inform patient of the following:   - Electrolytes, kidney function and liver function are normal.   - Diabetes with okay control with A1c of 7.3% which has improved. Continue with current medications and follow up in 3 months for diabetes follow up and labs. Letter sent as well.

## 2018-11-12 ENCOUNTER — OFFICE VISIT (OUTPATIENT)
Dept: FAMILY MEDICINE CLINIC | Age: 38
End: 2018-11-12

## 2018-11-12 VITALS
BODY MASS INDEX: 35.49 KG/M2 | HEIGHT: 65 IN | OXYGEN SATURATION: 99 % | DIASTOLIC BLOOD PRESSURE: 89 MMHG | HEART RATE: 113 BPM | TEMPERATURE: 98.7 F | SYSTOLIC BLOOD PRESSURE: 118 MMHG | RESPIRATION RATE: 16 BRPM | WEIGHT: 213 LBS

## 2018-11-12 DIAGNOSIS — R30.0 DYSURIA: Primary | ICD-10-CM

## 2018-11-12 DIAGNOSIS — R35.0 URINARY FREQUENCY: ICD-10-CM

## 2018-11-12 DIAGNOSIS — H92.01 RIGHT EAR PAIN: ICD-10-CM

## 2018-11-12 DIAGNOSIS — R30.9 URINARY PAIN: ICD-10-CM

## 2018-11-12 LAB
BILIRUB UR QL STRIP: NEGATIVE
GLUCOSE UR-MCNC: ABNORMAL MG/DL
KETONES P FAST UR STRIP-MCNC: NEGATIVE MG/DL
PH UR STRIP: 5 [PH] (ref 4.6–8)
PROT UR QL STRIP: NEGATIVE
SP GR UR STRIP: 1.01 (ref 1–1.03)
UA UROBILINOGEN AMB POC: ABNORMAL (ref 0.2–1)
URINALYSIS CLARITY POC: CLEAR
URINALYSIS COLOR POC: YELLOW
URINE BLOOD POC: NEGATIVE
URINE LEUKOCYTES POC: NEGATIVE
URINE NITRITES POC: NEGATIVE

## 2018-11-12 RX ORDER — AMOXICILLIN AND CLAVULANATE POTASSIUM 875; 125 MG/1; MG/1
1 TABLET, FILM COATED ORAL 2 TIMES DAILY
Qty: 20 TAB | Refills: 0 | Status: SHIPPED | OUTPATIENT
Start: 2018-11-12 | End: 2018-11-22

## 2018-11-12 RX ORDER — MONTELUKAST SODIUM 10 MG/1
10 TABLET ORAL
COMMUNITY
Start: 2018-03-24 | End: 2018-11-30

## 2018-11-12 NOTE — PROGRESS NOTES
HISTORY OF PRESENT ILLNESS  Kalen Mitchell is a 42 y.o. female. HPI  Pt presents with \"ear pain and urinary pin\"    Symptoms started with increased urinary frequency about 3 days ago  Today, she has had burning with urination  No blood noted in urine  No fever    In addition, she has had right ear pain for a couple of days  Nasal congestion is noted as well  No fever  OTC: none  Review of Systems   Constitutional: Negative for fever. HENT: Positive for congestion and ear pain. Respiratory: Negative for cough. Genitourinary: Positive for dysuria and frequency. Negative for hematuria. Physical Exam   Constitutional: She is oriented to person, place, and time. She appears well-developed and well-nourished. HENT:   Head: Normocephalic and atraumatic. Right Ear: Hearing, external ear and ear canal normal. Tympanic membrane is erythematous. Left Ear: Hearing, tympanic membrane, external ear and ear canal normal.   Nose: Nose normal.   Mouth/Throat: Oropharynx is clear and moist.   Neck: Normal range of motion. Neck supple. Cardiovascular: Normal rate, regular rhythm and normal heart sounds. Pulmonary/Chest: Effort normal and breath sounds normal.   Abdominal: There is no CVA tenderness. Lymphadenopathy:     She has no cervical adenopathy. Neurological: She is alert and oriented to person, place, and time. Skin: Skin is warm and dry. Psychiatric: She has a normal mood and affect. Her behavior is normal.       ASSESSMENT and PLAN    ICD-10-CM ICD-9-CM    1. Dysuria R30.0 788. 1 CULTURE, URINE      AMB POC URINALYSIS DIP STICK MANUAL W/O MICRO      amoxicillin-clavulanate (AUGMENTIN) 875-125 mg per tablet   2. Urinary pain R30.9 788.1 AMB POC URINALYSIS DIP STICK MANUAL W/O MICRO   3. Urinary frequency R35.0 788.41 AMB POC URINALYSIS DIP STICK MANUAL W/O MICRO   4.  Right ear pain H92.01 388.70 amoxicillin-clavulanate (AUGMENTIN) 875-125 mg per tablet     Informed patient that we will notify her when labs return, and inform her of any change in plan of care at that time. Educated about staying well hydrated, and treating fever as needed. Pt informed to return to office with worsening of symptoms, or PRN with any questions or concerns. Pt verbalizes understanding of plan of care and denies further questions or concerns at this time.

## 2018-11-12 NOTE — PATIENT INSTRUCTIONS
Painful Urination (Dysuria): Care Instructions  Your Care Instructions  Burning pain with urination (dysuria) is a common symptom of a urinary tract infection or other urinary problems. The bladder may become inflamed. This can cause pain when the bladder fills and empties. You may also feel pain if the tube that carries urine from the bladder to the outside of the body (urethra) gets irritated or infected. Sexually transmitted infections (STIs) also may cause pain when you urinate. Sometimes the pain can be caused by things other than an infection. The urethra can be irritated by soaps, perfumes, or foreign objects in the urethra. Kidney stones can cause pain when they pass through the urethra. The cause may be hard to find. You may need tests. Treatment for painful urination depends on the cause. Follow-up care is a key part of your treatment and safety. Be sure to make and go to all appointments, and call your doctor if you are having problems. It's also a good idea to know your test results and keep a list of the medicines you take. How can you care for yourself at home? · Drink extra water for the next day or two. This will help make the urine less concentrated. (If you have kidney, heart, or liver disease and have to limit fluids, talk with your doctor before you increase the amount of fluids you drink.)  · Avoid drinks that are carbonated or have caffeine. They can irritate the bladder. · Urinate often. Try to empty your bladder each time. For women:  · Urinate right after you have sex. · After going to the bathroom, wipe from front to back. · Avoid douches, bubble baths, and feminine hygiene sprays. And avoid other feminine hygiene products that have deodorants. When should you call for help? Call your doctor now or seek immediate medical care if:    · You have new symptoms, such as fever, nausea, or vomiting.     · You have new or worse symptoms of a urinary problem. For example:  ?  You have blood or pus in your urine. ? You have chills or body aches. ? It hurts worse to urinate. ? You have groin or belly pain. ? You have pain in your back just below your rib cage (the flank area).    Watch closely for changes in your health, and be sure to contact your doctor if you have any problems. Where can you learn more? Go to http://hodan-eugene.info/. Enter Q244 in the search box to learn more about \"Painful Urination (Dysuria): Care Instructions. \"  Current as of: March 21, 2018  Content Version: 11.8  © 8190-0334 [employer]. Care instructions adapted under license by [employer] (which disclaims liability or warranty for this information). If you have questions about a medical condition or this instruction, always ask your healthcare professional. [address] any warranty or liability for your use of this information.

## 2018-11-12 NOTE — PROGRESS NOTES
Identified pt with two pt identifiers(name and ). Chief Complaint   Patient presents with    Ear Pain     bilateral - right more than left    Urinary Frequency    Urinary Pain    Immunization/Injection     declines influenza vaccine        Health Maintenance Due   Topic    EYE EXAM RETINAL OR DILATED Q1     DTaP/Tdap/Td series (2 - Td)    FOOT EXAM Q1        Wt Readings from Last 3 Encounters:   18 213 lb (96.6 kg)   10/17/18 213 lb (96.6 kg)   10/11/18 201 lb (91.2 kg)     Temp Readings from Last 3 Encounters:   18 98.7 °F (37.1 °C) (Oral)   10/17/18 98.4 °F (36.9 °C) (Oral)   10/11/18 98.2 °F (36.8 °C) (Oral)     BP Readings from Last 3 Encounters:   18 118/89   10/17/18 98/68   10/11/18 100/70     Pulse Readings from Last 3 Encounters:   18 (!) 113   10/17/18 98   10/11/18 94         Learning Assessment:  :     Learning Assessment 2017   PRIMARY LEARNER Patient Patient   HIGHEST LEVEL OF EDUCATION - PRIMARY LEARNER  GRADUATED HIGH SCHOOL OR GED GRADUATED HIGH SCHOOL OR GED   BARRIERS PRIMARY LEARNER NONE NONE   PRIMARY LANGUAGE ENGLISH ENGLISH   LEARNER PREFERENCE PRIMARY READING READING   ANSWERED BY patient patient   RELATIONSHIP SELF SELF       Depression Screening:  :     PHQ over the last two weeks 2018   Little interest or pleasure in doing things Not at all   Feeling down, depressed, irritable, or hopeless Not at all   Total Score PHQ 2 0       Fall Risk Assessment:  :     Fall Risk Assessment, last 12 mths 2017   Able to walk? Yes   Fall in past 12 months? No       Abuse Screening:  :     Abuse Screening Questionnaire 2018   Do you ever feel afraid of your partner? N N N   Are you in a relationship with someone who physically or mentally threatens you? N N N   Is it safe for you to go home? [facility]       Coordination of Care Questionnaire:  :     1) Have you been to an emergency room, urgent care clinic since your last visit? no   Hospitalized since your last visit? no             2) Have you seen or consulted any other health care providers outside of [address] [place] [address] since your last visit? no  (Include any pap smears or colon screenings in this section.)    3) Do you have an Advance Directive on file? no  Are you interested in receiving information about Advance Directives? no    Patient is accompanied by self. Reviewed record in preparation for visit and have obtained necessary documentation. Medication reconciliation up to date and corrected with patient at this time. Order for POC U/A Testing placed per [facility] Cathy's verbal order.

## 2018-11-14 LAB
BACTERIA UR CULT: NORMAL
BACTERIA UR CULT: NORMAL

## 2018-11-14 NOTE — PROGRESS NOTES
Please call patient and let her know that her urine culture returned and is negative, NO Uti.   Should symptoms persist, should be seen by urology  Thanks

## 2018-11-14 NOTE — PROGRESS NOTES
Attempted to advise pt by phone, however, she did not answer and does not have voicemail available. Letter mailed to her home to advise.

## 2018-11-26 DIAGNOSIS — G47.00 INSOMNIA, UNSPECIFIED TYPE: Primary | ICD-10-CM

## 2018-11-26 DIAGNOSIS — G43.709 CHRONIC MIGRAINE WITHOUT AURA WITHOUT STATUS MIGRAINOSUS, NOT INTRACTABLE: ICD-10-CM

## 2018-11-26 RX ORDER — SUMATRIPTAN 50 MG/1
TABLET, FILM COATED ORAL
Qty: 9 TAB | Refills: 3 | Status: SHIPPED | OUTPATIENT
Start: 2018-11-26 | End: 2019-06-23 | Stop reason: SDUPTHER

## 2018-11-27 ENCOUNTER — OFFICE VISIT (OUTPATIENT)
Dept: FAMILY MEDICINE CLINIC | Age: 38
End: 2018-11-27

## 2018-11-27 ENCOUNTER — TELEPHONE (OUTPATIENT)
Dept: FAMILY MEDICINE CLINIC | Age: 38
End: 2018-11-27

## 2018-11-27 VITALS
OXYGEN SATURATION: 100 % | HEART RATE: 89 BPM | DIASTOLIC BLOOD PRESSURE: 82 MMHG | RESPIRATION RATE: 18 BRPM | BODY MASS INDEX: 33.49 KG/M2 | HEIGHT: 65 IN | TEMPERATURE: 98.8 F | WEIGHT: 201 LBS | SYSTOLIC BLOOD PRESSURE: 124 MMHG

## 2018-11-27 DIAGNOSIS — K43.9 ABDOMINAL WALL HERNIA: Primary | ICD-10-CM

## 2018-11-27 NOTE — TELEPHONE ENCOUNTER
The pt said she called general surgery and she cant get in until next week. She was told to call back and let us know this per kalin.

## 2018-11-27 NOTE — TELEPHONE ENCOUNTER
Pt was advised and verbalized understanding. She reports she was advised to callback if she could not get an appt right away and you would call something in for the pain.

## 2018-11-27 NOTE — TELEPHONE ENCOUNTER
The other option would be for her to see if she could get in at [facility] sooner, their number is [phone]  Thanks

## 2018-11-27 NOTE — PATIENT INSTRUCTIONS
Hernia: Care Instructions  Your Care Instructions    A hernia develops when tissue bulges through a weak spot in the wall of your belly. The groin area and the navel are common areas for a hernia. A hernia can also develop near the area of a surgery you had before. Pressure from lifting, straining, or coughing can tear the weak area, causing the hernia to bulge and be painful. If you cannot push a hernia back into place, the tissue may become trapped outside the belly wall. If the hernia gets twisted and loses its blood supply, it will swell and die. This is called a strangulated hernia. It usually causes a lot of pain. It needs treatment right away. Some hernias need to be repaired to prevent a strangulated hernia. If your hernia causes symptoms or is large, you may need surgery. Follow-up care is a key part of your treatment and safety. Be sure to make and go to all appointments, and call your doctor if you are having problems. It's also a good idea to know your test results and keep a list of the medicines you take. How can you care for yourself at home? · Take care when lifting heavy objects. · Stay at a healthy weight. · Do not smoke. Smoking can cause coughing, which can cause your hernia to bulge. If you need help quitting, talk to your doctor about stop-smoking programs and medicines. These can increase your chances of quitting for good. · Talk with your doctor before wearing a corset or truss for a hernia. These devices are not recommended for treating hernias and sometimes can do more harm than good. There may be certain situations when your doctor thinks a truss would work, but these are rare. When should you call for help?   Call your doctor now or seek immediate medical care if:    · You have new or worse belly pain.     · You are vomiting.     · You cannot pass stools or gas.     · You cannot push the hernia back into place with gentle pressure when you are lying down.     · The area over the hernia turns red or becomes tender.    Watch closely for changes in your health, and be sure to contact your doctor if you have any problems. Where can you learn more? Go to http://hodan-eugene.info/. Enter C129 in the search box to learn more about \"Hernia: Care Instructions. \"  Current as of: March 28, 2018  Content Version: 11.8  © 6954-4314 [employer]. Care instructions adapted under license by [employer] (which disclaims liability or warranty for this information). If you have questions about a medical condition or this instruction, always ask your healthcare professional. [address] any warranty or liability for your use of this information.

## 2018-11-27 NOTE — PROGRESS NOTES
HISTORY OF PRESENT ILLNESS  Yvan Wiggins is a 34 y.o. female. HPI   Pt presents with \"abdominal pain\"  Pt states that in 2011, she had an abdominal hernia repair. Pt states that about 6 months ago, she noted some pain in the area, but it resolved. Pt states that 3 days ago, she took a large turkey out of the oven, and she automatically had pain in her abdomen. Pt states that pain is right above her belly button, where the hernia was. She can feel a bulge in the area. She has increased pain with lifting, or bearing down. No nausea, no vomiting, no diarrhea  No fever  No urinary symptoms or pain  Review of Systems   Constitutional: Negative for fever. HENT: Negative for congestion. Respiratory: Negative for cough. Gastrointestinal: Positive for abdominal pain. Negative for constipation, diarrhea, nausea and vomiting. Genitourinary: Negative for dysuria, frequency and urgency. Physical Exam   Constitutional: She is oriented to person, place, and time. She appears well-developed and well-nourished. HENT:   Head: Normocephalic and atraumatic. Cardiovascular: Normal rate, regular rhythm and normal heart sounds. Pulmonary/Chest: Effort normal and breath sounds normal.   Abdominal: Soft. Bowel sounds are normal. She exhibits no distension and no mass. There is tenderness in the periumbilical area. There is no rebound and no guarding. Neurological: She is alert and oriented to person, place, and time. Skin: Skin is warm and dry. Psychiatric: She has a normal mood and affect. Her behavior is normal.       ASSESSMENT and PLAN    ICD-10-CM ICD-9-CM    1. Abdominal wall hernia K43.9 553.20 REFERRAL TO GENERAL SURGERY     Informed patient to call gen surgery, and make an appointment asap. Pt informed to return to office with worsening of symptoms, or PRN with any questions or concerns.   Pt verbalizes understanding of plan of care and denies further questions or concerns at this time.

## 2018-11-28 ENCOUNTER — OFFICE VISIT (OUTPATIENT)
Dept: SURGERY | Age: 38
End: 2018-11-28

## 2018-11-28 VITALS
SYSTOLIC BLOOD PRESSURE: 130 MMHG | RESPIRATION RATE: 18 BRPM | TEMPERATURE: 99 F | HEART RATE: 108 BPM | OXYGEN SATURATION: 98 % | WEIGHT: 205 LBS | DIASTOLIC BLOOD PRESSURE: 80 MMHG | BODY MASS INDEX: 34.16 KG/M2 | HEIGHT: 65 IN

## 2018-11-28 DIAGNOSIS — K42.9 RECURRENT UMBILICAL HERNIA: Primary | ICD-10-CM

## 2018-11-28 RX ORDER — ZOLPIDEM TARTRATE 5 MG/1
TABLET ORAL
Qty: 30 TAB | Refills: 0 | OUTPATIENT
Start: 2018-11-28 | End: 2018-11-30 | Stop reason: DRUGHIGH

## 2018-11-28 NOTE — PROGRESS NOTES
Surgery History and Physical    Subjective:      Estella Xiong  is a 34 y.o.  female who presents for evaluation of recurrent abdominal hernia. Pt believes that she has another abdominal hernia and was referred by her PCP Dr. Aj Munoz. She notes that she previously had a hernia that was repaired using mesh in 2010. Right before this Thanksgiving, she felt a \"tear\" in her abdomen while picking up her turkey. She states that she has a small lump in the umbilical region. Pt also states that she has had several surgeries through her umbilicus including gall bladder removal, hysterectomy, appendectomy, and tubal ligation. She states that the area is painful and has continued to get more painful over time. Past Medical History:   Diagnosis Date    Anxiety     Asthma     Diabetes ([address] 2008    Headache     Headache(784.0)     Hypertension     Rash 7/14/2016    Recurrent umbilical hernia 27/40/0712    S/P dilatation and curettage     Tachycardia        Past Surgical History:   Procedure Laterality Date    HX APPENDECTOMY  2/2008    HX CHOLECYSTECTOMY  2001    HX DILATION AND CURETTAGE      HX GYN  3/2008    tubal ligation    HX HERNIA REPAIR  2/2009    HX HYSTERECTOMY  03/2012    HX LUMBAR DISKECTOMY      2006    HX ORTHOPAEDIC  9/2006    ruptured discs    UPPER GI ENDOSCOPY,BIOPSY  5/11/2017            Social History     Tobacco Use    Smoking status: Never Smoker    Smokeless tobacco: Never Used   Substance Use Topics    Alcohol use: No       Family History   Problem Relation Age of Onset    Heart Disease Mother     Hypertension Father     Cancer Father         Lung    Hypertension Sister        Current Outpatient Medications on File Prior to Visit   Medication Sig Dispense Refill    SUMAtriptan (IMITREX) 50 mg tablet TAKE 1 TAB AT ONSET OF MIGRAINE HEADACHE, REPEAT IN 1 HOUR IF NEEDED. NO MORE THAN 2 TABS PER 24 HOUR 9 Tab 3    canagliflozin (INVOKANA) 300 mg tablet Take 1 Tab by mouth Daily (before breakfast). 30 Tab 5    [employer] CONTOUR strip USE AS DIRECTED 50 Strip 5    omeprazole (PRILOSEC) 20 mg capsule TAKE ONE CAPSULE BY MOUTH ONCE DAILY 90 Cap 1    simvastatin (ZOCOR) 20 mg tablet TAKE 1 TABLET BY MOUTH EVERY DAY 90 Tab 1    metFORMIN (GLUCOPHAGE) 1,000 mg tablet TAKE 1 TABLET BY MOUTH TWICE A  Tab 4    aspirin delayed-release 81 mg tablet TAKE 1 TABLET BY MOUTH EVERY DAY 30 Tab 5    ergocalciferol (ERGOCALCIFEROL) 50,000 unit capsule TAKE 1 CAP BY MOUTH EVERY SEVEN (7) DAYS. 30 Cap 4    famotidine (PEPCID) 40 mg tablet TAKE 1 TABLET BY MOUTH EVERY DAY  12    Lancets (MICROLET LANCET) Misc USE TO CHECK BLOOD SUGAR ONE TO TWO TIMES DAILY 100 Each 0    montelukast (SINGULAIR) 10 mg tablet Take 10 mg by mouth once over twenty-four (24) hours.  JANUVIA 100 mg tablet TAKE 1 TABLET BY MOUTH EVERY DAY 90 Tab 1    fenofibrate (LOFIBRA) 160 mg tablet TAKE 1 TAB BY MOUTH DAILY. 30 Tab 5    albuterol (PROVENTIL HFA, VENTOLIN HFA, PROAIR HFA) 90 mcg/actuation inhaler Take 2 Puffs by inhalation every four (4) hours as needed for Wheezing. Dispense proair. 1 Inhaler 2    fluticasone (FLONASE) 50 mcg/actuation nasal spray 2 Sprays by Both Nostrils route daily. 1 Bottle 2    albuterol (PROVENTIL VENTOLIN) 2.5 mg /3 mL (0.083 %) nebulizer solution USE ONE VIAL IN NEBULIZER EVERY 4 HOURS AS NEEDED FOR WHEEZING FOR  UP  TO  30  DOSES 300 Each 2    topiramate (TOPAMAX) 50 mg tablet Take 1 tab twice a day for migraine prevention 60 Tab 0    butalbital-acetaminophen-caffeine (FIORICET, ESGIC) -40 mg per tablet Take 1 Tab by mouth every six (6) hours as needed for Pain. Max Daily Amount: 4 Tabs. 20 Tab 0     No current facility-administered medications on file prior to visit. Allergies   Allergen Reactions    Green Pepper Hives         Review of Systems:    Pertinent items are noted in the History of Present Illness.     Objective:     Visit Vitals  /80 (BP 1 Location: Right arm, BP Patient Position: Sitting)   Pulse (!) 108   Temp 99 °F (37.2 °C) (Oral)   Resp 18   Ht 5' 5\" (1.651 m)   Wt 205 lb (93 kg)   SpO2 98%   BMI 34.11 kg/m²        Physical Exam:  GENERAL: alert, cooperative, no distress, appears stated age   LUNG: clear to auscultation bilaterally  HEART: regular rate and rhythm, S1, S2 normal, no murmur, click, rub or gallop  ABDOMEN: She has a 1cm recurrent umbilical hernia that is quite tender to palpation    Labs: No results found for this or any previous visit (from the past 24 hour(s)). Assessment and Plan:       ICD-10-CM ICD-9-CM    1. Recurrent umbilical hernia M30.8 709.4        I suggest operative repair of her umbilical hernia. The patient agrees and will schedule this accordingly. This document was scribed by Gio Wayne as dictated by Dr. Abigail Crawford.      Signed By: Tamela Mitchell MD     11/28/18

## 2018-11-28 NOTE — PROGRESS NOTES
1. Have you been to the ER, urgent care clinic since your last visit? Hospitalized since your last visit? No    2. Have you seen or consulted any other health care providers outside of the [address] [place] [address] since your last visit? Include any pap smears or colon screening.  No

## 2018-11-28 NOTE — H&P (VIEW-ONLY)
Surgery History and Physical 
 
Subjective:  
  
Matthew Perez  is a 34 y.o.  female who presents for evaluation of recurrent abdominal hernia. Pt believes that she has another abdominal hernia and was referred by her PCP Dr. Kathi Valerio. She notes that she previously had a hernia that was repaired using mesh in 2010. Right before this Thanksgiving, she felt a \"tear\" in her abdomen while picking up her turkey. She states that she has a small lump in the umbilical region. Pt also states that she has had several surgeries through her umbilicus including gall bladder removal, hysterectomy, appendectomy, and tubal ligation. She states that the area is painful and has continued to get more painful over time. Past Medical History:  
Diagnosis Date  Anxiety  Asthma  Diabetes ([address] 2008  Headache   
 Headache(784.0)  Hypertension  Rash 7/14/2016  Recurrent umbilical hernia 20/97/6233  S/P dilatation and curettage  Tachycardia Past Surgical History:  
Procedure Laterality Date  HX APPENDECTOMY  2/2008  HX CHOLECYSTECTOMY  2001  HX DILATION AND CURETTAGE    
 HX GYN  3/2008  
 tubal ligation  HX HERNIA REPAIR  2/2009  HX HYSTERECTOMY  03/2012  HX LUMBAR DISKECTOMY    
 2006  HX ORTHOPAEDIC  9/2006  
 ruptured discs  UPPER GI ENDOSCOPY,BIOPSY  5/11/2017 Social History Tobacco Use  Smoking status: Never Smoker  Smokeless tobacco: Never Used Substance Use Topics  Alcohol use: No  
 
 
Family History Problem Relation Age of Onset  Heart Disease Mother  Hypertension Father  Cancer Father Lung  Hypertension Sister Current Outpatient Medications on File Prior to Visit Medication Sig Dispense Refill  SUMAtriptan (IMITREX) 50 mg tablet TAKE 1 TAB AT ONSET OF MIGRAINE HEADACHE, REPEAT IN 1 HOUR IF NEEDED. NO MORE THAN 2 TABS PER 24 HOUR 9 Tab 3  
  canagliflozin (INVOKANA) 300 mg tablet Take 1 Tab by mouth Daily (before breakfast). 30 Tab 5  [employer] CONTOUR strip USE AS DIRECTED 50 Strip 5  
 omeprazole (PRILOSEC) 20 mg capsule TAKE ONE CAPSULE BY MOUTH ONCE DAILY 90 Cap 1  simvastatin (ZOCOR) 20 mg tablet TAKE 1 TABLET BY MOUTH EVERY DAY 90 Tab 1  
 metFORMIN (GLUCOPHAGE) 1,000 mg tablet TAKE 1 TABLET BY MOUTH TWICE A  Tab 4  
 aspirin delayed-release 81 mg tablet TAKE 1 TABLET BY MOUTH EVERY DAY 30 Tab 5  
 ergocalciferol (ERGOCALCIFEROL) 50,000 unit capsule TAKE 1 CAP BY MOUTH EVERY SEVEN (7) DAYS. 30 Cap 4  
 famotidine (PEPCID) 40 mg tablet TAKE 1 TABLET BY MOUTH EVERY DAY  12  
 Lancets (MICROLET LANCET) Misc USE TO CHECK BLOOD SUGAR ONE TO TWO TIMES DAILY 100 Each 0  
 montelukast (SINGULAIR) 10 mg tablet Take 10 mg by mouth once over twenty-four (24) hours.  JANUVIA 100 mg tablet TAKE 1 TABLET BY MOUTH EVERY DAY 90 Tab 1  
 fenofibrate (LOFIBRA) 160 mg tablet TAKE 1 TAB BY MOUTH DAILY. 30 Tab 5  
 albuterol (PROVENTIL HFA, VENTOLIN HFA, PROAIR HFA) 90 mcg/actuation inhaler Take 2 Puffs by inhalation every four (4) hours as needed for Wheezing. Dispense proair. 1 Inhaler 2  
 fluticasone (FLONASE) 50 mcg/actuation nasal spray 2 Sprays by Both Nostrils route daily. 1 Bottle 2  
 albuterol (PROVENTIL VENTOLIN) 2.5 mg /3 mL (0.083 %) nebulizer solution USE ONE VIAL IN NEBULIZER EVERY 4 HOURS AS NEEDED FOR WHEEZING FOR  UP  TO  30  DOSES 300 Each 2  
 topiramate (TOPAMAX) 50 mg tablet Take 1 tab twice a day for migraine prevention 60 Tab 0  
 butalbital-acetaminophen-caffeine (FIORICET, ESGIC) -40 mg per tablet Take 1 Tab by mouth every six (6) hours as needed for Pain. Max Daily Amount: 4 Tabs. 20 Tab 0 No current facility-administered medications on file prior to visit. Allergies Allergen Reactions  Green Pepper Hives Review of Systems: Pertinent items are noted in the History of Present Illness. Objective:  
 
Visit Vitals /80 (BP 1 Location: Right arm, BP Patient Position: Sitting) Pulse (!) 108 Temp 99 °F (37.2 °C) (Oral) Resp 18 Ht 5' 5\" (1.651 m) Wt 205 lb (93 kg) SpO2 98% BMI 34.11 kg/m² Physical Exam: 
GENERAL: alert, cooperative, no distress, appears stated age LUNG: clear to auscultation bilaterally HEART: regular rate and rhythm, S1, S2 normal, no murmur, click, rub or gallop ABDOMEN: She has a 1cm recurrent umbilical hernia that is quite tender to palpation Labs: No results found for this or any previous visit (from the past 24 hour(s)). Assessment and Plan: ICD-10-CM ICD-9-CM 1. Recurrent umbilical hernia U30.2 580.2 I suggest operative repair of her umbilical hernia. The patient agrees and will schedule this accordingly. This document was scribed by Thedore Band as dictated by Dr. Marjorie Grijalva. Signed By: Jose Cruz Cantu MD   
 11/28/18

## 2018-11-30 ENCOUNTER — HOSPITAL ENCOUNTER (OUTPATIENT)
Dept: PREADMISSION TESTING | Age: 38
Discharge: HOME | End: 2018-11-30
Payer: MEDICAID

## 2018-11-30 ENCOUNTER — OFFICE VISIT (OUTPATIENT)
Dept: FAMILY MEDICINE CLINIC | Age: 38
End: 2018-11-30

## 2018-11-30 VITALS
TEMPERATURE: 98.9 F | RESPIRATION RATE: 16 BRPM | HEART RATE: 108 BPM | HEIGHT: 65 IN | SYSTOLIC BLOOD PRESSURE: 123 MMHG | DIASTOLIC BLOOD PRESSURE: 85 MMHG | WEIGHT: 205 LBS | BODY MASS INDEX: 34.16 KG/M2 | OXYGEN SATURATION: 100 %

## 2018-11-30 VITALS
HEIGHT: 65 IN | OXYGEN SATURATION: 98 % | WEIGHT: 205 LBS | SYSTOLIC BLOOD PRESSURE: 120 MMHG | BODY MASS INDEX: 34.16 KG/M2 | DIASTOLIC BLOOD PRESSURE: 84 MMHG | TEMPERATURE: 98.1 F | HEART RATE: 96 BPM

## 2018-11-30 DIAGNOSIS — J02.9 SORE THROAT: Primary | ICD-10-CM

## 2018-11-30 DIAGNOSIS — H92.03 EAR PAIN, BILATERAL: ICD-10-CM

## 2018-11-30 DIAGNOSIS — H65.111 ACUTE MUCOID OTITIS MEDIA OF RIGHT EAR: ICD-10-CM

## 2018-11-30 LAB
ANION GAP SERPL CALC-SCNC: 9 MMOL/L (ref 5–15)
BASOPHILS # BLD: 0.1 K/UL (ref 0–0.1)
BASOPHILS NFR BLD: 1 % (ref 0–1)
BUN SERPL-MCNC: 10 MG/DL (ref 6–20)
BUN/CREAT SERPL: 12 (ref 12–20)
CALCIUM SERPL-MCNC: 9.3 MG/DL (ref 8.5–10.1)
CHLORIDE SERPL-SCNC: 105 MMOL/L (ref 97–108)
CO2 SERPL-SCNC: 26 MMOL/L (ref 21–32)
CREAT SERPL-MCNC: 0.83 MG/DL (ref 0.55–1.02)
DIFFERENTIAL METHOD BLD: ABNORMAL
EOSINOPHIL # BLD: 0.1 K/UL (ref 0–0.4)
EOSINOPHIL NFR BLD: 1 % (ref 0–7)
ERYTHROCYTE [DISTWIDTH] IN BLOOD BY AUTOMATED COUNT: 12.7 % (ref 11.5–14.5)
GLUCOSE SERPL-MCNC: 98 MG/DL (ref 65–100)
HCT VFR BLD AUTO: 46.1 % (ref 35–47)
HGB BLD-MCNC: 15.3 G/DL (ref 11.5–16)
IMM GRANULOCYTES # BLD: 0.1 K/UL (ref 0–0.04)
IMM GRANULOCYTES NFR BLD AUTO: 1 % (ref 0–0.5)
LYMPHOCYTES # BLD: 3.1 K/UL (ref 0.8–3.5)
LYMPHOCYTES NFR BLD: 31 % (ref 12–49)
MCH RBC QN AUTO: 29.2 PG (ref 26–34)
MCHC RBC AUTO-ENTMCNC: 33.2 G/DL (ref 30–36.5)
MCV RBC AUTO: 88 FL (ref 80–99)
MONOCYTES # BLD: 0.8 K/UL (ref 0–1)
MONOCYTES NFR BLD: 8 % (ref 5–13)
NEUTS SEG # BLD: 5.9 K/UL (ref 1.8–8)
NEUTS SEG NFR BLD: 59 % (ref 32–75)
NRBC # BLD: 0 K/UL (ref 0–0.01)
NRBC BLD-RTO: 0 PER 100 WBC
PLATELET # BLD AUTO: 263 K/UL (ref 150–400)
PMV BLD AUTO: 11.9 FL (ref 8.9–12.9)
POTASSIUM SERPL-SCNC: 4.3 MMOL/L (ref 3.5–5.1)
RBC # BLD AUTO: 5.24 M/UL (ref 3.8–5.2)
S PYO AG THROAT QL: NEGATIVE
SODIUM SERPL-SCNC: 140 MMOL/L (ref 136–145)
WBC # BLD AUTO: 10 K/UL (ref 3.6–11)

## 2018-11-30 PROCEDURE — 85025 COMPLETE CBC W/AUTO DIFF WBC: CPT

## 2018-11-30 PROCEDURE — 80048 BASIC METABOLIC PNL TOTAL CA: CPT

## 2018-11-30 PROCEDURE — 36415 COLL VENOUS BLD VENIPUNCTURE: CPT

## 2018-11-30 RX ORDER — CEFDINIR 300 MG/1
300 CAPSULE ORAL 2 TIMES DAILY
Qty: 20 CAP | Refills: 0 | Status: SHIPPED | OUTPATIENT
Start: 2018-11-30 | End: 2018-12-10

## 2018-11-30 RX ORDER — ZOLPIDEM TARTRATE 10 MG/1
10 TABLET ORAL
COMMUNITY
Start: 2018-11-28 | End: 2019-03-08

## 2018-11-30 NOTE — PROGRESS NOTES
Identified pt with two pt identifiers(name and ). Chief Complaint   Patient presents with    Sore Throat    Ear Pain     bilateral         Health Maintenance Due   Topic    EYE EXAM RETINAL OR DILATED Q1     DTaP/Tdap/Td series (2 - Td)    FOOT EXAM Q1        Wt Readings from Last 3 Encounters:   18 205 lb (93 kg)   18 205 lb (93 kg)   18 201 lb (91.2 kg)     Temp Readings from Last 3 Encounters:   18 98.9 °F (37.2 °C) (Oral)   18 99 °F (37.2 °C) (Oral)   18 98.8 °F (37.1 °C)     BP Readings from Last 3 Encounters:   18 123/85   18 130/80   18 124/82     Pulse Readings from Last 3 Encounters:   18 (!) 108   18 (!) 108   18 89         Learning Assessment:  :     Learning Assessment 2017   PRIMARY LEARNER Patient Patient   HIGHEST LEVEL OF EDUCATION - PRIMARY LEARNER  GRADUATED HIGH SCHOOL OR GED GRADUATED HIGH SCHOOL OR GED   BARRIERS PRIMARY LEARNER NONE NONE   PRIMARY LANGUAGE ENGLISH ENGLISH   LEARNER PREFERENCE PRIMARY READING READING   ANSWERED BY patient patient   RELATIONSHIP SELF SELF       Depression Screening:  :     PHQ over the last two weeks 2018   Little interest or pleasure in doing things Not at all   Feeling down, depressed, irritable, or hopeless Not at all   Total Score PHQ 2 0       Fall Risk Assessment:  :     Fall Risk Assessment, last 12 mths 2017   Able to walk? Yes   Fall in past 12 months? No       Abuse Screening:  :     Abuse Screening Questionnaire 2018   Do you ever feel afraid of your partner? N N N   Are you in a relationship with someone who physically or mentally threatens you? N N N   Is it safe for you to go home?  Y Y Y       Coordination of Care Questionnaire:  :     1) Have you been to an emergency room, urgent care clinic since your last visit? no   Hospitalized since your last visit? no             2) Have you seen or consulted any other health care providers outside of [address] [place] [address] since your last visit? no     3) Do you have an Advance Directive on file? no  Are you interested in receiving information about Advance Directives? no    Patient is accompanied by self. Reviewed record in preparation for visit and have obtained necessary documentation. Medication reconciliation up to date and corrected with patient at this time. Order for POC Rapid Strep Testing placed per [facility] Cathy's verbal order.

## 2018-11-30 NOTE — PATIENT INSTRUCTIONS

## 2018-11-30 NOTE — PROGRESS NOTES
HISTORY OF PRESENT ILLNESS  Valentín Hand is a 34 y.o. female. HPI   Pt presents with \"sore throat and bilateral ear pain\"  Pt states that she woke up this morning with a very sore throat. Ears are painful, bilaterally. She thinks that she may have a sinus infection. She has headache  Sinuses are painful  No fever  OTC: none    Pt has surgery scheduled for 12/6/18, for hernia repair, and is worried about symptoms worsening before this surgery. Review of Systems   Constitutional: Negative for fever. HENT: Positive for congestion, ear pain, sinus pain and sore throat. Respiratory: Negative for cough. Gastrointestinal: Negative for diarrhea and vomiting. Physical Exam   Constitutional: She is oriented to person, place, and time. She appears well-developed and well-nourished. HENT:   Head: Normocephalic and atraumatic. Right Ear: Hearing, external ear and ear canal normal. Tympanic membrane is erythematous and retracted. Left Ear: Hearing, tympanic membrane, external ear and ear canal normal.   Nose: Mucosal edema and rhinorrhea present. Right sinus exhibits maxillary sinus tenderness. Left sinus exhibits maxillary sinus tenderness. Mouth/Throat: Oropharynx is clear and moist.   Neck: Normal range of motion. Neck supple. Cardiovascular: Normal rate, regular rhythm and normal heart sounds. Pulmonary/Chest: Effort normal and breath sounds normal.   Lymphadenopathy:     She has no cervical adenopathy. Neurological: She is alert and oriented to person, place, and time. Skin: Skin is warm and dry. Psychiatric: She has a normal mood and affect. Her behavior is normal.       ASSESSMENT and PLAN    ICD-10-CM ICD-9-CM    1. Sore throat J02.9 462 AMB POC RAPID STREP TEST   2. Ear pain, bilateral H92.03 388.70 AMB POC RAPID STREP TEST   3.  Acute mucoid otitis media of right ear H65.111 381.02 cefdinir (OMNICEF) 300 mg capsule     Informed patient that I have sent medication to the pharmacy, and she should take as prescribed. Educated about staying well hydrated, and treating fever as needed. Pt informed to return to office with worsening of symptoms, or PRN with any questions or concerns. Pt verbalizes understanding of plan of care and denies further questions or concerns at this time.

## 2018-11-30 NOTE — PERIOP NOTES
PAT instructions reviewed with patient and given the opportunity to ask questions. Patient given CHG wipes and instruction sheet, instructions for use reviewed with patient. Patient given surgical site information FAQs handout and reviewed.

## 2018-12-05 ENCOUNTER — ANESTHESIA EVENT (OUTPATIENT)
Dept: SURGERY | Age: 38
End: 2018-12-05
Payer: MEDICAID

## 2018-12-06 ENCOUNTER — HOSPITAL ENCOUNTER (OUTPATIENT)
Age: 38
Setting detail: OUTPATIENT SURGERY
Discharge: HOME | End: 2018-12-06
Attending: SURGERY | Admitting: SURGERY
Payer: MEDICAID

## 2018-12-06 ENCOUNTER — ANESTHESIA (OUTPATIENT)
Dept: SURGERY | Age: 38
End: 2018-12-06
Payer: MEDICAID

## 2018-12-06 VITALS
WEIGHT: 205 LBS | DIASTOLIC BLOOD PRESSURE: 72 MMHG | BODY MASS INDEX: 34.16 KG/M2 | TEMPERATURE: 98.7 F | HEART RATE: 118 BPM | RESPIRATION RATE: 18 BRPM | HEIGHT: 65 IN | OXYGEN SATURATION: 95 % | SYSTOLIC BLOOD PRESSURE: 113 MMHG

## 2018-12-06 DIAGNOSIS — G89.18 POST-OP PAIN: Primary | ICD-10-CM

## 2018-12-06 LAB
GLUCOSE BLD STRIP.AUTO-MCNC: 113 MG/DL (ref 65–100)
HCG UR QL: NEGATIVE
SERVICE CMNT-IMP: ABNORMAL

## 2018-12-06 PROCEDURE — 77030011640 HC PAD GRND REM COVD -A: Performed by: SURGERY

## 2018-12-06 PROCEDURE — 74011250637 HC RX REV CODE- 250/637: Performed by: ANESTHESIOLOGY

## 2018-12-06 PROCEDURE — 77030031139 HC SUT VCRL2 J&J -A: Performed by: SURGERY

## 2018-12-06 PROCEDURE — 77030018548 HC SUT ETHBND2 J&J -B: Performed by: SURGERY

## 2018-12-06 PROCEDURE — 76210000017 HC OR PH I REC 1.5 TO 2 HR: Performed by: SURGERY

## 2018-12-06 PROCEDURE — 74011250636 HC RX REV CODE- 250/636

## 2018-12-06 PROCEDURE — 76060000033 HC ANESTHESIA 1 TO 1.5 HR: Performed by: SURGERY

## 2018-12-06 PROCEDURE — 77030002933 HC SUT MCRYL J&J -A: Performed by: SURGERY

## 2018-12-06 PROCEDURE — 74011250636 HC RX REV CODE- 250/636: Performed by: SURGERY

## 2018-12-06 PROCEDURE — 74011000250 HC RX REV CODE- 250

## 2018-12-06 PROCEDURE — 88302 TISSUE EXAM BY PATHOLOGIST: CPT

## 2018-12-06 PROCEDURE — 77030018836 HC SOL IRR NACL ICUM -A: Performed by: SURGERY

## 2018-12-06 PROCEDURE — 81025 URINE PREGNANCY TEST: CPT

## 2018-12-06 PROCEDURE — 77030008684 HC TU ET CUF COVD -B: Performed by: NURSE ANESTHETIST, CERTIFIED REGISTERED

## 2018-12-06 PROCEDURE — 77030039266 HC ADH SKN EXOFIN S2SG -A: Performed by: SURGERY

## 2018-12-06 PROCEDURE — 77030026438 HC STYL ET INTUB CARD -A: Performed by: NURSE ANESTHETIST, CERTIFIED REGISTERED

## 2018-12-06 PROCEDURE — 77030032490 HC SLV COMPR SCD KNE COVD -B: Performed by: SURGERY

## 2018-12-06 PROCEDURE — 74011250636 HC RX REV CODE- 250/636: Performed by: ANESTHESIOLOGY

## 2018-12-06 PROCEDURE — 74011000250 HC RX REV CODE- 250: Performed by: SURGERY

## 2018-12-06 PROCEDURE — 76210000020 HC REC RM PH II FIRST 0.5 HR: Performed by: SURGERY

## 2018-12-06 PROCEDURE — 82962 GLUCOSE BLOOD TEST: CPT

## 2018-12-06 PROCEDURE — 76010000138 HC OR TIME 0.5 TO 1 HR: Performed by: SURGERY

## 2018-12-06 PROCEDURE — 77030037367 HC STPLR ENDO TRI-STPLR COVD -D: Performed by: SURGERY

## 2018-12-06 PROCEDURE — 77030020782 HC GWN BAIR PAWS FLX 3M -B

## 2018-12-06 RX ORDER — ONDANSETRON 2 MG/ML
4 INJECTION INTRAMUSCULAR; INTRAVENOUS AS NEEDED
Status: DISCONTINUED | OUTPATIENT
Start: 2018-12-06 | End: 2018-12-06 | Stop reason: HOSPADM

## 2018-12-06 RX ORDER — CEFAZOLIN SODIUM/WATER 2 G/20 ML
2 SYRINGE (ML) INTRAVENOUS ONCE
Status: COMPLETED | OUTPATIENT
Start: 2018-12-06 | End: 2018-12-06

## 2018-12-06 RX ORDER — SODIUM CHLORIDE, SODIUM LACTATE, POTASSIUM CHLORIDE, CALCIUM CHLORIDE 600; 310; 30; 20 MG/100ML; MG/100ML; MG/100ML; MG/100ML
125 INJECTION, SOLUTION INTRAVENOUS CONTINUOUS
Status: DISCONTINUED | OUTPATIENT
Start: 2018-12-06 | End: 2018-12-06 | Stop reason: HOSPADM

## 2018-12-06 RX ORDER — ROCURONIUM BROMIDE 10 MG/ML
INJECTION, SOLUTION INTRAVENOUS AS NEEDED
Status: DISCONTINUED | OUTPATIENT
Start: 2018-12-06 | End: 2018-12-06 | Stop reason: HOSPADM

## 2018-12-06 RX ORDER — OXYCODONE AND ACETAMINOPHEN 5; 325 MG/1; MG/1
1 TABLET ORAL
Qty: 20 TAB | Refills: 0 | Status: SHIPPED | OUTPATIENT
Start: 2018-12-06 | End: 2019-01-10 | Stop reason: ALTCHOICE

## 2018-12-06 RX ORDER — DEXAMETHASONE SODIUM PHOSPHATE 4 MG/ML
INJECTION, SOLUTION INTRA-ARTICULAR; INTRALESIONAL; INTRAMUSCULAR; INTRAVENOUS; SOFT TISSUE AS NEEDED
Status: DISCONTINUED | OUTPATIENT
Start: 2018-12-06 | End: 2018-12-06 | Stop reason: HOSPADM

## 2018-12-06 RX ORDER — SODIUM CHLORIDE 0.9 % (FLUSH) 0.9 %
5-10 SYRINGE (ML) INJECTION AS NEEDED
Status: DISCONTINUED | OUTPATIENT
Start: 2018-12-06 | End: 2018-12-06 | Stop reason: HOSPADM

## 2018-12-06 RX ORDER — ONDANSETRON 2 MG/ML
INJECTION INTRAMUSCULAR; INTRAVENOUS AS NEEDED
Status: DISCONTINUED | OUTPATIENT
Start: 2018-12-06 | End: 2018-12-06 | Stop reason: HOSPADM

## 2018-12-06 RX ORDER — FENTANYL CITRATE 50 UG/ML
25 INJECTION, SOLUTION INTRAMUSCULAR; INTRAVENOUS
Status: COMPLETED | OUTPATIENT
Start: 2018-12-06 | End: 2018-12-06

## 2018-12-06 RX ORDER — PROPOFOL 10 MG/ML
INJECTION, EMULSION INTRAVENOUS AS NEEDED
Status: DISCONTINUED | OUTPATIENT
Start: 2018-12-06 | End: 2018-12-06 | Stop reason: HOSPADM

## 2018-12-06 RX ORDER — DEXTROSE, SODIUM CHLORIDE, SODIUM LACTATE, POTASSIUM CHLORIDE, AND CALCIUM CHLORIDE 5; .6; .31; .03; .02 G/100ML; G/100ML; G/100ML; G/100ML; G/100ML
125 INJECTION, SOLUTION INTRAVENOUS CONTINUOUS
Status: DISCONTINUED | OUTPATIENT
Start: 2018-12-06 | End: 2018-12-06 | Stop reason: HOSPADM

## 2018-12-06 RX ORDER — MIDAZOLAM HYDROCHLORIDE 1 MG/ML
INJECTION, SOLUTION INTRAMUSCULAR; INTRAVENOUS AS NEEDED
Status: DISCONTINUED | OUTPATIENT
Start: 2018-12-06 | End: 2018-12-06 | Stop reason: HOSPADM

## 2018-12-06 RX ORDER — FENTANYL CITRATE 50 UG/ML
INJECTION, SOLUTION INTRAMUSCULAR; INTRAVENOUS AS NEEDED
Status: DISCONTINUED | OUTPATIENT
Start: 2018-12-06 | End: 2018-12-06 | Stop reason: HOSPADM

## 2018-12-06 RX ORDER — MORPHINE SULFATE 10 MG/ML
2 INJECTION, SOLUTION INTRAMUSCULAR; INTRAVENOUS
Status: DISCONTINUED | OUTPATIENT
Start: 2018-12-06 | End: 2018-12-06 | Stop reason: HOSPADM

## 2018-12-06 RX ORDER — MIDAZOLAM HYDROCHLORIDE 1 MG/ML
1 INJECTION, SOLUTION INTRAMUSCULAR; INTRAVENOUS AS NEEDED
Status: DISCONTINUED | OUTPATIENT
Start: 2018-12-06 | End: 2018-12-06 | Stop reason: HOSPADM

## 2018-12-06 RX ORDER — MIDAZOLAM HYDROCHLORIDE 1 MG/ML
1 INJECTION, SOLUTION INTRAMUSCULAR; INTRAVENOUS
Status: DISCONTINUED | OUTPATIENT
Start: 2018-12-06 | End: 2018-12-06 | Stop reason: HOSPADM

## 2018-12-06 RX ORDER — SUCCINYLCHOLINE CHLORIDE 20 MG/ML
INJECTION INTRAMUSCULAR; INTRAVENOUS AS NEEDED
Status: DISCONTINUED | OUTPATIENT
Start: 2018-12-06 | End: 2018-12-06 | Stop reason: HOSPADM

## 2018-12-06 RX ORDER — LIDOCAINE HYDROCHLORIDE 10 MG/ML
0.5 INJECTION, SOLUTION EPIDURAL; INFILTRATION; INTRACAUDAL; PERINEURAL AS NEEDED
Status: DISCONTINUED | OUTPATIENT
Start: 2018-12-06 | End: 2018-12-06 | Stop reason: HOSPADM

## 2018-12-06 RX ORDER — DEXMEDETOMIDINE HYDROCHLORIDE 4 UG/ML
INJECTION, SOLUTION INTRAVENOUS AS NEEDED
Status: DISCONTINUED | OUTPATIENT
Start: 2018-12-06 | End: 2018-12-06 | Stop reason: HOSPADM

## 2018-12-06 RX ORDER — BUPIVACAINE HYDROCHLORIDE AND EPINEPHRINE 5; 5 MG/ML; UG/ML
30 INJECTION, SOLUTION EPIDURAL; INTRACAUDAL; PERINEURAL ONCE
Status: COMPLETED | OUTPATIENT
Start: 2018-12-06 | End: 2018-12-06

## 2018-12-06 RX ORDER — SODIUM CHLORIDE 0.9 % (FLUSH) 0.9 %
5-10 SYRINGE (ML) INJECTION EVERY 8 HOURS
Status: DISCONTINUED | OUTPATIENT
Start: 2018-12-06 | End: 2018-12-06 | Stop reason: HOSPADM

## 2018-12-06 RX ORDER — OXYCODONE HYDROCHLORIDE 5 MG/1
5 TABLET ORAL AS NEEDED
Status: DISCONTINUED | OUTPATIENT
Start: 2018-12-06 | End: 2018-12-06 | Stop reason: HOSPADM

## 2018-12-06 RX ORDER — LIDOCAINE HYDROCHLORIDE 20 MG/ML
INJECTION, SOLUTION EPIDURAL; INFILTRATION; INTRACAUDAL; PERINEURAL AS NEEDED
Status: DISCONTINUED | OUTPATIENT
Start: 2018-12-06 | End: 2018-12-06 | Stop reason: HOSPADM

## 2018-12-06 RX ORDER — SODIUM CHLORIDE, SODIUM LACTATE, POTASSIUM CHLORIDE, CALCIUM CHLORIDE 600; 310; 30; 20 MG/100ML; MG/100ML; MG/100ML; MG/100ML
INJECTION, SOLUTION INTRAVENOUS
Status: DISCONTINUED | OUTPATIENT
Start: 2018-12-06 | End: 2018-12-06

## 2018-12-06 RX ORDER — FENTANYL CITRATE 50 UG/ML
50 INJECTION, SOLUTION INTRAMUSCULAR; INTRAVENOUS AS NEEDED
Status: DISCONTINUED | OUTPATIENT
Start: 2018-12-06 | End: 2018-12-06 | Stop reason: HOSPADM

## 2018-12-06 RX ADMIN — Medication 2 G: at 13:54

## 2018-12-06 RX ADMIN — SUCCINYLCHOLINE CHLORIDE 140 MG: 20 INJECTION INTRAMUSCULAR; INTRAVENOUS at 13:48

## 2018-12-06 RX ADMIN — DEXAMETHASONE SODIUM PHOSPHATE 4 MG: 4 INJECTION, SOLUTION INTRA-ARTICULAR; INTRALESIONAL; INTRAMUSCULAR; INTRAVENOUS; SOFT TISSUE at 13:57

## 2018-12-06 RX ADMIN — FENTANYL CITRATE 25 MCG: 50 INJECTION INTRAMUSCULAR; INTRAVENOUS at 15:30

## 2018-12-06 RX ADMIN — PROPOFOL 200 MG: 10 INJECTION, EMULSION INTRAVENOUS at 13:48

## 2018-12-06 RX ADMIN — ONDANSETRON 4 MG: 2 INJECTION INTRAMUSCULAR; INTRAVENOUS at 13:39

## 2018-12-06 RX ADMIN — ONDANSETRON 4 MG: 2 INJECTION INTRAMUSCULAR; INTRAVENOUS at 15:44

## 2018-12-06 RX ADMIN — FENTANYL CITRATE 50 MCG: 50 INJECTION, SOLUTION INTRAMUSCULAR; INTRAVENOUS at 12:39

## 2018-12-06 RX ADMIN — MORPHINE SULFATE 2 MG: 10 INJECTION INTRAVENOUS at 16:25

## 2018-12-06 RX ADMIN — LIDOCAINE HYDROCHLORIDE 100 MG: 20 INJECTION, SOLUTION EPIDURAL; INFILTRATION; INTRACAUDAL; PERINEURAL at 13:48

## 2018-12-06 RX ADMIN — MIDAZOLAM HYDROCHLORIDE 2 MG: 1 INJECTION, SOLUTION INTRAMUSCULAR; INTRAVENOUS at 13:39

## 2018-12-06 RX ADMIN — MORPHINE SULFATE 2 MG: 10 INJECTION INTRAVENOUS at 15:43

## 2018-12-06 RX ADMIN — FENTANYL CITRATE 25 MCG: 50 INJECTION INTRAMUSCULAR; INTRAVENOUS at 15:20

## 2018-12-06 RX ADMIN — DEXMEDETOMIDINE HYDROCHLORIDE 10 MCG: 4 INJECTION, SOLUTION INTRAVENOUS at 13:57

## 2018-12-06 RX ADMIN — FENTANYL CITRATE 25 MCG: 50 INJECTION INTRAMUSCULAR; INTRAVENOUS at 15:25

## 2018-12-06 RX ADMIN — MORPHINE SULFATE 2 MG: 10 INJECTION INTRAVENOUS at 15:49

## 2018-12-06 RX ADMIN — SODIUM CHLORIDE, SODIUM LACTATE, POTASSIUM CHLORIDE, AND CALCIUM CHLORIDE 125 ML/HR: 600; 310; 30; 20 INJECTION, SOLUTION INTRAVENOUS at 11:08

## 2018-12-06 RX ADMIN — ROCURONIUM BROMIDE 10 MG: 10 INJECTION, SOLUTION INTRAVENOUS at 13:48

## 2018-12-06 RX ADMIN — MORPHINE SULFATE 2 MG: 10 INJECTION INTRAVENOUS at 15:55

## 2018-12-06 RX ADMIN — OXYCODONE HYDROCHLORIDE 5 MG: 5 TABLET ORAL at 16:16

## 2018-12-06 RX ADMIN — FENTANYL CITRATE 25 MCG: 50 INJECTION INTRAMUSCULAR; INTRAVENOUS at 15:35

## 2018-12-06 RX ADMIN — FENTANYL CITRATE 100 MCG: 50 INJECTION, SOLUTION INTRAMUSCULAR; INTRAVENOUS at 13:48

## 2018-12-06 RX ADMIN — DEXMEDETOMIDINE HYDROCHLORIDE 10 MCG: 4 INJECTION, SOLUTION INTRAVENOUS at 13:52

## 2018-12-06 RX ADMIN — DEXMEDETOMIDINE HYDROCHLORIDE 10 MCG: 4 INJECTION, SOLUTION INTRAVENOUS at 13:48

## 2018-12-06 NOTE — OP NOTES
[facility] REPORT    Lizbeth Tejeda  MR#: [id number]  : 1980  ACCOUNT #: [de-identified]   DATE OF SERVICE: 2018    PREOPERATIVE DIAGNOSIS:  Recurrent umbilical hernia. POSTOPERATIVE DIAGNOSIS:  Recurrent umbilical hernia. PROCEDURE PERFORMED:  Repair of recurrent umbilical hernia. SURGEON:  Olivia Ibanez MD    ANESTHESIA:  General supplemented with 0.5% Marcaine with epinephrine. FINDINGS:  A small 1 cm defect in the previous repair    ASSISTANT:  Lima Cox,      COMPLICATIONS:  None. IMPLANTS:  None. CONDITION:  Good to the PACU.    ESTIMATED BLOOD LOSS:  minimal    SPECIMENS REMOVED:  Portion of the umbilical sac. DESCRIPTION OF PROCEDURE:  With the patient supine and suitably anesthetized the abdomen was prepped with ChloraPrep and draped as a field. 0.5% Marcaine with epinephrine was infiltrated around the umbilicus. Subumbilical incision was opened and skin flaps were elevated. The cicatrix around the previous repair was taken down to the fascia where there was indeed a 1 cm defect. This was cleared up circumferentially and then closed with interrupted 0 Ethibond sutures. A good closure was obtained. Hemostasis was excellent. The subcutaneous tissues were approximated with a pursestring suture of 2-0 Vicryl and the apex of the umbilicus was tied down to this using the same suture. The skin was closed with subcuticular Monocryl followed by surgical glue. At the termination of the procedure, sponge, needle and instrument counts were correct. The patient tolerated this well and was brought to the PACU in satisfactory condition. MD Felix Camejo  D: 2018 14:30     T: 2018 15:15  JOB #: [id number]  CC: Mj Torres MD

## 2018-12-06 NOTE — BRIEF OP NOTE
BRIEF OPERATIVE NOTE Date of Procedure: 12/6/2018 Preoperative Diagnosis: RECURRENT UMBILICAL HERNIA Postoperative Diagnosis: RECURRENT UMBILICAL HERNIA Procedure(s): OPEN REPAIR RECURRENT UMBILICAL HERNIA Surgeon(s) and Role: Pantera Squires MD - Primary Surgical Assistant: Kate Barnett Surgical Staff: 
Circ-1: Dorys Oliva Scrub Tech-1: Mark Ayon Surg Asst-2: Kaylen Regalado Event Time In Time Out Incision Start 1400 Incision Close Anesthesia: General  
Estimated Blood Loss: minimal 
Specimens:  
ID Type Source Tests Collected by Time Destination 1 : umbilcal hernia sac Fresh Umbilicus  Yvon Garvey MD 12/6/2018 1419 Pathology Findings: 1 cm defect Complications: none Implants: * No implants in log * 
 
[id number]

## 2018-12-06 NOTE — ROUTINE PROCESS
Patient: Kasia See MRN: [id number]  SSN: [id number] YOB: 1980  Age: 34 y.o. Sex: female Patient is status post Procedure(s): OPEN REPAIR RECURRENT UMBILICAL HERNIA. Surgeon(s) and Role: Merari Lebron MD - Primary Peripheral IV 12/06/18 Right Hand (Active) Dressing/Packing:  Wound Abdomen-DRESSING TYPE: Topical skin adhesive/glue (12/06/18 1411) Splint/Cast:  ]

## 2018-12-06 NOTE — ANESTHESIA POSTPROCEDURE EVALUATION
Procedure(s): OPEN REPAIR RECURRENT UMBILICAL HERNIA. Anesthesia Post Evaluation Patient location during evaluation: PACU Patient participation: complete - patient participated Level of consciousness: awake and alert Pain management: adequate Airway patency: patent Anesthetic complications: no 
Cardiovascular status: acceptable Respiratory status: acceptable Hydration status: acceptable Comments: I have seen and evaluated the patient and is ready for discharge. Sherian Habermann, MD 
 
Post anesthesia nausea and vomiting:  none Visit Vitals /66 Pulse (!) 123 Temp 37.1 °C (98.7 °F) Resp 16 Ht 5' 5\" (1.651 m) Wt 93 kg (205 lb) SpO2 95% BMI 34.11 kg/m²

## 2018-12-06 NOTE — DISCHARGE INSTRUCTIONS
** you had an oxycodone in the recovery room at 4:16pm **     Abdominal Hernia Repair: What to Expect at [address] [place] [address]  After surgery to repair your hernia, you are likely to have pain for a few days. You may also feel like you have the flu, and you may have a low fever and feel tired and nauseated. This is common. You should feel better after a few days and will probably feel much better in 7 days. For several weeks you may feel twinges or pulling in the hernia repair when you move. You may have some bruising around the area of your hernia repair. This is normal.  This care sheet gives you a general idea about how long it will take for you to recover. But each person recovers at a different pace. Follow the steps below to get better as quickly as possible. How can you care for yourself at home? Activity    · Rest when you feel tired. Getting enough sleep will help you recover.     · Try to walk each day. Start by walking a little more than you did the day before. Bit by bit, increase the amount you walk. Walking boosts blood flow and helps prevent pneumonia and constipation.     · If your doctor gives you an abdominal binder to wear, use it as directed. This is an elastic bandage that wraps around your belly and upper hips. It helps support your belly muscles after surgery.     · Avoid strenuous activities, such as biking, jogging, weight lifting, or aerobic exercise, until your doctor says it is okay.     · Avoid lifting anything that would make you strain. This may include heavy grocery bags and milk containers, a heavy briefcase or backpack, cat litter or dog food bags, a vacuum , or a child.     · Ask your doctor when you can drive again.     · Most people are able to return to work within 1 to 2 weeks after surgery.  But if your job requires that you to do heavy lifting or strenuous activity, you may need to take 4 to 6 weeks off from work.     · You may shower 24 to 48 hours after surgery, if your doctor okays it. Pat the cut (incision) dry. Do not take a bath for the first 2 weeks, or until your doctor tells you it is okay.     · Ask your doctor when it is okay for you to have sex. Diet    · You can eat your normal diet. If your stomach is upset, try bland, low-fat foods like plain rice, broiled chicken, toast, and yogurt.     · Drink plenty of fluids (unless your doctor tells you not to).     · You may notice that your bowel movements are not regular right after your surgery. This is common. Avoid constipation and straining with bowel movements. You may want to take a fiber supplement every day. If you have not had a bowel movement after a couple of days, ask your doctor about taking a mild laxative. Medicines    · Your doctor will tell you if and when you can restart your medicines. He or she will also give you instructions about taking any new medicines.     · If you take blood thinners, such as warfarin (Coumadin), clopidogrel (Plavix), or aspirin, be sure to talk to your doctor. He or she will tell you if and when to start taking those medicines again. Make sure that you understand exactly what your doctor wants you to do.     · Be safe with medicines. Take pain medicines exactly as directed. ? If the doctor gave you a prescription medicine for pain, take it as prescribed. ? If you are not taking a prescription pain medicine, ask your doctor if you can take an over-the-counter medicine.     · If your doctor prescribed antibiotics, take them as directed. Do not stop taking them just because you feel better. You need to take the full course of antibiotics.     · If you think your pain medicine is making you sick to your stomach:  ? Take your medicine after meals (unless your doctor has told you not to). ? Ask your doctor for a different pain medicine. Incision care    · If you have strips of tape on the cut (incision) the doctor made, leave the tape on for a week or until it falls off.  Or follow your doctor's instructions for removing the tape.     · If you have staples closing the cut, you will need to visit your doctor in 1 to 2 weeks to have them removed.     · Wash the area daily with warm, soapy water, and pat it dry. Don't use hydrogen peroxide or alcohol, which can slow healing. You may cover the area with a gauze bandage if it weeps or rubs against clothing. Change the bandage every day. Other instructions    · Hold a pillow over your incision when you cough or take deep breaths. This will support your belly and decrease your pain.     · Do breathing exercises at home as instructed by your doctor. This will help prevent pneumonia.     · If you had laparoscopic surgery, you may also have pain in your left shoulder. The pain usually lasts about a day or two. Follow-up care is a key part of your treatment and safety. Be sure to make and go to all appointments, and call your doctor if you are having problems. It's also a good idea to know your test results and keep a list of the medicines you take. When should you call for help? Call 911 anytime you think you may need emergency care. For example, call if:    · You passed out (lost consciousness).     · You are short of breath.    Call your doctor now or seek immediate medical care if:    · You are sick to your stomach and cannot drink fluids.     · You have signs of a blood clot in your leg (called a deep vein thrombosis), such as:  ? Pain in your calf, back of the knee, thigh, or groin. ? Redness and swelling in your leg or groin.     · You have signs of infection, such as:  ? Increased pain, swelling, warmth, or redness. ? Red streaks leading from the incision. ? Pus draining from the incision. ?  A fever.     · You cannot pass stools or gas.     · You have pain that does not get better after you take pain medicine.     · You have loose stitches, or your incision comes open.     · Bright red blood has soaked through the bandage over your incision.    Watch closely for changes in your health, and be sure to contact your doctor if you have any problems. Where can you learn more? Go to http://hodan-eugene.info/. Enter B577 in the search box to learn more about \"Abdominal Hernia Repair: What to Expect at Home. \"  Current as of: 2018  Content Version: 11.8  © 6905-1831 [employer]. Care instructions adapted under license by [employer] (which disclaims liability or warranty for this information). If you have questions about a medical condition or this instruction, always ask your healthcare professional. [place] [address] any warranty or liability for your use of this information. Patient Discharge Instructions    [facility] / [id number] : 1980    Admitted 2018 Discharged: 2018     Take Home Medications            · It is important that you take the medication exactly as they are prescribed. · Keep your medication in the bottles provided by the pharmacist and keep a list of the medication names, dosages, and times to be taken in your wallet. · Do not take other medications without consulting your doctor. What to do at Home    Recommended diet: Regular Diet,     Recommended activity: Activity as tolerated, may shower any time          Follow-up Appointments   Procedures    FOLLOW UP VISIT Appointment in: Two Weeks     Standing Status:   Standing     Number of Occurrences:   1     Order Specific Question:   Appointment in     Answer: Two Weeks       ______________________________________________________________________    Anesthesia Discharge Instructions    After general anesthesia or intervenous sedation, for 24 hours or while taking prescription Narcotics:  · Limit your activities  · Do not drive or operate hazardous machinery  · If you have not urinated within 8 hours after discharge, please contact your surgeon on call.   · Do not make important personal or business decisions  · Do not drink alcoholic beverages    Report the following to your surgeon:  · Excessive pain, swelling, redness or odor of or around the surgical area  · Temperature over 100.5 degrees  · Nausea and vomiting lasting longer than 4 hours or if unable to take medication  · Any signs of decreased circulation or nerve impairment to extremity:  Change in color, persistent numbness, tingling, coldness or increased pain. · Any questions          Information obtained by :  I understand that if any problems occur once I am at home I am to contact my physician. I understand and acknowledge receipt of the instructions indicated above.                                                                                                                                            Physician's or R.N.'s Signature                                                                  Date/Time                                                                                                                                              Patient or Representative Signature                                                          Date/Time

## 2018-12-06 NOTE — ANESTHESIA PREPROCEDURE EVALUATION
Anesthetic History No history of anesthetic complications Review of Systems / Medical History Patient summary reviewed, nursing notes reviewed and pertinent labs reviewed Pulmonary Asthma Neuro/Psych Headaches Cardiovascular Hypertension Exercise tolerance: >4 METS 
  
GI/Hepatic/Renal 
Within defined limits Endo/Other Diabetes Other Findings Physical Exam 
 
Airway Mallampati: II 
TM Distance: 4 - 6 cm Neck ROM: normal range of motion Mouth opening: Normal 
 
 Cardiovascular Rhythm: regular Rate: normal 
 
 
 
 Dental 
 
Dentition: Lower dentition intact and Upper dentition intact Pulmonary Breath sounds clear to auscultation Abdominal 
 
 
 
 Other Findings Anesthetic Plan ASA: 3 Anesthesia type: general 
 
 
 
 
Induction: Intravenous Anesthetic plan and risks discussed with: Patient

## 2018-12-06 NOTE — INTERVAL H&P NOTE
H&P Update: 
Shahida Pozo was seen and examined. History and physical has been reviewed. The patient has been examined.  There have been no significant clinical changes since the completion of the originally dated History and Physical. 
 
Signed By: Catrachita Galarza MD   
 December 6, 2018 12:43 PM

## 2018-12-07 ENCOUNTER — TELEPHONE (OUTPATIENT)
Dept: FAMILY MEDICINE CLINIC | Age: 38
End: 2018-12-07

## 2018-12-07 NOTE — TELEPHONE ENCOUNTER
Rec'd prior auth request for pt's fenofibrate (Lofibra) 160 mg tablets. Upon doing PA the message below pops up with preferred generic medications. Is there a different generic equivalent we can send which will be covered? \"Has the patient had a previous trial (medication samples/coupons/discount cards are excluded from consideration as a trial) and inadequate response or intolerance to one of the following: generic fenofibrate; generic micronized fenofibrate; generic fenofibric acid, OR generic gemfibrozil? PLEASE NOTE: The preferred generic agents are: Fenofibrate 43 mg, 67 mg, 130 mg, 134 mg, and 200 mg micronized capsules; fenofibrate 48 mg, 54 mg, 145 mg, and 160 mg tablet; fenofibric acid (all strengths, all dose forms); gemfibrozil. \"

## 2018-12-10 RX ORDER — FENOFIBRATE 134 MG/1
134 CAPSULE ORAL
Qty: 90 CAP | Refills: 1 | Status: SHIPPED | OUTPATIENT
Start: 2018-12-10 | End: 2018-12-11 | Stop reason: ALTCHOICE

## 2018-12-11 ENCOUNTER — TELEPHONE (OUTPATIENT)
Dept: FAMILY MEDICINE CLINIC | Age: 38
End: 2018-12-11

## 2018-12-11 DIAGNOSIS — E78.5 DYSLIPIDEMIA (HIGH LDL; LOW HDL): Primary | ICD-10-CM

## 2018-12-11 RX ORDER — FENOFIBRIC ACID 35 MG/1
35 TABLET ORAL DAILY
Qty: 90 TAB | Refills: 1 | Status: SHIPPED | OUTPATIENT
Start: 2018-12-11 | End: 2018-12-13

## 2018-12-11 NOTE — TELEPHONE ENCOUNTER
[facility] has two orders for Fenofibrate. 160 mg sent in by Dr. Marco Antonio Lozano and 134 mg sent in by GILBERTO Morgan. Which dose do you want. Advised [facility] that since the patient's PCP is Dr. Marco Antonio Lozano we should go with that dose. Will contact Dr. Marco Antonio Lozano in this regard.

## 2018-12-12 NOTE — TELEPHONE ENCOUNTER
I have submitted another prior authorization and this time I did it via telephone and spoke with Yinka bueno. She took the information needed, gave me a reference # [id number] and advised that they will send their response to the PA submitted via fax within 72 hours. Pt was advised and verbalized understanding.

## 2018-12-13 DIAGNOSIS — E78.1 HYPERTRIGLYCERIDEMIA: Primary | ICD-10-CM

## 2018-12-13 RX ORDER — FENOFIBRATE 145 MG/1
145 TABLET, COATED ORAL DAILY
Qty: 90 TAB | Refills: 1 | Status: SHIPPED | OUTPATIENT
Start: 2018-12-13 | End: 2019-03-08

## 2018-12-20 ENCOUNTER — OFFICE VISIT (OUTPATIENT)
Dept: SURGERY | Age: 38
End: 2018-12-20

## 2018-12-20 VITALS
HEART RATE: 118 BPM | HEIGHT: 65 IN | OXYGEN SATURATION: 98 % | RESPIRATION RATE: 16 BRPM | WEIGHT: 205 LBS | SYSTOLIC BLOOD PRESSURE: 110 MMHG | DIASTOLIC BLOOD PRESSURE: 82 MMHG | TEMPERATURE: 98.6 F | BODY MASS INDEX: 34.16 KG/M2

## 2018-12-20 DIAGNOSIS — Z09 POSTOPERATIVE EXAMINATION: Primary | ICD-10-CM

## 2018-12-20 NOTE — PROGRESS NOTES
Subjective:      Melissa Mckee is a 34 y.o. female presents for postop care following an open recurrent umbilical hernia repair 2 weeks ago. Appetite is good. Eating a regular diet without difficulty. Bowel movements are regular. The patient is voiding without difficulty. The patient is not having any pain. .    Patient has an advanced directive: NOt on file. Ms. Je Mccabe has a reminder for a \"due or due soon\" health maintenance. I have asked that she contact her primary care provider for follow-up on this health maintenance. Objective:     Visit Vitals  /82 (BP 1 Location: Right arm, BP Patient Position: Sitting)   Pulse (!) 118   Temp 98.6 °F (37 °C) (Oral)   Resp 16   Ht 5' 5\" (1.651 m)   Wt 205 lb (93 kg)   LMP 01/25/2012   SpO2 98%   BMI 34.11 kg/m²       General:  alert, cooperative, no distress   Abdomen: soft, bowel sounds active, non-tender   Incision:   healing well, no drainage, no erythema, no hernia, no seroma, no swelling, no dehiscence, incision well approximated     Assessment:     Doing well postoperatively. Plan:     1. Follow up as needed. 2. No lifting greater than 25 lbs for the next 2 weeks. 3. May get incisions wet. Pt verbalized understanding and questions were answered to the best of my knowledge and ability.

## 2018-12-20 NOTE — PROGRESS NOTES
1. Have you been to the ER, urgent care clinic since your last visit? Hospitalized since your last visit? No    2. Have you seen or consulted any other health care providers outside of the [address] [place] [address] since your last visit? Include any pap smears or colon screening.  No

## 2018-12-26 RX ORDER — ALBUTEROL SULFATE 90 UG/1
AEROSOL, METERED RESPIRATORY (INHALATION)
Qty: 18 INHALER | Refills: 4 | Status: SHIPPED | OUTPATIENT
Start: 2018-12-26 | End: 2018-12-27 | Stop reason: ALTCHOICE

## 2018-12-27 RX ORDER — ALBUTEROL SULFATE 90 UG/1
2 AEROSOL, METERED RESPIRATORY (INHALATION)
Qty: 1 INHALER | Refills: 5 | Status: SHIPPED | OUTPATIENT
Start: 2018-12-27 | End: 2019-11-22 | Stop reason: SDUPTHER

## 2018-12-31 RX ORDER — FENOFIBRATE 160 MG/1
TABLET ORAL
Qty: 30 TAB | Refills: 1 | Status: SHIPPED | OUTPATIENT
Start: 2018-12-31 | End: 2019-03-03 | Stop reason: SDUPTHER

## 2019-01-10 ENCOUNTER — OFFICE VISIT (OUTPATIENT)
Dept: FAMILY MEDICINE CLINIC | Age: 39
End: 2019-01-10

## 2019-01-10 VITALS
DIASTOLIC BLOOD PRESSURE: 85 MMHG | WEIGHT: 205 LBS | BODY MASS INDEX: 34.16 KG/M2 | HEART RATE: 99 BPM | HEIGHT: 65 IN | TEMPERATURE: 98.7 F | RESPIRATION RATE: 16 BRPM | OXYGEN SATURATION: 98 % | SYSTOLIC BLOOD PRESSURE: 126 MMHG

## 2019-01-10 DIAGNOSIS — J06.9 URI WITH COUGH AND CONGESTION: Primary | ICD-10-CM

## 2019-01-10 RX ORDER — NAPROXEN 500 MG/1
500 TABLET ORAL
Refills: 2 | COMMUNITY
Start: 2018-12-28 | End: 2019-03-16

## 2019-01-10 RX ORDER — LEVOCETIRIZINE DIHYDROCHLORIDE 5 MG/1
5 TABLET, FILM COATED ORAL
COMMUNITY
Start: 2018-12-27 | End: 2019-03-08

## 2019-01-10 RX ORDER — AZITHROMYCIN 250 MG/1
TABLET, FILM COATED ORAL
Qty: 6 TAB | Refills: 0 | Status: SHIPPED | OUTPATIENT
Start: 2019-01-10 | End: 2019-01-15

## 2019-01-10 RX ORDER — BENZONATATE 100 MG/1
100 CAPSULE ORAL
Qty: 21 CAP | Refills: 0 | Status: SHIPPED | OUTPATIENT
Start: 2019-01-10 | End: 2019-01-17

## 2019-01-10 RX ORDER — CODEINE PHOSPHATE AND GUAIFENESIN 10; 100 MG/5ML; MG/5ML
5 SOLUTION ORAL
Qty: 118 ML | Refills: 0 | Status: SHIPPED | OUTPATIENT
Start: 2019-01-10 | End: 2019-02-13 | Stop reason: ALTCHOICE

## 2019-01-10 NOTE — PROGRESS NOTES
HISTORY OF PRESENT ILLNESS  Mary Leon is a 34 y.o. female. HPI   Pt presents with \"cough and ear pain\"  Symptoms started 3 days ago, and have been worsening  Right ear pain  Coughing so hard, she will hurt  She is able to cough up mucous at times, but mostly dry  Nasal congestion  Sinus pain and pressure  Low grade temp noted yesterday  Review of Systems   Constitutional: Positive for fever. HENT: Positive for congestion and ear pain. Respiratory: Positive for cough and sputum production. Gastrointestinal: Negative for diarrhea and vomiting. Physical Exam   Constitutional: She is oriented to person, place, and time. She appears well-developed and well-nourished. HENT:   Head: Normocephalic and atraumatic. Right Ear: Hearing, external ear and ear canal normal. Tympanic membrane is erythematous. Left Ear: Hearing, tympanic membrane, external ear and ear canal normal.   Nose: Mucosal edema present. Mouth/Throat: Oropharynx is clear and moist.   Neck: Normal range of motion. Neck supple. Cardiovascular: Normal rate, regular rhythm and normal heart sounds. Pulmonary/Chest: Effort normal. She has rhonchi in the right lower field and the left lower field. Lymphadenopathy:     She has no cervical adenopathy. Neurological: She is alert and oriented to person, place, and time. Skin: Skin is warm and dry. Psychiatric: She has a normal mood and affect. Her behavior is normal.       ASSESSMENT and PLAN    ICD-10-CM ICD-9-CM    1. URI with cough and congestion J06.9 465.9 azithromycin (ZITHROMAX) 250 mg tablet      benzonatate (TESSALON PERLES) 100 mg capsule      guaiFENesin-codeine (ROBITUSSIN AC) 100-10 mg/5 mL solution     Informed patient that I have sent medication to the pharmacy, and she should take as prescribed. Educated about staying well hydrated, and treating fever as needed.     Pt informed to return to office with worsening of symptoms, or PRN with any questions or concerns. Pt verbalizes understanding of plan of care and denies further questions or concerns at this time.

## 2019-01-10 NOTE — PROGRESS NOTES
Identified pt with two pt identifiers(name and ). Chief Complaint   Patient presents with    Cough    Ear Pain     right        Health Maintenance Due   Topic    EYE EXAM RETINAL OR DILATED     DTaP/Tdap/Td series (2 - Td)    FOOT EXAM Q1        Wt Readings from Last 3 Encounters:   01/10/19 205 lb (93 kg)   18 205 lb (93 kg)   18 205 lb (93 kg)     Temp Readings from Last 3 Encounters:   01/10/19 98.7 °F (37.1 °C) (Oral)   18 98.6 °F (37 °C) (Oral)   18 98.7 °F (37.1 °C)     BP Readings from Last 3 Encounters:   01/10/19 126/85   18 110/82   18 113/72     Pulse Readings from Last 3 Encounters:   01/10/19 99   18 (!) 118   18 (!) 118         Learning Assessment:  :     Learning Assessment 2017   PRIMARY LEARNER Patient Patient   HIGHEST LEVEL OF EDUCATION - PRIMARY LEARNER  GRADUATED HIGH SCHOOL OR GED GRADUATED HIGH SCHOOL OR GED   BARRIERS PRIMARY LEARNER NONE NONE   PRIMARY LANGUAGE ENGLISH ENGLISH   LEARNER PREFERENCE PRIMARY READING READING   ANSWERED BY patient patient   RELATIONSHIP SELF SELF       Depression Screening:  :     PHQ over the last two weeks 2018   Little interest or pleasure in doing things Not at all   Feeling down, depressed, irritable, or hopeless Not at all   Total Score PHQ 2 0       Fall Risk Assessment:  :     Fall Risk Assessment, last 12 mths 2017   Able to walk? Yes   Fall in past 12 months? No       Abuse Screening:  :     Abuse Screening Questionnaire 2018   Do you ever feel afraid of your partner? N N N   Are you in a relationship with someone who physically or mentally threatens you? N N N   Is it safe for you to go home?  Y Y Y       Coordination of Care Questionnaire:  :     1) Have you been to an emergency room, urgent care clinic since your last visit? no   Hospitalized since your last visit? no             2) Have you seen or consulted any other health care providers outside of [facility] since your last visit? no  (Include any pap smears or colon screenings in this section.)    3) Do you have an Advance Directive on file? no  Are you interested in receiving information about Advance Directives? no    Patient is accompanied by self. Reviewed record in preparation for visit and have obtained necessary documentation. Medication reconciliation up to date and corrected with patient at this time.

## 2019-01-11 ENCOUNTER — HOSPITAL ENCOUNTER (OUTPATIENT)
Dept: MAMMOGRAPHY | Age: 39
Discharge: HOME | End: 2019-01-11
Attending: OBSTETRICS & GYNECOLOGY
Payer: MEDICAID

## 2019-01-11 DIAGNOSIS — Z12.39 SCREENING BREAST EXAMINATION: ICD-10-CM

## 2019-01-11 PROCEDURE — 77067 SCR MAMMO BI INCL CAD: CPT

## 2019-01-23 DIAGNOSIS — E78.1 HYPERTRIGLYCERIDEMIA: ICD-10-CM

## 2019-01-23 DIAGNOSIS — I10 ESSENTIAL HYPERTENSION: ICD-10-CM

## 2019-01-23 DIAGNOSIS — E11.9 TYPE 2 DIABETES MELLITUS WITHOUT COMPLICATION, WITH LONG-TERM CURRENT USE OF INSULIN (HCC): ICD-10-CM

## 2019-01-23 DIAGNOSIS — Z79.4 TYPE 2 DIABETES MELLITUS WITHOUT COMPLICATION, WITH LONG-TERM CURRENT USE OF INSULIN (HCC): ICD-10-CM

## 2019-01-23 RX ORDER — ASPIRIN 81 MG/1
TABLET ORAL
Qty: 30 TAB | Refills: 5 | Status: SHIPPED | OUTPATIENT
Start: 2019-01-23 | End: 2019-07-19 | Stop reason: SDUPTHER

## 2019-01-28 DIAGNOSIS — Z91.09 ENVIRONMENTAL ALLERGIES: ICD-10-CM

## 2019-01-29 ENCOUNTER — OFFICE VISIT (OUTPATIENT)
Dept: FAMILY MEDICINE CLINIC | Age: 39
End: 2019-01-29

## 2019-01-29 VITALS
HEIGHT: 65 IN | TEMPERATURE: 98.5 F | HEART RATE: 99 BPM | BODY MASS INDEX: 34.35 KG/M2 | WEIGHT: 206.2 LBS | SYSTOLIC BLOOD PRESSURE: 114 MMHG | RESPIRATION RATE: 20 BRPM | DIASTOLIC BLOOD PRESSURE: 74 MMHG | OXYGEN SATURATION: 97 %

## 2019-01-29 DIAGNOSIS — R10.32 LEFT LOWER QUADRANT PAIN: Primary | ICD-10-CM

## 2019-01-29 RX ORDER — LEVOCETIRIZINE DIHYDROCHLORIDE 5 MG/1
TABLET, FILM COATED ORAL
Qty: 30 TAB | Refills: 2 | Status: SHIPPED | OUTPATIENT
Start: 2019-01-29 | End: 2019-05-14 | Stop reason: SDUPTHER

## 2019-01-29 NOTE — PROGRESS NOTES
Chief Complaint: 
Chief Complaint Patient presents with  Abdominal Pain Complains of left lower abdomen pain x 2 weeks History of Present Illness: 
She is a 34 y.o. female who presents with c/o of LLQ abdominal pain that has been ongoing for the past 2-weeks. She has a h/o ovarian cysts and was seen by her OB/GYN last Wednesday. At that time, she was told that she should take Naprosyn and schedule an US. However, they are booked up until mid-February. She reports that she has no N/V. No change in her stools. She denies any trauma to her abdomen. She did have a hernia repair in her belly button in early December. But this pain is in the LLQ. She is seen routinely by Dr. Jose Daniel Liu. . She is here to see if we can schedule the US for her. The Naprosyn is not really helping her pain either. Reviewed PmHx, RxHx, FmHx, SocHx, AllgHx and updated and dated in the chart. Patient Active Problem List  
 Diagnosis  Recurrent umbilical hernia  Severe obesity ([address]  Acute midline thoracic back pain  Acute mucoid otitis media of right ear  Dermatitis  Screening for thyroid disorder  Acute non-recurrent frontal sinusitis  Left lower quadrant pain  Left ear pain  Strep throat  Nausea  Right acute serous otitis media  Rash  Gastroesophageal reflux disease without esophagitis  Environmental allergies  Right ear pain  Diabetes ([address]  Sore throat  Migraine headache  Vitamin D deficiency  Dyslipidemia (high LDL; low HDL)  Hypertension  Asthma Review of Systems - negative except as listed above in the HPI Objective:  
 
Vitals:  
 01/29/19 4987 BP: 114/74 Pulse: 99 Resp: 20 Temp: 98.5 °F (36.9 °C) TempSrc: Oral  
SpO2: 97% Weight: 206 lb 3.2 oz (93.5 kg) Height: 5' 5\" (1.651 m) Physical Examination:  
General appearance - alert, well appearing, and in no distress and overweight Mental status - alert, oriented to person, place, and time Eyes - pupils equal and reactive, extraocular eye movements intact Mouth - mucous membranes moist, pharynx normal without lesions Chest - clear to auscultation, no wheezes, rales or rhonchi, symmetric air entry Heart - normal rate, regular rhythm, normal S1, S2, no murmurs, rubs, clicks or gallops Abdomen - tenderness noted LLQ 
no rebound tenderness noted 
bowel sounds normal 
no abdominal bruits 
no pulsatile masses 
no hernias noted Musculoskeletal - no joint tenderness, deformity or swelling Extremities - peripheral pulses normal, no pedal edema, no clubbing or cyanosis Skin - normal coloration and turgor, no rashes, no suspicious skin lesions noted LABS: 
Results for orders placed or performed in visit on 01/29/19 AMB POC URINALYSIS DIP STICK AUTO W/O MICRO Result Value Ref Range Color (UA POC) Yellow Clarity (UA POC) Clear Glucose (UA POC) 3+ Negative Bilirubin (UA POC) Negative Negative Ketones (UA POC) Negative Negative Specific gravity (UA POC) 1.010 1.001 - 1.035 Blood (UA POC) Negative Negative pH (UA POC) 5.0 4.6 - 8.0 Protein (UA POC) Negative Negative Urobilinogen (UA POC) 0.2 mg/dL 0.2 - 1 Nitrites (UA POC) Negative Negative Leukocyte esterase (UA POC) Negative Negative Assessment/ Plan:  
 
38F with known ovarian cyst. Presents with worsening LLQ abdominal pain and discomfort for the past 2-weeks. She can't get an US done with her regular gynecologist and we will attempt to get her in for one today. Naprosyn is not touching her pain. Symptoms seem to be worsening. If the US is negative or non-helpful, will send for CT abd/pelvis. In addition, will forward findings to her gynecologist - Dr. Nury Sheffield. ICD-10-CM ICD-9-CM 1. Left lower quadrant pain R10.32 789.04 AMB POC URINALYSIS DIP STICK AUTO W/O MICRO  
   US PELV NON OBS I have discussed the diagnosis with the patient and the intended treatment plan as seen in the above orders. The patient has received an after-visit summary and questions were answered concerning future plans. Asked to return should symptoms worsen or not improve with treatment. Any pending labs and studies will be relayed to patient when they become available. Pt verbalizes understanding of plan of care and denies further questions or concerns at this time. Follow-up Disposition: 
Return if symptoms worsen or fail to improve.  
 
Rajani Prince MD

## 2019-01-29 NOTE — PATIENT INSTRUCTIONS
Abdominal Pain: Care Instructions Your Care Instructions Abdominal pain has many possible causes. Some aren't serious and get better on their own in a few days. Others need more testing and treatment. If your pain continues or gets worse, you need to be rechecked and may need more tests to find out what is wrong. You may need surgery to correct the problem. Don't ignore new symptoms, such as fever, nausea and vomiting, urination problems, pain that gets worse, and dizziness. These may be signs of a more serious problem. Your doctor may have recommended a follow-up visit in the next 8 to 12 hours. If you are not getting better, you may need more tests or treatment. The doctor has checked you carefully, but problems can develop later. If you notice any problems or new symptoms, get medical treatment right away. Follow-up care is a key part of your treatment and safety. Be sure to make and go to all appointments, and call your doctor if you are having problems. It's also a good idea to know your test results and keep a list of the medicines you take. How can you care for yourself at home? · Rest until you feel better. · To prevent dehydration, drink plenty of fluids, enough so that your urine is light yellow or clear like water. Choose water and other caffeine-free clear liquids until you feel better. If you have kidney, heart, or liver disease and have to limit fluids, talk with your doctor before you increase the amount of fluids you drink. · If your stomach is upset, eat mild foods, such as rice, dry toast or crackers, bananas, and applesauce. Try eating several small meals instead of two or three large ones. · Wait until 48 hours after all symptoms have gone away before you have spicy foods, alcohol, and drinks that contain caffeine. · Do not eat foods that are high in fat.  
· Avoid anti-inflammatory medicines such as aspirin, ibuprofen (Advil, Motrin), and naproxen (Aleve). These can cause stomach upset. Talk to your doctor if you take daily aspirin for another health problem. When should you call for help? Call 911 anytime you think you may need emergency care. For example, call if: 
  · You passed out (lost consciousness).  
  · You pass maroon or very bloody stools.  
  · You vomit blood or what looks like coffee grounds.  
  · You have new, severe belly pain.  
 Call your doctor now or seek immediate medical care if: 
  · Your pain gets worse, especially if it becomes focused in one area of your belly.  
  · You have a new or higher fever.  
  · Your stools are black and look like tar, or they have streaks of blood.  
  · You have unexpected vaginal bleeding.  
  · You have symptoms of a urinary tract infection. These may include: 
? Pain when you urinate. ? Urinating more often than usual. 
? Blood in your urine.  
  · You are dizzy or lightheaded, or you feel like you may faint.  
 Watch closely for changes in your health, and be sure to contact your doctor if: 
  · You are not getting better after 1 day (24 hours). Where can you learn more? Go to http://hodan-eugene.info/. Enter L378 in the search box to learn more about \"Abdominal Pain: Care Instructions. \" Current as of: September 23, 2018 Content Version: 11.9 © 1823-2005 [employer]. Care instructions adapted under license by [employer] (which disclaims liability or warranty for this information). If you have questions about a medical condition or this instruction, always ask your healthcare professional. [place] [address] any warranty or liability for your use of this information. Abdominal Pain: Care Instructions Your Care Instructions Abdominal pain has many possible causes. Some aren't serious and get better on their own in a few days. Others need more testing and treatment. If your pain continues or gets worse, you need to be rechecked and may need more tests to find out what is wrong. You may need surgery to correct the problem. Don't ignore new symptoms, such as fever, nausea and vomiting, urination problems, pain that gets worse, and dizziness. These may be signs of a more serious problem. Your doctor may have recommended a follow-up visit in the next 8 to 12 hours. If you are not getting better, you may need more tests or treatment. The doctor has checked you carefully, but problems can develop later. If you notice any problems or new symptoms, get medical treatment right away. Follow-up care is a key part of your treatment and safety. Be sure to make and go to all appointments, and call your doctor if you are having problems. It's also a good idea to know your test results and keep a list of the medicines you take. How can you care for yourself at home? · Rest until you feel better. · To prevent dehydration, drink plenty of fluids, enough so that your urine is light yellow or clear like water. Choose water and other caffeine-free clear liquids until you feel better. If you have kidney, heart, or liver disease and have to limit fluids, talk with your doctor before you increase the amount of fluids you drink. · If your stomach is upset, eat mild foods, such as rice, dry toast or crackers, bananas, and applesauce. Try eating several small meals instead of two or three large ones. · Wait until 48 hours after all symptoms have gone away before you have spicy foods, alcohol, and drinks that contain caffeine. · Do not eat foods that are high in fat. · Avoid anti-inflammatory medicines such as aspirin, ibuprofen (Advil, Motrin), and naproxen (Aleve). These can cause stomach upset. Talk to your doctor if you take daily aspirin for another health problem. When should you call for help? Call 911 anytime you think you may need emergency care. For example, call if:   · You passed out (lost consciousness).  
  · You pass maroon or very bloody stools.  
  · You vomit blood or what looks like coffee grounds.  
  · You have new, severe belly pain.  
 Call your doctor now or seek immediate medical care if: 
  · Your pain gets worse, especially if it becomes focused in one area of your belly.  
  · You have a new or higher fever.  
  · Your stools are black and look like tar, or they have streaks of blood.  
  · You have unexpected vaginal bleeding.  
  · You have symptoms of a urinary tract infection. These may include: 
? Pain when you urinate. ? Urinating more often than usual. 
? Blood in your urine.  
  · You are dizzy or lightheaded, or you feel like you may faint.  
 Watch closely for changes in your health, and be sure to contact your doctor if: 
  · You are not getting better after 1 day (24 hours). Where can you learn more? Go to http://hodan-eugene.info/. Enter J234 in the search box to learn more about \"Abdominal Pain: Care Instructions. \" Current as of: September 23, 2018 Content Version: 11.9 © 5391-5565 [employer]. Care instructions adapted under license by [employer] (which disclaims liability or warranty for this information). If you have questions about a medical condition or this instruction, always ask your healthcare professional. [address] any warranty or liability for your use of this information.

## 2019-01-29 NOTE — PROGRESS NOTES
Identified pt with two pt identifiers(name and ). Chief Complaint Patient presents with  Abdominal Pain Complains of left lower abdomen pain x 2 weeks Health Maintenance Due Topic  
 EYE EXAM RETINAL OR DILATED  DTaP/Tdap/Td series (2 - Td)  FOOT EXAM Q1 Wt Readings from Last 3 Encounters:  
01/10/19 205 lb (93 kg) 18 205 lb (93 kg) 18 205 lb (93 kg) Temp Readings from Last 3 Encounters:  
01/10/19 98.7 °F (37.1 °C) (Oral) 18 98.6 °F (37 °C) (Oral) 18 98.7 °F (37.1 °C) BP Readings from Last 3 Encounters:  
01/10/19 126/85  
18 110/82  
18 113/72 Pulse Readings from Last 3 Encounters:  
01/10/19 99  
18 (!) 118  
18 (!) 118 Learning Assessment: 
:  
 
Learning Assessment 2017 PRIMARY LEARNER Patient Patient HIGHEST LEVEL OF EDUCATION - PRIMARY LEARNER  GRADUATED HIGH SCHOOL OR GED GRADUATED HIGH SCHOOL OR GED  
BARRIERS PRIMARY LEARNER NONE NONE PRIMARY LANGUAGE ENGLISH ENGLISH  
LEARNER PREFERENCE PRIMARY READING READING  
ANSWERED BY patient patient RELATIONSHIP SELF SELF Depression Screening: 
:  
 
PHQ over the last two weeks 2019 Little interest or pleasure in doing things Not at all Feeling down, depressed, irritable, or hopeless Not at all Total Score PHQ 2 0 Fall Risk Assessment: 
:  
 
Fall Risk Assessment, last 12 mths 2017 Able to walk? Yes Fall in past 12 months? No  
 
 
Abuse Screening: 
:  
 
Abuse Screening Questionnaire 2018 Do you ever feel afraid of your partner? N N N Are you in a relationship with someone who physically or mentally threatens you? N N N Is it safe for you to go home? Pradeep Landa Coordination of Care Questionnaire: 
:  
 
1) Have you been to an emergency room, urgent care clinic since your last visit? no  
Hospitalized since your last visit? no          
 
 2) Have you seen or consulted any other health care providers outside of [address] [place] [address] since your last visit? yes GYN Dr Teresa Menezes (Include any pap smears or colon screenings in this section.) 3) Do you have an Advance Directive on file? no 
Are you interested in receiving information about Advance Directives? no 
 
Reviewed record in preparation for visit and have obtained necessary documentation. Medication reconciliation up to date and corrected with patient at this time.

## 2019-01-31 ENCOUNTER — HOSPITAL ENCOUNTER (OUTPATIENT)
Dept: ULTRASOUND IMAGING | Age: 39
Discharge: HOME | End: 2019-01-31
Attending: INTERNAL MEDICINE
Payer: MEDICAID

## 2019-01-31 DIAGNOSIS — R10.32 LEFT LOWER QUADRANT PAIN: ICD-10-CM

## 2019-01-31 PROCEDURE — 76830 TRANSVAGINAL US NON-OB: CPT

## 2019-01-31 PROCEDURE — 76856 US EXAM PELVIC COMPLETE: CPT

## 2019-01-31 NOTE — PROGRESS NOTES
Patient continues to have a very large complex L-ovarian cyst which may be the cause of her pain. Little changed from 2012.  However, she should have this reviewed with her gynecologist and see if any further therapy (I.e. Surgery)

## 2019-02-01 ENCOUNTER — TELEPHONE (OUTPATIENT)
Dept: FAMILY MEDICINE CLINIC | Age: 39
End: 2019-02-01

## 2019-02-13 ENCOUNTER — OFFICE VISIT (OUTPATIENT)
Dept: FAMILY MEDICINE CLINIC | Age: 39
End: 2019-02-13

## 2019-02-13 VITALS
OXYGEN SATURATION: 98 % | TEMPERATURE: 98.4 F | RESPIRATION RATE: 16 BRPM | HEIGHT: 65 IN | SYSTOLIC BLOOD PRESSURE: 117 MMHG | HEART RATE: 115 BPM | WEIGHT: 206 LBS | BODY MASS INDEX: 34.32 KG/M2 | DIASTOLIC BLOOD PRESSURE: 84 MMHG

## 2019-02-13 DIAGNOSIS — Z87.09 HISTORY OF STREP PHARYNGITIS: ICD-10-CM

## 2019-02-13 DIAGNOSIS — H92.01 RIGHT EAR PAIN: ICD-10-CM

## 2019-02-13 DIAGNOSIS — H65.111 ACUTE MUCOID OTITIS MEDIA OF RIGHT EAR: Primary | ICD-10-CM

## 2019-02-13 LAB — S PYO AG THROAT QL: NEGATIVE

## 2019-02-13 RX ORDER — AMOXICILLIN AND CLAVULANATE POTASSIUM 875; 125 MG/1; MG/1
1 TABLET, FILM COATED ORAL 2 TIMES DAILY
Qty: 20 TAB | Refills: 0 | Status: SHIPPED | OUTPATIENT
Start: 2019-02-13 | End: 2019-02-23

## 2019-02-13 NOTE — PATIENT INSTRUCTIONS

## 2019-02-13 NOTE — PROGRESS NOTES
Identified pt with two pt identifiers(name and ). Chief Complaint   Patient presents with    Ear Pain     right and left ear pain over the past few days    Sore Throat     reports she has felt the need to clear her throat often         Health Maintenance Due   Topic    EYE EXAM RETINAL OR DILATED     DTaP/Tdap/Td series (2 - Td)    FOOT EXAM Q1        Wt Readings from Last 3 Encounters:   19 206 lb (93.4 kg)   19 206 lb 3.2 oz (93.5 kg)   01/10/19 205 lb (93 kg)     Temp Readings from Last 3 Encounters:   19 98.4 °F (36.9 °C) (Oral)   19 98.5 °F (36.9 °C) (Oral)   01/10/19 98.7 °F (37.1 °C) (Oral)     BP Readings from Last 3 Encounters:   19 117/84   19 114/74   01/10/19 126/85     Pulse Readings from Last 3 Encounters:   19 (!) 115   19 99   01/10/19 99         Learning Assessment:  :     Learning Assessment 2017   PRIMARY LEARNER Patient Patient   HIGHEST LEVEL OF EDUCATION - PRIMARY LEARNER  GRADUATED HIGH SCHOOL OR GED GRADUATED HIGH SCHOOL OR GED   BARRIERS PRIMARY LEARNER NONE NONE   PRIMARY LANGUAGE ENGLISH ENGLISH   LEARNER PREFERENCE PRIMARY READING READING   ANSWERED BY patient patient   RELATIONSHIP SELF SELF       Depression Screening:  :     3 most recent PHQ Screens 2019   Little interest or pleasure in doing things Not at all   Feeling down, depressed, irritable, or hopeless Not at all   Total Score PHQ 2 0       Fall Risk Assessment:  :     Fall Risk Assessment, last 12 mths 2017   Able to walk? Yes   Fall in past 12 months? No       Abuse Screening:  :     Abuse Screening Questionnaire 2018   Do you ever feel afraid of your partner? N N N   Are you in a relationship with someone who physically or mentally threatens you? N N N   Is it safe for you to go home?  Christy Awan         Coordination of Care Questionnaire:  :     1) Have you been to an emergency room, urgent care clinic since your last visit? no Hospitalized since your last visit? no             2) Have you seen or consulted any other health care providers outside of [address] [place] [address] since your last visit? no  (Include any pap smears or colon screenings in this section.)    3) Do you have an Advance Directive on file? no  Are you interested in receiving information about Advance Directives? no    Patient is accompanied by self. Reviewed record in preparation for visit and have obtained necessary documentation. Medication reconciliation up to date and corrected with patient at this time. Order for POC Rapid Strep Testing placed per [facility] Cathy's verbal order.

## 2019-02-28 ENCOUNTER — TELEPHONE (OUTPATIENT)
Dept: FAMILY MEDICINE CLINIC | Age: 39
End: 2019-02-28

## 2019-03-04 RX ORDER — FENOFIBRATE 160 MG/1
TABLET ORAL
Qty: 30 TAB | Refills: 1 | Status: SHIPPED | OUTPATIENT
Start: 2019-03-04 | End: 2019-05-10 | Stop reason: SDUPTHER

## 2019-03-08 ENCOUNTER — HOSPITAL ENCOUNTER (OUTPATIENT)
Dept: NON INVASIVE DIAGNOSTICS | Age: 39
Discharge: HOME | End: 2019-03-08
Attending: OBSTETRICS & GYNECOLOGY
Payer: MEDICAID

## 2019-03-08 ENCOUNTER — HOSPITAL ENCOUNTER (OUTPATIENT)
Dept: PREADMISSION TESTING | Age: 39
Discharge: HOME | End: 2019-03-08
Payer: MEDICAID

## 2019-03-08 VITALS
SYSTOLIC BLOOD PRESSURE: 126 MMHG | TEMPERATURE: 99 F | RESPIRATION RATE: 18 BRPM | HEIGHT: 65 IN | DIASTOLIC BLOOD PRESSURE: 80 MMHG | BODY MASS INDEX: 34.99 KG/M2 | HEART RATE: 102 BPM | WEIGHT: 210 LBS | OXYGEN SATURATION: 98 %

## 2019-03-08 LAB
ANION GAP SERPL CALC-SCNC: 10 MMOL/L (ref 5–15)
ATRIAL RATE: 110 BPM
BUN SERPL-MCNC: 10 MG/DL (ref 6–20)
BUN/CREAT SERPL: 10 (ref 12–20)
CALCIUM SERPL-MCNC: 9.2 MG/DL (ref 8.5–10.1)
CALCULATED P AXIS, ECG09: 54 DEGREES
CALCULATED R AXIS, ECG10: 61 DEGREES
CALCULATED T AXIS, ECG11: -17 DEGREES
CHLORIDE SERPL-SCNC: 105 MMOL/L (ref 97–108)
CO2 SERPL-SCNC: 25 MMOL/L (ref 21–32)
CREAT SERPL-MCNC: 0.97 MG/DL (ref 0.55–1.02)
DIAGNOSIS, 93000: NORMAL
GLUCOSE SERPL-MCNC: 163 MG/DL (ref 65–100)
P-R INTERVAL, ECG05: 116 MS
POTASSIUM SERPL-SCNC: 4.3 MMOL/L (ref 3.5–5.1)
Q-T INTERVAL, ECG07: 344 MS
QRS DURATION, ECG06: 80 MS
QTC CALCULATION (BEZET), ECG08: 465 MS
SODIUM SERPL-SCNC: 140 MMOL/L (ref 136–145)
VENTRICULAR RATE, ECG03: 110 BPM

## 2019-03-08 PROCEDURE — 93005 ELECTROCARDIOGRAM TRACING: CPT

## 2019-03-08 PROCEDURE — 80048 BASIC METABOLIC PNL TOTAL CA: CPT

## 2019-03-08 PROCEDURE — 36415 COLL VENOUS BLD VENIPUNCTURE: CPT

## 2019-03-08 NOTE — PERIOP NOTES
[address] , [address] [facility]   PRE-ADMISSION TESTING    [phone]     Surgery Date:   Thursday, 3/14/19  [facility] [facility] staff will call you between 3 and 7pm the Wednesday before your surgery with your arrival time. Call [phone] after 7pm Wednesday if you did not receive this call. INSTRUCTIONS BEFORE YOUR SURGERY   When You  Arrive   Arrive at the [address] [facility] on the day of your surgery  Have your insurance card, photo ID, and any copayment (if needed)     Food   and   Drink   NO food or drink after midnight the night before surgery    This means NO water, gum, mints, coffee, juice, etc.  No alcohol (beer, wine, liquor) 24 hours before and after surgery     Medications to   TAKE   Morning of Surgery   MEDICATIONS TO TAKE THE MORNING OF SURGERY WITH A SIP OF WATER:    xyzal & omeprazole  You may use flonase, if needed. Headache medicine is ok, as well.    Medications  To  STOP      7 days before surgery    Non-Steroidal anti-inflammatory Drugs (NSAID's): for example, Ibuprofen (Advil, Motrin), Naproxen (Aleve)   Aspirin, if taking for pain    Herbal supplements, vitamins, and fish oil    Tylenol may be taken       [address] [place] [address]  If you shower the morning of surgery, please do not apply anything to your skin (lotions, powders, deodorant, or makeup, especially mascara)   Do not shave or trim anywhere 24 hours before surgery   Wear your hair loose or down; no pony-tails, buns, or metal hair clips   Wear loose, comfortable clothes   Leave money, valuables, and jewelry, including body piercings, at home     Going Home       or Spending the Night    SAME-DAY SURGERY: You must have a responsible adult drive you home and stay with you 24 hours after surgery   ADMITS: If your doctor is keeping you in the hospital after surgery, leave personal belongings/luggage in your car until you have a hospital room number. Hospital discharge time is 12 noon  Drivers must be here before 12 noon unless you are told differently   Special Instructions If you become ill before your surgery, please call surgeon to discuss. If a situation occurs and you are delayed the day of surgery, call [phone].

## 2019-03-13 ENCOUNTER — ANESTHESIA EVENT (OUTPATIENT)
Dept: SURGERY | Age: 39
End: 2019-03-13
Payer: MEDICAID

## 2019-03-14 ENCOUNTER — ANESTHESIA (OUTPATIENT)
Dept: SURGERY | Age: 39
End: 2019-03-14
Payer: MEDICAID

## 2019-03-14 ENCOUNTER — HOSPITAL ENCOUNTER (OUTPATIENT)
Age: 39
Setting detail: OUTPATIENT SURGERY
Discharge: HOME | End: 2019-03-14
Attending: OBSTETRICS & GYNECOLOGY | Admitting: OBSTETRICS & GYNECOLOGY
Payer: MEDICAID

## 2019-03-14 VITALS
DIASTOLIC BLOOD PRESSURE: 72 MMHG | HEIGHT: 65 IN | SYSTOLIC BLOOD PRESSURE: 130 MMHG | WEIGHT: 210.1 LBS | HEART RATE: 96 BPM | OXYGEN SATURATION: 96 % | BODY MASS INDEX: 35 KG/M2 | TEMPERATURE: 99 F | RESPIRATION RATE: 13 BRPM

## 2019-03-14 DIAGNOSIS — Z98.890 STATUS POST SURGERY: Primary | ICD-10-CM

## 2019-03-14 LAB
GLUCOSE BLD STRIP.AUTO-MCNC: 124 MG/DL (ref 65–100)
GLUCOSE BLD STRIP.AUTO-MCNC: 129 MG/DL (ref 65–100)
SERVICE CMNT-IMP: ABNORMAL
SERVICE CMNT-IMP: ABNORMAL

## 2019-03-14 PROCEDURE — 74011000250 HC RX REV CODE- 250

## 2019-03-14 PROCEDURE — 82962 GLUCOSE BLOOD TEST: CPT

## 2019-03-14 PROCEDURE — 77030008684 HC TU ET CUF COVD -B: Performed by: ANESTHESIOLOGY

## 2019-03-14 PROCEDURE — 77030034628 HC LIGASURE LAP SEAL DIV MD COVD -F: Performed by: OBSTETRICS & GYNECOLOGY

## 2019-03-14 PROCEDURE — 77030035051: Performed by: OBSTETRICS & GYNECOLOGY

## 2019-03-14 PROCEDURE — 77030010517 HC APPL SEAL FLOSEL BAXT -B: Performed by: OBSTETRICS & GYNECOLOGY

## 2019-03-14 PROCEDURE — 77030002933 HC SUT MCRYL J&J -A: Performed by: OBSTETRICS & GYNECOLOGY

## 2019-03-14 PROCEDURE — 77030008771 HC TU NG SALEM SUMP -A: Performed by: ANESTHESIOLOGY

## 2019-03-14 PROCEDURE — 74011250636 HC RX REV CODE- 250/636

## 2019-03-14 PROCEDURE — 76210000017 HC OR PH I REC 1.5 TO 2 HR: Performed by: OBSTETRICS & GYNECOLOGY

## 2019-03-14 PROCEDURE — 74011250637 HC RX REV CODE- 250/637: Performed by: OBSTETRICS & GYNECOLOGY

## 2019-03-14 PROCEDURE — 76060000035 HC ANESTHESIA 2 TO 2.5 HR: Performed by: OBSTETRICS & GYNECOLOGY

## 2019-03-14 PROCEDURE — 74011000250 HC RX REV CODE- 250: Performed by: OBSTETRICS & GYNECOLOGY

## 2019-03-14 PROCEDURE — 74011250636 HC RX REV CODE- 250/636: Performed by: ANESTHESIOLOGY

## 2019-03-14 PROCEDURE — 88305 TISSUE EXAM BY PATHOLOGIST: CPT

## 2019-03-14 PROCEDURE — 77030011640 HC PAD GRND REM COVD -A: Performed by: OBSTETRICS & GYNECOLOGY

## 2019-03-14 PROCEDURE — 77030031139 HC SUT VCRL2 J&J -A: Performed by: OBSTETRICS & GYNECOLOGY

## 2019-03-14 PROCEDURE — 77030035045 HC TRCR ENDOSC VRSPRT BLDLSS COVD -B: Performed by: OBSTETRICS & GYNECOLOGY

## 2019-03-14 PROCEDURE — 77030039266 HC ADH SKN EXOFIN S2SG -A: Performed by: OBSTETRICS & GYNECOLOGY

## 2019-03-14 PROCEDURE — 76210000021 HC REC RM PH II 0.5 TO 1 HR: Performed by: OBSTETRICS & GYNECOLOGY

## 2019-03-14 PROCEDURE — 74011000250 HC RX REV CODE- 250: Performed by: ANESTHESIOLOGY

## 2019-03-14 PROCEDURE — 77030008756 HC TU IRR SUC STRY -B: Performed by: OBSTETRICS & GYNECOLOGY

## 2019-03-14 PROCEDURE — 77030009852 HC PCH RTVR ENDOSC COVD -B: Performed by: OBSTETRICS & GYNECOLOGY

## 2019-03-14 PROCEDURE — 77030009848 HC PASSR SUT SET COOP -C: Performed by: OBSTETRICS & GYNECOLOGY

## 2019-03-14 PROCEDURE — 76010000131 HC OR TIME 2 TO 2.5 HR: Performed by: OBSTETRICS & GYNECOLOGY

## 2019-03-14 PROCEDURE — 77030020782 HC GWN BAIR PAWS FLX 3M -B

## 2019-03-14 PROCEDURE — 77030019908 HC STETH ESOPH SIMS -A: Performed by: ANESTHESIOLOGY

## 2019-03-14 PROCEDURE — 77030034850: Performed by: OBSTETRICS & GYNECOLOGY

## 2019-03-14 PROCEDURE — 77030020263 HC SOL INJ SOD CL0.9% LFCR 1000ML: Performed by: OBSTETRICS & GYNECOLOGY

## 2019-03-14 PROCEDURE — 77030035048 HC TRCR ENDOSC OPTCL COVD -B: Performed by: OBSTETRICS & GYNECOLOGY

## 2019-03-14 PROCEDURE — 77030037032 HC INSRT SCIS CLICKLLINE DISP STOR -B: Performed by: OBSTETRICS & GYNECOLOGY

## 2019-03-14 PROCEDURE — 77030018836 HC SOL IRR NACL ICUM -A: Performed by: OBSTETRICS & GYNECOLOGY

## 2019-03-14 RX ORDER — SODIUM CHLORIDE, SODIUM LACTATE, POTASSIUM CHLORIDE, CALCIUM CHLORIDE 600; 310; 30; 20 MG/100ML; MG/100ML; MG/100ML; MG/100ML
125 INJECTION, SOLUTION INTRAVENOUS CONTINUOUS
Status: DISCONTINUED | OUTPATIENT
Start: 2019-03-14 | End: 2019-03-14 | Stop reason: HOSPADM

## 2019-03-14 RX ORDER — DIPHENHYDRAMINE HYDROCHLORIDE 50 MG/ML
12.5 INJECTION, SOLUTION INTRAMUSCULAR; INTRAVENOUS AS NEEDED
Status: DISCONTINUED | OUTPATIENT
Start: 2019-03-14 | End: 2019-03-14 | Stop reason: HOSPADM

## 2019-03-14 RX ORDER — SUCCINYLCHOLINE CHLORIDE 20 MG/ML
INJECTION INTRAMUSCULAR; INTRAVENOUS AS NEEDED
Status: DISCONTINUED | OUTPATIENT
Start: 2019-03-14 | End: 2019-03-14 | Stop reason: HOSPADM

## 2019-03-14 RX ORDER — METOPROLOL TARTRATE 5 MG/5ML
1 INJECTION INTRAVENOUS ONCE
Status: DISCONTINUED | OUTPATIENT
Start: 2019-03-14 | End: 2019-03-14 | Stop reason: HOSPADM

## 2019-03-14 RX ORDER — PROPOFOL 10 MG/ML
INJECTION, EMULSION INTRAVENOUS AS NEEDED
Status: DISCONTINUED | OUTPATIENT
Start: 2019-03-14 | End: 2019-03-14 | Stop reason: HOSPADM

## 2019-03-14 RX ORDER — METOPROLOL TARTRATE 5 MG/5ML
1 INJECTION INTRAVENOUS ONCE
Status: COMPLETED | OUTPATIENT
Start: 2019-03-14 | End: 2019-03-14

## 2019-03-14 RX ORDER — OXYCODONE AND ACETAMINOPHEN 5; 325 MG/1; MG/1
1 TABLET ORAL
Qty: 20 TAB | Refills: 0 | Status: SHIPPED | OUTPATIENT
Start: 2019-03-14 | End: 2019-03-19

## 2019-03-14 RX ORDER — MIDAZOLAM HYDROCHLORIDE 1 MG/ML
INJECTION, SOLUTION INTRAMUSCULAR; INTRAVENOUS AS NEEDED
Status: DISCONTINUED | OUTPATIENT
Start: 2019-03-14 | End: 2019-03-14 | Stop reason: HOSPADM

## 2019-03-14 RX ORDER — FENTANYL CITRATE 50 UG/ML
INJECTION, SOLUTION INTRAMUSCULAR; INTRAVENOUS AS NEEDED
Status: DISCONTINUED | OUTPATIENT
Start: 2019-03-14 | End: 2019-03-14 | Stop reason: HOSPADM

## 2019-03-14 RX ORDER — NEOSTIGMINE METHYLSULFATE 1 MG/ML
INJECTION INTRAVENOUS AS NEEDED
Status: DISCONTINUED | OUTPATIENT
Start: 2019-03-14 | End: 2019-03-14 | Stop reason: HOSPADM

## 2019-03-14 RX ORDER — NALOXONE HYDROCHLORIDE 0.4 MG/ML
0.2 INJECTION, SOLUTION INTRAMUSCULAR; INTRAVENOUS; SUBCUTANEOUS
Status: DISCONTINUED | OUTPATIENT
Start: 2019-03-14 | End: 2019-03-14 | Stop reason: HOSPADM

## 2019-03-14 RX ORDER — LIDOCAINE HYDROCHLORIDE 10 MG/ML
0.1 INJECTION, SOLUTION EPIDURAL; INFILTRATION; INTRACAUDAL; PERINEURAL AS NEEDED
Status: DISCONTINUED | OUTPATIENT
Start: 2019-03-14 | End: 2019-03-14 | Stop reason: HOSPADM

## 2019-03-14 RX ORDER — HYDROMORPHONE HYDROCHLORIDE 2 MG/ML
.25-1 INJECTION, SOLUTION INTRAMUSCULAR; INTRAVENOUS; SUBCUTANEOUS
Status: DISCONTINUED | OUTPATIENT
Start: 2019-03-14 | End: 2019-03-14 | Stop reason: HOSPADM

## 2019-03-14 RX ORDER — BUPIVACAINE HYDROCHLORIDE AND EPINEPHRINE 2.5; 5 MG/ML; UG/ML
INJECTION, SOLUTION EPIDURAL; INFILTRATION; INTRACAUDAL; PERINEURAL AS NEEDED
Status: DISCONTINUED | OUTPATIENT
Start: 2019-03-14 | End: 2019-03-14 | Stop reason: HOSPADM

## 2019-03-14 RX ORDER — FLUMAZENIL 0.1 MG/ML
0.2 INJECTION INTRAVENOUS
Status: DISCONTINUED | OUTPATIENT
Start: 2019-03-14 | End: 2019-03-14 | Stop reason: HOSPADM

## 2019-03-14 RX ORDER — MIDAZOLAM HYDROCHLORIDE 1 MG/ML
2 INJECTION, SOLUTION INTRAMUSCULAR; INTRAVENOUS
Status: DISCONTINUED | OUTPATIENT
Start: 2019-03-14 | End: 2019-03-14 | Stop reason: HOSPADM

## 2019-03-14 RX ORDER — LIDOCAINE HYDROCHLORIDE 20 MG/ML
INJECTION, SOLUTION EPIDURAL; INFILTRATION; INTRACAUDAL; PERINEURAL AS NEEDED
Status: DISCONTINUED | OUTPATIENT
Start: 2019-03-14 | End: 2019-03-14 | Stop reason: HOSPADM

## 2019-03-14 RX ORDER — OXYCODONE AND ACETAMINOPHEN 5; 325 MG/1; MG/1
1 TABLET ORAL ONCE
Status: COMPLETED | OUTPATIENT
Start: 2019-03-14 | End: 2019-03-14

## 2019-03-14 RX ORDER — ROCURONIUM BROMIDE 10 MG/ML
INJECTION, SOLUTION INTRAVENOUS AS NEEDED
Status: DISCONTINUED | OUTPATIENT
Start: 2019-03-14 | End: 2019-03-14 | Stop reason: HOSPADM

## 2019-03-14 RX ORDER — HYDROMORPHONE HYDROCHLORIDE 2 MG/ML
INJECTION, SOLUTION INTRAMUSCULAR; INTRAVENOUS; SUBCUTANEOUS AS NEEDED
Status: DISCONTINUED | OUTPATIENT
Start: 2019-03-14 | End: 2019-03-14 | Stop reason: HOSPADM

## 2019-03-14 RX ORDER — ONDANSETRON 2 MG/ML
INJECTION INTRAMUSCULAR; INTRAVENOUS AS NEEDED
Status: DISCONTINUED | OUTPATIENT
Start: 2019-03-14 | End: 2019-03-14 | Stop reason: HOSPADM

## 2019-03-14 RX ORDER — GLYCOPYRROLATE 0.2 MG/ML
INJECTION INTRAMUSCULAR; INTRAVENOUS AS NEEDED
Status: DISCONTINUED | OUTPATIENT
Start: 2019-03-14 | End: 2019-03-14 | Stop reason: HOSPADM

## 2019-03-14 RX ORDER — METOPROLOL TARTRATE 5 MG/5ML
INJECTION INTRAVENOUS
Status: DISCONTINUED
Start: 2019-03-14 | End: 2019-03-14 | Stop reason: HOSPADM

## 2019-03-14 RX ADMIN — ONDANSETRON 4 MG: 2 INJECTION INTRAMUSCULAR; INTRAVENOUS at 11:31

## 2019-03-14 RX ADMIN — MIDAZOLAM HYDROCHLORIDE 1 MG: 1 INJECTION, SOLUTION INTRAMUSCULAR; INTRAVENOUS at 09:50

## 2019-03-14 RX ADMIN — ROCURONIUM BROMIDE 20 MG: 10 INJECTION, SOLUTION INTRAVENOUS at 09:55

## 2019-03-14 RX ADMIN — LIDOCAINE HYDROCHLORIDE 60 MG: 20 INJECTION, SOLUTION EPIDURAL; INFILTRATION; INTRACAUDAL; PERINEURAL at 09:50

## 2019-03-14 RX ADMIN — ROCURONIUM BROMIDE 5 MG: 10 INJECTION, SOLUTION INTRAVENOUS at 09:50

## 2019-03-14 RX ADMIN — SUCCINYLCHOLINE CHLORIDE 100 MG: 20 INJECTION INTRAMUSCULAR; INTRAVENOUS at 09:52

## 2019-03-14 RX ADMIN — FENTANYL CITRATE 100 MCG: 50 INJECTION, SOLUTION INTRAMUSCULAR; INTRAVENOUS at 09:55

## 2019-03-14 RX ADMIN — HYDROMORPHONE HYDROCHLORIDE 0.5 MG: 2 INJECTION INTRAMUSCULAR; INTRAVENOUS; SUBCUTANEOUS at 12:50

## 2019-03-14 RX ADMIN — DIPHENHYDRAMINE HYDROCHLORIDE 12.5 MG: 50 INJECTION INTRAMUSCULAR; INTRAVENOUS at 12:47

## 2019-03-14 RX ADMIN — LIDOCAINE HYDROCHLORIDE 0.1 ML: 10 INJECTION, SOLUTION EPIDURAL; INFILTRATION; INTRACAUDAL; PERINEURAL at 07:52

## 2019-03-14 RX ADMIN — MIDAZOLAM HYDROCHLORIDE 2 MG: 1 INJECTION, SOLUTION INTRAMUSCULAR; INTRAVENOUS at 09:44

## 2019-03-14 RX ADMIN — ROCURONIUM BROMIDE 10 MG: 10 INJECTION, SOLUTION INTRAVENOUS at 10:23

## 2019-03-14 RX ADMIN — METOPROLOL TARTRATE 1 MG: 5 INJECTION, SOLUTION INTRAVENOUS at 13:06

## 2019-03-14 RX ADMIN — PROPOFOL 180 MG: 10 INJECTION, EMULSION INTRAVENOUS at 09:52

## 2019-03-14 RX ADMIN — HYDROMORPHONE HYDROCHLORIDE 0.5 MG: 2 INJECTION INTRAMUSCULAR; INTRAVENOUS; SUBCUTANEOUS at 13:25

## 2019-03-14 RX ADMIN — NEOSTIGMINE METHYLSULFATE 3.5 MG: 1 INJECTION INTRAVENOUS at 11:51

## 2019-03-14 RX ADMIN — GLYCOPYRROLATE 0.5 MG: 0.2 INJECTION INTRAMUSCULAR; INTRAVENOUS at 11:51

## 2019-03-14 RX ADMIN — ROCURONIUM BROMIDE 20 MG: 10 INJECTION, SOLUTION INTRAVENOUS at 10:45

## 2019-03-14 RX ADMIN — SODIUM CHLORIDE, POTASSIUM CHLORIDE, SODIUM LACTATE AND CALCIUM CHLORIDE: 600; 310; 30; 20 INJECTION, SOLUTION INTRAVENOUS at 11:34

## 2019-03-14 RX ADMIN — METOPROLOL TARTRATE 1 MG: 5 INJECTION, SOLUTION INTRAVENOUS at 13:45

## 2019-03-14 RX ADMIN — FENTANYL CITRATE 100 MCG: 50 INJECTION, SOLUTION INTRAMUSCULAR; INTRAVENOUS at 09:51

## 2019-03-14 RX ADMIN — MIDAZOLAM HYDROCHLORIDE 2 MG: 1 INJECTION, SOLUTION INTRAMUSCULAR; INTRAVENOUS at 09:41

## 2019-03-14 RX ADMIN — OXYCODONE AND ACETAMINOPHEN 1 TABLET: 5; 325 TABLET ORAL at 14:19

## 2019-03-14 RX ADMIN — ROCURONIUM BROMIDE 10 MG: 10 INJECTION, SOLUTION INTRAVENOUS at 10:05

## 2019-03-14 RX ADMIN — ROCURONIUM BROMIDE 10 MG: 10 INJECTION, SOLUTION INTRAVENOUS at 11:27

## 2019-03-14 RX ADMIN — SODIUM CHLORIDE, POTASSIUM CHLORIDE, SODIUM LACTATE AND CALCIUM CHLORIDE: 600; 310; 30; 20 INJECTION, SOLUTION INTRAVENOUS at 08:30

## 2019-03-14 RX ADMIN — HYDROMORPHONE HYDROCHLORIDE 0.5 MG: 2 INJECTION INTRAMUSCULAR; INTRAVENOUS; SUBCUTANEOUS at 13:15

## 2019-03-14 RX ADMIN — HYDROMORPHONE HYDROCHLORIDE 1 MG: 2 INJECTION, SOLUTION INTRAMUSCULAR; INTRAVENOUS; SUBCUTANEOUS at 11:27

## 2019-03-14 RX ADMIN — ROCURONIUM BROMIDE 5 MG: 10 INJECTION, SOLUTION INTRAVENOUS at 10:16

## 2019-03-14 RX ADMIN — HYDROMORPHONE HYDROCHLORIDE 0.5 MG: 2 INJECTION, SOLUTION INTRAMUSCULAR; INTRAVENOUS; SUBCUTANEOUS at 10:27

## 2019-03-14 RX ADMIN — FENTANYL CITRATE 50 MCG: 50 INJECTION, SOLUTION INTRAMUSCULAR; INTRAVENOUS at 09:50

## 2019-03-14 RX ADMIN — SODIUM CHLORIDE, SODIUM LACTATE, POTASSIUM CHLORIDE, AND CALCIUM CHLORIDE 125 ML/HR: 600; 310; 30; 20 INJECTION, SOLUTION INTRAVENOUS at 07:43

## 2019-03-14 RX ADMIN — HYDROMORPHONE HYDROCHLORIDE 0.5 MG: 2 INJECTION, SOLUTION INTRAMUSCULAR; INTRAVENOUS; SUBCUTANEOUS at 10:58

## 2019-03-14 RX ADMIN — ROCURONIUM BROMIDE 20 MG: 10 INJECTION, SOLUTION INTRAVENOUS at 11:06

## 2019-03-14 RX ADMIN — HYDROMORPHONE HYDROCHLORIDE 0.5 MG: 2 INJECTION INTRAMUSCULAR; INTRAVENOUS; SUBCUTANEOUS at 12:38

## 2019-03-14 NOTE — ANESTHESIA PREPROCEDURE EVALUATION
Anesthetic History   No history of anesthetic complications            Review of Systems / Medical History  Patient summary reviewed and nursing notes reviewed    Pulmonary            Asthma : well controlled    Comments: Seasonal asthma: no meds for years   Neuro/Psych             Comments: Frequent headaches  Migraines: last 1 month ago Cardiovascular            Dysrhythmias : SVT      Exercise tolerance: >4 METS     GI/Hepatic/Renal  Within defined limits              Endo/Other    Diabetes: poorly controlled, type 2    Obesity     Other Findings              Physical Exam    Airway  Mallampati: II    Neck ROM: normal range of motion   Mouth opening: Normal     Cardiovascular    Rhythm: regular  Rate: normal         Dental  No notable dental hx       Pulmonary  Breath sounds clear to auscultation               Abdominal         Other Findings            Anesthetic Plan    ASA: 2  Anesthesia type: general          Induction: Intravenous  Anesthetic plan and risks discussed with: Patient      Informed consent obtained.

## 2019-03-14 NOTE — DISCHARGE INSTRUCTIONS
Discharge Instructions for outpatient GYN surgery    Patient ID:  Tobias Pacheco  [id number]  34 y.o.  1980    Take Home Medications       Follow-up with Dr Woo Pedro in 2-4 weeks, call office for appointment, [phone]    Follow-up care is a key part of your treatment and safety. Be sure to schedule and keep all recommended follow up appointments    What to Expect at [address] [facility] might feel a bit sleepy, groggy or nauseous today because of the anesthetic or pain medication you received. Do not drive today. These symptoms should resolve by tomorrow. You will probably feel some cramping over the next day or two- this is normal.  You may take an over-the-counter pain medication such as Tylenol, Aleve, Motrin, Advil (ibuprofen) or you may have been given a prescription pain medication. Try to limit use of prescription pain medication to just a day or two, as they can cause constipation. You will probably experience some light vaginal bleeding or spotting. This is normal.     How can you care for yourself at home? Activity  Pelvic rest for 2-4 weeks (nothing in your vagina such as tampons, intercourse or douching)  You man return to work and normal activities tomorrow or the next day. If you are feeling well, you can also resume exercise. If you are having pain or not feeling well, you should wait a few days before exercising. Diet  Regular diet as tolerated  Drink plenty of fluids    Medicines  Take pain medicines as directed by your doctor. If you a prescription for pain medicine, take as needed . If you are not taking a prescription pain medicine, take an over-the-counter medicine such as acetaminophen (Tylenol), ibuprofen (Advil, Motrin), or naproxen (Aleve). Read and follow all instructions on the label. Do not take two or more pain medicines at the same time unless the doctor told you to. Many pain medicines contain acetaminophen, which is Tylenol.  Too much Tylenol can be harmful. If your doctor prescribed antibiotics, take them as directed. Do not stop taking them just because you feel better. If you think your pain medicine is making your stomach upset:  Take your medicine after meals (unless your doctor tells you not to). Ask your doctor for a different pain medicine. Call your doctor  or seek immediate medical care if you have:  bright red vaginal bleeding that soaks one or more pads in an hour, or you have large clots. foul-smelling discharge from your vagina. persistent nausea and vomiting  pain that does not get better after you take pain medicine. signs of infection, such as: Increased pain, swelling, warmth, or redness. A persistent fever of 101.5 F  signs of a blood clot, such as:  Pain, redness or swelling in your lower extremeties  You have trouble passing urine or stool, especially if you have pain or swelling in your lower belly. hot flashes, sweating, flushing, or a fast or pounding heartbeat.:  no bowel movement after taking a laxative. loss of consciousness  sudden chest pain and shortness of breath, or you cough up blood. persistent abdominal pain despite taking pain medication        DISCHARGE SUMMARY from your Nurse    The following personal items collected during your admission are returned to you:   Dental Appliance: Dental Appliances: None  Vision:    Hearing Aid:    Jewelry:    Clothing: Clothing: (denies valuables. clothing to locker)  Other Valuables:    Valuables sent to safe:      PATIENT INSTRUCTIONS:    After general anesthesia or intravenous sedation, for 24 hours or while taking prescription Narcotics:  · Limit your activities  · Do not drive and operate hazardous machinery  · Do not make important personal or business decisions  · Do  not drink alcoholic beverages  · If you have not urinated within 8 hours after discharge, please contact your surgeon on call.     Report the following to your surgeon:  · Excessive pain, swelling, redness or odor of or around the surgical area  · Temperature over 100.5  · Nausea and vomiting lasting longer than 4 hours or if unable to take medications  · Any signs of decreased circulation or nerve impairment to extremity: change in color, persistent  numbness, tingling, coldness or increase pain  · Any questions    COUGH AND DEEP BREATHE    Breathing deep and coughing are very important exercises to do after surgery. Deep breathing and coughing open the little air tubes and air sacks in your lungs. You take deep breaths every day. You may not even notice - it is just something you do when you sigh or yawn. It is a natural exercise you do to keep these air passages open. After surgery, take deep breaths and cough, on purpose. Coughing and deep breathing help prevent bronchitis and pneumonia after surgery. If you had chest or belly surgery, use a pillow as a \"hug thomas\" and hold it tightly to your chest or belly when you cough. DIRECTIONS:  6. Take 10 to 15 slow deep breaths every hour while awake. 7. Breathe in deeply, and hold it for 2 seconds. 8. Exhale slowly through puckered lips, like blowing up a balloon. 9. After every 4th or 5th deep breath, hug your pillow to your chest or belly and give a hard, deep cough. Yes, it will probably hurt. But doing this exercise is very important part of healing after surgery. Take your pain medicine to help you do this exercise without too much pain. IF YOU HAVE BEEN DIAGNOSED WITH SLEEP APNEA, PLEASE USE YOUR SLEEP APNEA DEVICE OR CPAP MACHINE WHEN YOU INTEND TO NAP AFTER TAKING PAIN MEDICATION. Ankle Pumps    Ankle pumps increase the circulation of oxygenated blood to your lower extremities and decrease your risk for circulation problems such as blood clots. They also stretch the muscles, tendons and ligaments in your foot and ankle, and prevent joint contracture in the ankle and foot, especially after surgeries on the legs.     It is important to do ankle pump exercises regularly after surgery because immobility increases your risk for developing a blood clot. Your doctor may also have you take an Aspirin for the next few days as well. If your doctor did not ask you to take an Aspirin, consult with him before starting Aspirin therapy on your own. Slowly point your foot forward, feeling the muscles on the top of your lower leg stretch, and hold this position for 5 seconds. Next, pull your foot back toward you as far as possible, stretching the calf muscles, and hold that position for 5 seconds. Repeat with the other foot. Perform 10 repetitions every hour while awake for both ankles if possible (down and then up with the foot once is one repetition). You should feel gentle stretching of the muscles in your lower leg when doing this exercise. If you feel pain, or your range of motion is limited, don't  Push too hard. Only go the limit your joint and muscles will let you go. If you have increasing pain, progressively worsening leg warmth or swelling, STOP the exercise and call your doctor. Below is information about the medications your doctor is prescribing after your visit:    Other information in your discharge envelope:  []     PRESCRIPTIONS  []     PHYSICAL THERAPY PRESCRIPTION  []     APPOINTMENT CARDS  []     Regional Anesthesia Pamphlet for block or block with On-Q Catheter from Anesthesia Service  []     Medical device information sheets/pamphlets from their    []     School/work excuse note. []     /parent work excuse note. These are general instructions for a healthy lifestyle:    *  Please give a list of your current medications to your Primary Care Provider. *  Please update this list whenever your medications are discontinued, doses are      changed, or new medications (including over-the-counter products) are added.   *  Please carry medication information at all times in case of emergency situations. About Smoking  No smoking / No tobacco products / Avoid exposure to second hand smoke    Surgeon General's Warning:  Quitting smoking now greatly reduces serious risk to your health. Obesity, smoking, and sedentary lifestyle greatly increases your risk for illness and disease. A healthy diet, regular physical exercise & weight monitoring are important for maintaining a healthy lifestyle. Congestive Heart Failure  You may be retaining fluid if you have a history of heart failure or if you experience any of the following symptoms:  Weight gain of 3 pounds or more overnight or 5 pounds in a week, increased swelling in our hands or feet or shortness of breath while lying flat in bed. Please call your doctor as soon as you notice any of these symptoms; do not wait until your next office visit. Recognize signs and symptoms of STROKE:  F - face looks uneven  A - arms unable to move or move even  S - speech slurred or non-existent  T - time-call 911 as soon as signs and symptoms begin-DO NOT go         Back to bed or wait to see if you get better-TIME IS BRAIN. Warning signs of HEART ATTACK  Call 911 if you have these symptoms    · Chest discomfort. Most heart attacks involve discomfort in the center of the chest that lasts more than a few minutes, or that goes away and comes back. It can feel like uncomfortable pressure, squeezing, fullness, or pain. · Discomfort in other areas of the upper body. Symptoms can include pain or discomfort in one or both        Arms, the back, neck, jaw, or stomach. ·  Shortness of breath with or without chest discomfort. · Other signs may include breaking out in a cold sweat, nausea, or lightheadedness    Don't wait more than five minutes to call 911 - MINUTES MATTER! Fast action can save your life. Calling 911 is almost always the fastest way to get lifesaving treatment.   Emergency Medical Services staff can begin treatment when they arrive - up to an hour sooner than if someone gets to the hospital by car. JAGRUTI PAZ MEDICATION AND SIDE EFFECT GUIDE    The [facility] MEDICATION AND SIDE EFFECT GUIDE was provided to the PATIENT AND CARE PROVIDER.   Information provided includes instruction about drug purpose and common side effects for the following medications:    Percocet

## 2019-03-14 NOTE — ANESTHESIA POSTPROCEDURE EVALUATION
Procedure(s):  LAPAROSCOPIC LEFT SALPINGOOPHRECTOMY/ CYSTECTOMY, LYSIS OF ADHESIONS.     Anesthesia Post Evaluation      Multimodal analgesia: multimodal analgesia not used between 6 hours prior to anesthesia start to PACU discharge  Patient location during evaluation: PACU  Patient participation: complete - patient participated  Level of consciousness: awake and alert  Pain score: 2  Pain management: satisfactory to patient  Airway patency: patent  Anesthetic complications: no  Cardiovascular status: acceptable  Respiratory status: acceptable  Hydration status: acceptable  Post anesthesia nausea and vomiting:  none      Visit Vitals  /74   Pulse (!) 110   Temp 37.2 °C (99 °F)   Resp 15   Ht 5' 5\" (1.651 m)   Wt 95.3 kg (210 lb 1.6 oz)   SpO2 98%   BMI 34.96 kg/m²

## 2019-03-14 NOTE — BRIEF OP NOTE
BRIEF OPERATIVE NOTE    Date of Procedure: 3/14/2019   Preoperative Diagnosis: LEFT OVARIAN CYST  Postoperative Diagnosis: LEFT OVARIAN CYST    Procedure(s):  LAPAROSCOPIC LEFT SALPINGOOPHRECTOMY/ CYSTECTOMY, LYSIS OF ADHESIONS  Surgeon(s) and Role:     * Whitney Martinez MD - Primary     * Debbie Berman DO - Assisting         Surgical Staff:  Circ-1: Bailey Li RN  Scrub Tech-1: Mercedes FLORES  Surg Asst-1: Micah Smith  Event Time In Time Out   Incision Start 1018    Incision Close 1155      Anesthesia: General   Estimated Blood Loss: 5ml   Specimens:   ID Type Source Tests Collected by Time Destination   1 : Left fallopian tube and left ovary Preservative Fallopian Tube  Nesha Lopez MD 3/14/2019 1133 Pathology      Findings: Extensive intraabdominal adhesive disease, enlarged left ovary    Complications: None   Implants: * No implants in log *    Solomon Rogers MD  [facility]

## 2019-03-15 ENCOUNTER — APPOINTMENT (OUTPATIENT)
Dept: GENERAL RADIOLOGY | Age: 39
End: 2019-03-15
Attending: NURSE PRACTITIONER
Payer: MEDICAID

## 2019-03-15 ENCOUNTER — HOSPITAL ENCOUNTER (EMERGENCY)
Age: 39
Discharge: HOME | End: 2019-03-16
Attending: EMERGENCY MEDICINE
Payer: MEDICAID

## 2019-03-15 ENCOUNTER — APPOINTMENT (OUTPATIENT)
Dept: CT IMAGING | Age: 39
End: 2019-03-15
Attending: NURSE PRACTITIONER
Payer: MEDICAID

## 2019-03-15 DIAGNOSIS — E11.9 TYPE 2 DIABETES MELLITUS WITHOUT COMPLICATION, WITHOUT LONG-TERM CURRENT USE OF INSULIN (HCC): ICD-10-CM

## 2019-03-15 DIAGNOSIS — R06.2 WHEEZING: Primary | ICD-10-CM

## 2019-03-15 DIAGNOSIS — K59.00 CONSTIPATION, UNSPECIFIED CONSTIPATION TYPE: ICD-10-CM

## 2019-03-15 DIAGNOSIS — G89.18 POST-OP PAIN: ICD-10-CM

## 2019-03-15 LAB
ALBUMIN SERPL-MCNC: 3.6 G/DL (ref 3.5–5)
ALBUMIN/GLOB SERPL: 1 {RATIO} (ref 1.1–2.2)
ALP SERPL-CCNC: 59 U/L (ref 45–117)
ALT SERPL-CCNC: 26 U/L (ref 12–78)
ANION GAP SERPL CALC-SCNC: 7 MMOL/L (ref 5–15)
AST SERPL-CCNC: 11 U/L (ref 15–37)
BASOPHILS # BLD: 0.1 K/UL (ref 0–0.1)
BASOPHILS NFR BLD: 1 % (ref 0–1)
BILIRUB SERPL-MCNC: 0.2 MG/DL (ref 0.2–1)
BUN SERPL-MCNC: 10 MG/DL (ref 6–20)
BUN/CREAT SERPL: 11 (ref 12–20)
CALCIUM SERPL-MCNC: 8.5 MG/DL (ref 8.5–10.1)
CHLORIDE SERPL-SCNC: 107 MMOL/L (ref 97–108)
CO2 SERPL-SCNC: 26 MMOL/L (ref 21–32)
COMMENT, HOLDF: NORMAL
CREAT SERPL-MCNC: 0.91 MG/DL (ref 0.55–1.02)
DIFFERENTIAL METHOD BLD: ABNORMAL
EOSINOPHIL # BLD: 0.1 K/UL (ref 0–0.4)
EOSINOPHIL NFR BLD: 1 % (ref 0–7)
ERYTHROCYTE [DISTWIDTH] IN BLOOD BY AUTOMATED COUNT: 12.9 % (ref 11.5–14.5)
FLUAV AG NPH QL IA: NEGATIVE
FLUBV AG NOSE QL IA: NEGATIVE
GLOBULIN SER CALC-MCNC: 3.5 G/DL (ref 2–4)
GLUCOSE SERPL-MCNC: 170 MG/DL (ref 65–100)
HCT VFR BLD AUTO: 41.2 % (ref 35–47)
HGB BLD-MCNC: 13.4 G/DL (ref 11.5–16)
IMM GRANULOCYTES # BLD AUTO: 0.1 K/UL (ref 0–0.04)
IMM GRANULOCYTES NFR BLD AUTO: 1 % (ref 0–0.5)
LACTATE SERPL-SCNC: 1.8 MMOL/L (ref 0.4–2)
LYMPHOCYTES # BLD: 2.9 K/UL (ref 0.8–3.5)
LYMPHOCYTES NFR BLD: 30 % (ref 12–49)
MCH RBC QN AUTO: 28.7 PG (ref 26–34)
MCHC RBC AUTO-ENTMCNC: 32.5 G/DL (ref 30–36.5)
MCV RBC AUTO: 88.2 FL (ref 80–99)
MONOCYTES # BLD: 0.9 K/UL (ref 0–1)
MONOCYTES NFR BLD: 9 % (ref 5–13)
NEUTS SEG # BLD: 5.6 K/UL (ref 1.8–8)
NEUTS SEG NFR BLD: 58 % (ref 32–75)
NRBC # BLD: 0 K/UL (ref 0–0.01)
NRBC BLD-RTO: 0 PER 100 WBC
PLATELET # BLD AUTO: 241 K/UL (ref 150–400)
PMV BLD AUTO: 11.3 FL (ref 8.9–12.9)
POTASSIUM SERPL-SCNC: 3.8 MMOL/L (ref 3.5–5.1)
PROT SERPL-MCNC: 7.1 G/DL (ref 6.4–8.2)
RBC # BLD AUTO: 4.67 M/UL (ref 3.8–5.2)
SAMPLES BEING HELD,HOLD: NORMAL
SODIUM SERPL-SCNC: 140 MMOL/L (ref 136–145)
WBC # BLD AUTO: 9.7 K/UL (ref 3.6–11)

## 2019-03-15 PROCEDURE — 96375 TX/PRO/DX INJ NEW DRUG ADDON: CPT

## 2019-03-15 PROCEDURE — 77030029684 HC NEB SM VOL KT MONA -A

## 2019-03-15 PROCEDURE — 96374 THER/PROPH/DIAG INJ IV PUSH: CPT

## 2019-03-15 PROCEDURE — 83605 ASSAY OF LACTIC ACID: CPT

## 2019-03-15 PROCEDURE — 36415 COLL VENOUS BLD VENIPUNCTURE: CPT

## 2019-03-15 PROCEDURE — 85025 COMPLETE CBC W/AUTO DIFF WBC: CPT

## 2019-03-15 PROCEDURE — 99284 EMERGENCY DEPT VISIT MOD MDM: CPT

## 2019-03-15 PROCEDURE — 71046 X-RAY EXAM CHEST 2 VIEWS: CPT

## 2019-03-15 PROCEDURE — 74011000250 HC RX REV CODE- 250: Performed by: NURSE PRACTITIONER

## 2019-03-15 PROCEDURE — 74011250637 HC RX REV CODE- 250/637: Performed by: NURSE PRACTITIONER

## 2019-03-15 PROCEDURE — 80053 COMPREHEN METABOLIC PANEL: CPT

## 2019-03-15 PROCEDURE — 74011250636 HC RX REV CODE- 250/636: Performed by: NURSE PRACTITIONER

## 2019-03-15 PROCEDURE — 74177 CT ABD & PELVIS W/CONTRAST: CPT

## 2019-03-15 PROCEDURE — 94640 AIRWAY INHALATION TREATMENT: CPT

## 2019-03-15 PROCEDURE — 96361 HYDRATE IV INFUSION ADD-ON: CPT

## 2019-03-15 PROCEDURE — 87804 INFLUENZA ASSAY W/OPTIC: CPT

## 2019-03-15 RX ORDER — IBUPROFEN 800 MG/1
800 TABLET ORAL
Status: COMPLETED | OUTPATIENT
Start: 2019-03-15 | End: 2019-03-15

## 2019-03-15 RX ORDER — IPRATROPIUM BROMIDE AND ALBUTEROL SULFATE 2.5; .5 MG/3ML; MG/3ML
3 SOLUTION RESPIRATORY (INHALATION)
Status: COMPLETED | OUTPATIENT
Start: 2019-03-15 | End: 2019-03-15

## 2019-03-15 RX ORDER — ONDANSETRON 2 MG/ML
4 INJECTION INTRAMUSCULAR; INTRAVENOUS
Status: COMPLETED | OUTPATIENT
Start: 2019-03-15 | End: 2019-03-15

## 2019-03-15 RX ORDER — MORPHINE SULFATE 4 MG/ML
2 INJECTION INTRAVENOUS
Status: COMPLETED | OUTPATIENT
Start: 2019-03-15 | End: 2019-03-15

## 2019-03-15 RX ORDER — SIMVASTATIN 20 MG/1
TABLET, FILM COATED ORAL
Qty: 90 TAB | Refills: 1 | Status: SHIPPED | OUTPATIENT
Start: 2019-03-15 | End: 2019-09-04 | Stop reason: SDUPTHER

## 2019-03-15 RX ADMIN — MORPHINE SULFATE 2 MG: 4 INJECTION, SOLUTION INTRAMUSCULAR; INTRAVENOUS at 23:55

## 2019-03-15 RX ADMIN — ONDANSETRON 4 MG: 2 INJECTION INTRAMUSCULAR; INTRAVENOUS at 23:54

## 2019-03-15 RX ADMIN — IBUPROFEN 800 MG: 800 TABLET, FILM COATED ORAL at 23:56

## 2019-03-15 RX ADMIN — IPRATROPIUM BROMIDE AND ALBUTEROL SULFATE 3 ML: .5; 3 SOLUTION RESPIRATORY (INHALATION) at 23:56

## 2019-03-15 RX ADMIN — SODIUM CHLORIDE 1000 ML: 900 INJECTION, SOLUTION INTRAVENOUS at 23:53

## 2019-03-16 VITALS
SYSTOLIC BLOOD PRESSURE: 131 MMHG | DIASTOLIC BLOOD PRESSURE: 73 MMHG | RESPIRATION RATE: 16 BRPM | HEIGHT: 65 IN | OXYGEN SATURATION: 98 % | TEMPERATURE: 98.6 F | HEART RATE: 100 BPM | WEIGHT: 201 LBS | BODY MASS INDEX: 33.49 KG/M2

## 2019-03-16 LAB
APPEARANCE UR: CLEAR
BACTERIA URNS QL MICRO: ABNORMAL /HPF
BILIRUB UR QL: NEGATIVE
COLOR UR: ABNORMAL
EPITH CASTS URNS QL MICRO: ABNORMAL /LPF
GLUCOSE UR STRIP.AUTO-MCNC: >1000 MG/DL
HGB UR QL STRIP: NEGATIVE
KETONES UR QL STRIP.AUTO: NEGATIVE MG/DL
LEUKOCYTE ESTERASE UR QL STRIP.AUTO: NEGATIVE
NITRITE UR QL STRIP.AUTO: NEGATIVE
PH UR STRIP: 5.5 [PH] (ref 5–8)
PROT UR STRIP-MCNC: NEGATIVE MG/DL
RBC #/AREA URNS HPF: ABNORMAL /HPF (ref 0–5)
SP GR UR REFRACTOMETRY: 1.01 (ref 1–1.03)
UR CULT HOLD, URHOLD: NORMAL
UROBILINOGEN UR QL STRIP.AUTO: 0.2 EU/DL (ref 0.2–1)
WBC URNS QL MICRO: ABNORMAL /HPF (ref 0–4)
YEAST URNS QL MICRO: PRESENT

## 2019-03-16 PROCEDURE — 81001 URINALYSIS AUTO W/SCOPE: CPT

## 2019-03-16 PROCEDURE — 74011636320 HC RX REV CODE- 636/320: Performed by: EMERGENCY MEDICINE

## 2019-03-16 PROCEDURE — 74011250637 HC RX REV CODE- 250/637: Performed by: NURSE PRACTITIONER

## 2019-03-16 RX ORDER — OXYCODONE AND ACETAMINOPHEN 5; 325 MG/1; MG/1
2 TABLET ORAL
Status: COMPLETED | OUTPATIENT
Start: 2019-03-16 | End: 2019-03-16

## 2019-03-16 RX ORDER — POLYETHYLENE GLYCOL 3350 17 G/17G
17 POWDER, FOR SOLUTION ORAL 2 TIMES DAILY
Qty: 289 G | Refills: 0 | Status: SHIPPED | OUTPATIENT
Start: 2019-03-16 | End: 2019-08-01

## 2019-03-16 RX ORDER — IBUPROFEN 800 MG/1
800 TABLET ORAL
Qty: 20 TAB | Refills: 0 | Status: SHIPPED | OUTPATIENT
Start: 2019-03-16 | End: 2019-03-23

## 2019-03-16 RX ORDER — AMOXICILLIN 250 MG
3 CAPSULE ORAL DAILY
Qty: 21 TAB | Refills: 0 | Status: SHIPPED | OUTPATIENT
Start: 2019-03-16 | End: 2019-08-01

## 2019-03-16 RX ADMIN — OXYCODONE AND ACETAMINOPHEN 2 TABLET: 5; 325 TABLET ORAL at 01:14

## 2019-03-16 RX ADMIN — IOPAMIDOL 100 ML: 755 INJECTION, SOLUTION INTRAVENOUS at 00:30

## 2019-03-16 NOTE — DISCHARGE INSTRUCTIONS
Patient Education     - use your inhaler as needed for cough/ wheezing  - take Miralax to assist w/ having daily BM- may also use Senna/ Doc (laxative/ stool softener) while taking Percocet   - Take Percocet as directed by your PCP      Constipation: Care Instructions  Your Care Instructions    Constipation means that you have a hard time passing stools (bowel movements). People pass stools from 3 times a day to once every 3 days. What is normal for you may be different. Constipation may occur with pain in the rectum and cramping. The pain may get worse when you try to pass stools. Sometimes there are small amounts of bright red blood on toilet paper or the surface of stools. This is because of enlarged veins near the rectum (hemorrhoids). A few changes in your diet and lifestyle may help you avoid ongoing constipation. Your doctor may also prescribe medicine to help loosen your stool. Some medicines can cause constipation. These include pain medicines and antidepressants. Tell your doctor about all the medicines you take. Your doctor may want to make a medicine change to ease your symptoms. Follow-up care is a key part of your treatment and safety. Be sure to make and go to all appointments, and call your doctor if you are having problems. It's also a good idea to know your test results and keep a list of the medicines you take. How can you care for yourself at home? · Drink plenty of fluids, enough so that your urine is light yellow or clear like water. If you have kidney, heart, or liver disease and have to limit fluids, talk with your doctor before you increase the amount of fluids you drink. · Include high-fiber foods in your diet each day. These include fruits, vegetables, beans, and whole grains. · Get at least 30 minutes of exercise on most days of the week. Walking is a good choice. You also may want to do other activities, such as running, swimming, cycling, or playing tennis or team sports.   · Take a fiber supplement, such as Citrucel or Metamucil, every day. Read and follow all instructions on the label. · Schedule time each day for a bowel movement. A daily routine may help. Take your time having your bowel movement. · Support your feet with a small step stool when you sit on the toilet. This helps flex your hips and places your pelvis in a squatting position. · Your doctor may recommend an over-the-counter laxative to relieve your constipation. Examples are Milk of Magnesia and MiraLax. Read and follow all instructions on the label. Do not use laxatives on a long-term basis. When should you call for help? Call your doctor now or seek immediate medical care if:    · You have new or worse belly pain.     · You have new or worse nausea or vomiting.     · You have blood in your stools.    Watch closely for changes in your health, and be sure to contact your doctor if:    · Your constipation is getting worse.     · You do not get better as expected. Where can you learn more? Go to http://hodan-eugene.info/. Enter 21 [id number] in the search box to learn more about \"Constipation: Care Instructions. \"  Current as of: September 23, 2018  Content Version: 11.9  © 3689-2297 [employer]. Care instructions adapted under license by [employer] (which disclaims liability or warranty for this information). If you have questions about a medical condition or this instruction, always ask your healthcare professional. [place] [address] any warranty or liability for your use of this information. Patient Education        Acute Pain After Surgery: Care Instructions  Your Care Instructions    It's common to have some pain after surgery. Pain doesn't mean that something is wrong or that the surgery didn't go well. But when the pain is severe, it's important to work with your doctor to manage it.  It's also important to be aware of a few facts about pain and pain medicine. · You are the only person who knows what your pain feels like. So be sure to tell your doctor when you are in pain or when the pain changes. Then he or she will know how to adjust your medicines. · Pain is often easier to control right after it starts. So it may be better to take regular doses of pain medicine and not wait until the pain gets bad. · Medicine can help control pain. But this doesn't mean you'll have no pain. Medicine works to keep the pain at a level you can live with. With time, you will feel better. Follow-up care is a key part of your treatment and safety. Be sure to make and go to all appointments, and call your doctor if you are having problems. It's also a good idea to know your test results and keep a list of the medicines you take. How can you care for yourself at home? · Be safe with medicines. Read and follow all instructions on the label. ? If the doctor gave you a prescription medicine for pain, take it as prescribed. ? If you are not taking a prescription pain medicine, ask your doctor if you can take an over-the-counter medicine. · If you take an over-the-counter pain medicine, such as acetaminophen (Tylenol), ibuprofen (Advil, Motrin), or naproxen (Aleve), read and follow all instructions on the label. · Do not take two or more pain medicines at the same time unless the doctor told you to. · Do not drink alcohol while you are taking pain medicines. · Try to walk each day if your doctor recommends it. Start by walking a little more than you did the day before. Bit by bit, increase the amount you walk. Walking increases blood flow. It also helps prevent pneumonia and constipation. · To prevent constipation from opioid pain medicines:  ? Talk to your doctor about a laxative. ? Include fruits, vegetables, beans, and whole grains in your diet each day. These foods are high in fiber.   ? Drink plenty of fluids, enough so that your urine is light yellow or clear like water. Drink water, fruit juice, or other drinks that do not contain caffeine or alcohol. If you have kidney, heart, or liver disease and have to limit fluids, talk with your doctor before you increase the amount of fluids you drink. ? Take a fiber supplement, such as Citrucel or Metamucil, every day if needed. Read and follow all instructions on the label. If you take pain medicine for more than a few days, talk to your doctor before you take fiber. When should you call for help? Call your doctor now or seek immediate medical care if:    · Your pain gets worse.     · Your pain is not controlled by medicine.    Watch closely for changes in your health, and be sure to contact your doctor if you have any problems. Where can you learn more? Go to http://hodan-eugene.info/. Enter [phone] 715-134 in the search box to learn more about \"Acute Pain After Surgery: Care Instructions. \"  Current as of: September 23, 2018  Content Version: 11.9  © 5895-5047 [employer]. Care instructions adapted under license by [employer] (which disclaims liability or warranty for this information). If you have questions about a medical condition or this instruction, always ask your healthcare professional. [place] [address] any warranty or liability for your use of this information.

## 2019-03-16 NOTE — ED PROVIDER NOTES
Marcie Stroud is a 34 y.o. female with Hx of asthma, DMII, GERD, HA, anxiety,tachycardia, HTN  who presents ambulatory w/ her SO to [facility] ED with cc of L sided abd pain, cough/ wheezing, body aches, and fevers (tMax 99). Pt reports discharge from [facility] yesterday post Marylee Sages w/ Dr. Carlos Teresa. States that she has ongoing L sided abdominal pain that only worsens w/ ROM since the surgery. Has taken Percocet- 1 tablet every 6 hours, but no Motrin/ NSAIDs WNR. She states that she also had body aches, cough w/ wheezing, and fever (Tmax 99) that began today. Pt reports \"I just don't feel well. \" Notes no BM x2d. No chills, N/V/D, rhinorrhea, congestion, CP, SOB, dysuria, urinary frequency/hesitancy, flank pain. (-) tobacco/ ETOH/ substance abuse. PCP: Alexia Cosme MD    There are no other complaints, changes or physical findings at this time. Past Medical History:   Diagnosis Date    Anxiety     Arrhythmia     HX TACHYCARDIA - NEG.  STRESS TEST 2013 PER PATIENT    Asthma     LAST EPISODE 2016    Diabetes ([facility] [address] 2008    NIDDM    Ear infection 11/30/2018    BILATERAL    GERD (gastroesophageal reflux disease)     Headache     Headache(784.0)     Hypertension     HX HTN - NO MEDS CURRENTLY(11-30-18)    Rash 7/14/2016    Recurrent umbilical hernia 10/85/7324    S/P dilatation and curettage     Tachycardia        Past Surgical History:   Procedure Laterality Date    HX APPENDECTOMY  2/2008    HX CHOLECYSTECTOMY  2001    HX DILATION AND CURETTAGE      HX GYN  3/2008    tubal ligation    HX HERNIA REPAIR  2/2009    HX HERNIA REPAIR  12/06/2018    open recurrent umbilical hernia repair    HX HYSTERECTOMY  03/2012    HX LUMBAR DISKECTOMY      2006    HX ORTHOPAEDIC  9/2006    ruptured discs    UPPER GI ENDOSCOPY,BIOPSY  5/11/2017              Family History:   Problem Relation Age of Onset    Heart Disease Mother     Hypertension Father     Cancer Father         Lung    Hypertension Sister     No Known Problems Brother     No Known Problems Brother     No Known Problems Brother     Anesth Problems Neg Hx        Social History     Socioeconomic History    Marital status:      Spouse name: Not on file    Number of children: Not on file    Years of education: Not on file    Highest education level: Not on file   Social Needs    Financial resource strain: Not on file    Food insecurity - worry: Not on file    Food insecurity - inability: Not on file   [employer] needs - medical: Not on file   [employer] needs - non-medical: Not on file   Occupational History    Not on file   Tobacco Use    Smoking status: Never Smoker    Smokeless tobacco: Never Used   Substance and Sexual Activity    Alcohol use: No    Drug use: No    Sexual activity: Yes     Partners: Male     Birth control/protection: Surgical   Other Topics Concern    Not on file   Social History Narrative    Not on file         ALLERGIES: Green pepper    Review of Systems   Constitutional: Positive for fever. Negative for activity change, appetite change and chills. HENT: Negative for congestion, rhinorrhea, sinus pressure, sneezing and sore throat. Eyes: Negative for pain, discharge and visual disturbance. Respiratory: Positive for cough and wheezing. Negative for shortness of breath. Cardiovascular: Negative for chest pain. Gastrointestinal: Positive for abdominal pain. Negative for diarrhea, nausea and vomiting. Genitourinary: Negative for dysuria, flank pain, frequency and urgency. Musculoskeletal: Negative for arthralgias, back pain, gait problem, joint swelling, myalgias and neck pain. Skin: Negative for color change and rash. Neurological: Negative for dizziness, speech difficulty, weakness, light-headedness, numbness and headaches. Psychiatric/Behavioral: Negative for agitation, behavioral problems and confusion.    All other systems reviewed and are negative. Vitals:    03/15/19 2234 03/16/19 0109 03/16/19 0134   BP: 144/87 131/73    Pulse: (!) 126 (!) 110 100   Resp: 20 16 16   Temp: 100.3 °F (37.9 °C) 98.6 °F (37 °C)    SpO2: 96% 96% 98%   Weight: 91.2 kg (201 lb)     Height: 5' 5\" (1.651 m)              Physical Exam   Constitutional: She is oriented to person, place, and time. She appears well-developed and well-nourished. No distress. HENT:   Head: Normocephalic and atraumatic. Right Ear: External ear normal.   Left Ear: External ear normal.   Nose: Nose normal.   Mouth/Throat: Oropharynx is clear and moist. No oropharyngeal exudate. Eyes: Conjunctivae and EOM are normal. Pupils are equal, round, and reactive to light. Neck: Normal range of motion. Neck supple. Cardiovascular: Regular rhythm, normal heart sounds and intact distal pulses. Tachycardia present. Pulmonary/Chest: Effort normal and breath sounds normal.   Abdominal: Soft. There is no tenderness. There is no rebound and no guarding. Healing C/D/I surgical incision noted to L side abdomen    Musculoskeletal: Normal range of motion. Neurological: She is alert and oriented to person, place, and time. Skin: Skin is warm and dry. Psychiatric: She has a normal mood and affect. Her behavior is normal. Judgment and thought content normal.   Nursing note and vitals reviewed.        MDM  Number of Diagnoses or Management Options  Constipation, unspecified constipation type:   Post-op pain:   Wheezing:   Diagnosis management comments: DDx: abscess, UTI, URI, PNA, wheezing     33 yo F presents post GYN surgery yesterday w/ URI s/sx and abd pain   - abd soft/ nontender on exam, post op wounds healing well   - CT w/o any focal- emergent findings to indicated post op complication; (+) constipated- pt deferred enema in ED- would rather take laxative at home   - (-) flu/ CXR- wheezing improved while in ED- has MDI at home    - CBC reassuring, (-) lactate   - Discussed taking pain medications at home- provided w/ education on usage/ possible SE; advised on laxative use to assist w/ daily BM   - f/u w/ OBGYN, reasons to return to ED reviewed/ provided        Amount and/or Complexity of Data Reviewed  Clinical lab tests: ordered and reviewed  Tests in the radiology section of CPT®: ordered and reviewed  Review and summarize past medical records: yes  Discuss the patient with other providers: yes Azeem Nick MD )           Procedures      LABORATORY TESTS:  Recent Results (from the past 12 hour(s))   CBC WITH AUTOMATED DIFF    Collection Time: 03/15/19 10:48 PM   Result Value Ref Range    WBC 9.7 3.6 - 11.0 K/uL    RBC 4.67 3.80 - 5.20 M/uL    HGB 13.4 11.5 - 16.0 g/dL    HCT 41.2 35.0 - 47.0 %    MCV 88.2 80.0 - 99.0 FL    MCH 28.7 26.0 - 34.0 PG    MCHC 32.5 30.0 - 36.5 g/dL    RDW 12.9 11.5 - 14.5 %    PLATELET 257 741 - 950 K/uL    MPV 11.3 8.9 - 12.9 FL    NRBC 0.0 0  WBC    ABSOLUTE NRBC 0.00 0.00 - 0.01 K/uL    NEUTROPHILS 58 32 - 75 %    LYMPHOCYTES 30 12 - 49 %    MONOCYTES 9 5 - 13 %    EOSINOPHILS 1 0 - 7 %    BASOPHILS 1 0 - 1 %    IMMATURE GRANULOCYTES 1 (H) 0.0 - 0.5 %    ABS. NEUTROPHILS 5.6 1.8 - 8.0 K/UL    ABS. LYMPHOCYTES 2.9 0.8 - 3.5 K/UL    ABS. MONOCYTES 0.9 0.0 - 1.0 K/UL    ABS. EOSINOPHILS 0.1 0.0 - 0.4 K/UL    ABS. BASOPHILS 0.1 0.0 - 0.1 K/UL    ABS. IMM.  GRANS. 0.1 (H) 0.00 - 0.04 K/UL    DF AUTOMATED     METABOLIC PANEL, COMPREHENSIVE    Collection Time: 03/15/19 10:48 PM   Result Value Ref Range    Sodium 140 136 - 145 mmol/L    Potassium 3.8 3.5 - 5.1 mmol/L    Chloride 107 97 - 108 mmol/L    CO2 26 21 - 32 mmol/L    Anion gap 7 5 - 15 mmol/L    Glucose 170 (H) 65 - 100 mg/dL    BUN 10 6 - 20 MG/DL    Creatinine 0.91 0.55 - 1.02 MG/DL    BUN/Creatinine ratio 11 (L) 12 - 20      GFR est AA >60 >60 ml/min/1.73m2    GFR est non-AA >60 >60 ml/min/1.73m2    Calcium 8.5 8.5 - 10.1 MG/DL    Bilirubin, total 0.2 0.2 - 1.0 MG/DL    ALT (SGPT) 26 12 - 78 U/L    AST (SGOT) 11 (L) 15 - 37 U/L    Alk. phosphatase 59 45 - 117 U/L    Protein, total 7.1 6.4 - 8.2 g/dL    Albumin 3.6 3.5 - 5.0 g/dL    Globulin 3.5 2.0 - 4.0 g/dL    A-G Ratio 1.0 (L) 1.1 - 2.2     LACTIC ACID    Collection Time: 03/15/19 10:48 PM   Result Value Ref Range    Lactic acid 1.8 0.4 - 2.0 MMOL/L   INFLUENZA A & B AG (RAPID TEST)    Collection Time: 03/15/19 10:48 PM   Result Value Ref Range    Influenza A Antigen NEGATIVE  NEG      Influenza B Antigen NEGATIVE  NEG     SAMPLES BEING HELD    Collection Time: 03/15/19 10:48 PM   Result Value Ref Range    SAMPLES BEING HELD SST,RD,RONNELL     COMMENT        Add-on orders for these samples will be processed based on acceptable specimen integrity and analyte stability, which may vary by analyte. URINALYSIS W/MICROSCOPIC    Collection Time: 03/16/19  1:02 AM   Result Value Ref Range    Color YELLOW/STRAW      Appearance CLEAR CLEAR      Specific gravity 1.010 1.003 - 1.030      pH (UA) 5.5 5.0 - 8.0      Protein NEGATIVE  NEG mg/dL    Glucose >1,000 (A) NEG mg/dL    Ketone NEGATIVE  NEG mg/dL    Bilirubin NEGATIVE  NEG      Blood NEGATIVE  NEG      Urobilinogen 0.2 0.2 - 1.0 EU/dL    Nitrites NEGATIVE  NEG      Leukocyte Esterase NEGATIVE  NEG      WBC 0-4 0 - 4 /hpf    RBC 0-5 0 - 5 /hpf    Epithelial cells FEW FEW /lpf    Bacteria 1+ (A) NEG /hpf    Yeast PRESENT (A) NEG     URINE CULTURE HOLD SAMPLE    Collection Time: 03/16/19  1:02 AM   Result Value Ref Range    Urine culture hold        URINE ON HOLD IN MICROBIOLOGY DEPT FOR 3 DAYS. IF UNPRESERVED URINE IS SUBMITTED, IT CANNOT BE USED FOR ADDITIONAL TESTING AFTER 24 HRS, RECOLLECTION WILL BE REQUIRED. IMAGING RESULTS:  CT ABD PELV W CONT   Final Result   IMPRESSION:    Expected postsurgical changes in the pelvis without evidence for abscess,   drainable collection, or other acute abnormality in the abdomen or pelvis. A   moderate to large amount of colonic stool is noted.  Hepatic steatosis. XR CHEST PA LAT   Final Result   IMPRESSION: No acute process. Stable exam.              MEDICATIONS GIVEN:  Medications   sodium chloride 0.9 % bolus infusion 1,000 mL (0 mL IntraVENous IV Completed 3/16/19 0110)   iopamidol (ISOVUE-370) 76 % injection 100 mL (100 mL IntraVENous Given 3/16/19 0030)   ibuprofen (MOTRIN) tablet 800 mg (800 mg Oral Given 3/15/19 2356)   morphine injection 2 mg (2 mg IntraVENous Given 3/15/19 2355)   albuterol-ipratropium (DUO-NEB) 2.5 MG-0.5 MG/3 ML (3 mL Nebulization Given 3/15/19 2356)   ondansetron (ZOFRAN) injection 4 mg (4 mg IntraVENous Given 3/15/19 2354)   oxyCODONE-acetaminophen (PERCOCET) 5-325 mg per tablet 2 Tab (2 Tabs Oral Given 3/16/19 0114)       IMPRESSION:  1. Wheezing    2. Constipation, unspecified constipation type    3. Post-op pain        PLAN:  1. Current Discharge Medication List      START taking these medications    Details   polyethylene glycol (MIRALAX) 17 gram/dose powder Take 17 g by mouth two (2) times a day. 1 tablespoon with 8 oz of water daily  Qty: 289 g, Refills: 0      senna-docusate (SENNA WITH DOCUSATE SODIUM) 8.6-50 mg per tablet Take 3 Tabs by mouth daily. Qty: 21 Tab, Refills: 0      ibuprofen (MOTRIN) 800 mg tablet Take 1 Tab by mouth every six (6) hours as needed for Pain for up to 7 days. Qty: 20 Tab, Refills: 0         CONTINUE these medications which have NOT CHANGED    Details   simvastatin (ZOCOR) 20 mg tablet TAKE 1 TABLET BY MOUTH EVERY DAY  Qty: 90 Tab, Refills: 1      oxyCODONE-acetaminophen (PERCOCET) 5-325 mg per tablet Take 1 Tab by mouth every six (6) hours as needed for Pain for up to 5 days. Max Daily Amount: 4 Tabs. Qty: 20 Tab, Refills: 0    Associated Diagnoses: Status post surgery      fenofibrate (LOFIBRA) 160 mg tablet TAKE 1 TAB BY MOUTH DAILY.   Qty: 30 Tab, Refills: 1      levocetirizine (XYZAL) 5 mg tablet TAKE 1 TABLET BY MOUTH ONCE DAILY  Qty: 30 Tab, Refills: 2    Comments: Please consider 90 day supplies to promote better adherence  Associated Diagnoses: Environmental allergies      aspirin delayed-release 81 mg tablet TAKE 1 TABLET BY MOUTH EVERY DAY  Qty: 30 Tab, Refills: 5    Associated Diagnoses: Type 2 diabetes mellitus without complication, with long-term current use of insulin ([address] Hypertriglyceridemia; Essential hypertension      albuterol (PROVENTIL HFA, VENTOLIN HFA, PROAIR HFA) 90 mcg/actuation inhaler Take 2 Puffs by inhalation every four (4) hours as needed for Wheezing. Qty: 1 Inhaler, Refills: 5      SUMAtriptan (IMITREX) 50 mg tablet TAKE 1 TAB AT ONSET OF MIGRAINE HEADACHE, REPEAT IN 1 HOUR IF NEEDED. NO MORE THAN 2 TABS PER 24 HOUR  Qty: 9 Tab, Refills: 3    Associated Diagnoses: Chronic migraine without aura without status migrainosus, not intractable      JANUVIA 100 mg tablet TAKE 1 TABLET BY MOUTH EVERY DAY  Qty: 90 Tab, Refills: 1    Associated Diagnoses: Type 2 diabetes mellitus without complication, with long-term current use of insulin ([address] Type 2 diabetes mellitus without complication, without long-term current use of insulin ([facility])      canagliflozin (INVOKANA) 300 mg tablet Take 1 Tab by mouth Daily (before breakfast). Qty: 30 Tab, Refills: 5    Associated Diagnoses: Type 2 diabetes mellitus without complication, without long-term current use of insulin ([facility])      ASCENSIA CONTOUR strip USE AS DIRECTED  Qty: 50 Strip, Refills: 5      omeprazole (PRILOSEC) 20 mg capsule TAKE ONE CAPSULE BY MOUTH ONCE DAILY  Qty: 90 Cap, Refills: 1    Comments: Please consider 90 day supplies to promote better adherence      metFORMIN (GLUCOPHAGE) 1,000 mg tablet TAKE 1 TABLET BY MOUTH TWICE A DAY  Qty: 180 Tab, Refills: 4    Associated Diagnoses: Type 2 diabetes mellitus without complication, with long-term current use of insulin ([address]  Type 2 diabetes mellitus without complication, without long-term current use of insulin ([facility])      fluticasone (FLONASE) 50 mcg/actuation nasal spray 2 Sprays by Both Nostrils route daily. Qty: 1 Bottle, Refills: 2    Associated Diagnoses: Environmental allergies      ergocalciferol (ERGOCALCIFEROL) 50,000 unit capsule TAKE 1 CAP BY MOUTH EVERY SEVEN (7) DAYS. Qty: 30 Cap, Refills: 4    Associated Diagnoses: Vitamin D deficiency      famotidine (PEPCID) 40 mg tablet TAKE 1 TABLET BY MOUTH EVERY DAY TAKES EVERY BEDTIME  Refills: 12      topiramate (TOPAMAX) 50 mg tablet Take 1 tab twice a day for migraine prevention  Qty: 60 Tab, Refills: 0    Associated Diagnoses: Chronic daily headache; Chronic migraine without aura without status migrainosus, not intractable      butalbital-acetaminophen-caffeine (FIORICET, ESGIC) -40 mg per tablet Take 1 Tab by mouth every six (6) hours as needed for Pain. Max Daily Amount: 4 Tabs. Qty: 20 Tab, Refills: 0    Associated Diagnoses: Migraine without status migrainosus, not intractable, unspecified migraine type      Lancets (MICROLET LANCET) Misc USE TO CHECK BLOOD SUGAR ONE TO TWO TIMES DAILY  Qty: 100 Each, Refills: 0    Comments: The requested Dispensed Quantity of 100 exceeded the Quantity Remaining for this Rx. Associated Diagnoses: Type II or unspecified type diabetes mellitus without mention of complication, not stated as uncontrolled           2. Follow-up Information     Follow up With Specialties Details Why Contact Info    Arnav Montemayor MD Obstetrics & Gynecology, Gynecology, Obstetrics Schedule an appointment as soon as possible for a visit  [phone]      [facility] EMERGENCY DEPT Emergency Medicine Go to If symptoms worsen [address] [facility] [place]  [phone]        3.  Return to ED if worse

## 2019-03-16 NOTE — ED TRIAGE NOTES
Patient with surgery to her ovary yesterday, today has had a low grade fever and aching all over. Denies n/v.  C/o pain at the left side of her abdomen.

## 2019-03-18 NOTE — OP NOTES
[facility]  OPERATIVE REPORT    Name:  Sanford Madsen  MR#:  [id number]  :  1980  ACCOUNT #:  [de-identified]  DATE OF SERVICE:  2019    PREOPERATIVE DIAGNOSES:  1. Chronic left pelvic pain. 2.  History of 7 cm left-sided ovarian cyst.  3.  Suspected intraabdominal adhesive disease status post herniorrhaphy. POSTOPERATIVE DIAGNOSES:  1. Chronic left pelvic pain. 2.  History of 7 cm left-sided ovarian cyst.  3.  Suspected intraabdominal adhesive disease status post herniorrhaphy. PROCEDURES PERFORMED:  1. Laparoscopic left salpingo-oophorectomy. 2.  Ovarian cystectomy. 3.  Lysis of adhesions. ATTENDING SURGEON:  Jimena Harris MD    ASSISTANT SURGEON:  Uzma Magaña DO    ANESTHESIA:  General.    ESTIMATED BLOOD LOSS:  Less than 5 mL. IV FLUIDS:  Per Anesthesia. URINE OUTPUT:  Over 100 mL of clear urine at the end of the case. BLOOD PRODUCTS:  None. PATHOLOGY:  Left tube, ovary and cyst.    SPECIMENS REMOVED:  LSO    IMPLANTS:  None. COMPLICATIONS:  None. FINDINGS AT THE TIME OF PROCEDURE:  1. Extensive intraabdominal adhesive disease with the omentum adherent to the anterior abdominal wall and the descending colon adherent to the left pelvic sidewall and ovary, beneath the colon, in the ovarian fossa. 2.  Normal right ovary with fallopian tube densely adherent to the right ovary. 3.  Hemostasis. PROCEDURE:  After informed consent, the patient was taken to the operating room where general anesthesia was found to be adequate. The patient was then prepped and draped in normal sterile fashion in dorsal supine low lithotomy position with her arms tucked to her side. Care was taken to avoid hyperflexion of pressure points. A formal timeout was performed. A sponge stick was placed in the vagina and a Barkley catheter was placed. After glove change, attention was turned to the abdomen. Galarza's point was marked.   A 5-mm skin incision was made with a scalpel. Using the 5-mm Optiview port, the abdomen was entered atraumatically and the abdomen was insufflated. Once this was complete, the patient was placed in Trendelenburg and extensive dense omental adhesions were noted in the midline from above the umbilicus down to approximately 5 cm below the umbilicus. Given the adhesions, a 12-mm left lower quadrant and a 5-mm right lower quadrant ports were placed under direct visualization after injection with Marcaine. Using the LigaSure device, these omental adhesions were taken down, carefully to avoid a bowel entrapment or any other complications. Once this omentum was taken down, visualization was improved. Attention was then turned to try to find the left ovary; however, it was very densely under left pelvic sidewall adhesions. Using the laparoscopic scissors without heat, these adhesions were taken down. With a combination of blunt traction as well as sharp dissection, the bowel was released off of the left pelvic sidewall and rolled medially. Once this was complete, the ovary was just barely visualized and again using blunt dissection as well as sharp dissection, the ovary as well as the tube were freed from the underlying dense adhesive disease. Care was taken to remain medial and close to the ovary but the ureter was not able to be identified. The adhesions were dissected until the ovaries and tubes were free except with the attachment to the endometrium and pelvic ligaments. Once this was achieved, the IP was then cauterized triply and transected. Hemostasis was excellent. The specimen was placed in an EndoCatch bag and removed from the abdomen. Hemostasis was excellent in all points; however, given the sharp dissection, FloSeal was placed in the ovarian fossa as well as along the left pelvic sidewall.   Given the densely adherent nature of the right fallopian tube to the right ovary, the decision was made to leave the fallopian tube in situ as the ovary looked normal.  Using 0 Vicryl, the left lower quadrant fascial incision was closed under direct visualization. Again, the pedicles were inspected and hemostatic. The abdomen was desufflated and the trocars were removed under direct visualization. These were closed with Monocryl as well as Dermabond. Once this was completed, the Barkley catheter as well as the sponge stick were removed. Sponge, laps, and needle counts were correct at the end of the case x2. She tolerated the procedure excellently and was taken to the recovery room in stable condition.         Carey Hickman MD      [id number]  D:  03/17/2019 17:06  T:  03/18/2019 4:30  JOB #:  [id number]  CC:  <>

## 2019-03-26 RX ORDER — CANAGLIFLOZIN 300 MG/1
TABLET, FILM COATED ORAL
Qty: 30 TAB | Refills: 4 | Status: SHIPPED | OUTPATIENT
Start: 2019-03-26 | End: 2019-10-01 | Stop reason: SDUPTHER

## 2019-04-02 ENCOUNTER — OFFICE VISIT (OUTPATIENT)
Dept: FAMILY MEDICINE CLINIC | Age: 39
End: 2019-04-02

## 2019-04-02 VITALS
HEIGHT: 65 IN | OXYGEN SATURATION: 98 % | HEART RATE: 94 BPM | SYSTOLIC BLOOD PRESSURE: 92 MMHG | DIASTOLIC BLOOD PRESSURE: 72 MMHG | WEIGHT: 207 LBS | TEMPERATURE: 98.9 F | RESPIRATION RATE: 18 BRPM | BODY MASS INDEX: 34.49 KG/M2

## 2019-04-02 DIAGNOSIS — E78.5 DYSLIPIDEMIA (HIGH LDL; LOW HDL): ICD-10-CM

## 2019-04-02 DIAGNOSIS — R00.0 TACHYCARDIA: ICD-10-CM

## 2019-04-02 DIAGNOSIS — J02.9 SORE THROAT: ICD-10-CM

## 2019-04-02 DIAGNOSIS — E11.9 TYPE 2 DIABETES MELLITUS WITHOUT COMPLICATION, WITHOUT LONG-TERM CURRENT USE OF INSULIN (HCC): Primary | ICD-10-CM

## 2019-04-02 PROBLEM — E66.01 SEVERE OBESITY (HCC): Status: RESOLVED | Noted: 2018-10-17 | Resolved: 2019-04-02

## 2019-04-02 NOTE — PROGRESS NOTES
Subjective:  
 
Micheline Hurley is a 34 y.o. female who presents for follow up of diabetes and hyperlipidemia. Diabetes mellitus: · Medication compliance: compliant all of the time · Diabetic diet compliance: compliant all of the time · Home glucose monitoring: is performed regularly - fasting values in the 130's · Further diabetic ROS: no polyuria or polydipsia, no chest pain, dyspnea or TIA's, no numbness, tingling or pain in extremities, no unusual visual symptoms, no hypoglycemia. Other symptoms and concerns: 
- She has had surgery on 3/14/19 under general anesthesia and since that time she has had a sore throat. Painful with swallowing. Pain is predominately on the right side. Denies fever, chills, nausea, vomiting.  
- After surgery, she was told that she was tachycardic with HR going up into the 160's. States that her HR is always elevated. When HR goes above 120's, she does feel palpitations and discomfort in the chest. Denies excessive caffeine intake. No h/o thyroid disease. Current Outpatient Medications Medication Sig Dispense Refill  INVOKANA 300 mg tablet TAKE 1 TABLET BY MOUTH DAILY BEFORE BREAKFAST 30 Tab 4  polyethylene glycol (MIRALAX) 17 gram/dose powder Take 17 g by mouth two (2) times a day. 1 tablespoon with 8 oz of water daily 289 g 0  
 senna-docusate (SENNA WITH DOCUSATE SODIUM) 8.6-50 mg per tablet Take 3 Tabs by mouth daily. 21 Tab 0  
 simvastatin (ZOCOR) 20 mg tablet TAKE 1 TABLET BY MOUTH EVERY DAY 90 Tab 1  
 fenofibrate (LOFIBRA) 160 mg tablet TAKE 1 TAB BY MOUTH DAILY. 30 Tab 1  
 levocetirizine (XYZAL) 5 mg tablet TAKE 1 TABLET BY MOUTH ONCE DAILY 30 Tab 2  
 aspirin delayed-release 81 mg tablet TAKE 1 TABLET BY MOUTH EVERY DAY 30 Tab 5  
 albuterol (PROVENTIL HFA, VENTOLIN HFA, PROAIR HFA) 90 mcg/actuation inhaler Take 2 Puffs by inhalation every four (4) hours as needed for Wheezing.  1 Inhaler 5  
  SUMAtriptan (IMITREX) 50 mg tablet TAKE 1 TAB AT ONSET OF MIGRAINE HEADACHE, REPEAT IN 1 HOUR IF NEEDED. NO MORE THAN 2 TABS PER 24 HOUR 9 Tab 3  JANUVIA 100 mg tablet TAKE 1 TABLET BY MOUTH EVERY DAY 90 Tab 1  ASCENSIA CONTOUR strip USE AS DIRECTED 50 Strip 5  
 omeprazole (PRILOSEC) 20 mg capsule TAKE ONE CAPSULE BY MOUTH ONCE DAILY 90 Cap 1  
 metFORMIN (GLUCOPHAGE) 1,000 mg tablet TAKE 1 TABLET BY MOUTH TWICE A  Tab 4  
 fluticasone (FLONASE) 50 mcg/actuation nasal spray 2 Sprays by Both Nostrils route daily. (Patient taking differently: 2 Sprays by Both Nostrils route daily as needed.) 1 Bottle 2  
 ergocalciferol (ERGOCALCIFEROL) 50,000 unit capsule TAKE 1 CAP BY MOUTH EVERY SEVEN (7) DAYS. 30 Cap 4  
 famotidine (PEPCID) 40 mg tablet TAKE 1 TABLET BY MOUTH EVERY DAY TAKES EVERY BEDTIME  12  
 topiramate (TOPAMAX) 50 mg tablet Take 1 tab twice a day for migraine prevention (Patient taking differently: as needed. Take 1 tab twice a day for migraine prevention) 60 Tab 0  
 butalbital-acetaminophen-caffeine (FIORICET, ESGIC) -40 mg per tablet Take 1 Tab by mouth every six (6) hours as needed for Pain. Max Daily Amount: 4 Tabs. 20 Tab 0  
 Lancets (MICROLET LANCET) Misc USE TO CHECK BLOOD SUGAR ONE TO TWO TIMES DAILY 100 Each 0 Allergies Allergen Reactions  Green Pepper Hives Red, Yellow and Orange Peppers also. OK with Black Pepper Past Medical History:  
Diagnosis Date  Anxiety  Arrhythmia HX TACHYCARDIA - NEG. STRESS TEST 2013 PER PATIENT  Asthma LAST EPISODE 2016  Diabetes ([facility] [address] 2008 NIDDM  Ear infection 11/30/2018 BILATERAL  
 GERD (gastroesophageal reflux disease)  Headache   
 Headache(784.0)  Hypertension HX HTN - NO MEDS CURRENTLY(11-30-18)  Rash 7/14/2016  Recurrent umbilical hernia 27/60/2797  S/P dilatation and curettage  Tachycardia Social History Tobacco Use  
  Smoking status: Never Smoker  Smokeless tobacco: Never Used Substance Use Topics  Alcohol use: No  
  
 
Lab Results Component Value Date/Time Hemoglobin A1c 7.3 (H) 10/17/2018 08:49 AM  
 Hemoglobin A1c 7.6 (H) 07/10/2018 08:48 AM  
 Hemoglobin A1c 7.1 (H) 10/31/2017 09:43 AM  
 Glucose 170 (H) 03/15/2019 10:48 PM  
 Glucose (POC) 124 (H) 03/14/2019 12:16 PM  
 Microalb/Creat ratio (ug/mg creat.) <8.0 03/15/2017 12:05 PM  
 LDL, calculated 95 07/10/2018 08:48 AM  
 Creatinine (POC) 0.7 06/04/2013 09:57 AM  
 Creatinine 0.91 03/15/2019 10:48 PM  
   
 
Review of Systems, additional: 
Pertinent items are noted in HPI. Objective:  
 
Visit Vitals BP 92/72 (BP 1 Location: Right arm, BP Patient Position: Sitting) Comment: Manual  
Pulse 94 Temp 98.9 °F (37.2 °C) (Oral) Comment: . Resp 18 Ht 5' 5\" (1.651 m) Wt 207 lb (93.9 kg) LMP 01/25/2012 SpO2 98% BMI 34.45 kg/m² General appearance - alert, well appearing, and in no distress Mental status - alert, oriented to person, place, and time, normal mood, behavior, speech, dress, motor activity, and thought processes Eyes - pupils equal and reactive, extraocular eye movements intact, sclera anicteric Neck - supple, no significant adenopathy, thyroid exam: thyroid is normal in size without nodules or tenderness Chest - clear to auscultation, no wheezes, rales or rhonchi, symmetric air entry, no tachypnea, retractions or cyanosis Heart - normal rate, regular rhythm, normal S1, S2, no murmurs, rubs, clicks or gallops Lab review: orders written for new lab studies as appropriate; see orders. Assessment/Plan:  
Cherylene Buckler is a 34 y.o. female seen today for:  
 
1. Type 2 diabetes mellitus without complication, without long-term current use of insulin ([address]): has been controlled. Continue with current therapy and check labs as below. Will request last eye examination from [facility]   
- HEMOGLOBIN A1C WITH EAG 
 - LIPID PANEL 2. Dyslipidemia (high LDL; low HDL): continue with current statin therapy. - LIPID PANEL 3. Tachycardia: intermittent. Recommend Cardiology evaluation. 4. Sore throat: intermittent s/p endotracheal intubation. Continue to observe at this time. If no resolution noted within the next 1-2 weeks, recommend ENT evaluation. 5. BMI 34.0-34.9,adult: I have reviewed/discussed the above normal BMI with the patient. I have recommended the following interventions: dietary management education, guidance, and counseling and encourage exercise. I have discussed the diagnosis with the patient and the intended plan as seen in the above orders. The patient has received an after-visit summary and questions were answered concerning future plans. I have discussed medication side effects and warnings with the patient as well. Patient verbalizes understanding of plan of care and denies further questions or concerns at this time. Informed patient to return to the office if symptoms worsen or if new symptoms arise. Follow-up and Dispositions · Return in about 6 months (around 10/2/2019) for follow up or sooner as needed.

## 2019-04-02 NOTE — PROGRESS NOTES
Identified pt with two pt identifiers(name and ). Chief Complaint Patient presents with  Labs Patient is fasting.  Diabetes  
  follow up . 3 month. last Diabetes check was 10/17/18  Sore Throat  
  has been hurting since surgery. patient would like to have Dr. Travis Rather at it. Health Maintenance Due Topic  Pneumococcal 0-64 years (1 of 1 - PPSV23)  EYE EXAM RETINAL OR DILATED  DTaP/Tdap/Td series (2 - Td)  FOOT EXAM Q1   
 HEMOGLOBIN A1C Q6M   
 PAP AKA CERVICAL CYTOLOGY Wt Readings from Last 3 Encounters:  
19 207 lb (93.9 kg) 03/15/19 201 lb (91.2 kg) 19 210 lb 1.6 oz (95.3 kg) Temp Readings from Last 3 Encounters:  
19 98.9 °F (37.2 °C) (Oral) 19 98.6 °F (37 °C)  
19 99 °F (37.2 °C) BP Readings from Last 3 Encounters:  
19 92/72  
19 131/73  
19 130/72 Pulse Readings from Last 3 Encounters:  
19 94  
19 100  
19 96 Learning Assessment: 
:  
 
Learning Assessment 2017 PRIMARY LEARNER Patient Patient HIGHEST LEVEL OF EDUCATION - PRIMARY LEARNER  GRADUATED HIGH SCHOOL OR GED GRADUATED HIGH SCHOOL OR GED  
BARRIERS PRIMARY LEARNER NONE NONE PRIMARY LANGUAGE ENGLISH ENGLISH  
LEARNER PREFERENCE PRIMARY READING READING  
ANSWERED BY patient patient RELATIONSHIP SELF SELF Depression Screening: 
:  
 
3 most recent PHQ Screens 2019 Little interest or pleasure in doing things Not at all Feeling down, depressed, irritable, or hopeless Not at all Total Score PHQ 2 0 Fall Risk Assessment: 
:  
 
Fall Risk Assessment, last 12 mths 2017 Able to walk? Yes Fall in past 12 months? No  
 
 
Abuse Screening: 
:  
 
Abuse Screening Questionnaire 2019 Do you ever feel afraid of your partner? N N N N Are you in a relationship with someone who physically or mentally threatens you?  N N N N  
 Is it safe for you to go home? [place] Loo Coordination of Care Questionnaire: 
:  
 
1) Have you been to an emergency room, urgent care clinic since your last visit? no  
Hospitalized since your last visit? no          
 
2) Have you seen or consulted any other health care providers outside of [address] [place] [address] since your last visit? no  (Include any pap smears or colon screenings in this section.) 3) Do you have an Advance Directive on file? no 
Are you interested in receiving information about Advance Directives? no 
 
Reviewed record in preparation for visit and have obtained necessary documentation. Medication reconciliation up to date and corrected with patient at this time.

## 2019-04-02 NOTE — PATIENT INSTRUCTIONS
A Healthy Lifestyle: Care Instructions Your Care Instructions A healthy lifestyle can help you feel good, stay at a healthy weight, and have plenty of energy for both work and play. A healthy lifestyle is something you can share with your whole family. A healthy lifestyle also can lower your risk for serious health problems, such as high blood pressure, heart disease, and diabetes. You can follow a few steps listed below to improve your health and the health of your family. Follow-up care is a key part of your treatment and safety. Be sure to make and go to all appointments, and call your doctor if you are having problems. It's also a good idea to know your test results and keep a list of the medicines you take. How can you care for yourself at home? · Do not eat too much sugar, fat, or fast foods. You can still have dessert and treats now and then. The goal is moderation. · Start small to improve your eating habits. Pay attention to portion sizes, drink less juice and soda pop, and eat more fruits and vegetables. ? Eat a healthy amount of food. A 3-ounce serving of meat, for example, is about the size of a deck of cards. Fill the rest of your plate with vegetables and whole grains. ? Limit the amount of soda and sports drinks you have every day. Drink more water when you are thirsty. ? Eat at least 5 servings of fruits and vegetables every day. It may seem like a lot, but it is not hard to reach this goal. A serving or helping is 1 piece of fruit, 1 cup of vegetables, or 2 cups of leafy, raw vegetables. Have an apple or some carrot sticks as an afternoon snack instead of a candy bar. Try to have fruits and/or vegetables at every meal. 
· Make exercise part of your daily routine. You may want to start with simple activities, such as walking, bicycling, or slow swimming. Try to be active 30 to 60 minutes every day.  You do not need to do all 30 to 60 minutes all at once. For example, you can exercise 3 times a day for 10 or 20 minutes. Moderate exercise is safe for most people, but it is always a good idea to talk to your doctor before starting an exercise program. 
· Keep moving. Juan Kayden the lawn, work in the garden, or MADS. Take the stairs instead of the elevator at work. · If you smoke, quit. People who smoke have an increased risk for heart attack, stroke, cancer, and other lung illnesses. Quitting is hard, but there are ways to boost your chance of quitting tobacco for good. ? Use nicotine gum, patches, or lozenges. ? Ask your doctor about stop-smoking programs and medicines. ? Keep trying. In addition to reducing your risk of diseases in the future, you will notice some benefits soon after you stop using tobacco. If you have shortness of breath or asthma symptoms, they will likely get better within a few weeks after you quit. · Limit how much alcohol you drink. Moderate amounts of alcohol (up to 2 drinks a day for men, 1 drink a day for women) are okay. But drinking too much can lead to liver problems, high blood pressure, and other health problems. Family health If you have a family, there are many things you can do together to improve your health. · Eat meals together as a family as often as possible. · Eat healthy foods. This includes fruits, vegetables, lean meats and dairy, and whole grains. · Include your family in your fitness plan. Most people think of activities such as jogging or tennis as the way to fitness, but there are many ways you and your family can be more active. Anything that makes you breathe hard and gets your heart pumping is exercise. Here are some tips: 
? Walk to do errands or to take your child to school or the bus. 
? Go for a family bike ride after dinner instead of watching TV. Where can you learn more? Go to http://hodan-eugene.info/. Enter M306 in the search box to learn more about \"A Healthy Lifestyle: Care Instructions. \" Current as of: September 11, 2018 Content Version: 11.9 © 3075-3460 [employer]. Care instructions adapted under license by [employer] (which disclaims liability or warranty for this information). If you have questions about a medical condition or this instruction, always ask your healthcare professional. [place] [address] any warranty or liability for your use of this information.

## 2019-04-03 LAB
CHOLEST SERPL-MCNC: 185 MG/DL (ref 100–199)
EST. AVERAGE GLUCOSE BLD GHB EST-MCNC: 157 MG/DL
HBA1C MFR BLD: 7.1 % (ref 4.8–5.6)
HDLC SERPL-MCNC: 40 MG/DL
INTERPRETATION, 910389: NORMAL
LDLC SERPL CALC-MCNC: 94 MG/DL (ref 0–99)
Lab: NORMAL
TRIGL SERPL-MCNC: 255 MG/DL (ref 0–149)
VLDLC SERPL CALC-MCNC: 51 MG/DL (ref 5–40)

## 2019-04-04 ENCOUNTER — OFFICE VISIT (OUTPATIENT)
Dept: CARDIOLOGY CLINIC | Age: 39
End: 2019-04-04

## 2019-04-04 ENCOUNTER — CLINICAL SUPPORT (OUTPATIENT)
Dept: CARDIOLOGY CLINIC | Age: 39
End: 2019-04-04

## 2019-04-04 VITALS
RESPIRATION RATE: 18 BRPM | HEIGHT: 65 IN | HEART RATE: 114 BPM | DIASTOLIC BLOOD PRESSURE: 82 MMHG | SYSTOLIC BLOOD PRESSURE: 136 MMHG | OXYGEN SATURATION: 95 % | BODY MASS INDEX: 34.66 KG/M2 | WEIGHT: 208 LBS

## 2019-04-04 DIAGNOSIS — R00.0 TACHYCARDIA: Primary | ICD-10-CM

## 2019-04-04 DIAGNOSIS — E66.09 CLASS 1 OBESITY DUE TO EXCESS CALORIES WITH BODY MASS INDEX (BMI) OF 34.0 TO 34.9 IN ADULT, UNSPECIFIED WHETHER SERIOUS COMORBIDITY PRESENT: ICD-10-CM

## 2019-04-04 DIAGNOSIS — R00.2 PALPITATIONS: Primary | ICD-10-CM

## 2019-04-04 DIAGNOSIS — E78.5 DYSLIPIDEMIA: ICD-10-CM

## 2019-04-04 DIAGNOSIS — Z82.49 FAMILY HISTORY OF PREMATURE CAD: ICD-10-CM

## 2019-04-04 NOTE — PROGRESS NOTES
Donnie Stout, DARRIUS  [address]  [place], [address] [facility]    Office [phone]  Fax [phone]        Lenka Marcus is a 34 y.o. female who will be followed outpatient by Dr. Jordy Zimmer. Patient is here today for evaluation of resting tachycardia. Assessment  Encounter Diagnoses   Name Primary?  Tachycardia Yes    Dyslipidemia     Family history of premature CAD     Class 1 obesity due to excess calories with body mass index (BMI) of 34.0 to 34.9 in adult, unspecified whether serious comorbidity present      Recommendations:  Tachycardia  - EKG sinus today, reviewed by Dr. Dunia Garcia  - further eval with 3 day event monitor   - recheck TSH (prev nml - last checked 2017)  - check echo  - pending results of above, consider rate control therapy; pt to continue good hydration and minimal caffeine intake    Dyslipidemia  - managed by PCP; on simvastatin 20mg/d and fenofibrate    Blood Pressure  - per pt, typically 110s/80s - normotensive    CV Risk Management  - family hx of premature CAD - mother MI in 45s  - HLD and DM  - no anginal c/o today; stress echo 2014 nml  - encouraged heart healthy diet and weight management; improved DM control    Follow-up and Dispositions    · Return in about 6 weeks (around 5/16/2019), or if symptoms worsen or fail to improve. Patient understands the plan. All questions were answered to the patient's satisfaction.     Medication Side Effects and Warnings were discussed with patient: yes  Patient Labs were reviewed and or requested:  yes  Patient Past Records were reviewed and or requested: yes     I appreciate the opportunity to be involved in patient's care. Please do not hesitate to contact us with questions or concerns. Subjective:  Pt here today for evaluation of resting tachycardia. States this has been ongoing for many years.   Recently had GYN surgery and was noted to have heart rates sustaining in the 160s; was told she needed follow-up for further eval.      States she can tell when her heart rate gets too high because she feels a fluttering in her chest with increased SOB and occasional dizziness. Denies CP, presyncope, and n/v. Also sometimes has a \"jumping sensation\" which is fleeting. States she is relatively active caring for her children and babysitting during the day. Doesn't \"exercise\" but can keep up with little kids without issue. Does feel short winded when walking too fast.  Denies known cardiac hx other than HLD; had increased stress years ago when she worked at a . BP was elevated at that time but improved after quitting. Snores infrequently. Feels well rested when she wakes up. Evaluated for sleep apnea about 5 years ago and was told she was fine. Patient denies any exertional chest pain, dyspnea, palpitations, syncope, orthopnea, edema or paroxysmal nocturnal dyspnea. Denies use of tobacco, drugs and alcohol. Hydrates well daily. Denies excessive caffeine intake. Family hx: Mom - MI in her 45s, passed of CHF age 62; father - HTN, prostate cancer and lung cancer. Cardiac testing  EKG 4/4/19 - Sinus Tachycardia, . Stress Echo 7/2014:   No EKG or Echocardiographic changes of ischemia at 96%MPHR. Resting echo with normal regional and global contractility; Post exercise, uniformly enhanced myocardial contractility    Past Medical History:   Diagnosis Date    Anxiety     Arrhythmia     HX TACHYCARDIA - NEG.  STRESS TEST 2013 PER PATIENT    Asthma     LAST EPISODE 2016    Diabetes ([facility] [address] 2008    NIDDM    Ear infection 11/30/2018    BILATERAL    GERD (gastroesophageal reflux disease)     Headache     Headache(784.0)     Hypertension     HX HTN - NO MEDS CURRENTLY(11-30-18)    Rash 7/14/2016    Recurrent umbilical hernia 11/39/3202    S/P dilatation and curettage     Tachycardia         Current Outpatient Medications   Medication Sig Dispense Refill    INVOKANA 300 mg tablet TAKE 1 TABLET BY MOUTH DAILY BEFORE BREAKFAST 30 Tab 4    polyethylene glycol (MIRALAX) 17 gram/dose powder Take 17 g by mouth two (2) times a day. 1 tablespoon with 8 oz of water daily (Patient not taking: Reported on 4/4/2019) 289 g 0    senna-docusate (SENNA WITH DOCUSATE SODIUM) 8.6-50 mg per tablet Take 3 Tabs by mouth daily. (Patient not taking: Reported on 4/4/2019) 21 Tab 0    simvastatin (ZOCOR) 20 mg tablet TAKE 1 TABLET BY MOUTH EVERY DAY 90 Tab 1    fenofibrate (LOFIBRA) 160 mg tablet TAKE 1 TAB BY MOUTH DAILY. 30 Tab 1    levocetirizine (XYZAL) 5 mg tablet TAKE 1 TABLET BY MOUTH ONCE DAILY 30 Tab 2    aspirin delayed-release 81 mg tablet TAKE 1 TABLET BY MOUTH EVERY DAY 30 Tab 5    albuterol (PROVENTIL HFA, VENTOLIN HFA, PROAIR HFA) 90 mcg/actuation inhaler Take 2 Puffs by inhalation every four (4) hours as needed for Wheezing. 1 Inhaler 5    SUMAtriptan (IMITREX) 50 mg tablet TAKE 1 TAB AT ONSET OF MIGRAINE HEADACHE, REPEAT IN 1 HOUR IF NEEDED. NO MORE THAN 2 TABS PER 24 HOUR 9 Tab 3    JANUVIA 100 mg tablet TAKE 1 TABLET BY MOUTH EVERY DAY 90 Tab 1    ASCENSIA CONTOUR strip USE AS DIRECTED 50 Strip 5    omeprazole (PRILOSEC) 20 mg capsule TAKE ONE CAPSULE BY MOUTH ONCE DAILY 90 Cap 1    metFORMIN (GLUCOPHAGE) 1,000 mg tablet TAKE 1 TABLET BY MOUTH TWICE A  Tab 4    fluticasone (FLONASE) 50 mcg/actuation nasal spray 2 Sprays by Both Nostrils route daily. (Patient taking differently: 2 Sprays by Both Nostrils route daily as needed.) 1 Bottle 2    ergocalciferol (ERGOCALCIFEROL) 50,000 unit capsule TAKE 1 CAP BY MOUTH EVERY SEVEN (7) DAYS. 30 Cap 4    famotidine (PEPCID) 40 mg tablet TAKE 1 TABLET BY MOUTH EVERY DAY TAKES EVERY BEDTIME  12    topiramate (TOPAMAX) 50 mg tablet Take 1 tab twice a day for migraine prevention (Patient taking differently: as needed.  Take 1 tab twice a day for migraine prevention) 60 Tab 0    butalbital-acetaminophen-caffeine (FIORICET, ESGIC) -40 mg per tablet Take 1 Tab by mouth every six (6) hours as needed for Pain. Max Daily Amount: 4 Tabs. 20 Tab 0    Lancets (MICROLET LANCET) Misc USE TO CHECK BLOOD SUGAR ONE TO TWO TIMES DAILY 100 Each 0       Allergies   Allergen Reactions    Green Pepper Hives     Red, Yellow and Orange Peppers also. OK with Black Pepper          Review of Systems  Constitutional: Negative for fever, chills, malaise/fatigue and diaphoresis. Respiratory: Negative for cough, hemoptysis, sputum production, shortness of breath and wheezing. Cardiovascular: Negative for chest pain, orthopnea, claudication, leg swelling and PND. +palpitations,  Gastrointestinal: Negative for heartburn, nausea, vomiting, blood in stool and melena. Genitourinary: Negative for dysuria and flank pain. Neurological: Negative for focal weakness, seizures, loss of consciousness, weakness and headaches. Endo/Heme/Allergies: Negative for abnormal bleeding. Psychiatric/Behavioral: Negative for memory loss. Physical Exam    Visit Vitals  LMP 01/25/2012     Wt Readings from Last 3 Encounters:   04/04/19 208 lb (94.3 kg)   04/02/19 207 lb (93.9 kg)   03/15/19 201 lb (91.2 kg)      General - well developed well nourished  Neck - no JVD  Cardiac - normal S1, S2, regular and tachy, no murmurs, rubs or gallops. No clicks  Vascular - radials and pedal pulses equal bilateral  Lungs - clear to auscultation bilaterals, no rales, wheezing or rhonchi  Abd - soft nontender, non-distended, +BS  Extremities - no edema, warm, well perfused  Neuro - nonfocal  Psych - normal mood and affect    Labs  Results for Michelle Bey (MRN [id number]) as of 4/8/2019 11:47   Ref.  Range 1/6/2011 09:50 7/22/2011 09:04 7/18/2014 09:05 1/10/2017 08:25   TSH Latest Ref Range: 0.450 - 4.500 uIU/mL 1.530 1.6 1.130 1.030     Component      Latest Ref Rng & Units 4/2/2019 4/2/2019 4/2/2019           8:34 AM  8:34 AM  8:34 AM   Cholesterol, total      100 - 199 mg/dL  185    Triglyceride      0 - 149 mg/dL  255 (H)    HDL Cholesterol      >39 mg/dL  40    VLDL, calculated      5 - 40 mg/dL  51 (H)    LDL, calculated      0 - 99 mg/dL  94    Hemoglobin A1c, (calculated)      4.8 - 5.6 %   7.1 (H)   Estimated average glucose      mg/dL   157   INTERPRETATION       Note         Component      Latest Ref Rng & Units 3/15/2019          10:48 PM   Sodium      136 - 145 mmol/L 140   Potassium      3.5 - 5.1 mmol/L 3.8   Chloride      97 - 108 mmol/L 107   CO2      21 - 32 mmol/L 26   Anion gap      5 - 15 mmol/L 7   Glucose      65 - 100 mg/dL 170 (H)   BUN      6 - 20 MG/DL 10   Creatinine      0.55 - 1.02 MG/DL 0.91   BUN/Creatinine ratio      12 - 20   11 (L)   GFR est AA      >60 ml/min/1.73m2 >60   GFR est non-AA      >60 ml/min/1.73m2 >60   Calcium      8.5 - 10.1 MG/DL 8.5   Bilirubin, total      0.2 - 1.0 MG/DL 0.2   ALT (SGPT)      12 - 78 U/L 26   AST      15 - 37 U/L 11 (L)   Alk.  phosphatase      45 - 117 U/L 59   Protein, total      6.4 - 8.2 g/dL 7.1   Albumin      3.5 - 5.0 g/dL 3.6   Globulin      2.0 - 4.0 g/dL 3.5   A-G Ratio      1.1 - 2.2   1.0 (L)     Alpha Dayhoff, ANP

## 2019-04-04 NOTE — PROGRESS NOTES
Jean Orantes is a 34 y.o. female    Chief Complaint   Patient presents with    New Patient     Ref by Dr. Patrice Dietrich    Rapid Heart Rate       1. Have you been to the ER, urgent care clinic since your last visit? Hospitalized since your last visit? NO    2. Have you seen or consulted any other health care providers outside of the [address] [place] [address] since your last visit? Include any pap smears or colon screening.   NO    Visit Vitals  /82 (BP 1 Location: Left arm, BP Patient Position: Sitting)   Pulse (!) 114   Resp 18   Ht 5' 5\" (1.651 m)   Wt 208 lb (94.3 kg)   LMP 01/25/2012   SpO2 95%   BMI 34.61 kg/m²

## 2019-04-04 NOTE — PATIENT INSTRUCTIONS
I will have you wear a 3 day heart monitor    Please have a thyroid lab checked on the 3rd floor    Lastly I will set you up with an ultrasound of your heart.

## 2019-04-05 LAB — TSH SERPL DL<=0.005 MIU/L-ACNC: 0.82 UIU/ML (ref 0.45–4.5)

## 2019-04-08 ENCOUNTER — TELEPHONE (OUTPATIENT)
Dept: CARDIOLOGY CLINIC | Age: 39
End: 2019-04-08

## 2019-04-08 PROBLEM — R00.2 PALPITATIONS: Status: ACTIVE | Noted: 2019-04-08

## 2019-04-19 ENCOUNTER — TELEPHONE (OUTPATIENT)
Dept: CARDIOLOGY CLINIC | Age: 39
End: 2019-04-19

## 2019-04-19 RX ORDER — METOPROLOL TARTRATE 25 MG/1
25 TABLET, FILM COATED ORAL EVERY 12 HOURS
Qty: 60 TAB | Refills: 0 | Status: SHIPPED | OUTPATIENT
Start: 2019-04-19 | End: 2019-05-02 | Stop reason: SDUPTHER

## 2019-04-19 NOTE — TELEPHONE ENCOUNTER
Holter report with sinus to sinus tachycardia with sinus arrhythmia - HR . Avg . Ectopic burden <1%. Called home and mobile numbers to discuss. Was unable to leave VM; box full. Will try back on Monday.

## 2019-04-19 NOTE — TELEPHONE ENCOUNTER
Discussed holter results; will trial metoprolol 25mg BID. Pt advised of possible side effects and s/s concerning for hypotension. Will call us if having issues.

## 2019-05-01 ENCOUNTER — OFFICE VISIT (OUTPATIENT)
Dept: FAMILY MEDICINE CLINIC | Age: 39
End: 2019-05-01

## 2019-05-01 VITALS
BODY MASS INDEX: 34.82 KG/M2 | WEIGHT: 209 LBS | RESPIRATION RATE: 18 BRPM | TEMPERATURE: 96.6 F | HEIGHT: 65 IN | OXYGEN SATURATION: 98 % | HEART RATE: 95 BPM | SYSTOLIC BLOOD PRESSURE: 118 MMHG | DIASTOLIC BLOOD PRESSURE: 78 MMHG

## 2019-05-01 DIAGNOSIS — J01.00 ACUTE NON-RECURRENT MAXILLARY SINUSITIS: Primary | ICD-10-CM

## 2019-05-01 RX ORDER — CEFDINIR 300 MG/1
300 CAPSULE ORAL 2 TIMES DAILY
Qty: 20 CAP | Refills: 0 | Status: SHIPPED | OUTPATIENT
Start: 2019-05-01 | End: 2019-05-10 | Stop reason: ALTCHOICE

## 2019-05-01 NOTE — PATIENT INSTRUCTIONS
Sinusitis: Care Instructions  Your Care Instructions    Sinusitis is an infection of the lining of the sinus cavities in your head. Sinusitis often follows a cold. It causes pain and pressure in your head and face. In most cases, sinusitis gets better on its own in 1 to 2 weeks. But some mild symptoms may last for several weeks. Sometimes antibiotics are needed. Follow-up care is a key part of your treatment and safety. Be sure to make and go to all appointments, and call your doctor if you are having problems. It's also a good idea to know your test results and keep a list of the medicines you take. How can you care for yourself at home? · Take an over-the-counter pain medicine, such as acetaminophen (Tylenol), ibuprofen (Advil, Motrin), or naproxen (Aleve). Read and follow all instructions on the label. · If the doctor prescribed antibiotics, take them as directed. Do not stop taking them just because you feel better. You need to take the full course of antibiotics. · Be careful when taking over-the-counter cold or flu medicines and Tylenol at the same time. Many of these medicines have acetaminophen, which is Tylenol. Read the labels to make sure that you are not taking more than the recommended dose. Too much acetaminophen (Tylenol) can be harmful. · Breathe warm, moist air from a steamy shower, a hot bath, or a sink filled with hot water. Avoid cold, dry air. Using a humidifier in your home may help. Follow the directions for cleaning the machine. · Use saline (saltwater) nasal washes to help keep your nasal passages open and wash out mucus and bacteria. You can buy saline nose drops at a grocery store or drugstore. Or you can make your own at home by adding 1 teaspoon of salt and 1 teaspoon of baking soda to 2 cups of distilled water. If you make your own, fill a bulb syringe with the solution, insert the tip into your nostril, and squeeze gently. Milo Beka your nose.   · Put a hot, wet towel or a warm gel pack on your face 3 or 4 times a day for 5 to 10 minutes each time. · Try a decongestant nasal spray like oxymetazoline (Afrin). Do not use it for more than 3 days in a row. Using it for more than 3 days can make your congestion worse. When should you call for help? Call your doctor now or seek immediate medical care if:    · You have new or worse swelling or redness in your face or around your eyes.     · You have a new or higher fever.    Watch closely for changes in your health, and be sure to contact your doctor if:    · You have new or worse facial pain.     · The mucus from your nose becomes thicker (like pus) or has new blood in it.     · You are not getting better as expected. Where can you learn more? Go to http://hodan-eugene.info/. Enter Q077 in the search box to learn more about \"Sinusitis: Care Instructions. \"  Current as of: March 27, 2018  Content Version: 11.9  © 0873-9586 [employer]. Care instructions adapted under license by [employer] (which disclaims liability or warranty for this information). If you have questions about a medical condition or this instruction, always ask your healthcare professional. [place] [address] any warranty or liability for your use of this information.

## 2019-05-01 NOTE — PROGRESS NOTES
HISTORY OF PRESENT ILLNESS  Pascual Posada is a 34 y.o. female. HPI   Pt presents with \"cold symptoms\"  Symptoms have been present for a couple of days  Cough, with mucous production  Cough kept her up all night last night  Sore throat  Nasal congestion  She is having some neck pain as well, unsure why, pain is worse with movement  No fever  OTC: Tylenol cold and sinus  Review of Systems   Constitutional: Negative for fever. HENT: Positive for congestion, ear pain, sinus pain and sore throat. Respiratory: Positive for cough and sputum production. Gastrointestinal: Negative for diarrhea and vomiting. Physical Exam   Constitutional: She is oriented to person, place, and time. She appears well-developed and well-nourished. HENT:   Head: Normocephalic and atraumatic. Right Ear: Hearing, tympanic membrane, external ear and ear canal normal.   Left Ear: Hearing, tympanic membrane, external ear and ear canal normal.   Nose: Mucosal edema and rhinorrhea present. Mouth/Throat: Posterior oropharyngeal erythema present. Neck: Normal range of motion. Neck supple. Cardiovascular: Normal rate, regular rhythm and normal heart sounds. Pulmonary/Chest: Effort normal and breath sounds normal. She has no wheezes. She has no rales. Lymphadenopathy:     She has cervical adenopathy. Neurological: She is alert and oriented to person, place, and time. Skin: Skin is warm and dry. Psychiatric: She has a normal mood and affect. Her behavior is normal.       ASSESSMENT and PLAN    ICD-10-CM ICD-9-CM    1.  Acute non-recurrent maxillary sinusitis J01.00 461.0 cefdinir (OMNICEF) 300 mg capsule     Informed patient that I have sent medication to the pharmacy, and she should take as prescribed  Educated about taking with food, to decrease the risk of stomach upset  Educated about staying well hydrated, and treating fever as needed    Pt informed to return to office with worsening of symptoms, or PRN with any questions or concerns. Pt verbalizes understanding of plan of care and denies further questions or concerns at this time.

## 2019-05-01 NOTE — PROGRESS NOTES
Identified pt with two pt identifiers(name and ). Chief Complaint   Patient presents with    Cough    Sore Throat     symptoms began yesterday    Nasal Congestion    Ear Pain    Neck Pain        Health Maintenance Due   Topic    Pneumococcal 0-64 years (1 of 1 - PPSV23)    EYE EXAM RETINAL OR DILATED     DTaP/Tdap/Td series (2 - Td)    FOOT EXAM Q1     PAP AKA CERVICAL CYTOLOGY        Wt Readings from Last 3 Encounters:   19 209 lb (94.8 kg)   19 208 lb (94.3 kg)   19 207 lb (93.9 kg)     Temp Readings from Last 3 Encounters:   19 96.6 °F (35.9 °C) (Oral)   19 98.9 °F (37.2 °C) (Oral)   19 98.6 °F (37 °C)     BP Readings from Last 3 Encounters:   19 118/78   19 136/82   19 92/72     Pulse Readings from Last 3 Encounters:   19 95   19 (!) 114   19 94         Learning Assessment:  :     Learning Assessment 2017   PRIMARY LEARNER Patient Patient   HIGHEST LEVEL OF EDUCATION - PRIMARY LEARNER  GRADUATED HIGH SCHOOL OR GED GRADUATED HIGH SCHOOL OR GED   BARRIERS PRIMARY LEARNER NONE NONE   PRIMARY LANGUAGE ENGLISH ENGLISH   LEARNER PREFERENCE PRIMARY READING READING   ANSWERED BY patient patient   RELATIONSHIP SELF SELF       Depression Screening:  :     3 most recent PHQ Screens 2019   Little interest or pleasure in doing things Not at all   Feeling down, depressed, irritable, or hopeless Not at all   Total Score PHQ 2 0       Fall Risk Assessment:  :     Fall Risk Assessment, last 12 mths 2017   Able to walk? Yes   Fall in past 12 months? No       Abuse Screening:  :     Abuse Screening Questionnaire 2019   Do you ever feel afraid of your partner? N N N N   Are you in a relationship with someone who physically or mentally threatens you? N N N N   Is it safe for you to go home?  Kale Kimball       Coordination of Care Questionnaire:  :     1) Have you been to an emergency room, urgent care clinic since your last visit? no   Hospitalized since your last visit? no             2) Have you seen or consulted any other health care providers outside of [address] [place] [address] since your last visit? no  (Include any pap smears or colon screenings in this section.)    3) Do you have an Advance Directive on file? no  Are you interested in receiving information about Advance Directives? no    Reviewed record in preparation for visit and have obtained necessary documentation. Medication reconciliation up to date and corrected with patient at this time.

## 2019-05-02 ENCOUNTER — TELEPHONE (OUTPATIENT)
Dept: CARDIOLOGY CLINIC | Age: 39
End: 2019-05-02

## 2019-05-02 RX ORDER — METOPROLOL TARTRATE 25 MG/1
12.5 TABLET, FILM COATED ORAL EVERY 12 HOURS
Qty: 30 TAB | Refills: 0
Start: 2019-05-02 | End: 2019-05-17

## 2019-05-02 NOTE — TELEPHONE ENCOUNTER
Received message that pt hypotensive on metoprolol. Called pt to discuss. States blood pressure ranging 80s-120s/60s-70s. Feels poorly when systolic 67H. No presyncope. Will try to decrease metoprolol to 12.5mg q12 hours. If pt continues to feel poorly or has pressures <93 systolic, then she was instructed to stop completely.

## 2019-05-10 ENCOUNTER — OFFICE VISIT (OUTPATIENT)
Dept: FAMILY MEDICINE CLINIC | Age: 39
End: 2019-05-10

## 2019-05-10 VITALS
SYSTOLIC BLOOD PRESSURE: 105 MMHG | OXYGEN SATURATION: 98 % | BODY MASS INDEX: 33.89 KG/M2 | RESPIRATION RATE: 18 BRPM | DIASTOLIC BLOOD PRESSURE: 70 MMHG | HEIGHT: 65 IN | TEMPERATURE: 98 F | HEART RATE: 90 BPM | WEIGHT: 203.4 LBS

## 2019-05-10 DIAGNOSIS — E11.9 TYPE 2 DIABETES MELLITUS WITHOUT COMPLICATION, WITHOUT LONG-TERM CURRENT USE OF INSULIN (HCC): ICD-10-CM

## 2019-05-10 DIAGNOSIS — Z79.4 TYPE 2 DIABETES MELLITUS WITHOUT COMPLICATION, WITH LONG-TERM CURRENT USE OF INSULIN (HCC): ICD-10-CM

## 2019-05-10 DIAGNOSIS — H65.111 ACUTE MUCOID OTITIS MEDIA OF RIGHT EAR: Primary | ICD-10-CM

## 2019-05-10 DIAGNOSIS — E11.9 TYPE 2 DIABETES MELLITUS WITHOUT COMPLICATION, WITH LONG-TERM CURRENT USE OF INSULIN (HCC): ICD-10-CM

## 2019-05-10 DIAGNOSIS — E55.9 VITAMIN D DEFICIENCY: ICD-10-CM

## 2019-05-10 RX ORDER — ERGOCALCIFEROL 1.25 MG/1
CAPSULE ORAL
Qty: 30 CAP | Refills: 0 | Status: SHIPPED | OUTPATIENT
Start: 2019-05-10 | End: 2019-11-12 | Stop reason: SDUPTHER

## 2019-05-10 RX ORDER — FENOFIBRATE 160 MG/1
TABLET ORAL
Qty: 30 TAB | Refills: 1 | Status: SHIPPED | OUTPATIENT
Start: 2019-05-10 | End: 2019-07-08 | Stop reason: SDUPTHER

## 2019-05-10 RX ORDER — SITAGLIPTIN 100 MG/1
TABLET, FILM COATED ORAL
Qty: 90 TAB | Refills: 1 | Status: SHIPPED | OUTPATIENT
Start: 2019-05-10 | End: 2019-12-09 | Stop reason: SDUPTHER

## 2019-05-10 RX ORDER — AMOXICILLIN AND CLAVULANATE POTASSIUM 875; 125 MG/1; MG/1
1 TABLET, FILM COATED ORAL 2 TIMES DAILY
Qty: 20 TAB | Refills: 0 | Status: SHIPPED | OUTPATIENT
Start: 2019-05-10 | End: 2019-05-20

## 2019-05-10 NOTE — PROGRESS NOTES
HISTORY OF PRESENT ILLNESS  Leslye Martinez is a 34 y.o. female. HPI  Pt presents with \"ear pain\"    Symptoms have been present for 2 days  Both ears hurt, but right is more painful then her left  She was up all night, due to pain  Nasal congestion  Sinus pressure  OTC: none  Review of Systems   Constitutional: Negative for fever. HENT: Positive for congestion, ear pain and sinus pain. Gastrointestinal: Negative for abdominal pain, diarrhea and vomiting. Physical Exam   Constitutional: She is oriented to person, place, and time. She appears well-developed and well-nourished. HENT:   Head: Normocephalic and atraumatic. Right Ear: Hearing, external ear and ear canal normal. Tympanic membrane is erythematous and retracted. Left Ear: Hearing, tympanic membrane, external ear and ear canal normal.   Nose: Mucosal edema and rhinorrhea present. Mouth/Throat: Oropharynx is clear and moist.   Neck: Normal range of motion. Neck supple. Cardiovascular: Normal rate, regular rhythm and normal heart sounds. Pulmonary/Chest: Effort normal and breath sounds normal.   Lymphadenopathy:     She has no cervical adenopathy. Neurological: She is alert and oriented to person, place, and time. Skin: Skin is warm and dry. Psychiatric: She has a normal mood and affect. Her behavior is normal.       ASSESSMENT and PLAN    ICD-10-CM ICD-9-CM    1. Acute mucoid otitis media of right ear H65.111 381.02 amoxicillin-clavulanate (AUGMENTIN) 875-125 mg per tablet     Informed patient that I have sent medication to the pharmacy, and she should take as prescribed  Educated about staying well hydrated, and treating fever as needed    Pt informed to return to office with worsening of symptoms, or PRN with any questions or concerns. Pt verbalizes understanding of plan of care and denies further questions or concerns at this time.

## 2019-05-10 NOTE — PATIENT INSTRUCTIONS

## 2019-05-10 NOTE — PROGRESS NOTES
Kash Summers is a 34 y.o. female    Chief Complaint   Patient presents with    Ear Pain     x 2 days ago and right ear kept her up last night from pain . Left and right ear has pain but right ear hurts the most    Hypertension       1. Have you been to the ER, urgent care clinic since your last visit? Hospitalized since your last visit? No  M  2. Have you seen or consulted any other health care providers outside of the [address] [place] [address] since your last visit? Include any pap smears or colon screening. No      Visit Vitals  /70 (BP 1 Location: Left arm, BP Patient Position: Sitting)   Pulse 90   Temp 98 °F (36.7 °C) (Oral)   Resp 18   Ht 5' 5\" (1.651 m)   Wt 203 lb 6.4 oz (92.3 kg)   SpO2 98%   BMI 33.85 kg/m²           Health Maintenance Due   Topic Date Due    Pneumococcal 0-64 years (1 of 1 - PPSV23) 11/14/1984    EYE EXAM RETINAL OR DILATED  09/01/2015    DTaP/Tdap/Td series (2 - Td) 01/01/2018    FOOT EXAM Q1  03/15/2018    PAP AKA CERVICAL CYTOLOGY  06/15/2019         Medication Reconciliation completed, changes noted.   Please  Update medication list.

## 2019-05-14 DIAGNOSIS — Z91.09 ENVIRONMENTAL ALLERGIES: ICD-10-CM

## 2019-05-14 RX ORDER — OMEPRAZOLE 20 MG/1
CAPSULE, DELAYED RELEASE ORAL
Qty: 30 CAP | Refills: 5 | Status: SHIPPED | OUTPATIENT
Start: 2019-05-14 | End: 2019-11-09 | Stop reason: SDUPTHER

## 2019-05-14 RX ORDER — LEVOCETIRIZINE DIHYDROCHLORIDE 5 MG/1
TABLET, FILM COATED ORAL
Qty: 30 TAB | Refills: 2 | Status: SHIPPED | OUTPATIENT
Start: 2019-05-14 | End: 2019-07-27 | Stop reason: SDUPTHER

## 2019-05-15 RX ORDER — ZOLPIDEM TARTRATE 10 MG/1
10 TABLET ORAL
OUTPATIENT
Start: 2019-05-15

## 2019-05-16 RX ORDER — METOPROLOL TARTRATE 25 MG/1
TABLET, FILM COATED ORAL
Qty: 60 TAB | Refills: 0 | OUTPATIENT
Start: 2019-05-16

## 2019-05-16 NOTE — TELEPHONE ENCOUNTER
Refill request received for metoprolol. Called pt to see if she was able to tolerate 1/2 dose. States low dose wasn't helping HR so she discontinued. Has f/u with Dr. Florentino Rogers tmw.

## 2019-05-17 ENCOUNTER — OFFICE VISIT (OUTPATIENT)
Dept: CARDIOLOGY CLINIC | Age: 39
End: 2019-05-17

## 2019-05-17 VITALS
OXYGEN SATURATION: 97 % | DIASTOLIC BLOOD PRESSURE: 78 MMHG | HEIGHT: 65 IN | BODY MASS INDEX: 34.82 KG/M2 | RESPIRATION RATE: 16 BRPM | WEIGHT: 209 LBS | SYSTOLIC BLOOD PRESSURE: 120 MMHG | HEART RATE: 76 BPM

## 2019-05-17 DIAGNOSIS — R00.0 INAPPROPRIATE SINUS TACHYCARDIA: Primary | ICD-10-CM

## 2019-05-17 RX ORDER — METOPROLOL TARTRATE 25 MG/1
12.5 TABLET, FILM COATED ORAL EVERY 12 HOURS
Qty: 90 TAB | Refills: 3 | Status: SHIPPED | OUTPATIENT
Start: 2019-05-17 | End: 2019-06-21 | Stop reason: ALTCHOICE

## 2019-05-17 NOTE — LETTER
5/20/19 Patient: Tate Suarez YOB: 1980 Date of Visit: 5/17/2019 Estrada Cervantes MD 
[facility] [address] [facility] [id number] VIA In Basket Dear Estrada Cervantes MD, Thank you for referring Ms. Ita Duncan to [facility] for evaluation. My notes for this consultation are attached. If you have questions, please do not hesitate to call me. I look forward to following your patient along with you.  
 
 
Sincerely, 
 
Bob Carreno, DO

## 2019-05-17 NOTE — PROGRESS NOTES
1. Have you been to the ER, urgent care clinic since your last visit? Hospitalized since your last visit? No 
 
2. Have you seen or consulted any other health care providers outside of the [address] [place] [address] since your last visit? Include any pap smears or colon screening. No  
 
Patient reports Med Rec. Completed. Chief Complaint Patient presents with  Palpitations Echo completed.

## 2019-05-20 PROBLEM — R00.0 INAPPROPRIATE SINUS TACHYCARDIA: Status: ACTIVE | Noted: 2019-05-20

## 2019-05-20 NOTE — PROGRESS NOTES
[facility] [address], [address] [place], [address] [facility] Office [phone] Mike Villarreal is a 34 y.o. female follow-up for tachycardia. Assessment/Recommendations: 
 
Inappropriate sinus tachycardia Symptoms consistent with a mild form of POTS Diabetes 
 
-Recommend patient continue on metoprolol 12.5 mg twice daily. 
-Encouraged aggressive hydration with use of sodium for orthostatic symptoms. 
-Discussed the importance of diabetes management for long-term prevention of neuropathy which will make orthostatic symptoms significantly worse. Plan of care discussed with patient and wishes to proceed as described above. Primary Care Physician- Jason De Los Santos MD 
 
Follow-up 6 months sooner as needed Subjective: 
34 y.o. with a history of anxiety and diabetes presents for follow-up evaluation for tachycardia and hypotension. Patient was previously seen by David Bates NP for evaluation of tachycardia and palpitations. Holter monitor was placed which showed inappropriate sinus tachycardia. She was started on metoprolol therapy 25 mg twice daily and subsequently had a hypotensive event with lightheadedness and dizziness. She reportedly stood up did not feel well was cool and clammy and blood pressure was depressed at that time, and metoprolol was reduced to 12.5 mg twice daily. Patient occasionally will have lightheadedness and dizziness when she stands up or stands for prolonged periods of time. Metoprolol appears to have improved her tachycardia slightly. She does report always having a \"high heart rate. \". He does drink a fair amount of water but does avoid salt. Past Medical History:  
Diagnosis Date  Anxiety  Arrhythmia HX TACHYCARDIA - NEG. STRESS TEST 2013 PER PATIENT  Asthma LAST EPISODE 2016  Diabetes ([address] 2008 NIDDM  Ear infection 11/30/2018 BILATERAL  
 GERD (gastroesophageal reflux disease)  Headache   
 Headache(784.0)  Hypertension HX HTN - NO MEDS CURRENTLY(11-30-18)  Rash 7/14/2016  Recurrent umbilical hernia 79/15/6441  S/P dilatation and curettage  Tachycardia Past Surgical History:  
Procedure Laterality Date  HX APPENDECTOMY  2/2008  HX CHOLECYSTECTOMY  2001  HX DILATION AND CURETTAGE    
 HX GYN  3/2008  
 tubal ligation  HX HERNIA REPAIR  2/2009  HX HERNIA REPAIR  12/06/2018  
 open recurrent umbilical hernia repair  HX HYSTERECTOMY  03/2012  HX LEFT SALPINGO-OOPHORECTOMY Left  HX LUMBAR DISKECTOMY    
 2006  HX ORTHOPAEDIC  9/2006  
 ruptured discs  UPPER GI ENDOSCOPY,BIOPSY  5/11/2017 Current Outpatient Medications:  
  metoprolol tartrate (LOPRESSOR) 25 mg tablet, Take 0.5 Tabs by mouth every twelve (12) hours. , Disp: 90 Tab, Rfl: 3 
  levocetirizine (XYZAL) 5 mg tablet, TAKE 1 TABLET BY MOUTH ONCE DAILY, Disp: 30 Tab, Rfl: 2 
  omeprazole (PRILOSEC) 20 mg capsule, TAKE 1 CAPSULE BY MOUTH ONCE DAILY, Disp: 30 Cap, Rfl: 5 
  ergocalciferol (ERGOCALCIFEROL) 50,000 unit capsule, TAKE 1 CAP BY MOUTH EVERY SEVEN (7) DAYS., Disp: 30 Cap, Rfl: 0 
  fenofibrate (LOFIBRA) 160 mg tablet, TAKE 1 TAB BY MOUTH DAILY. , Disp: 30 Tab, Rfl: 1 
  JANUVIA 100 mg tablet, TAKE 1 TABLET BY MOUTH EVERY DAY, Disp: 90 Tab, Rfl: 1 
  amoxicillin-clavulanate (AUGMENTIN) 875-125 mg per tablet, Take 1 Tab by mouth two (2) times a day for 10 days. , Disp: 20 Tab, Rfl: 0 
  INVOKANA 300 mg tablet, TAKE 1 TABLET BY MOUTH DAILY BEFORE BREAKFAST, Disp: 30 Tab, Rfl: 4 
  simvastatin (ZOCOR) 20 mg tablet, TAKE 1 TABLET BY MOUTH EVERY DAY, Disp: 90 Tab, Rfl: 1 
  aspirin delayed-release 81 mg tablet, TAKE 1 TABLET BY MOUTH EVERY DAY, Disp: 30 Tab, Rfl: 5 
  albuterol (PROVENTIL HFA, VENTOLIN HFA, PROAIR HFA) 90 mcg/actuation inhaler, Take 2 Puffs by inhalation every four (4) hours as needed for Wheezing., Disp: 1 Inhaler, Rfl: 5 
  SUMAtriptan (IMITREX) 50 mg tablet, TAKE 1 TAB AT ONSET OF MIGRAINE HEADACHE, REPEAT IN 1 HOUR IF NEEDED. NO MORE THAN 2 TABS PER 24 HOUR, Disp: 9 Tab, Rfl: 3   ASCENSIA CONTOUR strip, USE AS DIRECTED, Disp: 50 Strip, Rfl: 5 
  metFORMIN (GLUCOPHAGE) 1,000 mg tablet, TAKE 1 TABLET BY MOUTH TWICE A DAY, Disp: 180 Tab, Rfl: 4 
  fluticasone (FLONASE) 50 mcg/actuation nasal spray, 2 Sprays by Both Nostrils route daily. (Patient taking differently: 2 Sprays by Both Nostrils route daily as needed.), Disp: 1 Bottle, Rfl: 2 
  famotidine (PEPCID) 40 mg tablet, TAKE 1 TABLET BY MOUTH EVERY DAY TAKES EVERY BEDTIME, Disp: , Rfl: 12 
  topiramate (TOPAMAX) 50 mg tablet, Take 1 tab twice a day for migraine prevention (Patient taking differently: as needed. Take 1 tab twice a day for migraine prevention), Disp: 60 Tab, Rfl: 0 
  butalbital-acetaminophen-caffeine (FIORICET, ESGIC) -40 mg per tablet, Take 1 Tab by mouth every six (6) hours as needed for Pain. Max Daily Amount: 4 Tabs., Disp: 20 Tab, Rfl: 0 
  Lancets (MICROLET LANCET) Misc, USE TO CHECK BLOOD SUGAR ONE TO TWO TIMES DAILY, Disp: 100 Each, Rfl: 0 
  polyethylene glycol (MIRALAX) 17 gram/dose powder, Take 17 g by mouth two (2) times a day. 1 tablespoon with 8 oz of water daily (Patient not taking: Reported on 5/10/2019), Disp: 289 g, Rfl: 0 
  senna-docusate (SENNA WITH DOCUSATE SODIUM) 8.6-50 mg per tablet, Take 3 Tabs by mouth daily. (Patient not taking: Reported on 5/10/2019), Disp: 21 Tab, Rfl: 0 Allergies Allergen Reactions  Green Pepper Hives Red, Yellow and Orange Peppers also. OK with Black Pepper Family History Problem Relation Age of Onset  Heart Disease Mother  Hypertension Father  Cancer Father Lung  Hypertension Sister  No Known Problems Brother  No Known Problems Brother  No Known Problems Brother  Anesth Problems Neg Hx Social History Tobacco Use  Smoking status: Never Smoker  Smokeless tobacco: Never Used Substance Use Topics  Alcohol use: No  
 Drug use: No  
 
 
Review of Symptoms: 
Pertinent Positive: Palpitations, lightheadedness dizziness Pertinent Negative: No chest pain shortness of breath orthopnea PND syncope All Other systems reviewed and are negative for a Comprehensive ROS (10+) Physical Exam 
 
Blood pressure 120/78, pulse 76, resp. rate 16, height 5' 5\" (1.651 m), weight 209 lb (94.8 kg), last menstrual period 01/25/2012, SpO2 97 %. Constitutional:  well-developed and well-nourished. No distress. HENT: Normocephalic. Eyes: No scleral icterus. Neck:  Neck supple. No JVD present. Pulmonary/Chest: Effort normal and breath sounds normal. No respiratory distress, wheezes or rales. Cardiovascular: Normal rate, regular rhythm, S1 S2 . Exam reveals no gallop and no friction rub. No murmur heard. No edema. Extremities:  Normal muscle tone Abdominal:   No abnormal distension. Neurological:  Moving all extremities, cranial nerves appear grossly intact. Skin: Skin is not cold. Not diaphoretic. No erythema. Psychiatric:  Grossly normal mood and affect. Intact insight. Objective Data: 
 
05/17/19 ECHO ADULT COMPLETE 05/17/2019 5/17/2019 Narrative · Left Ventricle: Calculated left ventricular ejection fraction is 65%. Biplane method used to measure ejection fraction. No regional wall motion  
abnormality noted. Signed by: Sukhdev Del Rosario DO  
 
holter- 4/2019 Holter report with sinus to sinus tachycardia with sinus arrhythmia - HR . Avg . Ectopic burden <1%.  
 
 
 
 
  
  
 
 
 
Fabienne Garibay DO

## 2019-06-12 ENCOUNTER — OFFICE VISIT (OUTPATIENT)
Dept: FAMILY MEDICINE CLINIC | Age: 39
End: 2019-06-12

## 2019-06-12 VITALS
DIASTOLIC BLOOD PRESSURE: 68 MMHG | WEIGHT: 205.2 LBS | HEIGHT: 65 IN | RESPIRATION RATE: 18 BRPM | HEART RATE: 105 BPM | BODY MASS INDEX: 34.19 KG/M2 | SYSTOLIC BLOOD PRESSURE: 115 MMHG | OXYGEN SATURATION: 98 % | TEMPERATURE: 98 F

## 2019-06-12 DIAGNOSIS — H65.111 ACUTE MUCOID OTITIS MEDIA OF RIGHT EAR: Primary | ICD-10-CM

## 2019-06-12 RX ORDER — CLINDAMYCIN HYDROCHLORIDE 300 MG/1
300 CAPSULE ORAL 3 TIMES DAILY
Qty: 30 CAP | Refills: 0 | Status: SHIPPED | OUTPATIENT
Start: 2019-06-12 | End: 2019-06-22

## 2019-06-12 NOTE — PROGRESS NOTES
HISTORY OF PRESENT ILLNESS  Len Meehan is a 34 y.o. female. HPI   Pt presents with \"right ear pain\"  Pain has been present for 2 days  Pain is in right ear  She also has pain down the right side of her neck, and into her lymph nodes  Tender to touch  No fever  OTC: none  Review of Systems   Constitutional: Negative for fever. HENT: Positive for ear pain. Gastrointestinal: Negative for diarrhea and vomiting. Physical Exam   Constitutional: She is oriented to person, place, and time. She appears well-developed and well-nourished. HENT:   Head: Normocephalic and atraumatic. Right Ear: Hearing, external ear and ear canal normal. Tympanic membrane is erythematous and retracted. Left Ear: Hearing, tympanic membrane, external ear and ear canal normal.   Nose: Mucosal edema present. Mouth/Throat: Oropharynx is clear and moist.   Neck: Normal range of motion. Neck supple. Cardiovascular: Normal rate, regular rhythm and normal heart sounds. Pulmonary/Chest: Effort normal and breath sounds normal.   Lymphadenopathy:     She has no cervical adenopathy. Neurological: She is alert and oriented to person, place, and time. Skin: Skin is warm and dry. Psychiatric: She has a normal mood and affect. Her behavior is normal.       ASSESSMENT and PLAN    ICD-10-CM ICD-9-CM    1. Acute mucoid otitis media of right ear H65.111 381.02 clindamycin (CLEOCIN) 300 mg capsule      REFERRAL TO ENT-OTOLARYNGOLOGY     Educated patient about taking medication as prescribed, with food  Due to recurrence, should be seen by ENT, and educated about calling and making an appointment    Pt informed to return to office with worsening of symptoms, or PRN with any questions or concerns. Pt verbalizes understanding of plan of care and denies further questions or concerns at this time.

## 2019-06-12 NOTE — PATIENT INSTRUCTIONS

## 2019-06-12 NOTE — PROGRESS NOTES
Miguelangel Granger is a 34 y.o. female    Chief Complaint   Patient presents with    Ear Pain     x 1 day which is radiating down right side of neck and shoulder        1. Have you been to the ER, urgent care clinic since your last visit? Hospitalized since your last visit? No  M  2. Have you seen or consulted any other health care providers outside of the [address] [place] [address] since your last visit? Include any pap smears or colon screening. No      Visit Vitals  /68 (BP 1 Location: Left arm, BP Patient Position: Sitting)   Pulse (!) 105   Temp 98 °F (36.7 °C) (Oral)   Resp 18   Ht 5' 5\" (1.651 m)   Wt 205 lb 3.2 oz (93.1 kg)   SpO2 98%   BMI 34.15 kg/m²           Health Maintenance Due   Topic Date Due    Pneumococcal 0-64 years (1 of 1 - PPSV23) 11/14/1984    EYE EXAM RETINAL OR DILATED  09/01/2015    DTaP/Tdap/Td series (2 - Td) 01/01/2018    FOOT EXAM Q1  03/15/2018    PAP AKA CERVICAL CYTOLOGY  06/15/2019    MICROALBUMIN Q1  07/10/2019         Medication Reconciliation completed, changes noted.   Please  Update medication list.

## 2019-06-20 NOTE — PROGRESS NOTES
HISTORY OF PRESENTING ILLNESS      Alicia Gravse is a 34 y.o. female with recent palpitations and findings consistent with IST. She was poorly tolerant of metoprolol. ACTIVE PROBLEM LIST     Patient Active Problem List    Diagnosis Date Noted    Inappropriate sinus tachycardia 05/20/2019    Palpitations 04/08/2019    Recurrent umbilical hernia 12/19/8418    Acute midline thoracic back pain 09/26/2017    Acute mucoid otitis media of right ear 06/23/2017    Dermatitis 03/09/2017    Screening for thyroid disorder 01/10/2017    Acute non-recurrent frontal sinusitis 01/03/2017    Left lower quadrant pain 12/29/2016    Left ear pain 12/02/2016    Strep throat 11/09/2016    Nausea 10/07/2016    Right acute serous otitis media 07/14/2016    Rash 07/14/2016    Gastroesophageal reflux disease without esophagitis 07/14/2016    Environmental allergies 07/14/2016    Right ear pain 06/21/2016    Diabetes ([address] 07/18/2014    Sore throat 05/06/2014    Migraine headache 01/03/2012    Vitamin D deficiency 11/06/2011    Dyslipidemia (high LDL; low HDL) 01/24/2011    Hypertension 01/06/2011    Asthma 01/06/2011           PAST MEDICAL HISTORY     Past Medical History:   Diagnosis Date    Anxiety     Arrhythmia     HX TACHYCARDIA - NEG.  STRESS TEST 2013 PER PATIENT    Asthma     LAST EPISODE 2016    Diabetes ([address] 2008    NIDDM    Ear infection 11/30/2018    BILATERAL    GERD (gastroesophageal reflux disease)     Headache     Headache(784.0)     Hypertension     HX HTN - NO MEDS CURRENTLY(11-30-18)    Inappropriate sinus tachycardia 5/20/2019    Rash 7/14/2016    Recurrent umbilical hernia 38/38/8862    S/P dilatation and curettage     Tachycardia            PAST SURGICAL HISTORY     Past Surgical History:   Procedure Laterality Date    HX APPENDECTOMY  2/2008    HX CHOLECYSTECTOMY  2001    HX DILATION AND CURETTAGE      HX GYN  3/2008    tubal ligation    HX HERNIA REPAIR 2/2009    HX HERNIA REPAIR  12/06/2018    open recurrent umbilical hernia repair    HX HYSTERECTOMY  03/2012    HX LEFT SALPINGO-OOPHORECTOMY Left     HX LUMBAR DISKECTOMY      2006    HX ORTHOPAEDIC  9/2006    ruptured discs    UPPER GI ENDOSCOPY,BIOPSY  5/11/2017               ALLERGIES     Allergies   Allergen Reactions    Green Pepper Hives     Red, Yellow and Orange Peppers also. OK with Black Pepper          FAMILY HISTORY     Family History   Problem Relation Age of Onset    Heart Disease Mother     Hypertension Father     Cancer Father         Lung    Hypertension Sister     No Known Problems Brother     No Known Problems Brother     No Known Problems Brother     Anesth Problems Neg Hx     negative for cardiac disease       SOCIAL HISTORY     Social History     Socioeconomic History    Marital status:      Spouse name: Not on file    Number of children: Not on file    Years of education: Not on file    Highest education level: Not on file   Tobacco Use    Smoking status: Never Smoker    Smokeless tobacco: Never Used   Substance and Sexual Activity    Alcohol use: No    Drug use: No    Sexual activity: Yes     Partners: Male     Birth control/protection: Surgical         MEDICATIONS     Current Outpatient Medications   Medication Sig    clindamycin (CLEOCIN) 300 mg capsule Take 1 Cap by mouth three (3) times daily for 10 days.  metoprolol tartrate (LOPRESSOR) 25 mg tablet Take 0.5 Tabs by mouth every twelve (12) hours.  levocetirizine (XYZAL) 5 mg tablet TAKE 1 TABLET BY MOUTH ONCE DAILY    omeprazole (PRILOSEC) 20 mg capsule TAKE 1 CAPSULE BY MOUTH ONCE DAILY    ergocalciferol (ERGOCALCIFEROL) 50,000 unit capsule TAKE 1 CAP BY MOUTH EVERY SEVEN (7) DAYS.  fenofibrate (LOFIBRA) 160 mg tablet TAKE 1 TAB BY MOUTH DAILY.     JANUVIA 100 mg tablet TAKE 1 TABLET BY MOUTH EVERY DAY    INVOKANA 300 mg tablet TAKE 1 TABLET BY MOUTH DAILY BEFORE BREAKFAST    polyethylene glycol (MIRALAX) 17 gram/dose powder Take 17 g by mouth two (2) times a day. 1 tablespoon with 8 oz of water daily (Patient not taking: Reported on 5/10/2019)    senna-docusate (SENNA WITH DOCUSATE SODIUM) 8.6-50 mg per tablet Take 3 Tabs by mouth daily. (Patient not taking: Reported on 5/10/2019)    simvastatin (ZOCOR) 20 mg tablet TAKE 1 TABLET BY MOUTH EVERY DAY    aspirin delayed-release 81 mg tablet TAKE 1 TABLET BY MOUTH EVERY DAY    albuterol (PROVENTIL HFA, VENTOLIN HFA, PROAIR HFA) 90 mcg/actuation inhaler Take 2 Puffs by inhalation every four (4) hours as needed for Wheezing.  SUMAtriptan (IMITREX) 50 mg tablet TAKE 1 TAB AT ONSET OF MIGRAINE HEADACHE, REPEAT IN 1 HOUR IF NEEDED. NO MORE THAN 2 TABS PER 24 HOUR    ASCENSIA CONTOUR strip USE AS DIRECTED    metFORMIN (GLUCOPHAGE) 1,000 mg tablet TAKE 1 TABLET BY MOUTH TWICE A DAY    fluticasone (FLONASE) 50 mcg/actuation nasal spray 2 Sprays by Both Nostrils route daily. (Patient taking differently: 2 Sprays by Both Nostrils route daily as needed.)    famotidine (PEPCID) 40 mg tablet TAKE 1 TABLET BY MOUTH EVERY DAY TAKES EVERY BEDTIME    topiramate (TOPAMAX) 50 mg tablet Take 1 tab twice a day for migraine prevention (Patient taking differently: as needed. Take 1 tab twice a day for migraine prevention)    butalbital-acetaminophen-caffeine (FIORICET, ESGIC) -40 mg per tablet Take 1 Tab by mouth every six (6) hours as needed for Pain. Max Daily Amount: 4 Tabs.  Lancets (MICROLET LANCET) Misc USE TO CHECK BLOOD SUGAR ONE TO TWO TIMES DAILY (Patient taking differently: as needed. USE TO CHECK BLOOD SUGAR ONE TO TWO TIMES DAILY)     No current facility-administered medications for this visit. I have reviewed the nurses notes, vitals, problem list, allergy list, medical history, family, social history and medications. REVIEW OF SYMPTOMS      General: Pt denies excessive weight gain or loss.  Pt is able to conduct ADL's  HEENT: Denies blurred vision, headaches, hearing loss, epistaxis and difficulty swallowing. Respiratory: Denies cough, congestion, shortness of breath, RICE, wheezing or stridor. Cardiovascular: Denies precordial pain, palpitations, edema or PND  Gastrointestinal: Denies poor appetite, indigestion, abdominal pain or blood in stool  Genitourinary: Denies hematuria, dysuria, increased urinary frequency  Musculoskeletal: Denies joint pain or swelling from muscles or joints  Neurologic: Denies tremor, paresthesias, headache, or sensory motor disturbance  Psychiatric: Denies confusion, insomnia, depression  Integumentray: Denies rash, itching or ulcers. Hematologic: Denies easy bruising, bleeding       PHYSICAL EXAMINATION      There were no vitals filed for this visit. General: Well developed, in no acute distress. HEENT: No jaundice, oral mucosa moist, no oral ulcers  Neck: Supple, no stiffness, no lymphadenopathy, supple  Heart:  Normal S1/S2 negative S3 or S4. Regular, no murmur, gallop or rub, no jugular venous distention  Respiratory: Clear bilaterally x 4, no wheezing or rales  Abdomen:   Soft, non-tender, bowel sounds are active.   Extremities:  No edema, normal cap refill, no cyanosis. Musculoskeletal: No clubbing, no deformities  Neuro: A&Ox3, speech clear, gait stable, cooperative, no focal neurologic deficits  Skin: Skin color is normal. No rashes or lesions.  Non diaphoretic, moist.  Vascular: 2+ pulses symmetric in all extremities       DIAGNOSTIC DATA      EKG:        LABORATORY DATA      Lab Results   Component Value Date/Time    WBC 9.7 03/15/2019 10:48 PM    HGB 13.4 03/15/2019 10:48 PM    HCT 41.2 03/15/2019 10:48 PM    PLATELET 432 66/41/1658 10:48 PM    MCV 88.2 03/15/2019 10:48 PM      Lab Results   Component Value Date/Time    Sodium 140 03/15/2019 10:48 PM    Potassium 3.8 03/15/2019 10:48 PM    Chloride 107 03/15/2019 10:48 PM    CO2 26 03/15/2019 10:48 PM    Anion gap 7 03/15/2019 10:48 PM Glucose 170 (H) 03/15/2019 10:48 PM    BUN 10 03/15/2019 10:48 PM    Creatinine 0.91 03/15/2019 10:48 PM    BUN/Creatinine ratio 11 (L) 03/15/2019 10:48 PM    GFR est AA >60 03/15/2019 10:48 PM    GFR est non-AA >60 03/15/2019 10:48 PM    Calcium 8.5 03/15/2019 10:48 PM    Bilirubin, total 0.2 03/15/2019 10:48 PM    AST (SGOT) 11 (L) 03/15/2019 10:48 PM    Alk. phosphatase 59 03/15/2019 10:48 PM    Protein, total 7.1 03/15/2019 10:48 PM    Albumin 3.6 03/15/2019 10:48 PM    Globulin 3.5 03/15/2019 10:48 PM    A-G Ratio 1.0 (L) 03/15/2019 10:48 PM    ALT (SGPT) 26 03/15/2019 10:48 PM           ASSESSMENT      1. Inappropriate sinus tachycardia  2. Diabetes mellitus  3. Orthostatic hypotension  4. GERD  5. Anxiety       PLAN     Trial of diltiazem. Encouraged hydration/sodium liberalization       FOLLOW-UP     1 month      Thank you, Ceci Pena MD and Dr. Stacie Sultana for allowing me to participate in the care of this extraordinarily pleasant female. Please do not hesitate to contact me for further questions/concerns.          Genevieve Handy MD  Cardiac Electrophysiology / Cardiology    [address]  [address] [facility], [place] [address]  Meenu Pascual, [address] [place] [address]  [phone] / [phone] Fax   [phone] / [phone] Fax

## 2019-06-21 ENCOUNTER — OFFICE VISIT (OUTPATIENT)
Dept: CARDIOLOGY CLINIC | Age: 39
End: 2019-06-21

## 2019-06-21 VITALS
BODY MASS INDEX: 34.66 KG/M2 | OXYGEN SATURATION: 98 % | HEART RATE: 126 BPM | SYSTOLIC BLOOD PRESSURE: 130 MMHG | DIASTOLIC BLOOD PRESSURE: 82 MMHG | WEIGHT: 208 LBS | HEIGHT: 65 IN | RESPIRATION RATE: 18 BRPM

## 2019-06-21 DIAGNOSIS — R00.0 INAPPROPRIATE SINUS TACHYCARDIA: Primary | ICD-10-CM

## 2019-06-21 RX ORDER — DILTIAZEM HYDROCHLORIDE 60 MG/1
60 CAPSULE, EXTENDED RELEASE ORAL 2 TIMES DAILY
Qty: 60 CAP | Refills: 2 | Status: SHIPPED | OUTPATIENT
Start: 2019-06-21 | End: 2019-07-11 | Stop reason: SDUPTHER

## 2019-06-21 NOTE — PROGRESS NOTES
Room #2  Daily fast heart palpitations with light activity. Example mopping the floor. Notices heart palpitations when rates get up to 130's, measures with fitbit.   Visit Vitals  /82 (BP 1 Location: Left arm, BP Patient Position: Sitting)   Pulse (!) 126   Resp 18   Ht 5' 5\" (1.651 m)   Wt 208 lb (94.3 kg)   SpO2 98%   BMI 34.61 kg/m²

## 2019-06-23 DIAGNOSIS — G43.709 CHRONIC MIGRAINE WITHOUT AURA WITHOUT STATUS MIGRAINOSUS, NOT INTRACTABLE: ICD-10-CM

## 2019-06-24 RX ORDER — SUMATRIPTAN 50 MG/1
TABLET, FILM COATED ORAL
Qty: 9 TAB | Refills: 3 | Status: SHIPPED | OUTPATIENT
Start: 2019-06-24 | End: 2019-09-08 | Stop reason: SDUPTHER

## 2019-07-08 RX ORDER — FENOFIBRATE 160 MG/1
TABLET ORAL
Qty: 30 TAB | Refills: 1 | Status: SHIPPED | OUTPATIENT
Start: 2019-07-08 | End: 2019-09-04 | Stop reason: SDUPTHER

## 2019-07-10 ENCOUNTER — TELEPHONE (OUTPATIENT)
Dept: CARDIOLOGY CLINIC | Age: 39
End: 2019-07-10

## 2019-07-10 NOTE — TELEPHONE ENCOUNTER
Pt called stating last night around 6:00pm she started having a weird feeling in her chest, light headed, kind of hard to breath, heart pounding, tired & very hot. She said it is worse when she lays down. She said her HR has been fluctuating from 50's to highest she has seen 127 bpm.  Pt started taking Diltiazem 60 mg twice a day & takes it at around 7:00am & 7:00pm.  Please advise pt what to do. She can be reached at verified cell #[phone].

## 2019-07-11 RX ORDER — DILTIAZEM HYDROCHLORIDE 90 MG/1
90 CAPSULE, EXTENDED RELEASE ORAL 2 TIMES DAILY
Qty: 60 CAP | Refills: 3 | Status: SHIPPED | OUTPATIENT
Start: 2019-07-11 | End: 2019-07-19 | Stop reason: ALTCHOICE

## 2019-07-11 NOTE — TELEPHONE ENCOUNTER
Requested Prescriptions     Signed Prescriptions Disp Refills    dilTIAZem SR (CARDIZEM SR) 90 mg SR capsule 60 Cap 3     Sig: Take 1 Cap by mouth two (2) times a day. Authorizing Provider: Casi Chaves     Ordering User: Enma GALINDO     Dose increased to 90 mg twice daily per verbal order FLOR Juares NP.

## 2019-07-19 ENCOUNTER — OFFICE VISIT (OUTPATIENT)
Dept: CARDIOLOGY CLINIC | Age: 39
End: 2019-07-19

## 2019-07-19 VITALS
BODY MASS INDEX: 34.16 KG/M2 | OXYGEN SATURATION: 97 % | SYSTOLIC BLOOD PRESSURE: 122 MMHG | HEIGHT: 65 IN | RESPIRATION RATE: 16 BRPM | WEIGHT: 205 LBS | DIASTOLIC BLOOD PRESSURE: 78 MMHG | HEART RATE: 97 BPM

## 2019-07-19 DIAGNOSIS — E11.9 TYPE 2 DIABETES MELLITUS WITHOUT COMPLICATION, WITH LONG-TERM CURRENT USE OF INSULIN (HCC): ICD-10-CM

## 2019-07-19 DIAGNOSIS — R00.0 INAPPROPRIATE SINUS TACHYCARDIA: Primary | ICD-10-CM

## 2019-07-19 DIAGNOSIS — E78.5 DYSLIPIDEMIA: ICD-10-CM

## 2019-07-19 DIAGNOSIS — R00.2 PALPITATIONS: ICD-10-CM

## 2019-07-19 DIAGNOSIS — E78.1 HYPERTRIGLYCERIDEMIA: ICD-10-CM

## 2019-07-19 DIAGNOSIS — E11.9 TYPE 2 DIABETES MELLITUS WITHOUT COMPLICATION, WITHOUT LONG-TERM CURRENT USE OF INSULIN (HCC): ICD-10-CM

## 2019-07-19 DIAGNOSIS — Z79.4 TYPE 2 DIABETES MELLITUS WITHOUT COMPLICATION, WITH LONG-TERM CURRENT USE OF INSULIN (HCC): ICD-10-CM

## 2019-07-19 DIAGNOSIS — I10 ESSENTIAL HYPERTENSION: ICD-10-CM

## 2019-07-19 RX ORDER — DILTIAZEM HYDROCHLORIDE 120 MG/1
120 CAPSULE, COATED, EXTENDED RELEASE ORAL DAILY
Qty: 60 CAP | Refills: 1 | Status: SHIPPED | OUTPATIENT
Start: 2019-07-19 | End: 2019-08-01 | Stop reason: SDUPTHER

## 2019-07-19 RX ORDER — DILTIAZEM HYDROCHLORIDE 90 MG/1
90 CAPSULE, EXTENDED RELEASE ORAL 2 TIMES DAILY
Qty: 60 CAP | Refills: 3 | Status: CANCELLED | OUTPATIENT
Start: 2019-07-19

## 2019-07-19 NOTE — PROGRESS NOTES
Room # 5  Heart rate up to 150 yesterday. Noticed some improvement with dilitazem dosage increase but still doesn't last all day.   Visit Vitals  /78 (BP 1 Location: Left arm, BP Patient Position: Sitting)   Pulse 97   Resp 16   Ht 5' 5\" (1.651 m)   Wt 205 lb (93 kg)   SpO2 97%   BMI 34.11 kg/m²

## 2019-07-19 NOTE — PROGRESS NOTES
HISTORY OF PRESENTING ILLNESS      Maricruz Sahni is a 34 y.o. female with asthma, GERD, Diabetes and anxiety who is being seen for follow up of inappropriate sinus tachycardia. She wore holter monitor for palpitations which demonstrated sinus tachycardia. She was started on metoprolol 25mg which was later reduced to 12.5mg d/t hypotension. Last visit, this was switched to diltiazem and increased hydration/sodium liberalization was encouraged. EKG today shows sinus rhythm. She notes improvement in her palpitations with diltiazem, however, she has breakthrough symptoms prior to her next dose. ACTIVE PROBLEM LIST     Patient Active Problem List    Diagnosis Date Noted    Inappropriate sinus tachycardia 05/20/2019    Palpitations 04/08/2019    Recurrent umbilical hernia 51/46/9025    Acute midline thoracic back pain 09/26/2017    Acute mucoid otitis media of right ear 06/23/2017    Dermatitis 03/09/2017    Screening for thyroid disorder 01/10/2017    Acute non-recurrent frontal sinusitis 01/03/2017    Left lower quadrant pain 12/29/2016    Left ear pain 12/02/2016    Strep throat 11/09/2016    Nausea 10/07/2016    Right acute serous otitis media 07/14/2016    Rash 07/14/2016    Gastroesophageal reflux disease without esophagitis 07/14/2016    Environmental allergies 07/14/2016    Right ear pain 06/21/2016    Diabetes ([address] 07/18/2014    Sore throat 05/06/2014    Migraine headache 01/03/2012    Vitamin D deficiency 11/06/2011    Dyslipidemia (high LDL; low HDL) 01/24/2011    Hypertension 01/06/2011    Asthma 01/06/2011           PAST MEDICAL HISTORY     Past Medical History:   Diagnosis Date    Anxiety     Arrhythmia     HX TACHYCARDIA - NEG.  STRESS TEST 2013 PER PATIENT    Asthma     LAST EPISODE 2016    Diabetes ([address] 2008    NIDDM    Ear infection 11/30/2018    BILATERAL    GERD (gastroesophageal reflux disease)     Headache     Headache(784.0)     Hypertension     HX HTN - NO MEDS CURRENTLY(11-30-18)    Inappropriate sinus tachycardia 5/20/2019    Rash 7/14/2016    Recurrent umbilical hernia 67/71/0630    S/P dilatation and curettage     Tachycardia            PAST SURGICAL HISTORY     Past Surgical History:   Procedure Laterality Date    HX APPENDECTOMY  2/2008    HX CHOLECYSTECTOMY  2001    HX DILATION AND CURETTAGE      HX GYN  3/2008    tubal ligation    HX HERNIA REPAIR  2/2009    HX HERNIA REPAIR  12/06/2018    open recurrent umbilical hernia repair    HX HYSTERECTOMY  03/2012    HX LEFT SALPINGO-OOPHORECTOMY Left     HX LUMBAR DISKECTOMY      2006    HX ORTHOPAEDIC  9/2006    ruptured discs    UPPER GI ENDOSCOPY,BIOPSY  5/11/2017               ALLERGIES     Allergies   Allergen Reactions    Green Pepper Hives     Red, Yellow and Orange Peppers also. OK with Black Pepper          FAMILY HISTORY     Family History   Problem Relation Age of Onset    Heart Disease Mother     Hypertension Father     Cancer Father         Lung    Hypertension Sister     No Known Problems Brother     No Known Problems Brother     No Known Problems Brother     Anesth Problems Neg Hx     negative for cardiac disease       SOCIAL HISTORY     Social History     Socioeconomic History    Marital status:      Spouse name: Not on file    Number of children: Not on file    Years of education: Not on file    Highest education level: Not on file   Tobacco Use    Smoking status: Never Smoker    Smokeless tobacco: Never Used   Substance and Sexual Activity    Alcohol use: No    Drug use: No    Sexual activity: Yes     Partners: Male     Birth control/protection: Surgical         MEDICATIONS     Current Outpatient Medications   Medication Sig    dilTIAZem SR (CARDIZEM SR) 90 mg SR capsule Take 1 Cap by mouth two (2) times a day.  fenofibrate (LOFIBRA) 160 mg tablet TAKE 1 TAB BY MOUTH DAILY.     SUMAtriptan (IMITREX) 50 mg tablet TAKE 1 TAB AT ONSET OF MIGRAINE HEADACHE, REPEAT IN 1 HOUR IF NEEDED. NO MORE THAN 2 TABS PER 24 HOUR    levocetirizine (XYZAL) 5 mg tablet TAKE 1 TABLET BY MOUTH ONCE DAILY    omeprazole (PRILOSEC) 20 mg capsule TAKE 1 CAPSULE BY MOUTH ONCE DAILY    ergocalciferol (ERGOCALCIFEROL) 50,000 unit capsule TAKE 1 CAP BY MOUTH EVERY SEVEN (7) DAYS.  JANUVIA 100 mg tablet TAKE 1 TABLET BY MOUTH EVERY DAY    INVOKANA 300 mg tablet TAKE 1 TABLET BY MOUTH DAILY BEFORE BREAKFAST    polyethylene glycol (MIRALAX) 17 gram/dose powder Take 17 g by mouth two (2) times a day. 1 tablespoon with 8 oz of water daily (Patient not taking: Reported on 5/10/2019)    senna-docusate (SENNA WITH DOCUSATE SODIUM) 8.6-50 mg per tablet Take 3 Tabs by mouth daily. (Patient not taking: Reported on 5/10/2019)    simvastatin (ZOCOR) 20 mg tablet TAKE 1 TABLET BY MOUTH EVERY DAY    aspirin delayed-release 81 mg tablet TAKE 1 TABLET BY MOUTH EVERY DAY    albuterol (PROVENTIL HFA, VENTOLIN HFA, PROAIR HFA) 90 mcg/actuation inhaler Take 2 Puffs by inhalation every four (4) hours as needed for Wheezing.  ASCENSIA CONTOUR strip USE AS DIRECTED    metFORMIN (GLUCOPHAGE) 1,000 mg tablet TAKE 1 TABLET BY MOUTH TWICE A DAY    fluticasone (FLONASE) 50 mcg/actuation nasal spray 2 Sprays by Both Nostrils route daily. (Patient taking differently: 2 Sprays by Both Nostrils route daily as needed.)    famotidine (PEPCID) 40 mg tablet TAKE 1 TABLET BY MOUTH EVERY DAY TAKES EVERY BEDTIME    topiramate (TOPAMAX) 50 mg tablet Take 1 tab twice a day for migraine prevention (Patient taking differently: as needed. Take 1 tab twice a day for migraine prevention)    butalbital-acetaminophen-caffeine (FIORICET, ESGIC) -40 mg per tablet Take 1 Tab by mouth every six (6) hours as needed for Pain. Max Daily Amount: 4 Tabs.  Lancets (MICROLET LANCET) Misc USE TO CHECK BLOOD SUGAR ONE TO TWO TIMES DAILY (Patient taking differently: as needed.  USE TO CHECK BLOOD SUGAR ONE TO TWO TIMES DAILY)     No current facility-administered medications for this visit. I have reviewed the nurses notes, vitals, problem list, allergy list, medical history, family, social history and medications. REVIEW OF SYMPTOMS      General: Pt denies excessive weight gain or loss. Pt is able to conduct ADL's  HEENT: Denies blurred vision, headaches, hearing loss, epistaxis and difficulty swallowing. Respiratory: Denies cough, congestion, shortness of breath, RICE, wheezing or stridor. Cardiovascular: +palpitations, Denies precordial pain, edema or PND  Gastrointestinal: Denies poor appetite, indigestion, abdominal pain or blood in stool  Genitourinary: Denies hematuria, dysuria, increased urinary frequency  Musculoskeletal: Denies joint pain or swelling from muscles or joints  Neurologic: Denies tremor, paresthesias, headache, or sensory motor disturbance  Psychiatric: Denies confusion, insomnia, depression  Integumentray: Denies rash, itching or ulcers. Hematologic: Denies easy bruising, bleeding       PHYSICAL EXAMINATION      There were no vitals filed for this visit. General: Well developed, in no acute distress. HEENT: No jaundice, oral mucosa moist, no oral ulcers  Neck: Supple, no stiffness, no lymphadenopathy, supple  Heart:  Normal S1/S2 negative S3 or S4. Regular, no murmur, gallop or rub, no jugular venous distention  Respiratory: Clear bilaterally x 4, no wheezing or rales  Abdomen:   Soft, non-tender, bowel sounds are active.   Extremities:  No edema, normal cap refill, no cyanosis. Musculoskeletal: No clubbing, no deformities  Neuro: A&Ox3, speech clear, gait stable, cooperative, no focal neurologic deficits  Skin: Skin color is normal. No rashes or lesions.  Non diaphoretic, moist.  Vascular: 2+ pulses symmetric in all extremities       DIAGNOSTIC DATA      EKG: sinus rhythm        LABORATORY DATA      Lab Results   Component Value Date/Time    WBC 9.7 03/15/2019 10:48 PM    HGB 13.4 03/15/2019 10:48 PM    HCT 41.2 03/15/2019 10:48 PM    PLATELET 961 11/22/5894 10:48 PM    MCV 88.2 03/15/2019 10:48 PM      Lab Results   Component Value Date/Time    Sodium 140 03/15/2019 10:48 PM    Potassium 3.8 03/15/2019 10:48 PM    Chloride 107 03/15/2019 10:48 PM    CO2 26 03/15/2019 10:48 PM    Anion gap 7 03/15/2019 10:48 PM    Glucose 170 (H) 03/15/2019 10:48 PM    BUN 10 03/15/2019 10:48 PM    Creatinine 0.91 03/15/2019 10:48 PM    BUN/Creatinine ratio 11 (L) 03/15/2019 10:48 PM    GFR est AA >60 03/15/2019 10:48 PM    GFR est non-AA >60 03/15/2019 10:48 PM    Calcium 8.5 03/15/2019 10:48 PM    Bilirubin, total 0.2 03/15/2019 10:48 PM    AST (SGOT) 11 (L) 03/15/2019 10:48 PM    Alk. phosphatase 59 03/15/2019 10:48 PM    Protein, total 7.1 03/15/2019 10:48 PM    Albumin 3.6 03/15/2019 10:48 PM    Globulin 3.5 03/15/2019 10:48 PM    A-G Ratio 1.0 (L) 03/15/2019 10:48 PM    ALT (SGPT) 26 03/15/2019 10:48 PM           ASSESSMENT     1. Inappropriate sinus tachycardia  2. Diabetes  3. Orthostatic hypotension  4. GERD  5. Anxiety     PLAN     Will switch her diltiazem to 24 hour formula, 120mg daily with plans to increase dose as needed and tolerated (previous hx of of hypotension). Encouraged increased sodium liberalization, starting a B complex vitamin and increasing lower extremity strength exercises. FOLLOW-UP   1 month    Thank you, Zeenat Pereira MD and Dr. Sandra Lacy for allowing me to participate in the care of this extraordinarily pleasant female. Please do not hesitate to contact me for further questions/concerns.      Apolinar Liu NP

## 2019-07-22 RX ORDER — ASPIRIN 81 MG/1
TABLET ORAL
Qty: 30 TAB | Refills: 5 | Status: SHIPPED | OUTPATIENT
Start: 2019-07-22 | End: 2020-01-15

## 2019-07-24 ENCOUNTER — TELEPHONE (OUTPATIENT)
Dept: CARDIOLOGY CLINIC | Age: 39
End: 2019-07-24

## 2019-07-24 NOTE — TELEPHONE ENCOUNTER
Pt stated that her Diltiazem  mg 1 tab daily on Fri 7/19. She informed me that her HR has still been running from 67 to 144 bpm.  Her BP has been 119/83 & lowest 108/79 bpm.  She wanted inform you that on Mon 7/22 she increased her Diltiazem  mg 2 tabs daily. She wanted to verify you are ok with this.   Thanks, [employer]

## 2019-07-27 DIAGNOSIS — Z91.09 ENVIRONMENTAL ALLERGIES: ICD-10-CM

## 2019-07-29 RX ORDER — LEVOCETIRIZINE DIHYDROCHLORIDE 5 MG/1
TABLET, FILM COATED ORAL
Qty: 30 TAB | Refills: 2 | Status: SHIPPED | OUTPATIENT
Start: 2019-07-29 | End: 2019-11-09 | Stop reason: SDUPTHER

## 2019-08-01 ENCOUNTER — OFFICE VISIT (OUTPATIENT)
Dept: FAMILY MEDICINE CLINIC | Age: 39
End: 2019-08-01

## 2019-08-01 ENCOUNTER — TELEPHONE (OUTPATIENT)
Dept: CARDIOLOGY CLINIC | Age: 39
End: 2019-08-01

## 2019-08-01 ENCOUNTER — HOSPITAL ENCOUNTER (EMERGENCY)
Age: 39
Discharge: HOME | End: 2019-08-01
Attending: EMERGENCY MEDICINE
Payer: MEDICAID

## 2019-08-01 ENCOUNTER — APPOINTMENT (OUTPATIENT)
Dept: GENERAL RADIOLOGY | Age: 39
End: 2019-08-01
Attending: EMERGENCY MEDICINE
Payer: MEDICAID

## 2019-08-01 VITALS
OXYGEN SATURATION: 98 % | HEART RATE: 105 BPM | DIASTOLIC BLOOD PRESSURE: 82 MMHG | RESPIRATION RATE: 20 BRPM | SYSTOLIC BLOOD PRESSURE: 110 MMHG | TEMPERATURE: 98.4 F | BODY MASS INDEX: 33.76 KG/M2 | HEIGHT: 65 IN | WEIGHT: 202.6 LBS

## 2019-08-01 VITALS
HEART RATE: 95 BPM | RESPIRATION RATE: 22 BRPM | SYSTOLIC BLOOD PRESSURE: 127 MMHG | WEIGHT: 202 LBS | HEIGHT: 65 IN | DIASTOLIC BLOOD PRESSURE: 75 MMHG | OXYGEN SATURATION: 98 % | TEMPERATURE: 98.7 F | BODY MASS INDEX: 33.66 KG/M2

## 2019-08-01 DIAGNOSIS — E55.9 VITAMIN D DEFICIENCY: ICD-10-CM

## 2019-08-01 DIAGNOSIS — E78.1 HYPERTRIGLYCERIDEMIA: ICD-10-CM

## 2019-08-01 DIAGNOSIS — Z79.4 CONTROLLED TYPE 2 DIABETES MELLITUS WITHOUT COMPLICATION, WITH LONG-TERM CURRENT USE OF INSULIN (HCC): Primary | ICD-10-CM

## 2019-08-01 DIAGNOSIS — R53.81 MALAISE AND FATIGUE: ICD-10-CM

## 2019-08-01 DIAGNOSIS — E11.9 CONTROLLED TYPE 2 DIABETES MELLITUS WITHOUT COMPLICATION, WITH LONG-TERM CURRENT USE OF INSULIN (HCC): Primary | ICD-10-CM

## 2019-08-01 DIAGNOSIS — R00.0 INAPPROPRIATE SINUS TACHYCARDIA: Primary | ICD-10-CM

## 2019-08-01 DIAGNOSIS — E11.9 ENCOUNTER FOR DIABETIC FOOT EXAM (HCC): ICD-10-CM

## 2019-08-01 DIAGNOSIS — I10 ESSENTIAL HYPERTENSION: ICD-10-CM

## 2019-08-01 DIAGNOSIS — R53.83 MALAISE AND FATIGUE: ICD-10-CM

## 2019-08-01 DIAGNOSIS — R07.9 CHEST PAIN, UNSPECIFIED TYPE: ICD-10-CM

## 2019-08-01 DIAGNOSIS — R00.2 PALPITATIONS: ICD-10-CM

## 2019-08-01 LAB
ALBUMIN SERPL-MCNC: 3.8 G/DL (ref 3.5–5)
ALBUMIN UR QL STRIP: 10 MG/L
ALBUMIN/GLOB SERPL: 1.1 {RATIO} (ref 1.1–2.2)
ALP SERPL-CCNC: 61 U/L (ref 45–117)
ALT SERPL-CCNC: 33 U/L (ref 12–78)
ANION GAP SERPL CALC-SCNC: 10 MMOL/L (ref 5–15)
AST SERPL-CCNC: 12 U/L (ref 15–37)
BASOPHILS # BLD: 0 K/UL (ref 0–0.1)
BASOPHILS NFR BLD: 0 % (ref 0–1)
BILIRUB SERPL-MCNC: 0.3 MG/DL (ref 0.2–1)
BUN SERPL-MCNC: 12 MG/DL (ref 6–20)
BUN/CREAT SERPL: 11 (ref 12–20)
CALCIUM SERPL-MCNC: 9.4 MG/DL (ref 8.5–10.1)
CHLORIDE SERPL-SCNC: 102 MMOL/L (ref 97–108)
CK SERPL-CCNC: 52 U/L (ref 26–192)
CO2 SERPL-SCNC: 27 MMOL/L (ref 21–32)
CREAT SERPL-MCNC: 1.09 MG/DL (ref 0.55–1.02)
CREATININE, URINE POC: 200 MG/DL
DIFFERENTIAL METHOD BLD: ABNORMAL
EOSINOPHIL # BLD: 0.1 K/UL (ref 0–0.4)
EOSINOPHIL NFR BLD: 1 % (ref 0–7)
ERYTHROCYTE [DISTWIDTH] IN BLOOD BY AUTOMATED COUNT: 13.5 % (ref 11.5–14.5)
GLOBULIN SER CALC-MCNC: 3.6 G/DL (ref 2–4)
GLUCOSE SERPL-MCNC: 173 MG/DL (ref 65–100)
HCT VFR BLD AUTO: 40.8 % (ref 35–47)
HGB BLD-MCNC: 14.3 G/DL (ref 11.5–16)
IMM GRANULOCYTES # BLD AUTO: 0 K/UL (ref 0–0.04)
IMM GRANULOCYTES NFR BLD AUTO: 0 % (ref 0–0.5)
LYMPHOCYTES # BLD: 3.7 K/UL (ref 0.8–3.5)
LYMPHOCYTES NFR BLD: 31 % (ref 12–49)
MCH RBC QN AUTO: 29.7 PG (ref 26–34)
MCHC RBC AUTO-ENTMCNC: 35 G/DL (ref 30–36.5)
MCV RBC AUTO: 84.6 FL (ref 80–99)
METAMYELOCYTES NFR BLD MANUAL: 1 %
MICROALBUMIN/CREAT RATIO POC: <30 MG/G
MONOCYTES # BLD: 1.1 K/UL (ref 0–1)
MONOCYTES NFR BLD: 9 % (ref 5–13)
NEUTS BAND NFR BLD MANUAL: 1 %
NEUTS SEG # BLD: 6.9 K/UL (ref 1.8–8)
NEUTS SEG NFR BLD: 57 % (ref 32–75)
PLATELET # BLD AUTO: 231 K/UL (ref 150–400)
PMV BLD AUTO: 11.3 FL (ref 8.9–12.9)
POTASSIUM SERPL-SCNC: 3.6 MMOL/L (ref 3.5–5.1)
PROT SERPL-MCNC: 7.4 G/DL (ref 6.4–8.2)
RBC # BLD AUTO: 4.82 M/UL (ref 3.8–5.2)
RBC MORPH BLD: ABNORMAL
SODIUM SERPL-SCNC: 139 MMOL/L (ref 136–145)
TROPONIN I SERPL-MCNC: <0.05 NG/ML
TROPONIN I SERPL-MCNC: <0.05 NG/ML
WBC # BLD AUTO: 11.9 K/UL (ref 3.6–11)

## 2019-08-01 PROCEDURE — 36415 COLL VENOUS BLD VENIPUNCTURE: CPT

## 2019-08-01 PROCEDURE — 85025 COMPLETE CBC W/AUTO DIFF WBC: CPT

## 2019-08-01 PROCEDURE — 74011250637 HC RX REV CODE- 250/637: Performed by: EMERGENCY MEDICINE

## 2019-08-01 PROCEDURE — 71046 X-RAY EXAM CHEST 2 VIEWS: CPT

## 2019-08-01 PROCEDURE — 84484 ASSAY OF TROPONIN QUANT: CPT

## 2019-08-01 PROCEDURE — 82550 ASSAY OF CK (CPK): CPT

## 2019-08-01 PROCEDURE — 80053 COMPREHEN METABOLIC PANEL: CPT

## 2019-08-01 PROCEDURE — 74011000250 HC RX REV CODE- 250: Performed by: EMERGENCY MEDICINE

## 2019-08-01 PROCEDURE — 93005 ELECTROCARDIOGRAM TRACING: CPT

## 2019-08-01 PROCEDURE — 99285 EMERGENCY DEPT VISIT HI MDM: CPT

## 2019-08-01 RX ORDER — GUAIFENESIN 100 MG/5ML
81 LIQUID (ML) ORAL
Status: COMPLETED | OUTPATIENT
Start: 2019-08-01 | End: 2019-08-01

## 2019-08-01 RX ORDER — DILTIAZEM HYDROCHLORIDE 240 MG/1
240 CAPSULE, COATED, EXTENDED RELEASE ORAL DAILY
Qty: 30 CAP | Refills: 6 | Status: SHIPPED | OUTPATIENT
Start: 2019-08-01 | End: 2019-08-30 | Stop reason: ALTCHOICE

## 2019-08-01 RX ADMIN — ASPIRIN 81 MG 81 MG: 81 TABLET ORAL at 21:35

## 2019-08-01 RX ADMIN — LIDOCAINE HYDROCHLORIDE 40 ML: 20 SOLUTION ORAL; TOPICAL at 23:04

## 2019-08-01 NOTE — PATIENT INSTRUCTIONS
Diabetes Foot Health: Care Instructions Your Care Instructions When you have diabetes, your feet need extra care and attention. Diabetes can damage the nerve endings and blood vessels in your feet, making you less likely to notice when your feet are injured. Diabetes also limits your body's ability to fight infection and get blood to areas that need it. If you get a minor foot injury, it could become an ulcer or a serious infection. With good foot care, you can prevent most of these problems. Caring for your feet can be quick and easy. Most of the care can be done when you are bathing or getting ready for bed. Follow-up care is a key part of your treatment and safety. Be sure to make and go to all appointments, and call your doctor if you are having problems. It's also a good idea to know your test results and keep a list of the medicines you take. How can you care for yourself at home? · Keep your blood sugar close to normal by watching what and how much you eat, monitoring blood sugar, taking medicines if prescribed, and getting regular exercise. · Do not smoke. Smoking affects blood flow and can make foot problems worse. If you need help quitting, talk to your doctor about stop-smoking programs and medicines. These can increase your chances of quitting for good. · Eat a diet that is low in fats. High fat intake can cause fat to build up in your blood vessels and decrease blood flow. · Inspect your feet daily for blisters, cuts, cracks, or sores. If you cannot see well, use a mirror or have someone help you. · Take care of your feet: 
? Wash your feet every day. Use warm (not hot) water. Check the water temperature with your wrists or other part of your body, not your feet. ? Dry your feet well. Pat them dry. Do not rub the skin on your feet too hard. Dry well between your toes. If the skin on your feet stays moist, bacteria or a fungus can grow, which can lead to infection. ? Keep your skin soft. Use moisturizing skin cream to keep the skin on your feet soft and prevent calluses and cracks. But do not put the cream between your toes, and stop using any cream that causes a rash. ? Clean underneath your toenails carefully. Do not use a sharp object to clean underneath your toenails. Use the blunt end of a nail file or other rounded tool. ? Trim and file your toenails straight across to prevent ingrown toenails. Use a nail clipper, not scissors. Use an emery board to smooth the edges. · Change socks daily. Socks without seams are best, because seams often rub the feet. You can find socks for people with diabetes from specialty catalogs. · Look inside your shoes every day for things like gravel or torn linings, which could cause blisters or sores. · Buy shoes that fit well: 
? Look for shoes that have plenty of space around the toes. This helps prevent bunions and blisters. ? Try on shoes while wearing the kind of socks you will usually wear with the shoes. ? Avoid plastic shoes. They may rub your feet and cause blisters. Good shoes should be made of materials that are flexible and breathable, such as leather or cloth. ? Break in new shoes slowly by wearing them for no more than an hour a day for several days. Take extra time to check your feet for red areas, blisters, or other problems after you wear new shoes. · Do not go barefoot. Do not wear sandals, and do not wear shoes with very thin soles. Thin soles are easy to puncture. They also do not protect your feet from hot pavement or cold weather. · Have your doctor check your feet during each visit. If you have a foot problem, see your doctor. Do not try to treat an early foot problem at home. Home remedies or treatments that you can buy without a prescription (such as corn removers) can be harmful. · Always get early treatment for foot problems. A minor irritation can lead to a major problem if not properly cared for early. When should you call for help? Call your doctor now or seek immediate medical care if: 
  · You have a foot sore, an ulcer or break in the skin that is not healing after 4 days, bleeding corns or calluses, or an ingrown toenail.  
  · You have blue or black areas, which can mean bruising or blood flow problems.  
  · You have peeling skin or tiny blisters between your toes or cracking or oozing of the skin.  
  · You have a fever for more than 24 hours and a foot sore.  
  · You have new numbness or tingling in your feet that does not go away after you move your feet or change positions.  
  · You have unexplained or unusual swelling of the foot or ankle.  
 Watch closely for changes in your health, and be sure to contact your doctor if: 
  · You cannot do proper foot care. Where can you learn more? Go to http://hodan-eugene.info/. Enter A739 in the search box to learn more about \"Diabetes Foot Health: Care Instructions. \" Current as of: July 25, 2018 Content Version: 12.1 © 4207-3657 [employer]. Care instructions adapted under license by [employer] (which disclaims liability or warranty for this information). If you have questions about a medical condition or this instruction, always ask your healthcare professional. [address] any warranty or liability for your use of this information.

## 2019-08-01 NOTE — TELEPHONE ENCOUNTER
Can you send in a new script for pt Diltiazem 120mg 2 caps daily.   Verified pharmacy [facility]  Thank you, Mary Beth Hall

## 2019-08-01 NOTE — PROGRESS NOTES
Lab Results   Component Value Date/Time    Microalb/Creat ratio (ug/mg creat.) <8.0 03/15/2017 12:05 PM      Chief Complaint   Patient presents with    Follow Up Chronic Condition     Follow up for diabetes and fasting labs    Nasal Congestion     Complains of pressure in face in sinus area and notes blood when blowing nose     38F with Type II DM, HTN, HLD and recently IST who presents for 4-month follow up and diabetes evaluation. Since her last visit, she has been seen by cardiology for IST and started on Diltiazem. She had intolerable side effects with metoprolol. If the Diltiazem does not work, per the patient, there is a possibility that she may require an ablation. In addition, she reports that she has had increased difficulty with a home situation and it has been very stressful. She admits that this may be driving some of her overall concerns. The patient's step daughter has been running away from home and multiple other inappropriate behaviors. I do see her children and this child and discussed having a follow up to see what is going on. She will schedule the appointment. See Diabetic Report Card listed above. Patient Active Problem List    Diagnosis    Inappropriate sinus tachycardia    Palpitations    Recurrent umbilical hernia    Acute midline thoracic back pain    Acute mucoid otitis media of right ear    Dermatitis    Screening for thyroid disorder    Acute non-recurrent frontal sinusitis    Left lower quadrant pain    Left ear pain    Strep throat    Nausea    Right acute serous otitis media    Rash    Gastroesophageal reflux disease without esophagitis    Environmental allergies    Right ear pain    Diabetes (HCC)    Sore throat    Migraine headache    Vitamin D deficiency    Dyslipidemia (high LDL; low HDL)    Hypertension    Asthma       Reviewed PmHx, RxHx, FmHx, SocHx, AllgHx--dated and updated in the chart.     Review of Systems - negative except as listed above in the HPI    Objective:     Vitals:    08/01/19 1017   BP: 110/82   Pulse: (!) 105   Resp: 20   Temp: 98.4 °F (36.9 °C)   TempSrc: Oral   SpO2: 98%   Weight: 202 lb 9.6 oz (91.9 kg)   Height: 5' 5\" (1.651 m)     Physical Examination: General appearance - alert, well appearing, and in no distress  Mental status - alert, oriented to person, place, and time  Chest - clear to auscultation, no wheezes, rales or rhonchi, symmetric air entry  Heart - normal rate, regular rhythm, normal S1, S2, no murmurs, rubs, clicks or gallops  Back exam - full range of motion, no tenderness, palpable spasm or pain on motion  Neurological - alert, oriented, normal speech, no focal findings or movement disorder noted  Musculoskeletal - no joint tenderness, deformity or swelling    Diabetic foot exam:      Left Foot:              Visual Exam: normal               Pulse DP: 2+ (normal)              Filament test: normal sensation               Vibratory sensation: Vibratory sensation: normal                       Right Foot:              Visual Exam: normal               Pulse DP: 2+ (normal)              Filament test: normal sensation               Vibratory sensation: Vibratory sensation: normal    Assessment/ Plan:     Diagnoses and all orders for this visit:    1. Controlled type 2 diabetes mellitus without complication, with long-term current use of insulin ([facility])  -     AMB POC URINE, MICROALBUMIN, SEMIQUANT (3 RESULTS)  -     HEMOGLOBIN A1C WITH EAG  -      DIABETES FOOT EXAM    2. Malaise and fatigue  -     THYROID CASCADE PROFILE    3. Hypertriglyceridemia  -     LIPID PANEL    4. Essential hypertension    5. Vitamin D deficiency  -     VITAMIN D, 25 HYDROXY  -     CBC WITH AUTOMATED DIFF  -     METABOLIC PANEL, COMPREHENSIVE    6.  Encounter for diabetic foot exam ([facility] [address]  -      DIABETES FOOT EXAM    Other orders  -     pneumococcal 23-valent (PNEUMOVAX 23) 25 mcg/0.5 mL injection; 0.5 mL by IntraMUSCular route PRIOR TO DISCHARGE for 1 dose.  -     diph,pertuss,acel,,tetanus vac,PF, (ADACEL) 2 Lf-(2.5-5-3-5 mcg)-5Lf/0.5 mL syrg vaccine; 0.5 mL by IntraMUSCular route once for 1 dose. Lab Results   Component Value Date/Time    Cholesterol, total 185 04/02/2019 08:34 AM    HDL Cholesterol 40 04/02/2019 08:34 AM    LDL, calculated 94 04/02/2019 08:34 AM    Triglyceride 255 (H) 04/02/2019 08:34 AM     Lab Results   Component Value Date/Time    Hemoglobin A1c 7.1 (H) 04/02/2019 08:34 AM    Hemoglobin A1c 7.3 (H) 10/17/2018 08:49 AM    Hemoglobin A1c 7.6 (H) 07/10/2018 08:48 AM    Microalb/Creat ratio (ug/mg creat.) <8.0 03/15/2017 12:05 PM    LDL, calculated 94 04/02/2019 08:34 AM    Creatinine (POC) 0.7 06/04/2013 09:57 AM    Creatinine 0.91 03/15/2019 10:48 PM        Discussed with patient goal of Diabetes to include:  HgA1C <7, LDL cholesterol <100, Blood pressure <140/80. Discussed with patient diet and weight management and to get regular exercise. Recommend yearly eye exams and daily foot care. The patient understands and agrees with the plan. I have discussed the diagnosis with the patient and the intended plan as seen in the above orders. The patient has received an after-visit summary and questions were answered concerning future plans.      Medication Side Effects and Warnings were discussed with patient  Patient Labs were reviewed and or requested  Patient Past Records were reviewed and or requested    Trini Chawla MD  [place], [place]

## 2019-08-01 NOTE — PROGRESS NOTES
Patient presents for follow up for diabetes and fasting labs. Complains of nasal congestion with feelings of sinus pressure. Notes blood when blowing nose x 4 days.

## 2019-08-02 ENCOUNTER — TELEPHONE (OUTPATIENT)
Dept: CARDIOLOGY CLINIC | Age: 39
End: 2019-08-02

## 2019-08-02 LAB
25(OH)D3+25(OH)D2 SERPL-MCNC: 10.9 NG/ML (ref 30–100)
ALBUMIN SERPL-MCNC: 4.7 G/DL (ref 3.5–5.5)
ALBUMIN/GLOB SERPL: 1.8 {RATIO} (ref 1.2–2.2)
ALP SERPL-CCNC: 46 IU/L (ref 39–117)
ALT SERPL-CCNC: 25 IU/L (ref 0–32)
AST SERPL-CCNC: 22 IU/L (ref 0–40)
ATRIAL RATE: 115 BPM
BASOPHILS # BLD AUTO: 0 X10E3/UL (ref 0–0.2)
BASOPHILS NFR BLD AUTO: 0 %
BILIRUB SERPL-MCNC: 0.4 MG/DL (ref 0–1.2)
BUN SERPL-MCNC: 10 MG/DL (ref 6–24)
BUN/CREAT SERPL: 11 (ref 9–23)
CALCIUM SERPL-MCNC: 9.1 MG/DL (ref 8.7–10.2)
CALCULATED P AXIS, ECG09: 38 DEGREES
CALCULATED R AXIS, ECG10: 48 DEGREES
CALCULATED T AXIS, ECG11: 53 DEGREES
CHLORIDE SERPL-SCNC: 103 MMOL/L (ref 96–106)
CHOLEST SERPL-MCNC: 163 MG/DL (ref 100–199)
CO2 SERPL-SCNC: 22 MMOL/L (ref 20–29)
CREAT SERPL-MCNC: 0.88 MG/DL (ref 0.57–1)
DIAGNOSIS, 93000: NORMAL
EOSINOPHIL # BLD AUTO: 0.1 X10E3/UL (ref 0–0.4)
EOSINOPHIL NFR BLD AUTO: 1 %
ERYTHROCYTE [DISTWIDTH] IN BLOOD BY AUTOMATED COUNT: 14.6 % (ref 12.3–15.4)
EST. AVERAGE GLUCOSE BLD GHB EST-MCNC: 154 MG/DL
GLOBULIN SER CALC-MCNC: 2.6 G/DL (ref 1.5–4.5)
GLUCOSE SERPL-MCNC: 105 MG/DL (ref 65–99)
HBA1C MFR BLD: 7 % (ref 4.8–5.6)
HCT VFR BLD AUTO: 43.5 % (ref 34–46.6)
HDLC SERPL-MCNC: 38 MG/DL
HGB BLD-MCNC: 14.5 G/DL (ref 11.1–15.9)
IMM GRANULOCYTES # BLD AUTO: 0.1 X10E3/UL (ref 0–0.1)
IMM GRANULOCYTES NFR BLD AUTO: 1 %
INTERPRETATION, 910389: NORMAL
LDLC SERPL CALC-MCNC: 97 MG/DL (ref 0–99)
LYMPHOCYTES # BLD AUTO: 2.7 X10E3/UL (ref 0.7–3.1)
LYMPHOCYTES NFR BLD AUTO: 24 %
Lab: NORMAL
MCH RBC QN AUTO: 29.2 PG (ref 26.6–33)
MCHC RBC AUTO-ENTMCNC: 33.3 G/DL (ref 31.5–35.7)
MCV RBC AUTO: 88 FL (ref 79–97)
MONOCYTES # BLD AUTO: 0.7 X10E3/UL (ref 0.1–0.9)
MONOCYTES NFR BLD AUTO: 7 %
NEUTROPHILS # BLD AUTO: 7.3 X10E3/UL (ref 1.4–7)
NEUTROPHILS NFR BLD AUTO: 67 %
P-R INTERVAL, ECG05: 128 MS
PLATELET # BLD AUTO: 246 X10E3/UL (ref 150–450)
POTASSIUM SERPL-SCNC: 4.3 MMOL/L (ref 3.5–5.2)
PROT SERPL-MCNC: 7.3 G/DL (ref 6–8.5)
Q-T INTERVAL, ECG07: 462 MS
QRS DURATION, ECG06: 72 MS
QTC CALCULATION (BEZET), ECG08: 639 MS
RBC # BLD AUTO: 4.96 X10E6/UL (ref 3.77–5.28)
SODIUM SERPL-SCNC: 142 MMOL/L (ref 134–144)
TRIGL SERPL-MCNC: 140 MG/DL (ref 0–149)
TSH SERPL DL<=0.005 MIU/L-ACNC: 0.95 UIU/ML (ref 0.45–4.5)
VENTRICULAR RATE, ECG03: 115 BPM
VLDLC SERPL CALC-MCNC: 28 MG/DL (ref 5–40)
WBC # BLD AUTO: 10.9 X10E3/UL (ref 3.4–10.8)

## 2019-08-02 RX ORDER — FAMOTIDINE 40 MG/1
TABLET, FILM COATED ORAL
Qty: 30 TAB | Refills: 12 | Status: SHIPPED | OUTPATIENT
Start: 2019-08-02 | End: 2020-06-24 | Stop reason: SDUPTHER

## 2019-08-02 NOTE — ED NOTES
Bedside shift change report given to CHANELLE RN  (oncoming nurse) by SUKI RN  (offgoing nurse). Report included the following information SBAR.

## 2019-08-02 NOTE — ED NOTES
Patient had recently c/o \"indigestion\" and no chest pain. Dr Corets Crooks ordered GI cocktail and that was administered.  Second troponin drawn and sent, results pending

## 2019-08-02 NOTE — TELEPHONE ENCOUNTER
Spoke to pt about issues she had that started yesterday. She stated her HR has been up & down between the 120s - 50s, light headed & chest pain yesterday after her pneumonia vaccine. Pt went to [facility] ER last night. They wanted to increase her Diltiazem 360 mg daily. Pt was concerned about increasing the strength since she was feeling fine before. Spoke to Dominic Maya NP regarding her ER visit. She stated that due to her other health issues the symptoms could be related to the vaccine but unable to tell for sure. She stated that if she was feeling fine on the Diltiazem 240 mg daily then to stay at that dose but if her HR starts to go up & feeling bad then trial of the Diltiazem 360 mg daily & keep a HR & BP journal.  Keep her scheduled f/u unless more issues then see earlier. Pt agrees to this plan.

## 2019-08-02 NOTE — DISCHARGE INSTRUCTIONS
Patient Education        Cardiac Arrhythmia: Care Instructions  Your Care Instructions    A cardiac arrhythmia is a change in the normal rhythm of the heart. Your heart may beat too fast or too slow or beat with an irregular or skipping rhythm. A change in the heart's rhythm may feel like a really strong heartbeat or a fluttering in your chest. A severe heart rhythm problem can keep the body from getting the blood it needs. This can result in shortness of breath, lightheadedness, and fainting. You may take medicine to treat your condition. Your doctor may recommend a pacemaker or recommend catheter ablation to destroy small parts of the heart that are causing a rhythm problem. Another possible treatment is an implantable cardioverter-defibrillator (ICD). An ICD is a device that gives the heart a shock to return the heart to a normal rhythm. Follow-up care is a key part of your treatment and safety. Be sure to make and go to all appointments, and call your doctor if you are having problems. It's also a good idea to know your test results and keep a list of the medicines you take. How can you care for yourself at home?  [facility] care    · Be safe with medicines. Take your medicines exactly as prescribed. Call your doctor if you think you are having a problem with your medicine. You will get more details on the specific medicines your doctor prescribes.     · If you received a pacemaker or an ICD, you will get a fact sheet about it.     · Wear medical alert jewelry that says you have an abnormal heart rhythm. You can buy this at most drugstores.    Lifestyle changes    · Eat a heart-healthy diet.     · Stay at a healthy weight. Lose weight if you need to.     · Avoid nicotine, too much alcohol, and illegal drugs (meth, speed, and cocaine). Also, get enough sleep and do not overeat.     · Ask your doctor whether you can take over-the-counter medicines (such as decongestants).  These can make your heart beat fast.     · Talk to your doctor about any limits to activities, such as driving, or tasks where you use power tools or ladders. Activity    · Start light exercise if your doctor says you can. Even a small amount will help you get stronger, have more energy, and manage your stress.     · Get regular exercise. Try for 30 minutes on most days of the week. Ask your doctor what level of exercise is safe for you. If activity is not likely to cause health problems, you probably do not have limits on the type or level of activity that you can do. You may want to walk, swim, bike, or do other activities.     · When you exercise, watch for signs that your heart is working too hard. You are pushing too hard if you cannot talk while you exercise. If you become short of breath or dizzy or have chest pain, sit down and rest.     · Check your pulse daily. Place two fingers on the artery at the palm side of your wrist, in line with your thumb. If your heartbeat seems uneven, talk to your doctor. When should you call for help? Call 911 anytime you think you may need emergency care. For example, call if:    · You have symptoms of sudden heart failure. These may include:  ? Severe trouble breathing. ? A fast or irregular heartbeat. ? Coughing up pink, foamy mucus. ? You passed out.     · You have signs of a stroke. These include:  ? Sudden numbness, paralysis, or weakness in your face, arm, or leg, especially on only one side of your body. ? New problems with walking or balance. ? Sudden vision changes. ? Drooling or slurred speech. ? New problems speaking or understanding simple statements, or feeling confused. ? A sudden, severe headache that is different from past headaches.    Call your doctor now or seek immediate medical care if:    · You have new or changed symptoms of heart failure, such as:  ? New or increased shortness of breath. ? New or worse swelling in your legs, ankles, or feet.   ? Sudden weight gain, such as more than 2 to 3 pounds in a day or 5 pounds in a week. (Your doctor may suggest a different range of weight gain.)  ? Feeling dizzy or lightheaded or like you may faint. ? Feeling so tired or weak that you cannot do your usual activities. ? Not sleeping well. Shortness of breath wakes you at night. You need extra pillows to prop yourself up to breathe easier.    Watch closely for changes in your health, and be sure to contact your doctor if:    · You do not get better as expected. Where can you learn more? Go to http://hodan-eugene.info/. Enter S292 in the search box to learn more about \"Cardiac Arrhythmia: Care Instructions. \"  Current as of: July 22, 2018  Content Version: 12.1  © 9364-1203 [employer]. Care instructions adapted under license by [employer] (which disclaims liability or warranty for this information). If you have questions about a medical condition or this instruction, always ask your healthcare professional. [place] [address] any warranty or liability for your use of this information. Patient Education        Chest Pain: Care Instructions  Your Care Instructions    There are many things that can cause chest pain. Some are not serious and will get better on their own in a few days. But some kinds of chest pain need more testing and treatment. Your doctor may have recommended a follow-up visit in the next 8 to 12 hours. If you are not getting better, you may need more tests or treatment. Even though your doctor has released you, you still need to watch for any problems. The doctor carefully checked you, but sometimes problems can develop later. If you have new symptoms or if your symptoms do not get better, get medical care right away. If you have worse or different chest pain or pressure that lasts more than 5 minutes or you passed out (lost consciousness), call 911 or seek other emergency help right away.    A medical visit is only one step in your treatment. Even if you feel better, you still need to do what your doctor recommends, such as going to all suggested follow-up appointments and taking medicines exactly as directed. This will help you recover and help prevent future problems. How can you care for yourself at home? · Rest until you feel better. · Take your medicine exactly as prescribed. Call your doctor if you think you are having a problem with your medicine. · Do not drive after taking a prescription pain medicine. When should you call for help? Call 911 if:    · You passed out (lost consciousness).     · You have severe difficulty breathing.     · You have symptoms of a heart attack. These may include:  ? Chest pain or pressure, or a strange feeling in your chest.  ? Sweating. ? Shortness of breath. ? Nausea or vomiting. ? Pain, pressure, or a strange feeling in your back, neck, jaw, or upper belly or in one or both shoulders or arms. ? Lightheadedness or sudden weakness. ? A fast or irregular heartbeat. After you call 911, the  may tell you to chew 1 adult-strength or 2 to 4 low-dose aspirin. Wait for an ambulance. Do not try to drive yourself.    Call your doctor today if:    · You have any trouble breathing.     · Your chest pain gets worse.     · You are dizzy or lightheaded, or you feel like you may faint.     · You are not getting better as expected.     · You are having new or different chest pain. Where can you learn more? Go to http://hodan-eugene.info/. Enter A120 in the search box to learn more about \"Chest Pain: Care Instructions. \"  Current as of: September 23, 2018  Content Version: 12.1  © 9135-6532 [employer]. Care instructions adapted under license by [employer] (which disclaims liability or warranty for this information).  If you have questions about a medical condition or this instruction, always ask your healthcare professional. [employer] disclaims any warranty or liability for your use of this information.

## 2019-08-02 NOTE — ED TRIAGE NOTES
Patient had a pneumonia vaccine today and has been \"feeling bad\" since then. At 4 pm tonight she felt lightheaded and had left sided chest pain that comes and goes.  Has been eating and drinking well today

## 2019-08-29 NOTE — PROGRESS NOTES
HISTORY OF PRESENTING ILLNESS      Alicia Graves is a 34 y.o. female with recent palpitations and findings consistent with IST. She was poorly tolerant of metoprolol and has continued to have tachycardia with increased diltiazem. She reports severe fatigue with fluctuating HRs. Thus far, monitoring has shown sinus tachycardia. ACTIVE PROBLEM LIST     Patient Active Problem List    Diagnosis Date Noted    Inappropriate sinus tachycardia 05/20/2019    Palpitations 04/08/2019    Recurrent umbilical hernia 65/25/8585    Acute midline thoracic back pain 09/26/2017    Acute mucoid otitis media of right ear 06/23/2017    Dermatitis 03/09/2017    Screening for thyroid disorder 01/10/2017    Acute non-recurrent frontal sinusitis 01/03/2017    Left lower quadrant pain 12/29/2016    Left ear pain 12/02/2016    Strep throat 11/09/2016    Nausea 10/07/2016    Right acute serous otitis media 07/14/2016    Rash 07/14/2016    Gastroesophageal reflux disease without esophagitis 07/14/2016    Environmental allergies 07/14/2016    Right ear pain 06/21/2016    Diabetes ([address] 07/18/2014    Sore throat 05/06/2014    Migraine headache 01/03/2012    Vitamin D deficiency 11/06/2011    Dyslipidemia (high LDL; low HDL) 01/24/2011    Hypertension 01/06/2011    Asthma 01/06/2011           PAST MEDICAL HISTORY     Past Medical History:   Diagnosis Date    Anxiety     Arrhythmia     HX TACHYCARDIA - NEG.  STRESS TEST 2013 PER PATIENT    Asthma     LAST EPISODE 2016    Diabetes ([address] 2008    NIDDM    Ear infection 11/30/2018    BILATERAL    GERD (gastroesophageal reflux disease)     Headache     Headache(784.0)     Hypertension     HX HTN - NO MEDS CURRENTLY(11-30-18)    Inappropriate sinus tachycardia 5/20/2019    Rash 7/14/2016    Recurrent umbilical hernia 87/48/2971    S/P dilatation and curettage     Tachycardia            PAST SURGICAL HISTORY     Past Surgical History:   Procedure Laterality Date    HX APPENDECTOMY  2/2008    HX CHOLECYSTECTOMY  2001    HX DILATION AND CURETTAGE      HX GYN  3/2008    tubal ligation    HX HERNIA REPAIR  2/2009    HX HERNIA REPAIR  12/06/2018    open recurrent umbilical hernia repair    HX HYSTERECTOMY  03/2012    HX LEFT SALPINGO-OOPHORECTOMY Left     HX LUMBAR DISKECTOMY      2006    HX ORTHOPAEDIC  9/2006    ruptured discs    UPPER GI ENDOSCOPY,BIOPSY  5/11/2017               ALLERGIES     Allergies   Allergen Reactions    Green Pepper Hives     Red, Yellow and Orange Peppers also. OK with Black Pepper          FAMILY HISTORY     Family History   Problem Relation Age of Onset    Heart Disease Mother     Hypertension Father     Cancer Father         Lung    Hypertension Sister     No Known Problems Brother     No Known Problems Brother     No Known Problems Brother     Anesth Problems Neg Hx     negative for cardiac disease       SOCIAL HISTORY     Social History     Socioeconomic History    Marital status:      Spouse name: Not on file    Number of children: Not on file    Years of education: Not on file    Highest education level: Not on file   Tobacco Use    Smoking status: Never Smoker    Smokeless tobacco: Never Used   Substance and Sexual Activity    Alcohol use: No    Drug use: No    Sexual activity: Yes     Partners: Male     Birth control/protection: Surgical         MEDICATIONS     Current Outpatient Medications   Medication Sig    metFORMIN (GLUCOPHAGE) 1,000 mg tablet TAKE 1 TABLET BY MOUTH TWICE A DAY    famotidine (PEPCID) 40 mg tablet TAKE 1 TABLET BY MOUTH EVERY DAY TAKES EVERY BEDTIME    dilTIAZem CD (CARDIZEM CD) 240 mg ER capsule Take 1 Cap by mouth daily.  pneumococcal 23-valent (PNEUMOVAX 23) 25 mcg/0.5 mL injection 0.5 mL by IntraMUSCular route PRIOR TO DISCHARGE for 1 dose.     levocetirizine (XYZAL) 5 mg tablet TAKE 1 TABLET BY MOUTH ONCE DAILY    aspirin delayed-release 81 mg tablet TAKE 1 TABLET BY MOUTH EVERY DAY    fenofibrate (LOFIBRA) 160 mg tablet TAKE 1 TAB BY MOUTH DAILY.  SUMAtriptan (IMITREX) 50 mg tablet TAKE 1 TAB AT ONSET OF MIGRAINE HEADACHE, REPEAT IN 1 HOUR IF NEEDED. NO MORE THAN 2 TABS PER 24 HOUR    omeprazole (PRILOSEC) 20 mg capsule TAKE 1 CAPSULE BY MOUTH ONCE DAILY    ergocalciferol (ERGOCALCIFEROL) 50,000 unit capsule TAKE 1 CAP BY MOUTH EVERY SEVEN (7) DAYS.  JANUVIA 100 mg tablet TAKE 1 TABLET BY MOUTH EVERY DAY    INVOKANA 300 mg tablet TAKE 1 TABLET BY MOUTH DAILY BEFORE BREAKFAST    simvastatin (ZOCOR) 20 mg tablet TAKE 1 TABLET BY MOUTH EVERY DAY    albuterol (PROVENTIL HFA, VENTOLIN HFA, PROAIR HFA) 90 mcg/actuation inhaler Take 2 Puffs by inhalation every four (4) hours as needed for Wheezing.  ASCENSIA CONTOUR strip USE AS DIRECTED    topiramate (TOPAMAX) 50 mg tablet Take 1 tab twice a day for migraine prevention (Patient taking differently: as needed. Take 1 tab twice a day for migraine prevention)    butalbital-acetaminophen-caffeine (FIORICET, ESGIC) -40 mg per tablet Take 1 Tab by mouth every six (6) hours as needed for Pain. Max Daily Amount: 4 Tabs.  Lancets (MICROLET LANCET) Misc USE TO CHECK BLOOD SUGAR ONE TO TWO TIMES DAILY (Patient taking differently: as needed. USE TO CHECK BLOOD SUGAR ONE TO TWO TIMES DAILY)     No current facility-administered medications for this visit. I have reviewed the nurses notes, vitals, problem list, allergy list, medical history, family, social history and medications. REVIEW OF SYMPTOMS      General: +fatigue, Pt denies excessive weight gain or loss. Pt is able to conduct ADL's  HEENT: Denies blurred vision, headaches, hearing loss, epistaxis and difficulty swallowing. Respiratory: Denies cough, congestion, shortness of breath, RICE, wheezing or stridor.   Cardiovascular: +palpitations, Denies precordial pain,edema or PND  Gastrointestinal: Denies poor appetite, indigestion, abdominal pain or blood in stool  Genitourinary: Denies hematuria, dysuria, increased urinary frequency  Musculoskeletal: Denies joint pain or swelling from muscles or joints  Neurologic: Denies tremor, paresthesias, headache, or sensory motor disturbance  Psychiatric: Denies confusion, insomnia, depression  Integumentray: Denies rash, itching or ulcers. Hematologic: Denies easy bruising, bleeding       PHYSICAL EXAMINATION      There were no vitals filed for this visit. General: Well developed, in no acute distress. HEENT: No jaundice, oral mucosa moist, no oral ulcers  Neck: Supple, no stiffness, no lymphadenopathy, supple  Heart:  Normal S1/S2 negative S3 or S4. Regular, no murmur, gallop or rub, no jugular venous distention  Respiratory: Clear bilaterally x 4, no wheezing or rales  Abdomen:   Soft, non-tender, bowel sounds are active.   Extremities:  No edema, normal cap refill, no cyanosis. Musculoskeletal: No clubbing, no deformities  Neuro: A&Ox3, speech clear, gait stable, cooperative, no focal neurologic deficits  Skin: Skin color is normal. No rashes or lesions.  Non diaphoretic, moist.  Vascular: 2+ pulses symmetric in all extremities       DIAGNOSTIC DATA      EKG:        LABORATORY DATA      Lab Results   Component Value Date/Time    WBC 11.9 (H) 08/01/2019 08:56 PM    HGB 14.3 08/01/2019 08:56 PM    HCT 40.8 08/01/2019 08:56 PM    PLATELET 727 58/41/8959 08:56 PM    MCV 84.6 08/01/2019 08:56 PM      Lab Results   Component Value Date/Time    Sodium 139 08/01/2019 08:56 PM    Potassium 3.6 08/01/2019 08:56 PM    Chloride 102 08/01/2019 08:56 PM    CO2 27 08/01/2019 08:56 PM    Anion gap 10 08/01/2019 08:56 PM    Glucose 173 (H) 08/01/2019 08:56 PM    BUN 12 08/01/2019 08:56 PM    Creatinine 1.09 (H) 08/01/2019 08:56 PM    BUN/Creatinine ratio 11 (L) 08/01/2019 08:56 PM    GFR est AA >60 08/01/2019 08:56 PM    GFR est non-AA 56 (L) 08/01/2019 08:56 PM    Calcium 9.4 08/01/2019 08:56 PM    Bilirubin, total 0.3 08/01/2019 08:56 PM    AST (SGOT) 12 (L) 08/01/2019 08:56 PM    Alk. phosphatase 61 08/01/2019 08:56 PM    Protein, total 7.4 08/01/2019 08:56 PM    Albumin 3.8 08/01/2019 08:56 PM    Globulin 3.6 08/01/2019 08:56 PM    A-G Ratio 1.1 08/01/2019 08:56 PM    ALT (SGPT) 33 08/01/2019 08:56 PM           ASSESSMENT      1. Inappropriate sinus tachycardia  2. Diabetes mellitus  3. Orthostatic hypotension  4. GERD  5. Anxiety        PLAN     Referral to Dr. Nora Story for evaluation of possible autonomic dysfunction. Stop diltiazem, start acebutolol 200mg BID and will titrate up as needed. Follow up with Dr. Monica Booth to discuss role of EP study. FOLLOW-UP   1 month with Dr. Monica Booth      Thank you, Zheng Contreras MD for allowing me to participate in the care of this extraordinarily pleasant female. Please do not hesitate to contact me for further questions/concerns.        Melissa Nicolas NP

## 2019-08-30 ENCOUNTER — OFFICE VISIT (OUTPATIENT)
Dept: CARDIOLOGY CLINIC | Age: 39
End: 2019-08-30

## 2019-08-30 VITALS
HEART RATE: 104 BPM | SYSTOLIC BLOOD PRESSURE: 122 MMHG | DIASTOLIC BLOOD PRESSURE: 80 MMHG | WEIGHT: 203 LBS | HEIGHT: 65 IN | RESPIRATION RATE: 16 BRPM | OXYGEN SATURATION: 98 % | BODY MASS INDEX: 33.82 KG/M2

## 2019-08-30 DIAGNOSIS — E78.5 DYSLIPIDEMIA: ICD-10-CM

## 2019-08-30 DIAGNOSIS — E11.9 TYPE 2 DIABETES MELLITUS WITHOUT COMPLICATION, WITHOUT LONG-TERM CURRENT USE OF INSULIN (HCC): ICD-10-CM

## 2019-08-30 DIAGNOSIS — R00.0 INAPPROPRIATE SINUS TACHYCARDIA: Primary | ICD-10-CM

## 2019-08-30 DIAGNOSIS — R00.2 PALPITATIONS: ICD-10-CM

## 2019-08-30 DIAGNOSIS — I10 ESSENTIAL HYPERTENSION: ICD-10-CM

## 2019-08-30 RX ORDER — ACEBUTOLOL HYDROCHLORIDE 200 MG/1
200 CAPSULE ORAL 2 TIMES DAILY
Qty: 60 CAP | Refills: 2 | Status: SHIPPED | OUTPATIENT
Start: 2019-08-30 | End: 2019-09-30

## 2019-08-30 NOTE — PROGRESS NOTES
Room # 2    WTC: 8/1/19 Chest pain, Tachycardia    Took Diltiazem 240 mg last night at 8 pm and then accidentally took another one this am @ 8     Fast heart beat, BP higher than normal, fatigue, SOB with increased HR    Visit Vitals  /80 (BP 1 Location: Left arm, BP Patient Position: Sitting)   Pulse (!) 104   Resp 16   Ht 5' 5\" (1.651 m)   Wt 203 lb (92.1 kg)   LMP 01/25/2012   SpO2 98%   BMI 33.78 kg/m²

## 2019-09-05 RX ORDER — SIMVASTATIN 20 MG/1
TABLET, FILM COATED ORAL
Qty: 30 TAB | Refills: 5 | Status: SHIPPED | OUTPATIENT
Start: 2019-09-05 | End: 2020-02-27

## 2019-09-05 RX ORDER — FENOFIBRATE 160 MG/1
TABLET ORAL
Qty: 30 TAB | Refills: 1 | Status: SHIPPED | OUTPATIENT
Start: 2019-09-05 | End: 2019-11-12 | Stop reason: SDUPTHER

## 2019-09-08 DIAGNOSIS — G43.709 CHRONIC MIGRAINE WITHOUT AURA WITHOUT STATUS MIGRAINOSUS, NOT INTRACTABLE: ICD-10-CM

## 2019-09-10 RX ORDER — SUMATRIPTAN 50 MG/1
TABLET, FILM COATED ORAL
Qty: 9 TAB | Refills: 3 | Status: SHIPPED | OUTPATIENT
Start: 2019-09-10 | End: 2019-11-04 | Stop reason: SDUPTHER

## 2019-09-19 ENCOUNTER — OFFICE VISIT (OUTPATIENT)
Dept: NEUROLOGY | Age: 39
End: 2019-09-19

## 2019-09-19 VITALS — SYSTOLIC BLOOD PRESSURE: 108 MMHG | BODY MASS INDEX: 33.78 KG/M2 | WEIGHT: 203 LBS | DIASTOLIC BLOOD PRESSURE: 70 MMHG

## 2019-09-19 VITALS
DIASTOLIC BLOOD PRESSURE: 70 MMHG | HEIGHT: 65 IN | WEIGHT: 204 LBS | SYSTOLIC BLOOD PRESSURE: 108 MMHG | BODY MASS INDEX: 33.99 KG/M2

## 2019-09-19 DIAGNOSIS — R00.0 TACHYCARDIA: Primary | ICD-10-CM

## 2019-09-19 DIAGNOSIS — R00.2 PALPITATIONS: Primary | ICD-10-CM
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
## 2019-09-19 NOTE — LETTER
9/19/19 Patient: Jenise Moseley YOB: 1980 Date of Visit: 9/19/2019 Yesika Simpson MD 
[address] [facility] [address] [facility] [id number] VIA In Basket Dear Yesika Simpson MD, Thank you for referring Ms. Slade Stewart to [facility] for evaluation. My notes for this consultation are attached. If you have questions, please do not hesitate to call me. I look forward to following your patient along with you. Sincerely, Dimitri Valadez MD

## 2019-09-20 ENCOUNTER — OFFICE VISIT (OUTPATIENT)
Dept: FAMILY MEDICINE CLINIC | Age: 39
End: 2019-09-20

## 2019-09-20 ENCOUNTER — DOCUMENTATION ONLY (OUTPATIENT)
Dept: CARDIOLOGY CLINIC | Age: 39
End: 2019-09-20

## 2019-09-20 VITALS
WEIGHT: 201.4 LBS | TEMPERATURE: 98.7 F | BODY MASS INDEX: 33.55 KG/M2 | RESPIRATION RATE: 18 BRPM | SYSTOLIC BLOOD PRESSURE: 110 MMHG | HEART RATE: 98 BPM | HEIGHT: 65 IN | DIASTOLIC BLOOD PRESSURE: 72 MMHG | OXYGEN SATURATION: 97 %

## 2019-09-20 DIAGNOSIS — H65.01 RIGHT ACUTE SEROUS OTITIS MEDIA, RECURRENCE NOT SPECIFIED: Primary | ICD-10-CM

## 2019-09-20 RX ORDER — DILTIAZEM HYDROCHLORIDE 240 MG/1
CAPSULE, COATED, EXTENDED RELEASE ORAL
Refills: 6 | COMMUNITY
Start: 2019-08-27 | End: 2019-09-30 | Stop reason: ALTCHOICE

## 2019-09-20 RX ORDER — AMOXICILLIN 875 MG/1
875 TABLET, FILM COATED ORAL 2 TIMES DAILY
Qty: 20 TAB | Refills: 0 | Status: SHIPPED | OUTPATIENT
Start: 2019-09-20 | End: 2019-09-30

## 2019-09-20 NOTE — PROGRESS NOTES
Identified pt with two pt identifiers(name and ). Chief Complaint   Patient presents with    Ear Pain     right ear feels like it is draining - it is sore down to neck last couple days        Health Maintenance Due   Topic    DTaP/Tdap/Td series (2 - Td)    PAP AKA CERVICAL CYTOLOGY        Wt Readings from Last 3 Encounters:   19 201 lb 6.4 oz (91.4 kg)   19 203 lb (92.1 kg)   19 204 lb (92.5 kg)     Temp Readings from Last 3 Encounters:   19 98.7 °F (37.1 °C) (Oral)   19 98.7 °F (37.1 °C)   19 98.4 °F (36.9 °C) (Oral)     BP Readings from Last 3 Encounters:   19 110/72   19 108/70   19 108/70     Pulse Readings from Last 3 Encounters:   19 98   19 (!) 104   19 95         Learning Assessment:  :     Learning Assessment 2017   PRIMARY LEARNER Patient Patient   HIGHEST LEVEL OF EDUCATION - PRIMARY LEARNER  GRADUATED HIGH SCHOOL OR GED GRADUATED HIGH SCHOOL OR GED   BARRIERS PRIMARY LEARNER NONE NONE   PRIMARY LANGUAGE ENGLISH ENGLISH   LEARNER PREFERENCE PRIMARY READING READING   ANSWERED BY patient patient   RELATIONSHIP SELF SELF       Depression Screening:  :     3 most recent PHQ Screens 2019   Little interest or pleasure in doing things Not at all   Feeling down, depressed, irritable, or hopeless Not at all   Total Score PHQ 2 0       Fall Risk Assessment:  :     Fall Risk Assessment, last 12 mths 2017   Able to walk? Yes   Fall in past 12 months? No       Abuse Screening:  :     Abuse Screening Questionnaire 2019   Do you ever feel afraid of your partner? N N N N   Are you in a relationship with someone who physically or mentally threatens you? N N N N   Is it safe for you to go home?  Y Y Y Y       Coordination of Care Questionnaire:  :     1) Have you been to an emergency room, urgent care clinic since your last visit? no   Hospitalized since your last visit? no             2) Have you seen or consulted any other health care providers outside of [address] [place] [address] since your last visit? yes  Dr Macky Phoenix, Dr Panchito Cunningham (Include any pap smears or colon screenings in this section.)    3) Do you have an Advance Directive on file? no  Are you interested in receiving information about Advance Directives? no    Reviewed record in preparation for visit and have obtained necessary documentation. Medication reconciliation up to date and corrected with patient at this time.

## 2019-09-20 NOTE — PATIENT INSTRUCTIONS
Middle Ear Fluid: Care Instructions  Your Care Instructions    Fluid often builds up inside the ear during a cold or allergies. Usually the fluid drains away, but sometimes a small tube in the ear, called the eustachian tube, stays blocked for months. Symptoms of fluid buildup may include:  · Popping, ringing, or a feeling of fullness or pressure in the ear. · Trouble hearing. · Balance problems and dizziness. In most cases, you can treat yourself at home. Follow-up care is a key part of your treatment and safety. Be sure to make and go to all appointments, and call your doctor if you are having problems. It's also a good idea to know your test results and keep a list of the medicines you take. How can you care for yourself at home? · In most cases, the fluid clears up within a few months without treatment. You may need more tests if the fluid does not clear up after 3 months. · If your doctor prescribed antibiotics, take them as directed. Do not stop taking them just because you feel better. You need to take the full course of antibiotics. When should you call for help? Call your doctor now or seek immediate medical care if:    · You have symptoms of infection, such as:  ? Increased pain, swelling, warmth, or redness. ? Pus draining from the area. ? A fever.    Watch closely for changes in your health, and be sure to contact your doctor if:    · You notice changes in hearing.     · You do not get better as expected. Where can you learn more? Go to http://hodan-eugene.info/. Enter T514 in the search box to learn more about \"Middle Ear Fluid: Care Instructions. \"  Current as of: October 21, 2018  Content Version: 12.2  © 5855-3427 [employer]. Care instructions adapted under license by [employer] (which disclaims liability or warranty for this information).  If you have questions about a medical condition or this instruction, always ask your healthcare professional. [address] any warranty or liability for your use of this information.

## 2019-09-20 NOTE — PROGRESS NOTES
Subjective:   Sherwin Byrnes is a 34 y.o. female who complains of congestion and right ear pain for few days, gradually worsening since that time. She denies a history of shortness of breath and wheezing. Evaluation to date: none. Treatment to date: OTC products. Patient does not smoke cigarettes. Relevant PMH: No pertinent additional PMH. Patient Active Problem List    Diagnosis Date Noted    Inappropriate sinus tachycardia 05/20/2019    Palpitations 04/08/2019    Recurrent umbilical hernia 48/63/5887    Acute midline thoracic back pain 09/26/2017    Acute mucoid otitis media of right ear 06/23/2017    Dermatitis 03/09/2017    Screening for thyroid disorder 01/10/2017    Acute non-recurrent frontal sinusitis 01/03/2017    Left lower quadrant pain 12/29/2016    Left ear pain 12/02/2016    Strep throat 11/09/2016    Nausea 10/07/2016    Right acute serous otitis media 07/14/2016    Rash 07/14/2016    Gastroesophageal reflux disease without esophagitis 07/14/2016    Environmental allergies 07/14/2016    Right ear pain 06/21/2016    Diabetes ([address] 07/18/2014    Sore throat 05/06/2014    Migraine headache 01/03/2012    Vitamin D deficiency 11/06/2011    Dyslipidemia (high LDL; low HDL) 01/24/2011    Hypertension 01/06/2011    Asthma 01/06/2011     Allergies   Allergen Reactions    Green Pepper Hives     Red, Yellow and Orange Peppers also. OK with Black Pepper        Review of Systems  Pertinent items are noted in HPI.     Objective:     Visit Vitals  /72 (BP 1 Location: Right arm, BP Patient Position: Sitting) Comment: manual   Pulse 98   Temp 98.7 °F (37.1 °C) (Oral)   Resp 18   Ht 5' 5\" (1.651 m)   Wt 201 lb 6.4 oz (91.4 kg)   LMP 01/25/2012   SpO2 97%   BMI 33.51 kg/m²     General:  alert, cooperative, no distress   Eyes: negative   Ears: abnormal TM AD - dull   Sinuses: Normal paranasal sinuses without tenderness   Mouth:  Lips, mucosa, and tongue normal. Teeth and gums normal   Neck: supple, symmetrical, trachea midline and no adenopathy. Heart: S1 and S2 normal, no murmurs noted. Lungs: clear to auscultation bilaterally   Abdomen:         Assessment/Plan:         ICD-10-CM ICD-9-CM    1. Right acute serous otitis media, recurrence not specified H65.01 381.01 amoxicillin (AMOXIL) 875 mg tablet   . Add OTC decongestant.

## 2019-09-20 NOTE — PROGRESS NOTES
Prior authorization for Acebtolol HCL sent to [facility] via Cover My Meds. Request received today.

## 2019-09-20 NOTE — PROGRESS NOTES
Approval for Acebutolol 200 mg capsule received from [employer]. Approved from 9/20/19 through 9/19/2020.  Reference # [id number]

## 2019-09-25 PROBLEM — Z13.29 SCREENING FOR THYROID DISORDER: Status: RESOLVED | Noted: 2017-01-10 | Resolved: 2019-09-25

## 2019-09-30 ENCOUNTER — OFFICE VISIT (OUTPATIENT)
Dept: CARDIOLOGY CLINIC | Age: 39
End: 2019-09-30

## 2019-09-30 VITALS
BODY MASS INDEX: 33.45 KG/M2 | OXYGEN SATURATION: 98 % | DIASTOLIC BLOOD PRESSURE: 88 MMHG | WEIGHT: 200.8 LBS | HEIGHT: 65 IN | SYSTOLIC BLOOD PRESSURE: 132 MMHG | RESPIRATION RATE: 20 BRPM | HEART RATE: 99 BPM

## 2019-09-30 DIAGNOSIS — R00.2 PALPITATIONS: ICD-10-CM

## 2019-09-30 DIAGNOSIS — E11.9 TYPE 2 DIABETES MELLITUS WITHOUT COMPLICATION, WITHOUT LONG-TERM CURRENT USE OF INSULIN (HCC): ICD-10-CM

## 2019-09-30 DIAGNOSIS — I10 ESSENTIAL HYPERTENSION: ICD-10-CM

## 2019-09-30 DIAGNOSIS — R00.0 INAPPROPRIATE SINUS TACHYCARDIA: Primary | ICD-10-CM

## 2019-09-30 RX ORDER — ACEBUTOLOL HYDROCHLORIDE 400 MG/1
400 CAPSULE ORAL 2 TIMES DAILY
Qty: 60 CAP | Refills: 3 | Status: SHIPPED | OUTPATIENT
Start: 2019-09-30 | End: 2020-01-10

## 2019-09-30 NOTE — PROGRESS NOTES
Room # 2  Not able to tell heart is beating fast until it gets into the upper 120's.   Visit Vitals  /88 (BP 1 Location: Left arm, BP Patient Position: Sitting)   Pulse 99   Resp 20   Ht 5' 5\" (1.651 m)   Wt 200 lb 12.8 oz (91.1 kg)   SpO2 98%   BMI 33.41 kg/m²

## 2019-09-30 NOTE — PROGRESS NOTES
HISTORY OF PRESENTING ILLNESS      Barbara Cruz is a 34 y.o. female with recent palpitations and findings consistent with IST. She was poorly tolerant of metoprolol and has continued to have tachycardia with increased diltiazem. She reports severe fatigue with fluctuating HRs. Thus far, monitoring has shown sinus tachycardia. Last visit we stopped diltiazem and started acebutolol 200mg BID. She was referred to Dr. Tacos Lemon for ANS testing which is scheduled for tomorrow. Since being on acebutolol, she reports continued tachycardia (mostly in the second half of the day) although she has been less symptomatic with these episodes. She typically does not feel symptoms until HR is >120bpm. EKG today shows sinus rhythm at 99bpm, she has been holding acebutolol for 2 days.         ACTIVE PROBLEM LIST     Patient Active Problem List    Diagnosis Date Noted    Inappropriate sinus tachycardia 05/20/2019    Palpitations 04/08/2019    Recurrent umbilical hernia 23/18/4210    Acute midline thoracic back pain 09/26/2017    Acute mucoid otitis media of right ear 06/23/2017    Dermatitis 03/09/2017    Acute non-recurrent frontal sinusitis 01/03/2017    Left lower quadrant pain 12/29/2016    Left ear pain 12/02/2016    Strep throat 11/09/2016    Nausea 10/07/2016    Right acute serous otitis media 07/14/2016    Rash 07/14/2016    Gastroesophageal reflux disease without esophagitis 07/14/2016    Environmental allergies 07/14/2016    Right ear pain 06/21/2016    Diabetes ([address] 07/18/2014    Sore throat 05/06/2014    Migraine headache 01/03/2012    Vitamin D deficiency 11/06/2011    Dyslipidemia (high LDL; low HDL) 01/24/2011    Hypertension 01/06/2011    Asthma 01/06/2011           PAST MEDICAL HISTORY     Past Medical History:   Diagnosis Date    Anxiety     Arrhythmia     HX TACHYCARDIA - NEG.  STRESS TEST 2013 PER PATIENT    Asthma     LAST EPISODE 2016    Diabetes ([address] 2008    NIDDM    Ear infection 11/30/2018    BILATERAL    GERD (gastroesophageal reflux disease)     Headache     Headache(784.0)     Hypertension     HX HTN - NO MEDS CURRENTLY(11-30-18)    Inappropriate sinus tachycardia 5/20/2019    Rash 7/14/2016    Recurrent umbilical hernia 86/18/5314    S/P dilatation and curettage     Tachycardia            PAST SURGICAL HISTORY     Past Surgical History:   Procedure Laterality Date    HX APPENDECTOMY  2/2008    HX CHOLECYSTECTOMY  2001    HX DILATION AND CURETTAGE      HX GYN  3/2008    tubal ligation    HX HERNIA REPAIR  2/2009    HX HERNIA REPAIR  12/06/2018    open recurrent umbilical hernia repair    HX HYSTERECTOMY  03/2012    HX LEFT SALPINGO-OOPHORECTOMY Left     HX LUMBAR DISKECTOMY      2006    HX ORTHOPAEDIC  9/2006    ruptured discs    UPPER GI ENDOSCOPY,BIOPSY  5/11/2017               ALLERGIES     Allergies   Allergen Reactions    Green Pepper Hives     Red, Yellow and Orange Peppers also.   OK with Black Pepper          FAMILY HISTORY     Family History   Problem Relation Age of Onset    Heart Disease Mother     Hypertension Father     Cancer Father         Lung    Hypertension Sister     No Known Problems Brother     No Known Problems Brother     No Known Problems Brother     Anesth Problems Neg Hx     negative for cardiac disease       SOCIAL HISTORY     Social History     Socioeconomic History    Marital status:      Spouse name: Not on file    Number of children: Not on file    Years of education: Not on file    Highest education level: Not on file   Tobacco Use    Smoking status: Never Smoker    Smokeless tobacco: Never Used   Substance and Sexual Activity    Alcohol use: No    Drug use: No    Sexual activity: Yes     Partners: Male     Birth control/protection: Surgical         MEDICATIONS     Current Outpatient Medications   Medication Sig    dilTIAZem CD (CARDIZEM CD) 240 mg ER capsule TAKE 1 CAPSULE BY MOUTH EVERY DAY    amoxicillin (AMOXIL) 875 mg tablet Take 1 Tab by mouth two (2) times a day for 10 days.  SUMAtriptan (IMITREX) 50 mg tablet TAKE 1 TAB AT ONSET OF MIGRAINE HEADACHE, REPEAT IN 1 HOUR IF NEEDED. NO MORE THAN 2 TABS PER 24 HOUR    simvastatin (ZOCOR) 20 mg tablet TAKE 1 TABLET BY MOUTH EVERY DAY    fenofibrate (LOFIBRA) 160 mg tablet TAKE 1 TAB BY MOUTH DAILY.  acebutolol (SECTRAL) 200 mg capsule Take 1 Cap by mouth two (2) times a day.  metFORMIN (GLUCOPHAGE) 1,000 mg tablet TAKE 1 TABLET BY MOUTH TWICE A DAY    famotidine (PEPCID) 40 mg tablet TAKE 1 TABLET BY MOUTH EVERY DAY TAKES EVERY BEDTIME    levocetirizine (XYZAL) 5 mg tablet TAKE 1 TABLET BY MOUTH ONCE DAILY    aspirin delayed-release 81 mg tablet TAKE 1 TABLET BY MOUTH EVERY DAY    omeprazole (PRILOSEC) 20 mg capsule TAKE 1 CAPSULE BY MOUTH ONCE DAILY    ergocalciferol (ERGOCALCIFEROL) 50,000 unit capsule TAKE 1 CAP BY MOUTH EVERY SEVEN (7) DAYS.  JANUVIA 100 mg tablet TAKE 1 TABLET BY MOUTH EVERY DAY    INVOKANA 300 mg tablet TAKE 1 TABLET BY MOUTH DAILY BEFORE BREAKFAST    albuterol (PROVENTIL HFA, VENTOLIN HFA, PROAIR HFA) 90 mcg/actuation inhaler Take 2 Puffs by inhalation every four (4) hours as needed for Wheezing.  ASCENSIA CONTOUR strip USE AS DIRECTED    topiramate (TOPAMAX) 50 mg tablet Take 1 tab twice a day for migraine prevention (Patient taking differently: as needed. Take 1 tab twice a day for migraine prevention)    butalbital-acetaminophen-caffeine (FIORICET, ESGIC) -40 mg per tablet Take 1 Tab by mouth every six (6) hours as needed for Pain. Max Daily Amount: 4 Tabs.  Lancets (MICROLET LANCET) Misc USE TO CHECK BLOOD SUGAR ONE TO TWO TIMES DAILY (Patient taking differently: as needed. USE TO CHECK BLOOD SUGAR ONE TO TWO TIMES DAILY)     No current facility-administered medications for this visit.         I have reviewed the nurses notes, vitals, problem list, allergy list, medical history, family, social history and medications. REVIEW OF SYMPTOMS      General: Pt denies excessive weight gain or loss. Pt is able to conduct ADL's  HEENT: Denies blurred vision, headaches, hearing loss, epistaxis and difficulty swallowing. Respiratory: Denies cough, congestion, shortness of breath, RICE, wheezing or stridor. Cardiovascular: Denies precordial pain, palpitations, edema or PND  Gastrointestinal: Denies poor appetite, indigestion, abdominal pain or blood in stool  Genitourinary: Denies hematuria, dysuria, increased urinary frequency  Musculoskeletal: Denies joint pain or swelling from muscles or joints  Neurologic: Denies tremor, paresthesias, headache, or sensory motor disturbance  Psychiatric: Denies confusion, insomnia, depression  Integumentray: Denies rash, itching or ulcers. Hematologic: Denies easy bruising, bleeding       PHYSICAL EXAMINATION      There were no vitals filed for this visit. General: Well developed, in no acute distress. HEENT: No jaundice, oral mucosa moist, no oral ulcers  Neck: Supple, no stiffness, no lymphadenopathy, supple  Heart:  Normal S1/S2 negative S3 or S4. Regular, no murmur, gallop or rub, no jugular venous distention  Respiratory: Clear bilaterally x 4, no wheezing or rales  Abdomen:   Soft, non-tender, bowel sounds are active.   Extremities:  No edema, normal cap refill, no cyanosis. Musculoskeletal: No clubbing, no deformities  Neuro: A&Ox3, speech clear, gait stable, cooperative, no focal neurologic deficits  Skin: Skin color is normal. No rashes or lesions.  Non diaphoretic, moist.  Vascular: 2+ pulses symmetric in all extremities       DIAGNOSTIC DATA      EKG: sinus rhythm        LABORATORY DATA      Lab Results   Component Value Date/Time    WBC 11.9 (H) 08/01/2019 08:56 PM    HGB 14.3 08/01/2019 08:56 PM    HCT 40.8 08/01/2019 08:56 PM    PLATELET 195 27/76/2337 08:56 PM    MCV 84.6 08/01/2019 08:56 PM      Lab Results   Component Value Date/Time    Sodium 139 08/01/2019 08:56 PM    Potassium 3.6 08/01/2019 08:56 PM    Chloride 102 08/01/2019 08:56 PM    CO2 27 08/01/2019 08:56 PM    Anion gap 10 08/01/2019 08:56 PM    Glucose 173 (H) 08/01/2019 08:56 PM    BUN 12 08/01/2019 08:56 PM    Creatinine 1.09 (H) 08/01/2019 08:56 PM    BUN/Creatinine ratio 11 (L) 08/01/2019 08:56 PM    GFR est AA >60 08/01/2019 08:56 PM    GFR est non-AA 56 (L) 08/01/2019 08:56 PM    Calcium 9.4 08/01/2019 08:56 PM    Bilirubin, total 0.3 08/01/2019 08:56 PM    AST (SGOT) 12 (L) 08/01/2019 08:56 PM    Alk. phosphatase 61 08/01/2019 08:56 PM    Protein, total 7.4 08/01/2019 08:56 PM    Albumin 3.8 08/01/2019 08:56 PM    Globulin 3.6 08/01/2019 08:56 PM    A-G Ratio 1.1 08/01/2019 08:56 PM    ALT (SGPT) 33 08/01/2019 08:56 PM           ASSESSMENT      1. Inappropriate sinus tachycardia  2. Diabetes mellitus  3. Orthostatic hypotension  4.  GERD  5. Anxiety        PLAN     Will increase acebutolol to 400mg BID for increased control of symptomatic, elevated heart rates. She will resume after ANS testing. FOLLOW-UP     3 months    Thank you, Fabian Evangelista MD for allowing me to participate in the care of this extraordinarily pleasant female. Please do not hesitate to contact me for further questions/concerns. Vera Farrell NP    Patient seen and examined by me with nurse practitioner. I personally performed all components of the history, physical, and medical decision making and agree with the assessment and plan with minor modifications as noted.        Sadiq Lerner MD  Cardiac Electrophysiology / Cardiology    [place] [id number].  [facility], [address], [address] [place] [address]  [phone] / [phone] Fax   [phone] / [phone] Fax

## 2019-10-01 ENCOUNTER — TELEPHONE (OUTPATIENT)
Dept: FAMILY MEDICINE CLINIC | Age: 39
End: 2019-10-01

## 2019-10-01 ENCOUNTER — OFFICE VISIT (OUTPATIENT)
Dept: FAMILY MEDICINE CLINIC | Age: 39
End: 2019-10-01

## 2019-10-01 VITALS
SYSTOLIC BLOOD PRESSURE: 116 MMHG | WEIGHT: 198 LBS | RESPIRATION RATE: 18 BRPM | BODY MASS INDEX: 32.99 KG/M2 | OXYGEN SATURATION: 99 % | TEMPERATURE: 98.9 F | HEIGHT: 65 IN | DIASTOLIC BLOOD PRESSURE: 65 MMHG | HEART RATE: 84 BPM

## 2019-10-01 DIAGNOSIS — L03.031 INFECTION OF NAIL BED OF TOE OF RIGHT FOOT: Primary | ICD-10-CM

## 2019-10-01 DIAGNOSIS — E11.9 TYPE 2 DIABETES MELLITUS WITHOUT COMPLICATION, WITHOUT LONG-TERM CURRENT USE OF INSULIN (HCC): ICD-10-CM

## 2019-10-01 RX ORDER — MUPIROCIN 20 MG/G
OINTMENT TOPICAL 2 TIMES DAILY
Qty: 22 G | Refills: 0 | Status: SHIPPED | OUTPATIENT
Start: 2019-10-01 | End: 2019-11-17 | Stop reason: SDUPTHER

## 2019-10-01 RX ORDER — SULFAMETHOXAZOLE AND TRIMETHOPRIM 800; 160 MG/1; MG/1
1 TABLET ORAL 2 TIMES DAILY
Qty: 14 TAB | Refills: 0 | Status: SHIPPED | OUTPATIENT
Start: 2019-10-01 | End: 2019-10-08

## 2019-10-01 NOTE — PROGRESS NOTES
HISTORY OF PRESENT ILLNESS  Roseline Rm is a 34 y.o. female. HPI   Pt presents with \"infected toe nail\"  Pt states that about a week ago, she was putting a sock on her right foot, and the sock got caught on the right great toe nail, and ripped the nail. She had to cut the nail short, due to the nail tearing. She states that it will not heal  There is some tenderness in area  Surrounding erythema  No drainage or bleeding noted  Review of Systems   Constitutional: Negative for fever. HENT: Negative for congestion. Gastrointestinal: Negative for diarrhea and vomiting. Physical Exam   Constitutional: She is oriented to person, place, and time. She appears well-developed and well-nourished. HENT:   Head: Normocephalic and atraumatic. Cardiovascular: Normal rate, regular rhythm and normal heart sounds. Pulmonary/Chest: Effort normal and breath sounds normal.   Musculoskeletal:        Feet:    Neurological: She is alert and oriented to person, place, and time. Skin: Skin is warm and dry. Psychiatric: She has a normal mood and affect. Her behavior is normal.       ASSESSMENT and PLAN    ICD-10-CM ICD-9-CM    1. Infection of nail bed of toe of right foot L03.031 681.11 mupirocin (BACTROBAN) 2 % ointment      trimethoprim-sulfamethoxazole (BACTRIM DS, SEPTRA DS) 160-800 mg per tablet     Educated about taking medications as prescribed, with food  Should stay well hydrated, and treat fever as needed    Pt informed to return to office with worsening of symptoms, or PRN with any questions or concerns. Pt verbalizes understanding of plan of care and denies further questions or concerns at this time.

## 2019-10-01 NOTE — PROGRESS NOTES
Identified pt with two pt identifiers(name and ). Chief Complaint   Patient presents with    Nail Problem     grand toe on right side        Health Maintenance Due   Topic    DTaP/Tdap/Td series (2 - Td)    PAP AKA CERVICAL CYTOLOGY        Wt Readings from Last 3 Encounters:   10/01/19 198 lb (89.8 kg)   19 200 lb 12.8 oz (91.1 kg)   19 201 lb 6.4 oz (91.4 kg)     Temp Readings from Last 3 Encounters:   10/01/19 98.9 °F (37.2 °C) (Oral)   19 98.7 °F (37.1 °C) (Oral)   19 98.7 °F (37.1 °C)     BP Readings from Last 3 Encounters:   10/01/19 116/65   19 132/88   19 110/72     Pulse Readings from Last 3 Encounters:   10/01/19 84   19 99   19 98         Learning Assessment:  :     Learning Assessment 2017   PRIMARY LEARNER Patient Patient   HIGHEST LEVEL OF EDUCATION - PRIMARY LEARNER  GRADUATED HIGH SCHOOL OR GED GRADUATED HIGH SCHOOL OR GED   BARRIERS PRIMARY LEARNER NONE NONE   PRIMARY LANGUAGE ENGLISH ENGLISH   LEARNER PREFERENCE PRIMARY READING READING   ANSWERED BY patient patient   RELATIONSHIP SELF SELF       Depression Screening:  :     3 most recent PHQ Screens 2019   Little interest or pleasure in doing things Not at all   Feeling down, depressed, irritable, or hopeless Not at all   Total Score PHQ 2 0       Fall Risk Assessment:  :     Fall Risk Assessment, last 12 mths 2017   Able to walk? Yes   Fall in past 12 months? No       Abuse Screening:  :     Abuse Screening Questionnaire 2019   Do you ever feel afraid of your partner? N N N N   Are you in a relationship with someone who physically or mentally threatens you? N N N N   Is it safe for you to go home?  Y Y Y Y       Coordination of Care Questionnaire:  :     1) Have you been to an emergency room, urgent care clinic since your last visit? no   Hospitalized since your last visit? no             2) Have you seen or consulted any other health care providers outside of [address] [place] [address] since your last visit? no     3) Do you have an Advance Directive on file? no  Are you interested in receiving information about Advance Directives? no      Reviewed record in preparation for visit and have obtained necessary documentation. Medication reconciliation up to date and corrected with patient at this time.

## 2019-10-01 NOTE — PATIENT INSTRUCTIONS
Paronychia: Care Instructions  Your Care Instructions  Paronychia (say \"xkdr-kq-JY-taryn-uh\") is an infection of the skin around a fingernail or toenail. It happens when germs enter through a break in the skin. The doctor may have made a small cut in the infected area to drain the pus. Most cases of paronychia improve in a few days. But watch your symptoms and follow your doctor's advice. Though rare, a mild case can turn into something more serious and infect your entire finger or toe. Also, it is possible for an infection to return. Follow-up care is a key part of your treatment and safety. Be sure to make and go to all appointments, and call your doctor if you are having problems. It's also a good idea to know your test results and keep a list of the medicines you take. How can you care for yourself at home? · If your doctor told you how to care for your infected nail, follow the doctor's instructions. If you did not get instructions, follow this general advice:  ? Wash the area with clean water 2 times a day. Don't use hydrogen peroxide or alcohol, which can slow healing. ? You may cover the area with a thin layer of petroleum jelly, such as Vaseline, and a nonstick bandage. ? Apply more petroleum jelly and replace the bandage as needed. · If your doctor prescribed antibiotics, take them as directed. Do not stop taking them just because you feel better. You need to take the full course of antibiotics. · Take an over-the-counter pain medicine, such as acetaminophen (Tylenol), ibuprofen (Advil, Motrin), or naproxen (Aleve). Read and follow all instructions on the label. · Do not take two or more pain medicines at the same time unless the doctor told you to. Many pain medicines have acetaminophen, which is Tylenol. Too much acetaminophen (Tylenol) can be harmful. · Prop up the toe or finger so that it is higher than the level of your heart. This will help with pain and swelling. · Apply heat.  Put a warm water bottle, heating pad set on low, or warm cloth on your finger or toe. Do not go to sleep with a heating pad on your skin. · Soak the area in warm water twice a day for 15 minutes each time. After soaking, dry the area well and apply a thin layer of petroleum jelly, such as Vaseline. Put on a new bandage. When should you call for help? Call your doctor now or seek immediate medical care if:    · You have signs of new or worsening infection, such as:  ? Increased pain, swelling, warmth, or redness. ? Red streaks leading from the infected skin. ? Pus draining from the area. ? A fever.    Watch closely for changes in your health, and be sure to contact your doctor if:    · You do not get better as expected. Where can you learn more? Go to http://hodan-eugene.info/. Enter C435 in the search box to learn more about \"Paronychia: Care Instructions. \"  Current as of: April 1, 2019  Content Version: 12.2  © 6944-1442 [employer]. Care instructions adapted under license by [employer] (which disclaims liability or warranty for this information). If you have questions about a medical condition or this instruction, always ask your healthcare professional. [address] any warranty or liability for your use of this information.

## 2019-10-03 ENCOUNTER — OFFICE VISIT (OUTPATIENT)
Dept: FAMILY MEDICINE CLINIC | Age: 39
End: 2019-10-03

## 2019-10-03 VITALS
SYSTOLIC BLOOD PRESSURE: 98 MMHG | HEART RATE: 95 BPM | DIASTOLIC BLOOD PRESSURE: 74 MMHG | WEIGHT: 199 LBS | HEIGHT: 65 IN | BODY MASS INDEX: 33.15 KG/M2 | TEMPERATURE: 98.8 F | OXYGEN SATURATION: 97 % | RESPIRATION RATE: 18 BRPM
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
## 2019-10-03 DIAGNOSIS — S80.862A INSECT BITE OF LEFT LOWER LEG, INITIAL ENCOUNTER: Primary | ICD-10-CM

## 2019-10-03 RX ORDER — TRIAMCINOLONE ACETONIDE 1 MG/G
OINTMENT TOPICAL 2 TIMES DAILY
Qty: 30 G | Refills: 0 | Status: SHIPPED | OUTPATIENT
Start: 2019-10-03 | End: 2019-11-04 | Stop reason: SDUPTHER

## 2019-10-03 NOTE — PATIENT INSTRUCTIONS

## 2019-10-03 NOTE — PROGRESS NOTES
Identified pt with two pt identifiers(name and ). Chief Complaint   Patient presents with    Insect Bite     possible bite lexy on the inner left thigh that first was noticed yesterday morning - pt notes that her whole thigh feels like it is on fire and area has been itchy and become larger since it was first started        Health Maintenance Due   Topic    DTaP/Tdap/Td series (2 - Td)    PAP AKA CERVICAL CYTOLOGY     Influenza Age 5 to Adult        Wt Readings from Last 3 Encounters:   10/03/19 199 lb (90.3 kg)   10/01/19 198 lb (89.8 kg)   19 200 lb 12.8 oz (91.1 kg)     Temp Readings from Last 3 Encounters:   10/03/19 98.8 °F (37.1 °C) (Oral)   10/01/19 98.9 °F (37.2 °C) (Oral)   19 98.7 °F (37.1 °C) (Oral)     BP Readings from Last 3 Encounters:   10/03/19 98/74   10/01/19 116/65   19 132/88     Pulse Readings from Last 3 Encounters:   10/03/19 95   10/01/19 84   19 99         Learning Assessment:  :     Learning Assessment 2017   PRIMARY LEARNER Patient Patient   HIGHEST LEVEL OF EDUCATION - PRIMARY LEARNER  GRADUATED HIGH SCHOOL OR GED GRADUATED HIGH SCHOOL OR GED   BARRIERS PRIMARY LEARNER NONE NONE   PRIMARY LANGUAGE ENGLISH ENGLISH   LEARNER PREFERENCE PRIMARY READING READING   ANSWERED BY patient patient   RELATIONSHIP SELF SELF       Depression Screening:  :     3 most recent PHQ Screens 2019   Little interest or pleasure in doing things Not at all   Feeling down, depressed, irritable, or hopeless Not at all   Total Score PHQ 2 0       Fall Risk Assessment:  :     Fall Risk Assessment, last 12 mths 2017   Able to walk? Yes   Fall in past 12 months? No       Abuse Screening:  :     Abuse Screening Questionnaire 2019   Do you ever feel afraid of your partner? N N N N   Are you in a relationship with someone who physically or mentally threatens you? N N N N   Is it safe for you to go home?  Mer Kathleen       Coordination of Care Questionnaire:  :     1) Have you been to an emergency room, urgent care clinic since your last visit? no   Hospitalized since your last visit? no             2) Have you seen or consulted any other health care providers outside of [address] [place] [address] since your last visit? no  (Include any pap smears or colon screenings in this section.)    3) Do you have an Advance Directive on file? no  Are you interested in receiving information about Advance Directives? no    Reviewed record in preparation for visit and have obtained necessary documentation. Medication reconciliation up to date and corrected with patient at this time.

## 2019-10-03 NOTE — PROGRESS NOTES
HISTORY OF PRESENT ILLNESS  Augustin Hilario is a 34 y.o. female. HPI   Pt presents with \"possible spider bite\"  Pt states that she believes that she got bite by a spider 2 nights ago  Yesterday she noted a bite to the left inner thing  This morning, she noted some erythema and warmth around the bite  No fever  She is still on Bactrim, which was started on 10/1, for toe infection. Review of Systems   Constitutional: Negative for fever. HENT: Negative for congestion. Gastrointestinal: Negative for diarrhea and vomiting. Skin: Positive for itching and rash. Physical Exam   Constitutional: She appears well-developed and well-nourished. HENT:   Head: Normocephalic and atraumatic. Cardiovascular: Normal rate, regular rhythm and normal heart sounds. Pulmonary/Chest: Effort normal and breath sounds normal.   Neurological: She is alert. Skin: Skin is warm and dry. Rash noted. Psychiatric: She has a normal mood and affect. Her behavior is normal.       ASSESSMENT and PLAN      ICD-10-CM ICD-9-CM    1. Insect bite of left lower leg, initial encounter S80.862A 916.4 triamcinolone acetonide (KENALOG) 0.1 % ointment    W57. Bridgette Spire E906.4        Educated about continuing bactrim, as prescribed  Should apply ointment for itching    Pt informed to return to office with worsening of symptoms, or PRN with any questions or concerns. Pt verbalizes understanding of plan of care and denies further questions or concerns at this time.

## 2019-10-07 DIAGNOSIS — S80.862A INSECT BITE OF LEFT LOWER LEG, INITIAL ENCOUNTER: Primary | ICD-10-CM
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
## 2019-10-07 RX ORDER — PREDNISONE 5 MG/1
TABLET ORAL
Qty: 21 TAB | Refills: 0 | Status: SHIPPED | OUTPATIENT
Start: 2019-10-07 | End: 2019-11-22 | Stop reason: ALTCHOICE

## 2019-10-07 NOTE — TELEPHONE ENCOUNTER
Pt came in last week for spider bite and was told to call back today to inform if it's better.  Pt stated it looks worse than before and looks like a target  Pt can be reached at [phone]   Pt uses [facility] pharmacy

## 2019-10-11 ENCOUNTER — HOSPITAL ENCOUNTER (EMERGENCY)
Age: 39
Discharge: HOME | End: 2019-10-11
Attending: STUDENT IN AN ORGANIZED HEALTH CARE EDUCATION/TRAINING PROGRAM
Payer: MEDICAID

## 2019-10-11 ENCOUNTER — OFFICE VISIT (OUTPATIENT)
Dept: FAMILY MEDICINE CLINIC | Age: 39
End: 2019-10-11

## 2019-10-11 VITALS
TEMPERATURE: 98.7 F | SYSTOLIC BLOOD PRESSURE: 141 MMHG | RESPIRATION RATE: 16 BRPM | OXYGEN SATURATION: 97 % | WEIGHT: 202.16 LBS | HEART RATE: 85 BPM | DIASTOLIC BLOOD PRESSURE: 65 MMHG | BODY MASS INDEX: 33.68 KG/M2 | HEIGHT: 65 IN

## 2019-10-11 VITALS
OXYGEN SATURATION: 98 % | TEMPERATURE: 98.6 F | BODY MASS INDEX: 33.15 KG/M2 | HEIGHT: 65 IN | HEART RATE: 80 BPM | RESPIRATION RATE: 16 BRPM | DIASTOLIC BLOOD PRESSURE: 69 MMHG | SYSTOLIC BLOOD PRESSURE: 121 MMHG | WEIGHT: 199 LBS

## 2019-10-11 DIAGNOSIS — R53.83 FATIGUE, UNSPECIFIED TYPE: Primary | ICD-10-CM

## 2019-10-11 DIAGNOSIS — M79.10 GENERALIZED MUSCLE ACHE: Primary | ICD-10-CM

## 2019-10-11 DIAGNOSIS — R51.9 NONINTRACTABLE HEADACHE, UNSPECIFIED CHRONICITY PATTERN, UNSPECIFIED HEADACHE TYPE: ICD-10-CM

## 2019-10-11 DIAGNOSIS — R52 BODY ACHES: ICD-10-CM

## 2019-10-11 DIAGNOSIS — R53.83 FATIGUE, UNSPECIFIED TYPE: ICD-10-CM

## 2019-10-11 LAB
ALBUMIN SERPL-MCNC: 3.8 G/DL (ref 3.5–5)
ALBUMIN/GLOB SERPL: 1.1 {RATIO} (ref 1.1–2.2)
ALP SERPL-CCNC: 47 U/L (ref 45–117)
ALT SERPL-CCNC: 37 U/L (ref 12–78)
ANION GAP SERPL CALC-SCNC: 11 MMOL/L (ref 5–15)
APPEARANCE UR: CLEAR
AST SERPL-CCNC: 18 U/L (ref 15–37)
BASOPHILS # BLD: 0.1 K/UL (ref 0–0.1)
BASOPHILS NFR BLD: 1 % (ref 0–1)
BILIRUB SERPL-MCNC: 0.4 MG/DL (ref 0.2–1)
BILIRUB UR QL: NEGATIVE
BUN SERPL-MCNC: 9 MG/DL (ref 6–20)
BUN/CREAT SERPL: 9 (ref 12–20)
CALCIUM SERPL-MCNC: 9.3 MG/DL (ref 8.5–10.1)
CHLORIDE SERPL-SCNC: 103 MMOL/L (ref 97–108)
CK SERPL-CCNC: 69 U/L (ref 26–192)
CO2 SERPL-SCNC: 25 MMOL/L (ref 21–32)
COLOR UR: ABNORMAL
CREAT SERPL-MCNC: 0.95 MG/DL (ref 0.55–1.02)
DIFFERENTIAL METHOD BLD: NORMAL
EOSINOPHIL # BLD: 0.1 K/UL (ref 0–0.4)
EOSINOPHIL NFR BLD: 1 % (ref 0–7)
ERYTHROCYTE [DISTWIDTH] IN BLOOD BY AUTOMATED COUNT: 13.1 % (ref 11.5–14.5)
GLOBULIN SER CALC-MCNC: 3.4 G/DL (ref 2–4)
GLUCOSE SERPL-MCNC: 136 MG/DL (ref 65–100)
GLUCOSE UR STRIP.AUTO-MCNC: >1000 MG/DL
HCT VFR BLD AUTO: 41.8 % (ref 35–47)
HGB BLD-MCNC: 14.1 G/DL (ref 11.5–16)
HGB UR QL STRIP: NEGATIVE
KETONES UR QL STRIP.AUTO: NEGATIVE MG/DL
LEUKOCYTE ESTERASE UR QL STRIP.AUTO: NEGATIVE
LYMPHOCYTES # BLD: 2.8 K/UL (ref 0.8–3.5)
LYMPHOCYTES NFR BLD: 30 % (ref 12–49)
MCH RBC QN AUTO: 29.4 PG (ref 26–34)
MCHC RBC AUTO-ENTMCNC: 33.7 G/DL (ref 30–36.5)
MCV RBC AUTO: 87.3 FL (ref 80–99)
MONOCYTES # BLD: 0.9 K/UL (ref 0–1)
MONOCYTES NFR BLD: 9 % (ref 5–13)
NEUTS SEG # BLD: 5.8 K/UL (ref 1.8–8)
NEUTS SEG NFR BLD: 59 % (ref 32–75)
NITRITE UR QL STRIP.AUTO: NEGATIVE
PH UR STRIP: 5.5 [PH] (ref 5–8)
PLATELET # BLD AUTO: 260 K/UL (ref 150–400)
PMV BLD AUTO: 11.3 FL (ref 8.9–12.9)
POTASSIUM SERPL-SCNC: 4 MMOL/L (ref 3.5–5.1)
PROT SERPL-MCNC: 7.2 G/DL (ref 6.4–8.2)
PROT UR STRIP-MCNC: NEGATIVE MG/DL
RBC # BLD AUTO: 4.79 M/UL (ref 3.8–5.2)
SODIUM SERPL-SCNC: 139 MMOL/L (ref 136–145)
SP GR UR REFRACTOMETRY: 1.02 (ref 1–1.03)
TSH SERPL DL<=0.05 MIU/L-ACNC: 1.32 UIU/ML (ref 0.36–3.74)
UROBILINOGEN UR QL STRIP.AUTO: 0.2 EU/DL (ref 0.2–1)
WBC # BLD AUTO: 9.6 K/UL (ref 3.6–11)

## 2019-10-11 PROCEDURE — 80053 COMPREHEN METABOLIC PANEL: CPT

## 2019-10-11 PROCEDURE — 81003 URINALYSIS AUTO W/O SCOPE: CPT

## 2019-10-11 PROCEDURE — 74011250636 HC RX REV CODE- 250/636: Performed by: STUDENT IN AN ORGANIZED HEALTH CARE EDUCATION/TRAINING PROGRAM

## 2019-10-11 PROCEDURE — 96374 THER/PROPH/DIAG INJ IV PUSH: CPT

## 2019-10-11 PROCEDURE — 84443 ASSAY THYROID STIM HORMONE: CPT

## 2019-10-11 PROCEDURE — 82550 ASSAY OF CK (CPK): CPT

## 2019-10-11 PROCEDURE — 85025 COMPLETE CBC W/AUTO DIFF WBC: CPT

## 2019-10-11 PROCEDURE — 36415 COLL VENOUS BLD VENIPUNCTURE: CPT

## 2019-10-11 PROCEDURE — 99283 EMERGENCY DEPT VISIT LOW MDM: CPT

## 2019-10-11 RX ORDER — SODIUM CHLORIDE 9 MG/ML
1000 INJECTION, SOLUTION INTRAVENOUS ONCE
Status: COMPLETED | OUTPATIENT
Start: 2019-10-11 | End: 2019-10-11

## 2019-10-11 RX ORDER — KETOROLAC TROMETHAMINE 30 MG/ML
30 INJECTION, SOLUTION INTRAMUSCULAR; INTRAVENOUS
Status: COMPLETED | OUTPATIENT
Start: 2019-10-11 | End: 2019-10-11

## 2019-10-11 RX ADMIN — KETOROLAC TROMETHAMINE 30 MG: 30 INJECTION, SOLUTION INTRAMUSCULAR at 11:47

## 2019-10-11 RX ADMIN — SODIUM CHLORIDE 1000 ML: 900 INJECTION, SOLUTION INTRAVENOUS at 11:51

## 2019-10-11 NOTE — ED NOTES
Pt. was discharged and given instructions by . Pt verbalized good understanding of all discharge instructions and F/U care. All questions answered. Pt in stable condition on discharge.

## 2019-10-11 NOTE — PROGRESS NOTES
Identified pt with two pt identifiers(name and ). Chief Complaint   Patient presents with    Insect Bite     f/u from spider bite    Fatigue    Headache    Neck Pain        Health Maintenance Due   Topic    DTaP/Tdap/Td series (2 - Td)    PAP AKA CERVICAL CYTOLOGY     Influenza Age 5 to Adult        Wt Readings from Last 3 Encounters:   10/11/19 199 lb (90.3 kg)   10/03/19 199 lb (90.3 kg)   10/01/19 198 lb (89.8 kg)     Temp Readings from Last 3 Encounters:   10/11/19 98.6 °F (37 °C) (Oral)   10/03/19 98.8 °F (37.1 °C) (Oral)   10/01/19 98.9 °F (37.2 °C) (Oral)     BP Readings from Last 3 Encounters:   10/11/19 121/69   10/03/19 98/74   10/01/19 116/65     Pulse Readings from Last 3 Encounters:   10/11/19 80   10/03/19 95   10/01/19 84         Learning Assessment:  :     Learning Assessment 2017   PRIMARY LEARNER Patient Patient   HIGHEST LEVEL OF EDUCATION - PRIMARY LEARNER  GRADUATED HIGH SCHOOL OR GED GRADUATED HIGH SCHOOL OR GED   BARRIERS PRIMARY LEARNER NONE NONE   PRIMARY LANGUAGE ENGLISH ENGLISH   LEARNER PREFERENCE PRIMARY READING READING   ANSWERED BY patient patient   RELATIONSHIP SELF SELF       Depression Screening:  :     3 most recent PHQ Screens 2019   Little interest or pleasure in doing things Not at all   Feeling down, depressed, irritable, or hopeless Not at all   Total Score PHQ 2 0       Fall Risk Assessment:  :     Fall Risk Assessment, last 12 mths 2017   Able to walk? Yes   Fall in past 12 months? No       Abuse Screening:  :     Abuse Screening Questionnaire 2019   Do you ever feel afraid of your partner? N N N N   Are you in a relationship with someone who physically or mentally threatens you? N N N N   Is it safe for you to go home?  Y Y Y Y       Coordination of Care Questionnaire:  :     1) Have you been to an emergency room, urgent care clinic since your last visit? no   Hospitalized since your last visit? no 2) Have you seen or consulted any other health care providers outside of [address] [place] [address] since your last visit? no  (Include any pap smears or colon screenings in this section.)    3) Do you have an Advance Directive on file? no  Are you interested in receiving information about Advance Directives? no      Reviewed record in preparation for visit and have obtained necessary documentation. Medication reconciliation up to date and corrected with patient at this time.

## 2019-10-11 NOTE — PROGRESS NOTES
HISTORY OF PRESENT ILLNESS  Manuel Peace is a 34 y.o. female. HPI   Pt presents with \"feeling off\"    Pt states that she is not sure what is going on, but something is off. Her muscles are aching. She states that her back aches, as do her legs and arms. She is more fatigued then usual.  She feels sluggish and tired  This stated 2 nights ago  No fever  No cold symptoms  No urinary symptoms    Blood sugars have been elevated, last check was 320, but she was just on prednisone. Review of Systems   Constitutional: Positive for malaise/fatigue. Negative for fever. Musculoskeletal: Positive for myalgias. Physical Exam   Constitutional: She is oriented to person, place, and time. She appears well-developed and well-nourished. HENT:   Head: Normocephalic and atraumatic. Right Ear: Hearing, tympanic membrane, external ear and ear canal normal.   Left Ear: Hearing, tympanic membrane, external ear and ear canal normal.   Nose: Nose normal.   Neck: Normal range of motion. Neck supple. Cardiovascular: Normal rate, regular rhythm and normal heart sounds. Pulmonary/Chest: Effort normal and breath sounds normal.   Lymphadenopathy:     She has no cervical adenopathy. Neurological: She is alert and oriented to person, place, and time. Skin: Skin is warm and dry. Psychiatric: She has a normal mood and affect. Her behavior is normal.       ASSESSMENT and PLAN    ICD-10-CM ICD-9-CM    1. Fatigue, unspecified type R53.83 780.79 CBC W/O DIFF      METABOLIC PANEL, COMPREHENSIVE      THYROID CASCADE PROFILE      CANDIE RUIZ VIRUS AB PANEL      CREATINE KINASE (CK), MB/TOTAL   2. Body aches R52 780.96 CREATINE KINASE (CK), MB/TOTAL   3.  Nonintractable headache, unspecified chronicity pattern, unspecified headache type R51 784.0      Will notify when labs return, and inform her of any change in plan of care at that time  Educated about staying well hydrated, and treating fever as needed    Pt informed to return to office with worsening of symptoms, or PRN with any questions or concerns. Pt verbalizes understanding of plan of care and denies further questions or concerns at this time.

## 2019-10-11 NOTE — ED TRIAGE NOTES
Pt ambulated to treatment area with steady gait. Pt states, \"last week I was bit by a spider on my left upper thigh. I was put on an antibiotic and prednisone. I feel weak and achy all over. I have pain in the back of my neck that radiates up my head. \" Pt denies any fevers.

## 2019-10-11 NOTE — ED PROVIDER NOTES
Patient is a 41-year-old female history of diabetes presenting to the emergency department today secondary to muscle pain and weakness. She reports that approximately 8 days ago she was bitten by spider and treated with Bactrim and prednisone which she is no longer taking. 3 days ago she started with generalized muscle aches particularly in the upper body involving the back and arms with associated generalized fatigue. No unilateral weakness. No numbness. No difficulty ambulating. She has not had fevers. She is taken Tylenol without relief in the myalgias. She saw PCP today who advised her to come get seen in the ED. Denies chest pain or trouble breathing. No URI symptoms or cough. No vomiting or diarrhea. No urinary symptoms. She is on a statin for cholesterol. She also notes that she is on a beta-blocker for sinus tachycardia and this dose was increased 2 weeks ago. Past Medical History:   Diagnosis Date    Anxiety     Arrhythmia     HX TACHYCARDIA - NEG.  STRESS TEST 2013 PER PATIENT    Asthma     LAST EPISODE 2016    Diabetes ([address] 2008    NIDDM    Ear infection 11/30/2018    BILATERAL    GERD (gastroesophageal reflux disease)     Headache     Headache(784.0)     Hypertension     HX HTN - NO MEDS CURRENTLY(11-30-18)    Inappropriate sinus tachycardia 5/20/2019    Rash 7/14/2016    Recurrent umbilical hernia 50/67/5873    S/P dilatation and curettage     Tachycardia        Past Surgical History:   Procedure Laterality Date    HX APPENDECTOMY  2/2008    HX CHOLECYSTECTOMY  2001    HX DILATION AND CURETTAGE      HX GYN  3/2008    tubal ligation    HX HERNIA REPAIR  2/2009    HX HERNIA REPAIR  12/06/2018    open recurrent umbilical hernia repair    HX HYSTERECTOMY  03/2012    HX LEFT SALPINGO-OOPHORECTOMY Left     HX LUMBAR DISKECTOMY      2006    HX ORTHOPAEDIC  9/2006    ruptured discs    UPPER GI ENDOSCOPY,BIOPSY  5/11/2017              Family History:   Problem Relation Age of Onset    Heart Disease Mother     Hypertension Father     Cancer Father         Lung    Hypertension Sister     No Known Problems Brother     No Known Problems Brother     No Known Problems Brother     Anesth Problems Neg Hx        Social History     Socioeconomic History    Marital status:      Spouse name: Not on file    Number of children: Not on file    Years of education: Not on file    Highest education level: Not on file   Occupational History    Not on file   Social Needs    Financial resource strain: Not on file    Food insecurity:     Worry: Not on file     Inability: Not on file    Transportation needs:     Medical: Not on file     Non-medical: Not on file   Tobacco Use    Smoking status: Never Smoker    Smokeless tobacco: Never Used   Substance and Sexual Activity    Alcohol use: No    Drug use: Never    Sexual activity: Yes     Partners: Male     Birth control/protection: Surgical   Lifestyle    Physical activity:     Days per week: Not on file     Minutes per session: Not on file    Stress: Not on file   Relationships    Social connections:     Talks on phone: Not on file     Gets together: Not on file     Attends Mandaen service: Not on file     Active member of club or organization: Not on file     Attends meetings of clubs or organizations: Not on file     Relationship status: Not on file    Intimate partner violence:     Fear of current or ex partner: Not on file     Emotionally abused: Not on file     Physically abused: Not on file     Forced sexual activity: Not on file   Other Topics Concern    Not on file   Social History Narrative    Not on file         ALLERGIES: Green pepper    Review of Systems   Constitutional: Positive for fatigue. Negative for chills and fever. HENT: Negative for congestion and rhinorrhea. Eyes: Negative for redness and visual disturbance. Respiratory: Negative for cough and shortness of breath. Cardiovascular: Negative for chest pain and leg swelling. Gastrointestinal: Negative for abdominal pain, diarrhea, nausea and vomiting. Endocrine:        +hyperglycemia   Genitourinary: Negative for dysuria, flank pain, frequency, hematuria and urgency. Musculoskeletal: Positive for myalgias. Negative for arthralgias, back pain and neck pain. Skin: Negative for rash and wound. Allergic/Immunologic: Negative for immunocompromised state. Neurological: Negative for dizziness and headaches. Vitals:    10/11/19 1042   BP: 141/65   Pulse: 85   Resp: 16   Temp: 98.7 °F (37.1 °C)   SpO2: 97%   Weight: 91.7 kg (202 lb 2.6 oz)   Height: 5' 5\" (1.651 m)            Physical Exam   Constitutional: She is oriented to person, place, and time. She appears well-developed and well-nourished. No distress. HENT:   Head: Normocephalic. Mouth/Throat: Oropharynx is clear and moist. No oropharyngeal exudate. Eyes: Pupils are equal, round, and reactive to light. EOM are normal. Right eye exhibits no discharge. Left eye exhibits no discharge. Neck: Normal range of motion. Neck supple. Cardiovascular: Normal rate, regular rhythm, normal heart sounds and intact distal pulses. Exam reveals no gallop and no friction rub. No murmur heard. Pulmonary/Chest: Effort normal and breath sounds normal. No stridor. No respiratory distress. She has no wheezes. She has no rales. Abdominal: Soft. Bowel sounds are normal. She exhibits no distension. There is no tenderness. There is no rebound and no guarding. Musculoskeletal: Normal range of motion. She exhibits no edema or deformity. Neurological: She is alert and oriented to person, place, and time.    CN II-XII tested and intact  Speech is clear and fluid  Tongue protrusion normal  5/5 b/l strength with shoulder/elbow flexion and extension  Equal  strength  5/5 b/l strength with hip extension, knee flexion/extension  Symmetric dorsi and plantar-flexion of feet  Sensation intact in face and throughout all 4 extremities  No truncal ataxia      Skin: Skin is warm and dry. Capillary refill takes less than 2 seconds. No rash noted. She is not diaphoretic. L medial thigh: 4cm area of hyperpigmented skin with central desquamation--no cellulitis, abscess, or drainage   Psychiatric: She has a normal mood and affect. Her behavior is normal.   Nursing note and vitals reviewed. Labs Reviewed:   No leukocytosis or anemia  Normal sodium, potassium, bicarb and no anion gap  Normal renal function  Mild hyperglycemia  TSH normal  CK normal  UA with glucose but no signs of infection      Course:  IV fluids and Toradol given        MDM:  70-year-old female history of diabetes here today with generalized myalgias and fatigue for several days after taking Bactrim and prednisone for possible spider bite to her left thigh. At this time her spider bite wound looks okay and no signs of active infection/cellulitis. Overall she is stable vital signs and is well-appearing. Her labs do not indicate acute myositis, renal failure, electrolyte disturbance, thyroid derangement or other acute process. Exam is overall unremarkable. She was given IV fluids and Toradol. This could be reaction to recent use of prednisone. She was worried it was related to the spider bite from last week however I do not suspect this. She is advised to follow-up with her PCP or return here if worsening symptoms. Clinical Impression:     ICD-10-CM ICD-9-CM    1. Generalized muscle ache M79.10 729.1    2.  Fatigue, unspecified type R53.83 780.79        Disposition: PRECIOUS Rangel DO

## 2019-10-11 NOTE — PATIENT INSTRUCTIONS
Fatigue: Care Instructions  Your Care Instructions    Fatigue is a feeling of tiredness, exhaustion, or lack of energy. You may feel fatigue because of too much or not enough activity. It can also come from stress, lack of sleep, boredom, and poor diet. Many medical problems, such as viral infections, can cause fatigue. Emotional problems, especially depression, are often the cause of fatigue. Fatigue is most often a symptom of another problem. Treatment for fatigue depends on the cause. For example, if you have fatigue because you have a certain health problem, treating this problem also treats your fatigue. If depression or anxiety is the cause, treatment may help. Follow-up care is a key part of your treatment and safety. Be sure to make and go to all appointments, and call your doctor if you are having problems. It's also a good idea to know your test results and keep a list of the medicines you take. How can you care for yourself at home? · Get regular exercise. But don't overdo it. Go back and forth between rest and exercise. · Get plenty of rest.  · Eat a healthy diet. Do not skip meals, especially breakfast.  · Reduce your use of caffeine, tobacco, and alcohol. Caffeine is most often found in coffee, tea, cola drinks, and chocolate. · Limit medicines that can cause fatigue. This includes tranquilizers and cold and allergy medicines. When should you call for help? Watch closely for changes in your health, and be sure to contact your doctor if:    · You have new symptoms such as fever or a rash.     · Your fatigue gets worse.     · You have been feeling down, depressed, or hopeless. Or you may have lost interest in things that you usually enjoy.     · You are not getting better as expected. Where can you learn more? Go to http://hodan-eugene.info/. Enter F193 in the search box to learn more about \"Fatigue: Care Instructions. \"  Current as of: June 26, 2019  Content Version: 12.2  © 1570-8777 [employer]. Care instructions adapted under license by [employer] (which disclaims liability or warranty for this information). If you have questions about a medical condition or this instruction, always ask your healthcare professional. [address] any warranty or liability for your use of this information.

## 2019-10-12 LAB
ALBUMIN SERPL-MCNC: 4.7 G/DL (ref 3.5–5.5)
ALBUMIN/GLOB SERPL: 1.8 {RATIO} (ref 1.2–2.2)
ALP SERPL-CCNC: 47 IU/L (ref 39–117)
ALT SERPL-CCNC: 27 IU/L (ref 0–32)
AST SERPL-CCNC: 19 IU/L (ref 0–40)
BILIRUB SERPL-MCNC: 0.4 MG/DL (ref 0–1.2)
BUN SERPL-MCNC: 9 MG/DL (ref 6–24)
BUN/CREAT SERPL: 10 (ref 9–23)
CALCIUM SERPL-MCNC: 9.8 MG/DL (ref 8.7–10.2)
CHLORIDE SERPL-SCNC: 99 MMOL/L (ref 96–106)
CK MB SERPL-MCNC: 1.5 NG/ML (ref 0–5.3)
CK SERPL-CCNC: 77 U/L (ref 24–173)
CO2 SERPL-SCNC: 22 MMOL/L (ref 20–29)
CREAT SERPL-MCNC: 0.9 MG/DL (ref 0.57–1)
EBV EA IGG SER-ACNC: <9 U/ML (ref 0–8.9)
EBV NA IGG SER IA-ACNC: <18 U/ML (ref 0–17.9)
EBV VCA IGG SER IA-ACNC: >600 U/ML (ref 0–17.9)
EBV VCA IGM SER IA-ACNC: >160 U/ML (ref 0–35.9)
ERYTHROCYTE [DISTWIDTH] IN BLOOD BY AUTOMATED COUNT: 12.9 % (ref 12.3–15.4)
GLOBULIN SER CALC-MCNC: 2.6 G/DL (ref 1.5–4.5)
GLUCOSE SERPL-MCNC: 143 MG/DL (ref 65–99)
HCT VFR BLD AUTO: 41 % (ref 34–46.6)
HGB BLD-MCNC: 14.2 G/DL (ref 11.1–15.9)
MCH RBC QN AUTO: 29.3 PG (ref 26.6–33)
MCHC RBC AUTO-ENTMCNC: 34.6 G/DL (ref 31.5–35.7)
MCV RBC AUTO: 85 FL (ref 79–97)
PLATELET # BLD AUTO: 312 X10E3/UL (ref 150–450)
POTASSIUM SERPL-SCNC: 4.2 MMOL/L (ref 3.5–5.2)
PROT SERPL-MCNC: 7.3 G/DL (ref 6–8.5)
RBC # BLD AUTO: 4.84 X10E6/UL (ref 3.77–5.28)
SERVICE CMNT-IMP: ABNORMAL
SODIUM SERPL-SCNC: 141 MMOL/L (ref 134–144)
TSH SERPL DL<=0.005 MIU/L-ACNC: 1.23 UIU/ML (ref 0.45–4.5)
WBC # BLD AUTO: 9.2 X10E3/UL (ref 3.4–10.8)

## 2019-10-14 NOTE — PROGRESS NOTES
Please call patient and let her know that her labs returned. It appears she could have mono! This would explain her symptoms. This is viral and must work its course, is spread via saliva.   Thanks

## 2019-11-04 DIAGNOSIS — G43.709 CHRONIC MIGRAINE WITHOUT AURA WITHOUT STATUS MIGRAINOSUS, NOT INTRACTABLE: ICD-10-CM

## 2019-11-04 DIAGNOSIS — S80.862A INSECT BITE OF LEFT LOWER LEG, INITIAL ENCOUNTER: ICD-10-CM
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
## 2019-11-04 RX ORDER — SUMATRIPTAN 50 MG/1
TABLET, FILM COATED ORAL
Qty: 9 TAB | Refills: 3 | Status: SHIPPED | OUTPATIENT
Start: 2019-11-04 | End: 2019-12-17 | Stop reason: SDUPTHER

## 2019-11-04 RX ORDER — TRIAMCINOLONE ACETONIDE 1 MG/G
OINTMENT TOPICAL 2 TIMES DAILY
Qty: 30 G | Refills: 0 | Status: SHIPPED | OUTPATIENT
Start: 2019-11-04 | End: 2019-11-17 | Stop reason: SDUPTHER

## 2019-11-09 DIAGNOSIS — Z91.09 ENVIRONMENTAL ALLERGIES: ICD-10-CM

## 2019-11-10 RX ORDER — OMEPRAZOLE 20 MG/1
CAPSULE, DELAYED RELEASE ORAL
Qty: 30 CAP | Refills: 0 | Status: SHIPPED | OUTPATIENT
Start: 2019-11-10 | End: 2019-12-16 | Stop reason: SDUPTHER

## 2019-11-10 RX ORDER — LEVOCETIRIZINE DIHYDROCHLORIDE 5 MG/1
TABLET, FILM COATED ORAL
Qty: 30 TAB | Refills: 0 | Status: SHIPPED | OUTPATIENT
Start: 2019-11-10 | End: 2019-12-16 | Stop reason: SDUPTHER

## 2019-11-12 DIAGNOSIS — E55.9 VITAMIN D DEFICIENCY: ICD-10-CM

## 2019-11-12 RX ORDER — FENOFIBRATE 160 MG/1
TABLET ORAL
Qty: 30 TAB | Refills: 1 | Status: SHIPPED | OUTPATIENT
Start: 2019-11-12 | End: 2020-01-06

## 2019-11-12 RX ORDER — ERGOCALCIFEROL 1.25 MG/1
CAPSULE ORAL
Qty: 4 CAP | Refills: 7 | Status: SHIPPED | OUTPATIENT
Start: 2019-11-12 | End: 2020-01-30 | Stop reason: DRUGHIGH

## 2019-11-17 DIAGNOSIS — L03.031 INFECTION OF NAIL BED OF TOE OF RIGHT FOOT: ICD-10-CM

## 2019-11-17 DIAGNOSIS — S80.862A INSECT BITE OF LEFT LOWER LEG, INITIAL ENCOUNTER: ICD-10-CM
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
## 2019-11-18 RX ORDER — TRIAMCINOLONE ACETONIDE 1 MG/G
OINTMENT TOPICAL
Qty: 30 G | Refills: 0 | Status: SHIPPED | OUTPATIENT
Start: 2019-11-18 | End: 2020-06-24 | Stop reason: SDUPTHER

## 2019-11-18 RX ORDER — MUPIROCIN 20 MG/G
OINTMENT TOPICAL
Qty: 22 G | Refills: 0 | Status: SHIPPED | OUTPATIENT
Start: 2019-11-18 | End: 2020-01-10

## 2019-11-22 ENCOUNTER — OFFICE VISIT (OUTPATIENT)
Dept: FAMILY MEDICINE CLINIC | Age: 39
End: 2019-11-22

## 2019-11-22 VITALS
BODY MASS INDEX: 33.66 KG/M2 | DIASTOLIC BLOOD PRESSURE: 78 MMHG | OXYGEN SATURATION: 98 % | SYSTOLIC BLOOD PRESSURE: 127 MMHG | HEIGHT: 65 IN | TEMPERATURE: 98.1 F | HEART RATE: 88 BPM | RESPIRATION RATE: 20 BRPM | WEIGHT: 202 LBS

## 2019-11-22 DIAGNOSIS — J20.9 BRONCHITIS, ACUTE, WITH BRONCHOSPASM: Primary | ICD-10-CM

## 2019-11-22 RX ORDER — AZITHROMYCIN 250 MG/1
TABLET, FILM COATED ORAL
Qty: 6 TAB | Refills: 0 | Status: SHIPPED | OUTPATIENT
Start: 2019-11-22 | End: 2019-11-27

## 2019-11-22 RX ORDER — PREDNISONE 5 MG/1
TABLET ORAL
Qty: 21 TAB | Refills: 0 | Status: SHIPPED | OUTPATIENT
Start: 2019-11-22 | End: 2019-12-09 | Stop reason: ALTCHOICE

## 2019-11-22 RX ORDER — ALBUTEROL SULFATE 90 UG/1
2 AEROSOL, METERED RESPIRATORY (INHALATION)
Qty: 1 INHALER | Refills: 5 | Status: SHIPPED | OUTPATIENT
Start: 2019-11-22 | End: 2020-06-24 | Stop reason: SDUPTHER

## 2019-11-22 RX ORDER — BENZONATATE 100 MG/1
100 CAPSULE ORAL
Qty: 21 CAP | Refills: 0 | Status: SHIPPED | OUTPATIENT
Start: 2019-11-22 | End: 2019-11-29

## 2019-11-22 NOTE — PROGRESS NOTES
Identified pt with two pt identifiers(name and ). Chief Complaint   Patient presents with    Chest Congestion    Breathing Problem     shortness of breath as she also has asthma        Health Maintenance Due   Topic    DTaP/Tdap/Td series (2 - Td)    PAP AKA CERVICAL CYTOLOGY     Influenza Age 5 to Adult        Wt Readings from Last 3 Encounters:   19 202 lb (91.6 kg)   10/11/19 202 lb 2.6 oz (91.7 kg)   10/11/19 199 lb (90.3 kg)     Temp Readings from Last 3 Encounters:   19 98.1 °F (36.7 °C) (Oral)   10/11/19 98.7 °F (37.1 °C)   10/11/19 98.6 °F (37 °C) (Oral)     BP Readings from Last 3 Encounters:   19 127/78   10/11/19 141/65   10/11/19 121/69     Pulse Readings from Last 3 Encounters:   19 88   10/11/19 85   10/11/19 80         Learning Assessment:  :     Learning Assessment 2017   PRIMARY LEARNER Patient Patient   HIGHEST LEVEL OF EDUCATION - PRIMARY LEARNER  GRADUATED HIGH SCHOOL OR GED GRADUATED HIGH SCHOOL OR GED   BARRIERS PRIMARY LEARNER NONE NONE   PRIMARY LANGUAGE ENGLISH ENGLISH   LEARNER PREFERENCE PRIMARY READING READING   ANSWERED BY patient patient   RELATIONSHIP SELF SELF       Depression Screening:  :     3 most recent PHQ Screens 2019   Little interest or pleasure in doing things Not at all   Feeling down, depressed, irritable, or hopeless Not at all   Total Score PHQ 2 0       Fall Risk Assessment:  :     Fall Risk Assessment, last 12 mths 2017   Able to walk? Yes   Fall in past 12 months? No       Abuse Screening:  :     Abuse Screening Questionnaire 2019   Do you ever feel afraid of your partner? N N N N   Are you in a relationship with someone who physically or mentally threatens you? N N N N   Is it safe for you to go home?  Y Y Y Y       Coordination of Care Questionnaire:  :     1) Have you been to an emergency room, urgent care clinic since your last visit? no   Hospitalized since your last visit? no 2) Have you seen or consulted any other health care providers outside of [address] [place] [address] since your last visit? no  (Include any pap smears or colon screenings in this section.)    3) Do you have an Advance Directive on file? no  Are you interested in receiving information about Advance Directives? no        Reviewed record in preparation for visit and have obtained necessary documentation. Medication reconciliation up to date and corrected with patient at this time.

## 2019-11-22 NOTE — PATIENT INSTRUCTIONS
Bronchitis: Care Instructions Your Care Instructions Bronchitis is inflammation of the bronchial tubes, which carry air to the lungs. The tubes swell and produce mucus, or phlegm. The mucus and inflamed bronchial tubes make you cough. You may have trouble breathing. Most cases of bronchitis are caused by viruses like those that cause colds. Antibiotics usually do not help and they may be harmful. Bronchitis usually develops rapidly and lasts about 2 to 3 weeks in otherwise healthy people. Follow-up care is a key part of your treatment and safety. Be sure to make and go to all appointments, and call your doctor if you are having problems. It's also a good idea to know your test results and keep a list of the medicines you take. How can you care for yourself at home? · Take all medicines exactly as prescribed. Call your doctor if you think you are having a problem with your medicine. · Get some extra rest. 
· Take an over-the-counter pain medicine, such as acetaminophen (Tylenol), ibuprofen (Advil, Motrin), or naproxen (Aleve) to reduce fever and relieve body aches. Read and follow all instructions on the label. · Do not take two or more pain medicines at the same time unless the doctor told you to. Many pain medicines have acetaminophen, which is Tylenol. Too much acetaminophen (Tylenol) can be harmful. · Take an over-the-counter cough medicine that contains dextromethorphan to help quiet a dry, hacking cough so that you can sleep. Avoid cough medicines that have more than one active ingredient. Read and follow all instructions on the label. · Breathe moist air from a humidifier, hot shower, or sink filled with hot water. The heat and moisture will thin mucus so you can cough it out. · Do not smoke. Smoking can make bronchitis worse. If you need help quitting, talk to your doctor about stop-smoking programs and medicines. These can increase your chances of quitting for good. When should you call for help? Call 911 anytime you think you may need emergency care. For example, call if: 
  · You have severe trouble breathing.  
 Call your doctor now or seek immediate medical care if: 
  · You have new or worse trouble breathing.  
  · You cough up dark brown or bloody mucus (sputum).  
  · You have a new or higher fever.  
  · You have a new rash.  
 Watch closely for changes in your health, and be sure to contact your doctor if: 
  · You cough more deeply or more often, especially if you notice more mucus or a change in the color of your mucus.  
  · You are not getting better as expected. Where can you learn more? Go to http://hodan-eugene.info/. Enter H333 in the search box to learn more about \"Bronchitis: Care Instructions. \" Current as of: June 9, 2019 Content Version: 12.2 © 3668-6746 [employer]. Care instructions adapted under license by [employer] (which disclaims liability or warranty for this information). If you have questions about a medical condition or this instruction, always ask your healthcare professional. [address] any warranty or liability for your use of this information.

## 2019-11-22 NOTE — PROGRESS NOTES
HISTORY OF PRESENT ILLNESS  Aneta Kang is a 37 y.o. female. HPI   Pt presents with \"cough and wheezing\"  Symptoms have been present for about a week  Symptoms started with head and nasal congestion, and have all settled into her chest  She has no head or nasal congestion at this time  She has a cough, and is able to cough up mucous  She is wheezing,and at times feels short of breath with the cough  No fever  OTC: sinus medication  Review of Systems   Constitutional: Negative for fever. HENT: Positive for congestion. Respiratory: Positive for cough, sputum production, shortness of breath and wheezing. Gastrointestinal: Negative for diarrhea and vomiting. Physical Exam  Constitutional:       Appearance: Normal appearance. HENT:      Head: Normocephalic and atraumatic. Right Ear: Tympanic membrane normal.      Left Ear: Tympanic membrane normal.      Nose: Nose normal.   Cardiovascular:      Rate and Rhythm: Normal rate and regular rhythm. Heart sounds: Normal heart sounds. Pulmonary:      Effort: Pulmonary effort is normal.      Breath sounds: Examination of the right-middle field reveals wheezing. Examination of the left-middle field reveals wheezing. Examination of the right-lower field reveals wheezing and rhonchi. Examination of the left-lower field reveals wheezing and rhonchi. Wheezing and rhonchi present. Neurological:      Mental Status: She is alert. Psychiatric:         Mood and Affect: Mood normal.         Behavior: Behavior normal.         ASSESSMENT and PLAN    ICD-10-CM ICD-9-CM    1.  Bronchitis, acute, with bronchospasm J20.9 466.0 azithromycin (ZITHROMAX) 250 mg tablet      benzonatate (TESSALON PERLES) 100 mg capsule      predniSONE (STERAPRED) 5 mg dose pack     Informed patient that I have sent medication to the pharmacy, and she should take as prescribed  Educated about staying well hydrated, and treating fever as needed    Pt informed to return to office with worsening of symptoms, or PRN with any questions or concerns. Pt verbalizes understanding of plan of care and denies further questions or concerns at this time.

## 2019-12-09 ENCOUNTER — OFFICE VISIT (OUTPATIENT)
Dept: FAMILY MEDICINE CLINIC | Age: 39
End: 2019-12-09

## 2019-12-09 VITALS
RESPIRATION RATE: 16 BRPM | TEMPERATURE: 98.1 F | DIASTOLIC BLOOD PRESSURE: 74 MMHG | BODY MASS INDEX: 33.49 KG/M2 | OXYGEN SATURATION: 97 % | WEIGHT: 201 LBS | HEIGHT: 65 IN | HEART RATE: 89 BPM | SYSTOLIC BLOOD PRESSURE: 102 MMHG

## 2019-12-09 DIAGNOSIS — E11.9 TYPE 2 DIABETES MELLITUS WITHOUT COMPLICATION, WITHOUT LONG-TERM CURRENT USE OF INSULIN (HCC): ICD-10-CM

## 2019-12-09 DIAGNOSIS — E11.9 TYPE 2 DIABETES MELLITUS WITHOUT COMPLICATION, WITH LONG-TERM CURRENT USE OF INSULIN (HCC): ICD-10-CM

## 2019-12-09 DIAGNOSIS — Z79.4 TYPE 2 DIABETES MELLITUS WITHOUT COMPLICATION, WITH LONG-TERM CURRENT USE OF INSULIN (HCC): ICD-10-CM

## 2019-12-09 NOTE — PATIENT INSTRUCTIONS
Noninsulin Medicines for Type 2 Diabetes: Care Instructions Your Care Instructions There are different types of noninsulin medicines for diabetes. Each works in a different way. But they all help you control your blood sugar. Some types help your body make insulin to lower your blood sugar. Others lower how much insulin your body needs. Some can slow how fast your body digests sugars. And some can remove extra glucose through your urine. · Alpha-glucosidase inhibitors. These keep starches from breaking down. This means that they lower the amount of glucose absorbed when you eat. They don't help your body make more insulin. So they will not cause low blood sugar unless you use them with other medicines for diabetes. They include acarbose and miglitol. · DPP-4 inhibitors. These help your body raise the level of insulin after you eat. They also help your body make less of a hormone that raises blood sugar. They include linagliptin, saxagliptin, and sitagliptin. · Incretin hormones (GLP-1 receptor agonists). Your body makes a protein that can raise your insulin level. It also can lower your blood sugar and make you less hungry. You can get shots of hormones that work the same way. They include exenatide and liraglutide. · Meglitinides. These help your body release insulin. They also help slow how your body digests sugars. So they can keep your blood sugar from rising too fast after you eat. They include nateglinide and repaglinide. · Metformin. This lowers how much glucose your liver makes. And it helps you respond better to insulin. It also lowers the amount of stored sugar that your liver releases when you are not eating. · SGLT2 inhibitors. These help to remove extra glucose through your urine. They may also help some people lose weight. They include canagliflozin, dapagliflozin, and empagliflozin. · Sulfonylureas. These help your body release more insulin.  Some work for many hours. They can cause low blood sugar if you don't eat as you planned. They include glipizide and glyburide. · Thiazolidinediones. These reduce the amount of blood glucose. They also help you respond better to insulin. They include pioglitazone and rosiglitazone. You may need to take more than one medicine for diabetes. Two or more medicines may work better to lower your blood sugar level than just one does. Follow-up care is a key part of your treatment and safety. Be sure to make and go to all appointments, and call your doctor if you are having problems. It's also a good idea to know your test results and keep a list of the medicines you take. How can you care for yourself at home? · Eat a healthy diet. Get some exercise each day. This may help you to reduce how much medicine you need. · Do not take other prescription or over-the-counter medicines, vitamins, herbal products, or supplements without talking to your doctor first. Some medicines for type 2 diabetes can cause problems with other medicines or supplements. · Tell your doctor if you plan to get pregnant. Some of these drugs are not safe for pregnant women. · Be safe with medicines. Take your medicines exactly as prescribed. Meglitinides and sulfonylureas can cause your blood sugar to drop very low. Call your doctor if you think you are having a problem with your medicine. · Check your blood sugar often. You can use a glucose monitor. Keeping track can help you know how certain foods, activities, and medicines affect your blood sugar. And it can help you keep your blood sugar from getting so low that it's not safe. When should you call for help? Call 911 anytime you think you may need emergency care.  For example, call if: 
  · You passed out (lost consciousness).  
  · You are confused or cannot think clearly.  
  · Your blood sugar is very high or very low.  
 Watch closely for changes in your health, and be sure to contact your doctor if: 
  · Your blood sugar stays outside the level your doctor set for you.  
  · You have any problems. Where can you learn more? Go to http://hodan-eugene.info/. Enter H153 in the search box to learn more about \"Noninsulin Medicines for Type 2 Diabetes: Care Instructions. \" Current as of: April 16, 2019 Content Version: 12.2 © 3617-2769 [employer]. Care instructions adapted under license by [employer] (which disclaims liability or warranty for this information). If you have questions about a medical condition or this instruction, always ask your healthcare professional. [address] any warranty or liability for your use of this information.

## 2019-12-09 NOTE — PROGRESS NOTES
Subjective:     Deny Ennis is a 37 y.o. female seen for follow up of diabetes. She also has hypertension and hyperlipidemia. Diabetic Review of Systems - medication compliance: compliant all of the time, diabetic diet compliance: compliant all of the time. Other symptoms and concerns: Pt is here for refills of her Januvia. She had fasting labs in August, and HgbA1C was well controlled at 7.     Patient Active Problem List    Diagnosis Date Noted    Inappropriate sinus tachycardia 05/20/2019    Palpitations 04/08/2019    Recurrent umbilical hernia 99/65/9021    Acute midline thoracic back pain 09/26/2017    Acute mucoid otitis media of right ear 06/23/2017    Dermatitis 03/09/2017    Acute non-recurrent frontal sinusitis 01/03/2017    Left lower quadrant pain 12/29/2016    Left ear pain 12/02/2016    Strep throat 11/09/2016    Nausea 10/07/2016    Right acute serous otitis media 07/14/2016    Rash 07/14/2016    Gastroesophageal reflux disease without esophagitis 07/14/2016    Environmental allergies 07/14/2016    Right ear pain 06/21/2016    Diabetes ([address] 07/18/2014    Sore throat 05/06/2014    Migraine headache 01/03/2012    Vitamin D deficiency 11/06/2011    Dyslipidemia (high LDL; low HDL) 01/24/2011    Hypertension 01/06/2011    Asthma 01/06/2011        Lab Results   Component Value Date/Time    Hemoglobin A1c 7.0 (H) 08/01/2019 11:01 AM    Hemoglobin A1c 7.1 (H) 04/02/2019 08:34 AM    Hemoglobin A1c 7.3 (H) 10/17/2018 08:49 AM    Glucose 136 (H) 10/11/2019 11:02 AM    Glucose (POC) 124 (H) 03/14/2019 12:16 PM    Microalb/Creat ratio (ug/mg creat.) <8.0 03/15/2017 12:05 PM    LDL, calculated 97 08/01/2019 11:01 AM    Creatinine (POC) 0.7 06/04/2013 09:57 AM    Creatinine 0.95 10/11/2019 11:02 AM      Lab Results   Component Value Date/Time    GFR est non-AA >60 10/11/2019 11:02 AM    GFRNA, POC >60 06/04/2013 09:57 AM    GFR est AA >60 10/11/2019 11:02 AM    GFRAA, POC >60 06/04/2013 09:57 AM    Creatinine 0.95 10/11/2019 11:02 AM    Creatinine (POC) 0.7 06/04/2013 09:57 AM    BUN 9 10/11/2019 11:02 AM    Sodium 139 10/11/2019 11:02 AM    Potassium 4.0 10/11/2019 11:02 AM    Chloride 103 10/11/2019 11:02 AM    CO2 25 10/11/2019 11:02 AM        Review of Systems  A comprehensive review of systems was negative. Objective:     Visit Vitals  /74 (BP 1 Location: Left arm, BP Patient Position: Sitting)   Pulse 89   Temp 98.1 °F (36.7 °C) (Oral)   Resp 16   Ht 5' 5\" (1.651 m)   Wt 201 lb (91.2 kg)   LMP 01/25/2012   SpO2 97%   BMI 33.45 kg/m²     Appearance: alert, well appearing, and in no distress. Exam: heart sounds normal rate, regular rhythm, normal S1, S2, no murmurs, rubs, clicks or gallops  Lab review: labs are reviewed, up to date and normal.    Assessment/Plan:     diabetes well controlled. Diabetic issues reviewed with her: diabetic diet discussed in detail, written exchange diet given. ICD-10-CM ICD-9-CM    1. Type 2 diabetes mellitus without complication, with long-term current use of insulin ([facility]) E11.9 250.00 SITagliptin (JANUVIA) 100 mg tablet    Z79.4 V58.67    2. Type 2 diabetes mellitus without complication, without long-term current use of insulin ([facility]) E11.9 250.00 SITagliptin (JANUVIA) 100 mg tablet     Educated about returning to office in February for office visit and fasting labs. Pt informed to return to office with worsening of symptoms, or PRN with any questions or concerns. Pt verbalizes understanding of plan of care and denies further questions or concerns at this time.

## 2019-12-09 NOTE — PROGRESS NOTES
Identified pt with two pt identifiers(name and ). Chief Complaint   Patient presents with    Diabetes    Labs     has eaten since midnight    Medication Refill     has been out of her medication x 2 wks        Health Maintenance Due   Topic    DTaP/Tdap/Td series (2 - Td)    PAP AKA CERVICAL CYTOLOGY     Influenza Age 5 to Adult        Wt Readings from Last 3 Encounters:   19 201 lb (91.2 kg)   19 202 lb (91.6 kg)   10/11/19 202 lb 2.6 oz (91.7 kg)     Temp Readings from Last 3 Encounters:   19 98.1 °F (36.7 °C) (Oral)   19 98.1 °F (36.7 °C) (Oral)   10/11/19 98.7 °F (37.1 °C)     BP Readings from Last 3 Encounters:   19 102/74   19 127/78   10/11/19 141/65     Pulse Readings from Last 3 Encounters:   19 89   19 88   10/11/19 85         Learning Assessment:  :     Learning Assessment 2017   PRIMARY LEARNER Patient Patient   HIGHEST LEVEL OF EDUCATION - PRIMARY LEARNER  GRADUATED HIGH SCHOOL OR GED GRADUATED HIGH SCHOOL OR GED   BARRIERS PRIMARY LEARNER NONE NONE   PRIMARY LANGUAGE ENGLISH ENGLISH   LEARNER PREFERENCE PRIMARY READING READING   ANSWERED BY patient patient   RELATIONSHIP SELF SELF       Depression Screening:  :     3 most recent PHQ Screens 2019   Little interest or pleasure in doing things Not at all   Feeling down, depressed, irritable, or hopeless Not at all   Total Score PHQ 2 0       Fall Risk Assessment:  :     Fall Risk Assessment, last 12 mths 2017   Able to walk? Yes   Fall in past 12 months? No       Abuse Screening:  :     Abuse Screening Questionnaire 2019   Do you ever feel afraid of your partner? N N N N   Are you in a relationship with someone who physically or mentally threatens you? N N N N   Is it safe for you to go home?  Y Y Y Y       Coordination of Care Questionnaire:  :     1) Have you been to an emergency room, urgent care clinic since your last visit? no Hospitalized since your last visit? no             2) Have you seen or consulted any other health care providers outside of [address] [place] [address] since your last visit? no  (Include any pap smears or colon screenings in this section.)    3) Do you have an Advance Directive on file? no  Are you interested in receiving information about Advance Directives? no    Reviewed record in preparation for visit and have obtained necessary documentation. Medication reconciliation up to date and corrected with patient at this time.

## 2019-12-16 ENCOUNTER — HOSPITAL ENCOUNTER (EMERGENCY)
Age: 39
Discharge: HOME | End: 2019-12-16
Attending: EMERGENCY MEDICINE
Payer: MEDICAID

## 2019-12-16 VITALS
OXYGEN SATURATION: 98 % | WEIGHT: 209 LBS | DIASTOLIC BLOOD PRESSURE: 65 MMHG | HEART RATE: 96 BPM | RESPIRATION RATE: 14 BRPM | TEMPERATURE: 98 F | SYSTOLIC BLOOD PRESSURE: 141 MMHG | BODY MASS INDEX: 34.78 KG/M2

## 2019-12-16 DIAGNOSIS — M54.6 ACUTE BILATERAL THORACIC BACK PAIN: Primary | ICD-10-CM

## 2019-12-16 DIAGNOSIS — Z91.09 ENVIRONMENTAL ALLERGIES: ICD-10-CM

## 2019-12-16 PROCEDURE — 99282 EMERGENCY DEPT VISIT SF MDM: CPT

## 2019-12-16 RX ORDER — LEVOCETIRIZINE DIHYDROCHLORIDE 5 MG/1
TABLET, FILM COATED ORAL
Qty: 30 TAB | Refills: 0 | Status: SHIPPED | OUTPATIENT
Start: 2019-12-16 | End: 2020-02-05

## 2019-12-16 RX ORDER — OMEPRAZOLE 20 MG/1
CAPSULE, DELAYED RELEASE ORAL
Qty: 30 CAP | Refills: 0 | Status: SHIPPED | OUTPATIENT
Start: 2019-12-16 | End: 2020-02-05

## 2019-12-16 RX ORDER — METHOCARBAMOL 750 MG/1
750 TABLET, FILM COATED ORAL 4 TIMES DAILY
Qty: 28 TAB | Refills: 0 | Status: SHIPPED | OUTPATIENT
Start: 2019-12-16 | End: 2019-12-23

## 2019-12-16 RX ORDER — IBUPROFEN 800 MG/1
800 TABLET ORAL
COMMUNITY
End: 2020-01-10

## 2019-12-16 NOTE — ED NOTES
The patient was discharged home by Dr Marnie Sifuentes in stable condition. The patient is alert and oriented, in no respiratory distress. The patient's diagnosis, condition and treatment were explained. The patient expressed understanding. 1 prescriptions given. A discharge plan has been developed. A  was not involved in the process. Aftercare instructions were given. Pt ambulatory out of the ED.

## 2019-12-16 NOTE — ED PROVIDER NOTES
The history is provided by the patient. Back Pain    This is a new problem. The current episode started 2 days ago. The problem has not changed since onset. The problem occurs constantly. Patient reports not work related injury. The pain is associated with no known injury. The pain is present in the thoracic spine and upper back. The pain does not radiate. The pain is at a severity of 4/10. The pain is moderate. The symptoms are aggravated by bending and twisting. The pain is worse during the night. Pertinent negatives include no chest pain, no fever, no numbness, no headaches, no abdominal pain, no bowel incontinence, no bladder incontinence, no dysuria, no pelvic pain, no leg pain, no paresthesias, no paresis, no tingling and no weakness. Risk factors: None. Past Medical History:   Diagnosis Date    Anxiety     Arrhythmia     HX TACHYCARDIA - NEG.  STRESS TEST 2013 PER PATIENT    Asthma     LAST EPISODE 2016    Diabetes ([facility] [address] 2008    NIDDM    Ear infection 11/30/2018    BILATERAL    GERD (gastroesophageal reflux disease)     Headache     Headache(784.0)     Hypertension     HX HTN - NO MEDS CURRENTLY(11-30-18)    Inappropriate sinus tachycardia 5/20/2019    Rash 7/14/2016    Recurrent umbilical hernia 98/14/7155    S/P dilatation and curettage     Tachycardia        Past Surgical History:   Procedure Laterality Date    HX APPENDECTOMY  2/2008    HX CHOLECYSTECTOMY  2001    HX DILATION AND CURETTAGE      HX GYN  3/2008    tubal ligation    HX HERNIA REPAIR  2/2009    HX HERNIA REPAIR  12/06/2018    open recurrent umbilical hernia repair    HX HYSTERECTOMY  03/2012    HX LEFT SALPINGO-OOPHORECTOMY Left     HX LUMBAR DISKECTOMY      2006    HX ORTHOPAEDIC  9/2006    ruptured discs    UPPER GI ENDOSCOPY,BIOPSY  5/11/2017              Family History:   Problem Relation Age of Onset    Heart Disease Mother     Hypertension Father     Cancer Father         Lung    Hypertension Sister  No Known Problems Brother     No Known Problems Brother     No Known Problems Brother     Anesth Problems Neg Hx        Social History     Socioeconomic History    Marital status:      Spouse name: Not on file    Number of children: Not on file    Years of education: Not on file    Highest education level: Not on file   Occupational History    Not on file   Social Needs    Financial resource strain: Not on file    Food insecurity:     Worry: Not on file     Inability: Not on file    Transportation needs:     Medical: Not on file     Non-medical: Not on file   Tobacco Use    Smoking status: Never Smoker    Smokeless tobacco: Never Used   Substance and Sexual Activity    Alcohol use: No    Drug use: Never    Sexual activity: Yes     Partners: Male     Birth control/protection: Surgical   Lifestyle    Physical activity:     Days per week: Not on file     Minutes per session: Not on file    Stress: Not on file   Relationships    Social connections:     Talks on phone: Not on file     Gets together: Not on file     Attends L[employer]-day service: Not on file     Active member of club or organization: Not on file     Attends meetings of clubs or organizations: Not on file     Relationship status: Not on file    Intimate partner violence:     Fear of current or ex partner: Not on file     Emotionally abused: Not on file     Physically abused: Not on file     Forced sexual activity: Not on file   Other Topics Concern    Not on file   Social History Narrative    Not on file         ALLERGIES: Green pepper    Review of Systems   Constitutional: Negative for chills and fever. HENT: Negative for ear pain and sore throat. Eyes: Negative for pain. Respiratory: Negative for chest tightness and shortness of breath. Cardiovascular: Negative for chest pain. Gastrointestinal: Negative for abdominal pain and bowel incontinence.    Genitourinary: Negative for bladder incontinence, dysuria, flank pain and pelvic pain. Musculoskeletal: Positive for back pain. Skin: Negative for rash. Neurological: Negative for tingling, weakness, numbness, headaches and paresthesias. All other systems reviewed and are negative. Vitals:    12/16/19 1043   BP: 141/65   Pulse: 96   Resp: 14   Temp: 98 °F (36.7 °C)   SpO2: 98%   Weight: 94.8 kg (208 lb 15.9 oz)            Physical Exam  Constitutional:       Appearance: She is well-developed. HENT:      Head: Normocephalic and atraumatic. Eyes:      General: No scleral icterus. Neck:      Musculoskeletal: No neck rigidity. Trachea: No tracheal deviation. Cardiovascular:      Rate and Rhythm: Normal rate. Pulses: Normal pulses. Pulmonary:      Effort: Pulmonary effort is normal. No respiratory distress. Abdominal:      General: There is no distension. Genitourinary:     Comments: deferred  Musculoskeletal:         General: Tenderness (Paraspinal tenderness in the thoracic back) present. No deformity. Skin:     General: Skin is dry. Neurological:      General: No focal deficit present. Mental Status: She is alert. Comments: Normal motor and sensation in upper and lower extremities. Psychiatric:         Mood and Affect: Mood normal.          MDM  Number of Diagnoses or Management Options  Acute bilateral thoracic back pain:   Diagnosis management comments: Prescribed NSAIDs. Follow-up with primary care doctor. Follow-up with back doctor as needed. Given return precautions.          Procedures

## 2019-12-16 NOTE — DISCHARGE INSTRUCTIONS
Patient Education        Back Pain: Care Instructions  Your Care Instructions    Back pain has many possible causes. It is often related to problems with muscles and ligaments of the back. It may also be related to problems with the nerves, discs, or bones of the back. Moving, lifting, standing, sitting, or sleeping in an awkward way can strain the back. Sometimes you don't notice the injury until later. Arthritis is another common cause of back pain. Although it may hurt a lot, back pain usually improves on its own within several weeks. Most people recover in 12 weeks or less. Using good home treatment and being careful not to stress your back can help you feel better sooner. Follow-up care is a key part of your treatment and safety. Be sure to make and go to all appointments, and call your doctor if you are having problems. It's also a good idea to know your test results and keep a list of the medicines you take. How can you care for yourself at home? · Sit or lie in positions that are most comfortable and reduce your pain. Try one of these positions when you lie down:  ? Lie on your back with your knees bent and supported by large pillows. ? Lie on the floor with your legs on the seat of a sofa or chair. ? Lie on your side with your knees and hips bent and a pillow between your legs. ? Lie on your stomach if it does not make pain worse. · Do not sit up in bed, and avoid soft couches and twisted positions. Bed rest can help relieve pain at first, but it delays healing. Avoid bed rest after the first day of back pain. · Change positions every 30 minutes. If you must sit for long periods of time, take breaks from sitting. Get up and walk around, or lie in a comfortable position. · Try using a heating pad on a low or medium setting for 15 to 20 minutes every 2 or 3 hours. Try a warm shower in place of one session with the heating pad. · You can also try an ice pack for 10 to 15 minutes every 2 to 3 hours. Put a thin cloth between the ice pack and your skin. · Take pain medicines exactly as directed. ? If the doctor gave you a prescription medicine for pain, take it as prescribed. ? If you are not taking a prescription pain medicine, ask your doctor if you can take an over-the-counter medicine. · Take short walks several times a day. You can start with 5 to 10 minutes, 3 or 4 times a day, and work up to longer walks. Walk on level surfaces and avoid hills and stairs until your back is better. · Return to work and other activities as soon as you can. Continued rest without activity is usually not good for your back. · To prevent future back pain, do exercises to stretch and strengthen your back and stomach. Learn how to use good posture, safe lifting techniques, and proper body mechanics. When should you call for help? Call your doctor now or seek immediate medical care if:    · You have new or worsening numbness in your legs.     · You have new or worsening weakness in your legs. (This could make it hard to stand up.)     · You lose control of your bladder or bowels.    Watch closely for changes in your health, and be sure to contact your doctor if:    · You have a fever, lose weight, or don't feel well.     · You do not get better as expected. Where can you learn more? Go to http://hodan-eugene.info/. Enter R847 in the search box to learn more about \"Back Pain: Care Instructions. \"  Current as of: June 26, 2019  Content Version: 12.2  © 8673-7636 [employer]. Care instructions adapted under license by [employer] (which disclaims liability or warranty for this information). If you have questions about a medical condition or this instruction, always ask your healthcare professional. [place] [address] any warranty or liability for your use of this information.

## 2019-12-16 NOTE — ED TRIAGE NOTES
Pt ambulatory to the treatment room;gait steady and unassisted. Pt rpts bilateral middle/ lower back pain since Saturday morning; denies injury. Denies urinary symptoms.

## 2019-12-17 DIAGNOSIS — G43.709 CHRONIC MIGRAINE WITHOUT AURA WITHOUT STATUS MIGRAINOSUS, NOT INTRACTABLE: ICD-10-CM

## 2019-12-17 RX ORDER — SUMATRIPTAN 50 MG/1
TABLET, FILM COATED ORAL
Qty: 9 TAB | Refills: 3 | Status: SHIPPED | OUTPATIENT
Start: 2019-12-17 | End: 2020-04-13

## 2020-01-02 RX ORDER — ACEBUTOLOL HYDROCHLORIDE 200 MG/1
CAPSULE ORAL
Qty: 60 CAP | Refills: 2 | Status: SHIPPED | OUTPATIENT
Start: 2020-01-02 | End: 2020-01-10 | Stop reason: SDUPTHER

## 2020-01-06 RX ORDER — FENOFIBRATE 160 MG/1
TABLET ORAL
Qty: 30 TAB | Refills: 1 | Status: SHIPPED | OUTPATIENT
Start: 2020-01-06 | End: 2020-03-24

## 2020-01-10 ENCOUNTER — OFFICE VISIT (OUTPATIENT)
Dept: CARDIOLOGY CLINIC | Age: 40
End: 2020-01-10

## 2020-01-10 ENCOUNTER — TELEPHONE (OUTPATIENT)
Dept: FAMILY MEDICINE CLINIC | Age: 40
End: 2020-01-10

## 2020-01-10 VITALS
HEART RATE: 92 BPM | SYSTOLIC BLOOD PRESSURE: 122 MMHG | RESPIRATION RATE: 16 BRPM | HEIGHT: 65 IN | WEIGHT: 210 LBS | OXYGEN SATURATION: 98 % | DIASTOLIC BLOOD PRESSURE: 78 MMHG | BODY MASS INDEX: 34.99 KG/M2

## 2020-01-10 DIAGNOSIS — R53.83 FATIGUE, UNSPECIFIED TYPE: ICD-10-CM

## 2020-01-10 DIAGNOSIS — R00.2 PALPITATIONS: ICD-10-CM

## 2020-01-10 DIAGNOSIS — I10 ESSENTIAL HYPERTENSION: ICD-10-CM

## 2020-01-10 DIAGNOSIS — R00.0 INAPPROPRIATE SINUS TACHYCARDIA: Primary | ICD-10-CM

## 2020-01-10 DIAGNOSIS — E11.9 TYPE 2 DIABETES MELLITUS WITHOUT COMPLICATION, WITHOUT LONG-TERM CURRENT USE OF INSULIN (HCC): ICD-10-CM

## 2020-01-10 RX ORDER — ACEBUTOLOL HYDROCHLORIDE 200 MG/1
200 CAPSULE ORAL 2 TIMES DAILY
Qty: 60 CAP | Refills: 1 | Status: SHIPPED | OUTPATIENT
Start: 2020-01-10 | End: 2020-02-21 | Stop reason: SDUPTHER

## 2020-01-10 NOTE — PROGRESS NOTES
ROOM # 3  Fatigued all the time,occasional SOB and palpitations  Visit Vitals  /78 (BP 1 Location: Left arm, BP Patient Position: Sitting)   Pulse 92   Resp 16   Ht 5' 5\" (1.651 m)   Wt 210 lb (95.3 kg)   LMP 01/25/2012   SpO2 98%   BMI 34.95 kg/m²

## 2020-01-10 NOTE — PROGRESS NOTES
HISTORY OF PRESENTING ILLNESS      Tabitha Rodrigues is a 37 y.o. female with recent palpitations and findings consistent with IST. She was poorly tolerant of metoprolol and has continued to have tachycardia with increased diltiazem. She reports severe fatigue with fluctuating HRs. Thus far, monitoring has shown sinus tachycardia. Last visit we stopped diltiazem and started acebutolol 200mg BID. She was referred to Dr. Todd Perez for ANS testing but cancelled this appt and has not rescheduled. Her acebutolol was increased to 400 BID for increased control of symptomatic, elevated heart rates last visit. She reports great control of symptoms and heart rate after increasing dose; however, now report that she has increased fatigue and that her HRs have elevated. She incurred spider bite and underwent treatment including prednisone and abx therapy (resulting in high blood sugars) a few weeks following her dose increase. EKG shows sinus rhythm.       ACTIVE PROBLEM LIST     Patient Active Problem List    Diagnosis Date Noted    Inappropriate sinus tachycardia 05/20/2019    Palpitations 04/08/2019    Recurrent umbilical hernia 35/24/6248    Acute midline thoracic back pain 09/26/2017    Acute mucoid otitis media of right ear 06/23/2017    Dermatitis 03/09/2017    Acute non-recurrent frontal sinusitis 01/03/2017    Left lower quadrant pain 12/29/2016    Left ear pain 12/02/2016    Strep throat 11/09/2016    Nausea 10/07/2016    Right acute serous otitis media 07/14/2016    Rash 07/14/2016    Gastroesophageal reflux disease without esophagitis 07/14/2016    Environmental allergies 07/14/2016    Right ear pain 06/21/2016    Diabetes ([facility] [address] 07/18/2014    Sore throat 05/06/2014    Migraine headache 01/03/2012    Vitamin D deficiency 11/06/2011    Dyslipidemia (high LDL; low HDL) 01/24/2011    Hypertension 01/06/2011    Asthma 01/06/2011           PAST MEDICAL HISTORY     Past Medical History: Diagnosis Date    Anxiety     Arrhythmia     HX TACHYCARDIA - NEG. STRESS TEST 2013 PER PATIENT    Asthma     LAST EPISODE 2016    Diabetes ([address] 2008    NIDDM    Ear infection 11/30/2018    BILATERAL    GERD (gastroesophageal reflux disease)     Headache     Headache(784.0)     Hypertension     HX HTN - NO MEDS CURRENTLY(11-30-18)    Inappropriate sinus tachycardia 5/20/2019    Rash 7/14/2016    Recurrent umbilical hernia 59/95/4513    S/P dilatation and curettage     Tachycardia            PAST SURGICAL HISTORY     Past Surgical History:   Procedure Laterality Date    HX APPENDECTOMY  2/2008    HX CHOLECYSTECTOMY  2001    HX DILATION AND CURETTAGE      HX GYN  3/2008    tubal ligation    HX HERNIA REPAIR  2/2009    HX HERNIA REPAIR  12/06/2018    open recurrent umbilical hernia repair    HX HYSTERECTOMY  03/2012    HX LEFT SALPINGO-OOPHORECTOMY Left     HX LUMBAR DISKECTOMY      2006    HX ORTHOPAEDIC  9/2006    ruptured discs    UPPER GI ENDOSCOPY,BIOPSY  5/11/2017               ALLERGIES     Allergies   Allergen Reactions    Green Pepper Hives     Red, Yellow and Orange Peppers also.   OK with Black Pepper          FAMILY HISTORY     Family History   Problem Relation Age of Onset    Heart Disease Mother     Hypertension Father     Cancer Father         Lung    Hypertension Sister     No Known Problems Brother     No Known Problems Brother     No Known Problems Brother     Anesth Problems Neg Hx     negative for cardiac disease       SOCIAL HISTORY     Social History     Socioeconomic History    Marital status:      Spouse name: Not on file    Number of children: Not on file    Years of education: Not on file    Highest education level: Not on file   Tobacco Use    Smoking status: Never Smoker    Smokeless tobacco: Never Used   Substance and Sexual Activity    Alcohol use: No    Drug use: Never    Sexual activity: Yes     Partners: Male     Birth control/protection: Surgical         MEDICATIONS     Current Outpatient Medications   Medication Sig    fenofibrate (LOFIBRA) 160 mg tablet TAKE 1 TAB BY MOUTH DAILY.  acebutolol (SECTRAL) 200 mg capsule TAKE 1 CAPSULE BY MOUTH TWICE A DAY    SUMAtriptan (IMITREX) 50 mg tablet TAKE 1 TAB AT ONSET OF MIGRAINE HEADACHE, REPEAT IN 1 HOUR IF NEEDED. NO MORE THAN 2 TABS PER 24 HOUR    ibuprofen (MOTRIN) 800 mg tablet Take 800 mg by mouth every eight (8) hours as needed for Pain.  omeprazole (PRILOSEC) 20 mg capsule TAKE 1 CAPSULE BY MOUTH ONCE DAILY    levocetirizine (XYZAL) 5 mg tablet TAKE 1 TABLET BY MOUTH ONCE DAILY    SITagliptin (JANUVIA) 100 mg tablet TAKE 1 TABLET BY MOUTH EVERY DAY    albuterol (PROVENTIL HFA, VENTOLIN HFA, PROAIR HFA) 90 mcg/actuation inhaler Take 2 Puffs by inhalation every four (4) hours as needed for Wheezing.  mupirocin (BACTROBAN) 2 % ointment APPLY TO AFFECTED AREA TWICE A DAY    triamcinolone acetonide (KENALOG) 0.1 % ointment APPLY TO AFFECTED AREA TWICE A DAY. USE THIN LAYER    ergocalciferol (ERGOCALCIFEROL) 50,000 unit capsule TAKE 1 CAP BY MOUTH EVERY SEVEN (7) DAYS.  canagliflozin (INVOKANA) 300 mg tablet TAKE 1 TABLET BY MOUTH DAILY BEFORE BREAKFAST    acebutolol (SECTRAL) 400 mg capsule Take 1 Cap by mouth two (2) times a day.  simvastatin (ZOCOR) 20 mg tablet TAKE 1 TABLET BY MOUTH EVERY DAY    metFORMIN (GLUCOPHAGE) 1,000 mg tablet TAKE 1 TABLET BY MOUTH TWICE A DAY    famotidine (PEPCID) 40 mg tablet TAKE 1 TABLET BY MOUTH EVERY DAY TAKES EVERY BEDTIME    aspirin delayed-release 81 mg tablet TAKE 1 TABLET BY MOUTH EVERY DAY    [employer] CONTOUR strip USE AS DIRECTED    topiramate (TOPAMAX) 50 mg tablet Take 1 tab twice a day for migraine prevention (Patient taking differently: as needed.  Take 1 tab twice a day for migraine prevention)    butalbital-acetaminophen-caffeine (FIORICET, ESGIC) -40 mg per tablet Take 1 Tab by mouth every six (6) hours as needed for Pain. Max Daily Amount: 4 Tabs.  Lancets (MICROLET LANCET) Misc USE TO CHECK BLOOD SUGAR ONE TO TWO TIMES DAILY (Patient taking differently: as needed. USE TO CHECK BLOOD SUGAR ONE TO TWO TIMES DAILY)     No current facility-administered medications for this visit. I have reviewed the nurses notes, vitals, problem list, allergy list, medical history, family, social history and medications. REVIEW OF SYMPTOMS      General: +fatigue, Pt denies excessive weight gain or loss. Pt is able to conduct ADL's  HEENT: Denies blurred vision, headaches, hearing loss, epistaxis and difficulty swallowing. Respiratory: Denies cough, congestion, shortness of breath, RICE, wheezing or stridor. Cardiovascular: +tachycardia, Denies precordial pain, palpitations, edema or PND  Gastrointestinal: Denies poor appetite, indigestion, abdominal pain or blood in stool  Genitourinary: Denies hematuria, dysuria, increased urinary frequency  Musculoskeletal: Denies joint pain or swelling from muscles or joints  Neurologic: Denies tremor, paresthesias, headache, or sensory motor disturbance  Psychiatric: Denies confusion, insomnia, depression  Integumentray: Denies rash, itching or ulcers. Hematologic: Denies easy bruising, bleeding       PHYSICAL EXAMINATION      There were no vitals filed for this visit. General: Well developed, in no acute distress. HEENT: No jaundice, oral mucosa moist, no oral ulcers  Neck: Supple, no stiffness, no lymphadenopathy, supple  Heart:  Normal S1/S2 negative S3 or S4. Regular, no murmur, gallop or rub, no jugular venous distention  Respiratory: Clear bilaterally x 4, no wheezing or rales  Abdomen:   Soft, non-tender, bowel sounds are active.   Extremities:  No edema, normal cap refill, no cyanosis. Musculoskeletal: No clubbing, no deformities  Neuro: A&Ox3, speech clear, gait stable, cooperative, no focal neurologic deficits  Skin: Skin color is normal. No rashes or lesions. Non diaphoretic, moist.  Vascular: 2+ pulses symmetric in all extremities       DIAGNOSTIC DATA      EKG:        LABORATORY DATA      Lab Results   Component Value Date/Time    WBC 9.6 10/11/2019 11:02 AM    HGB 14.1 10/11/2019 11:02 AM    HCT 41.8 10/11/2019 11:02 AM    PLATELET 641 92/75/0047 11:02 AM    MCV 87.3 10/11/2019 11:02 AM      Lab Results   Component Value Date/Time    Sodium 139 10/11/2019 11:02 AM    Potassium 4.0 10/11/2019 11:02 AM    Chloride 103 10/11/2019 11:02 AM    CO2 25 10/11/2019 11:02 AM    Anion gap 11 10/11/2019 11:02 AM    Glucose 136 (H) 10/11/2019 11:02 AM    BUN 9 10/11/2019 11:02 AM    Creatinine 0.95 10/11/2019 11:02 AM    BUN/Creatinine ratio 9 (L) 10/11/2019 11:02 AM    GFR est AA >60 10/11/2019 11:02 AM    GFR est non-AA >60 10/11/2019 11:02 AM    Calcium 9.3 10/11/2019 11:02 AM    Bilirubin, total 0.4 10/11/2019 11:02 AM    AST (SGOT) 18 10/11/2019 11:02 AM    Alk. phosphatase 47 10/11/2019 11:02 AM    Protein, total 7.2 10/11/2019 11:02 AM    Albumin 3.8 10/11/2019 11:02 AM    Globulin 3.4 10/11/2019 11:02 AM    A-G Ratio 1.1 10/11/2019 11:02 AM    ALT (SGPT) 37 10/11/2019 11:02 AM           ASSESSMENT      1. Inappropriate sinus tachycardia  2. Diabetes mellitus  3. Orthostatic hypotension  4.  GERD  5. Anxiety        PLAN     She is unable to determine when the fatigue started. Unsure if it's a result of higher dose of acebutolol or other multiple factors coinciding at the time of her spider bite. She cancelled her ANS appt due to anxiety. Recommend decreasing her acebutolol and evaluating for improvement in fatigue. Will obtain MRA of pelvis to r/o May Thurner syndrome. If negative will discuss alternate medication regimen as well as rescheduling ANS testing with Dr. Santos Mg. FOLLOW-UP   1 month    Thank you, Nathalia Todd NP for allowing me to participate in the care of this extraordinarily pleasant female.  Please do not hesitate to contact me for further questions/concerns.        Ann Posey, NP

## 2020-01-15 DIAGNOSIS — E11.9 TYPE 2 DIABETES MELLITUS WITHOUT COMPLICATION, WITH LONG-TERM CURRENT USE OF INSULIN (HCC): ICD-10-CM

## 2020-01-15 DIAGNOSIS — Z79.4 TYPE 2 DIABETES MELLITUS WITHOUT COMPLICATION, WITH LONG-TERM CURRENT USE OF INSULIN (HCC): ICD-10-CM

## 2020-01-15 DIAGNOSIS — I10 ESSENTIAL HYPERTENSION: ICD-10-CM

## 2020-01-15 DIAGNOSIS — E78.1 HYPERTRIGLYCERIDEMIA: ICD-10-CM

## 2020-01-15 RX ORDER — ASPIRIN 81 MG/1
TABLET ORAL
Qty: 30 TAB | Refills: 5 | Status: SHIPPED | OUTPATIENT
Start: 2020-01-15 | End: 2020-08-26

## 2020-01-16 ENCOUNTER — TELEPHONE (OUTPATIENT)
Dept: CARDIOLOGY CLINIC | Age: 40
End: 2020-01-16

## 2020-01-16 NOTE — TELEPHONE ENCOUNTER
Called patient, ID verified using two patient identifiers. Informed patient that per staff in MRI department she can take all of her medications tomorrow, but she needs to have nothing to eat or drink after lunch tomorrow. Patient verbalizes understanding of all information.

## 2020-01-16 NOTE — TELEPHONE ENCOUNTER
Patient stated that she is scheduled for an MRI tomorrow and they are going to use a contrast. She stated that, in the past, she was instructed to hold her diabetic medication prior to and to continue a few days after. Please advise.     Phone #: [phone]  Thanks

## 2020-01-20 ENCOUNTER — HOSPITAL ENCOUNTER (OUTPATIENT)
Dept: MRI IMAGING | Age: 40
Discharge: HOME | End: 2020-01-20
Attending: NURSE PRACTITIONER
Payer: MEDICAID

## 2020-01-20 VITALS — BODY MASS INDEX: 34.11 KG/M2 | WEIGHT: 205 LBS

## 2020-01-20 DIAGNOSIS — R00.0 INAPPROPRIATE SINUS TACHYCARDIA: ICD-10-CM

## 2020-01-20 DIAGNOSIS — R00.2 PALPITATIONS: ICD-10-CM

## 2020-01-20 PROCEDURE — 74011250636 HC RX REV CODE- 250/636: Performed by: RADIOLOGY

## 2020-01-20 PROCEDURE — A9577 INJ MULTIHANCE: HCPCS | Performed by: RADIOLOGY

## 2020-01-20 PROCEDURE — 72198 MR ANGIO PELVIS W/O & W/DYE: CPT

## 2020-01-20 RX ADMIN — GADOBENATE DIMEGLUMINE 20 ML: 529 INJECTION, SOLUTION INTRAVENOUS at 07:04

## 2020-01-22 RX ORDER — ACEBUTOLOL HYDROCHLORIDE 400 MG/1
CAPSULE ORAL
Qty: 60 CAP | Refills: 3 | Status: SHIPPED | OUTPATIENT
Start: 2020-01-22 | End: 2020-01-29 | Stop reason: DRUGHIGH

## 2020-01-23 ENCOUNTER — TELEPHONE (OUTPATIENT)
Dept: CARDIOLOGY CLINIC | Age: 40
End: 2020-01-23

## 2020-01-23 NOTE — TELEPHONE ENCOUNTER
Called patient, ID verified using two patient identifiers. Informed patient that per FLOR Carrillo NP her MRA of the pelvis  \" It was negative for May thurner. Recommend continued medical therapy and follow up with Dr. Merelne Valdes. Also recommend that she re-schedule her appt with Dr. Omid Patino.'  Patient verbalizes understanding of all information.

## 2020-01-29 ENCOUNTER — OFFICE VISIT (OUTPATIENT)
Dept: FAMILY MEDICINE CLINIC | Age: 40
End: 2020-01-29

## 2020-01-29 VITALS
HEIGHT: 65 IN | OXYGEN SATURATION: 98 % | DIASTOLIC BLOOD PRESSURE: 76 MMHG | BODY MASS INDEX: 33.99 KG/M2 | HEART RATE: 86 BPM | TEMPERATURE: 98.5 F | SYSTOLIC BLOOD PRESSURE: 110 MMHG | WEIGHT: 204 LBS | RESPIRATION RATE: 18 BRPM

## 2020-01-29 DIAGNOSIS — I10 ESSENTIAL HYPERTENSION: ICD-10-CM

## 2020-01-29 DIAGNOSIS — E55.9 VITAMIN D DEFICIENCY: ICD-10-CM

## 2020-01-29 DIAGNOSIS — E78.5 DYSLIPIDEMIA (HIGH LDL; LOW HDL): ICD-10-CM

## 2020-01-29 DIAGNOSIS — R53.83 FATIGUE, UNSPECIFIED TYPE: ICD-10-CM

## 2020-01-29 DIAGNOSIS — E11.9 TYPE 2 DIABETES MELLITUS WITHOUT COMPLICATION, WITHOUT LONG-TERM CURRENT USE OF INSULIN (HCC): ICD-10-CM

## 2020-01-29 DIAGNOSIS — E11.9 TYPE 2 DIABETES MELLITUS WITHOUT COMPLICATION, WITHOUT LONG-TERM CURRENT USE OF INSULIN (HCC): Primary | ICD-10-CM

## 2020-01-29 NOTE — PROGRESS NOTES
Identified pt with two pt identifiers(name and ). Chief Complaint   Patient presents with    Vitamin D Deficiency    Cholesterol Problem    Diabetes    Labs     NPO since midnight        Health Maintenance Due   Topic    DTaP/Tdap/Td series (2 - Td)    PAP AKA CERVICAL CYTOLOGY     Influenza Age 5 to Adult     HEMOGLOBIN A1C Q6M        Wt Readings from Last 3 Encounters:   20 204 lb (92.5 kg)   20 205 lb (93 kg)   01/10/20 210 lb (95.3 kg)     Temp Readings from Last 3 Encounters:   20 98.5 °F (36.9 °C) (Oral)   19 98 °F (36.7 °C)   19 98.1 °F (36.7 °C) (Oral)     BP Readings from Last 3 Encounters:   20 110/76   01/10/20 122/78   19 141/65     Pulse Readings from Last 3 Encounters:   20 86   01/10/20 92   19 96         Learning Assessment:  :     Learning Assessment 2017   PRIMARY LEARNER Patient Patient   HIGHEST LEVEL OF EDUCATION - PRIMARY LEARNER  GRADUATED HIGH SCHOOL OR GED GRADUATED HIGH SCHOOL OR GED   BARRIERS PRIMARY LEARNER NONE NONE   PRIMARY LANGUAGE ENGLISH ENGLISH   LEARNER PREFERENCE PRIMARY READING READING   ANSWERED BY patient patient   RELATIONSHIP SELF SELF       Depression Screening:  :     3 most recent PHQ Screens 2019   Little interest or pleasure in doing things Not at all   Feeling down, depressed, irritable, or hopeless Not at all   Total Score PHQ 2 0       Fall Risk Assessment:  :     Fall Risk Assessment, last 12 mths 2017   Able to walk? Yes   Fall in past 12 months? No       Abuse Screening:  :     Abuse Screening Questionnaire 2019   Do you ever feel afraid of your partner? N N N N   Are you in a relationship with someone who physically or mentally threatens you? N N N N   Is it safe for you to go home?  Y Y Y Y         Coordination of Care Questionnaire:  :     1) Have you been to an emergency room, urgent care clinic since your last visit? no   Hospitalized since your last visit? no             2) Have you seen or consulted any other health care providers outside of [address] [place] [address] since your last visit? no  (Include any pap smears or colon screenings in this section.)    3) Do you have an Advance Directive on file? no  Are you interested in receiving information about Advance Directives? no    Reviewed record in preparation for visit and have obtained necessary documentation. Medication reconciliation up to date and corrected with patient at this time.

## 2020-01-29 NOTE — PROGRESS NOTES
Subjective:     Gemini Rajput is a 37 y.o. female seen for follow up of diabetes. She also has hypertension and hyperlipidemia. Diabetic Review of Systems - medication compliance: compliant all of the time. Other symptoms and concerns: Pt is here for routine follow up and fasting labs. Pt states that she is fatigued all the time. She wants to go to bed early, and wakes up a lot. She does not snore, and has had a sleep study in the past 3 years, that was normal.  She is unsure if something is causing her fatigue, or if she is just not able to sleep well due to her anxious mind at night. Beta blocker was recently decreased by cardiology, to see if this would aid in her fatigue, but she states that she continues to feel fatigued    Patient Active Problem List    Diagnosis Date Noted    Inappropriate sinus tachycardia 05/20/2019    Palpitations 04/08/2019    Recurrent umbilical hernia 30/16/4173    Acute midline thoracic back pain 09/26/2017    Acute mucoid otitis media of right ear 06/23/2017    Dermatitis 03/09/2017    Acute non-recurrent frontal sinusitis 01/03/2017    Left lower quadrant pain 12/29/2016    Left ear pain 12/02/2016    Strep throat 11/09/2016    Nausea 10/07/2016    Right acute serous otitis media 07/14/2016    Rash 07/14/2016    Gastroesophageal reflux disease without esophagitis 07/14/2016    Environmental allergies 07/14/2016    Right ear pain 06/21/2016    Diabetes ([facility] [address] 07/18/2014    Sore throat 05/06/2014    Migraine headache 01/03/2012    Vitamin D deficiency 11/06/2011    Dyslipidemia (high LDL; low HDL) 01/24/2011    Hypertension 01/06/2011    Asthma 01/06/2011     Current Outpatient Medications   Medication Sig Dispense Refill    aspirin delayed-release 81 mg tablet TAKE 1 TABLET BY MOUTH EVERY DAY 30 Tab 5    acebutolol (SECTRAL) 200 mg capsule Take 1 Cap by mouth two (2) times a day.  60 Cap 1    fenofibrate (LOFIBRA) 160 mg tablet TAKE 1 TAB BY MOUTH DAILY. 30 Tab 1    SUMAtriptan (IMITREX) 50 mg tablet TAKE 1 TAB AT ONSET OF MIGRAINE HEADACHE, REPEAT IN 1 HOUR IF NEEDED. NO MORE THAN 2 TABS PER 24 HOUR 9 Tab 3    omeprazole (PRILOSEC) 20 mg capsule TAKE 1 CAPSULE BY MOUTH ONCE DAILY 30 Cap 0    levocetirizine (XYZAL) 5 mg tablet TAKE 1 TABLET BY MOUTH ONCE DAILY (Patient taking differently: daily as needed.) 30 Tab 0    SITagliptin (JANUVIA) 100 mg tablet TAKE 1 TABLET BY MOUTH EVERY DAY 90 Tab 1    albuterol (PROVENTIL HFA, VENTOLIN HFA, PROAIR HFA) 90 mcg/actuation inhaler Take 2 Puffs by inhalation every four (4) hours as needed for Wheezing. 1 Inhaler 5    triamcinolone acetonide (KENALOG) 0.1 % ointment APPLY TO AFFECTED AREA TWICE A DAY. USE THIN LAYER (Patient taking differently: as needed.) 30 g 0    ergocalciferol (ERGOCALCIFEROL) 50,000 unit capsule TAKE 1 CAP BY MOUTH EVERY SEVEN (7) DAYS. 4 Cap 7    canagliflozin (INVOKANA) 300 mg tablet TAKE 1 TABLET BY MOUTH DAILY BEFORE BREAKFAST 90 Tab 1    metFORMIN (GLUCOPHAGE) 1,000 mg tablet TAKE 1 TABLET BY MOUTH TWICE A  Tab 1    famotidine (PEPCID) 40 mg tablet TAKE 1 TABLET BY MOUTH EVERY DAY TAKES EVERY BEDTIME 30 Tab 12    ASCENSIA CONTOUR strip USE AS DIRECTED 50 Strip 5    topiramate (TOPAMAX) 50 mg tablet Take 1 tab twice a day for migraine prevention (Patient taking differently: as needed. Take 1 tab twice a day for migraine prevention) 60 Tab 0    butalbital-acetaminophen-caffeine (FIORICET, ESGIC) -40 mg per tablet Take 1 Tab by mouth every six (6) hours as needed for Pain. Max Daily Amount: 4 Tabs. 20 Tab 0    Lancets (MICROLET LANCET) Misc USE TO CHECK BLOOD SUGAR ONE TO TWO TIMES DAILY (Patient taking differently: as needed. USE TO CHECK BLOOD SUGAR ONE TO TWO TIMES DAILY) 100 Each 0    simvastatin (ZOCOR) 20 mg tablet TAKE 1 TABLET BY MOUTH EVERY DAY 30 Tab 5     Allergies   Allergen Reactions    Green Pepper Hives     Red, Yellow and Orange Peppers also.   OK with Black Pepper     Past Medical History:   Diagnosis Date    Anxiety     Arrhythmia     HX TACHYCARDIA - NEG.  STRESS TEST 2013 PER PATIENT    Asthma     LAST EPISODE 2016    Diabetes ([facility] [address] 2008    NIDDM    Ear infection 11/30/2018    BILATERAL    GERD (gastroesophageal reflux disease)     Headache     Headache(784.0)     Hypertension     HX HTN - NO MEDS CURRENTLY(11-30-18)    Inappropriate sinus tachycardia 5/20/2019    Rash 7/14/2016    Recurrent umbilical hernia 90/74/5533    S/P dilatation and curettage     Tachycardia      Past Surgical History:   Procedure Laterality Date    HX APPENDECTOMY  2/2008    HX CHOLECYSTECTOMY  2001    HX DILATION AND CURETTAGE      HX GYN  3/2008    tubal ligation    HX HERNIA REPAIR  2/2009    HX HERNIA REPAIR  12/06/2018    open recurrent umbilical hernia repair    HX HYSTERECTOMY  03/2012    HX LEFT SALPINGO-OOPHORECTOMY Left     HX LUMBAR DISKECTOMY      2006    HX ORTHOPAEDIC  9/2006    ruptured discs    UPPER GI ENDOSCOPY,BIOPSY  5/11/2017          Family History   Problem Relation Age of Onset    Heart Disease Mother     Hypertension Father     Cancer Father         Lung    Hypertension Sister     No Known Problems Brother     No Known Problems Brother     No Known Problems Brother     Anesth Problems Neg Hx      Social History     Tobacco Use    Smoking status: Never Smoker    Smokeless tobacco: Never Used   Substance Use Topics    Alcohol use: No        Lab Results   Component Value Date/Time    WBC 9.6 10/11/2019 11:02 AM    HGB 14.1 10/11/2019 11:02 AM    HCT 41.8 10/11/2019 11:02 AM    PLATELET 828 78/22/0333 11:02 AM    MCV 87.3 10/11/2019 11:02 AM     Lab Results   Component Value Date/Time    Hemoglobin A1c 7.0 (H) 08/01/2019 11:01 AM    Hemoglobin A1c 7.1 (H) 04/02/2019 08:34 AM    Hemoglobin A1c 7.3 (H) 10/17/2018 08:49 AM    Glucose 136 (H) 10/11/2019 11:02 AM    Glucose (POC) 124 (H) 03/14/2019 12:16 PM    Microalb/Creat ratio (ug/mg creat.) <8.0 03/15/2017 12:05 PM    LDL, calculated 97 08/01/2019 11:01 AM    Creatinine (POC) 0.7 06/04/2013 09:57 AM    Creatinine 0.95 10/11/2019 11:02 AM      Lab Results   Component Value Date/Time    Cholesterol, total 163 08/01/2019 11:01 AM    HDL Cholesterol 38 (L) 08/01/2019 11:01 AM    LDL, calculated 97 08/01/2019 11:01 AM    Triglyceride 140 08/01/2019 11:01 AM     Lab Results   Component Value Date/Time    GFR est non-AA >60 10/11/2019 11:02 AM    GFRNA, POC >60 06/04/2013 09:57 AM    GFR est AA >60 10/11/2019 11:02 AM    GFRAA, POC >60 06/04/2013 09:57 AM    Creatinine 0.95 10/11/2019 11:02 AM    Creatinine (POC) 0.7 06/04/2013 09:57 AM    BUN 9 10/11/2019 11:02 AM    Sodium 139 10/11/2019 11:02 AM    Potassium 4.0 10/11/2019 11:02 AM    Chloride 103 10/11/2019 11:02 AM    CO2 25 10/11/2019 11:02 AM        Review of Systems  A comprehensive review of systems was negative. Objective:     Visit Vitals  /76 (BP 1 Location: Left arm, BP Patient Position: Sitting)   Pulse 86   Temp 98.5 °F (36.9 °C) (Oral)   Resp 18   Ht 5' 5\" (1.651 m)   Wt 204 lb (92.5 kg)   LMP 01/25/2012   SpO2 98%   BMI 33.95 kg/m²     Appearance: alert, well appearing, and in no distress. Exam: heart sounds normal rate, regular rhythm, normal S1, S2, no murmurs, rubs, clicks or gallops, chest clear  Lab review: orders written for new lab studies as appropriate; see orders. Assessment/Plan:     diabetes stable, hypertension stable, hyperlipidemia stable. Diabetic issues reviewed with her: diabetic diet discussed in detail, written exchange diet given. ICD-10-CM ICD-9-CM    1. Type 2 diabetes mellitus without complication, without long-term current use of insulin (HCC) E11.9 250.00 CBC W/O DIFF      METABOLIC PANEL, COMPREHENSIVE      HEMOGLOBIN A1C WITH EAG   2. Fatigue, unspecified type R53.83 780.79 THYROID CASCADE PROFILE      VITAMIN D, 25 HYDROXY   3. Essential hypertension I10 401.9    4.  Dyslipidemia (high LDL; low HDL) E78.5 272.4 LIPID PANEL   5. Vitamin D deficiency E55.9 268.9 VITAMIN D, 25 HYDROXY       Will notify when labs return  Educated about taking medications as prescribed  If labs are normal, will try a sleep aid    Pt informed to return to office with worsening of symptoms, or PRN with any questions or concerns. Pt verbalizes understanding of plan of care and denies further questions or concerns at this time.

## 2020-01-30 DIAGNOSIS — G47.00 INSOMNIA, UNSPECIFIED TYPE: Primary | ICD-10-CM

## 2020-01-30 LAB
25(OH)D3+25(OH)D2 SERPL-MCNC: 7.9 NG/ML (ref 30–100)
ALBUMIN SERPL-MCNC: 4.7 G/DL (ref 3.8–4.8)
ALBUMIN/GLOB SERPL: 1.8 {RATIO} (ref 1.2–2.2)
ALP SERPL-CCNC: 48 IU/L (ref 39–117)
ALT SERPL-CCNC: 24 IU/L (ref 0–32)
AST SERPL-CCNC: 17 IU/L (ref 0–40)
BILIRUB SERPL-MCNC: 0.3 MG/DL (ref 0–1.2)
BUN SERPL-MCNC: 9 MG/DL (ref 6–24)
BUN/CREAT SERPL: 10 (ref 9–23)
CALCIUM SERPL-MCNC: 9.6 MG/DL (ref 8.7–10.2)
CHLORIDE SERPL-SCNC: 104 MMOL/L (ref 96–106)
CHOLEST SERPL-MCNC: 165 MG/DL (ref 100–199)
CO2 SERPL-SCNC: 19 MMOL/L (ref 20–29)
CREAT SERPL-MCNC: 0.93 MG/DL (ref 0.57–1)
ERYTHROCYTE [DISTWIDTH] IN BLOOD BY AUTOMATED COUNT: 13.1 % (ref 11.7–15.4)
EST. AVERAGE GLUCOSE BLD GHB EST-MCNC: 157 MG/DL
GLOBULIN SER CALC-MCNC: 2.6 G/DL (ref 1.5–4.5)
GLUCOSE SERPL-MCNC: 127 MG/DL (ref 65–99)
HBA1C MFR BLD: 7.1 % (ref 4.8–5.6)
HCT VFR BLD AUTO: 42.9 % (ref 34–46.6)
HDLC SERPL-MCNC: 41 MG/DL
HGB BLD-MCNC: 14.6 G/DL (ref 11.1–15.9)
INTERPRETATION, 910389: NORMAL
LDLC SERPL CALC-MCNC: 84 MG/DL (ref 0–99)
Lab: NORMAL
MCH RBC QN AUTO: 29 PG (ref 26.6–33)
MCHC RBC AUTO-ENTMCNC: 34 G/DL (ref 31.5–35.7)
MCV RBC AUTO: 85 FL (ref 79–97)
PLATELET # BLD AUTO: 273 X10E3/UL (ref 150–450)
POTASSIUM SERPL-SCNC: 4.7 MMOL/L (ref 3.5–5.2)
PROT SERPL-MCNC: 7.3 G/DL (ref 6–8.5)
RBC # BLD AUTO: 5.03 X10E6/UL (ref 3.77–5.28)
SODIUM SERPL-SCNC: 141 MMOL/L (ref 134–144)
TRIGL SERPL-MCNC: 199 MG/DL (ref 0–149)
TSH SERPL DL<=0.005 MIU/L-ACNC: 1.13 UIU/ML (ref 0.45–4.5)
VLDLC SERPL CALC-MCNC: 40 MG/DL (ref 5–40)
WBC # BLD AUTO: 9.5 X10E3/UL (ref 3.4–10.8)

## 2020-01-30 RX ORDER — ERGOCALCIFEROL 1.25 MG/1
50000 CAPSULE ORAL 2 TIMES WEEKLY
Qty: 24 CAP | Refills: 0 | Status: SHIPPED | OUTPATIENT
Start: 2020-01-31 | End: 2020-06-24 | Stop reason: SDUPTHER

## 2020-01-30 RX ORDER — TRAZODONE HYDROCHLORIDE 100 MG/1
100 TABLET ORAL
Qty: 90 TAB | Refills: 0 | Status: SHIPPED | OUTPATIENT
Start: 2020-01-30 | End: 2020-02-21

## 2020-01-30 NOTE — PROGRESS NOTES
Please call patient and let her know that her labs returned:  1. Vit D is very low. Is she still taking the Vit D rx? Otherwise stable. I have sent in Trazodone to pharmacy, for her to try at bedtime for sleep.   Should return to office in 6 months for office visit and fasting labs  Thanks

## 2020-01-30 NOTE — PROGRESS NOTES
Pt is taking vitamin D script once weekly as prescribed. Has recently changed from Vitamin D whole milk to 2% milk. She is aware new script for Vitamin D will be sent to her pharmacy. She verbalized understanding of the remainder of her labs.

## 2020-01-31 DIAGNOSIS — Z79.4 TYPE 2 DIABETES MELLITUS WITHOUT COMPLICATION, WITH LONG-TERM CURRENT USE OF INSULIN (HCC): ICD-10-CM

## 2020-01-31 DIAGNOSIS — E11.9 TYPE 2 DIABETES MELLITUS WITHOUT COMPLICATION, WITHOUT LONG-TERM CURRENT USE OF INSULIN (HCC): ICD-10-CM

## 2020-01-31 DIAGNOSIS — E11.9 TYPE 2 DIABETES MELLITUS WITHOUT COMPLICATION, WITH LONG-TERM CURRENT USE OF INSULIN (HCC): ICD-10-CM

## 2020-01-31 RX ORDER — METFORMIN HYDROCHLORIDE 1000 MG/1
TABLET ORAL
Qty: 60 TAB | Refills: 5 | Status: SHIPPED | OUTPATIENT
Start: 2020-01-31 | End: 2020-06-24 | Stop reason: SDUPTHER

## 2020-02-04 ENCOUNTER — TELEPHONE (OUTPATIENT)
Dept: NEUROLOGY | Age: 40
End: 2020-02-04

## 2020-02-04 NOTE — TELEPHONE ENCOUNTER
----- Message from Delfinabimal Orozco sent at 2/4/2020 12:36 PM EST -----  Regarding: Dr Leidy Villalobos first and last name:      Reason for call:pt is going to reach out to her ins company to see what is holding up the PA for her to get the POTS testing and she would like to know the CPT code incase they ask for it      Callback required yes/no and why:yes for reason given above      Best contact number(s): [phone]      Details to clarify the request:pt said she has been waiting for three weeks to try to get this scheduled so she going to also check with her [facility]

## 2020-02-14 ENCOUNTER — OFFICE VISIT (OUTPATIENT)
Dept: NEUROLOGY | Age: 40
End: 2020-02-14

## 2020-02-14 DIAGNOSIS — R00.2 PALPITATIONS: Primary | ICD-10-CM

## 2020-02-14 NOTE — PROCEDURES
[facility]  [address] [place], [address] [place], [address] [facility]  Phone: [phone]  FAX: [phone]     Clinical Autonomic Testing Report     Patient ID:  Mike Villarreal  [id number]  37 y.o.  1980     REFERRED BY: Italia Moore MD  PCP: Daisha Saavedra NP    Date of Testin2020     Indication/History:     Mike Villarreal is a 34 y.o. female who  has a past medical history of Anxiety, Arrhythmia, Asthma, Diabetes ([facility] [address] (), Ear infection (2018), GERD (gastroesophageal reflux disease), Headache, Headache(784.0), Hypertension, Inappropriate sinus tachycardia (2019), Rash (2016), Recurrent umbilical hernia (34/10/0561), S/P dilatation and curettage, and Tachycardia who since her late s, has been noticing baseline heart rate in the 100s with sudden episodes of increased heart rate (Max 160s) associated with chest tightness and feeling \"drained\" lasting for 1-2 mins. Happening daily, no aggravating/relieving factors.      Patient is coming for syncope/autonomic dysfunction evaluation. Medications taken 48 hrs before the test: None     Procedure: Insurance only approved Head-Up Tilt Table Testing. It is performed by utilizing [address] [place] [address] Autonomic System, with established protocol. Result:HUT (head-up tilt) : [id number] BP and HR were measured, up to 15 minutes post tilt. No significant BP reduction or HR acceleration/deceleration. Impression:   NORMAL    No orthostatic hypotension or significant tachycardia noted during head-up tilt, despite patient reported that she \"felt warm\".         Troy Lemon MD  Diplomate, American Board of Psychiatry and Neurology  Diplomate, Neuromuscular Medicine  Diplomate, American Board of Electrodiagnostic Medicine    Note: Raw Data will be scanned separately in Media

## 2020-02-18 ENCOUNTER — OFFICE VISIT (OUTPATIENT)
Dept: FAMILY MEDICINE CLINIC | Age: 40
End: 2020-02-18

## 2020-02-18 VITALS
DIASTOLIC BLOOD PRESSURE: 86 MMHG | HEIGHT: 65 IN | BODY MASS INDEX: 33.82 KG/M2 | WEIGHT: 203 LBS | OXYGEN SATURATION: 99 % | HEART RATE: 107 BPM | SYSTOLIC BLOOD PRESSURE: 129 MMHG | TEMPERATURE: 99 F | RESPIRATION RATE: 16 BRPM

## 2020-02-18 DIAGNOSIS — E11.40 TYPE 2 DIABETES MELLITUS WITH DIABETIC NEUROPATHY, WITHOUT LONG-TERM CURRENT USE OF INSULIN (HCC): ICD-10-CM

## 2020-02-18 DIAGNOSIS — I73.9 PERIPHERAL VASCULAR DISEASE (HCC): ICD-10-CM

## 2020-02-18 DIAGNOSIS — M79.672 PAIN IN BOTH FEET: Primary | ICD-10-CM

## 2020-02-18 DIAGNOSIS — M79.671 PAIN IN BOTH FEET: Primary | ICD-10-CM

## 2020-02-18 RX ORDER — GABAPENTIN 300 MG/1
300 CAPSULE ORAL
Qty: 30 CAP | Refills: 2 | Status: SHIPPED | OUTPATIENT
Start: 2020-02-18 | End: 2020-06-24 | Stop reason: DRUGHIGH

## 2020-02-18 NOTE — PROGRESS NOTES
HISTORY OF PRESENT ILLNESS  Micheline Hurley is a 37 y.o. female. HPI   Pt presents with \"bilateral foot pain\"  Pt states that she has been dealing with foot pain for a couple of months, and it has been getting worse  She has pain in the balls of her feet, only when she is in bed  She notes that the pain feels \"spongey\" and she feels like she constantly has to rub her feet. No pain with standing or pressure on the feet  Aching type pain  Nothing seems to improve the pain  No swelling or redness noted in feet  Pt has not been taking her statin medication for months. Review of Systems   Constitutional: Negative for fever. HENT: Negative for congestion. Gastrointestinal: Negative for diarrhea and vomiting. Musculoskeletal: Positive for joint pain. Physical Exam  Constitutional:       Appearance: Normal appearance. HENT:      Head: Normocephalic and atraumatic. Neck:      Musculoskeletal: Normal range of motion and neck supple. Cardiovascular:      Rate and Rhythm: Normal rate and regular rhythm. Heart sounds: Normal heart sounds. Pulmonary:      Effort: Pulmonary effort is normal.      Breath sounds: Normal breath sounds. Musculoskeletal:      Right foot: Normal range of motion. Left foot: Normal range of motion. Feet:      Right foot:      Skin integrity: Skin integrity normal.      Left foot:      Skin integrity: Skin integrity normal.   Neurological:      Mental Status: She is alert. ASSESSMENT and PLAN    ICD-10-CM ICD-9-CM    1. Pain in both feet M79.671 729.5 gabapentin (NEURONTIN) 300 mg capsule    M79.672     2. Type 2 diabetes mellitus with diabetic neuropathy, without long-term current use of insulin ([facility]) E11.40 250.60      357.2    3.  Peripheral vascular disease ([facility]) I73.9 443.9      Will try gabapentin  Educated that should pain continue, should be seen by podiatry    Pt informed to return to office with worsening of symptoms, or PRN with any questions or concerns. Pt verbalizes understanding of plan of care and denies further questions or concerns at this time.

## 2020-02-18 NOTE — PROGRESS NOTES
Identified pt with two pt identifiers(name and ). Chief Complaint   Patient presents with    Foot Pain     feet and legs both ache at night after she lays down for bed - sx have been occurring over the past few months    Leg Pain        Health Maintenance Due   Topic    DTaP/Tdap/Td series (2 - Td)    PAP AKA CERVICAL CYTOLOGY        Wt Readings from Last 3 Encounters:   20 203 lb (92.1 kg)   20 204 lb (92.5 kg)   20 205 lb (93 kg)     Temp Readings from Last 3 Encounters:   20 99 °F (37.2 °C) (Oral)   20 98.5 °F (36.9 °C) (Oral)   19 98 °F (36.7 °C)     BP Readings from Last 3 Encounters:   20 129/86   20 110/76   01/10/20 122/78     Pulse Readings from Last 3 Encounters:   20 (!) 107   20 86   01/10/20 92         Learning Assessment:  :     Learning Assessment 2017   PRIMARY LEARNER Patient Patient   HIGHEST LEVEL OF EDUCATION - PRIMARY LEARNER  GRADUATED HIGH SCHOOL OR GED GRADUATED HIGH SCHOOL OR GED   BARRIERS PRIMARY LEARNER NONE NONE   PRIMARY LANGUAGE ENGLISH ENGLISH   LEARNER PREFERENCE PRIMARY READING READING   ANSWERED BY patient patient   RELATIONSHIP SELF SELF       Depression Screening:  :     3 most recent PHQ Screens 2019   Little interest or pleasure in doing things Not at all   Feeling down, depressed, irritable, or hopeless Not at all   Total Score PHQ 2 0       Fall Risk Assessment:  :     Fall Risk Assessment, last 12 mths 2017   Able to walk? Yes   Fall in past 12 months? No       Abuse Screening:  :     Abuse Screening Questionnaire 2019   Do you ever feel afraid of your partner? N N N N   Are you in a relationship with someone who physically or mentally threatens you? N N N N   Is it safe for you to go home?  Y Y Y Y             Coordination of Care Questionnaire:  :     1) Have you been to an emergency room, urgent care clinic since your last visit? no   Hospitalized since your last visit? no             2) Have you seen or consulted any other health care providers outside of [address] [place] [address] since your last visit? no  (Include any pap smears or colon screenings in this section.)    3) Do you have an Advance Directive on file? no  Are you interested in receiving information about Advance Directives? no    Reviewed record in preparation for visit and have obtained necessary documentation. Medication reconciliation up to date and corrected with patient at this time.

## 2020-02-18 NOTE — PATIENT INSTRUCTIONS
Foot Pain: Care Instructions  Your Care Instructions  Foot injuries that cause pain and swelling are fairly common. Almost all sports or home repair projects can cause a misstep that ends up as foot pain. Normal wear and tear, especially as you get older, also can cause foot pain. Most minor foot injuries will heal on their own, and home treatment is usually all you need to do. If you have a severe injury, you may need tests and treatment. Follow-up care is a key part of your treatment and safety. Be sure to make and go to all appointments, and call your doctor if you are having problems. It's also a good idea to know your test results and keep a list of the medicines you take. How can you care for yourself at home? · Take pain medicines exactly as directed. ? If the doctor gave you a prescription medicine for pain, take it as prescribed. ? If you are not taking a prescription pain medicine, ask your doctor if you can take an over-the-counter medicine. · Rest and protect your foot. Take a break from any activity that may cause pain. · Put ice or a cold pack on your foot for 10 to 20 minutes at a time. Put a thin cloth between the ice and your skin. · Prop up the sore foot on a pillow when you ice it or anytime you sit or lie down during the next 3 days. Try to keep it above the level of your heart. This will help reduce swelling. · Your doctor may recommend that you wrap your foot with an elastic bandage. Keep your foot wrapped for as long as your doctor advises. · If your doctor recommends crutches, use them as directed. · Wear roomy footwear. · As soon as pain and swelling end, begin gentle exercises of your foot. Your doctor can tell you which exercises will help. When should you call for help? Call 911 anytime you think you may need emergency care.  For example, call if:    · Your foot turns pale, white, blue, or cold.    Call your doctor now or seek immediate medical care if:    · You cannot move or stand on your foot.     · Your foot looks twisted or out of its normal position.     · Your foot is not stable when you step down.     · You have signs of infection, such as:  ? Increased pain, swelling, warmth, or redness. ? Red streaks leading from the sore area. ? Pus draining from a place on your foot. ? A fever.     · Your foot is numb or tingly.    Watch closely for changes in your health, and be sure to contact your doctor if:    · You do not get better as expected.     · You have bruises from an injury that last longer than 2 weeks. Where can you learn more? Go to http://hodan-eugene.info/. Enter U477 in the search box to learn more about \"Foot Pain: Care Instructions. \"  Current as of: June 26, 2019  Content Version: 12.2  © 6889-1425 [employer]. Care instructions adapted under license by [employer] (which disclaims liability or warranty for this information). If you have questions about a medical condition or this instruction, always ask your healthcare professional. [address] any warranty or liability for your use of this information.

## 2020-02-21 ENCOUNTER — OFFICE VISIT (OUTPATIENT)
Dept: CARDIOLOGY CLINIC | Age: 40
End: 2020-02-21

## 2020-02-21 VITALS
BODY MASS INDEX: 34.49 KG/M2 | SYSTOLIC BLOOD PRESSURE: 140 MMHG | DIASTOLIC BLOOD PRESSURE: 84 MMHG | HEIGHT: 65 IN | OXYGEN SATURATION: 98 % | HEART RATE: 106 BPM | WEIGHT: 207 LBS

## 2020-02-21 DIAGNOSIS — E11.9 TYPE 2 DIABETES MELLITUS WITHOUT COMPLICATION, WITHOUT LONG-TERM CURRENT USE OF INSULIN (HCC): ICD-10-CM

## 2020-02-21 DIAGNOSIS — R53.83 FATIGUE, UNSPECIFIED TYPE: ICD-10-CM

## 2020-02-21 DIAGNOSIS — I10 ESSENTIAL HYPERTENSION: ICD-10-CM

## 2020-02-21 DIAGNOSIS — R00.0 INAPPROPRIATE SINUS TACHYCARDIA: Primary | ICD-10-CM

## 2020-02-21 DIAGNOSIS — R00.2 PALPITATIONS: ICD-10-CM

## 2020-02-21 RX ORDER — ACEBUTOLOL HYDROCHLORIDE 400 MG/1
400 CAPSULE ORAL 2 TIMES DAILY
Qty: 60 CAP | Refills: 2 | Status: SHIPPED | OUTPATIENT
Start: 2020-02-21 | End: 2020-05-27

## 2020-02-21 NOTE — PROGRESS NOTES
Chief Complaint   Patient presents with    Irregular Heart Beat    Fatigue     Visit Vitals  /84 (BP 1 Location: Left arm, BP Patient Position: Sitting)   Pulse (!) 106   Ht 5' 5\" (1.651 m)   Wt 207 lb (93.9 kg)   SpO2 98%   BMI 34.45 kg/m²   . Pt in office states no caridac complaints. No hospital stays. No cardiac medication refills.

## 2020-02-21 NOTE — PROGRESS NOTES
Patient never seen or examined by me. Chart opened erroneously. Pt saw my colleague Dr Johnathan Salazar on this date of service.

## 2020-02-21 NOTE — PROGRESS NOTES
HISTORY OF PRESENTING ILLNESS      Renata Colby is a 37 y.o. female recent palpitations and findings consistent with IST. She was poorly tolerant of metoprolol and has continued to have tachycardia with increased diltiazem. She reports severe fatigue with fluctuating HRs. Thus far, monitoring has shown sinus tachycardia. Last visit we stopped diltiazem and started acebutolol 200mg BID. She was referred to Dr. Mohan Matthews for ANS testing but cancelled this appt and has not rescheduled. Her acebutolol was increased to 400 BID for increased control of symptomatic, elevated heart rates last visit. She reports great control of symptoms and heart rate after increasing dose; however, now report that she has increased fatigue and that her HRs have elevated. She incurred spider bite and underwent treatment including prednisone and abx therapy (resulting in high blood sugars) a few weeks following her dose increase. EKG shows sinus rhythm. MRA of pelvis negative for May Thurner syndrome. She underwent tilt table testing which was negative. She decreased acebutolol to evaluate for any improvement in fatigue, but this has remained the same, although she has noticed increased palpitations/tachycardia with lower dose of acebutolol.        ACTIVE PROBLEM LIST     Patient Active Problem List    Diagnosis Date Noted    Type 2 diabetes mellitus with diabetic neuropathy ([facility] [address] 02/18/2020    Peripheral vascular disease ([facility] 02/18/2020    Inappropriate sinus tachycardia 05/20/2019    Palpitations 04/08/2019    Recurrent umbilical hernia 87/11/5552    Acute midline thoracic back pain 09/26/2017    Acute mucoid otitis media of right ear 06/23/2017    Dermatitis 03/09/2017    Acute non-recurrent frontal sinusitis 01/03/2017    Left lower quadrant pain 12/29/2016    Left ear pain 12/02/2016    Strep throat 11/09/2016    Nausea 10/07/2016    Right acute serous otitis media 07/14/2016    Rash 07/14/2016    Gastroesophageal reflux disease without esophagitis 07/14/2016    Environmental allergies 07/14/2016    Right ear pain 06/21/2016    Diabetes ([facility] [address] 07/18/2014    Sore throat 05/06/2014    Migraine headache 01/03/2012    Vitamin D deficiency 11/06/2011    Dyslipidemia (high LDL; low HDL) 01/24/2011    Hypertension 01/06/2011    Asthma 01/06/2011           PAST MEDICAL HISTORY     Past Medical History:   Diagnosis Date    Anxiety     Arrhythmia     HX TACHYCARDIA - NEG. STRESS TEST 2013 PER PATIENT    Asthma     LAST EPISODE 2016    Diabetes ([facility] [address] 2008    NIDDM    Ear infection 11/30/2018    BILATERAL    GERD (gastroesophageal reflux disease)     Headache     Headache(784.0)     Hypertension     HX HTN - NO MEDS CURRENTLY(11-30-18)    Inappropriate sinus tachycardia 5/20/2019    Rash 7/14/2016    Recurrent umbilical hernia 56/22/1067    S/P dilatation and curettage     Tachycardia            PAST SURGICAL HISTORY     Past Surgical History:   Procedure Laterality Date    HX APPENDECTOMY  2/2008    HX CHOLECYSTECTOMY  2001    HX DILATION AND CURETTAGE      HX GYN  3/2008    tubal ligation    HX HERNIA REPAIR  2/2009    HX HERNIA REPAIR  12/06/2018    open recurrent umbilical hernia repair    HX HYSTERECTOMY  03/2012    HX LEFT SALPINGO-OOPHORECTOMY Left     HX LUMBAR DISKECTOMY      2006    HX ORTHOPAEDIC  9/2006    ruptured discs    UPPER GI ENDOSCOPY,BIOPSY  5/11/2017               ALLERGIES     Allergies   Allergen Reactions    Green Pepper Hives     Red, Yellow and Orange Peppers also.   OK with Black Pepper          FAMILY HISTORY     Family History   Problem Relation Age of Onset    Heart Disease Mother     Hypertension Father     Cancer Father         Lung    Hypertension Sister     No Known Problems Brother     No Known Problems Brother     No Known Problems Brother     Anesth Problems Neg Hx     negative for cardiac disease       SOCIAL HISTORY     Social History Socioeconomic History    Marital status:      Spouse name: Not on file    Number of children: Not on file    Years of education: Not on file    Highest education level: Not on file   Tobacco Use    Smoking status: Never Smoker    Smokeless tobacco: Never Used   Substance and Sexual Activity    Alcohol use: No    Drug use: Never    Sexual activity: Yes     Partners: Male     Birth control/protection: Surgical         MEDICATIONS     Current Outpatient Medications   Medication Sig    gabapentin (NEURONTIN) 300 mg capsule Take 1 Cap by mouth nightly. Max Daily Amount: 300 mg.  levocetirizine (XYZAL) 5 mg tablet TAKE 1 TABLET BY MOUTH ONCE DAILY    omeprazole (PRILOSEC) 20 mg capsule TAKE 1 CAPSULE BY MOUTH ONCE DAILY    metFORMIN (GLUCOPHAGE) 1,000 mg tablet TAKE 1 TABLET BY MOUTH TWICE A DAY    traZODone (DESYREL) 100 mg tablet Take 1 Tab by mouth nightly.  ergocalciferol (ERGOCALCIFEROL) 1,250 mcg (50,000 unit) capsule Take 1 Cap by mouth two (2) times a week.  aspirin delayed-release 81 mg tablet TAKE 1 TABLET BY MOUTH EVERY DAY    acebutolol (SECTRAL) 200 mg capsule Take 1 Cap by mouth two (2) times a day.  fenofibrate (LOFIBRA) 160 mg tablet TAKE 1 TAB BY MOUTH DAILY.  SUMAtriptan (IMITREX) 50 mg tablet TAKE 1 TAB AT ONSET OF MIGRAINE HEADACHE, REPEAT IN 1 HOUR IF NEEDED. NO MORE THAN 2 TABS PER 24 HOUR    SITagliptin (JANUVIA) 100 mg tablet TAKE 1 TABLET BY MOUTH EVERY DAY    albuterol (PROVENTIL HFA, VENTOLIN HFA, PROAIR HFA) 90 mcg/actuation inhaler Take 2 Puffs by inhalation every four (4) hours as needed for Wheezing.  triamcinolone acetonide (KENALOG) 0.1 % ointment APPLY TO AFFECTED AREA TWICE A DAY.  USE THIN LAYER (Patient taking differently: as needed.)    canagliflozin (INVOKANA) 300 mg tablet TAKE 1 TABLET BY MOUTH DAILY BEFORE BREAKFAST    simvastatin (ZOCOR) 20 mg tablet TAKE 1 TABLET BY MOUTH EVERY DAY    famotidine (PEPCID) 40 mg tablet TAKE 1 TABLET BY MOUTH EVERY DAY TAKES EVERY BEDTIME    [employer] CONTOUR strip USE AS DIRECTED    topiramate (TOPAMAX) 50 mg tablet Take 1 tab twice a day for migraine prevention (Patient taking differently: as needed. Take 1 tab twice a day for migraine prevention)    butalbital-acetaminophen-caffeine (FIORICET, ESGIC) -40 mg per tablet Take 1 Tab by mouth every six (6) hours as needed for Pain. Max Daily Amount: 4 Tabs.  Lancets (MICROLET LANCET) Misc USE TO CHECK BLOOD SUGAR ONE TO TWO TIMES DAILY (Patient taking differently: as needed. USE TO CHECK BLOOD SUGAR ONE TO TWO TIMES DAILY)     No current facility-administered medications for this visit. I have reviewed the nurses notes, vitals, problem list, allergy list, medical history, family, social history and medications. REVIEW OF SYMPTOMS      General: Pt denies excessive weight gain or loss. Pt is able to conduct ADL's  HEENT: Denies blurred vision, headaches, hearing loss, epistaxis and difficulty swallowing. Respiratory: Denies cough, congestion, shortness of breath, RICE, wheezing or stridor. Cardiovascular: Denies precordial pain, palpitations, edema or PND  Gastrointestinal: Denies poor appetite, indigestion, abdominal pain or blood in stool  Genitourinary: Denies hematuria, dysuria, increased urinary frequency  Musculoskeletal: Denies joint pain or swelling from muscles or joints  Neurologic: Denies tremor, paresthesias, headache, or sensory motor disturbance  Psychiatric: Denies confusion, insomnia, depression  Integumentray: Denies rash, itching or ulcers. Hematologic: Denies easy bruising, bleeding       PHYSICAL EXAMINATION      There were no vitals filed for this visit. General: Well developed, in no acute distress. HEENT: No jaundice, oral mucosa moist, no oral ulcers  Neck: Supple, no stiffness, no lymphadenopathy, supple  Heart:  Normal S1/S2 negative S3 or S4.  Regular, no murmur, gallop or rub, no jugular venous distention  Respiratory: Clear bilaterally x 4, no wheezing or rales  Abdomen:   Soft, non-tender, bowel sounds are active.   Extremities:  No edema, normal cap refill, no cyanosis. Musculoskeletal: No clubbing, no deformities  Neuro: A&Ox3, speech clear, gait stable, cooperative, no focal neurologic deficits  Skin: Skin color is normal. No rashes or lesions. Non diaphoretic, moist.  Vascular: 2+ pulses symmetric in all extremities       DIAGNOSTIC DATA      EKG:        LABORATORY DATA      Lab Results   Component Value Date/Time    WBC 9.5 01/29/2020 08:37 AM    HGB 14.6 01/29/2020 08:37 AM    HCT 42.9 01/29/2020 08:37 AM    PLATELET 813 82/72/0907 08:37 AM    MCV 85 01/29/2020 08:37 AM      Lab Results   Component Value Date/Time    Sodium 141 01/29/2020 08:37 AM    Potassium 4.7 01/29/2020 08:37 AM    Chloride 104 01/29/2020 08:37 AM    CO2 19 (L) 01/29/2020 08:37 AM    Anion gap 11 10/11/2019 11:02 AM    Glucose 127 (H) 01/29/2020 08:37 AM    BUN 9 01/29/2020 08:37 AM    Creatinine 0.93 01/29/2020 08:37 AM    BUN/Creatinine ratio 10 01/29/2020 08:37 AM    GFR est AA 88 01/29/2020 08:37 AM    GFR est non-AA 77 01/29/2020 08:37 AM    Calcium 9.6 01/29/2020 08:37 AM    Bilirubin, total 0.3 01/29/2020 08:37 AM    AST (SGOT) 17 01/29/2020 08:37 AM    Alk. phosphatase 48 01/29/2020 08:37 AM    Protein, total 7.3 01/29/2020 08:37 AM    Albumin 4.7 01/29/2020 08:37 AM    Globulin 3.4 10/11/2019 11:02 AM    A-G Ratio 1.8 01/29/2020 08:37 AM    ALT (SGPT) 24 01/29/2020 08:37 AM           ASSESSMENT      1. Inappropriate sinus tachycardia  2. Diabetes mellitus  3. Orthostatic hypotension  4.  GERD  5. Anxiety        PLAN     Increase acebutolol back to 400mg BID. Will discuss possible introduction of Rythmol with  and notify patient. FOLLOW-UP   2 months    Thank you, Yakelin Gaming NP for allowing me to participate in the care of this extraordinarily pleasant female.  Please do not hesitate to contact me for further questions/concerns.        Ann Posey, NP

## 2020-02-25 ENCOUNTER — OFFICE VISIT (OUTPATIENT)
Dept: FAMILY MEDICINE CLINIC | Age: 40
End: 2020-02-25

## 2020-02-25 VITALS
TEMPERATURE: 98.1 F | BODY MASS INDEX: 34.49 KG/M2 | OXYGEN SATURATION: 99 % | HEART RATE: 96 BPM | HEIGHT: 65 IN | SYSTOLIC BLOOD PRESSURE: 119 MMHG | WEIGHT: 207 LBS | RESPIRATION RATE: 16 BRPM | DIASTOLIC BLOOD PRESSURE: 77 MMHG

## 2020-02-25 DIAGNOSIS — H65.111 ACUTE MUCOID OTITIS MEDIA OF RIGHT EAR: Primary | ICD-10-CM

## 2020-02-25 RX ORDER — AMOXICILLIN AND CLAVULANATE POTASSIUM 875; 125 MG/1; MG/1
1 TABLET, FILM COATED ORAL 2 TIMES DAILY
Qty: 20 TAB | Refills: 0 | Status: SHIPPED | OUTPATIENT
Start: 2020-02-25 | End: 2020-03-06

## 2020-02-25 NOTE — PROGRESS NOTES
Identified pt with two pt identifiers(name and ). Chief Complaint   Patient presents with    Pressure Behind the Eyes    Nasal Discharge    Ear Pain     right side        Health Maintenance Due   Topic    DTaP/Tdap/Td series (2 - Td)    PAP AKA CERVICAL CYTOLOGY        Wt Readings from Last 3 Encounters:   20 207 lb (93.9 kg)   20 207 lb (93.9 kg)   20 203 lb (92.1 kg)     Temp Readings from Last 3 Encounters:   20 98.1 °F (36.7 °C) (Oral)   20 99 °F (37.2 °C) (Oral)   20 98.5 °F (36.9 °C) (Oral)     BP Readings from Last 3 Encounters:   20 119/77   20 140/84   20 129/86     Pulse Readings from Last 3 Encounters:   20 96   20 (!) 106   20 (!) 107         Learning Assessment:  :     Learning Assessment 2017   PRIMARY LEARNER Patient Patient   HIGHEST LEVEL OF EDUCATION - PRIMARY LEARNER  GRADUATED HIGH SCHOOL OR GED GRADUATED HIGH SCHOOL OR GED   BARRIERS PRIMARY LEARNER NONE NONE   PRIMARY LANGUAGE ENGLISH ENGLISH   LEARNER PREFERENCE PRIMARY READING READING   ANSWERED BY patient patient   RELATIONSHIP SELF SELF       Depression Screening:  :     3 most recent PHQ Screens 2019   Little interest or pleasure in doing things Not at all   Feeling down, depressed, irritable, or hopeless Not at all   Total Score PHQ 2 0       Fall Risk Assessment:  :     Fall Risk Assessment, last 12 mths 2017   Able to walk? Yes   Fall in past 12 months? No       Abuse Screening:  :     Abuse Screening Questionnaire 2019   Do you ever feel afraid of your partner? N N N N   Are you in a relationship with someone who physically or mentally threatens you? N N N N   Is it safe for you to go home?  Y Y Y Y           Coordination of Care Questionnaire:  :     1) Have you been to an emergency room, urgent care clinic since your last visit? no   Hospitalized since your last visit? no             2) Have you seen or consulted any other health care providers outside of [address] [place] [address] since your last visit? no  (Include any pap smears or colon screenings in this section.)    3) Do you have an Advance Directive on file? no  Are you interested in receiving information about Advance Directives? no    Reviewed record in preparation for visit and have obtained necessary documentation. Medication reconciliation up to date and corrected with patient at this time.

## 2020-02-25 NOTE — PROGRESS NOTES
HISTORY OF PRESENT ILLNESS  Mikaela Ledesma is a 37 y.o. female. HPI  Pt presents with \"congestion and right ear pain\"    Congestion has been worsening over the past couple of days  Nasal congestion  Sinus pain and pressure  Right ear pain started 2 days ago  No fever  OTC: sudafed  Review of Systems   Constitutional: Negative for fever. HENT: Positive for congestion, ear pain and sinus pain. Gastrointestinal: Negative for diarrhea and vomiting. Physical Exam  Constitutional:       Appearance: Normal appearance. HENT:      Head: Normocephalic and atraumatic. Right Ear: Tympanic membrane is erythematous and retracted. Left Ear: Tympanic membrane normal.      Nose: Congestion present. Neck:      Musculoskeletal: Normal range of motion and neck supple. Cardiovascular:      Rate and Rhythm: Normal rate and regular rhythm. Heart sounds: Normal heart sounds. Pulmonary:      Effort: Pulmonary effort is normal.      Breath sounds: Normal breath sounds. Neurological:      Mental Status: She is alert. Psychiatric:         Mood and Affect: Mood normal.         Behavior: Behavior normal.         ASSESSMENT and PLAN    ICD-10-CM ICD-9-CM    1. Acute mucoid otitis media of right ear H65.111 381.02 amoxicillin-clavulanate (AUGMENTIN) 875-125 mg per tablet     Educated about taking medication as prescribed, with food  Should stay well hydrated and treat fever as needed    Pt informed to return to office with worsening of symptoms, or PRN with any questions or concerns. Pt verbalizes understanding of plan of care and denies further questions or concerns at this time.

## 2020-02-25 NOTE — PATIENT INSTRUCTIONS
Ear Infection (Otitis Media): Care Instructions  Your Care Instructions    An ear infection may start with a cold and affect the middle ear (otitis media). It can hurt a lot. Most ear infections clear up on their own in a couple of days. Most often you will not need antibiotics. This is because many ear infections are caused by a virus. Antibiotics don't work against a virus. Regular doses of pain medicines are the best way to reduce your fever and help you feel better. Follow-up care is a key part of your treatment and safety. Be sure to make and go to all appointments, and call your doctor if you are having problems. It's also a good idea to know your test results and keep a list of the medicines you take. How can you care for yourself at home? · Take pain medicines exactly as directed. ? If the doctor gave you a prescription medicine for pain, take it as prescribed. ? If you are not taking a prescription pain medicine, take an over-the-counter medicine, such as acetaminophen (Tylenol), ibuprofen (Advil, Motrin), or naproxen (Aleve). Read and follow all instructions on the label. ? Do not take two or more pain medicines at the same time unless the doctor told you to. Many pain medicines have acetaminophen, which is Tylenol. Too much acetaminophen (Tylenol) can be harmful. · Plan to take a full dose of pain reliever before bedtime. Getting enough sleep will help you get better. · Try a warm, moist washcloth on the ear. It may help relieve pain. · If your doctor prescribed antibiotics, take them as directed. Do not stop taking them just because you feel better. You need to take the full course of antibiotics. When should you call for help?   Call your doctor now or seek immediate medical care if:    · You have new or increasing ear pain.     · You have new or increasing pus or blood draining from your ear.     · You have a fever with a stiff neck or a severe headache.    Watch closely for changes in your health, and be sure to contact your doctor if:    · You have new or worse symptoms.     · You are not getting better after taking an antibiotic for 2 days. Where can you learn more? Go to http://hodan-eugene.info/. Enter A665 in the search box to learn more about \"Ear Infection (Otitis Media): Care Instructions. \"  Current as of: October 21, 2018  Content Version: 12.2  © 8228-5321 [employer]. Care instructions adapted under license by [employer] (which disclaims liability or warranty for this information). If you have questions about a medical condition or this instruction, always ask your healthcare professional. [address] any warranty or liability for your use of this information.

## 2020-02-27 RX ORDER — SIMVASTATIN 20 MG/1
TABLET, FILM COATED ORAL
Qty: 30 TAB | Refills: 5 | Status: SHIPPED | OUTPATIENT
Start: 2020-02-27 | End: 2020-06-24 | Stop reason: SDUPTHER

## 2020-03-11 ENCOUNTER — OFFICE VISIT (OUTPATIENT)
Dept: FAMILY MEDICINE CLINIC | Age: 40
End: 2020-03-11

## 2020-03-11 VITALS — OXYGEN SATURATION: 99 % | RESPIRATION RATE: 18 BRPM | BODY MASS INDEX: 34.32 KG/M2 | WEIGHT: 206 LBS | HEIGHT: 65 IN

## 2020-03-11 DIAGNOSIS — M54.41 CHRONIC MIDLINE LOW BACK PAIN WITH RIGHT-SIDED SCIATICA: Primary | ICD-10-CM

## 2020-03-11 DIAGNOSIS — G89.29 CHRONIC MIDLINE LOW BACK PAIN WITH RIGHT-SIDED SCIATICA: Primary | ICD-10-CM

## 2020-03-11 RX ORDER — METHYLPREDNISOLONE 4 MG/1
TABLET ORAL
Qty: 1 DOSE PACK | Refills: 0 | Status: SHIPPED | OUTPATIENT
Start: 2020-03-11 | End: 2020-04-29 | Stop reason: ALTCHOICE

## 2020-03-11 RX ORDER — CYCLOBENZAPRINE HCL 10 MG
10 TABLET ORAL
Qty: 30 TAB | Refills: 0 | Status: SHIPPED | OUTPATIENT
Start: 2020-03-11 | End: 2020-03-20

## 2020-03-11 NOTE — PROGRESS NOTES
Identified pt with two pt identifiers(name and ). Chief Complaint   Patient presents with    LOW BACK PAIN     sciatica that she has on/off d/t old back issue - has made appt with orthopaedic but it is not until next week and she needs some medication to get her through until that time        Health Maintenance Due   Topic    DTaP/Tdap/Td series (2 - Td)    PAP AKA CERVICAL CYTOLOGY        Wt Readings from Last 3 Encounters:   20 206 lb (93.4 kg)   20 207 lb (93.9 kg)   20 207 lb (93.9 kg)     Temp Readings from Last 3 Encounters:   20 98.1 °F (36.7 °C) (Oral)   20 99 °F (37.2 °C) (Oral)   20 98.5 °F (36.9 °C) (Oral)     BP Readings from Last 3 Encounters:   20 119/77   20 140/84   20 129/86     Pulse Readings from Last 3 Encounters:   20 96   20 (!) 106   20 (!) 107         Learning Assessment:  :     Learning Assessment 2017   PRIMARY LEARNER Patient Patient   HIGHEST LEVEL OF EDUCATION - PRIMARY LEARNER  GRADUATED HIGH SCHOOL OR GED GRADUATED HIGH SCHOOL OR GED   BARRIERS PRIMARY LEARNER NONE NONE   PRIMARY LANGUAGE ENGLISH ENGLISH   LEARNER PREFERENCE PRIMARY READING READING   ANSWERED BY patient patient   RELATIONSHIP SELF SELF       Depression Screening:  :     3 most recent PHQ Screens 2019   Little interest or pleasure in doing things Not at all   Feeling down, depressed, irritable, or hopeless Not at all   Total Score PHQ 2 0       Fall Risk Assessment:  :     Fall Risk Assessment, last 12 mths 2017   Able to walk? Yes   Fall in past 12 months? No       Abuse Screening:  :     Abuse Screening Questionnaire 2019   Do you ever feel afraid of your partner? N N N N   Are you in a relationship with someone who physically or mentally threatens you? N N N N   Is it safe for you to go home?  Yisel Marcial       Coordination of Care Questionnaire:  :     1) Have you been to an emergency room, urgent care clinic since your last visit? no   Hospitalized since your last visit? no             2) Have you seen or consulted any other health care providers outside of [address] [place] [address] since your last visit? no  (Include any pap smears or colon screenings in this section.)    3) Do you have an Advance Directive on file? no  Are you interested in receiving information about Advance Directives? no    Reviewed record in preparation for visit and have obtained necessary documentation. Medication reconciliation up to date and corrected with patient at this time.

## 2020-03-11 NOTE — PATIENT INSTRUCTIONS
Back Pain: Care Instructions Your Care Instructions Back pain has many possible causes. It is often related to problems with muscles and ligaments of the back. It may also be related to problems with the nerves, discs, or bones of the back. Moving, lifting, standing, sitting, or sleeping in an awkward way can strain the back. Sometimes you don't notice the injury until later. Arthritis is another common cause of back pain. Although it may hurt a lot, back pain usually improves on its own within several weeks. Most people recover in 12 weeks or less. Using good home treatment and being careful not to stress your back can help you feel better sooner. Follow-up care is a key part of your treatment and safety. Be sure to make and go to all appointments, and call your doctor if you are having problems. It's also a good idea to know your test results and keep a list of the medicines you take. How can you care for yourself at home? · Sit or lie in positions that are most comfortable and reduce your pain. Try one of these positions when you lie down: ? Lie on your back with your knees bent and supported by large pillows. ? Lie on the floor with your legs on the seat of a sofa or chair. ? Lie on your side with your knees and hips bent and a pillow between your legs. ? Lie on your stomach if it does not make pain worse. · Do not sit up in bed, and avoid soft couches and twisted positions. Bed rest can help relieve pain at first, but it delays healing. Avoid bed rest after the first day of back pain. · Change positions every 30 minutes. If you must sit for long periods of time, take breaks from sitting. Get up and walk around, or lie in a comfortable position. · Try using a heating pad on a low or medium setting for 15 to 20 minutes every 2 or 3 hours. Try a warm shower in place of one session with the heating pad. · You can also try an ice pack for 10 to 15 minutes every 2 to 3 hours. Put a thin cloth between the ice pack and your skin. · Take pain medicines exactly as directed. ? If the doctor gave you a prescription medicine for pain, take it as prescribed. ? If you are not taking a prescription pain medicine, ask your doctor if you can take an over-the-counter medicine. · Take short walks several times a day. You can start with 5 to 10 minutes, 3 or 4 times a day, and work up to longer walks. Walk on level surfaces and avoid hills and stairs until your back is better. · Return to work and other activities as soon as you can. Continued rest without activity is usually not good for your back. · To prevent future back pain, do exercises to stretch and strengthen your back and stomach. Learn how to use good posture, safe lifting techniques, and proper body mechanics. When should you call for help? Call your doctor now or seek immediate medical care if: 
  · You have new or worsening numbness in your legs.  
  · You have new or worsening weakness in your legs. (This could make it hard to stand up.)  
  · You lose control of your bladder or bowels.  
 Watch closely for changes in your health, and be sure to contact your doctor if: 
  · You have a fever, lose weight, or don't feel well.  
  · You do not get better as expected. Where can you learn more? Go to http://hodan-eugene.info/. Enter M245 in the search box to learn more about \"Back Pain: Care Instructions. \" Current as of: June 26, 2019 Content Version: 12.2 © 6640-9974 [employer]. Care instructions adapted under license by [employer] (which disclaims liability or warranty for this information). If you have questions about a medical condition or this instruction, always ask your healthcare professional. [place] [address] any warranty or liability for your use of this information.

## 2020-03-11 NOTE — PROGRESS NOTES
HISTORY OF PRESENT ILLNESS  Miguelangel Granger is a 37 y.o. female. HPI   Pt presents with \"low back pain\"  Pt states that she had back surgery 13 years ago  She had sciatic nerve damage from this, and has pain off and on. She has an appointment with [address] at [facility] next week, but is unable to tolerate the pain on her own until that appointment. She has pain on the right side of her lower back. Pain radiates down right leg, and causes numbness down leg. She has difficulty sleeping, due to the pain. OTC: Ibuprofen. Review of Systems   Constitutional: Negative for fever. HENT: Negative for congestion. Gastrointestinal: Negative for diarrhea and vomiting. Musculoskeletal: Positive for back pain. Physical Exam  Constitutional:       Appearance: Normal appearance. HENT:      Head: Normocephalic and atraumatic. Neck:      Musculoskeletal: Normal range of motion and neck supple. Cardiovascular:      Rate and Rhythm: Normal rate and regular rhythm. Heart sounds: Normal heart sounds. Pulmonary:      Effort: Pulmonary effort is normal.      Breath sounds: Normal breath sounds. Musculoskeletal:      Lumbar back: She exhibits decreased range of motion and pain. Neurological:      Mental Status: She is alert. Psychiatric:         Mood and Affect: Mood normal.         Behavior: Behavior normal.         ASSESSMENT and PLAN    ICD-10-CM ICD-9-CM    1. Chronic midline low back pain with right-sided sciatica M54.41 724.2 methylPREDNISolone (MEDROL DOSEPACK) 4 mg tablet    G89.29 724.3 cyclobenzaprine (FLEXERIL) 10 mg tablet     338.29      Educated about taking medication as prescribed  Should keep ortho appointment    Pt informed to return to office with worsening of symptoms, or PRN with any questions or concerns. Pt verbalizes understanding of plan of care and denies further questions or concerns at this time.

## 2020-03-20 DIAGNOSIS — M54.41 CHRONIC MIDLINE LOW BACK PAIN WITH RIGHT-SIDED SCIATICA: ICD-10-CM

## 2020-03-20 DIAGNOSIS — G89.29 CHRONIC MIDLINE LOW BACK PAIN WITH RIGHT-SIDED SCIATICA: ICD-10-CM

## 2020-03-20 RX ORDER — CYCLOBENZAPRINE HCL 10 MG
TABLET ORAL
Qty: 30 TAB | Refills: 0 | Status: SHIPPED | OUTPATIENT
Start: 2020-03-20 | End: 2020-04-13

## 2020-03-22 DIAGNOSIS — E11.9 TYPE 2 DIABETES MELLITUS WITHOUT COMPLICATION, WITHOUT LONG-TERM CURRENT USE OF INSULIN (HCC): ICD-10-CM

## 2020-03-24 RX ORDER — FENOFIBRATE 160 MG/1
TABLET ORAL
Qty: 30 TAB | Refills: 1 | Status: SHIPPED | OUTPATIENT
Start: 2020-03-24 | End: 2020-04-20

## 2020-04-13 DIAGNOSIS — M54.41 CHRONIC MIDLINE LOW BACK PAIN WITH RIGHT-SIDED SCIATICA: ICD-10-CM

## 2020-04-13 DIAGNOSIS — G89.29 CHRONIC MIDLINE LOW BACK PAIN WITH RIGHT-SIDED SCIATICA: ICD-10-CM

## 2020-04-13 DIAGNOSIS — G43.709 CHRONIC MIGRAINE WITHOUT AURA WITHOUT STATUS MIGRAINOSUS, NOT INTRACTABLE: ICD-10-CM

## 2020-04-13 RX ORDER — SUMATRIPTAN 50 MG/1
TABLET, FILM COATED ORAL
Qty: 9 TAB | Refills: 0 | Status: SHIPPED | OUTPATIENT
Start: 2020-04-13 | End: 2020-05-05

## 2020-04-13 RX ORDER — CYCLOBENZAPRINE HCL 10 MG
TABLET ORAL
Qty: 30 TAB | Refills: 0 | Status: SHIPPED | OUTPATIENT
Start: 2020-04-13 | End: 2020-05-07

## 2020-04-20 RX ORDER — FENOFIBRATE 160 MG/1
TABLET ORAL
Qty: 30 TAB | Refills: 1 | Status: SHIPPED | OUTPATIENT
Start: 2020-04-20 | End: 2020-05-15

## 2020-04-21 ENCOUNTER — TELEPHONE (OUTPATIENT)
Dept: CARDIOLOGY CLINIC | Age: 40
End: 2020-04-21

## 2020-04-29 ENCOUNTER — VIRTUAL VISIT (OUTPATIENT)
Dept: CARDIOLOGY CLINIC | Age: 40
End: 2020-04-29

## 2020-04-29 VITALS — DIASTOLIC BLOOD PRESSURE: 80 MMHG | HEART RATE: 112 BPM | SYSTOLIC BLOOD PRESSURE: 121 MMHG

## 2020-04-29 DIAGNOSIS — E78.5 DYSLIPIDEMIA: ICD-10-CM

## 2020-04-29 DIAGNOSIS — R00.0 INAPPROPRIATE SINUS TACHYCARDIA: Primary | ICD-10-CM

## 2020-04-29 DIAGNOSIS — R53.83 FATIGUE, UNSPECIFIED TYPE: ICD-10-CM

## 2020-04-29 DIAGNOSIS — E11.9 TYPE 2 DIABETES MELLITUS WITHOUT COMPLICATION, WITHOUT LONG-TERM CURRENT USE OF INSULIN (HCC): ICD-10-CM

## 2020-04-29 DIAGNOSIS — R00.0 TACHYCARDIA: ICD-10-CM

## 2020-04-29 DIAGNOSIS — I10 ESSENTIAL HYPERTENSION: ICD-10-CM

## 2020-04-29 DIAGNOSIS — I73.9 PERIPHERAL VASCULAR DISEASE (HCC): ICD-10-CM

## 2020-04-29 DIAGNOSIS — R00.2 PALPITATIONS: ICD-10-CM

## 2020-04-29 RX ORDER — FLECAINIDE ACETATE 50 MG/1
50 TABLET ORAL 2 TIMES DAILY
Qty: 60 TAB | Refills: 3 | Status: SHIPPED | OUTPATIENT
Start: 2020-04-29 | End: 2020-05-27

## 2020-04-29 NOTE — PROGRESS NOTES
VIRTUAL VISIT DOCUMENTATION     Pursuant to the emergency declaration under the [facility], [facility] waiver authority and the [employer] Act, this Virtual  Visit was conducted, with patient's consent, to reduce the patient's risk of exposure to COVID-19 and provide continuity of care for an established patient. Services were provided through a video synchronous discussion virtually to substitute for in-person clinic visit. HISTORY OF PRESENTING ILLNESS      Georgia Chavira is a 37 y.o. female who was seen by synchronous (real-time) audio-video technology on 4/29/2020 recent palpitations and findings consistent with IST. She was poorly tolerant of metoprolol and has continued to have tachycardia with increased diltiazem. She reports severe fatigue with fluctuating HRs. Thus far, monitoring has shown sinus tachycardia. Last visit we stopped diltiazem and started acebutolol 200mg BID. She was referred to Dr. Anthony Robbins for ANS testing but cancelled this appt and has not rescheduled. Her acebutolol was increased to 400 BID for increased control of symptomatic, elevated heart rates last visit. She reported control of symptoms and heart rate after increasing dose; however, subsequently reported that she had increased fatigue and that her HRs had been elevated. She incurred spider bite and underwent treatment including prednisone and abx therapy (resulting in high blood sugars) a few weeks following her dose increase. MRA of pelvis negative for May Thurner syndrome. She underwent tilt table testing which was negative. She decreased acebutolol to evaluate for any improvement in fatigue, but failed to derive symptomatic improvement, yet noticed increased palpitations/tachycardia with lower dose of acebutolol. As a result, we increased her dose back to 400 mg bid during our last visit. She continues to have tachycardia. ACTIVE PROBLEM LIST     Patient Active Problem List    Diagnosis Date Noted    Type 2 diabetes mellitus with diabetic neuropathy ([facility] 02/18/2020    Peripheral vascular disease ([facility] 02/18/2020    Inappropriate sinus tachycardia 05/20/2019    Palpitations 04/08/2019    Recurrent umbilical hernia 07/44/5100    Acute midline thoracic back pain 09/26/2017    Acute mucoid otitis media of right ear 06/23/2017    Dermatitis 03/09/2017    Acute non-recurrent frontal sinusitis 01/03/2017    Left lower quadrant pain 12/29/2016    Left ear pain 12/02/2016    Strep throat 11/09/2016    Nausea 10/07/2016    Right acute serous otitis media 07/14/2016    Rash 07/14/2016    Gastroesophageal reflux disease without esophagitis 07/14/2016    Environmental allergies 07/14/2016    Right ear pain 06/21/2016    [facility] 07/18/2014    Sore throat 05/06/2014    Migraine headache 01/03/2012    Vitamin D deficiency 11/06/2011    Dyslipidemia (high LDL; low HDL) 01/24/2011    Hypertension 01/06/2011    Asthma 01/06/2011           PAST MEDICAL HISTORY     Past Medical History:   Diagnosis Date    Anxiety     Arrhythmia     HX TACHYCARDIA - NEG.  STRESS TEST 2013 PER PATIENT    Asthma     LAST EPISODE 2016    Diabetes ([facility] 2008    NIDDM    Ear infection 11/30/2018    BILATERAL    GERD (gastroesophageal reflux disease)     Headache     Headache(784.0)     Hypertension     HX HTN - NO MEDS CURRENTLY(11-30-18)    Inappropriate sinus tachycardia 5/20/2019    Rash 7/14/2016    Recurrent umbilical hernia 32/27/7107    S/P dilatation and curettage     Tachycardia            PAST SURGICAL HISTORY     Past Surgical History:   Procedure Laterality Date    HX APPENDECTOMY  2/2008    HX CHOLECYSTECTOMY  2001    HX DILATION AND CURETTAGE      HX GYN  3/2008    tubal ligation    HX HERNIA REPAIR  2/2009    HX HERNIA REPAIR  12/06/2018    open recurrent umbilical hernia repair    HX HYSTERECTOMY  03/2012    HX LEFT SALPINGO-OOPHORECTOMY Left     HX LUMBAR DISKECTOMY      2006    HX ORTHOPAEDIC  9/2006    ruptured discs    UPPER GI ENDOSCOPY,BIOPSY  5/11/2017               ALLERGIES     Allergies   Allergen Reactions    Green Pepper Hives     Red, Yellow and Orange Peppers also. OK with Black Pepper          FAMILY HISTORY     Family History   Problem Relation Age of Onset    Heart Disease Mother     Hypertension Father     Cancer Father         Lung    Hypertension Sister     No Known Problems Brother     No Known Problems Brother     No Known Problems Brother     Anesth Problems Neg Hx     negative for cardiac disease       SOCIAL HISTORY     Social History     Socioeconomic History    Marital status:      Spouse name: Not on file    Number of children: Not on file    Years of education: Not on file    Highest education level: Not on file   Tobacco Use    Smoking status: Never Smoker    Smokeless tobacco: Never Used   Substance and Sexual Activity    Alcohol use: No    Drug use: Never    Sexual activity: Yes     Partners: Male     Birth control/protection: Surgical         MEDICATIONS     Current Outpatient Medications   Medication Sig    fenofibrate (LOFIBRA) 160 mg tablet TAKE 1 TABLET BY MOUTH EVERY DAY    cyclobenzaprine (FLEXERIL) 10 mg tablet TAKE 1 TABLET BY MOUTH THREE TIMES DAILY AS NEEDED FOR MUSCLE SPASM DO  NOT  DRIVE  WHILE  TAKING  MEDICATION    SUMAtriptan (IMITREX) 50 mg tablet TAKE ONE TABLET BY MOUTH AT ONSET OF MIGRAINE HEADACHE. REPEAT IN 1 HOUR IF NEEDED. NO MORE THAN 2 TABLETS PER 24 HOURS    canagliflozin (Invokana) 300 mg tablet TAKE 1 TABLET BY MOUTH ONCE DAILY BEFORE BREAKFAST    simvastatin (ZOCOR) 20 mg tablet TAKE 1 TABLET BY MOUTH EVERY DAY    acebutoloL (SECTRAL) 400 mg capsule Take 1 Cap by mouth two (2) times a day.  gabapentin (NEURONTIN) 300 mg capsule Take 1 Cap by mouth nightly. Max Daily Amount: 300 mg.     levocetirizine (XYZAL) 5 mg tablet TAKE 1 TABLET BY MOUTH ONCE DAILY    omeprazole (PRILOSEC) 20 mg capsule TAKE 1 CAPSULE BY MOUTH ONCE DAILY    metFORMIN (GLUCOPHAGE) 1,000 mg tablet TAKE 1 TABLET BY MOUTH TWICE A DAY    ergocalciferol (ERGOCALCIFEROL) 1,250 mcg (50,000 unit) capsule Take 1 Cap by mouth two (2) times a week.  aspirin delayed-release 81 mg tablet TAKE 1 TABLET BY MOUTH EVERY DAY    SITagliptin (JANUVIA) 100 mg tablet TAKE 1 TABLET BY MOUTH EVERY DAY    albuterol (PROVENTIL HFA, VENTOLIN HFA, PROAIR HFA) 90 mcg/actuation inhaler Take 2 Puffs by inhalation every four (4) hours as needed for Wheezing.  triamcinolone acetonide (KENALOG) 0.1 % ointment APPLY TO AFFECTED AREA TWICE A DAY. USE THIN LAYER (Patient taking differently: as needed.)    famotidine (PEPCID) 40 mg tablet TAKE 1 TABLET BY MOUTH EVERY DAY TAKES EVERY BEDTIME    [employer] CONTOUR strip USE AS DIRECTED    topiramate (TOPAMAX) 50 mg tablet Take 1 tab twice a day for migraine prevention (Patient taking differently: as needed. Take 1 tab twice a day for migraine prevention)    butalbital-acetaminophen-caffeine (FIORICET, ESGIC) -40 mg per tablet Take 1 Tab by mouth every six (6) hours as needed for Pain. Max Daily Amount: 4 Tabs.  Lancets (MICROLET LANCET) Misc USE TO CHECK BLOOD SUGAR ONE TO TWO TIMES DAILY (Patient taking differently: as needed. USE TO CHECK BLOOD SUGAR ONE TO TWO TIMES DAILY)     No current facility-administered medications for this visit. I have reviewed the nurses notes, vitals, problem list, allergy list, medical history, family, social history and medications. REVIEW OF SYMPTOMS      General: Pt denies excessive weight gain or loss. Pt is able to conduct ADL's  HEENT: Denies blurred vision, headaches, hearing loss, epistaxis and difficulty swallowing. Respiratory: Denies cough, congestion, shortness of breath, RICE, wheezing or stridor.   Cardiovascular: Denies precordial pain, palpitations, edema or PND  Gastrointestinal: Denies poor appetite, indigestion, abdominal pain or blood in stool  Genitourinary: Denies hematuria, dysuria, increased urinary frequency  Musculoskeletal: Denies joint pain or swelling from muscles or joints  Neurologic: Denies tremor, paresthesias, headache, or sensory motor disturbance  Psychiatric: Denies confusion, insomnia, depression  Integumentray: Denies rash, itching or ulcers. Hematologic: Denies easy bruising, bleeding       PHYSICAL EXAMINATION      Due to this being a TeleHealth evaluation, many elements of the physical examination are unable to be assessed. General: Well developed, in no acute distress, cooperative and alert  HEENT: Pupils equal/round. No marked JVD visible on video. Respiratory: No audible wheezing, no signs of respiratory distress, lips non cyanotic  Extremities:  No edema  Neuro: A&Ox3, speech clear, no facial droop, answering questions appropriately  Skin: Skin color is normal. No rashes or lesions. Non diaphoretic on visible skin during exam       DIAGNOSTIC DATA      EKG:        LABORATORY DATA      Lab Results   Component Value Date/Time    WBC 9.5 01/29/2020 08:37 AM    HGB 14.6 01/29/2020 08:37 AM    HCT 42.9 01/29/2020 08:37 AM    PLATELET 017 20/69/5849 08:37 AM    MCV 85 01/29/2020 08:37 AM      Lab Results   Component Value Date/Time    Sodium 141 01/29/2020 08:37 AM    Potassium 4.7 01/29/2020 08:37 AM    Chloride 104 01/29/2020 08:37 AM    CO2 19 (L) 01/29/2020 08:37 AM    Anion gap 11 10/11/2019 11:02 AM    Glucose 127 (H) 01/29/2020 08:37 AM    BUN 9 01/29/2020 08:37 AM    Creatinine 0.93 01/29/2020 08:37 AM    BUN/Creatinine ratio 10 01/29/2020 08:37 AM    GFR est AA 88 01/29/2020 08:37 AM    GFR est non-AA 77 01/29/2020 08:37 AM    Calcium 9.6 01/29/2020 08:37 AM    Bilirubin, total 0.3 01/29/2020 08:37 AM    AST (SGOT) 17 01/29/2020 08:37 AM    Alk.  phosphatase 48 01/29/2020 08:37 AM    Protein, total 7.3 01/29/2020 08:37 AM    Albumin 4.7 01/29/2020 08:37 AM    Globulin 3.4 10/11/2019 11:02 AM    A-G Ratio 1.8 01/29/2020 08:37 AM    ALT (SGPT) 24 01/29/2020 08:37 AM           ASSESSMENT      1. Inappropriate sinus tachycardia  2. Diabetes mellitus  3. Orthostatic hypotension  4. GERD  5. Anxiety        ICD-10-CM ICD-9-CM    1. Inappropriate sinus tachycardia R00.0 427.89    2. Palpitations R00.2 785.1    3. Essential hypertension I10 401.9    4. Type 2 diabetes mellitus without complication, without long-term current use of insulin (HCC) E11.9 250.00    5. Fatigue, unspecified type R53.83 780.79    6. Dyslipidemia E78.5 272.4    7. Tachycardia R00.0 785.0    8. Peripheral vascular disease (HCC) I73.9 443.9      No orders of the defined types were placed in this encounter. PLAN     Given patient continues to experience tachycardia will add flecainide 50 mg twice daily to her current regimen. We will escalate this dosing if tolerated and continuing to experience tachycardia. We discussed the expected course, resolution and complications of the diagnosis(es) in detail. Medication risks, benefits, costs, interactions, and alternatives were discussed as indicated. I advised her to contact the office if her condition worsens, changes or fails to improve as anticipated. She expressed understanding with the diagnosis(es) and plan     FOLLOW-UP     1 month    Patient was made aware and verbalized understanding that an appointment will be scheduled for them for a virtual visit and/or office visit within the above time frame. Patient understanding his/her responsibility to call and change time/date if he/she so chooses. Thank you, Sandra Saldaña NP for allowing me to participate in the care of this extraordinarily pleasant female. Please do not hesitate to contact me for further questions/concerns.          Linden Almazan MD  Cardiac Electrophysiology / Cardiology    [address], [address] [facility], [address] [place] [address], [facility] [address]    [address] [facility]  [phone] / [phone] Fax  [phone] / [phone] Fax        Greater than 25 minutes was spent in direct video patient care, planning and chart review. This visit was conducted using [employer] telemedicine services.

## 2020-05-05 ENCOUNTER — VIRTUAL VISIT (OUTPATIENT)
Dept: FAMILY MEDICINE CLINIC | Age: 40
End: 2020-05-05

## 2020-05-05 VITALS — HEIGHT: 65 IN | BODY MASS INDEX: 34.28 KG/M2

## 2020-05-05 DIAGNOSIS — G62.9 NEUROPATHY: Primary | ICD-10-CM

## 2020-05-05 DIAGNOSIS — G43.709 CHRONIC MIGRAINE WITHOUT AURA WITHOUT STATUS MIGRAINOSUS, NOT INTRACTABLE: ICD-10-CM

## 2020-05-05 DIAGNOSIS — E11.9 TYPE 2 DIABETES MELLITUS WITHOUT COMPLICATION, WITHOUT LONG-TERM CURRENT USE OF INSULIN (HCC): ICD-10-CM

## 2020-05-05 RX ORDER — TRAZODONE HYDROCHLORIDE 100 MG/1
100 TABLET ORAL
COMMUNITY
Start: 2020-04-21 | End: 2020-06-24 | Stop reason: SDUPTHER

## 2020-05-05 RX ORDER — SUMATRIPTAN 50 MG/1
TABLET, FILM COATED ORAL
Qty: 9 TAB | Refills: 0 | Status: SHIPPED | OUTPATIENT
Start: 2020-05-05 | End: 2020-05-26

## 2020-05-05 RX ORDER — GABAPENTIN 300 MG/1
600 CAPSULE ORAL
Qty: 60 CAP | Refills: 2 | Status: SHIPPED | OUTPATIENT
Start: 2020-05-05 | End: 2020-06-24 | Stop reason: SDUPTHER

## 2020-05-05 NOTE — PROGRESS NOTES
HISTORY OF PRESENT ILLNESS  Joe Lopez is a 37 y.o. female. HPI  Pt presents with \"foot pain\"  Visit was conducted via [email or web address]  Pt was located at home, provider was located at [facility]  Visit lasted 4 minutes  Consent: Joe Lopez, who was seen by synchronous (real-time) audio-video technology, and/or her healthcare decision maker, is aware that this patient-initiated, Telehealth encounter on 5/5/2020 is a billable service, with coverage as determined by her insurance carrier. She is aware that she may receive a bill and has provided verbal consent to proceed: Yes. Pt states that her feet have started hurting again  Pt states that the pain feels exactly like it did when she saw me before starting the gabapentin  Tingling, sharp feeling on bottoms of feet  This occurs mostly at night time  The pain has been building over a couple of weeks  She has been taking the 300 mg Gabapentin nightly  Review of Systems   Constitutional: Negative for fever. Physical Exam  Constitutional:       Appearance: Normal appearance. Neurological:      Mental Status: She is alert. Psychiatric:         Mood and Affect: Mood normal.         Behavior: Behavior normal.         ASSESSMENT and PLAN    ICD-10-CM ICD-9-CM    1. Neuropathy G62.9 355.9 gabapentin (NEURONTIN) 300 mg capsule     Educated about taking medication as prescribed  Should notify office should symptoms not improve, and/or worsen    Pt informed to return to office with worsening of symptoms, or PRN with any questions or concerns. Pt verbalizes understanding of plan of care and denies further questions or concerns at this time.

## 2020-05-05 NOTE — PROGRESS NOTES
Identified pt with two pt identifiers(name and ). Chief Complaint   Patient presents with    Foot Pain     bilateral - \"both feet are starting to hurt again\"        Health Maintenance Due   Topic    DTaP/Tdap/Td series (2 - Td)    PAP AKA CERVICAL CYTOLOGY        Wt Readings from Last 3 Encounters:   20 206 lb (93.4 kg)   20 207 lb (93.9 kg)   20 207 lb (93.9 kg)     Temp Readings from Last 3 Encounters:   20 98.1 °F (36.7 °C) (Oral)   20 99 °F (37.2 °C) (Oral)   20 98.5 °F (36.9 °C) (Oral)     BP Readings from Last 3 Encounters:   20 121/80   20 119/77   20 140/84     Pulse Readings from Last 3 Encounters:   20 (!) 112   20 96   20 (!) 106         Learning Assessment:  :     Learning Assessment 2017   PRIMARY LEARNER Patient Patient   HIGHEST LEVEL OF EDUCATION - PRIMARY LEARNER  GRADUATED HIGH SCHOOL OR GED GRADUATED HIGH SCHOOL OR GED   BARRIERS PRIMARY LEARNER NONE NONE   PRIMARY LANGUAGE ENGLISH ENGLISH   LEARNER PREFERENCE PRIMARY READING READING   ANSWERED BY patient patient   RELATIONSHIP SELF SELF       Depression Screening:  :     3 most recent PHQ Screens 2020   Little interest or pleasure in doing things Not at all   Feeling down, depressed, irritable, or hopeless Not at all   Total Score PHQ 2 0       Fall Risk Assessment:  :     Fall Risk Assessment, last 12 mths 2017   Able to walk? Yes   Fall in past 12 months? No       Abuse Screening:  :     Abuse Screening Questionnaire 2019   Do you ever feel afraid of your partner? N N N N   Are you in a relationship with someone who physically or mentally threatens you? N N N N   Is it safe for you to go home?  Y Y Y Y       Coordination of Care Questionnaire:  :     1) Have you been to an emergency room, urgent care clinic since your last visit? no   Hospitalized since your last visit? no             2) Have you seen or consulted any other health care providers outside of [address] [place] [address] since your last visit? no  (Include any pap smears or colon screenings in this section.)    3) Do you have an Advance Directive on file? no  Are you interested in receiving information about Advance Directives? no      Reviewed record in preparation for visit and have obtained necessary documentation. Medication reconciliation up to date and corrected with patient at this time.

## 2020-05-06 DIAGNOSIS — G89.29 CHRONIC MIDLINE LOW BACK PAIN WITH RIGHT-SIDED SCIATICA: ICD-10-CM

## 2020-05-06 DIAGNOSIS — M54.41 CHRONIC MIDLINE LOW BACK PAIN WITH RIGHT-SIDED SCIATICA: ICD-10-CM

## 2020-05-07 RX ORDER — CYCLOBENZAPRINE HCL 10 MG
TABLET ORAL
Qty: 30 TAB | Refills: 0 | Status: SHIPPED | OUTPATIENT
Start: 2020-05-07 | End: 2020-05-26

## 2020-05-15 RX ORDER — FENOFIBRATE 160 MG/1
TABLET ORAL
Qty: 30 TAB | Refills: 1 | Status: SHIPPED | OUTPATIENT
Start: 2020-05-15 | End: 2020-06-18

## 2020-05-26 DIAGNOSIS — G43.709 CHRONIC MIGRAINE WITHOUT AURA WITHOUT STATUS MIGRAINOSUS, NOT INTRACTABLE: ICD-10-CM

## 2020-05-26 DIAGNOSIS — M54.41 CHRONIC MIDLINE LOW BACK PAIN WITH RIGHT-SIDED SCIATICA: ICD-10-CM

## 2020-05-26 DIAGNOSIS — G89.29 CHRONIC MIDLINE LOW BACK PAIN WITH RIGHT-SIDED SCIATICA: ICD-10-CM

## 2020-05-26 RX ORDER — CYCLOBENZAPRINE HCL 10 MG
TABLET ORAL
Qty: 30 TAB | Refills: 0 | Status: SHIPPED | OUTPATIENT
Start: 2020-05-26 | End: 2021-05-16

## 2020-05-26 RX ORDER — SUMATRIPTAN 50 MG/1
TABLET, FILM COATED ORAL
Qty: 9 TAB | Refills: 0 | Status: SHIPPED | OUTPATIENT
Start: 2020-05-26 | End: 2020-06-24 | Stop reason: SDUPTHER

## 2020-05-27 ENCOUNTER — VIRTUAL VISIT (OUTPATIENT)
Dept: CARDIOLOGY CLINIC | Age: 40
End: 2020-05-27

## 2020-05-27 DIAGNOSIS — E78.5 DYSLIPIDEMIA: ICD-10-CM

## 2020-05-27 DIAGNOSIS — E11.9 TYPE 2 DIABETES MELLITUS WITHOUT COMPLICATION, WITHOUT LONG-TERM CURRENT USE OF INSULIN (HCC): ICD-10-CM

## 2020-05-27 DIAGNOSIS — R00.0 INAPPROPRIATE SINUS TACHYCARDIA: Primary | ICD-10-CM

## 2020-05-27 DIAGNOSIS — I10 ESSENTIAL HYPERTENSION: ICD-10-CM

## 2020-05-27 DIAGNOSIS — R00.2 PALPITATIONS: ICD-10-CM

## 2020-05-27 RX ORDER — FLECAINIDE ACETATE 100 MG/1
100 TABLET ORAL 2 TIMES DAILY
Qty: 60 TAB | Refills: 3 | Status: SHIPPED | OUTPATIENT
Start: 2020-05-27 | End: 2020-06-26

## 2020-05-27 RX ORDER — ACEBUTOLOL HYDROCHLORIDE 200 MG/1
200 CAPSULE ORAL 2 TIMES DAILY
Qty: 60 CAP | Refills: 3 | Status: SHIPPED | OUTPATIENT
Start: 2020-05-27 | End: 2020-09-22 | Stop reason: SDUPTHER

## 2020-05-27 NOTE — PROGRESS NOTES
VIRTUAL VISIT DOCUMENTATION     Pursuant to the emergency declaration under the [facility], [facility] waiver authority and the [employer] Act, this Virtual  Visit was conducted, with patient's consent, to reduce the patient's risk of exposure to COVID-19 and provide continuity of care for an established patient. Services were provided through a video synchronous discussion virtually to substitute for in-person clinic visit. HISTORY OF PRESENTING ILLNESS      Franki Angulo is a 37 y.o. female who was seen by synchronous (real-time) audio-video technology on 5/27/2020 with recent palpitations and findings consistent with IST. She was poorly tolerant of metoprolol and continued to have tachycardia with increased diltiazem. She reported severe fatigue with fluctuating HRs. Monitoring demonstrated sinus tachycardia. We stopped diltiazem and started acebutolol 200 mg BID. She was referred to Dr. Alba Floyd for ANS testing but cancelled this appt. Her acebutolol was increased to 400 BID for increased control of symptomatic, elevated heart rates. She reported control of symptoms and heart rate after increasing dose; however, subsequently reported that she had increased fatigue and that her HRs had been elevated. She incurred spider bite and underwent treatment including prednisone and abx therapy (resulting in high blood sugars) a few weeks following her dose increase. MRA of pelvis negative for May Thurner syndrome. She underwent tilt table testing which was negative. She decreased acebutolol to evaluate for any improvement in fatigue, but failed to derive symptomatic improvement, yet noticed increased palpitations/tachycardia with lower dose of acebutolol. As a result, we increased her dose back to 400 mg bid. . She continued to have tachycardia.    Last visit we added flecainide 50 mg twice daily to her current regimen and she now presents for follow-up. Had some improvement though not complete and BS elevated now       ACTIVE PROBLEM LIST     Patient Active Problem List    Diagnosis Date Noted    Type 2 diabetes mellitus with diabetic neuropathy ([facility] 02/18/2020     Priority: 3 - Three    Peripheral vascular disease ([facility] 02/18/2020     Priority: 3 - Three    Inappropriate sinus tachycardia 05/20/2019     Priority: 3 - Three    Palpitations 04/08/2019     Priority: 3 - Three    Recurrent umbilical hernia 15/80/3710     Priority: 3 - Three    Acute midline thoracic back pain 09/26/2017     Priority: 3 - Three    Acute mucoid otitis media of right ear 06/23/2017     Priority: 3 - Three    Dermatitis 03/09/2017     Priority: 3 - Three    Acute non-recurrent frontal sinusitis 01/03/2017     Priority: 3 - Three    Left lower quadrant pain 12/29/2016     Priority: 3 - Three    Left ear pain 12/02/2016     Priority: 3 - Three    Strep throat 11/09/2016     Priority: 3 - Three    Nausea 10/07/2016     Priority: 3 - Three    Right acute serous otitis media 07/14/2016     Priority: 3 - Three    Rash 07/14/2016     Priority: 3 - Three    Gastroesophageal reflux disease without esophagitis 07/14/2016     Priority: 3 - Three    Environmental allergies 07/14/2016     Priority: 3 - Three    Right ear pain 06/21/2016     Priority: 3 - Three    [facility] 07/18/2014     Priority: 3 - Three    Sore throat 05/06/2014     Priority: 3 - Three    Migraine headache 01/03/2012     Priority: 3 - Three    Vitamin D deficiency 11/06/2011     Priority: 3 - Three    Dyslipidemia (high LDL; low HDL) 01/24/2011     Priority: 3 - Three    Hypertension 01/06/2011     Priority: 3 - Three    Asthma 01/06/2011     Priority: 3 - Three           PAST MEDICAL HISTORY     Past Medical History:   Diagnosis Date    Anxiety     Arrhythmia     HX TACHYCARDIA - NEG.  STRESS TEST 2013 PER PATIENT    Asthma     LAST EPISODE 2016    [facility] 2008    NIDDM    Ear infection 11/30/2018    BILATERAL    GERD (gastroesophageal reflux disease)     Headache     Headache(784.0)     Hypertension     HX HTN - NO MEDS CURRENTLY(11-30-18)    Inappropriate sinus tachycardia 5/20/2019    Rash 7/14/2016    Recurrent umbilical hernia 67/38/3041    S/P dilatation and curettage     Tachycardia            PAST SURGICAL HISTORY     Past Surgical History:   Procedure Laterality Date    HX APPENDECTOMY  2/2008    HX CHOLECYSTECTOMY  2001    HX DILATION AND CURETTAGE      HX GYN  3/2008    tubal ligation    HX HERNIA REPAIR  2/2009    HX HERNIA REPAIR  12/06/2018    open recurrent umbilical hernia repair    HX HYSTERECTOMY  03/2012    HX LEFT SALPINGO-OOPHORECTOMY Left     HX LUMBAR DISKECTOMY      2006    HX ORTHOPAEDIC  9/2006    ruptured discs    UPPER GI ENDOSCOPY,BIOPSY  5/11/2017               ALLERGIES     Allergies   Allergen Reactions    Green Pepper Hives     Red, Yellow and Orange Peppers also.   OK with Black Pepper          FAMILY HISTORY     Family History   Problem Relation Age of Onset    Heart Disease Mother     Hypertension Father     Cancer Father         Lung    Hypertension Sister     No Known Problems Brother     No Known Problems Brother     No Known Problems Brother     Anesth Problems Neg Hx     negative for cardiac disease       SOCIAL HISTORY     Social History     Socioeconomic History    Marital status:      Spouse name: Not on file    Number of children: Not on file    Years of education: Not on file    Highest education level: Not on file   Tobacco Use    Smoking status: Never Smoker    Smokeless tobacco: Never Used   Substance and Sexual Activity    Alcohol use: No    Drug use: Never    Sexual activity: Yes     Partners: Male     Birth control/protection: Surgical         MEDICATIONS     Current Outpatient Medications   Medication Sig    acebutoloL (SECTRAL) 200 mg capsule Take 1 Cap by mouth two (2) times a day.  flecainide (TAMBOCOR) 100 mg tablet Take 1 Tab by mouth two (2) times a day.  fenofibrate (LOFIBRA) 160 mg tablet TAKE 1 TABLET BY MOUTH EVERY DAY    canagliflozin (Invokana) 300 mg tablet TAKE 1 TABLET BY MOUTH ONCE DAILY BEFORE BREAKFAST    SITagliptin (Januvia) 100 mg tablet Take 1 tablet by mouth once daily    cyclobenzaprine (FLEXERIL) 10 mg tablet TAKE 1 TABLET BY MOUTH THREE TIMES DAILY AS NEEDED FOR MUSCLE SPASM DO  NOT  DRIVE  WHILE  TAKING  THIS  MEDICATION    SUMAtriptan (IMITREX) 50 mg tablet TAKE 1 TABLET BY MOUTH AT THE ONSET OF A MIGRAINE HEADACHE. REPEAT IN 1 HOUR IF NEEDED. TAKE NO MORE THAN 2 TABLETS PER 24 HOURS    traZODone (DESYREL) 100 mg tablet Take 100 mg by mouth once over twenty-four (24) hours.  gabapentin (NEURONTIN) 300 mg capsule Take 2 Caps by mouth nightly. Max Daily Amount: 600 mg.  simvastatin (ZOCOR) 20 mg tablet TAKE 1 TABLET BY MOUTH EVERY DAY    gabapentin (NEURONTIN) 300 mg capsule Take 1 Cap by mouth nightly. Max Daily Amount: 300 mg.  levocetirizine (XYZAL) 5 mg tablet TAKE 1 TABLET BY MOUTH ONCE DAILY    omeprazole (PRILOSEC) 20 mg capsule TAKE 1 CAPSULE BY MOUTH ONCE DAILY    metFORMIN (GLUCOPHAGE) 1,000 mg tablet TAKE 1 TABLET BY MOUTH TWICE A DAY    ergocalciferol (ERGOCALCIFEROL) 1,250 mcg (50,000 unit) capsule Take 1 Cap by mouth two (2) times a week.  aspirin delayed-release 81 mg tablet TAKE 1 TABLET BY MOUTH EVERY DAY    albuterol (PROVENTIL HFA, VENTOLIN HFA, PROAIR HFA) 90 mcg/actuation inhaler Take 2 Puffs by inhalation every four (4) hours as needed for Wheezing.  triamcinolone acetonide (KENALOG) 0.1 % ointment APPLY TO AFFECTED AREA TWICE A DAY.  USE THIN LAYER (Patient taking differently: as needed.)    famotidine (PEPCID) 40 mg tablet TAKE 1 TABLET BY MOUTH EVERY DAY TAKES EVERY BEDTIME    ASCENSIA CONTOUR strip USE AS DIRECTED    topiramate (TOPAMAX) 50 mg tablet Take 1 tab twice a day for migraine prevention (Patient taking differently: as needed. Take 1 tab twice a day for migraine prevention)    butalbital-acetaminophen-caffeine (FIORICET, ESGIC) -40 mg per tablet Take 1 Tab by mouth every six (6) hours as needed for Pain. Max Daily Amount: 4 Tabs.  Lancets (MICROLET LANCET) Misc USE TO CHECK BLOOD SUGAR ONE TO TWO TIMES DAILY (Patient taking differently: as needed. USE TO CHECK BLOOD SUGAR ONE TO TWO TIMES DAILY)     No current facility-administered medications for this visit. I have reviewed the nurses notes, vitals, problem list, allergy list, medical history, family, social history and medications. REVIEW OF SYMPTOMS      General: Pt denies excessive weight gain or loss. Pt is able to conduct ADL's  HEENT: Denies blurred vision, headaches, hearing loss, epistaxis and difficulty swallowing. Respiratory: Denies cough, congestion, shortness of breath, RICE, wheezing or stridor. Cardiovascular: Denies precordial pain, palpitations, edema or PND  Gastrointestinal: Denies poor appetite, indigestion, abdominal pain or blood in stool  Genitourinary: Denies hematuria, dysuria, increased urinary frequency  Musculoskeletal: Denies joint pain or swelling from muscles or joints  Neurologic: Denies tremor, paresthesias, headache, or sensory motor disturbance  Psychiatric: Denies confusion, insomnia, depression  Integumentray: Denies rash, itching or ulcers. Hematologic: Denies easy bruising, bleeding       PHYSICAL EXAMINATION      Due to this being a TeleHealth evaluation, many elements of the physical examination are unable to be assessed. General: Well developed, in no acute distress, cooperative and alert  HEENT: Pupils equal/round. No marked JVD visible on video.   Respiratory: No audible wheezing, no signs of respiratory distress, lips non cyanotic  Extremities:  No edema  Neuro: A&Ox3, speech clear, no facial droop, answering questions appropriately  Skin: Skin color is normal. No rashes or lesions. Non diaphoretic on visible skin during exam       DIAGNOSTIC DATA      EKG:        LABORATORY DATA      Lab Results   Component Value Date/Time    WBC 9.5 01/29/2020 08:37 AM    HGB 14.6 01/29/2020 08:37 AM    HCT 42.9 01/29/2020 08:37 AM    PLATELET 797 08/97/0775 08:37 AM    MCV 85 01/29/2020 08:37 AM      Lab Results   Component Value Date/Time    Sodium 141 01/29/2020 08:37 AM    Potassium 4.7 01/29/2020 08:37 AM    Chloride 104 01/29/2020 08:37 AM    CO2 19 (L) 01/29/2020 08:37 AM    Anion gap 11 10/11/2019 11:02 AM    Glucose 127 (H) 01/29/2020 08:37 AM    BUN 9 01/29/2020 08:37 AM    Creatinine 0.93 01/29/2020 08:37 AM    BUN/Creatinine ratio 10 01/29/2020 08:37 AM    GFR est AA 88 01/29/2020 08:37 AM    GFR est non-AA 77 01/29/2020 08:37 AM    Calcium 9.6 01/29/2020 08:37 AM    Bilirubin, total 0.3 01/29/2020 08:37 AM    Alk. phosphatase 48 01/29/2020 08:37 AM    Protein, total 7.3 01/29/2020 08:37 AM    Albumin 4.7 01/29/2020 08:37 AM    Globulin 3.4 10/11/2019 11:02 AM    A-G Ratio 1.8 01/29/2020 08:37 AM    ALT (SGPT) 24 01/29/2020 08:37 AM           ASSESSMENT      1. Inappropriate sinus tachycardia  2. Diabetes mellitus  3. Orthostatic hypotension  4. GERD  5. Anxiety        ICD-10-CM ICD-9-CM    1. Inappropriate sinus tachycardia R00.0 427.89    2. Palpitations R00.2 785.1    3. Type 2 diabetes mellitus without complication, without long-term current use of insulin (HCC) E11.9 250.00    4. Essential hypertension I10 401.9    5. Dyslipidemia E78.5 272.4      Orders Placed This Encounter    acebutoloL (SECTRAL) 200 mg capsule     Sig: Take 1 Cap by mouth two (2) times a day. Dispense:  60 Cap     Refill:  3    flecainide (TAMBOCOR) 100 mg tablet     Sig: Take 1 Tab by mouth two (2) times a day.      Dispense:  60 Tab     Refill:  3        PLAN     Increase flecainide to 100 mg bid; lower acebutolol from 400 mg bid to 200 mg bid      We discussed the expected course, resolution and complications of the diagnosis(es) in detail. Medication risks, benefits, costs, interactions, and alternatives were discussed as indicated. I advised her to contact the office if her condition worsens, changes or fails to improve as anticipated. She expressed understanding with the diagnosis(es) and plan     FOLLOW-UP     1 month      Patient was made aware and verbalized understanding that an appointment will be scheduled for them for a virtual visit and/or office visit within the above time frame. Patient understanding his/her responsibility to call and change time/date if he/she so chooses. Thank you, Simran Thompson NP for allowing me to participate in the care of this extraordinarily pleasant female. Please do not hesitate to contact me for further questions/concerns. Tiana Noriega MD  Cardiac Electrophysiology / Cardiology    [place] [address].  [address] [place] [address], [address] [place] [address]  [phone] / [phone] Fax  [phone] / [phone] Fax        Greater than 25 minutes was spent in direct video patient care, planning and chart review. This visit was conducted using [employer] telemedicine services.

## 2020-06-03 ENCOUNTER — HOSPITAL ENCOUNTER (OUTPATIENT)
Dept: MAMMOGRAPHY | Age: 40
Discharge: HOME | End: 2020-06-03
Attending: OBSTETRICS & GYNECOLOGY
Payer: MEDICAID

## 2020-06-03 DIAGNOSIS — Z12.31 VISIT FOR SCREENING MAMMOGRAM: ICD-10-CM

## 2020-06-03 PROCEDURE — 77067 SCR MAMMO BI INCL CAD: CPT

## 2020-06-06 DIAGNOSIS — E11.9 TYPE 2 DIABETES MELLITUS WITHOUT COMPLICATION, WITHOUT LONG-TERM CURRENT USE OF INSULIN (HCC): ICD-10-CM

## 2020-06-08 DIAGNOSIS — Z79.4 TYPE 2 DIABETES MELLITUS WITHOUT COMPLICATION, WITH LONG-TERM CURRENT USE OF INSULIN (HCC): ICD-10-CM

## 2020-06-08 DIAGNOSIS — E11.9 TYPE 2 DIABETES MELLITUS WITHOUT COMPLICATION, WITHOUT LONG-TERM CURRENT USE OF INSULIN (HCC): ICD-10-CM

## 2020-06-08 DIAGNOSIS — E11.9 TYPE 2 DIABETES MELLITUS WITHOUT COMPLICATION, WITH LONG-TERM CURRENT USE OF INSULIN (HCC): ICD-10-CM

## 2020-06-09 ENCOUNTER — HOSPITAL ENCOUNTER (OUTPATIENT)
Dept: MAMMOGRAPHY | Age: 40
Discharge: HOME | End: 2020-06-09
Attending: OBSTETRICS & GYNECOLOGY
Payer: MEDICAID

## 2020-06-09 DIAGNOSIS — R92.8 ABNORMAL MAMMOGRAM OF LEFT BREAST: ICD-10-CM

## 2020-06-09 DIAGNOSIS — R92.8 ABNORMALITY OF BREAST ON SCREENING MAMMOGRAPHY: ICD-10-CM

## 2020-06-09 PROCEDURE — 77061 BREAST TOMOSYNTHESIS UNI: CPT

## 2020-06-17 ENCOUNTER — VIRTUAL VISIT (OUTPATIENT)
Dept: FAMILY MEDICINE CLINIC | Age: 40
End: 2020-06-17

## 2020-06-17 DIAGNOSIS — M79.672 PAIN IN BOTH FEET: Primary | ICD-10-CM

## 2020-06-17 DIAGNOSIS — M79.671 PAIN IN BOTH FEET: Primary | ICD-10-CM

## 2020-06-18 RX ORDER — FENOFIBRATE 160 MG/1
TABLET ORAL
Qty: 30 TAB | Refills: 1 | Status: SHIPPED | OUTPATIENT
Start: 2020-06-18 | End: 2020-06-24 | Stop reason: SDUPTHER

## 2020-06-24 ENCOUNTER — OFFICE VISIT (OUTPATIENT)
Dept: FAMILY MEDICINE CLINIC | Age: 40
End: 2020-06-24

## 2020-06-24 VITALS
HEIGHT: 65 IN | WEIGHT: 198 LBS | DIASTOLIC BLOOD PRESSURE: 82 MMHG | RESPIRATION RATE: 16 BRPM | SYSTOLIC BLOOD PRESSURE: 110 MMHG | TEMPERATURE: 98.3 F | BODY MASS INDEX: 32.99 KG/M2 | HEART RATE: 95 BPM | OXYGEN SATURATION: 98 %

## 2020-06-24 DIAGNOSIS — Z91.09 ENVIRONMENTAL ALLERGIES: ICD-10-CM

## 2020-06-24 DIAGNOSIS — G62.9 NEUROPATHY: ICD-10-CM

## 2020-06-24 DIAGNOSIS — E78.5 HYPERLIPIDEMIA, UNSPECIFIED HYPERLIPIDEMIA TYPE: ICD-10-CM

## 2020-06-24 DIAGNOSIS — E11.40 TYPE 2 DIABETES MELLITUS WITH DIABETIC NEUROPATHY, WITHOUT LONG-TERM CURRENT USE OF INSULIN (HCC): ICD-10-CM

## 2020-06-24 DIAGNOSIS — E11.9 TYPE 2 DIABETES MELLITUS WITHOUT COMPLICATION, WITHOUT LONG-TERM CURRENT USE OF INSULIN (HCC): ICD-10-CM

## 2020-06-24 DIAGNOSIS — I10 ESSENTIAL HYPERTENSION: ICD-10-CM

## 2020-06-24 DIAGNOSIS — G43.909 MIGRAINE WITHOUT STATUS MIGRAINOSUS, NOT INTRACTABLE, UNSPECIFIED MIGRAINE TYPE: ICD-10-CM
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
## 2020-06-24 DIAGNOSIS — Z79.4 TYPE 2 DIABETES MELLITUS WITHOUT COMPLICATION, WITH LONG-TERM CURRENT USE OF INSULIN (HCC): ICD-10-CM

## 2020-06-24 DIAGNOSIS — S80.862A INSECT BITE OF LEFT LOWER LEG, INITIAL ENCOUNTER: ICD-10-CM

## 2020-06-24 DIAGNOSIS — R51.9 CHRONIC DAILY HEADACHE: ICD-10-CM

## 2020-06-24 DIAGNOSIS — E11.40 TYPE 2 DIABETES MELLITUS WITH DIABETIC NEUROPATHY, WITHOUT LONG-TERM CURRENT USE OF INSULIN (HCC): Primary | ICD-10-CM

## 2020-06-24 DIAGNOSIS — G43.709 CHRONIC MIGRAINE WITHOUT AURA WITHOUT STATUS MIGRAINOSUS, NOT INTRACTABLE: ICD-10-CM

## 2020-06-24 DIAGNOSIS — E11.9 TYPE 2 DIABETES MELLITUS WITHOUT COMPLICATION, WITH LONG-TERM CURRENT USE OF INSULIN (HCC): ICD-10-CM

## 2020-06-24 RX ORDER — SUMATRIPTAN 50 MG/1
TABLET, FILM COATED ORAL
Qty: 9 TAB | Refills: 5 | Status: SHIPPED | OUTPATIENT
Start: 2020-06-24

## 2020-06-24 RX ORDER — LEVOCETIRIZINE DIHYDROCHLORIDE 5 MG/1
TABLET, FILM COATED ORAL
Qty: 30 TAB | Refills: 5 | Status: SHIPPED | OUTPATIENT
Start: 2020-06-24 | End: 2020-10-07 | Stop reason: ALTCHOICE

## 2020-06-24 RX ORDER — BUTALBITAL, ACETAMINOPHEN AND CAFFEINE 50; 325; 40 MG/1; MG/1; MG/1
1 TABLET ORAL
Qty: 20 TAB | Refills: 1 | Status: SHIPPED | OUTPATIENT
Start: 2020-06-24

## 2020-06-24 RX ORDER — TRAZODONE HYDROCHLORIDE 100 MG/1
100 TABLET ORAL
Qty: 30 TAB | Refills: 5 | Status: SHIPPED | OUTPATIENT
Start: 2020-06-24 | End: 2020-10-15

## 2020-06-24 RX ORDER — METFORMIN HYDROCHLORIDE 1000 MG/1
TABLET ORAL
Qty: 60 TAB | Refills: 5 | Status: ON HOLD | OUTPATIENT
Start: 2020-06-24 | End: 2021-03-16

## 2020-06-24 RX ORDER — BLOOD SUGAR DIAGNOSTIC
STRIP MISCELLANEOUS
Qty: 50 STRIP | Refills: 5 | Status: SHIPPED | OUTPATIENT
Start: 2020-06-24 | End: 2020-10-07 | Stop reason: SDUPTHER

## 2020-06-24 RX ORDER — TRIAMCINOLONE ACETONIDE 1 MG/G
OINTMENT TOPICAL
Qty: 30 G | Refills: 2 | Status: ON HOLD | OUTPATIENT
Start: 2020-06-24 | End: 2020-11-20

## 2020-06-24 RX ORDER — SIMVASTATIN 20 MG/1
TABLET, FILM COATED ORAL
Qty: 30 TAB | Refills: 5 | Status: SHIPPED | OUTPATIENT
Start: 2020-06-24 | End: 2021-01-27 | Stop reason: SDUPTHER

## 2020-06-24 RX ORDER — OMEPRAZOLE 20 MG/1
CAPSULE, DELAYED RELEASE ORAL
Qty: 30 CAP | Refills: 5 | Status: SHIPPED | OUTPATIENT
Start: 2020-06-24 | End: 2021-01-04

## 2020-06-24 RX ORDER — FENOFIBRATE 160 MG/1
TABLET ORAL
Qty: 30 TAB | Refills: 5 | Status: SHIPPED | OUTPATIENT
Start: 2020-06-24 | End: 2020-07-16

## 2020-06-24 RX ORDER — GABAPENTIN 300 MG/1
600 CAPSULE ORAL
Qty: 60 CAP | Refills: 2 | Status: SHIPPED | OUTPATIENT
Start: 2020-06-24 | End: 2020-10-07 | Stop reason: SDUPTHER

## 2020-06-24 RX ORDER — ALBUTEROL SULFATE 90 UG/1
2 AEROSOL, METERED RESPIRATORY (INHALATION)
Qty: 1 INHALER | Refills: 5 | Status: SHIPPED | OUTPATIENT
Start: 2020-06-24 | End: 2021-04-02

## 2020-06-24 RX ORDER — ERGOCALCIFEROL 1.25 MG/1
50000 CAPSULE ORAL 2 TIMES WEEKLY
Qty: 24 CAP | Refills: 0 | Status: SHIPPED | OUTPATIENT
Start: 2020-06-26 | End: 2020-12-22

## 2020-06-24 RX ORDER — INSULIN PUMP SYRINGE, 3 ML
EACH MISCELLANEOUS
Qty: 1 KIT | Refills: 0 | Status: SHIPPED | OUTPATIENT
Start: 2020-06-24 | End: 2020-10-19

## 2020-06-24 RX ORDER — FAMOTIDINE 40 MG/1
TABLET, FILM COATED ORAL
Qty: 30 TAB | Refills: 5 | Status: SHIPPED | OUTPATIENT
Start: 2020-06-24 | End: 2021-01-27 | Stop reason: SDUPTHER

## 2020-06-24 RX ORDER — TOPIRAMATE 50 MG/1
TABLET, FILM COATED ORAL
Qty: 60 TAB | Refills: 5 | Status: SHIPPED | OUTPATIENT
Start: 2020-06-24 | End: 2021-07-22

## 2020-06-24 NOTE — PROGRESS NOTES
Subjective:     Price Navarro is a 37 y.o. female seen for follow up of diabetes. She also has hypertension and hyperlipidemia. Diabetic Review of Systems - medication compliance: compliant all of the time, diabetic diet compliance: compliant most of the time. Other symptoms and concerns: Pt has no concerns or complaints at this time. She is fasting for labs, and needs refills of all of her medications. Patient Active Problem List    Diagnosis Date Noted    Type 2 diabetes mellitus with diabetic neuropathy ([facility] 02/18/2020    Peripheral vascular disease ([facility] 02/18/2020    Inappropriate sinus tachycardia 05/20/2019    Palpitations 04/08/2019    Recurrent umbilical hernia 55/85/3050    Acute midline thoracic back pain 09/26/2017    Acute mucoid otitis media of right ear 06/23/2017    Dermatitis 03/09/2017    Acute non-recurrent frontal sinusitis 01/03/2017    Left lower quadrant pain 12/29/2016    Left ear pain 12/02/2016    Strep throat 11/09/2016    Nausea 10/07/2016    Right acute serous otitis media 07/14/2016    Rash 07/14/2016    Gastroesophageal reflux disease without esophagitis 07/14/2016    Environmental allergies 07/14/2016    Right ear pain 06/21/2016    Diabetes ([facility] 07/18/2014    Sore throat 05/06/2014    Migraine headache 01/03/2012    Vitamin D deficiency 11/06/2011    Dyslipidemia (high LDL; low HDL) 01/24/2011    Hypertension 01/06/2011    Asthma 01/06/2011     Current Outpatient Medications   Medication Sig Dispense Refill    albuterol (PROVENTIL HFA, VENTOLIN HFA, PROAIR HFA) 90 mcg/actuation inhaler Take 2 Puffs by inhalation every four (4) hours as needed for Wheezing. 1 Inhaler 5    glucose blood VI test strips (Ascensia CONTOUR) strip USE AS DIRECTED 50 Strip 5    butalbital-acetaminophen-caffeine (FIORICET, ESGIC) -40 mg per tablet Take 1 Tab by mouth every six (6) hours as needed for Headache.  20 Tab 1    canagliflozin (Invokana) 300 mg tablet TAKE 1 TABLET BY MOUTH ONCE DAILY BEFORE BREAKFAST 30 Tab 5    [START ON 6/26/2020] ergocalciferol (ERGOCALCIFEROL) 1,250 mcg (50,000 unit) capsule Take 1 Cap by mouth two (2) times a week. 24 Cap 0    famotidine (PEPCID) 40 mg tablet TAKE 1 TABLET BY MOUTH EVERY DAY TAKES EVERY BEDTIME 30 Tab 5    fenofibrate (LOFIBRA) 160 mg tablet TAKE 1 TABLET BY MOUTH EVERY DAY 30 Tab 5    gabapentin (NEURONTIN) 300 mg capsule Take 2 Caps by mouth nightly. Max Daily Amount: 600 mg. 60 Cap 2    levocetirizine (XYZAL) 5 mg tablet TAKE 1 TABLET BY MOUTH ONCE DAILY 30 Tab 5    metFORMIN (GLUCOPHAGE) 1,000 mg tablet TAKE 1 TABLET BY MOUTH TWICE A DAY 60 Tab 5    omeprazole (PRILOSEC) 20 mg capsule TAKE 1 CAPSULE BY MOUTH ONCE DAILY 30 Cap 5    simvastatin (ZOCOR) 20 mg tablet TAKE 1 TABLET BY MOUTH EVERY DAY 30 Tab 5    SITagliptin (Januvia) 100 mg tablet Take 1 tablet by mouth once daily 30 Tab 5    SUMAtriptan (IMITREX) 50 mg tablet TAKE 1 TABLET BY MOUTH AT THE ONSET OF A MIGRAINE HEADACHE. REPEAT IN 1 HOUR IF NEEDED. TAKE NO MORE THAN 2 TABLETS PER 24 HOURS 9 Tab 5    topiramate (TOPAMAX) 50 mg tablet Take 1 tab twice a day for migraine prevention 60 Tab 5    traZODone (DESYREL) 100 mg tablet Take 1 Tab by mouth once over twenty-four (24) hours. 30 Tab 5    triamcinolone acetonide (KENALOG) 0.1 % ointment APPLY TO AFFECTED AREA TWICE A DAY. USE THIN LAYER 30 g 2    Blood-Glucose Meter monitoring kit To use to check blood sugar daily. Dx: E11.9 1 Kit 0    acebutoloL (SECTRAL) 200 mg capsule Take 1 Cap by mouth two (2) times a day. 60 Cap 3    flecainide (TAMBOCOR) 100 mg tablet Take 1 Tab by mouth two (2) times a day.  60 Tab 3    cyclobenzaprine (FLEXERIL) 10 mg tablet TAKE 1 TABLET BY MOUTH THREE TIMES DAILY AS NEEDED FOR MUSCLE SPASM DO  NOT  DRIVE  WHILE  TAKING  THIS  MEDICATION 30 Tab 0    aspirin delayed-release 81 mg tablet TAKE 1 TABLET BY MOUTH EVERY DAY 30 Tab 5    Lancets (MICROLET LANCET) Misc USE TO CHECK BLOOD SUGAR ONE TO TWO TIMES DAILY (Patient taking differently: as needed. USE TO CHECK BLOOD SUGAR ONE TO TWO TIMES DAILY) 100 Each 0     Allergies   Allergen Reactions    Green Pepper Hives     Red, Yellow and Orange Peppers also. OK with Black Pepper     Past Medical History:   Diagnosis Date    Anxiety     Arrhythmia     HX TACHYCARDIA - NEG.  STRESS TEST 2013 PER PATIENT    Asthma     LAST EPISODE 2016    Diabetes ([address] 2008    NIDDM    Ear infection 11/30/2018    BILATERAL    GERD (gastroesophageal reflux disease)     Headache     Headache(784.0)     Hypertension     HX HTN - NO MEDS CURRENTLY(11-30-18)    Inappropriate sinus tachycardia 5/20/2019    Rash 7/14/2016    Recurrent umbilical hernia 84/53/4561    S/P dilatation and curettage     Tachycardia      Past Surgical History:   Procedure Laterality Date    HX APPENDECTOMY  2/2008    HX CHOLECYSTECTOMY  2001    HX DILATION AND CURETTAGE      HX GYN  3/2008    tubal ligation    HX HERNIA REPAIR  2/2009    HX HERNIA REPAIR  12/06/2018    open recurrent umbilical hernia repair    HX HYSTERECTOMY  03/2012    HX LEFT SALPINGO-OOPHORECTOMY Left     HX LUMBAR DISKECTOMY      2006    HX ORTHOPAEDIC  9/2006    ruptured discs    UPPER GI ENDOSCOPY,BIOPSY  5/11/2017          Family History   Problem Relation Age of Onset    Heart Disease Mother     Hypertension Father     Cancer Father         Lung    Hypertension Sister     No Known Problems Brother     No Known Problems Brother     No Known Problems Brother     Anesth Problems Neg Hx      Social History     Tobacco Use    Smoking status: Never Smoker    Smokeless tobacco: Never Used   Substance Use Topics    Alcohol use: No        Lab Results   Component Value Date/Time    WBC 9.5 01/29/2020 08:37 AM    HGB 14.6 01/29/2020 08:37 AM    HCT 42.9 01/29/2020 08:37 AM    PLATELET 302 03/60/1522 08:37 AM    MCV 85 01/29/2020 08:37 AM     Lab Results   Component Value Date/Time Hemoglobin A1c 7.1 (H) 01/29/2020 08:37 AM    Hemoglobin A1c 7.0 (H) 08/01/2019 11:01 AM    Hemoglobin A1c 7.1 (H) 04/02/2019 08:34 AM    Glucose 127 (H) 01/29/2020 08:37 AM    Glucose (POC) 124 (H) 03/14/2019 12:16 PM    Microalb/Creat ratio (ug/mg creat.) <8.0 03/15/2017 12:05 PM    LDL, calculated 84 01/29/2020 08:37 AM    Creatinine (POC) 0.7 06/04/2013 09:57 AM    Creatinine 0.93 01/29/2020 08:37 AM      Lab Results   Component Value Date/Time    Cholesterol, total 165 01/29/2020 08:37 AM    HDL Cholesterol 41 01/29/2020 08:37 AM    LDL, calculated 84 01/29/2020 08:37 AM    Triglyceride 199 (H) 01/29/2020 08:37 AM     Lab Results   Component Value Date/Time    GFR est non-AA 77 01/29/2020 08:37 AM    GFRNA, POC >60 06/04/2013 09:57 AM    GFR est AA 88 01/29/2020 08:37 AM    GFRAA, POC >60 06/04/2013 09:57 AM    Creatinine 0.93 01/29/2020 08:37 AM    Creatinine (POC) 0.7 06/04/2013 09:57 AM    BUN 9 01/29/2020 08:37 AM    Sodium 141 01/29/2020 08:37 AM    Potassium 4.7 01/29/2020 08:37 AM    Chloride 104 01/29/2020 08:37 AM    CO2 19 (L) 01/29/2020 08:37 AM        Review of Systems  A comprehensive review of systems was negative. Objective:     Visit Vitals  /82 (BP 1 Location: Left arm, BP Patient Position: Sitting)   Pulse 95   Temp 98.3 °F (36.8 °C) (Oral)   Resp 16   Ht 5' 5\" (1.651 m)   Wt 198 lb (89.8 kg)   LMP 01/25/2012   SpO2 98%   BMI 32.95 kg/m²     Appearance: alert, well appearing, and in no distress. Exam: heart sounds normal rate, regular rhythm, normal S1, S2, no murmurs, rubs, clicks or gallops, chest clear  Lab review: orders written for new lab studies as appropriate; see orders. Assessment/Plan:     diabetes stable, hypertension stable, hyperlipidemia stable. Diabetic issues reviewed with her: diabetic diet discussed in detail, written exchange diet given. ICD-10-CM ICD-9-CM    1.  Type 2 diabetes mellitus with diabetic neuropathy, without long-term current use of insulin ([facility] E11.40 250.60 CBC W/O DIFF     334.3 METABOLIC PANEL, COMPREHENSIVE      HEMOGLOBIN A1C WITH EAG   2. Essential hypertension I10 401.9    3. Hyperlipidemia, unspecified hyperlipidemia type E78.5 272.4 LIPID PANEL   4. Migraine without status migrainosus, not intractable, unspecified migraine type G43.909 346.90 butalbital-acetaminophen-caffeine (FIORICET, ESGIC) -40 mg per tablet   5. Type 2 diabetes mellitus without complication, without long-term current use of insulin ([facility]) E11.9 250.00 canagliflozin (Invokana) 300 mg tablet      metFORMIN (GLUCOPHAGE) 1,000 mg tablet      SITagliptin (Januvia) 100 mg tablet   6. Neuropathy G62.9 355.9 gabapentin (NEURONTIN) 300 mg capsule   7. Environmental allergies Z91.09 V15.09 levocetirizine (XYZAL) 5 mg tablet   8. Type 2 diabetes mellitus without complication, with long-term current use of insulin ([facility]) E11.9 250.00 metFORMIN (GLUCOPHAGE) 1,000 mg tablet    Z79.4 V58.67 SITagliptin (Januvia) 100 mg tablet   9. Chronic migraine without aura without status migrainosus, not intractable G43.709 346.70 SUMAtriptan (IMITREX) 50 mg tablet      topiramate (TOPAMAX) 50 mg tablet   10. Chronic daily headache R51 784.0 topiramate (TOPAMAX) 50 mg tablet   11. Insect bite of left lower leg, initial encounter S80.862A 916.4 triamcinolone acetonide (KENALOG) 0.1 % ointment    W57. Didi Horton A645.8      Will notify when labs return and inform her of any change in plan of care at that time    Pt informed to return to office with worsening of symptoms, or PRN with any questions or concerns. Pt verbalizes understanding of plan of care and denies further questions or concerns at this time.

## 2020-06-24 NOTE — PROGRESS NOTES
Identified pt with two pt identifiers(name and ). Chief Complaint   Patient presents with    Diabetes    Labs     NPO since midnight    Medication Refill     needs all meds refilled        Health Maintenance Due   Topic    DTaP/Tdap/Td series (2 - Td)    PAP AKA CERVICAL CYTOLOGY        Wt Readings from Last 3 Encounters:   20 198 lb (89.8 kg)   20 206 lb (93.4 kg)   20 207 lb (93.9 kg)     Temp Readings from Last 3 Encounters:   20 98.3 °F (36.8 °C) (Oral)   20 98.1 °F (36.7 °C) (Oral)   20 99 °F (37.2 °C) (Oral)     BP Readings from Last 3 Encounters:   20 110/82   20 121/80   20 119/77     Pulse Readings from Last 3 Encounters:   20 95   20 (!) 112   20 96         Learning Assessment:  :     Learning Assessment 2017   PRIMARY LEARNER Patient Patient   HIGHEST LEVEL OF EDUCATION - PRIMARY LEARNER  GRADUATED HIGH SCHOOL OR GED GRADUATED HIGH SCHOOL OR GED   BARRIERS PRIMARY LEARNER NONE NONE   PRIMARY LANGUAGE ENGLISH ENGLISH   LEARNER PREFERENCE PRIMARY READING READING   ANSWERED BY patient patient   RELATIONSHIP SELF SELF       Depression Screening:  :     3 most recent PHQ Screens 2020   Little interest or pleasure in doing things Not at all   Feeling down, depressed, irritable, or hopeless Not at all   Total Score PHQ 2 0       Fall Risk Assessment:  :     Fall Risk Assessment, last 12 mths 2017   Able to walk? Yes   Fall in past 12 months? No       Abuse Screening:  :     Abuse Screening Questionnaire 2020   Do you ever feel afraid of your partner? N N N N N   Are you in a relationship with someone who physically or mentally threatens you? N N N N N   Is it safe for you to go home?  Y Y Y Y Y         Coordination of Care Questionnaire:  :     1) Have you been to an emergency room, urgent care clinic since your last visit? no   Hospitalized since your last visit? no 2) Have you seen or consulted any other health care providers outside of [address] [place] [address] since your last visit? no  (Include any pap smears or colon screenings in this section.)    3) Do you have an Advance Directive on file? no  Are you interested in receiving information about Advance Directives? no    Patient is accompanied by daughter. I have received verbal consent from Georgia Chavira to discuss any/all medical information while they are present in the room. Reviewed record in preparation for visit and have obtained necessary documentation. Medication reconciliation up to date and corrected with patient at this time.

## 2020-06-24 NOTE — PATIENT INSTRUCTIONS

## 2020-06-25 LAB
ALBUMIN SERPL-MCNC: 4.6 G/DL (ref 3.8–4.8)
ALBUMIN/GLOB SERPL: 2 {RATIO} (ref 1.2–2.2)
ALP SERPL-CCNC: 54 IU/L (ref 39–117)
ALT SERPL-CCNC: 29 IU/L (ref 0–32)
AST SERPL-CCNC: 21 IU/L (ref 0–40)
BILIRUB SERPL-MCNC: 0.2 MG/DL (ref 0–1.2)
BUN SERPL-MCNC: 8 MG/DL (ref 6–24)
BUN/CREAT SERPL: 9 (ref 9–23)
CALCIUM SERPL-MCNC: 9.4 MG/DL (ref 8.7–10.2)
CHLORIDE SERPL-SCNC: 105 MMOL/L (ref 96–106)
CHOLEST SERPL-MCNC: 155 MG/DL (ref 100–199)
CO2 SERPL-SCNC: 21 MMOL/L (ref 20–29)
CREAT SERPL-MCNC: 0.87 MG/DL (ref 0.57–1)
ERYTHROCYTE [DISTWIDTH] IN BLOOD BY AUTOMATED COUNT: 13 % (ref 11.7–15.4)
EST. AVERAGE GLUCOSE BLD GHB EST-MCNC: 171 MG/DL
GLOBULIN SER CALC-MCNC: 2.3 G/DL (ref 1.5–4.5)
GLUCOSE SERPL-MCNC: 147 MG/DL (ref 65–99)
HBA1C MFR BLD: 7.6 % (ref 4.8–5.6)
HCT VFR BLD AUTO: 42.5 % (ref 34–46.6)
HDLC SERPL-MCNC: 28 MG/DL
HGB BLD-MCNC: 14.4 G/DL (ref 11.1–15.9)
INTERPRETATION, 910389: NORMAL
LDLC SERPL CALC-MCNC: 64 MG/DL (ref 0–99)
Lab: NORMAL
MCH RBC QN AUTO: 29 PG (ref 26.6–33)
MCHC RBC AUTO-ENTMCNC: 33.9 G/DL (ref 31.5–35.7)
MCV RBC AUTO: 86 FL (ref 79–97)
PLATELET # BLD AUTO: 260 X10E3/UL (ref 150–450)
POTASSIUM SERPL-SCNC: 4.2 MMOL/L (ref 3.5–5.2)
PROT SERPL-MCNC: 6.9 G/DL (ref 6–8.5)
RBC # BLD AUTO: 4.97 X10E6/UL (ref 3.77–5.28)
SODIUM SERPL-SCNC: 143 MMOL/L (ref 134–144)
TRIGL SERPL-MCNC: 315 MG/DL (ref 0–149)
VLDLC SERPL CALC-MCNC: 63 MG/DL (ref 5–40)
WBC # BLD AUTO: 8.5 X10E3/UL (ref 3.4–10.8)

## 2020-06-25 NOTE — PROGRESS NOTES
HISTORY OF PRESENTING ILLNESS      Rob Kimball is a 37 y.o. female with tachycardia for whom we increased the dosing of flecainide from 50mg to 100 mg twice daily during her last visit. She reported today failure to drive improvement with this dose adjustment. PAST MEDICAL HISTORY     Past Medical History:   Diagnosis Date    Anxiety     Arrhythmia     HX TACHYCARDIA - NEG. STRESS TEST 2013 PER PATIENT    Asthma     LAST EPISODE 2016    Diabetes ([address] 2008    NIDDM    Ear infection 11/30/2018    BILATERAL    GERD (gastroesophageal reflux disease)     Headache     Headache(784.0)     Hypertension     HX HTN - NO MEDS CURRENTLY(11-30-18)    Inappropriate sinus tachycardia 5/20/2019    Rash 7/14/2016    Recurrent umbilical hernia 38/62/8764    S/P dilatation and curettage     Tachycardia            PAST SURGICAL HISTORY     Past Surgical History:   Procedure Laterality Date    HX APPENDECTOMY  2/2008    HX CHOLECYSTECTOMY  2001    HX DILATION AND CURETTAGE      HX GYN  3/2008    tubal ligation    HX HERNIA REPAIR  2/2009    HX HERNIA REPAIR  12/06/2018    open recurrent umbilical hernia repair    HX HYSTERECTOMY  03/2012    HX LEFT SALPINGO-OOPHORECTOMY Left     HX LUMBAR DISKECTOMY      2006    HX ORTHOPAEDIC  9/2006    ruptured discs    UPPER GI ENDOSCOPY,BIOPSY  5/11/2017               ALLERGIES     Allergies   Allergen Reactions    Green Pepper Hives     Red, Yellow and Orange Peppers also.   OK with Black Pepper          FAMILY HISTORY     Family History   Problem Relation Age of Onset    Heart Disease Mother     Hypertension Father     Cancer Father         Lung    Hypertension Sister     No Known Problems Brother     No Known Problems Brother     No Known Problems Brother     Anesth Problems Neg Hx     negative for cardiac disease       SOCIAL HISTORY     Social History     Socioeconomic History    Marital status:      Spouse name: Not on file    Number of children: Not on file    Years of education: Not on file    Highest education level: Not on file   Tobacco Use    Smoking status: Never Smoker    Smokeless tobacco: Never Used   Substance and Sexual Activity    Alcohol use: No    Drug use: Never    Sexual activity: Yes     Partners: Male     Birth control/protection: Surgical         MEDICATIONS     Current Outpatient Medications   Medication Sig    glucose blood VI test strips (Contour Next Test Strips) strip To use to check blood sugar BID, as needed. Dx: E11.9    flecainide (TAMBOCOR) 150 mg tablet Take 1 Tab by mouth two (2) times a day.  albuterol (PROVENTIL HFA, VENTOLIN HFA, PROAIR HFA) 90 mcg/actuation inhaler Take 2 Puffs by inhalation every four (4) hours as needed for Wheezing.  glucose blood VI test strips (Ascensia CONTOUR) strip USE AS DIRECTED    butalbital-acetaminophen-caffeine (FIORICET, ESGIC) -40 mg per tablet Take 1 Tab by mouth every six (6) hours as needed for Headache.  canagliflozin (Invokana) 300 mg tablet TAKE 1 TABLET BY MOUTH ONCE DAILY BEFORE BREAKFAST    ergocalciferol (ERGOCALCIFEROL) 1,250 mcg (50,000 unit) capsule Take 1 Cap by mouth two (2) times a week.  famotidine (PEPCID) 40 mg tablet TAKE 1 TABLET BY MOUTH EVERY DAY TAKES EVERY BEDTIME    fenofibrate (LOFIBRA) 160 mg tablet TAKE 1 TABLET BY MOUTH EVERY DAY    gabapentin (NEURONTIN) 300 mg capsule Take 2 Caps by mouth nightly. Max Daily Amount: 600 mg.  levocetirizine (XYZAL) 5 mg tablet TAKE 1 TABLET BY MOUTH ONCE DAILY    metFORMIN (GLUCOPHAGE) 1,000 mg tablet TAKE 1 TABLET BY MOUTH TWICE A DAY    omeprazole (PRILOSEC) 20 mg capsule TAKE 1 CAPSULE BY MOUTH ONCE DAILY    simvastatin (ZOCOR) 20 mg tablet TAKE 1 TABLET BY MOUTH EVERY DAY    SITagliptin (Januvia) 100 mg tablet Take 1 tablet by mouth once daily    SUMAtriptan (IMITREX) 50 mg tablet TAKE 1 TABLET BY MOUTH AT THE ONSET OF A MIGRAINE HEADACHE.  REPEAT IN 1 HOUR IF NEEDED. TAKE NO MORE THAN 2 TABLETS PER 24 HOURS    topiramate (TOPAMAX) 50 mg tablet Take 1 tab twice a day for migraine prevention    traZODone (DESYREL) 100 mg tablet Take 1 Tab by mouth once over twenty-four (24) hours.  triamcinolone acetonide (KENALOG) 0.1 % ointment APPLY TO AFFECTED AREA TWICE A DAY. USE THIN LAYER    Blood-Glucose Meter monitoring kit To use to check blood sugar daily. Dx: E11.9    acebutoloL (SECTRAL) 200 mg capsule Take 1 Cap by mouth two (2) times a day.  cyclobenzaprine (FLEXERIL) 10 mg tablet TAKE 1 TABLET BY MOUTH THREE TIMES DAILY AS NEEDED FOR MUSCLE SPASM DO  NOT  DRIVE  WHILE  TAKING  THIS  MEDICATION    aspirin delayed-release 81 mg tablet TAKE 1 TABLET BY MOUTH EVERY DAY    Lancets (MICROLET LANCET) Misc USE TO CHECK BLOOD SUGAR ONE TO TWO TIMES DAILY (Patient taking differently: as needed. USE TO CHECK BLOOD SUGAR ONE TO TWO TIMES DAILY)     No current facility-administered medications for this visit. I have reviewed the nurses notes, vitals, problem list, allergy list, medical history, family, social history and medications. REVIEW OF SYMPTOMS      General: Pt denies excessive weight gain or loss. Pt is able to conduct ADL's  HEENT: Denies blurred vision, headaches, hearing loss, epistaxis and difficulty swallowing. Respiratory: Denies cough, congestion, shortness of breath, RICE, wheezing or stridor. Cardiovascular: Denies precordial pain, palpitations, edema or PND  Gastrointestinal: Denies poor appetite, indigestion, abdominal pain or blood in stool  Genitourinary: Denies hematuria, dysuria, increased urinary frequency  Musculoskeletal: Denies joint pain or swelling from muscles or joints  Neurologic: Denies tremor, paresthesias, headache, or sensory motor disturbance  Psychiatric: Denies confusion, insomnia, depression  Integumentray: Denies rash, itching or ulcers.   Hematologic: Denies easy bruising, bleeding       PHYSICAL EXAMINATION Vitals: see vitals section  General: Well developed, in no acute distress. HEENT: No jaundice, oral mucosa moist, no oral ulcers  Neck: Supple, no stiffness, no lymphadenopathy, supple  Heart:  Normal S1/S2 negative S3 or S4. Regular, no murmur, gallop or rub, no jugular venous distention  Respiratory: Clear bilaterally x 4, no wheezing or rales  Abdomen:   Soft, non-tender, bowel sounds are active. Extremities:  No edema, normal cap refill, no cyanosis. Musculoskeletal: No clubbing, no deformities  Neuro: A&Ox3, speech clear, gait stable, cooperative, no focal neurologic deficits  Skin: Skin color is normal. No rashes or lesions. Non diaphoretic, moist.  Vascular: 2+ pulses symmetric in all extremities       DIAGNOSTIC DATA      EKG: Sinus rhythm       LABORATORY DATA      Lab Results   Component Value Date/Time    WBC 8.5 06/24/2020 08:50 AM    HGB 14.4 06/24/2020 08:50 AM    HCT 42.5 06/24/2020 08:50 AM    PLATELET 210 02/64/1281 08:50 AM    MCV 86 06/24/2020 08:50 AM      Lab Results   Component Value Date/Time    Sodium 143 06/24/2020 08:50 AM    Potassium 4.2 06/24/2020 08:50 AM    Chloride 105 06/24/2020 08:50 AM    CO2 21 06/24/2020 08:50 AM    Anion gap 11 10/11/2019 11:02 AM    Glucose 147 (H) 06/24/2020 08:50 AM    BUN 8 06/24/2020 08:50 AM    Creatinine 0.87 06/24/2020 08:50 AM    BUN/Creatinine ratio 9 06/24/2020 08:50 AM    GFR est AA 96 06/24/2020 08:50 AM    GFR est non-AA 83 06/24/2020 08:50 AM    Calcium 9.4 06/24/2020 08:50 AM    Bilirubin, total 0.2 06/24/2020 08:50 AM    Alk. phosphatase 54 06/24/2020 08:50 AM    Protein, total 6.9 06/24/2020 08:50 AM    Albumin 4.6 06/24/2020 08:50 AM    Globulin 3.4 10/11/2019 11:02 AM    A-G Ratio 2.0 06/24/2020 08:50 AM    ALT (SGPT) 29 06/24/2020 08:50 AM           ASSESSMENT      1. Inappropriate sinus tachycardia  2. Diabetes mellitus  3. Orthostatic hypotension  4. GERD  5. Anxiety         PLAN     We will increase flecainide to 150 mg twice daily. Continue acebutolol and monitor for improvement. If she fails to derive symptomatic improvement will consider an alternate drug in place of flecainide versus consideration for an EPS in the future. ICD-10-CM ICD-9-CM    1. Inappropriate sinus tachycardia R00.0 427.89 AMB POC EKG ROUTINE W/ 12 LEADS, INTER & REP   2. Palpitations R00.2 785.1 AMB POC EKG ROUTINE W/ 12 LEADS, INTER & REP   3. Type 2 diabetes mellitus without complication, without long-term current use of insulin (HCC) E11.9 250.00 AMB POC EKG ROUTINE W/ 12 LEADS, INTER & REP   4. Essential hypertension I10 401.9 AMB POC EKG ROUTINE W/ 12 LEADS, INTER & REP   5. Dyslipidemia E78.5 272.4 AMB POC EKG ROUTINE W/ 12 LEADS, INTER & REP   6. Fatigue, unspecified type R53.83 780.79 AMB POC EKG ROUTINE W/ 12 LEADS, INTER & REP   7. Tachycardia R00.0 785.0 AMB POC EKG ROUTINE W/ 12 LEADS, INTER & REP   8. Peripheral vascular disease (HCC) I73.9 443.9 AMB POC EKG ROUTINE W/ 12 LEADS, INTER & REP     Orders Placed This Encounter    AMB POC EKG ROUTINE W/ 12 LEADS, INTER & REP     Order Specific Question:   Reason for Exam:     Answer:   Palpitations    flecainide (TAMBOCOR) 150 mg tablet     Sig: Take 1 Tab by mouth two (2) times a day. Dispense:  60 Tab     Refill:  3          FOLLOW-UP     1 month      Thank you, Aura Orta NP for allowing me to participate in the care of this extraordinarily pleasant female. Please do not hesitate to contact me for further questions/concerns.          Matthew Allen MD  Cardiac Electrophysiology / Cardiology    [address]  [address] [facility], [facility], [address] [place] [address], [address] [place] [address], [facility]  [phone] / [phone] Fax   [phone] / [phone] Fax

## 2020-06-25 NOTE — PROGRESS NOTES
Please call patient and let her know that her labs returned. Diabetes and triglycerides have worsened. Really needs to focus on diet changes, decreased fatty fried foods, red meat, and sugars. Should return to office in 6 months for office visit and fasting labs.   Thanks

## 2020-06-26 ENCOUNTER — OFFICE VISIT (OUTPATIENT)
Dept: CARDIOLOGY CLINIC | Age: 40
End: 2020-06-26

## 2020-06-26 VITALS
WEIGHT: 203 LBS | HEART RATE: 100 BPM | OXYGEN SATURATION: 98 % | DIASTOLIC BLOOD PRESSURE: 76 MMHG | BODY MASS INDEX: 33.78 KG/M2 | RESPIRATION RATE: 18 BRPM | SYSTOLIC BLOOD PRESSURE: 118 MMHG

## 2020-06-26 DIAGNOSIS — R53.83 FATIGUE, UNSPECIFIED TYPE: ICD-10-CM

## 2020-06-26 DIAGNOSIS — R00.2 PALPITATIONS: ICD-10-CM

## 2020-06-26 DIAGNOSIS — E78.5 DYSLIPIDEMIA: ICD-10-CM

## 2020-06-26 DIAGNOSIS — I73.9 PERIPHERAL VASCULAR DISEASE (HCC): ICD-10-CM

## 2020-06-26 DIAGNOSIS — R00.0 TACHYCARDIA: ICD-10-CM

## 2020-06-26 DIAGNOSIS — I10 ESSENTIAL HYPERTENSION: ICD-10-CM

## 2020-06-26 DIAGNOSIS — R00.0 INAPPROPRIATE SINUS TACHYCARDIA: ICD-10-CM

## 2020-06-26 DIAGNOSIS — E11.9 TYPE 2 DIABETES MELLITUS WITHOUT COMPLICATION, WITHOUT LONG-TERM CURRENT USE OF INSULIN (HCC): ICD-10-CM

## 2020-06-26 RX ORDER — BLOOD SUGAR DIAGNOSTIC
STRIP MISCELLANEOUS
Qty: 100 STRIP | Refills: 0 | Status: SHIPPED | OUTPATIENT
Start: 2020-06-26 | End: 2020-10-07 | Stop reason: SDUPTHER

## 2020-06-26 RX ORDER — FLECAINIDE ACETATE 150 MG/1
150 TABLET ORAL 2 TIMES DAILY
Qty: 60 TAB | Refills: 3 | Status: ON HOLD | OUTPATIENT
Start: 2020-06-26 | End: 2020-11-20

## 2020-06-26 NOTE — PROGRESS NOTES
Visit Vitals  /76 (BP 1 Location: Left arm, BP Patient Position: Sitting)   Pulse 100   Resp 18   Wt 203 lb (92.1 kg)   LMP 01/25/2012   SpO2 98%   BMI 33.78 kg/m²     Pt states no change in frequency regarding palpitations. Pt states over the past few days she has had intermitent mid sternal discomfort occurring 2-3/day. Pt denies SOB, dizziness, edema.

## 2020-07-02 ENCOUNTER — TELEPHONE (OUTPATIENT)
Dept: FAMILY MEDICINE CLINIC | Age: 40
End: 2020-07-02

## 2020-07-02 DIAGNOSIS — E11.49 OTHER DIABETIC NEUROLOGICAL COMPLICATION ASSOCIATED WITH TYPE 2 DIABETES MELLITUS (HCC): Primary | ICD-10-CM

## 2020-07-02 RX ORDER — GABAPENTIN 300 MG/1
300 CAPSULE ORAL 3 TIMES DAILY
Qty: 90 CAP | Refills: 2 | Status: SHIPPED | OUTPATIENT
Start: 2020-07-02 | End: 2020-10-19 | Stop reason: DRUGHIGH

## 2020-07-02 NOTE — TELEPHONE ENCOUNTER
Pt saw foot dr and was supposed to have sent over a fax with information about increasing pt's foot medication for pain     Call pt and advise if you got a fax and what can be done     Call pt at [phone]

## 2020-07-02 NOTE — TELEPHONE ENCOUNTER
We did get notes. I have increased dose from 2 at bedtime, to 1 tab 3 times a day.   Can try this  Thanks

## 2020-07-08 RX ORDER — INSULIN PUMP SYRINGE, 3 ML
EACH MISCELLANEOUS
Qty: 1 KIT | Refills: 0 | Status: SHIPPED | OUTPATIENT
Start: 2020-07-08 | End: 2020-10-07 | Stop reason: SDUPTHER

## 2020-07-14 RX ORDER — CALCIUM CITRATE/VITAMIN D3 200MG-6.25
TABLET ORAL
Qty: 100 STRIP | Refills: 0 | Status: SHIPPED | OUTPATIENT
Start: 2020-07-14 | End: 2020-08-25 | Stop reason: SDUPTHER

## 2020-07-16 RX ORDER — FENOFIBRATE 160 MG/1
TABLET ORAL
Qty: 30 TAB | Refills: 1 | Status: SHIPPED | OUTPATIENT
Start: 2020-07-16 | End: 2020-08-14

## 2020-07-22 ENCOUNTER — TELEPHONE (OUTPATIENT)
Dept: CARDIOLOGY CLINIC | Age: 40
End: 2020-07-22

## 2020-07-22 NOTE — TELEPHONE ENCOUNTER
Patient would like to reschedule today's appointment. Please advise.     Phone #: [phone]  Thanks

## 2020-07-26 ENCOUNTER — HOSPITAL ENCOUNTER (EMERGENCY)
Age: 40
Discharge: HOME | End: 2020-07-26
Attending: EMERGENCY MEDICINE
Payer: MEDICAID

## 2020-07-26 VITALS
SYSTOLIC BLOOD PRESSURE: 108 MMHG | TEMPERATURE: 98 F | OXYGEN SATURATION: 95 % | DIASTOLIC BLOOD PRESSURE: 56 MMHG | HEIGHT: 65 IN | WEIGHT: 202 LBS | BODY MASS INDEX: 33.66 KG/M2 | RESPIRATION RATE: 18 BRPM | HEART RATE: 87 BPM

## 2020-07-26 DIAGNOSIS — T78.40XA ALLERGIC REACTION, INITIAL ENCOUNTER: Primary | ICD-10-CM

## 2020-07-26 PROCEDURE — 99285 EMERGENCY DEPT VISIT HI MDM: CPT

## 2020-07-26 PROCEDURE — 74011636637 HC RX REV CODE- 636/637: Performed by: EMERGENCY MEDICINE

## 2020-07-26 RX ORDER — PREDNISONE 50 MG/1
50 TABLET ORAL DAILY
Qty: 3 TAB | Refills: 0 | Status: SHIPPED | OUTPATIENT
Start: 2020-07-26 | End: 2020-07-29

## 2020-07-26 RX ORDER — PREDNISONE 20 MG/1
60 TABLET ORAL
Status: COMPLETED | OUTPATIENT
Start: 2020-07-26 | End: 2020-07-26

## 2020-07-26 RX ORDER — EPINEPHRINE 0.3 MG/.3ML
0.3 INJECTION SUBCUTANEOUS
Qty: 1 SYRINGE | Refills: 0 | Status: SHIPPED | OUTPATIENT
Start: 2020-07-26 | End: 2020-07-26

## 2020-07-26 RX ADMIN — PREDNISONE 60 MG: 20 TABLET ORAL at 04:41

## 2020-07-26 NOTE — DISCHARGE INSTRUCTIONS
Patient Education        Allergic Reaction: Care Instructions  Your Care Instructions     An allergic reaction is an excessive response from your immune system to a medicine, chemical, food, insect bite, or other substance. A reaction can range from mild to life-threatening. Some people have a mild rash, hives, and itching or stomach cramps. In severe reactions, swelling of your tongue and throat can close up your airway so that you cannot breathe. Follow-up care is a key part of your treatment and safety. Be sure to make and go to all appointments, and call your doctor if you are having problems. It's also a good idea to know your test results and keep a list of the medicines you take. How can you care for yourself at home? · If you know what caused your allergic reaction, be sure to avoid it. Your allergy may become more severe each time you have a reaction. · Take an over-the-counter antihistamine, such as cetirizine (Zyrtec) or loratadine (Claritin), to treat mild symptoms. Read and follow directions on the label. Some antihistamines can make you feel sleepy. Do not give antihistamines to a child unless you have checked with your doctor first. Mild symptoms include sneezing or an itchy or runny nose; an itchy mouth; a few hives or mild itching; and mild nausea or stomach discomfort. · Do not scratch hives or a rash. Put a cold, moist towel on them or take cool baths to relieve itching. Put ice packs on hives, swelling, or insect stings for 10 to 15 minutes at a time. Put a thin cloth between the ice pack and your skin. Do not take hot baths or showers. They will make the itching worse. · Your doctor may prescribe a shot of epinephrine to carry with you in case you have a severe reaction. Learn how to give yourself the shot and keep it with you at all times. Make sure it is not .   · Go to the emergency room every time you have a severe reaction, even if you have used your shot of epinephrine and are feeling better. Symptoms can come back after a shot. · Wear medical alert jewelry that lists your allergies. You can buy this at most drugstores. · If your child has a severe allergy, make sure that his or her teachers, babysitters, coaches, and other caregivers know about the allergy. They should have an epinephrine shot, know how and when to give it, and have a plan to take your child to the hospital.  When should you call for help? Give an epinephrine shot if:  · You think you are having a severe allergic reaction. · You have symptoms in more than one body area, such as mild nausea and an itchy mouth. After giving an epinephrine shot call 911, even if you feel better. [id number] if:  · You have symptoms of a severe allergic reaction. These may include:  ? Sudden raised, red areas (hives) all over your body. ? Swelling of the throat, mouth, lips, or tongue. ? Trouble breathing. ? Passing out (losing consciousness). Or you may feel very lightheaded or suddenly feel weak, confused, or restless. · You have been given an epinephrine shot, even if you feel better. Call your doctor now or seek immediate medical care if:  · You have symptoms of an allergic reaction, such as:  ? A rash or hives (raised, red areas on the skin). ? Itching. ? Swelling. ? Belly pain, nausea, or vomiting. Watch closely for changes in your health, and be sure to contact your doctor if:  · You do not get better as expected. Where can you learn more? Go to http://hodan-eugene.info/  Enter P465 in the search box to learn more about \"Allergic Reaction: Care Instructions. \"  Current as of: October 7, 2019               Content Version: 12.5  © 2907-1144 [employer]. Care instructions adapted under license by [employer] (which disclaims liability or warranty for this information).  If you have questions about a medical condition or this instruction, always ask your healthcare professional. [employer] disclaims any warranty or liability for your use of this information.

## 2020-07-26 NOTE — ED NOTES
The pt is discharged home with follow-up instructions. The pt verbalizes understanding of the discharge instructions and is resting in the room, waiting for transportation.

## 2020-07-26 NOTE — ED PROVIDER NOTES
History of anxiety, tachycardia, asthma, diabetes, GERD, hypertension, allergic reactions. She presents via EMS with a suspected allergic reaction. She states that she awoke approximately an hour prior to arrival with her hands itching. She states that this is typically the first symptoms she gets with allergic reactions. She then developed hives on her arms. Soon after she began to feel like her tongue was swollen and that her throat was tight. She took 2 Benadryl with some relief. She feels better at present although her symptoms have not completely resolved. She felt short of breath earlier. She denies a history of anaphylaxis and has never had to have epinephrine. She states that she has been allergic to peppers in the past but denies any known exposure to them today. Past Medical History:   Diagnosis Date    Anxiety     Arrhythmia     HX TACHYCARDIA - NEG.  STRESS TEST 2013 PER PATIENT    Asthma     LAST EPISODE 2016    Diabetes ([facility] [address] 2008    NIDDM    Ear infection 11/30/2018    BILATERAL    GERD (gastroesophageal reflux disease)     Headache     Headache(784.0)     Hypertension     HX HTN - NO MEDS CURRENTLY(11-30-18)    Inappropriate sinus tachycardia 5/20/2019    Rash 7/14/2016    Recurrent umbilical hernia 54/44/1911    S/P dilatation and curettage     Tachycardia        Past Surgical History:   Procedure Laterality Date    HX APPENDECTOMY  2/2008    HX CHOLECYSTECTOMY  2001    HX DILATION AND CURETTAGE      HX GYN  3/2008    tubal ligation    HX HERNIA REPAIR  2/2009    HX HERNIA REPAIR  12/06/2018    open recurrent umbilical hernia repair    HX HYSTERECTOMY  03/2012    HX LEFT SALPINGO-OOPHORECTOMY Left     HX LUMBAR DISKECTOMY      2006    HX ORTHOPAEDIC  9/2006    ruptured discs    UPPER GI ENDOSCOPY,BIOPSY  5/11/2017              Family History:   Problem Relation Age of Onset    Heart Disease Mother     Hypertension Father     Cancer Father         Lung  Hypertension Sister     No Known Problems Brother     No Known Problems Brother     No Known Problems Brother     Anesth Problems Neg Hx        Social History     Socioeconomic History    Marital status:      Spouse name: Not on file    Number of children: Not on file    Years of education: Not on file    Highest education level: Not on file   Occupational History    Not on file   Social Needs    Financial resource strain: Not on file    Food insecurity     Worry: Not on file     Inability: Not on file   [employer] needs     Medical: Not on file     Non-medical: Not on file   Tobacco Use    Smoking status: Never Smoker    Smokeless tobacco: Never Used   Substance and Sexual Activity    Alcohol use: No    Drug use: Never    Sexual activity: Yes     Partners: Male     Birth control/protection: Surgical   Lifestyle    Physical activity     Days per week: Not on file     Minutes per session: Not on file    Stress: Not on file   Relationships    Social connections     Talks on phone: Not on file     Gets together: Not on file     Attends Zoroastrian service: Not on file     Active member of club or organization: Not on file     Attends meetings of clubs or organizations: Not on file     Relationship status: Not on file    Intimate partner violence     Fear of current or ex partner: Not on file     Emotionally abused: Not on file     Physically abused: Not on file     Forced sexual activity: Not on file   Other Topics Concern    Not on file   Social History Narrative    Not on file         ALLERGIES: Green pepper    Review of Systems   All other systems reviewed and are negative. Vitals:    07/26/20 0436   BP: 143/82   Pulse: 94   Resp: 16   Temp: 98 °F (36.7 °C)   SpO2: 96%   Weight: 91.6 kg (202 lb)   Height: 5' 5\" (1.651 m)            Physical Exam  Vitals signs and nursing note reviewed. Constitutional:       Appearance: She is well-developed.    HENT:      Head: Normocephalic and atraumatic. Eyes:      Conjunctiva/sclera: Conjunctivae normal.   Neck:      Musculoskeletal: Neck supple. Trachea: No tracheal deviation. Cardiovascular:      Rate and Rhythm: Normal rate and regular rhythm. Heart sounds: Normal heart sounds. No murmur. No friction rub. No gallop. Pulmonary:      Effort: Pulmonary effort is normal.      Breath sounds: Normal breath sounds. Abdominal:      Palpations: Abdomen is soft. Tenderness: There is no abdominal tenderness. Musculoskeletal:         General: No deformity. Skin:     General: Skin is warm and dry. Comments: Mild hives noted on her forearms. Neurological:      Mental Status: She is alert. Comments: oriented          MDM       Procedures    Progress note: She feels better and her symptoms have mostly resolved. Lucian Echevarria MD  5:47 AM    Assessment/plan: Suspected allergic reaction with hives and a throat tightening sensation -unclear trigger. Reassuring appearance/exam with stable vital signs. She had Benadryl prior to arrival.  Prednisone in the ED. Home with continued Benadryl, prednisone, and an EpiPen as needed.   Lucian Echevarria MD  5:49 AM

## 2020-07-26 NOTE — ED TRIAGE NOTES
Patient brought in by EMS for complaint of \"my dog woke me up in the middle of the night and my hands were itching. I feel like my tongue was swelling and it was hard to swallow. I took 50mg of benadryl. \" Pt denies new soaps, lotions,etc. Pt reports eating peanut butter freezer sandwich.

## 2020-07-27 ENCOUNTER — VIRTUAL VISIT (OUTPATIENT)
Dept: FAMILY MEDICINE CLINIC | Age: 40
End: 2020-07-27

## 2020-07-27 ENCOUNTER — PATIENT OUTREACH (OUTPATIENT)
Dept: CASE MANAGEMENT | Age: 40
End: 2020-07-27

## 2020-07-27 DIAGNOSIS — T78.40XA ALLERGIC REACTION, INITIAL ENCOUNTER: Primary | ICD-10-CM

## 2020-07-27 NOTE — PROGRESS NOTES
Patient contacted regarding recent discharge and COVID-19 risk. Discussed COVID-19 related testing which was not done at this time. Test results were not done. Patient informed of results, if available? no    Care Transition Nurse/ Ambulatory Care Manager contacted the patient by telephone to perform post discharge assessment. Verified name and  with patient as identifiers. Patient has following risk factors of: asthma and diabetes. CTN/ACM reviewed discharge instructions, medical action plan and red flags related to discharge diagnosis. Reviewed and educated them on any new and changed medications related to discharge diagnosis. Advised obtaining a 90-day supply of all daily and as-needed medications. Advance Care Planning:   Does patient have an Advance Directive: patient declined education     Education provided regarding infection prevention, and signs and symptoms of COVID-19 and when to seek medical attention with patient who verbalized understanding. Discussed exposure protocols and quarantine from [address] you at higher risk for severe illness  and given an opportunity for questions and concerns. The patient agrees to contact the COVID-19 hotline [phone] or PCP office for questions related to their healthcare. CTN/ACM provided contact information for future reference. From CDC: Are you at higher risk for severe illness?  Wash your hands often.  Avoid close contact (6 feet, which is about two arm lengths) with people who are sick.  Put distance between yourself and other people if COVID-19 is spreading in your community.  Clean and disinfect frequently touched surfaces.  Avoid all cruise travel and non-essential air travel.  Call your healthcare professional if you have concerns about COVID-19 and your underlying condition or if you are sick.     For more information on steps you can take to protect yourself, see CDC's How to Protect Yourself Patient/family/caregiver given information for [employer] and agrees to enroll no      Patient declines need for further COVID education, resolving CHUCK episode.

## 2020-07-27 NOTE — PROGRESS NOTES
HISTORY OF PRESENT ILLNESS  Urban Crooks is a 37 y.o. female. HPI   Pt presents with \"ER follow up\"  Visit was conducted via [employer]. me  Pt was located at home, provider was located at [facility]  Visit lasted 5 minutes  Urban Crooks, who was evaluated through a synchronous (real-time) audio-video encounter, and/or her healthcare decision maker, is aware that it is a billable service, with coverage as determined by her insurance carrier. She provided verbal consent to proceed: Yes, and patient identification was verified. It was conducted pursuant to the emergency declaration under the [facility], [facility] authority and the [employer] General Act. A caregiver was present when appropriate. Ability to conduct physical exam was limited. I was in the office. The patient was at home. Pt states that yesterday, she woke up to her dog licking her face. She states that once she was awake, she felt like her palms were itching, like she was having an allergic reaction. Soon after, she felt like her tongue was swelling and her throat was closing. She states that she took Benadryl, and called 911. She was seen in the [place] ER, and diagnosed with allergic reaction. She was prescribed prednisone and an epi pen. She was informed to follow up with allergist ASAP    She has no more swelling or itching, at this time. Review of Systems   Constitutional: Negative for fever. Physical Exam  Constitutional:       Appearance: Normal appearance. Pulmonary:      Effort: Pulmonary effort is normal.   Neurological:      Mental Status: She is alert. Psychiatric:         Mood and Affect: Mood normal.         Behavior: Behavior normal.         ASSESSMENT and PLAN    ICD-10-CM ICD-9-CM    1.  Allergic reaction, initial encounter  T78.40XA 995.3 REFERRAL TO ALLERGY     Educated about keeping epi pen near by, and completing prednisone as prescribed  Should call allergist for an appointment today  Educated about always calling 911 or going to the ER with any shortness of breath, swelling, rash, etc    Pt informed to return to office with worsening of symptoms, or PRN with any questions or concerns. Pt verbalizes understanding of plan of care and denies further questions or concerns at this time.

## 2020-07-30 ENCOUNTER — TELEPHONE (OUTPATIENT)
Dept: CARDIOLOGY CLINIC | Age: 40
End: 2020-07-30

## 2020-07-30 NOTE — TELEPHONE ENCOUNTER
Can you please call pt to r/s her appt with Orville Rich NP on 8/3.   She can be reached on verified cell#

## 2020-08-10 NOTE — PROGRESS NOTES
HISTORY OF PRESENTING ILLNESS      Codie Jin is a 37 y.o. female with tachycardia for whom we increased the dosing of flecainide from 50mg to 100 mg twice daily during her last visit. She reported today failure to drive improvement with this dose adjustment. She has had negative ANS testing and negative MRA for May Thurner. Last visit we increased flecainide to 150 mg twice daily and continued acebutolol. She reports lower heart rates and EKG today shows sinus rhythm. PAST MEDICAL HISTORY     Past Medical History:   Diagnosis Date    Anxiety     Arrhythmia     HX TACHYCARDIA - NEG. STRESS TEST 2013 PER PATIENT    Asthma     LAST EPISODE 2016    Diabetes ([address] 2008    NIDDM    Ear infection 11/30/2018    BILATERAL    GERD (gastroesophageal reflux disease)     Headache     Headache(784.0)     Hypertension     HX HTN - NO MEDS CURRENTLY(11-30-18)    Inappropriate sinus tachycardia 5/20/2019    Rash 7/14/2016    Recurrent umbilical hernia 77/16/7613    S/P dilatation and curettage     Tachycardia            PAST SURGICAL HISTORY     Past Surgical History:   Procedure Laterality Date    HX APPENDECTOMY  2/2008    HX CHOLECYSTECTOMY  2001    HX DILATION AND CURETTAGE      HX GYN  3/2008    tubal ligation    HX HERNIA REPAIR  2/2009    HX HERNIA REPAIR  12/06/2018    open recurrent umbilical hernia repair    HX HYSTERECTOMY  03/2012    HX LEFT SALPINGO-OOPHORECTOMY Left     HX LUMBAR DISKECTOMY      2006    HX ORTHOPAEDIC  9/2006    ruptured discs    UPPER GI ENDOSCOPY,BIOPSY  5/11/2017               ALLERGIES     Allergies   Allergen Reactions    Green Pepper Hives     Red, Yellow and Orange Peppers also.   OK with Black Pepper          FAMILY HISTORY     Family History   Problem Relation Age of Onset    Heart Disease Mother     Hypertension Father     Cancer Father         Lung    Hypertension Sister     No Known Problems Brother     No Known Problems Brother     No Known Problems Brother     Anesth Problems Neg Hx     negative for cardiac disease       SOCIAL HISTORY     Social History     Socioeconomic History    Marital status:      Spouse name: Not on file    Number of children: Not on file    Years of education: Not on file    Highest education level: Not on file   Tobacco Use    Smoking status: Never Smoker    Smokeless tobacco: Never Used   Substance and Sexual Activity    Alcohol use: No    Drug use: Never    Sexual activity: Yes     Partners: Male     Birth control/protection: Surgical         MEDICATIONS     Current Outpatient Medications   Medication Sig    fenofibrate (LOFIBRA) 160 mg tablet TAKE 1 TABLET BY MOUTH EVERY DAY    glucose blood VI test strips (True Metrix Glucose Test Strip) strip To use to check blood sugar daily,  Dx: E11.9    Blood-Glucose Meter monitoring kit To use to check blood sugar daily. Dx: E11.9    gabapentin (NEURONTIN) 300 mg capsule Take 1 Cap by mouth three (3) times daily. Max Daily Amount: 900 mg. (Patient taking differently: Take 300 mg by mouth daily.)    glucose blood VI test strips (Contour Next Test Strips) strip To use to check blood sugar BID, as needed. Dx: E11.9    flecainide (TAMBOCOR) 150 mg tablet Take 1 Tab by mouth two (2) times a day.  albuterol (PROVENTIL HFA, VENTOLIN HFA, PROAIR HFA) 90 mcg/actuation inhaler Take 2 Puffs by inhalation every four (4) hours as needed for Wheezing.  glucose blood VI test strips (Ascensia CONTOUR) strip USE AS DIRECTED    butalbital-acetaminophen-caffeine (FIORICET, ESGIC) -40 mg per tablet Take 1 Tab by mouth every six (6) hours as needed for Headache.  canagliflozin (Invokana) 300 mg tablet TAKE 1 TABLET BY MOUTH ONCE DAILY BEFORE BREAKFAST    ergocalciferol (ERGOCALCIFEROL) 1,250 mcg (50,000 unit) capsule Take 1 Cap by mouth two (2) times a week.     famotidine (PEPCID) 40 mg tablet TAKE 1 TABLET BY MOUTH EVERY DAY TAKES EVERY BEDTIME    gabapentin (NEURONTIN) 300 mg capsule Take 2 Caps by mouth nightly. Max Daily Amount: 600 mg.  levocetirizine (XYZAL) 5 mg tablet TAKE 1 TABLET BY MOUTH ONCE DAILY    metFORMIN (GLUCOPHAGE) 1,000 mg tablet TAKE 1 TABLET BY MOUTH TWICE A DAY    omeprazole (PRILOSEC) 20 mg capsule TAKE 1 CAPSULE BY MOUTH ONCE DAILY    simvastatin (ZOCOR) 20 mg tablet TAKE 1 TABLET BY MOUTH EVERY DAY    SITagliptin (Januvia) 100 mg tablet Take 1 tablet by mouth once daily    SUMAtriptan (IMITREX) 50 mg tablet TAKE 1 TABLET BY MOUTH AT THE ONSET OF A MIGRAINE HEADACHE. REPEAT IN 1 HOUR IF NEEDED. TAKE NO MORE THAN 2 TABLETS PER 24 HOURS    topiramate (TOPAMAX) 50 mg tablet Take 1 tab twice a day for migraine prevention    triamcinolone acetonide (KENALOG) 0.1 % ointment APPLY TO AFFECTED AREA TWICE A DAY. USE THIN LAYER    Blood-Glucose Meter monitoring kit To use to check blood sugar daily. Dx: E11.9    acebutoloL (SECTRAL) 200 mg capsule Take 1 Cap by mouth two (2) times a day.  cyclobenzaprine (FLEXERIL) 10 mg tablet TAKE 1 TABLET BY MOUTH THREE TIMES DAILY AS NEEDED FOR MUSCLE SPASM DO  NOT  DRIVE  WHILE  TAKING  THIS  MEDICATION    aspirin delayed-release 81 mg tablet TAKE 1 TABLET BY MOUTH EVERY DAY    Lancets (MICROLET LANCET) Misc USE TO CHECK BLOOD SUGAR ONE TO TWO TIMES DAILY (Patient taking differently: as needed. USE TO CHECK BLOOD SUGAR ONE TO TWO TIMES DAILY)    traZODone (DESYREL) 100 mg tablet Take 1 Tab by mouth once over twenty-four (24) hours. No current facility-administered medications for this visit. I have reviewed the nurses notes, vitals, problem list, allergy list, medical history, family, social history and medications. REVIEW OF SYMPTOMS      General: Pt denies excessive weight gain or loss. Pt is able to conduct ADL's  HEENT: Denies blurred vision, headaches, hearing loss, epistaxis and difficulty swallowing.   Respiratory: Denies cough, congestion, shortness of breath, RICE, wheezing or stridor. Cardiovascular: Denies precordial pain, palpitations, edema or PND  Gastrointestinal: Denies poor appetite, indigestion, abdominal pain or blood in stool  Genitourinary: Denies hematuria, dysuria, increased urinary frequency  Musculoskeletal: Denies joint pain or swelling from muscles or joints  Neurologic: Denies tremor, paresthesias, headache, or sensory motor disturbance  Psychiatric: Denies confusion, insomnia, depression  Integumentray: Denies rash, itching or ulcers. Hematologic: Denies easy bruising, bleeding       PHYSICAL EXAMINATION      Vitals: see vitals section  General: Well developed, in no acute distress. HEENT: No jaundice, oral mucosa moist, no oral ulcers  Neck: Supple, no stiffness, no lymphadenopathy, supple  Heart:  Normal S1/S2 negative S3 or S4. Regular, no murmur, gallop or rub, no jugular venous distention  Respiratory: Clear bilaterally x 4, no wheezing or rales  Abdomen:   Soft, non-tender, bowel sounds are active. Extremities:  No edema, normal cap refill, no cyanosis. Musculoskeletal: No clubbing, no deformities  Neuro: A&Ox3, speech clear, gait stable, cooperative, no focal neurologic deficits  Skin: Skin color is normal. No rashes or lesions.  Non diaphoretic, moist.  Vascular: 2+ pulses symmetric in all extremities       DIAGNOSTIC DATA      EKG: sinus rhythm        LABORATORY DATA      Lab Results   Component Value Date/Time    WBC 8.5 06/24/2020 08:50 AM    HGB 14.4 06/24/2020 08:50 AM    HCT 42.5 06/24/2020 08:50 AM    PLATELET 334 34/72/3787 08:50 AM    MCV 86 06/24/2020 08:50 AM      Lab Results   Component Value Date/Time    Sodium 143 06/24/2020 08:50 AM    Potassium 4.2 06/24/2020 08:50 AM    Chloride 105 06/24/2020 08:50 AM    CO2 21 06/24/2020 08:50 AM    Anion gap 11 10/11/2019 11:02 AM    Glucose 147 (H) 06/24/2020 08:50 AM    BUN 8 06/24/2020 08:50 AM    Creatinine 0.87 06/24/2020 08:50 AM    BUN/Creatinine ratio 9 06/24/2020 08:50 AM GFR est AA 96 06/24/2020 08:50 AM    GFR est non-AA 83 06/24/2020 08:50 AM    Calcium 9.4 06/24/2020 08:50 AM    Bilirubin, total 0.2 06/24/2020 08:50 AM    Alk. phosphatase 54 06/24/2020 08:50 AM    Protein, total 6.9 06/24/2020 08:50 AM    Albumin 4.6 06/24/2020 08:50 AM    Globulin 3.4 10/11/2019 11:02 AM    A-G Ratio 2.0 06/24/2020 08:50 AM    ALT (SGPT) 29 06/24/2020 08:50 AM           ASSESSMENT      1. Inappropriate sinus tachycardia  2. Diabetes mellitus  3. Orthostatic hypotension  4. GERD  5. Anxiety          PLAN     Discussed and will plan on EPS as next step for recurrence or breakthrough of tachycardia on current regimen. Continue medical therapy for now. ICD-10-CM ICD-9-CM    1. Inappropriate sinus tachycardia  R00.0 427.89 AMB POC EKG ROUTINE W/ 12 LEADS, INTER & REP   2. Palpitations  R00.2 785.1 AMB POC EKG ROUTINE W/ 12 LEADS, INTER & REP   3. Fatigue, unspecified type  R53.83 780.79    4. Type 2 diabetes mellitus without complication, without long-term current use of insulin (HCC)  E11.9 250.00    5. Essential hypertension  I10 401.9      Orders Placed This Encounter    AMB POC EKG ROUTINE W/ 12 LEADS, INTER & REP     Order Specific Question:   Reason for Exam:     Answer: Tachycardia          FOLLOW-UP   2 months    Thank you, Fredy Orta NP for allowing me to participate in the care of this extraordinarily pleasant female. Please do not hesitate to contact me for further questions/concerns.      Candi Rivera NP    [address], [place] [address]  [facility] [place]  [phone] / [phone] Fax   [phone] / [phone] Fax

## 2020-08-11 ENCOUNTER — OFFICE VISIT (OUTPATIENT)
Dept: CARDIOLOGY CLINIC | Age: 40
End: 2020-08-11
Payer: MEDICAID

## 2020-08-11 ENCOUNTER — PATIENT MESSAGE (OUTPATIENT)
Dept: FAMILY MEDICINE CLINIC | Age: 40
End: 2020-08-11

## 2020-08-11 VITALS
SYSTOLIC BLOOD PRESSURE: 126 MMHG | HEART RATE: 92 BPM | BODY MASS INDEX: 33.79 KG/M2 | DIASTOLIC BLOOD PRESSURE: 82 MMHG | RESPIRATION RATE: 16 BRPM | OXYGEN SATURATION: 97 % | WEIGHT: 202.8 LBS | HEIGHT: 65 IN

## 2020-08-11 DIAGNOSIS — R00.2 PALPITATIONS: ICD-10-CM

## 2020-08-11 DIAGNOSIS — R53.83 FATIGUE, UNSPECIFIED TYPE: ICD-10-CM

## 2020-08-11 DIAGNOSIS — R00.0 INAPPROPRIATE SINUS TACHYCARDIA: Primary | ICD-10-CM

## 2020-08-11 DIAGNOSIS — E11.9 TYPE 2 DIABETES MELLITUS WITHOUT COMPLICATION, WITHOUT LONG-TERM CURRENT USE OF INSULIN (HCC): ICD-10-CM

## 2020-08-11 DIAGNOSIS — I10 ESSENTIAL HYPERTENSION: ICD-10-CM

## 2020-08-11 PROCEDURE — 93000 ELECTROCARDIOGRAM COMPLETE: CPT | Performed by: NURSE PRACTITIONER

## 2020-08-11 PROCEDURE — 3051F HG A1C>EQUAL 7.0%<8.0%: CPT | Performed by: NURSE PRACTITIONER

## 2020-08-11 PROCEDURE — 99214 OFFICE O/P EST MOD 30 MIN: CPT | Performed by: NURSE PRACTITIONER

## 2020-08-11 NOTE — TELEPHONE ENCOUNTER
----- Message from Lazaro Bautista. Wendi Shin sent at 8/11/2020  1:26 PM EDT -----  Regarding: Test Results Question  Contact: [phone]  Hey, your nurse called yesterday about Eda's test results and said she would talk to you today and get back to me. So i was wondering if y'all figured out what's next. .. I've tried calling the office number and I'm on hold for 35 mins and still no one answered, same as yesterday i called took 28 mins and someone got my info and then we was disconnected and finally your nursed called me back. Calling is such a hassle now. ..  its crazy. So if one of y'all could call an let me know I'd appreciate it.  Thanks savanah

## 2020-08-14 RX ORDER — FENOFIBRATE 160 MG/1
TABLET ORAL
Qty: 30 TAB | Refills: 1 | Status: SHIPPED | OUTPATIENT
Start: 2020-08-14 | End: 2021-06-13

## 2020-08-25 ENCOUNTER — VIRTUAL VISIT (OUTPATIENT)
Dept: FAMILY MEDICINE CLINIC | Age: 40
End: 2020-08-25
Payer: MEDICAID

## 2020-08-25 DIAGNOSIS — E11.40 TYPE 2 DIABETES MELLITUS WITH DIABETIC NEUROPATHY, WITHOUT LONG-TERM CURRENT USE OF INSULIN (HCC): ICD-10-CM

## 2020-08-25 DIAGNOSIS — M54.50 CHRONIC MIDLINE LOW BACK PAIN WITHOUT SCIATICA: ICD-10-CM

## 2020-08-25 DIAGNOSIS — J01.00 ACUTE NON-RECURRENT MAXILLARY SINUSITIS: Primary | ICD-10-CM

## 2020-08-25 DIAGNOSIS — G89.29 CHRONIC MIDLINE LOW BACK PAIN WITHOUT SCIATICA: ICD-10-CM

## 2020-08-25 PROCEDURE — 99213 OFFICE O/P EST LOW 20 MIN: CPT | Performed by: NURSE PRACTITIONER

## 2020-08-25 PROCEDURE — 3051F HG A1C>EQUAL 7.0%<8.0%: CPT | Performed by: NURSE PRACTITIONER

## 2020-08-25 RX ORDER — CYCLOBENZAPRINE HCL 10 MG
10 TABLET ORAL
Qty: 30 TAB | Refills: 0 | Status: SHIPPED | OUTPATIENT
Start: 2020-08-25 | End: 2020-10-07 | Stop reason: SDUPTHER

## 2020-08-25 RX ORDER — CALCIUM CITRATE/VITAMIN D3 200MG-6.25
TABLET ORAL
Qty: 100 STRIP | Refills: 2 | Status: SHIPPED | OUTPATIENT
Start: 2020-08-25

## 2020-08-25 RX ORDER — AMOXICILLIN AND CLAVULANATE POTASSIUM 875; 125 MG/1; MG/1
1 TABLET, FILM COATED ORAL EVERY 12 HOURS
Qty: 20 TAB | Refills: 0 | Status: SHIPPED | OUTPATIENT
Start: 2020-08-25 | End: 2020-09-04

## 2020-08-31 DIAGNOSIS — R07.9 CHEST PAIN, UNSPECIFIED TYPE: Primary | ICD-10-CM

## 2020-09-03 ENCOUNTER — VIRTUAL VISIT (OUTPATIENT)
Dept: FAMILY MEDICINE CLINIC | Age: 40
End: 2020-09-03
Payer: MEDICAID

## 2020-09-03 DIAGNOSIS — H10.32 ACUTE BACTERIAL CONJUNCTIVITIS OF LEFT EYE: Primary | ICD-10-CM

## 2020-09-03 DIAGNOSIS — Z91.09 ENVIRONMENTAL ALLERGIES: ICD-10-CM

## 2020-09-03 PROCEDURE — 99213 OFFICE O/P EST LOW 20 MIN: CPT | Performed by: NURSE PRACTITIONER

## 2020-09-03 RX ORDER — FLUTICASONE PROPIONATE 50 MCG
2 SPRAY, SUSPENSION (ML) NASAL DAILY
Qty: 1 BOTTLE | Refills: 0 | Status: SHIPPED | OUTPATIENT
Start: 2020-09-03 | End: 2022-09-27 | Stop reason: ALTCHOICE

## 2020-09-03 RX ORDER — POLYMYXIN B SULFATE AND TRIMETHOPRIM 1; 10000 MG/ML; [USP'U]/ML
1 SOLUTION OPHTHALMIC EVERY 6 HOURS
Qty: 10 ML | Refills: 0 | Status: SHIPPED | OUTPATIENT
Start: 2020-09-03 | End: 2020-09-10

## 2020-09-03 NOTE — PROGRESS NOTES
HISTORY OF PRESENT ILLNESS  Ping Yadav is a 37 y.o. female. HPI  Pt presents with \"pink eye\"  Visit was conducted via [employer]. me  Pt was located at home, provider was located at [facility]  Visit lasted 3 minutes  Ping Yadav, who was evaluated through a synchronous (real-time) audio-video encounter, and/or her healthcare decision maker, is aware that it is a billable service, with coverage as determined by her insurance carrier. She provided verbal consent to proceed: Yes, and patient identification was verified. It was conducted pursuant to the emergency declaration under the [facility], [address] [place] [address] authority and the [employer] General Act. A caregiver was present when appropriate. Ability to conduct physical exam was limited. I was in the office. The patient was at home. Pt states that she believes that she has pink eye in her left eye. She woke up this morning her her left eye was closed shut with mucous  She has discharge and itching in the eye  The eye is red and a little swollen  She has been battling allergy symptoms, which makes her wonder if this could have caused the pink eye? Review of Systems   Constitutional: Negative for fever. Eyes: Positive for discharge and redness. Physical Exam  Constitutional:       Appearance: Normal appearance. Eyes:      General:         Left eye: Discharge present. Conjunctiva/sclera:      Left eye: Left conjunctiva is injected. Pulmonary:      Effort: Pulmonary effort is normal.   Neurological:      Mental Status: She is alert. ASSESSMENT and PLAN    ICD-10-CM ICD-9-CM    1. Acute bacterial conjunctivitis of left eye  H10.32 372.03 trimethoprim-polymyxin b (POLYTRIM) ophthalmic solution   2.  Environmental allergies  Z91.09 V15.09 fluticasone propionate (FLONASE) 50 mcg/actuation nasal spray     Educated about using drops as prescribed  Should notify office should symptoms continue and/or worsen    Pt informed to return to office with worsening of symptoms, or PRN with any questions or concerns. Pt verbalizes understanding of plan of care and denies further questions or concerns at this time.

## 2020-09-08 ENCOUNTER — ANCILLARY PROCEDURE (OUTPATIENT)
Dept: CARDIOLOGY CLINIC | Age: 40
End: 2020-09-08
Payer: MEDICAID

## 2020-09-08 VITALS — HEIGHT: 65 IN | WEIGHT: 202 LBS | BODY MASS INDEX: 33.66 KG/M2

## 2020-09-08 DIAGNOSIS — R07.9 CHEST PAIN, UNSPECIFIED TYPE: ICD-10-CM

## 2020-09-08 LAB
STRESS ANGINA INDEX: 2
STRESS BASELINE DIAS BP: 80 MMHG
STRESS BASELINE HR: 110 BPM
STRESS BASELINE SYS BP: 138 MMHG
STRESS ESTIMATED WORKLOAD: 7 METS
STRESS EXERCISE DUR MIN: NORMAL MIN:SEC
STRESS O2 SAT PEAK: 99 %
STRESS O2 SAT REST: 97 %
STRESS PEAK DIAS BP: 86 MMHG
STRESS PEAK SYS BP: 160 MMHG
STRESS PERCENT HR ACHIEVED: 87 %
STRESS POST PEAK HR: 155 BPM
STRESS RATE PRESSURE PRODUCT: NORMAL BPM*MMHG
STRESS SR DUKE TREADMILL SCORE: -2
STRESS ST DEPRESSION: 0 MM
STRESS ST ELEVATION: 0 MM
STRESS TARGET HR: 179 BPM

## 2020-09-08 PROCEDURE — 93015 CV STRESS TEST SUPVJ I&R: CPT | Performed by: INTERNAL MEDICINE

## 2020-09-21 ENCOUNTER — DOCUMENTATION ONLY (OUTPATIENT)
Dept: CARDIOLOGY CLINIC | Age: 40
End: 2020-09-21

## 2020-09-21 NOTE — PROGRESS NOTES
Prior authorization done for Acebutolol HCl 200 mg capsules. Via Cover My Med[employer].    PA Case: [id number], Status: Approved, Coverage Starts on: 9/21/2020 12:00:00 AM, Coverage Ends on: 9/21/2021 12:00:00 AM.

## 2020-09-22 RX ORDER — ACEBUTOLOL HYDROCHLORIDE 200 MG/1
200 CAPSULE ORAL 2 TIMES DAILY
Qty: 180 CAP | Refills: 2 | Status: SHIPPED | OUTPATIENT
Start: 2020-09-22 | End: 2020-12-30 | Stop reason: ALTCHOICE

## 2020-09-22 NOTE — TELEPHONE ENCOUNTER
Requested Prescriptions     Signed Prescriptions Disp Refills    acebutoloL (SECTRAL) 200 mg capsule 180 Cap 2     Sig: Take 1 Cap by mouth two (2) times a day. Authorizing Provider: Graciela Harper     Ordering User: Tayla Mckeon     Refill per verbal order Dr. Jaden Zabala.

## 2020-10-07 ENCOUNTER — OFFICE VISIT (OUTPATIENT)
Dept: FAMILY MEDICINE CLINIC | Age: 40
End: 2020-10-07
Payer: MEDICAID

## 2020-10-07 VITALS
SYSTOLIC BLOOD PRESSURE: 127 MMHG | RESPIRATION RATE: 16 BRPM | OXYGEN SATURATION: 98 % | WEIGHT: 198 LBS | BODY MASS INDEX: 32.99 KG/M2 | HEIGHT: 65 IN | TEMPERATURE: 98 F | HEART RATE: 109 BPM | DIASTOLIC BLOOD PRESSURE: 76 MMHG

## 2020-10-07 DIAGNOSIS — H65.111 ACUTE MUCOID OTITIS MEDIA OF RIGHT EAR: Primary | ICD-10-CM

## 2020-10-07 DIAGNOSIS — Z91.09 ENVIRONMENTAL ALLERGIES: ICD-10-CM

## 2020-10-07 PROCEDURE — 99213 OFFICE O/P EST LOW 20 MIN: CPT | Performed by: NURSE PRACTITIONER

## 2020-10-07 RX ORDER — EPINEPHRINE 0.3 MG/.3ML
INJECTION SUBCUTANEOUS
COMMUNITY
Start: 2020-07-26 | End: 2021-08-03 | Stop reason: SDUPTHER

## 2020-10-07 RX ORDER — LORATADINE 10 MG/1
10 TABLET ORAL DAILY
Qty: 30 TAB | Refills: 5 | Status: SHIPPED | OUTPATIENT
Start: 2020-10-07

## 2020-10-07 RX ORDER — CEFDINIR 300 MG/1
300 CAPSULE ORAL 2 TIMES DAILY
Qty: 20 CAP | Refills: 0 | Status: SHIPPED | OUTPATIENT
Start: 2020-10-07 | End: 2020-10-15

## 2020-10-07 NOTE — PROGRESS NOTES
Identified pt with two pt identifiers(name and ). Chief Complaint   Patient presents with    Ear Pain     right ear        Health Maintenance Due   Topic    Eye Exam Retinal or Dilated     DTaP/Tdap/Td series (2 - Td)    PAP AKA CERVICAL CYTOLOGY     MICROALBUMIN Q1     Flu Vaccine (1)       Wt Readings from Last 3 Encounters:   10/07/20 198 lb (89.8 kg)   20 202 lb (91.6 kg)   20 202 lb 12.8 oz (92 kg)     Temp Readings from Last 3 Encounters:   10/07/20 98 °F (36.7 °C) (Oral)   20 98 °F (36.7 °C)   20 98.3 °F (36.8 °C) (Oral)     BP Readings from Last 3 Encounters:   10/07/20 127/76   20 126/82   20 108/56     Pulse Readings from Last 3 Encounters:   10/07/20 (!) 109   20 92   20 87         Learning Assessment:  :     Learning Assessment 2017   PRIMARY LEARNER Patient Patient   HIGHEST LEVEL OF EDUCATION - PRIMARY LEARNER  GRADUATED HIGH SCHOOL OR GED GRADUATED HIGH SCHOOL OR GED   BARRIERS PRIMARY LEARNER NONE NONE   PRIMARY LANGUAGE ENGLISH ENGLISH   LEARNER PREFERENCE PRIMARY READING READING   ANSWERED BY patient patient   RELATIONSHIP SELF SELF       Depression Screening:  :     3 most recent PHQ Screens 2020   Little interest or pleasure in doing things Not at all   Feeling down, depressed, irritable, or hopeless Not at all   Total Score PHQ 2 0       Fall Risk Assessment:  :     Fall Risk Assessment, last 12 mths 2017   Able to walk? Yes   Fall in past 12 months? No       Abuse Screening:  :     Abuse Screening Questionnaire 2020   Do you ever feel afraid of your partner? N N N N N   Are you in a relationship with someone who physically or mentally threatens you? N N N N N   Is it safe for you to go home?  Y Y Y Y Y       Coordination of Care Questionnaire:  :     1) Have you been to an emergency room, urgent care clinic since your last visit? no   Hospitalized since your last visit? no 2) Have you seen or consulted any other health care providers outside of [address] [place] [address] since your last visit? no  (Include any pap smears or colon screenings in this section.)    3) Do you have an Advance Directive on file? no  Are you interested in receiving information about Advance Directives? no    Patient is accompanied by self. Reviewed record in preparation for visit and have obtained necessary documentation. Medication reconciliation up to date and corrected with patient at this time.

## 2020-10-07 NOTE — PROGRESS NOTES
HISTORY OF PRESENT ILLNESS  Bolivar Oreilly is a 37 y.o. female. HPI  Pt presents with \"right ear pain\"    Pt states that she has had right ear pain for a couple of days  She states that she has had symptoms for a couple of days, and they are worse at night  No fever  She notes that allergies have worsened, and she would like to try something other then Xyzal  OTC; none  Review of Systems   Constitutional: Negative for fever. HENT: Positive for ear pain. Physical Exam  Constitutional:       Appearance: Normal appearance. HENT:      Head: Normocephalic and atraumatic. Right Ear: Tympanic membrane is erythematous and retracted. Left Ear: Tympanic membrane normal.   Cardiovascular:      Rate and Rhythm: Normal rate and regular rhythm. Heart sounds: Normal heart sounds. Pulmonary:      Effort: Pulmonary effort is normal.      Breath sounds: Normal breath sounds. Neurological:      Mental Status: She is alert. Psychiatric:         Mood and Affect: Mood normal.         Behavior: Behavior normal.         ASSESSMENT and PLAN    ICD-10-CM ICD-9-CM    1. Acute mucoid otitis media of right ear  H65.111 381.02 cefdinir (OMNICEF) 300 mg capsule   2. Environmental allergies  Z91.09 V15.09 loratadine (CLARITIN) 10 mg tablet     Educated about taking medication as prescribed, with food  Should stay well hydrated and treat fever as needed    Pt informed to return to office with worsening of symptoms, or PRN with any questions or concerns. Pt verbalizes understanding of plan of care and denies further questions or concerns at this time.

## 2020-10-15 ENCOUNTER — OFFICE VISIT (OUTPATIENT)
Dept: CARDIOLOGY CLINIC | Age: 40
End: 2020-10-15
Payer: MEDICAID

## 2020-10-15 VITALS
HEART RATE: 88 BPM | SYSTOLIC BLOOD PRESSURE: 136 MMHG | WEIGHT: 204 LBS | HEIGHT: 65 IN | DIASTOLIC BLOOD PRESSURE: 98 MMHG | OXYGEN SATURATION: 98 % | BODY MASS INDEX: 33.99 KG/M2

## 2020-10-15 DIAGNOSIS — I10 ESSENTIAL HYPERTENSION: ICD-10-CM

## 2020-10-15 DIAGNOSIS — E11.9 TYPE 2 DIABETES MELLITUS WITHOUT COMPLICATION, WITHOUT LONG-TERM CURRENT USE OF INSULIN (HCC): ICD-10-CM

## 2020-10-15 DIAGNOSIS — R00.0 INAPPROPRIATE SINUS TACHYCARDIA: ICD-10-CM

## 2020-10-15 DIAGNOSIS — R53.83 FATIGUE, UNSPECIFIED TYPE: ICD-10-CM

## 2020-10-15 DIAGNOSIS — R00.2 PALPITATIONS: ICD-10-CM

## 2020-10-15 DIAGNOSIS — R00.0 TACHYCARDIA: Primary | ICD-10-CM

## 2020-10-15 PROCEDURE — 99215 OFFICE O/P EST HI 40 MIN: CPT | Performed by: NURSE PRACTITIONER

## 2020-10-15 PROCEDURE — 3051F HG A1C>EQUAL 7.0%<8.0%: CPT | Performed by: NURSE PRACTITIONER

## 2020-10-15 PROCEDURE — 93000 ELECTROCARDIOGRAM COMPLETE: CPT | Performed by: NURSE PRACTITIONER

## 2020-10-15 NOTE — PROGRESS NOTES
HISTORY OF PRESENTING ILLNESS      Hellen Gaona is a 37 y.o. female with tachycardia for whom we increased the dosing of flecainide from 50mg to 100 mg twice daily during her last visit. She reported today failure to drive improvement with this dose adjustment. She has had negative ANS testing and negative MRA for May Thurner. Her flecainide was increased to 150 mg twice daily and continued acebutolol. She reports lower heart rates. She underwent negative stress testing. She continues to have tachycardia, sob, dizziness on current regimen, EKG today shows sinus rhythm with heart rates in the 90s. Since last visit, she has also been diagnosed with alpha gal syndrome from possible previous tick bite after a severe allergic reaction. PAST MEDICAL HISTORY     Past Medical History:   Diagnosis Date    Anxiety     Arrhythmia     HX TACHYCARDIA - NEG.  STRESS TEST 2013 PER PATIENT    Asthma     LAST EPISODE 2016    Diabetes ([facility] [address] 2008    NIDDM    Ear infection 11/30/2018    BILATERAL    GERD (gastroesophageal reflux disease)     Headache     Headache(784.0)     Hypertension     HX HTN - NO MEDS CURRENTLY(11-30-18)    Inappropriate sinus tachycardia 5/20/2019    Rash 7/14/2016    Recurrent umbilical hernia 45/94/7951    S/P dilatation and curettage     Tachycardia            PAST SURGICAL HISTORY     Past Surgical History:   Procedure Laterality Date    HX APPENDECTOMY  2/2008    HX CHOLECYSTECTOMY  2001    HX DILATION AND CURETTAGE      HX GYN  3/2008    tubal ligation    HX HERNIA REPAIR  2/2009    HX HERNIA REPAIR  12/06/2018    open recurrent umbilical hernia repair    HX HYSTERECTOMY  03/2012    HX LEFT SALPINGO-OOPHORECTOMY Left     HX LUMBAR DISKECTOMY      2006    HX ORTHOPAEDIC  9/2006    ruptured discs    UPPER GI ENDOSCOPY,BIOPSY  5/11/2017               ALLERGIES     Allergies   Allergen Reactions    Beef Containing Products Rash    Green Pepper Hives     Red, Yellow and Orange Peppers also. OK with Black Pepper    Other Plant, Animal, Environmental Swelling     Red meat    Pork Derived (Porcine) Rash          FAMILY HISTORY     Family History   Problem Relation Age of Onset    Heart Disease Mother     Hypertension Father     Cancer Father         Lung    Hypertension Sister     No Known Problems Brother     No Known Problems Brother     No Known Problems Brother     Anesth Problems Neg Hx     negative for cardiac disease       SOCIAL HISTORY     Social History     Socioeconomic History    Marital status:      Spouse name: Not on file    Number of children: Not on file    Years of education: Not on file    Highest education level: Not on file   Tobacco Use    Smoking status: Never Smoker    Smokeless tobacco: Never Used   Substance and Sexual Activity    Alcohol use: No    Drug use: Never    Sexual activity: Yes     Partners: Male     Birth control/protection: Surgical         MEDICATIONS     Current Outpatient Medications   Medication Sig    EPINEPHrine (EPIPEN) 0.3 mg/0.3 mL injection INJECT CONTENTS OF 1 PEN INTRAMUSCULARLY ONCE AS NEEDED FOR ALLERGIC REACTION FOR UP TO 1 DOSE    loratadine (CLARITIN) 10 mg tablet Take 1 Tab by mouth daily. To replace xyzal.    acebutoloL (SECTRAL) 200 mg capsule Take 1 Cap by mouth two (2) times a day.  fluticasone propionate (FLONASE) 50 mcg/actuation nasal spray 2 Sprays by Both Nostrils route daily.  aspirin delayed-release 81 mg tablet Take 1 tablet by mouth once daily    glucose blood VI test strips (True Metrix Glucose Test Strip) strip To use to check blood sugar daily,  Dx: E11.9    fenofibrate (LOFIBRA) 160 mg tablet TAKE 1 TABLET BY MOUTH EVERY DAY    gabapentin (NEURONTIN) 300 mg capsule Take 1 Cap by mouth three (3) times daily. Max Daily Amount: 900 mg.  flecainide (TAMBOCOR) 150 mg tablet Take 1 Tab by mouth two (2) times a day.     albuterol (PROVENTIL HFA, VENTOLIN HFA, PROAIR HFA) 90 mcg/actuation inhaler Take 2 Puffs by inhalation every four (4) hours as needed for Wheezing.  butalbital-acetaminophen-caffeine (FIORICET, ESGIC) -40 mg per tablet Take 1 Tab by mouth every six (6) hours as needed for Headache.  canagliflozin (Invokana) 300 mg tablet TAKE 1 TABLET BY MOUTH ONCE DAILY BEFORE BREAKFAST    ergocalciferol (ERGOCALCIFEROL) 1,250 mcg (50,000 unit) capsule Take 1 Cap by mouth two (2) times a week.  famotidine (PEPCID) 40 mg tablet TAKE 1 TABLET BY MOUTH EVERY DAY TAKES EVERY BEDTIME    metFORMIN (GLUCOPHAGE) 1,000 mg tablet TAKE 1 TABLET BY MOUTH TWICE A DAY    omeprazole (PRILOSEC) 20 mg capsule TAKE 1 CAPSULE BY MOUTH ONCE DAILY    simvastatin (ZOCOR) 20 mg tablet TAKE 1 TABLET BY MOUTH EVERY DAY    SITagliptin (Januvia) 100 mg tablet Take 1 tablet by mouth once daily    SUMAtriptan (IMITREX) 50 mg tablet TAKE 1 TABLET BY MOUTH AT THE ONSET OF A MIGRAINE HEADACHE. REPEAT IN 1 HOUR IF NEEDED. TAKE NO MORE THAN 2 TABLETS PER 24 HOURS    topiramate (TOPAMAX) 50 mg tablet Take 1 tab twice a day for migraine prevention    triamcinolone acetonide (KENALOG) 0.1 % ointment APPLY TO AFFECTED AREA TWICE A DAY. USE THIN LAYER    Blood-Glucose Meter monitoring kit To use to check blood sugar daily. Dx: E11.9    cyclobenzaprine (FLEXERIL) 10 mg tablet TAKE 1 TABLET BY MOUTH THREE TIMES DAILY AS NEEDED FOR MUSCLE SPASM DO  NOT  DRIVE  WHILE  TAKING  THIS  MEDICATION    Lancets (MICROLET LANCET) Misc USE TO CHECK BLOOD SUGAR ONE TO TWO TIMES DAILY (Patient taking differently: as needed. USE TO CHECK BLOOD SUGAR ONE TO TWO TIMES DAILY)     No current facility-administered medications for this visit. I have reviewed the nurses notes, vitals, problem list, allergy list, medical history, family, social history and medications. REVIEW OF SYMPTOMS      General: Pt denies excessive weight gain or loss.  Pt is able to conduct ADL's  HEENT: Denies blurred vision, headaches, hearing loss, epistaxis and difficulty swallowing. Respiratory: Denies cough, congestion, shortness of breath, RICE, wheezing or stridor. Cardiovascular: Denies precordial pain, palpitations, edema or PND  Gastrointestinal: Denies poor appetite, indigestion, abdominal pain or blood in stool  Genitourinary: Denies hematuria, dysuria, increased urinary frequency  Musculoskeletal: Denies joint pain or swelling from muscles or joints  Neurologic: Denies tremor, paresthesias, headache, or sensory motor disturbance  Psychiatric: Denies confusion, insomnia, depression  Integumentray: Denies rash, itching or ulcers. Hematologic: Denies easy bruising, bleeding       PHYSICAL EXAMINATION      Vitals: see vitals section  General: Well developed, in no acute distress. HEENT: No jaundice, oral mucosa moist, no oral ulcers  Neck: Supple, no stiffness, no lymphadenopathy, supple  Heart:  Normal S1/S2 negative S3 or S4. Regular, no murmur, gallop or rub, no jugular venous distention  Respiratory: Clear bilaterally x 4, no wheezing or rales  Abdomen:   Soft, non-tender, bowel sounds are active. Extremities:  No edema, normal cap refill, no cyanosis. Musculoskeletal: No clubbing, no deformities  Neuro: A&Ox3, speech clear, gait stable, cooperative, no focal neurologic deficits  Skin: Skin color is normal. No rashes or lesions.  Non diaphoretic, moist.  Vascular: 2+ pulses symmetric in all extremities       DIAGNOSTIC DATA      EKG:        LABORATORY DATA      Lab Results   Component Value Date/Time    WBC 8.5 06/24/2020 08:50 AM    HGB 14.4 06/24/2020 08:50 AM    HCT 42.5 06/24/2020 08:50 AM    PLATELET 741 62/53/0430 08:50 AM    MCV 86 06/24/2020 08:50 AM      Lab Results   Component Value Date/Time    Sodium 143 06/24/2020 08:50 AM    Potassium 4.2 06/24/2020 08:50 AM    Chloride 105 06/24/2020 08:50 AM    CO2 21 06/24/2020 08:50 AM    Anion gap 11 10/11/2019 11:02 AM    Glucose 147 (H) 06/24/2020 08:50 AM    BUN 8 06/24/2020 08:50 AM    Creatinine 0.87 06/24/2020 08:50 AM    BUN/Creatinine ratio 9 06/24/2020 08:50 AM    GFR est AA 96 06/24/2020 08:50 AM    GFR est non-AA 83 06/24/2020 08:50 AM    Calcium 9.4 06/24/2020 08:50 AM    Bilirubin, total 0.2 06/24/2020 08:50 AM    Alk. phosphatase 54 06/24/2020 08:50 AM    Protein, total 6.9 06/24/2020 08:50 AM    Albumin 4.6 06/24/2020 08:50 AM    Globulin 3.4 10/11/2019 11:02 AM    A-G Ratio 2.0 06/24/2020 08:50 AM    ALT (SGPT) 29 06/24/2020 08:50 AM           ASSESSMENT      1. Inappropriate sinus tachycardia  2. Diabetes mellitus  3. Orthostatic hypotension  4. GERD  5. Anxiety       PLAN     She has previously discussed EPS with Dr. Belkys Valdez. Will plan on EPS at [facility] with conscious sedation. Hold flecainide and acebutolol 7 days prior. Ok to continue asa. ICD-10-CM ICD-9-CM    1. Tachycardia  R00.0 785.0 AMB POC EKG ROUTINE W/ 12 LEADS, INTER & REP   2. Inappropriate sinus tachycardia  R00.0 427.89    3. Palpitations  R00.2 785.1    4. Fatigue, unspecified type  R53.83 780.79    5. Type 2 diabetes mellitus without complication, without long-term current use of insulin (HCC)  E11.9 250.00    6. Essential hypertension  I10 401.9      Orders Placed This Encounter    AMB POC EKG ROUTINE W/ 12 LEADS, INTER & REP     Order Specific Question:   Reason for Exam:     Answer: Tachy          FOLLOW-UP   Post procedure    Thank you, Magaly Xiong NP for allowing me to participate in the care of this extraordinarily pleasant female. Please do not hesitate to contact me for further questions/concerns.      GILBERTO Bello [facility] 92.  [address] [facility], [facility], [address] [place] [address], [address]    [facility], [facility]  [phone] / [phone] Fax   [phone] / [phone] Fax

## 2020-10-15 NOTE — PROGRESS NOTES
Room 3    Chest pain: no  Shortness of breath: sometimes  Edema: no  Palpitations, Skipped beats, Rapid heartbeat: yes, fluttering sometimes  Dizziness: no    New diagnosis/Surgeries: no    ER/Hospitalizations: no    Refills: no

## 2020-10-19 ENCOUNTER — OFFICE VISIT (OUTPATIENT)
Dept: FAMILY MEDICINE CLINIC | Age: 40
End: 2020-10-19
Payer: MEDICAID

## 2020-10-19 VITALS
RESPIRATION RATE: 16 BRPM | HEART RATE: 82 BPM | WEIGHT: 201 LBS | SYSTOLIC BLOOD PRESSURE: 128 MMHG | HEIGHT: 65 IN | BODY MASS INDEX: 33.49 KG/M2 | OXYGEN SATURATION: 97 % | DIASTOLIC BLOOD PRESSURE: 89 MMHG | TEMPERATURE: 98 F

## 2020-10-19 DIAGNOSIS — E11.49 OTHER DIABETIC NEUROLOGICAL COMPLICATION ASSOCIATED WITH TYPE 2 DIABETES MELLITUS (HCC): Primary | ICD-10-CM

## 2020-10-19 DIAGNOSIS — M79.672 BILATERAL FOOT PAIN: ICD-10-CM

## 2020-10-19 DIAGNOSIS — M79.671 BILATERAL FOOT PAIN: ICD-10-CM

## 2020-10-19 PROCEDURE — 3051F HG A1C>EQUAL 7.0%<8.0%: CPT | Performed by: NURSE PRACTITIONER

## 2020-10-19 PROCEDURE — 99213 OFFICE O/P EST LOW 20 MIN: CPT | Performed by: NURSE PRACTITIONER

## 2020-10-19 RX ORDER — GABAPENTIN 400 MG/1
400 CAPSULE ORAL 3 TIMES DAILY
Qty: 90 CAP | Refills: 1 | Status: SHIPPED | OUTPATIENT
Start: 2020-10-19 | End: 2020-12-18

## 2020-10-19 NOTE — PROGRESS NOTES
HISTORY OF PRESENT ILLNESS  Audrey Tuttle is a 37 y.o. female. HPI   Pt presents with \"neuropathy pain\"  Pt states that she has been taking the Gabapentin as prescribed, but her pain has returned  She states that she is uncertain if she has gotten immune to medication, or what? She was seen by podiatry in June, and he stated that pain was neuropathy related and should increase Gabapentin. Pain is only noted at night  Pain feels like pins and needles in both feet  Review of Systems   Constitutional: Negative for fever. Physical Exam  Constitutional:       Appearance: Normal appearance. Cardiovascular:      Rate and Rhythm: Normal rate and regular rhythm. Heart sounds: Normal heart sounds. Pulmonary:      Effort: Pulmonary effort is normal.      Breath sounds: Normal breath sounds. Neurological:      Mental Status: She is alert. Psychiatric:         Mood and Affect: Mood normal.         Behavior: Behavior normal.         ASSESSMENT and PLAN    ICD-10-CM ICD-9-CM    1. Other diabetic neurological complication associated with type 2 diabetes mellitus (HCC)  E11.49 250.60 gabapentin (NEURONTIN) 400 mg capsule   2. Bilateral foot pain  M79.671 729.5 REFERRAL TO ORTHOPEDICS    E60.208       Educated about increasing gabapentin slightly  Educated that there is max of this medication, so have to be cautious continuing to increase  Could be seen by ortho to rule out anything else causing pain, as well    Pt informed to return to office with worsening of symptoms, or PRN with any questions or concerns. Pt verbalizes understanding of plan of care and denies further questions or concerns at this time.

## 2020-10-19 NOTE — PROGRESS NOTES
Identified pt with two pt identifiers(name and ). Chief Complaint   Patient presents with    Foot Pain     chronic pain - foot doctor increased gabapentin, however, medication does not seem to be working        Health Maintenance Due   Topic    Eye Exam Retinal or Dilated     DTaP/Tdap/Td series (2 - Td)    PAP AKA CERVICAL CYTOLOGY     MICROALBUMIN Q1        Wt Readings from Last 3 Encounters:   10/19/20 201 lb (91.2 kg)   10/15/20 204 lb (92.5 kg)   10/07/20 198 lb (89.8 kg)     Temp Readings from Last 3 Encounters:   10/19/20 98 °F (36.7 °C) (Oral)   10/07/20 98 °F (36.7 °C) (Oral)   20 98 °F (36.7 °C)     BP Readings from Last 3 Encounters:   10/19/20 128/89   10/15/20 (!) 136/98   10/07/20 127/76     Pulse Readings from Last 3 Encounters:   10/19/20 82   10/15/20 88   10/07/20 (!) 109         Learning Assessment:  :     Learning Assessment 2017   PRIMARY LEARNER Patient Patient   HIGHEST LEVEL OF EDUCATION - PRIMARY LEARNER  GRADUATED HIGH SCHOOL OR GED GRADUATED HIGH SCHOOL OR GED   BARRIERS PRIMARY LEARNER NONE NONE   PRIMARY LANGUAGE ENGLISH ENGLISH   LEARNER PREFERENCE PRIMARY READING READING   ANSWERED BY patient patient   RELATIONSHIP SELF SELF       Depression Screening:  :     3 most recent PHQ Screens 10/15/2020   Little interest or pleasure in doing things Not at all   Feeling down, depressed, irritable, or hopeless Not at all   Total Score PHQ 2 0       Fall Risk Assessment:  :     Fall Risk Assessment, last 12 mths 2017   Able to walk? Yes   Fall in past 12 months? No       Abuse Screening:  :     Abuse Screening Questionnaire 2020   Do you ever feel afraid of your partner? N N N N N   Are you in a relationship with someone who physically or mentally threatens you? N N N N N   Is it safe for you to go home?  Stefan Gutierrez       Coordination of Care Questionnaire:  :     1) Have you been to an emergency room, urgent care clinic since your last visit? no   Hospitalized since your last visit? no             2) Have you seen or consulted any other health care providers outside of [address] [place] [address] since your last visit? no  (Include any pap smears or colon screenings in this section.)    3) Do you have an Advance Directive on file? no  Are you interested in receiving information about Advance Directives? no    Patient is accompanied by self. Reviewed record in preparation for visit and have obtained necessary documentation. Medication reconciliation up to date and corrected with patient at this time.

## 2020-10-27 DIAGNOSIS — R00.2 PALPITATIONS: ICD-10-CM

## 2020-10-27 DIAGNOSIS — R00.0 INAPPROPRIATE SINUS TACHYCARDIA: Primary | ICD-10-CM

## 2020-10-27 DIAGNOSIS — Z01.812 PRE-PROCEDURE LAB EXAM: ICD-10-CM

## 2020-10-27 DIAGNOSIS — R00.0 INAPPROPRIATE SINUS TACHYCARDIA: ICD-10-CM

## 2020-10-27 RX ORDER — SODIUM CHLORIDE 0.9 % (FLUSH) 0.9 %
5-40 SYRINGE (ML) INJECTION EVERY 8 HOURS
Status: CANCELLED | OUTPATIENT
Start: 2020-10-27

## 2020-10-27 RX ORDER — SODIUM CHLORIDE 0.9 % (FLUSH) 0.9 %
5-40 SYRINGE (ML) INJECTION AS NEEDED
Status: CANCELLED | OUTPATIENT
Start: 2020-10-27

## 2020-11-09 ENCOUNTER — VIRTUAL VISIT (OUTPATIENT)
Dept: FAMILY MEDICINE CLINIC | Age: 40
End: 2020-11-09
Payer: MEDICAID

## 2020-11-09 DIAGNOSIS — J01.00 ACUTE NON-RECURRENT MAXILLARY SINUSITIS: Primary | ICD-10-CM

## 2020-11-09 PROCEDURE — 99213 OFFICE O/P EST LOW 20 MIN: CPT | Performed by: NURSE PRACTITIONER

## 2020-11-09 RX ORDER — CEFDINIR 300 MG/1
300 CAPSULE ORAL 2 TIMES DAILY
Qty: 20 CAP | Refills: 0 | Status: ON HOLD | OUTPATIENT
Start: 2020-11-09 | End: 2020-11-20

## 2020-11-09 NOTE — PROGRESS NOTES
HISTORY OF PRESENT ILLNESS  Sophia Koenig is a 37 y.o. female. HPI  Pt presents with \"sinus infection\"  Visit was conducted via [employer] me  Pt was located at home, provider was located at [facility]  Visit lasted 2 minutes  Sophia Koenig, who was evaluated through a synchronous (real-time) audio-video encounter, and/or her healthcare decision maker, is aware that it is a billable service, with coverage as determined by her insurance carrier. She provided verbal consent to proceed: Yes, and patient identification was verified. It was conducted pursuant to the emergency declaration under the [facility], [address] [place] [address] authority and the [employer] and [employer] Act. A caregiver was present when appropriate. Ability to conduct physical exam was limited. I was in the office. The patient was at home. Pt states that she believes that she has a sinus infection  Symptoms have been present for about 4 days  Nasal congestion  Sinus pressure  Thick green nasal mucous with blood  OTC: Maritza Troy, Tylenol  NO fever  Review of Systems   Constitutional: Negative for fever. HENT: Positive for congestion and sinus pain. Physical Exam  Constitutional:       Appearance: Normal appearance. Neurological:      Mental Status: She is alert. Psychiatric:         Mood and Affect: Mood normal.         ASSESSMENT and PLAN    ICD-10-CM ICD-9-CM    1. Acute non-recurrent maxillary sinusitis  J01.00 461.0      Educated about taking medication as prescribed, with food  Should stay well hydrated and treat fever as needed    Pt informed to return to office with worsening of symptoms, or PRN with any questions or concerns. Pt verbalizes understanding of plan of care and denies further questions or concerns at this time.

## 2020-11-11 ENCOUNTER — OFFICE VISIT (OUTPATIENT)
Dept: NEUROLOGY | Age: 40
End: 2020-11-11
Payer: MEDICAID

## 2020-11-11 VITALS
HEART RATE: 120 BPM | HEIGHT: 65 IN | WEIGHT: 204 LBS | SYSTOLIC BLOOD PRESSURE: 129 MMHG | DIASTOLIC BLOOD PRESSURE: 81 MMHG | OXYGEN SATURATION: 99 % | BODY MASS INDEX: 33.99 KG/M2 | TEMPERATURE: 97.3 F | RESPIRATION RATE: 16 BRPM

## 2020-11-11 DIAGNOSIS — R20.2 PARESTHESIA: Primary | ICD-10-CM

## 2020-11-11 DIAGNOSIS — R20.2 PARESTHESIA: ICD-10-CM

## 2020-11-11 PROCEDURE — 99215 OFFICE O/P EST HI 40 MIN: CPT | Performed by: PSYCHIATRY & NEUROLOGY

## 2020-11-11 NOTE — LETTER
11/11/20 Patient: Beatrice Noriega YOB: 1980 Date of Visit: 11/11/2020 Donnie Dumont NP 
[address] [facility] [phone] VIA In Basket Dear Donnie Dumont NP, Thank you for referring Ms. Danica Guzman to [facility] for evaluation. My notes for this consultation are attached. If you have questions, please do not hesitate to call me. I look forward to following your patient along with you. Sincerely, Juana Monahan MD

## 2020-11-11 NOTE — PROGRESS NOTES
Neurology Progress Note    Patient ID:  Bravo Mathis  [id number]  37 y.o.  1980      Subjective:   History:  Bravo Mathis is a female who  has a past medical history of lower back surgery 15 yrs ago with residual R lower back pain, Anxiety, Arrhythmia, Asthma, Diabetes ([facility] [address] (2008), Ear infection (11/30/2018), GERD (gastroesophageal reflux disease), Headache, Headache(784.0), Hypertension, Inappropriate sinus tachycardia (5/20/2019), Rash (7/14/2016), Recurrent umbilical hernia (94/87/6350), S/P dilatation and curettage, and Tachycardia who since her late 20's, has been noticing baseline heart rate in the 100s with sudden episodes of increased heart rate (Max 160s) associated with chest tightness and feeling \"drained\" lasting for 1-2 mins. Happening daily, no aggravating/relieving factors. Tilt table negative for POTS. Patient now comes in with a different concern.     Since June 2020, patient noted burning numbness/discomfort of forefront of both feet, involving all toes and symmetric. Patient was placed on gabapentin 400 mg TID with benefit c/o PCP (-) weakness (-) falls    (+) diabetes for 12 yrs    Patient seen a podiatrist.     ANS Testing:(2/14/20): Insurance only approved Tilt table testing  NORMAL     No orthostatic hypotension or significant tachycardia noted during head-up tilt, despite patient reported that she \"felt warm\".         ROS:  Per HPI-  Otherwise the remainder of ROS was negative    Social Hx  Social History     Socioeconomic History    Marital status:      Spouse name: Not on file    Number of children: Not on file    Years of education: Not on file    Highest education level: Not on file   Tobacco Use    Smoking status: Never Smoker    Smokeless tobacco: Never Used   Substance and Sexual Activity    Alcohol use: No    Drug use: Never    Sexual activity: Yes     Partners: Male     Birth control/protection: Surgical       Meds:  Current Outpatient Medications on File Prior to Visit   Medication Sig Dispense Refill    gabapentin (NEURONTIN) 400 mg capsule Take 1 Cap by mouth three (3) times daily. Max Daily Amount: 1,200 mg. 90 Cap 1    EPINEPHrine (EPIPEN) 0.3 mg/0.3 mL injection INJECT CONTENTS OF 1 PEN INTRAMUSCULARLY ONCE AS NEEDED FOR ALLERGIC REACTION FOR UP TO 1 DOSE      loratadine (CLARITIN) 10 mg tablet Take 1 Tab by mouth daily. To replace xyzal. 30 Tab 5    acebutoloL (SECTRAL) 200 mg capsule Take 1 Cap by mouth two (2) times a day. 180 Cap 2    fluticasone propionate (FLONASE) 50 mcg/actuation nasal spray 2 Sprays by Both Nostrils route daily. 1 Bottle 0    aspirin delayed-release 81 mg tablet Take 1 tablet by mouth once daily 30 Tab 5    glucose blood VI test strips (True Metrix Glucose Test Strip) strip To use to check blood sugar daily,  Dx: E11.9 100 Strip 2    fenofibrate (LOFIBRA) 160 mg tablet TAKE 1 TABLET BY MOUTH EVERY DAY 30 Tab 1    flecainide (TAMBOCOR) 150 mg tablet Take 1 Tab by mouth two (2) times a day. 60 Tab 3    albuterol (PROVENTIL HFA, VENTOLIN HFA, PROAIR HFA) 90 mcg/actuation inhaler Take 2 Puffs by inhalation every four (4) hours as needed for Wheezing. 1 Inhaler 5    butalbital-acetaminophen-caffeine (FIORICET, ESGIC) -40 mg per tablet Take 1 Tab by mouth every six (6) hours as needed for Headache. 20 Tab 1    canagliflozin (Invokana) 300 mg tablet TAKE 1 TABLET BY MOUTH ONCE DAILY BEFORE BREAKFAST 30 Tab 5    ergocalciferol (ERGOCALCIFEROL) 1,250 mcg (50,000 unit) capsule Take 1 Cap by mouth two (2) times a week.  24 Cap 0    famotidine (PEPCID) 40 mg tablet TAKE 1 TABLET BY MOUTH EVERY DAY TAKES EVERY BEDTIME 30 Tab 5    metFORMIN (GLUCOPHAGE) 1,000 mg tablet TAKE 1 TABLET BY MOUTH TWICE A DAY 60 Tab 5    omeprazole (PRILOSEC) 20 mg capsule TAKE 1 CAPSULE BY MOUTH ONCE DAILY 30 Cap 5    simvastatin (ZOCOR) 20 mg tablet TAKE 1 TABLET BY MOUTH EVERY DAY 30 Tab 5    SITagliptin (Januvia) 100 mg tablet Take 1 tablet by mouth once daily 30 Tab 5    SUMAtriptan (IMITREX) 50 mg tablet TAKE 1 TABLET BY MOUTH AT THE ONSET OF A MIGRAINE HEADACHE. REPEAT IN 1 HOUR IF NEEDED. TAKE NO MORE THAN 2 TABLETS PER 24 HOURS 9 Tab 5    topiramate (TOPAMAX) 50 mg tablet Take 1 tab twice a day for migraine prevention 60 Tab 5    triamcinolone acetonide (KENALOG) 0.1 % ointment APPLY TO AFFECTED AREA TWICE A DAY. USE THIN LAYER 30 g 2    cyclobenzaprine (FLEXERIL) 10 mg tablet TAKE 1 TABLET BY MOUTH THREE TIMES DAILY AS NEEDED FOR MUSCLE SPASM DO  NOT  DRIVE  WHILE  TAKING  THIS  MEDICATION 30 Tab 0    Lancets (MICROLET LANCET) Misc USE TO CHECK BLOOD SUGAR ONE TO TWO TIMES DAILY (Patient taking differently: as needed. USE TO CHECK BLOOD SUGAR ONE TO TWO TIMES DAILY) 100 Each 0    cefdinir (OMNICEF) 300 mg capsule Take 1 Cap by mouth two (2) times a day for 10 days. 20 Cap 0     No current facility-administered medications on file prior to visit. Imaging:    CT Results (recent):  Results from Hospital Encounter encounter on 03/15/19   CT ABD PELV W CONT    Narrative EXAM:  CT abdomen and pelvis with contrast    INDICATION: Generalized body aches and fever. Left abdominal pain. Oophorectomy  yesterday. COMPARISON: CT 3/2/2017. TECHNIQUE: Helical CT of the abdomen  and pelvis  following the uneventful  intravenous administration of nonionic contrast.  Coronal and sagittal reformats  are performed. CT dose reduction was achieved through use of a standardized  protocol tailored for this examination and automatic exposure control for dose  modulation. FINDINGS:   The visualized lung bases demonstrate no mass or consolidation. The heart size  is normal. There is no pericardial or pleural effusion. The liver is diffusely low in attenuation. The spleen, pancreas, and adrenal  glands are normal. The gall bladder is surgically absent without intra- or  extra-hepatic biliary dilatation.       The kidneys are symmetric without hydronephrosis. There is a moderate to large amount of colonic stool. There are no dilated bowel  loops. The appendix is surgically absent. There are no enlarged lymph nodes. The aorta tapers without aneurysm. A small amount of postsurgical fluid and fat stranding are noted in the pelvis. There is a small amount of air in the urinary bladder and scattered air in the  anterior abdominal wall, which are likely post catheter/postsurgical changes. There is no abscess or drainable collection. The uterus is surgically absent. There is degenerative change at L5-S1. There is no aggressive bony lesion. Impression IMPRESSION:   Expected postsurgical changes in the pelvis without evidence for abscess,  drainable collection, or other acute abnormality in the abdomen or pelvis. A  moderate to large amount of colonic stool is noted. Hepatic steatosis. MRI Results (recent):  Results from [place] encounter on 01/20/20   MRA PELV W CONT    Narrative History: Sinus tachycardia evaluate for May Thurner    INDICATION: Sinus tachycardia    COMPARISON:  None    TECHNIQUE:  3-D time-of-flight and contrast-enhanced MRA of the pelvis was  performed. Multiplanar reconstructions were obtained. FINDINGS:    The abdominal aorta is normal in caliber. Celiac and SMA are normal.. The renal  arteries are normal as well. . There are single renal arteries on the right and  on the left. . There is no evidence of mesenteric arterial stenosis. . Visualized  venous structures including the right and left common iliac veins and IVC are  patent. . There is no evidence of venous stenosis. .      Impression IMPRESSION:  No evidence of venous stenosis. .  No arterial abnormalities are demonstrated. IR Results (recent):  No results found for this or any previous visit.     VAS/US Results (recent):  Results from Hospital Encounter encounter on 11/15/17   [address] records in [place] and media tab today    Lab Review     Ancillary Procedure on 09/08/2020   Component Date Value Ref Range Status    Target HR 09/08/2020 179  bpm Final    Baseline HR 09/08/2020 110  bpm Final    Baseline BP 09/08/2020 138  mmHg Final    O2 sat rest 09/08/2020 97  % Final    Percent HR 09/08/2020 87  % Final    Estimated workload 09/08/2020 7.0  METS Final    Post peak HR 09/08/2020 155  bpm Final    O2 sat peak 09/08/2020 99  % Final    Angina Index 09/08/2020 2   Final    Stress Base Diastolic BP 46/10/4453 80  mmHg Final    Exercise duration time 09/08/2020 6:0  min:sec Final    Stress Base Systolic BP 47/05/4391 996  mmHg Final    Stress Base Diastolic BP 23/85/5879 86  mmHg Final    Stress Rate Pressure Product 09/08/2020 24,800  bpm*mmHg Final    STRESS SR ELDRIDGE TREADMILL SCORE 09/08/2020 -2   Final    ST Elevation (mm) 09/08/2020 0  mm Final    ST Depression (mm) 09/08/2020 0  mm Final         Objective:       Exam:  Visit Vitals  /81 (BP 1 Location: Left arm, BP Patient Position: Sitting)   Pulse (!) 120   Temp 97.3 °F (36.3 °C)   Resp 16   Ht 5' 5\" (1.651 m)   Wt 92.5 kg (204 lb)   SpO2 99%   BMI 33.95 kg/m²     Gen: Awake, alert, follows commands  Appropriate appearance, normal speech. Oriented to all spheres. No visual field defect on confrontation exam.  Full eyes movement, with no nystagmus, no diplopia, no ptosis. Normal gag and swallow. All remaining cranial nerves were normal  Motor function: 5/5 in all extremities  Sensory: dec PP bottom of both feet  DTRs ++ UE and knees, absent ankles (-) Babinski  Good FTN and HTS   Gait: Able to walk on toes and heels    Assessment:       ICD-10-CM ICD-9-CM    1. Paresthesia  R20.2 782.0 EMG LIMITED      VITAMIN B12 & FOLATE      VITAMIN B6      SHAI, DIRECT, W/REFLEX      SED RATE (ESR)      SPEP AND MARYLOU, SERUM       Possible sensory polyneuropathy due to long history of diabetes    Plan:   1.  Discussed Tilt table was unremarkable for POTS  2. EMG/NCS of both LE with medial plantar study If sural sensory is normal (Of note, has history of lower back surgery with residual R lower back pain)  3. Blood test as above  4. Trial of compounded pain cream  5. Continue Gabapentin 400 mg TID c/o PCP  6. Advise weight loss and exercise      Follow-up and Dispositions    · Return for above testing.                Fracisco Razo MD  Diplomate, American Board of Psychiatry and Neurology  Diplomate, Neuromuscular Medicine  Diplomate, American Board of Electrodiagnostic Medicine

## 2020-11-11 NOTE — PROGRESS NOTES
Chief Complaint   Patient presents with    Follow-up     C/o feet pain, worse when laying down at night.  PCP gave her gabapentin, increased it with no relief, referred to Podiatrist--he recommended her see a neurologist.     Visit Vitals  /81 (BP 1 Location: Left arm, BP Patient Position: Sitting)   Pulse (!) 120   Temp 97.3 °F (36.3 °C)   Resp 16   Ht 5' 5\" (1.651 m)   Wt 92.5 kg (204 lb)   SpO2 99%   BMI 33.95 kg/m²

## 2020-11-12 LAB
BUN SERPL-MCNC: 10 MG/DL (ref 6–24)
BUN/CREAT SERPL: 10 (ref 9–23)
CALCIUM SERPL-MCNC: 9.8 MG/DL (ref 8.7–10.2)
CHLORIDE SERPL-SCNC: 101 MMOL/L (ref 96–106)
CO2 SERPL-SCNC: 19 MMOL/L (ref 20–29)
CREAT SERPL-MCNC: 1 MG/DL (ref 0.57–1)
ERYTHROCYTE [DISTWIDTH] IN BLOOD BY AUTOMATED COUNT: 12.8 % (ref 11.7–15.4)
GLUCOSE SERPL-MCNC: 235 MG/DL (ref 65–99)
HCT VFR BLD AUTO: 46.3 % (ref 34–46.6)
HGB BLD-MCNC: 15.2 G/DL (ref 11.1–15.9)
INR PPP: 1 (ref 0.9–1.2)
MCH RBC QN AUTO: 28.7 PG (ref 26.6–33)
MCHC RBC AUTO-ENTMCNC: 32.8 G/DL (ref 31.5–35.7)
MCV RBC AUTO: 87 FL (ref 79–97)
PLATELET # BLD AUTO: 273 X10E3/UL (ref 150–450)
POTASSIUM SERPL-SCNC: 4.7 MMOL/L (ref 3.5–5.2)
PROTHROMBIN TIME: 10.3 SEC (ref 9.1–12)
RBC # BLD AUTO: 5.3 X10E6/UL (ref 3.77–5.28)
SODIUM SERPL-SCNC: 138 MMOL/L (ref 134–144)
WBC # BLD AUTO: 10.6 X10E3/UL (ref 3.4–10.8)

## 2020-11-16 ENCOUNTER — TRANSCRIBE ORDER (OUTPATIENT)
Dept: REGISTRATION | Age: 40
End: 2020-11-16

## 2020-11-16 ENCOUNTER — HOSPITAL ENCOUNTER (OUTPATIENT)
Dept: PREADMISSION TESTING | Age: 40
Discharge: HOME | End: 2020-11-16
Payer: MEDICAID

## 2020-11-16 DIAGNOSIS — R00.2 PALPITATIONS: ICD-10-CM

## 2020-11-16 DIAGNOSIS — Z01.812 PRE-PROCEDURE LAB EXAM: ICD-10-CM

## 2020-11-16 DIAGNOSIS — R00.0 INAPPROPRIATE SINUS TACHYCARDIA: ICD-10-CM

## 2020-11-16 DIAGNOSIS — Z01.812 PRE-PROCEDURE LAB EXAM: Primary | ICD-10-CM

## 2020-11-16 PROCEDURE — 87635 SARS-COV-2 COVID-19 AMP PRB: CPT

## 2020-11-17 LAB
ALBUMIN SERPL ELPH-MCNC: 4.1 G/DL (ref 2.9–4.4)
ALBUMIN/GLOB SERPL: 1.4 {RATIO} (ref 0.7–1.7)
ALPHA1 GLOB SERPL ELPH-MCNC: 0.2 G/DL (ref 0–0.4)
ALPHA2 GLOB SERPL ELPH-MCNC: 0.7 G/DL (ref 0.4–1)
ANA SER QL: NEGATIVE
B-GLOBULIN SERPL ELPH-MCNC: 1.2 G/DL (ref 0.7–1.3)
ERYTHROCYTE [SEDIMENTATION RATE] IN BLOOD BY WESTERGREN METHOD: 9 MM/HR (ref 0–32)
FOLATE SERPL-MCNC: 10.7 NG/ML
GAMMA GLOB SERPL ELPH-MCNC: 0.9 G/DL (ref 0.4–1.8)
GLOBULIN SER-MCNC: 3.1 G/DL (ref 2.2–3.9)
IGA SERPL-MCNC: 339 MG/DL (ref 87–352)
IGG SERPL-MCNC: 802 MG/DL (ref 586–1602)
IGM SERPL-MCNC: 95 MG/DL (ref 26–217)
INTERPRETATION SERPL IEP-IMP: NORMAL
M PROTEIN SERPL ELPH-MCNC: NORMAL G/DL
PLEASE NOTE:, 149534: NORMAL
PROT SERPL-MCNC: 7.2 G/DL (ref 6–8.5)
SARS-COV-2, COV2NT: NOT DETECTED
VIT B12 SERPL-MCNC: 196 PG/ML (ref 232–1245)
VIT B6 SERPL-MCNC: 6.9 UG/L (ref 2–32.8)

## 2020-11-18 NOTE — PROGRESS NOTES
Pls inform patient is Vit B12 deficient which can cause paresthesias. P> Advise to get Vit B12 IM injections c/o PCP to raise Vit B12 level to high normal range  11/18/20 Sent patient a Lookout message with lab results and MDs recommendations.   Mike Swann LPN

## 2020-11-19 ENCOUNTER — ANESTHESIA EVENT (OUTPATIENT)
Dept: CARDIAC CATH/INVASIVE PROCEDURES | Age: 40
End: 2020-11-19
Payer: MEDICAID

## 2020-11-19 RX ORDER — LIDOCAINE HYDROCHLORIDE 10 MG/ML
0.1 INJECTION, SOLUTION EPIDURAL; INFILTRATION; INTRACAUDAL; PERINEURAL AS NEEDED
Status: CANCELLED | OUTPATIENT
Start: 2020-11-19

## 2020-11-19 RX ORDER — SODIUM CHLORIDE, SODIUM LACTATE, POTASSIUM CHLORIDE, CALCIUM CHLORIDE 600; 310; 30; 20 MG/100ML; MG/100ML; MG/100ML; MG/100ML
125 INJECTION, SOLUTION INTRAVENOUS CONTINUOUS
Status: CANCELLED | OUTPATIENT
Start: 2020-11-19

## 2020-11-19 RX ORDER — HYDROMORPHONE HYDROCHLORIDE 1 MG/ML
.5-1 INJECTION, SOLUTION INTRAMUSCULAR; INTRAVENOUS; SUBCUTANEOUS
Status: CANCELLED | OUTPATIENT
Start: 2020-11-19

## 2020-11-19 NOTE — PROGRESS NOTES
5:52 PM    Spoke with patient to verify arrival time, to remain NPO after midnight, and  for transportation home. Patient verbalized understanding.

## 2020-11-20 ENCOUNTER — HOSPITAL ENCOUNTER (OUTPATIENT)
Age: 40
Setting detail: OUTPATIENT SURGERY
Discharge: HOME | End: 2020-11-20
Attending: INTERNAL MEDICINE | Admitting: INTERNAL MEDICINE
Payer: MEDICAID

## 2020-11-20 ENCOUNTER — ANESTHESIA (OUTPATIENT)
Dept: CARDIAC CATH/INVASIVE PROCEDURES | Age: 40
End: 2020-11-20
Payer: MEDICAID

## 2020-11-20 VITALS
HEART RATE: 101 BPM | SYSTOLIC BLOOD PRESSURE: 118 MMHG | HEIGHT: 65 IN | OXYGEN SATURATION: 100 % | DIASTOLIC BLOOD PRESSURE: 83 MMHG | TEMPERATURE: 98.4 F | RESPIRATION RATE: 17 BRPM | WEIGHT: 200.19 LBS | BODY MASS INDEX: 33.35 KG/M2

## 2020-11-20 DIAGNOSIS — R00.0 TACHYCARDIA, UNSPECIFIED: ICD-10-CM

## 2020-11-20 LAB
COMMENT, HOLDF: NORMAL
GLUCOSE BLD STRIP.AUTO-MCNC: 115 MG/DL (ref 65–100)
HCG UR QL: NEGATIVE
SAMPLES BEING HELD,HOLD: NORMAL
SERVICE CMNT-IMP: ABNORMAL

## 2020-11-20 PROCEDURE — 74011000250 HC RX REV CODE- 250: Performed by: INTERNAL MEDICINE

## 2020-11-20 PROCEDURE — C1733 CATH, EP, OTHR THAN COOL-TIP: HCPCS | Performed by: INTERNAL MEDICINE

## 2020-11-20 PROCEDURE — 93621 COMP EP EVL L PAC&REC C SINS: CPT | Performed by: INTERNAL MEDICINE

## 2020-11-20 PROCEDURE — C1732 CATH, EP, DIAG/ABL, 3D/VECT: HCPCS | Performed by: INTERNAL MEDICINE

## 2020-11-20 PROCEDURE — 81025 URINE PREGNANCY TEST: CPT

## 2020-11-20 PROCEDURE — 82962 GLUCOSE BLOOD TEST: CPT

## 2020-11-20 PROCEDURE — 93621 COMP EP EVL L PAC&REC C SINS: CPT

## 2020-11-20 PROCEDURE — 93613 INTRACARDIAC EPHYS 3D MAPG: CPT

## 2020-11-20 PROCEDURE — C1730 CATH, EP, 19 OR FEW ELECT: HCPCS | Performed by: INTERNAL MEDICINE

## 2020-11-20 PROCEDURE — 74011250636 HC RX REV CODE- 250/636: Performed by: INTERNAL MEDICINE

## 2020-11-20 PROCEDURE — 93653 COMPRE EP EVAL TX SVT: CPT | Performed by: INTERNAL MEDICINE

## 2020-11-20 PROCEDURE — C1893 INTRO/SHEATH, FIXED,NON-PEEL: HCPCS | Performed by: INTERNAL MEDICINE

## 2020-11-20 PROCEDURE — 99152 MOD SED SAME PHYS/QHP 5/>YRS: CPT | Performed by: INTERNAL MEDICINE

## 2020-11-20 PROCEDURE — 77030018729 HC ELECTRD DEFIB PAD CARD -B: Performed by: INTERNAL MEDICINE

## 2020-11-20 PROCEDURE — 77030027107 HC PTCH EXT REF CARTO3 J&J -F: Performed by: INTERNAL MEDICINE

## 2020-11-20 PROCEDURE — 99153 MOD SED SAME PHYS/QHP EA: CPT | Performed by: INTERNAL MEDICINE

## 2020-11-20 PROCEDURE — 93620 COMP EP EVL R AT VEN PAC&REC: CPT | Performed by: INTERNAL MEDICINE

## 2020-11-20 PROCEDURE — C1894 INTRO/SHEATH, NON-LASER: HCPCS | Performed by: INTERNAL MEDICINE

## 2020-11-20 PROCEDURE — 93653 COMPRE EP EVAL TX SVT: CPT

## 2020-11-20 PROCEDURE — 93613 INTRACARDIAC EPHYS 3D MAPG: CPT | Performed by: INTERNAL MEDICINE

## 2020-11-20 RX ORDER — ACETAMINOPHEN 325 MG/1
650 TABLET ORAL
Status: CANCELLED | OUTPATIENT
Start: 2020-11-20

## 2020-11-20 RX ORDER — MIDAZOLAM HYDROCHLORIDE 1 MG/ML
INJECTION, SOLUTION INTRAMUSCULAR; INTRAVENOUS AS NEEDED
Status: DISCONTINUED | OUTPATIENT
Start: 2020-11-20 | End: 2020-11-20 | Stop reason: HOSPADM

## 2020-11-20 RX ORDER — LIDOCAINE HYDROCHLORIDE 10 MG/ML
INJECTION INFILTRATION; PERINEURAL AS NEEDED
Status: DISCONTINUED | OUTPATIENT
Start: 2020-11-20 | End: 2020-11-20 | Stop reason: HOSPADM

## 2020-11-20 RX ORDER — DIPHENHYDRAMINE HYDROCHLORIDE 50 MG/ML
INJECTION, SOLUTION INTRAMUSCULAR; INTRAVENOUS AS NEEDED
Status: DISCONTINUED | OUTPATIENT
Start: 2020-11-20 | End: 2020-11-20 | Stop reason: HOSPADM

## 2020-11-20 RX ORDER — FENTANYL CITRATE 50 UG/ML
INJECTION, SOLUTION INTRAMUSCULAR; INTRAVENOUS AS NEEDED
Status: DISCONTINUED | OUTPATIENT
Start: 2020-11-20 | End: 2020-11-20 | Stop reason: HOSPADM

## 2020-11-20 RX ORDER — SODIUM CHLORIDE 0.9 % (FLUSH) 0.9 %
5-40 SYRINGE (ML) INJECTION AS NEEDED
Status: CANCELLED | OUTPATIENT
Start: 2020-11-20

## 2020-11-20 RX ORDER — ONDANSETRON 2 MG/ML
4 INJECTION INTRAMUSCULAR; INTRAVENOUS
Status: CANCELLED | OUTPATIENT
Start: 2020-11-20

## 2020-11-20 RX ORDER — SODIUM CHLORIDE 0.9 % (FLUSH) 0.9 %
5-40 SYRINGE (ML) INJECTION EVERY 8 HOURS
Status: CANCELLED | OUTPATIENT
Start: 2020-11-20

## 2020-11-20 RX ORDER — PROPAFENONE HYDROCHLORIDE 225 MG/1
225 CAPSULE, EXTENDED RELEASE ORAL 2 TIMES DAILY
Qty: 60 CAP | Refills: 3 | Status: ON HOLD | OUTPATIENT
Start: 2020-11-20 | End: 2021-01-11 | Stop reason: ALTCHOICE

## 2020-11-20 RX ORDER — HYDROCODONE BITARTRATE AND ACETAMINOPHEN 5; 325 MG/1; MG/1
1 TABLET ORAL
Status: CANCELLED | OUTPATIENT
Start: 2020-11-20

## 2020-11-20 RX ORDER — SODIUM CHLORIDE, SODIUM LACTATE, POTASSIUM CHLORIDE, CALCIUM CHLORIDE 600; 310; 30; 20 MG/100ML; MG/100ML; MG/100ML; MG/100ML
125 INJECTION, SOLUTION INTRAVENOUS CONTINUOUS
Status: DISCONTINUED | OUTPATIENT
Start: 2020-11-20 | End: 2020-11-20 | Stop reason: HOSPADM

## 2020-11-20 NOTE — Clinical Note
Bilateral groin clipped prepped with ChloraPrep and draped. Wet prep solution applied at: 1024. Wet prep solution dried at: 1027. Wet prep elapsed drying time: 3 mins.

## 2020-11-20 NOTE — H&P
HISTORY OF PRESENTING ILLNESS      Marina Dunbar is a 37 y.o. female with tachycardia for whom we increased the dosing of flecainide from 50mg to 100 mg twice daily during her last visit. She reported today failure to drive improvement with this dose adjustment. She has had negative ANS testing and negative MRA for May Thurner. Her flecainide was increased to 150 mg twice daily and continued acebutolol. She reports lower heart rates. She underwent negative stress testing.     She continues to have tachycardia, sob, dizziness on current regimen, EKG today shows sinus rhythm with heart rates in the 90s. Since last visit, she has also been diagnosed with alpha gal syndrome from possible previous tick bite after a severe allergic reaction.          PAST MEDICAL HISTORY           Past Medical History:   Diagnosis Date    Anxiety      Arrhythmia       HX TACHYCARDIA - NEG.  STRESS TEST 2013 PER PATIENT    Asthma       LAST EPISODE 2016    Diabetes ([facility] [address] 2008     NIDDM    Ear infection 11/30/2018     BILATERAL    GERD (gastroesophageal reflux disease)      Headache      Headache(784.0)      Hypertension       HX HTN - NO MEDS CURRENTLY(11-30-18)    Inappropriate sinus tachycardia 5/20/2019    Rash 7/14/2016    Recurrent umbilical hernia 61/37/9911    S/P dilatation and curettage      Tachycardia               PAST SURGICAL HISTORY            Past Surgical History:   Procedure Laterality Date    HX APPENDECTOMY   2/2008    HX CHOLECYSTECTOMY   2001    HX DILATION AND CURETTAGE        HX GYN   3/2008     tubal ligation    HX HERNIA REPAIR   2/2009    HX HERNIA REPAIR   12/06/2018     open recurrent umbilical hernia repair    HX HYSTERECTOMY   03/2012    HX LEFT SALPINGO-OOPHORECTOMY Left      HX LUMBAR DISKECTOMY         2006    HX ORTHOPAEDIC   9/2006     ruptured discs    UPPER GI ENDOSCOPY,BIOPSY   5/11/2017                   ALLERGIES            Allergies   Allergen Reactions    Beef Containing Products Rash    Green Pepper Hives       Red, Yellow and Orange Peppers also. OK with Black Pepper    Other Plant, Animal, Environmental Swelling       Red meat    Pork Derived (Porcine) Rash            FAMILY HISTORY            Family History   Problem Relation Age of Onset    Heart Disease Mother      Hypertension Father      Cancer Father           Lung    Hypertension Sister      No Known Problems Brother      No Known Problems Brother      No Known Problems Brother      Anesth Problems Neg Hx      negative for cardiac disease         SOCIAL HISTORY      Social History               Socioeconomic History    Marital status:        Spouse name: Not on file    Number of children: Not on file    Years of education: Not on file    Highest education level: Not on file   Tobacco Use    Smoking status: Never Smoker    Smokeless tobacco: Never Used   Substance and Sexual Activity    Alcohol use: No    Drug use: Never    Sexual activity: Yes       Partners: Male       Birth control/protection: Surgical              MEDICATIONS           Current Outpatient Medications   Medication Sig    EPINEPHrine (EPIPEN) 0.3 mg/0.3 mL injection INJECT CONTENTS OF 1 PEN INTRAMUSCULARLY ONCE AS NEEDED FOR ALLERGIC REACTION FOR UP TO 1 DOSE    loratadine (CLARITIN) 10 mg tablet Take 1 Tab by mouth daily. To replace xyzal.    acebutoloL (SECTRAL) 200 mg capsule Take 1 Cap by mouth two (2) times a day.  fluticasone propionate (FLONASE) 50 mcg/actuation nasal spray 2 Sprays by Both Nostrils route daily.  aspirin delayed-release 81 mg tablet Take 1 tablet by mouth once daily    glucose blood VI test strips (True Metrix Glucose Test Strip) strip To use to check blood sugar daily,  Dx: E11.9    fenofibrate (LOFIBRA) 160 mg tablet TAKE 1 TABLET BY MOUTH EVERY DAY    gabapentin (NEURONTIN) 300 mg capsule Take 1 Cap by mouth three (3) times daily. Max Daily Amount: 900 mg.     flecainide (TAMBOCOR) 150 mg tablet Take 1 Tab by mouth two (2) times a day.  albuterol (PROVENTIL HFA, VENTOLIN HFA, PROAIR HFA) 90 mcg/actuation inhaler Take 2 Puffs by inhalation every four (4) hours as needed for Wheezing.  butalbital-acetaminophen-caffeine (FIORICET, ESGIC) -40 mg per tablet Take 1 Tab by mouth every six (6) hours as needed for Headache.  canagliflozin (Invokana) 300 mg tablet TAKE 1 TABLET BY MOUTH ONCE DAILY BEFORE BREAKFAST    ergocalciferol (ERGOCALCIFEROL) 1,250 mcg (50,000 unit) capsule Take 1 Cap by mouth two (2) times a week.  famotidine (PEPCID) 40 mg tablet TAKE 1 TABLET BY MOUTH EVERY DAY TAKES EVERY BEDTIME    metFORMIN (GLUCOPHAGE) 1,000 mg tablet TAKE 1 TABLET BY MOUTH TWICE A DAY    omeprazole (PRILOSEC) 20 mg capsule TAKE 1 CAPSULE BY MOUTH ONCE DAILY    simvastatin (ZOCOR) 20 mg tablet TAKE 1 TABLET BY MOUTH EVERY DAY    SITagliptin (Januvia) 100 mg tablet Take 1 tablet by mouth once daily    SUMAtriptan (IMITREX) 50 mg tablet TAKE 1 TABLET BY MOUTH AT THE ONSET OF A MIGRAINE HEADACHE. REPEAT IN 1 HOUR IF NEEDED. TAKE NO MORE THAN 2 TABLETS PER 24 HOURS    topiramate (TOPAMAX) 50 mg tablet Take 1 tab twice a day for migraine prevention    triamcinolone acetonide (KENALOG) 0.1 % ointment APPLY TO AFFECTED AREA TWICE A DAY. USE THIN LAYER    Blood-Glucose Meter monitoring kit To use to check blood sugar daily. Dx: E11.9    cyclobenzaprine (FLEXERIL) 10 mg tablet TAKE 1 TABLET BY MOUTH THREE TIMES DAILY AS NEEDED FOR MUSCLE SPASM DO  NOT  DRIVE  WHILE  TAKING  THIS  MEDICATION    Lancets (MICROLET LANCET) Misc USE TO CHECK BLOOD SUGAR ONE TO TWO TIMES DAILY (Patient taking differently: as needed.  USE TO CHECK BLOOD SUGAR ONE TO TWO TIMES DAILY)      No current facility-administered medications for this visit.          I have reviewed the nurses notes, vitals, problem list, allergy list, medical history, family, social history and medications.         REVIEW OF SYMPTOMS      General: Pt denies excessive weight gain or loss. Pt is able to conduct ADL's  HEENT: Denies blurred vision, headaches, hearing loss, epistaxis and difficulty swallowing. Respiratory: Denies cough, congestion, shortness of breath, RICE, wheezing or stridor. Cardiovascular: Denies precordial pain, palpitations, edema or PND  Gastrointestinal: Denies poor appetite, indigestion, abdominal pain or blood in stool  Genitourinary: Denies hematuria, dysuria, increased urinary frequency  Musculoskeletal: Denies joint pain or swelling from muscles or joints  Neurologic: Denies tremor, paresthesias, headache, or sensory motor disturbance  Psychiatric: Denies confusion, insomnia, depression  Integumentray: Denies rash, itching or ulcers. Hematologic: Denies easy bruising, bleeding         PHYSICAL EXAMINATION      Vitals: see vitals section  General: Well developed, in no acute distress. HEENT: No jaundice, oral mucosa moist, no oral ulcers  Neck: Supple, no stiffness, no lymphadenopathy, supple  Heart:  Normal S1/S2 negative S3 or S4. Regular, no murmur, gallop or rub, no jugular venous distention  Respiratory: Clear bilaterally x 4, no wheezing or rales  Abdomen:   Soft, non-tender, bowel sounds are active.   Extremities:  No edema, normal cap refill, no cyanosis. Musculoskeletal: No clubbing, no deformities  Neuro: A&Ox3, speech clear, gait stable, cooperative, no focal neurologic deficits  Skin: Skin color is normal. No rashes or lesions.  Non diaphoretic, moist.  Vascular: 2+ pulses symmetric in all extremities         DIAGNOSTIC DATA      EKG:          LABORATORY DATA            Lab Results   Component Value Date/Time     WBC 8.5 06/24/2020 08:50 AM     HGB 14.4 06/24/2020 08:50 AM     HCT 42.5 06/24/2020 08:50 AM     PLATELET 003 52/38/8315 08:50 AM     MCV 86 06/24/2020 08:50 AM            Lab Results   Component Value Date/Time     Sodium 143 06/24/2020 08:50 AM     Potassium 4.2 06/24/2020 08:50 AM   Chloride 105 06/24/2020 08:50 AM     CO2 21 06/24/2020 08:50 AM     Anion gap 11 10/11/2019 11:02 AM     Glucose 147 (H) 06/24/2020 08:50 AM     BUN 8 06/24/2020 08:50 AM     Creatinine 0.87 06/24/2020 08:50 AM     BUN/Creatinine ratio 9 06/24/2020 08:50 AM     GFR est AA 96 06/24/2020 08:50 AM     GFR est non-AA 83 06/24/2020 08:50 AM     Calcium 9.4 06/24/2020 08:50 AM     Bilirubin, total 0.2 06/24/2020 08:50 AM     Alk. phosphatase 54 06/24/2020 08:50 AM     Protein, total 6.9 06/24/2020 08:50 AM     Albumin 4.6 06/24/2020 08:50 AM     Globulin 3.4 10/11/2019 11:02 AM     A-G Ratio 2.0 06/24/2020 08:50 AM     ALT (SGPT) 29 06/24/2020 08:50 AM             ASSESSMENT      1. Inappropriate sinus tachycardia  2. Diabetes mellitus  3. Orthostatic hypotension  4.  GERD  5. Anxiety         PLAN      EPS     [address]les  [address] [place] [address], [address] [place] [address], [address]  [address]  [phone] / [phone] Fax                            [phone] / [phone] Fax

## 2020-11-20 NOTE — DISCHARGE INSTRUCTIONS
Electrophysiology Study (EPS)/Cardiac Ablation   Discharge Instructions      You have just had an electrophysiology study. There were catheters temporarily placed in your heart through a puncture in the Veins and/or Arteries in your groin. WHAT TO EXPECT      If you have had a Cardiac Ablation please be aware that you may experience mild chest pain that will resolve within 24-48 hours. If the Chest Pain begins radiating down your shoulders or arms, becomes severe or breathing becomes difficult, call 911 or go to the closest emergency room.  Mild to moderate, non-painful, bruising or mild swelling at the puncture site is not un-common, and will resolve in 7 - 14 days, and may extend down your thigh as it heals.  If a closure device was used to seal your artery or vein, a separate pamphlet will be given to you with these discharge instructions. It is very important that you review the information in the pamphlet for different restrictions and precautions.  You have a small gauze dressing applied to the puncture site in your groin. You may remove this the following morning.  You MAY receive a 30 day heart monitor in the mail to wear after your procedure. This is to evaluate your heart rhythm post ablation. MEDICATIONS      Take only the medications prescribed to you at discharge. ACTIVITY      A responsible adult must take you home. Do not drive a car for 24 hours.  Rest quietly for the remainder of the day.  Do not lift anything greater than 10 pounds for 3 days.  Limit bending at the puncture site and use of stairs for at least 3 days.  You may remove the bandage and shower the morning after the procedure. Do not take a bath for 3 days. SYMPTOMS THAT NEED TO BE REPORTED IMMEDIATELY      Bleeding at the puncture site. If there is bleeding, lie down and hold firm direct pressure for at least 5 minutes.   If the bleeding does not stop, go to the closest emergency room, or call 911.  Temperature more than 100.5 F.   Redness or warmth at the puncture site.  Increasing pain, numbness, coolness or blue discoloration of the extremity where the puncture is located.  Pulsating mass at the puncture site.  A new lump at the puncture site, or increasing swelling at the site. Please be aware that a pea or marble sized lump is normal, anything larger or increasing in size should be reported.  Bruising at the puncture site that enlarges or becomes painful (some bruising at the site is common and will go away in 7-14 days).  Rapid heart rate or palpitations.  Dizziness, lightheadedness, fainting.  REMEMBER: If you feel something is an emergency or cannot be handled over the phone, call 911 or go to the closest emergency room.       FOLLOW UP CARE     Marbin Escobedo NP in 2 weeks  Dr. Jaime Guadarrama in 3 months        Juliana Galloway MD  Cardiac Electrophysiology / Cardiology    [place] [id number].  [address] [place] [address], [address], [address] [facility]  [phone] / [phone] Fax   [phone] / [phone] Fax

## 2020-11-20 NOTE — Clinical Note
TRANSFER - OUT REPORT:     Verbal report given to: Bedside. Report consisted of patient's Situation, Background, Assessment and   Recommendations(SBAR). Opportunity for questions and clarification was provided. Patient transported with a Registered Nurse.

## 2020-11-20 NOTE — PROCEDURES
Cardiac Electrophysiology Report      PATIENT INFORMATION     Patient Name: Daniel Lacy  MRN: [id number]              Study Date: 2020    YOB: 1980   Age: 41 y.o. Gender: female      Procedure:  Electrophysiology Haritha    Referring Physician:  Arden Perez NP        STAFF     Duty Name   Electrophysiologist Raman Fierro MD   Monitor Particia Light, RCIS   Circulator Kesha Conner RN; Isai Anderson RN       PATIENT HISTORY     Seble Limon a 9 y.o. female with long RP tachycardia who has failed to derive improvement with acebutolol and flecainide (150 mg bid). ANS testing and MRV negative for May Thurner. She now presents for an EPS and possible SVT ablation.        PROCEDURE     The patient was brought to the Cardiac Electrophysiology laboratory in a post-absorptive, fasting state. Informed consent was obtained. A peripheral IV was in place. Continuous electrocardiographic, blood pressure, O2 saturation and  CO2 monitoring was initiated. Self-adhesive cardioversion patches were positioned on the chest.  Conscious sedation was administered per protocol. The patient was then prepped and draped in the usual sterile fashion. Both groins were infiltrated with a 50/50 mixture of Lidocaine (1%) and bupivicaine (0.5%). Vascular access was obtained in the right common femoral vein using an 8F, two 6F and a 7F sheath. Through these sheaths, quadripolar catheters and a deflectable decapolar catheter were positioned at the high right atrium, His, RV apex/outlfow tract and coronary sinus. Baseline intervals were recorded and found to be normal. The patient was noted to be in a 1:1 long RP tachycardia with TCL varying between 450-500 msec but predominantly with a TCL of 460 msec. Activation mapping was performed using a 4 mm non-irrigated tip ablation catheter through an SRO.  Activation identified the earliest atrial activation along the anterolateral aspect of the SVC-RA junction where several fractionated potentials were observed. This region of early activity appeared consistent with the sinus node region. High output pacing in this region failed to stimulate the phrenic nerve. Given the patient presented tachycardic, ablation was performed at sites of early activation that exhibited a unipolar QS configuration electrogram with a bipolar electrogram that was 16 msec pre-P wave. Ablation at this site resulted in acceleration of the tachycardia up to 200 bpm however failed to terminate the tachycardia. Ablation was performed at the next earliest site however again failed to terminate the tachycardia despite acceleration of the tachycardia. Further attempts to ablate in this region were deferred due to concern for possible resultant junctional rhythm. At the conclusion of the procedure, all catheters and sheaths were removed  and hemostasis obtained by direct manual compression. The patient remained hemodynamically stable, tolerated the procedure well and was transferred in stable condition. There were no immediate complications encountered during the procedure. CONDUCTION INTERVALS     See attached report       FINDINGS     1. The baseline ECG revealed long RP tachycardia  2. The intracardiac intervals were normal (HV = 41 msec). 3. The patient was noted to be in a 1:1 long RP tachycardia with TCL varying between 450-500 msec but predominantly with a TCL of 460 msec. 4.  Activation mapping was performed using a 4 mm non-irrigated tip ablation catheter through an SRO. Activation identified the earliest atrial activation along the anterolateral aspect of the SVC-RA junction where several fractionated potentials were observed. This region of early activity appeared consistent with the sinus node region.     5. Given the patient presented tachycardic, ablation was performed at sites of early activation that exhibited a unipolar QS configuration electrogram with a bipolar electrogram that was 16 msec pre-P wave. Ablation at this site resulted in acceleration of the tachycardia up to 200 bpm however failed to terminate the tachycardia. Ablation was performed at the next earliest site however again failed to terminate the tachycardia despite acceleration of the tachycardia. 6. Further attempts to ablate in this region were deferred due to concern for possible resultant junctional rhythm. CONCLUSIONS     1. Long RP tachycardia consistent with either inappropriate sinus tachycardia vs atrial tachycardia  2. Trial of changing flecainide to rythmol 225 mg bid; continue acebutolol  3. Follow up in 2 weeks in EP clinic. 4. Consider sinus node ablation in future discussion about higher likelihood of pacemaker implantation following ablation. 5. Follow up with Moise Navarro NP as scheduled. Thank you, Moise Navarro NP for allowing me to participate in the care of this extraordinarily pleasant female.         Ronn Pacheco MD  Cardiac Electrophysiology / Cardiology    [address], [address] [facility], [address]  Te[address] [place]   [address] [facility]  [phone] / [phone] Fax [phone] / [phone] Fax

## 2020-11-20 NOTE — PROGRESS NOTES
0800    Patient arrived. ID and allergies verified verbally with patient. Pt voices understanding of procedure to be performed. Consent obtained. Pt prepped for procedure. 1015    TRANSFER - OUT REPORT:    Verbal report given to Steven Cervantes RN (name) on Elisabeth Crews  being transferred to EP LAB (unit) for ordered procedure       Report consisted of patients Situation, Background, Assessment and   Recommendations(SBAR). Information from the following report(s) SBAR was reviewed with the receiving nurse. Lines:   Peripheral IV 11/20/20 Right Forearm (Active)        Opportunity for questions and clarification was provided. Patient transported with:   Registered Nurse        8160    TRANSFER - IN REPORT:    Verbal report received from [place] (name) on Elisabeth Crews  being received from EP LAB (unit) for routine progression of care      Report consisted of patients Situation, Background, Assessment and   Recommendations(SBAR). Information from the following report(s) Procedure Summary was reviewed with the receiving nurse. Opportunity for questions and clarification was provided. Assessment completed upon patients arrival to unit and care assumed. 11:44 AM    Site intact  Patient resting comfortably  Monitoring      12:20 PM    Boyfriend updated      1:26 PM    HOB 30degrees  Patient tolerating sips      Discharge instructions reviewed with patient and family. Voiced understanding. Patient given copy of discharge instructions to take home.       1420    Patient sat at bedside  No complaints of dizziness    Patient ambulated to restroom and back to stretcher in CLPO   No complaints - site intact    Removed patient's IV with bled - patient felt faint and nauseous - laid her down on stretcher - reattached to monitor  BP stable    - baseline    After 20 minutes and saltine crackers and water patient stated she felt \"much better\"    Patient sat again at bedside  No complaints  Patient stood and ambulated - no complaints    1450    Pt discharged via wheelchair with Kimberley Shaw RN. Personal belongings with patient upon discharge.

## 2020-11-24 ENCOUNTER — VIRTUAL VISIT (OUTPATIENT)
Dept: FAMILY MEDICINE CLINIC | Age: 40
End: 2020-11-24

## 2020-11-24 NOTE — PROGRESS NOTES
HISTORY OF PRESENT ILLNESS Hugo Briggs is a 41 y.o. female. HPI Pt presents with \"follow up\" Visit was conducted via [employer]. me Pt was located at home, provider was located at [facility] Visit lasted Hugo Briggs, who was evaluated through a synchronous (real-time) {virtual platform audio-video vs audio only:54890::\"audio-video\"} encounter, and/or her healthcare decision maker, is aware that it is a billable service, with coverage as determined by her insurance carrier. She provided verbal consent to proceed: {YES/NO/NA-Consent obtained within past 12 months:19056::\"Yes\"}, and patient identification was verified. It was conducted pursuant to the emergency declaration under the [address] [place] [address] authority and the [employer] and [employer]ar General Act. A caregiver was present when appropriate. Ability to conduct physical exam was limited. I was {location home office other:53928::\"at home\"}. The patient was {location home office other:49498::\"at home\"}. ROS Physical Exam 
 
ASSESSMENT and PLAN 
{ASSESSMENT/PLAN:97817}

## 2020-12-01 ENCOUNTER — OFFICE VISIT (OUTPATIENT)
Dept: CARDIOLOGY CLINIC | Age: 40
End: 2020-12-01
Payer: MEDICAID

## 2020-12-01 VITALS
OXYGEN SATURATION: 98 % | WEIGHT: 203 LBS | HEART RATE: 118 BPM | HEIGHT: 65 IN | BODY MASS INDEX: 33.82 KG/M2 | SYSTOLIC BLOOD PRESSURE: 126 MMHG | DIASTOLIC BLOOD PRESSURE: 78 MMHG

## 2020-12-01 DIAGNOSIS — R00.0 INAPPROPRIATE SINUS TACHYCARDIA: ICD-10-CM

## 2020-12-01 DIAGNOSIS — I47.1 SVT (SUPRAVENTRICULAR TACHYCARDIA) (HCC): Primary | ICD-10-CM

## 2020-12-01 DIAGNOSIS — E11.9 TYPE 2 DIABETES MELLITUS WITHOUT COMPLICATION, WITHOUT LONG-TERM CURRENT USE OF INSULIN (HCC): ICD-10-CM

## 2020-12-01 DIAGNOSIS — R00.2 PALPITATIONS: ICD-10-CM

## 2020-12-01 DIAGNOSIS — R53.83 FATIGUE, UNSPECIFIED TYPE: ICD-10-CM

## 2020-12-01 PROCEDURE — 93000 ELECTROCARDIOGRAM COMPLETE: CPT | Performed by: NURSE PRACTITIONER

## 2020-12-01 PROCEDURE — 99215 OFFICE O/P EST HI 40 MIN: CPT | Performed by: NURSE PRACTITIONER

## 2020-12-01 PROCEDURE — 3051F HG A1C>EQUAL 7.0%<8.0%: CPT | Performed by: NURSE PRACTITIONER

## 2020-12-01 NOTE — PROGRESS NOTES
HISTORY OF PRESENTING ILLNESS      Venessa George is a 41 y.o. female with tachycardia for whom we increased the dosing of flecainide from 50mg to 100 mg twice daily during her last visit. She reported today failure to drive improvement with this dose adjustment. She has had negative ANS testing and negative MRA for May Thurner. Her flecainide was increased to 150 mg twice daily and continued acebutolol. She reports lower heart rates. She underwent negative stress testing. She continues to have tachycardia, sob, dizziness on current regimen, EKG today shows sinus rhythm with heart rates in the 90s. Since last visit, she has also been diagnosed with alpha gal syndrome from possible previous tick bite after a severe allergic reaction. She underwent EPS which demonstrated a long RP tachycardia consistent with either inappropriate sinus tachycardia vs atrial tachycardia. We started a trial of changing flecainide to rythmol 225 mg bid; continue acebutolol. Since her EPS/med change, her palpitations are unchanged. She had an episode on 11/26/2020 of chest and shoulder pain, as well as hypertension/presyncope. She was given 3 81mg ASA in the rescue squad. BP normotensive since that time. PAST MEDICAL HISTORY     Past Medical History:   Diagnosis Date    Anxiety     Arrhythmia     HX TACHYCARDIA - NEG.  STRESS TEST 2013 PER PATIENT    Asthma     LAST EPISODE 2016    Diabetes ([address] 2008    NIDDM    Ear infection 11/30/2018    BILATERAL    GERD (gastroesophageal reflux disease)     Headache     Headache(784.0)     Hypertension     HX HTN - NO MEDS CURRENTLY(11-30-18)    Inappropriate sinus tachycardia 5/20/2019    Rash 7/14/2016    Recurrent umbilical hernia 06/90/3687    S/P dilatation and curettage     Tachycardia            PAST SURGICAL HISTORY     Past Surgical History:   Procedure Laterality Date    HX APPENDECTOMY  2/2008    HX CHOLECYSTECTOMY  2001    HX DILATION AND CURETTAGE  HX GYN  3/2008    tubal ligation    HX HERNIA REPAIR  2/2009    HX HERNIA REPAIR  12/06/2018    open recurrent umbilical hernia repair    HX HYSTERECTOMY  03/2012    HX LEFT SALPINGO-OOPHORECTOMY Left     HX LUMBAR DISKECTOMY      2006    HX ORTHOPAEDIC  9/2006    ruptured discs    [facility] COMPRE ELECTROPHYSIOL XM W/LEFT ATRIAL PACNG/REC N/A 11/20/2020    Lt Atrial Pace & Record During Ep Study performed by Ban Zabala MD at [address] CATH LAB    [place] COMPRE ELECTROPHYSIOLOGIC ARRHYTHMIA INDUCTION N/A 11/20/2020    EP STUDY COMPLETE performed by Ban Zabala MD at [address] CATH LAB    [place] EPHYS EVAL W/ABLATION [address] N/A 11/20/2020    Ablation Svt performed by Ban Zabala MD at [address] CATH LAB    [place] INTRACARDIAC ELECTROPHYSIOLOGIC 3D MAPPING N/A 11/20/2020    Ep 3d Mapping performed by Ban Zabala MD at [address] CATH LAB    UPPER GI ENDOSCOPY,BIOPSY  5/11/2017               ALLERGIES     Allergies   Allergen Reactions    Beef Containing Products Rash    Green Pepper Hives     Red, Yellow and Orange Peppers also.   OK with Black Pepper    Other Plant, Animal, Environmental Swelling     Red meat    Pork Derived (Porcine) Rash          FAMILY HISTORY     Family History   Problem Relation Age of Onset    Heart Disease Mother     Hypertension Father     Cancer Father         Lung    Hypertension Sister     No Known Problems Brother     No Known Problems Brother     No Known Problems Brother     Anesth Problems Neg Hx     negative for cardiac disease       SOCIAL HISTORY     Social History     Socioeconomic History    Marital status:      Spouse name: Not on file    Number of children: Not on file    Years of education: Not on file    Highest education level: Not on file   Tobacco Use    Smoking status: Never Smoker    Smokeless tobacco: Never Used   Substance and Sexual Activity    Alcohol use: No    Drug use: Never    Sexual activity: Yes Partners: Male     Birth control/protection: Surgical         MEDICATIONS     Current Outpatient Medications   Medication Sig    propafenone SR (Rythmol SR) 225 mg SR capsule Take 1 Cap by mouth two (2) times a day.  gabapentin (NEURONTIN) 400 mg capsule Take 1 Cap by mouth three (3) times daily. Max Daily Amount: 1,200 mg.    EPINEPHrine (EPIPEN) 0.3 mg/0.3 mL injection INJECT CONTENTS OF 1 PEN INTRAMUSCULARLY ONCE AS NEEDED FOR ALLERGIC REACTION FOR UP TO 1 DOSE    loratadine (CLARITIN) 10 mg tablet Take 1 Tab by mouth daily. To replace xyzal.    fluticasone propionate (FLONASE) 50 mcg/actuation nasal spray 2 Sprays by Both Nostrils route daily.  aspirin delayed-release 81 mg tablet Take 1 tablet by mouth once daily    glucose blood VI test strips (True Metrix Glucose Test Strip) strip To use to check blood sugar daily,  Dx: E11.9    fenofibrate (LOFIBRA) 160 mg tablet TAKE 1 TABLET BY MOUTH EVERY DAY    albuterol (PROVENTIL HFA, VENTOLIN HFA, PROAIR HFA) 90 mcg/actuation inhaler Take 2 Puffs by inhalation every four (4) hours as needed for Wheezing.  butalbital-acetaminophen-caffeine (FIORICET, ESGIC) -40 mg per tablet Take 1 Tab by mouth every six (6) hours as needed for Headache.  canagliflozin (Invokana) 300 mg tablet TAKE 1 TABLET BY MOUTH ONCE DAILY BEFORE BREAKFAST    ergocalciferol (ERGOCALCIFEROL) 1,250 mcg (50,000 unit) capsule Take 1 Cap by mouth two (2) times a week.  famotidine (PEPCID) 40 mg tablet TAKE 1 TABLET BY MOUTH EVERY DAY TAKES EVERY BEDTIME    metFORMIN (GLUCOPHAGE) 1,000 mg tablet TAKE 1 TABLET BY MOUTH TWICE A DAY    omeprazole (PRILOSEC) 20 mg capsule TAKE 1 CAPSULE BY MOUTH ONCE DAILY    simvastatin (ZOCOR) 20 mg tablet TAKE 1 TABLET BY MOUTH EVERY DAY    SITagliptin (Januvia) 100 mg tablet Take 1 tablet by mouth once daily    SUMAtriptan (IMITREX) 50 mg tablet TAKE 1 TABLET BY MOUTH AT THE ONSET OF A MIGRAINE HEADACHE. REPEAT IN 1 HOUR IF NEEDED.  TAKE NO MORE THAN 2 TABLETS PER 24 HOURS    topiramate (TOPAMAX) 50 mg tablet Take 1 tab twice a day for migraine prevention    cyclobenzaprine (FLEXERIL) 10 mg tablet TAKE 1 TABLET BY MOUTH THREE TIMES DAILY AS NEEDED FOR MUSCLE SPASM DO  NOT  DRIVE  WHILE  TAKING  THIS  MEDICATION    Lancets (MICROLET LANCET) Misc USE TO CHECK BLOOD SUGAR ONE TO TWO TIMES DAILY (Patient taking differently: as needed. USE TO CHECK BLOOD SUGAR ONE TO TWO TIMES DAILY)    acebutoloL (SECTRAL) 200 mg capsule Take 1 Cap by mouth two (2) times a day. No current facility-administered medications for this visit. I have reviewed the nurses notes, vitals, problem list, allergy list, medical history, family, social history and medications. REVIEW OF SYMPTOMS      General: Pt denies excessive weight gain or loss. Pt is able to conduct ADL's  HEENT: Denies blurred vision, headaches, hearing loss, epistaxis and difficulty swallowing. Respiratory: Denies cough, congestion, shortness of breath, RICE, wheezing or stridor. Cardiovascular: +chest pain, palpitations, Denies edema or PND  Gastrointestinal: Denies poor appetite, indigestion, abdominal pain or blood in stool  Genitourinary: Denies hematuria, dysuria, increased urinary frequency  Musculoskeletal: Denies joint pain or swelling from muscles or joints  Neurologic: Denies tremor, paresthesias, headache, or sensory motor disturbance  Psychiatric: Denies confusion, insomnia, depression  Integumentray: Denies rash, itching or ulcers. Hematologic: Denies easy bruising, bleeding       PHYSICAL EXAMINATION      Vitals: see vitals section  General: Well developed, in no acute distress. HEENT: No jaundice, oral mucosa moist, no oral ulcers  Neck: Supple, no stiffness, no lymphadenopathy, supple  Heart:  Normal S1/S2 negative S3 or S4.  Regular, no murmur, gallop or rub, no jugular venous distention  Respiratory: Clear bilaterally x 4, no wheezing or rales  Abdomen:   Soft, non-tender, bowel sounds are active. Extremities:  No edema, normal cap refill, no cyanosis. Musculoskeletal: No clubbing, no deformities  Neuro: A&Ox3, speech clear, gait stable, cooperative, no focal neurologic deficits  Skin: Skin color is normal. No rashes or lesions. Non diaphoretic, moist.  Vascular: 2+ pulses symmetric in all extremities       DIAGNOSTIC DATA      EKG: sinus rhythm        LABORATORY DATA      Lab Results   Component Value Date/Time    WBC 10.6 11/11/2020 08:11 AM    HGB 15.2 11/11/2020 08:11 AM    HCT 46.3 11/11/2020 08:11 AM    PLATELET 992 25/79/7468 08:11 AM    MCV 87 11/11/2020 08:11 AM      Lab Results   Component Value Date/Time    Sodium 138 11/11/2020 08:11 AM    Potassium 4.7 11/11/2020 08:11 AM    Chloride 101 11/11/2020 08:11 AM    CO2 19 (L) 11/11/2020 08:11 AM    Anion gap 11 10/11/2019 11:02 AM    Glucose 235 (H) 11/11/2020 08:11 AM    BUN 10 11/11/2020 08:11 AM    Creatinine 1.00 11/11/2020 08:11 AM    BUN/Creatinine ratio 10 11/11/2020 08:11 AM    GFR est AA 81 11/11/2020 08:11 AM    GFR est non-AA 70 11/11/2020 08:11 AM    Calcium 9.8 11/11/2020 08:11 AM    Bilirubin, total 0.2 06/24/2020 08:50 AM    Alk. phosphatase 54 06/24/2020 08:50 AM    Protein, total 7.2 11/11/2020 10:33 AM    Albumin 4.6 06/24/2020 08:50 AM    Globulin 3.4 10/11/2019 11:02 AM    A-G Ratio 2.0 06/24/2020 08:50 AM    ALT (SGPT) 29 06/24/2020 08:50 AM           ASSESSMENT      1. Inappropriate sinus tachycardia  2. Diabetes mellitus  3. Orthostatic hypotension  4. GERD  5. Anxiety          PLAN     Continue current doses of acebutolol and propafenone for now. Consider increased propafenone versus sinus node ablation in future along with discussion about higher likelihood of pacemaker implantation following ablation. ICD-10-CM ICD-9-CM    1. SVT (supraventricular tachycardia) (HCC)  I47.1 427.89 AMB POC EKG ROUTINE W/ 12 LEADS, INTER & REP   2. Inappropriate sinus tachycardia  R00.0 427.89    3. Palpitations  R00.2 785.1    4. Fatigue, unspecified type  R53.83 780.79    5. Type 2 diabetes mellitus without complication, without long-term current use of insulin (HCC)  E11.9 250.00      Orders Placed This Encounter    AMB POC EKG ROUTINE W/ 12 LEADS, INTER & REP     Order Specific Question:   Reason for Exam:     Answer:   SVT          FOLLOW-UP   1 month with Dr. Vladimir Perkins    Thank you, Laurie Diaz NP for allowing me to participate in the care of this extraordinarily pleasant female. Please do not hesitate to contact me for further questions/concerns.      Caterina Fees, NP    [address]  [address] [facility], [address] [facility], [address]  [address]  [phone] / [phone] Fax   [phone] / [phone] Fax    '

## 2020-12-01 NOTE — PROGRESS NOTES
Room 4    Visit Vitals  /78 (BP 1 Location: Left arm, BP Patient Position: Sitting)   Pulse (!) 118   Ht 5' 5\" (1.651 m)   Wt 203 lb (92.1 kg)   LMP 01/25/2012   SpO2 98%   BMI 33.78 kg/m²       EP Study    1. Long RP tachycardia consistent with either inappropriate sinus tachycardia vs atrial tachycardia  2. Trial of changing flecainide to rythmol 225 mg bid; continue acebutolol  3. Follow up in 2 weeks in EP clinic. 4. Consider sinus node ablation in future discussion about higher likelihood of pacemaker implantation following ablation.      HAS NOT been taking acebutolol (but will restart)    Chest pain: no  Shortness of breath: no  Edema: no  Palpitations, Skipped beats, Rapid heartbeat: no  Dizziness: no    New diagnosis/Surgeries: no    ER/Hospitalizations:     Refills:

## 2020-12-09 ENCOUNTER — OFFICE VISIT (OUTPATIENT)
Dept: FAMILY MEDICINE CLINIC | Age: 40
End: 2020-12-09
Payer: MEDICAID

## 2020-12-09 VITALS
TEMPERATURE: 98.8 F | DIASTOLIC BLOOD PRESSURE: 78 MMHG | WEIGHT: 205 LBS | RESPIRATION RATE: 20 BRPM | HEART RATE: 115 BPM | HEIGHT: 65 IN | SYSTOLIC BLOOD PRESSURE: 120 MMHG | OXYGEN SATURATION: 97 % | BODY MASS INDEX: 34.16 KG/M2

## 2020-12-09 DIAGNOSIS — E11.40 TYPE 2 DIABETES MELLITUS WITH DIABETIC NEUROPATHY, WITHOUT LONG-TERM CURRENT USE OF INSULIN (HCC): Primary | ICD-10-CM

## 2020-12-09 DIAGNOSIS — E53.8 VITAMIN B12 DEFICIENCY: ICD-10-CM

## 2020-12-09 PROCEDURE — 99214 OFFICE O/P EST MOD 30 MIN: CPT | Performed by: FAMILY MEDICINE

## 2020-12-09 PROCEDURE — 3051F HG A1C>EQUAL 7.0%<8.0%: CPT | Performed by: FAMILY MEDICINE

## 2020-12-09 RX ORDER — GLIPIZIDE 5 MG/1
5 TABLET, FILM COATED, EXTENDED RELEASE ORAL DAILY
Qty: 30 TAB | Refills: 5 | Status: SHIPPED | OUTPATIENT
Start: 2020-12-09 | End: 2021-02-04 | Stop reason: SDUPTHER

## 2020-12-09 RX ORDER — LANOLIN ALCOHOL/MO/W.PET/CERES
1000 CREAM (GRAM) TOPICAL DAILY
Qty: 30 TAB | Refills: 5 | Status: SHIPPED | OUTPATIENT
Start: 2020-12-09 | End: 2021-02-04 | Stop reason: SDUPTHER

## 2020-12-09 NOTE — PATIENT INSTRUCTIONS
Vitamin B12 Deficiency: Care Instructions Overview A vitamin B12 deficiency means that your body doesn't have enough of this vitamin. You need vitamin B12 to keep red blood cells and nerve cells healthy. Not enough B12 can cause anemia. It can also damage nerves and cause trouble with memory and thinking. Many things can cause low levels of vitamin B12. They include: · Not getting enough of this vitamin through food. · An autoimmune problem, like pernicious anemia. · Weight-loss surgery, like gastric bypass. · Long-term use of heartburn medicines. Low levels of B12 may not cause symptoms. But symptoms may include fatigue, depression, and thinking or memory problems. You may have tingling in your hands or feet and changes in the way you walk. Treatment depends on the reason for low vitamin B12. Eating more foods rich in B12 may be enough. Or you might take the vitamin as a pill, as shots, or as nasal spray. How can you care for yourself? · Take vitamin B12 as your doctor recommends. · Go to your appointments if you are getting B12 shots. · Eat more foods rich in vitamin B12. Examples are: ? Animal products. These include meat, seafood, milk products, poultry, and eggs. ? Foods that have B12 added. These are called fortified foods. They include soy products, nutritional yeast, and dry cereals. · Work with a nutritionist or dietitian if you need help getting more vitamin B12 from food. · Talk to your doctor about stopping medicines if they are adding to your B12 deficiency. When should you call for help? Call 911 anytime you think you may need emergency care. For example, call if: 
  · You passed out (lost consciousness). Call your doctor now or seek immediate medical care if: 
  · You are dizzy or lightheaded, or you feel like you may faint. Watch closely for changes in your health. Be sure to call your doctor if: 
  · You are confused or can't think clearly.   · You don't get better as expected. Where can you learn more? Go to http://www.gray.com/ Enter [phone] in the search box to learn more about \"Vitamin B12 Deficiency: Care Instructions. \" Current as of: November 8, 2019               Content Version: 12.6 © 4260-4404 [employer]. Care instructions adapted under license by [employer] (which disclaims liability or warranty for this information). If you have questions about a medical condition or this instruction, always ask your healthcare professional. [address] any warranty or liability for your use of this information.

## 2020-12-09 NOTE — PROGRESS NOTES
Identified pt with two pt identifiers(name and ). Chief Complaint   Patient presents with    Diabetes    Foot Pain     saw neuro        Health Maintenance Due   Topic    Eye Exam Retinal or Dilated     DTaP/Tdap/Td series (2 - Td)    PAP AKA CERVICAL CYTOLOGY     MICROALBUMIN Q1        Wt Readings from Last 3 Encounters:   20 205 lb (93 kg)   20 203 lb (92.1 kg)   20 200 lb 3 oz (90.8 kg)     Temp Readings from Last 3 Encounters:   20 98.8 °F (37.1 °C) (Oral)   20 98.4 °F (36.9 °C)   20 97.3 °F (36.3 °C)     BP Readings from Last 3 Encounters:   20 120/78   20 126/78   20 118/83     Pulse Readings from Last 3 Encounters:   20 (!) 115   20 (!) 118   20 (!) 101         Learning Assessment:  :     Learning Assessment 2017   PRIMARY LEARNER Patient Patient   HIGHEST LEVEL OF EDUCATION - PRIMARY LEARNER  GRADUATED HIGH SCHOOL OR GED GRADUATED HIGH SCHOOL OR GED   BARRIERS PRIMARY LEARNER NONE NONE   PRIMARY LANGUAGE ENGLISH ENGLISH   LEARNER PREFERENCE PRIMARY READING READING   ANSWERED BY patient patient   RELATIONSHIP SELF SELF       Depression Screening:  :     3 most recent PHQ Screens 2020   Little interest or pleasure in doing things Not at all   Feeling down, depressed, irritable, or hopeless Not at all   Total Score PHQ 2 0       Fall Risk Assessment:  :     Fall Risk Assessment, last 12 mths 2017   Able to walk? Yes   Fall in past 12 months? No       Abuse Screening:  :     Abuse Screening Questionnaire 2020   Do you ever feel afraid of your partner? N N N N N   Are you in a relationship with someone who physically or mentally threatens you? N N N N N   Is it safe for you to go home?  Y Y Y Y Y       Coordination of Care Questionnaire:  :     1) Have you been to an emergency room, urgent care clinic since your last visit? no  Hospitalized since your last visit? no 2) Have you seen or consulted any other health care providers outside of [address] [place] [address] since your last visit? yes  (Include any pap smears or colon screenings in this section.)    3) Do you have an Advance Directive on file? no  Are you interested in receiving information about Advance Directives? no    Reviewed record in preparation for visit and have obtained necessary documentation. Medication reconciliation up to date and corrected with patient at this time.

## 2020-12-09 NOTE — PROGRESS NOTES
Subjective:     Joyce Acosta is a 41 y.o. female who presents for follow up of diabetes and follow up foot paresthesias. Diabetes mellitus:   · Medication compliance: compliant all of the time,   · Diabetic diet compliance: compliant most of the time,   · Home glucose monitoring: is performed regularly, fasting BGs in the 140's (has noticed over the last 1-1.5 months; previously 100s-120s), in the evening has been higher than her normal range but is typically below 180  · Further diabetic ROS: no polyuria or polydipsia, no chest pain, dyspnea or TIA's, no numbness, tingling or pain in extremities, no unusual visual symptoms, no medication side effects noted, nonsmoker. Lab Results   Component Value Date/Time    Hemoglobin A1c 7.6 (H) 06/24/2020 08:50 AM    Hemoglobin A1c (POC) 6.5 04/26/2017 09:31 AM       Foot paresthesias:   - Seen by Dr. Cristhian Lorenzo in November. Workup notable for low vitamin B level and recommendation made for B12 supplementation to high-normal range. Folate normal, no anemia. Here to discuss starting medication. No history of absorption issues. Lab Results   Component Value Date/Time    Vitamin B12 196 (L) 11/11/2020 10:33 AM    Folate 10.7 11/11/2020 10:33 AM       Current Outpatient Medications   Medication Sig Dispense Refill    propafenone SR (Rythmol SR) 225 mg SR capsule Take 1 Cap by mouth two (2) times a day. 60 Cap 3    gabapentin (NEURONTIN) 400 mg capsule Take 1 Cap by mouth three (3) times daily. Max Daily Amount: 1,200 mg. 90 Cap 1    loratadine (CLARITIN) 10 mg tablet Take 1 Tab by mouth daily. To replace xyzal. 30 Tab 5    acebutoloL (SECTRAL) 200 mg capsule Take 1 Cap by mouth two (2) times a day. 180 Cap 2    fluticasone propionate (FLONASE) 50 mcg/actuation nasal spray 2 Sprays by Both Nostrils route daily.  1 Bottle 0    aspirin delayed-release 81 mg tablet Take 1 tablet by mouth once daily 30 Tab 5    glucose blood VI test strips (True Metrix Glucose Test Strip) strip To use to check blood sugar daily,  Dx: E11.9 100 Strip 2    fenofibrate (LOFIBRA) 160 mg tablet TAKE 1 TABLET BY MOUTH EVERY DAY 30 Tab 1    canagliflozin (Invokana) 300 mg tablet TAKE 1 TABLET BY MOUTH ONCE DAILY BEFORE BREAKFAST 30 Tab 5    ergocalciferol (ERGOCALCIFEROL) 1,250 mcg (50,000 unit) capsule Take 1 Cap by mouth two (2) times a week. 24 Cap 0    famotidine (PEPCID) 40 mg tablet TAKE 1 TABLET BY MOUTH EVERY DAY TAKES EVERY BEDTIME 30 Tab 5    metFORMIN (GLUCOPHAGE) 1,000 mg tablet TAKE 1 TABLET BY MOUTH TWICE A DAY 60 Tab 5    omeprazole (PRILOSEC) 20 mg capsule TAKE 1 CAPSULE BY MOUTH ONCE DAILY 30 Cap 5    simvastatin (ZOCOR) 20 mg tablet TAKE 1 TABLET BY MOUTH EVERY DAY 30 Tab 5    SITagliptin (Januvia) 100 mg tablet Take 1 tablet by mouth once daily 30 Tab 5    topiramate (TOPAMAX) 50 mg tablet Take 1 tab twice a day for migraine prevention 60 Tab 5    Lancets (MICROLET LANCET) Misc USE TO CHECK BLOOD SUGAR ONE TO TWO TIMES DAILY (Patient taking differently: as needed. USE TO CHECK BLOOD SUGAR ONE TO TWO TIMES DAILY) 100 Each 0    EPINEPHrine (EPIPEN) 0.3 mg/0.3 mL injection INJECT CONTENTS OF 1 PEN INTRAMUSCULARLY ONCE AS NEEDED FOR ALLERGIC REACTION FOR UP TO 1 DOSE      albuterol (PROVENTIL HFA, VENTOLIN HFA, PROAIR HFA) 90 mcg/actuation inhaler Take 2 Puffs by inhalation every four (4) hours as needed for Wheezing. 1 Inhaler 5    butalbital-acetaminophen-caffeine (FIORICET, ESGIC) -40 mg per tablet Take 1 Tab by mouth every six (6) hours as needed for Headache. 20 Tab 1    SUMAtriptan (IMITREX) 50 mg tablet TAKE 1 TABLET BY MOUTH AT THE ONSET OF A MIGRAINE HEADACHE. REPEAT IN 1 HOUR IF NEEDED.  TAKE NO MORE THAN 2 TABLETS PER 24 HOURS 9 Tab 5    cyclobenzaprine (FLEXERIL) 10 mg tablet TAKE 1 TABLET BY MOUTH THREE TIMES DAILY AS NEEDED FOR MUSCLE SPASM DO  NOT  DRIVE  WHILE  TAKING  THIS  MEDICATION 30 Tab 0       Allergies   Allergen Reactions    Beef Containing Products Rash    Green Pepper Hives     Red, Yellow and Orange Peppers also. OK with Black Pepper    Other Plant, Animal, Environmental Swelling     Red meat    Pork Derived (Porcine) Rash       Past Medical History:   Diagnosis Date    Anxiety     Arrhythmia     HX TACHYCARDIA - NEG. STRESS TEST 2013 PER PATIENT    Asthma     LAST EPISODE 2016    Diabetes ([facility] 2008    NIDDM    Ear infection 11/30/2018    BILATERAL    GERD (gastroesophageal reflux disease)     Headache     Headache(784.0)     Hypertension     HX HTN - NO MEDS CURRENTLY(11-30-18)    Inappropriate sinus tachycardia 5/20/2019    Rash 7/14/2016    Recurrent umbilical hernia 51/71/5621    S/P dilatation and curettage     Tachycardia        Social History     Tobacco Use    Smoking status: Never Smoker    Smokeless tobacco: Never Used   Substance Use Topics    Alcohol use: No        Lab Results   Component Value Date/Time    Hemoglobin A1c 7.6 (H) 06/24/2020 08:50 AM    Hemoglobin A1c 7.1 (H) 01/29/2020 08:37 AM    Hemoglobin A1c 7.0 (H) 08/01/2019 11:01 AM    Glucose 235 (H) 11/11/2020 08:11 AM    Glucose (POC) 115 (H) 11/20/2020 11:51 AM    Microalb/Creat ratio (ug/mg creat.) <8.0 03/15/2017 12:05 PM    LDL, calculated 64 06/24/2020 08:50 AM    Creatinine (POC) 0.7 06/04/2013 09:57 AM    Creatinine 1.00 11/11/2020 08:11 AM         Review of Systems, additional:  Pertinent items are noted in HPI.     Objective:     Visit Vitals  /78 (BP 1 Location: Right arm, BP Patient Position: Sitting) Comment: manualk   Pulse (!) 115   Temp 98.8 °F (37.1 °C) (Oral)   Resp 20   Ht 5' 5\" (1.651 m)   Wt 205 lb (93 kg)   LMP 01/25/2012   SpO2 97%   BMI 34.11 kg/m²     General appearance - alert, well appearing, and in no distress  Mental status - alert, oriented to person, place, and time, normal mood, behavior, speech, dress, motor activity, and thought processes  Eyes - pupils equal and reactive, extraocular eye movements intact  Neck - supple, no significant adenopathy, thyroid exam: thyroid is normal in size without nodules or tenderness  Chest - clear to auscultation, no wheezes, rales or rhonchi, symmetric air entry, no tachypnea, retractions or cyanosis  Heart - normal rate, regular rhythm, normal S1, S2, no murmurs, rubs, clicks or gallops     Lab review: orders written for new lab studies as appropriate; see orders. Assessment/Plan:   Cuca Lemus is a 41 y.o. female seen today for:     1. Type 2 diabetes mellitus with diabetic neuropathy, without long-term current use of insulin ([facility]): home BG measurements have not been at goal. Check A1c. At maximum recommend doses of current therapy and thus will start low dose glipizide. Medication side effects discussed. Continue all other medications as prescribed. Continue to monitor home BG.   - HEMOGLOBIN A1C WITH EAG; Future  - MICROALBUMIN, UR, RAND W/ MICROALB/CREAT RATIO; Future  - glipiZIDE SR (GLUCOTROL XL) 5 mg CR tablet; Take 1 Tab by mouth daily. Dispense: 30 Tab; Refill: 5    2. Vitamin B12 deficiency: new onset with paresthesias of the feet, no anemia. With no absorption issues noted, will start oral B12 at 1000 mcg daily. Check B12 level in 1 month. - cyanocobalamin 1,000 mcg tablet; Take 1 Tab by mouth daily. Dispense: 30 Tab; Refill: 5  - VITAMIN B12; Future    I have discussed the diagnosis with the patient and the intended plan as seen in the above orders. The patient has received an after-visit summary and questions were answered concerning future plans. I have discussed medication side effects and warnings with the patient as well. Patient verbalizes understanding of plan of care and denies further questions or concerns at this time. Informed patient to return to the office if symptoms worsen or if new symptoms arise. Follow-up and Dispositions    · Return in about 1 month (around 1/9/2021) for lab appointment for vitamin B12.

## 2020-12-10 LAB
ALBUMIN/CREAT UR: <5 MG/G CREAT (ref 0–29)
CREAT UR-MCNC: 54.6 MG/DL
EST. AVERAGE GLUCOSE BLD GHB EST-MCNC: 163 MG/DL
HBA1C MFR BLD: 7.3 % (ref 4.8–5.6)
MICROALBUMIN UR-MCNC: <3 UG/ML

## 2020-12-11 NOTE — PROGRESS NOTES
Urine microalbumin/creatinine ration normal. Recheck in 1 year. Diabetes with fair control with A1c 7.3%; continue with current therapy. MyChart comment sent.

## 2020-12-12 DIAGNOSIS — E11.49 OTHER DIABETIC NEUROLOGICAL COMPLICATION ASSOCIATED WITH TYPE 2 DIABETES MELLITUS (HCC): ICD-10-CM

## 2020-12-18 ENCOUNTER — VIRTUAL VISIT (OUTPATIENT)
Dept: FAMILY MEDICINE CLINIC | Age: 40
End: 2020-12-18
Payer: MEDICAID

## 2020-12-18 DIAGNOSIS — H92.02 LEFT EAR PAIN: Primary | ICD-10-CM

## 2020-12-18 PROCEDURE — 99213 OFFICE O/P EST LOW 20 MIN: CPT | Performed by: NURSE PRACTITIONER

## 2020-12-18 RX ORDER — GABAPENTIN 400 MG/1
CAPSULE ORAL
Qty: 90 CAP | Refills: 5 | Status: SHIPPED | OUTPATIENT
Start: 2020-12-18 | End: 2021-02-04 | Stop reason: SDUPTHER

## 2020-12-18 RX ORDER — CEFDINIR 300 MG/1
300 CAPSULE ORAL 2 TIMES DAILY
Qty: 20 CAP | Refills: 0 | Status: SHIPPED | OUTPATIENT
Start: 2020-12-18 | End: 2020-12-26 | Stop reason: ALTCHOICE

## 2020-12-18 NOTE — PROGRESS NOTES
HISTORY OF PRESENT ILLNESS  Ricky Varma is a 41 y.o. female. HPI  Pt presents with \"left ear pain\"  Visit was conducted via [employer]. me  Pt was located at home, provider was located at [facility]  Visit lasted 4 minutes  Ricky Kojo, who was evaluated through a synchronous (real-time) audio-video encounter, and/or her healthcare decision maker, is aware that it is a billable service, with coverage as determined by her insurance carrier. She provided verbal consent to proceed: Yes, and patient identification was verified. It was conducted pursuant to the emergency declaration under the [facility], [address] [place] [address] authority and the [employer] Act. A caregiver was present when appropriate. Ability to conduct physical exam was limited. I was in the office. The patient was at home. Pt states that she has been dealing with left ear pain for about 5 days  She states that she has a hard time hearing out of the ear  She hears some fluid in her ear when she moves her head around  She has some nasal congestion  She has tried ear drops, with no relief  No fever  OTC: ear drops  Review of Systems   Constitutional: Negative for fever. HENT: Positive for ear pain. Physical Exam  Constitutional:       Appearance: Normal appearance. Neurological:      Mental Status: She is alert and oriented to person, place, and time. ASSESSMENT and PLAN    ICD-10-CM ICD-9-CM    1. Left ear pain  H92.02 388.70 cefdinir (OMNICEF) 300 mg capsule     Educated about taking medication as prescribed  Should notify office should symptoms continue and/or worsen    Pt informed to return to office with worsening of symptoms, or PRN with any questions or concerns. Pt verbalizes understanding of plan of care and denies further questions or concerns at this time.

## 2020-12-22 RX ORDER — ERGOCALCIFEROL 1.25 MG/1
CAPSULE ORAL
Qty: 24 CAP | Refills: 0 | Status: SHIPPED | OUTPATIENT
Start: 2020-12-22 | End: 2021-01-06

## 2020-12-26 ENCOUNTER — HOSPITAL ENCOUNTER (EMERGENCY)
Age: 40
Discharge: HOME | End: 2020-12-26
Attending: EMERGENCY MEDICINE
Payer: MEDICAID

## 2020-12-26 VITALS
SYSTOLIC BLOOD PRESSURE: 131 MMHG | HEART RATE: 107 BPM | TEMPERATURE: 99.7 F | DIASTOLIC BLOOD PRESSURE: 73 MMHG | BODY MASS INDEX: 34.16 KG/M2 | WEIGHT: 205 LBS | RESPIRATION RATE: 14 BRPM | OXYGEN SATURATION: 97 % | HEIGHT: 65 IN

## 2020-12-26 DIAGNOSIS — R55 NEAR SYNCOPE: Primary | ICD-10-CM

## 2020-12-26 LAB
ALBUMIN SERPL-MCNC: 3.7 G/DL (ref 3.5–5)
ALBUMIN/GLOB SERPL: 1.1 {RATIO} (ref 1.1–2.2)
ALP SERPL-CCNC: 53 U/L (ref 45–117)
ALT SERPL-CCNC: 24 U/L (ref 12–78)
ANION GAP SERPL CALC-SCNC: 10 MMOL/L (ref 5–15)
AST SERPL-CCNC: 13 U/L (ref 15–37)
BASOPHILS # BLD: 0.1 K/UL (ref 0–0.1)
BASOPHILS NFR BLD: 1 % (ref 0–1)
BILIRUB SERPL-MCNC: 0.2 MG/DL (ref 0.2–1)
BUN SERPL-MCNC: 9 MG/DL (ref 6–20)
BUN/CREAT SERPL: 7 (ref 12–20)
CALCIUM SERPL-MCNC: 8.8 MG/DL (ref 8.5–10.1)
CHLORIDE SERPL-SCNC: 103 MMOL/L (ref 97–108)
CO2 SERPL-SCNC: 25 MMOL/L (ref 21–32)
COMMENT, HOLDF: NORMAL
CREAT SERPL-MCNC: 1.24 MG/DL (ref 0.55–1.02)
DIFFERENTIAL METHOD BLD: ABNORMAL
EOSINOPHIL # BLD: 0.2 K/UL (ref 0–0.4)
EOSINOPHIL NFR BLD: 2 % (ref 0–7)
ERYTHROCYTE [DISTWIDTH] IN BLOOD BY AUTOMATED COUNT: 12.8 % (ref 11.5–14.5)
GLOBULIN SER CALC-MCNC: 3.3 G/DL (ref 2–4)
GLUCOSE SERPL-MCNC: 187 MG/DL (ref 65–100)
HCT VFR BLD AUTO: 38.7 % (ref 35–47)
HGB BLD-MCNC: 12.6 G/DL (ref 11.5–16)
IMM GRANULOCYTES # BLD AUTO: 0.1 K/UL (ref 0–0.04)
IMM GRANULOCYTES NFR BLD AUTO: 1 % (ref 0–0.5)
LYMPHOCYTES # BLD: 2.8 K/UL (ref 0.8–3.5)
LYMPHOCYTES NFR BLD: 35 % (ref 12–49)
MCH RBC QN AUTO: 27.9 PG (ref 26–34)
MCHC RBC AUTO-ENTMCNC: 32.6 G/DL (ref 30–36.5)
MCV RBC AUTO: 85.6 FL (ref 80–99)
MONOCYTES # BLD: 0.8 K/UL (ref 0–1)
MONOCYTES NFR BLD: 10 % (ref 5–13)
NEUTS SEG # BLD: 4.1 K/UL (ref 1.8–8)
NEUTS SEG NFR BLD: 51 % (ref 32–75)
NRBC # BLD: 0 K/UL (ref 0–0.01)
NRBC BLD-RTO: 0 PER 100 WBC
PLATELET # BLD AUTO: 237 K/UL (ref 150–400)
PMV BLD AUTO: 11.3 FL (ref 8.9–12.9)
POTASSIUM SERPL-SCNC: 3.6 MMOL/L (ref 3.5–5.1)
PROT SERPL-MCNC: 7 G/DL (ref 6.4–8.2)
RBC # BLD AUTO: 4.52 M/UL (ref 3.8–5.2)
SAMPLES BEING HELD,HOLD: NORMAL
SODIUM SERPL-SCNC: 138 MMOL/L (ref 136–145)
TROPONIN I SERPL-MCNC: <0.05 NG/ML
WBC # BLD AUTO: 8.2 K/UL (ref 3.6–11)

## 2020-12-26 PROCEDURE — 93005 ELECTROCARDIOGRAM TRACING: CPT

## 2020-12-26 PROCEDURE — 99285 EMERGENCY DEPT VISIT HI MDM: CPT

## 2020-12-26 PROCEDURE — 80053 COMPREHEN METABOLIC PANEL: CPT

## 2020-12-26 PROCEDURE — 84484 ASSAY OF TROPONIN QUANT: CPT

## 2020-12-26 PROCEDURE — 36415 COLL VENOUS BLD VENIPUNCTURE: CPT

## 2020-12-26 PROCEDURE — 85025 COMPLETE CBC W/AUTO DIFF WBC: CPT

## 2020-12-26 NOTE — ED NOTES
Orthostatic v/s obtained. Pt only c/o weakness with position changes. Denies CP or SOB. Spouse at bedside.

## 2020-12-26 NOTE — ED PROVIDER NOTES
HPI the patient was sitting down to dinner this evening when she became hot and presyncopal.  She admits to feeling like her heart was beating fast.  She has had several episodes of this in the past few weeks. She denies having chest pain, shortness of breath, syncope, fever, vomiting, cough or other complaints. Past Medical History:   Diagnosis Date    Anxiety     Arrhythmia     HX TACHYCARDIA - NEG.  STRESS TEST 2013 PER PATIENT    Asthma     LAST EPISODE 2016    Diabetes ([address] 2008    NIDDM    Ear infection 11/30/2018    BILATERAL    GERD (gastroesophageal reflux disease)     Headache     Headache(784.0)     Hypertension     HX HTN - NO MEDS CURRENTLY(11-30-18)    Inappropriate sinus tachycardia 5/20/2019    Rash 7/14/2016    Recurrent umbilical hernia 33/47/0892    S/P dilatation and curettage     Tachycardia        Past Surgical History:   Procedure Laterality Date    HX APPENDECTOMY  2/2008    HX CHOLECYSTECTOMY  2001    HX DILATION AND CURETTAGE      HX GYN  3/2008    tubal ligation    HX HERNIA REPAIR  2/2009    HX HERNIA REPAIR  12/06/2018    open recurrent umbilical hernia repair    HX HYSTERECTOMY  03/2012    HX LEFT SALPINGO-OOPHORECTOMY Left     HX LUMBAR DISKECTOMY      2006    HX ORTHOPAEDIC  9/2006    ruptured discs    OH COMPRE ELECTROPHYSIOL XM W/LEFT ATRIAL PACNG/REC N/A 11/20/2020    Lt Atrial Pace & Record During Ep Study performed by Valentine Styles MD at [address] [place] [address] CATH LAB    OH COMPRE ELECTROPHYSIOLOGIC ARRHYTHMIA INDUCTION N/A 11/20/2020    EP STUDY COMPLETE performed by Valentine Styles MD at [address] [place] [address] CATH LAB    OH EPHYS EVAL W/ABLATION SUPRAVENT ARRHYTHMIA N/A 11/20/2020    Ablation Svt performed by Valentine Styles MD at [address] [place] [address] CATH LAB    OH INTRACARDIAC ELECTROPHYSIOLOGIC 3D MAPPING N/A 11/20/2020    Ep 3d Mapping performed by Valentine Styles MD at [address] [place] [address] CATH LAB    UPPER GI ENDOSCOPY,BIOPSY  5/11/2017              Family History:   Problem Relation Age of Onset    Heart Disease Mother     Hypertension Father     Cancer Father         Lung    Hypertension Sister     No Known Problems Brother     No Known Problems Brother     No Known Problems Brother     Anesth Problems Neg Hx        Social History     Socioeconomic History    Marital status:      Spouse name: Not on file    Number of children: Not on file    Years of education: Not on file    Highest education level: Not on file   Occupational History    Not on file   Social Needs    Financial resource strain: Not on file    Food insecurity     Worry: Not on file     Inability: Not on file   [employer] needs     Medical: Not on file     Non-medical: Not on file   Tobacco Use    Smoking status: Never Smoker    Smokeless tobacco: Never Used   Substance and Sexual Activity    Alcohol use: No    Drug use: Never    Sexual activity: Yes     Partners: Male     Birth control/protection: Surgical   Lifestyle    Physical activity     Days per week: Not on file     Minutes per session: Not on file    Stress: Not on file   Relationships    Social connections     Talks on phone: Not on file     Gets together: Not on file     Attends Holiness service: Not on file     Active member of club or organization: Not on file     Attends meetings of clubs or organizations: Not on file     Relationship status: Not on file    Intimate partner violence     Fear of current or ex partner: Not on file     Emotionally abused: Not on file     Physically abused: Not on file     Forced sexual activity: Not on file   Other Topics Concern    Not on file   Social History Narrative    Not on file         ALLERGIES: Beef containing products; Green pepper; Other plant, animal, environmental; and Pork derived (porcine)    Review of Systems   Constitutional: Positive for diaphoresis. Negative for fever. HENT: Negative for voice change. Eyes: Negative for pain.    Respiratory: Negative for cough and shortness of breath. Cardiovascular: Positive for palpitations. Negative for chest pain. Gastrointestinal: Negative for abdominal pain, nausea and vomiting. Genitourinary: Negative for flank pain. Musculoskeletal: Negative for back pain. Skin: Negative for rash. Neurological: Positive for light-headedness. Negative for syncope and headaches. Psychiatric/Behavioral: Negative for confusion. There were no vitals filed for this visit. Physical Exam  Constitutional:       General: She is not in acute distress. Appearance: She is well-developed. HENT:      Head: Normocephalic. Eyes:      Pupils: Pupils are equal, round, and reactive to light. Neck:      Musculoskeletal: Normal range of motion. Cardiovascular:      Rate and Rhythm: Tachycardia present. Heart sounds: No murmur. Pulmonary:      Effort: Pulmonary effort is normal.      Breath sounds: Normal breath sounds. Abdominal:      Palpations: Abdomen is soft. Tenderness: There is no abdominal tenderness. Musculoskeletal: Normal range of motion. Skin:     General: Skin is warm and dry. Capillary Refill: Capillary refill takes less than 2 seconds. Neurological:      Mental Status: She is alert. Psychiatric:         Behavior: Behavior normal.          MDM       Procedures ED EKG interpretation:  Rhythm: Sinus tachycardia, rate 112. Nonspecific ST changes. This EKG was interpreted by Renelda Holter, MD,ED Provider.

## 2020-12-26 NOTE — ED TRIAGE NOTES
Pt presents via EMS with c/o dizziness with chest pressure. Denies syncope. Under treatment by Dr Sarah Ferris for possible ablation.

## 2020-12-27 NOTE — ED NOTES
Pt given discharge instructions by Dr Lomeli May pt stable at time of discharge ambulates to lobby with spouse

## 2020-12-28 ENCOUNTER — TELEPHONE (OUTPATIENT)
Dept: CARDIOLOGY CLINIC | Age: 40
End: 2020-12-28

## 2020-12-28 LAB
ATRIAL RATE: 112 BPM
CALCULATED P AXIS, ECG09: 19 DEGREES
CALCULATED R AXIS, ECG10: 33 DEGREES
CALCULATED T AXIS, ECG11: 57 DEGREES
DIAGNOSIS, 93000: NORMAL
P-R INTERVAL, ECG05: 158 MS
Q-T INTERVAL, ECG07: 338 MS
QRS DURATION, ECG06: 86 MS
QTC CALCULATION (BEZET), ECG08: 461 MS
VENTRICULAR RATE, ECG03: 112 BPM

## 2020-12-28 NOTE — TELEPHONE ENCOUNTER
Patient was seen in the hosptial over the holiday and was advised to follow up with Dr. Jeison Hanks or Ana Adam. Please advise.     Phone: [phone]

## 2020-12-29 ENCOUNTER — VIRTUAL VISIT (OUTPATIENT)
Dept: FAMILY MEDICINE CLINIC | Age: 40
End: 2020-12-29
Payer: MEDICAID

## 2020-12-29 ENCOUNTER — NURSE TRIAGE (OUTPATIENT)
Dept: OTHER | Facility: CLINIC | Age: 40
End: 2020-12-29

## 2020-12-29 DIAGNOSIS — J01.90 ACUTE RHINOSINUSITIS: Primary | ICD-10-CM

## 2020-12-29 PROCEDURE — 99213 OFFICE O/P EST LOW 20 MIN: CPT | Performed by: FAMILY MEDICINE

## 2020-12-29 RX ORDER — AMOXICILLIN AND CLAVULANATE POTASSIUM 875; 125 MG/1; MG/1
1 TABLET, FILM COATED ORAL EVERY 12 HOURS
Qty: 20 TAB | Refills: 0 | Status: SHIPPED | OUTPATIENT
Start: 2020-12-29 | End: 2021-01-08

## 2020-12-29 NOTE — PROGRESS NOTES
HISTORY OF PRESENTING ILLNESS      Cherylene Lees is a 41 y.o. female with tachycardia for whom we increased the dosing of flecainide from 50mg to 100 mg twice daily during her last visit. She reported today failure to drive improvement with this dose adjustment. She has had negative ANS testing and negative MRA for May Thurner. Her flecainide was increased to 150 mg twice daily and continued acebutolol. She reports lower heart rates. She underwent negative stress testing. She continues to have tachycardia, sob, dizziness on current regimen, EKG today shows sinus rhythm with heart rates in the 90s. Since last visit, she has also been diagnosed with alpha gal syndrome from possible previous tick bite after a severe allergic reaction. She underwent EPS which demonstrated a long RP tachycardia consistent with either inappropriate sinus tachycardia vs atrial tachycardia. We started a trial of changing flecainide to rythmol 225 mg bid; continue acebutolol. Since her EPS/med change, her palpitations are unchanged. She had an episode on 11/26/2020 of chest and shoulder pain, as well as hypertension/presyncope. She was given 3 81mg ASA in the rescue squad. BP normotensive since that time. She was seen last visit and continued current doses of acebutolol and propafenone for now with plans to consider increased propafenone versus sinus node ablation in future along with discussion about higher likelihood of pacemaker implantation following ablation. She was seen in the ER for an episode of chest discomfort/palpitations with high blood pressure over the weekend and is planned for follow up on 1/4/2021 with Dr. Rickey Neville. She remains somewhat hypertensive today during her visit, still experiencing tachycardia. PAST MEDICAL HISTORY     Past Medical History:   Diagnosis Date    Anxiety     Arrhythmia     HX TACHYCARDIA - NEG.  STRESS TEST 2013 PER PATIENT    Asthma     LAST EPISODE 2016    Diabetes ([facility] [address] 2008    NIDDM    Ear infection 11/30/2018    BILATERAL    GERD (gastroesophageal reflux disease)     Headache     Headache(784.0)     Hypertension     HX HTN - NO MEDS CURRENTLY(11-30-18)    Inappropriate sinus tachycardia 5/20/2019    Rash 7/14/2016    Recurrent umbilical hernia 41/15/6721    S/P dilatation and curettage     Tachycardia            PAST SURGICAL HISTORY     Past Surgical History:   Procedure Laterality Date    HX APPENDECTOMY  2/2008    HX CHOLECYSTECTOMY  2001    HX DILATION AND CURETTAGE      HX GYN  3/2008    tubal ligation    HX HERNIA REPAIR  2/2009    HX HERNIA REPAIR  12/06/2018    open recurrent umbilical hernia repair    HX HYSTERECTOMY  03/2012    HX LEFT SALPINGO-OOPHORECTOMY Left     HX LUMBAR DISKECTOMY      2006    HX ORTHOPAEDIC  9/2006    ruptured discs    MT COMPRE ELECTROPHYSIOL XM W/LEFT ATRIAL PACNG/REC N/A 11/20/2020    Lt Atrial Pace & Record During Ep Study performed by Kiki Doty MD at [address] CATH LAB    MT COMPRE ELECTROPHYSIOLOGIC ARRHYTHMIA INDUCTION N/A 11/20/2020    EP STUDY COMPLETE performed by Kiki Doty MD at [address] CATH LAB    MT EPHYS EVAL W/ABLATION SUPRAVENT ARRHYTHMIA N/A 11/20/2020    Ablation Svt performed by Kiki Doty MD at [address] CATH LAB    MT INTRACARDIAC ELECTROPHYSIOLOGIC 3D MAPPING N/A 11/20/2020    Ep 3d Mapping performed by Kiki Doty MD at [address] [place] [address] St CATH LAB    UPPER GI ENDOSCOPY,BIOPSY  5/11/2017               ALLERGIES     Allergies   Allergen Reactions    Beef Containing Products Rash    Green Pepper Hives     Red, Yellow and Orange Peppers also.   OK with Black Pepper    Other Plant, Animal, Environmental Swelling     Red meat    Pork Derived (Porcine) Rash          FAMILY HISTORY     Family History   Problem Relation Age of Onset    Heart Disease Mother     Hypertension Father     Cancer Father         Lung    Hypertension Sister     No Known Problems Brother     No Known Problems Brother     No Known Problems Brother     Anesth Problems Neg Hx     negative for cardiac disease       SOCIAL HISTORY     Social History     Socioeconomic History    Marital status:      Spouse name: Not on file    Number of children: Not on file    Years of education: Not on file    Highest education level: Not on file   Tobacco Use    Smoking status: Never Smoker    Smokeless tobacco: Never Used   Substance and Sexual Activity    Alcohol use: No    Drug use: Never    Sexual activity: Yes     Partners: Male     Birth control/protection: Surgical         MEDICATIONS     Current Outpatient Medications   Medication Sig    acebutoloL (SECTRAL) 400 mg capsule Take 1 Cap by mouth two (2) times a day.  amoxicillin-clavulanate (AUGMENTIN) 875-125 mg per tablet Take 1 Tab by mouth every twelve (12) hours for 10 days.  ergocalciferol (ERGOCALCIFEROL) 1,250 mcg (50,000 unit) capsule TAKE 1 CAPSULE BY MOUTH TWICE A WEEK    gabapentin (NEURONTIN) 400 mg capsule TAKE 1 CAPSULE BY MOUTH THREE TIMES DAILY . DO NOT EXCEED 3 PER 24 HOURS    cyanocobalamin 1,000 mcg tablet Take 1 Tab by mouth daily.  glipiZIDE SR (GLUCOTROL XL) 5 mg CR tablet Take 1 Tab by mouth daily.  propafenone SR (Rythmol SR) 225 mg SR capsule Take 1 Cap by mouth two (2) times a day.  EPINEPHrine (EPIPEN) 0.3 mg/0.3 mL injection INJECT CONTENTS OF 1 PEN INTRAMUSCULARLY ONCE AS NEEDED FOR ALLERGIC REACTION FOR UP TO 1 DOSE    loratadine (CLARITIN) 10 mg tablet Take 1 Tab by mouth daily. To replace xyzal.    fluticasone propionate (FLONASE) 50 mcg/actuation nasal spray 2 Sprays by Both Nostrils route daily.     aspirin delayed-release 81 mg tablet Take 1 tablet by mouth once daily    glucose blood VI test strips (True Metrix Glucose Test Strip) strip To use to check blood sugar daily,  Dx: E11.9    fenofibrate (LOFIBRA) 160 mg tablet TAKE 1 TABLET BY MOUTH EVERY DAY    albuterol (PROVENTIL HFA, VENTOLIN HFA, PROAIR HFA) 90 mcg/actuation inhaler Take 2 Puffs by inhalation every four (4) hours as needed for Wheezing.  butalbital-acetaminophen-caffeine (FIORICET, ESGIC) -40 mg per tablet Take 1 Tab by mouth every six (6) hours as needed for Headache.  canagliflozin (Invokana) 300 mg tablet TAKE 1 TABLET BY MOUTH ONCE DAILY BEFORE BREAKFAST    famotidine (PEPCID) 40 mg tablet TAKE 1 TABLET BY MOUTH EVERY DAY TAKES EVERY BEDTIME (Patient taking differently: Every morning. T)    metFORMIN (GLUCOPHAGE) 1,000 mg tablet TAKE 1 TABLET BY MOUTH TWICE A DAY    omeprazole (PRILOSEC) 20 mg capsule TAKE 1 CAPSULE BY MOUTH ONCE DAILY (Patient taking differently: nightly.)    simvastatin (ZOCOR) 20 mg tablet TAKE 1 TABLET BY MOUTH EVERY DAY    SITagliptin (Januvia) 100 mg tablet Take 1 tablet by mouth once daily    SUMAtriptan (IMITREX) 50 mg tablet TAKE 1 TABLET BY MOUTH AT THE ONSET OF A MIGRAINE HEADACHE. REPEAT IN 1 HOUR IF NEEDED. TAKE NO MORE THAN 2 TABLETS PER 24 HOURS    topiramate (TOPAMAX) 50 mg tablet Take 1 tab twice a day for migraine prevention    cyclobenzaprine (FLEXERIL) 10 mg tablet TAKE 1 TABLET BY MOUTH THREE TIMES DAILY AS NEEDED FOR MUSCLE SPASM DO  NOT  DRIVE  WHILE  TAKING  THIS  MEDICATION    Lancets (MICROLET LANCET) Misc USE TO CHECK BLOOD SUGAR ONE TO TWO TIMES DAILY (Patient taking differently: as needed. USE TO CHECK BLOOD SUGAR ONE TO TWO TIMES DAILY)     No current facility-administered medications for this visit. I have reviewed the nurses notes, vitals, problem list, allergy list, medical history, family, social history and medications. REVIEW OF SYMPTOMS      General: Pt denies excessive weight gain or loss. Pt is able to conduct ADL's  HEENT: Denies blurred vision, headaches, hearing loss, epistaxis and difficulty swallowing. Respiratory: Denies cough, congestion, shortness of breath, RICE, wheezing or stridor.   Cardiovascular: Denies precordial pain, palpitations, edema or PND  Gastrointestinal: Denies poor appetite, indigestion, abdominal pain or blood in stool  Genitourinary: Denies hematuria, dysuria, increased urinary frequency  Musculoskeletal: Denies joint pain or swelling from muscles or joints  Neurologic: Denies tremor, paresthesias, headache, or sensory motor disturbance  Psychiatric: Denies confusion, insomnia, depression  Integumentray: Denies rash, itching or ulcers. Hematologic: Denies easy bruising, bleeding       PHYSICAL EXAMINATION      Vitals: see vitals section  General: Well developed, in no acute distress. HEENT: No jaundice, oral mucosa moist, no oral ulcers  Neck: Supple, no stiffness, no lymphadenopathy, supple  Heart:  Normal S1/S2 negative S3 or S4. Regular, no murmur, gallop or rub, no jugular venous distention  Respiratory: Clear bilaterally x 4, no wheezing or rales  Abdomen:   Soft, non-tender, bowel sounds are active. Extremities:  No edema, normal cap refill, no cyanosis. Musculoskeletal: No clubbing, no deformities  Neuro: A&Ox3, speech clear, gait stable, cooperative, no focal neurologic deficits  Skin: Skin color is normal. No rashes or lesions.  Non diaphoretic, moist.  Vascular: 2+ pulses symmetric in all extremities       DIAGNOSTIC DATA      EKG:        LABORATORY DATA      Lab Results   Component Value Date/Time    WBC 8.2 12/26/2020 06:16 PM    HGB 12.6 12/26/2020 06:16 PM    HCT 38.7 12/26/2020 06:16 PM    PLATELET 410 94/96/3289 06:16 PM    MCV 85.6 12/26/2020 06:16 PM      Lab Results   Component Value Date/Time    Sodium 138 12/26/2020 06:16 PM    Potassium 3.6 12/26/2020 06:16 PM    Chloride 103 12/26/2020 06:16 PM    CO2 25 12/26/2020 06:16 PM    Anion gap 10 12/26/2020 06:16 PM    Glucose 187 (H) 12/26/2020 06:16 PM    BUN 9 12/26/2020 06:16 PM    Creatinine 1.24 (H) 12/26/2020 06:16 PM    BUN/Creatinine ratio 7 (L) 12/26/2020 06:16 PM    GFR est AA 57 (L) 12/26/2020 06:16 PM    GFR est non-AA 47 (L) 12/26/2020 06:16 PM    Calcium 8.8 12/26/2020 06:16 PM    Bilirubin, total 0.2 12/26/2020 06:16 PM    Alk. phosphatase 53 12/26/2020 06:16 PM    Protein, total 7.0 12/26/2020 06:16 PM    Albumin 3.7 12/26/2020 06:16 PM    Globulin 3.3 12/26/2020 06:16 PM    A-G Ratio 1.1 12/26/2020 06:16 PM    ALT (SGPT) 24 12/26/2020 06:16 PM           ASSESSMENT      1. Inappropriate sinus tachycardia  2. Diabetes mellitus  3. Orthostatic hypotension  4. GERD  5. Anxiety       PLAN     Increase acebutolol to 400mg BID, follow up to discuss ablation on Monday with Dr. Jessica Ford. BP decreased through visit, she reports feeling better prior to leaving. ICD-10-CM ICD-9-CM    1. SVT (supraventricular tachycardia) (HCC)  I47.1 427.89    2. Inappropriate sinus tachycardia  R00.0 427.89    3. Palpitations  R00.2 785.1    4. Fatigue, unspecified type  R53.83 780.79    5. Chest pain, unspecified type  R07.9 786.50      Orders Placed This Encounter    acebutoloL (SECTRAL) 400 mg capsule     Sig: Take 1 Cap by mouth two (2) times a day. Dispense:  60 Cap     Refill:  0          FOLLOW-UP   Monday as planned    Thank you, Chu Grover NP for allowing me to participate in the care of this extraordinarily pleasant female. Please do not hesitate to contact me for further questions/concerns.      GILBERTO Sotelo [facility].  [address] [place] [address], [address] [facility]    [facility]  [phone] / [phone] Fax   [phone] / [phone] Fax

## 2020-12-29 NOTE — TELEPHONE ENCOUNTER
Reason for Disposition   [1] COVID-19 infection suspected by caller or triager AND [2] mild symptoms (cough, fever, or others) AND [1] no complications or SOB    Answer Assessment - Initial Assessment Questions  1. COVID-19 DIAGNOSIS: \"Who made your Coronavirus (COVID-19) diagnosis? \" \"Was it confirmed by a positive lab test?\" If not diagnosed by a HCP, ask \"Are there lots of cases (community spread) where you live? \" (See public health department website, if unsure)      Not diagnosed     2. COVID-19 EXPOSURE: \"Was there any known exposure to COVID before the symptoms began? \" [facility] Definition of close contact: within 6 feet (2 meters) for a total of 15 minutes or more over a 24-hour period. No    3. ONSET: \"When did the COVID-19 symptoms start? \"       3 days ago     4. WORST SYMPTOM: \"What is your worst symptom? \" (e.g., cough, fever, shortness of breath, muscle aches)      Stuffy nose     5. COUGH: \"Do you have a cough? \" If so, ask: \"How bad is the cough? \"        Cough of green mucous     6. FEVER: \"Do you have a fever? \" If so, ask: \"What is your temperature, how was it measured, and when did it start? \"      No    7. RESPIRATORY STATUS: \"Describe your breathing? \" (e.g., shortness of breath, wheezing, unable to speak)       Breathing is normal     8. BETTER-SAME-WORSE: [place] you getting better, staying the same or getting worse compared to yesterday? \"  If getting worse, ask, \"In what way? \"      Same     9. HIGH RISK DISEASE: \"Do you have any chronic medical problems? \" (e.g., asthma, heart or lung disease, weak immune system, obesity, etc.)      No    10. PREGNANCY: \"Is there any chance you are pregnant? \" \"When was your last menstrual period? \"        No, hysterectomy     11. OTHER SYMPTOMS: \"Do you have any other symptoms? \"  (e.g., chills, fatigue, headache, loss of smell or taste, muscle pain, sore throat; new loss of smell or taste especially support the diagnosis of COVID-19)        no    Protocols used: CORONAVIRUS (COVID-19) DIAGNOSED OR SUSPECTED-ADULT-AH        Brief description of triage: Patient reports nasal congestion x 3 days, cough of yellow mucous. Pt denies fever, difficulty breathing. Triage indicates for patient to CALL PCP OFFICE WHEN OPEN. Care advice provided, patient verbalizes understanding; denies any other questions or concerns; instructed to call back for any new or worsening symptoms. Writer provided warm transfer to [facility] for appointment scheduling. Attention Provider: Thank you for allowing me to participate in the care of your patient. Please do not respond through this encounter as the response is not directed to a shared pool.

## 2020-12-29 NOTE — PROGRESS NOTES
Micheline Hurley is a 41 y.o. female who was seen by synchronous (real-time) audio-video technology on 12/29/2020 for Sinus Pain      Assessment & Plan:   Diagnoses and all orders for this visit:    1. Acute rhinosinusitis  -     amoxicillin-clavulanate (AUGMENTIN) 875-125 mg per tablet; Take 1 Tab by mouth every twelve (12) hours for 10 days. - Warm compresses to sinuses, nasal saline spray PRN for congestion, OTC analgesic of choice for pain     712  Subjective:   Onset about 3 days ago. Reports nasal congestion, right nasal discharge (dark colored, blood tinged), postnasal drainage, sinus pressure, mild intermittent cough. No fever, known sick contacts, COVID-19 exposures. Evaluation to date: none  Treatment to date: Flonase    Prior to Admission medications    Medication Sig Start Date End Date Taking? Authorizing Provider   ergocalciferol (ERGOCALCIFEROL) 1,250 mcg (50,000 unit) capsule TAKE 1 CAPSULE BY MOUTH TWICE A WEEK 12/22/20  Yes Elise Christian MD   gabapentin (NEURONTIN) 400 mg capsule TAKE 1 CAPSULE BY MOUTH THREE TIMES DAILY . DO NOT EXCEED 3 PER 24 HOURS 12/18/20  Yes Elise Christian MD   cyanocobalamin 1,000 mcg tablet Take 1 Tab by mouth daily. 12/9/20  Yes Elise Christian MD   glipiZIDE SR (GLUCOTROL XL) 5 mg CR tablet Take 1 Tab by mouth daily. 12/9/20  Yes Elise Christian MD   propafenone SR (Rythmol SR) 225 mg SR capsule Take 1 Cap by mouth two (2) times a day. 11/20/20  Yes Talita Miranda MD   EPINEPHrine (EPIPEN) 0.3 mg/0.3 mL injection INJECT CONTENTS OF 1 PEN INTRAMUSCULARLY ONCE AS NEEDED FOR ALLERGIC REACTION FOR UP TO 1 DOSE 7/26/20  Yes Provider, Historical   loratadine (CLARITIN) 10 mg tablet Take 1 Tab by mouth daily. To replace xyzal. 10/7/20  Yes Apolinar Orta NP   acebutoloL (SECTRAL) 200 mg capsule Take 1 Cap by mouth two (2) times a day.  9/22/20  Yes Talita Miranda MD   fluticasone propionate (FLONASE) 50 mcg/actuation nasal spray 2 Sprays by Both Nostrils route daily. 9/3/20  Yes Nelli Braswell NP   aspirin delayed-release 81 mg tablet Take 1 tablet by mouth once daily 8/26/20  Yes Staley, Gerhardt Daisy, NP   glucose blood VI test strips (True Metrix Glucose Test Strip) strip To use to check blood sugar daily,  Dx: E11.9 8/25/20  Yes Staley, Gerhardt Daisy, NP   fenofibrate (LOFIBRA) 160 mg tablet TAKE 1 TABLET BY MOUTH EVERY DAY 8/14/20  Yes Staley, Gerhardt Daisy, NP   albuterol (PROVENTIL HFA, VENTOLIN HFA, PROAIR HFA) 90 mcg/actuation inhaler Take 2 Puffs by inhalation every four (4) hours as needed for Wheezing. 6/24/20  Yes Staley, Gerhardt Daisy, NP   butalbital-acetaminophen-caffeine (FIORICET, ESGIC) -40 mg per tablet Take 1 Tab by mouth every six (6) hours as needed for Headache. 6/24/20  Yes Staley, Gerhardt Daisy, NP   canagliflozin (Invokana) 300 mg tablet TAKE 1 TABLET BY MOUTH ONCE DAILY BEFORE BREAKFAST 6/24/20  Yes Staley, Gerhardt Daisy, NP   famotidine (PEPCID) 40 mg tablet TAKE 1 TABLET BY MOUTH EVERY DAY TAKES EVERY BEDTIME 6/24/20  Yes Selina Orta NP   metFORMIN (GLUCOPHAGE) 1,000 mg tablet TAKE 1 TABLET BY MOUTH TWICE A DAY 6/24/20  Yes Staley, Gerhardt Daisy, NP   omeprazole (PRILOSEC) 20 mg capsule TAKE 1 CAPSULE BY MOUTH ONCE DAILY 6/24/20  Yes Staley, Gerhardt Daisy, NP   simvastatin (ZOCOR) 20 mg tablet TAKE 1 TABLET BY MOUTH EVERY DAY 6/24/20  Yes Nelli Braswell NP   SITagliptin (Januvia) 100 mg tablet Take 1 tablet by mouth once daily 6/24/20  Yes Staley, Gerhardt Daisy, NP   SUMAtriptan (IMITREX) 50 mg tablet TAKE 1 TABLET BY MOUTH AT THE ONSET OF A MIGRAINE HEADACHE. REPEAT IN 1 HOUR IF NEEDED.  TAKE NO MORE THAN 2 TABLETS PER 24 HOURS 6/24/20  Yes Staley, Gerhardt Daisy, NP   topiramate (TOPAMAX) 50 mg tablet Take 1 tab twice a day for migraine prevention 6/24/20  Yes Staley, Gerhardt Daisy, NP   cyclobenzaprine (FLEXERIL) 10 mg tablet TAKE 1 TABLET BY MOUTH THREE TIMES DAILY AS NEEDED FOR MUSCLE SPASM DO  NOT  DRIVE  WHILE  TAKING  THIS  MEDICATION 5/26/20  Yes Clara Orta NP   Lancets ([address] Misc USE TO CHECK BLOOD SUGAR ONE TO TWO TIMES DAILY  Patient taking differently: as needed. USE TO CHECK BLOOD SUGAR ONE TO TWO TIMES DAILY 11/19/13  Yes MD PAOLO Bowles   Pertinent items noted in HPI      Objective:   No flowsheet data found. General: alert, cooperative, no distress   Mental  status: normal mood, behavior, speech, dress, motor activity, and thought processes, able to follow commands   HENT: NCAT   Neck: no visualized mass   Resp: no respiratory distress   Neuro: no gross deficits   Skin: no discoloration or lesions of concern on visible areas   Psychiatric: normal affect, consistent with stated mood, no evidence of hallucinations       We discussed the expected course, resolution and complications of the diagnosis(es) in detail. Medication risks, benefits, costs, interactions, and alternatives were discussed as indicated. I advised her to contact the office if her condition worsens, changes or fails to improve as anticipated. She expressed understanding with the diagnosis(es) and plan. Cj Brantley, who was evaluated through a patient-initiated, synchronous (real-time) audio-video encounter, and/or her healthcare decision maker, is aware that it is a billable service, with coverage as determined by her insurance carrier. She provided verbal consent to proceed: Yes, and patient identification was verified. It was conducted pursuant to the emergency declaration under the [facility], [address] [place] [address] authority and the [employer] Act. A caregiver was present when appropriate. Ability to conduct physical exam was limited. I was in the office. The patient was at home.       Zaira Barnes MD

## 2020-12-30 ENCOUNTER — OFFICE VISIT (OUTPATIENT)
Dept: CARDIOLOGY CLINIC | Age: 40
End: 2020-12-30
Payer: MEDICAID

## 2020-12-30 VITALS
WEIGHT: 206.4 LBS | OXYGEN SATURATION: 97 % | HEIGHT: 65 IN | RESPIRATION RATE: 16 BRPM | HEART RATE: 120 BPM | SYSTOLIC BLOOD PRESSURE: 142 MMHG | BODY MASS INDEX: 34.39 KG/M2 | DIASTOLIC BLOOD PRESSURE: 84 MMHG

## 2020-12-30 DIAGNOSIS — R00.2 PALPITATIONS: ICD-10-CM

## 2020-12-30 DIAGNOSIS — R07.9 CHEST PAIN, UNSPECIFIED TYPE: ICD-10-CM

## 2020-12-30 DIAGNOSIS — R00.0 INAPPROPRIATE SINUS TACHYCARDIA: ICD-10-CM

## 2020-12-30 DIAGNOSIS — R53.83 FATIGUE, UNSPECIFIED TYPE: ICD-10-CM

## 2020-12-30 DIAGNOSIS — I47.1 SVT (SUPRAVENTRICULAR TACHYCARDIA) (HCC): Primary | ICD-10-CM

## 2020-12-30 PROCEDURE — 99215 OFFICE O/P EST HI 40 MIN: CPT | Performed by: NURSE PRACTITIONER

## 2020-12-30 RX ORDER — ACEBUTOLOL HYDROCHLORIDE 400 MG/1
400 CAPSULE ORAL 2 TIMES DAILY
Qty: 60 CAP | Refills: 0 | Status: SHIPPED | OUTPATIENT
Start: 2020-12-30 | End: 2021-05-19

## 2020-12-30 NOTE — PROGRESS NOTES
Room # 3  Fatigue:yes  BP elevated  Chest pain: no  Shortness of breath: no  Edema: no  Palpitations, Skipped beats, Rapid heartbeat: yes, fast HR  Dizziness: yes, random     New diagnosis/Surgeries: no    ER/Hospitalizations: [place] [facility] on 12/26/20 pre syncope    Refills:no      Visit Vitals  BP (!) 140/100 (BP 1 Location: Left arm, BP Patient Position: Sitting)   Pulse (!) 120   Resp 16   Ht 5' 5\" (1.651 m)   Wt 206 lb 6.4 oz (93.6 kg)   LMP 01/25/2012   SpO2 97%   BMI 34.35 kg/m²

## 2020-12-30 NOTE — LETTER
12/30/2020 Patient: Cj Brantley YOB: 1980 Date of Visit: 12/30/2020 Cintia Russo NP 
[address] [facility] [id number] [address] [employer] Dear Cintia Russo NP, Thank you for referring Ms. Gloria Cabral to [facility] for evaluation. My notes for this consultation are attached. If you have questions, please do not hesitate to call me. I look forward to following your patient along with you. Sincerely, Dale Rodriguez NP

## 2021-01-02 DIAGNOSIS — E11.9 TYPE 2 DIABETES MELLITUS WITHOUT COMPLICATION, WITHOUT LONG-TERM CURRENT USE OF INSULIN (HCC): ICD-10-CM

## 2021-01-04 ENCOUNTER — OFFICE VISIT (OUTPATIENT)
Dept: CARDIOLOGY CLINIC | Age: 41
End: 2021-01-04
Payer: MEDICAID

## 2021-01-04 ENCOUNTER — PATIENT MESSAGE (OUTPATIENT)
Dept: CARDIOLOGY CLINIC | Age: 41
End: 2021-01-04

## 2021-01-04 VITALS
WEIGHT: 209 LBS | SYSTOLIC BLOOD PRESSURE: 154 MMHG | HEART RATE: 104 BPM | DIASTOLIC BLOOD PRESSURE: 100 MMHG | BODY MASS INDEX: 34.82 KG/M2 | OXYGEN SATURATION: 99 % | HEIGHT: 65 IN

## 2021-01-04 DIAGNOSIS — R00.2 PALPITATIONS: ICD-10-CM

## 2021-01-04 DIAGNOSIS — I47.1 SVT (SUPRAVENTRICULAR TACHYCARDIA) (HCC): Primary | ICD-10-CM

## 2021-01-04 PROCEDURE — 93000 ELECTROCARDIOGRAM COMPLETE: CPT | Performed by: INTERNAL MEDICINE

## 2021-01-04 PROCEDURE — 99215 OFFICE O/P EST HI 40 MIN: CPT | Performed by: INTERNAL MEDICINE

## 2021-01-04 RX ORDER — OMEPRAZOLE 20 MG/1
CAPSULE, DELAYED RELEASE ORAL
Qty: 30 CAP | Refills: 0 | Status: SHIPPED | OUTPATIENT
Start: 2021-01-04 | End: 2021-02-08 | Stop reason: SDUPTHER

## 2021-01-04 NOTE — PROGRESS NOTES
Room 3    Visit Vitals  BP (!) 154/100   Pulse (!) 104   Ht 5' 5\" (1.651 m)   Wt 209 lb (94.8 kg)   LMP 01/25/2012   SpO2 99%   BMI 34.78 kg/m²       Plan: Increase acebutolol to 400mg BID, follow up to discuss ablation on Monday with Dr. Viridiana Smyth. BP decreased through visit, she reports feeling better prior to leaving.      Appt 2-24-21    Chest pain: no  Shortness of breath: no  Edema: no  Palpitations, Skipped beats, Rapid heartbeat: YES flutters!!  Dizziness: no    New diagnosis/Surgeries: no    ER/Hospitalizations: no    Refills: no

## 2021-01-04 NOTE — PROGRESS NOTES
HISTORY OF PRESENTING ILLNESS      Jesús Nolasco is a 41 y.o. female with long RP tachycardia suspected to be IST after recent EPS presents after rythmol was added to her acebutolol. She reports addition of the rythmol has failed to improve her tachycardia. PAST MEDICAL HISTORY     Past Medical History:   Diagnosis Date    Anxiety     Arrhythmia     HX TACHYCARDIA - NEG.  STRESS TEST 2013 PER PATIENT    Asthma     LAST EPISODE 2016    Diabetes ([address] 2008    NIDDM    Ear infection 11/30/2018    BILATERAL    GERD (gastroesophageal reflux disease)     Headache     Headache(784.0)     Hypertension     HX HTN - NO MEDS CURRENTLY(11-30-18)    Inappropriate sinus tachycardia 5/20/2019    Rash 7/14/2016    Recurrent umbilical hernia 82/71/0031    S/P dilatation and curettage     Tachycardia            PAST SURGICAL HISTORY     Past Surgical History:   Procedure Laterality Date    HX APPENDECTOMY  2/2008    HX CHOLECYSTECTOMY  2001    HX DILATION AND CURETTAGE      HX GYN  3/2008    tubal ligation    HX HERNIA REPAIR  2/2009    HX HERNIA REPAIR  12/06/2018    open recurrent umbilical hernia repair    HX HYSTERECTOMY  03/2012    HX LEFT SALPINGO-OOPHORECTOMY Left     HX LUMBAR DISKECTOMY      2006    HX ORTHOPAEDIC  9/2006    ruptured discs    NH COMPRE ELECTROPHYSIOL XM W/LEFT ATRIAL PACNG/REC N/A 11/20/2020    Lt Atrial Pace & Record During Ep Study performed by Nabil Ruth MD at [address] CATH LAB    NH COMPRE ELECTROPHYSIOLOGIC ARRHYTHMIA INDUCTION N/A 11/20/2020    EP STUDY COMPLETE performed by Nabil Ruth MD at [address] CATH LAB    NH EPHYS EVAL W/ABLATION SUPRAVENT ARRHYTHMIA N/A 11/20/2020    Ablation Svt performed by Nabil Ruth MD at [address] CATH LAB    NH INTRACARDIAC ELECTROPHYSIOLOGIC 3D MAPPING N/A 11/20/2020    Ep 3d Mapping performed by Nabil Ruth MD at [address] CATH LAB    UPPER GI ENDOSCOPY,BIOPSY  5/11/2017               ALLERGIES Allergies   Allergen Reactions    Beef Containing Products Rash    Green Pepper Hives     Red, Yellow and Orange Peppers also. OK with Black Pepper    Other Plant, Animal, Environmental Swelling     Red meat    Pork Derived (Porcine) Rash          FAMILY HISTORY     Family History   Problem Relation Age of Onset    Heart Disease Mother     Hypertension Father     Cancer Father         Lung    Hypertension Sister     No Known Problems Brother     No Known Problems Brother     No Known Problems Brother     Anesth Problems Neg Hx     negative for cardiac disease       SOCIAL HISTORY     Social History     Socioeconomic History    Marital status:      Spouse name: Not on file    Number of children: Not on file    Years of education: Not on file    Highest education level: Not on file   Tobacco Use    Smoking status: Never Smoker    Smokeless tobacco: Never Used   Substance and Sexual Activity    Alcohol use: No    Drug use: Never    Sexual activity: Yes     Partners: Male     Birth control/protection: Surgical         MEDICATIONS     Current Outpatient Medications   Medication Sig    omeprazole (PRILOSEC) 20 mg capsule Take 1 capsule by mouth once daily    canagliflozin (Invokana) 300 mg tablet TAKE 1 TABLET BY MOUTH ONCE DAILY BEFORE BREAKFAST    acebutoloL (SECTRAL) 400 mg capsule Take 1 Cap by mouth two (2) times a day.  amoxicillin-clavulanate (AUGMENTIN) 875-125 mg per tablet Take 1 Tab by mouth every twelve (12) hours for 10 days.  ergocalciferol (ERGOCALCIFEROL) 1,250 mcg (50,000 unit) capsule TAKE 1 CAPSULE BY MOUTH TWICE A WEEK    gabapentin (NEURONTIN) 400 mg capsule TAKE 1 CAPSULE BY MOUTH THREE TIMES DAILY . DO NOT EXCEED 3 PER 24 HOURS    cyanocobalamin 1,000 mcg tablet Take 1 Tab by mouth daily.  glipiZIDE SR (GLUCOTROL XL) 5 mg CR tablet Take 1 Tab by mouth daily.  propafenone SR (Rythmol SR) 225 mg SR capsule Take 1 Cap by mouth two (2) times a day.     EPINEPHrine (EPIPEN) 0.3 mg/0.3 mL injection INJECT CONTENTS OF 1 PEN INTRAMUSCULARLY ONCE AS NEEDED FOR ALLERGIC REACTION FOR UP TO 1 DOSE    loratadine (CLARITIN) 10 mg tablet Take 1 Tab by mouth daily. To replace xyzal.    fluticasone propionate (FLONASE) 50 mcg/actuation nasal spray 2 Sprays by Both Nostrils route daily.  aspirin delayed-release 81 mg tablet Take 1 tablet by mouth once daily    glucose blood VI test strips (True Metrix Glucose Test Strip) strip To use to check blood sugar daily,  Dx: E11.9    fenofibrate (LOFIBRA) 160 mg tablet TAKE 1 TABLET BY MOUTH EVERY DAY    albuterol (PROVENTIL HFA, VENTOLIN HFA, PROAIR HFA) 90 mcg/actuation inhaler Take 2 Puffs by inhalation every four (4) hours as needed for Wheezing.  butalbital-acetaminophen-caffeine (FIORICET, ESGIC) -40 mg per tablet Take 1 Tab by mouth every six (6) hours as needed for Headache.  famotidine (PEPCID) 40 mg tablet TAKE 1 TABLET BY MOUTH EVERY DAY TAKES EVERY BEDTIME (Patient taking differently: Every morning. T)    metFORMIN (GLUCOPHAGE) 1,000 mg tablet TAKE 1 TABLET BY MOUTH TWICE A DAY    simvastatin (ZOCOR) 20 mg tablet TAKE 1 TABLET BY MOUTH EVERY DAY    SITagliptin (Januvia) 100 mg tablet Take 1 tablet by mouth once daily    SUMAtriptan (IMITREX) 50 mg tablet TAKE 1 TABLET BY MOUTH AT THE ONSET OF A MIGRAINE HEADACHE. REPEAT IN 1 HOUR IF NEEDED. TAKE NO MORE THAN 2 TABLETS PER 24 HOURS    topiramate (TOPAMAX) 50 mg tablet Take 1 tab twice a day for migraine prevention    cyclobenzaprine (FLEXERIL) 10 mg tablet TAKE 1 TABLET BY MOUTH THREE TIMES DAILY AS NEEDED FOR MUSCLE SPASM DO  NOT  DRIVE  WHILE  TAKING  THIS  MEDICATION    Lancets (MICROLET LANCET) Misc USE TO CHECK BLOOD SUGAR ONE TO TWO TIMES DAILY (Patient taking differently: as needed. USE TO CHECK BLOOD SUGAR ONE TO TWO TIMES DAILY)     No current facility-administered medications for this visit.         I have reviewed the nurses notes, vitals, problem list, allergy list, medical history, family, social history and medications. REVIEW OF SYMPTOMS      General: Pt denies excessive weight gain or loss. Pt is able to conduct ADL's  HEENT: Denies blurred vision, headaches, hearing loss, epistaxis and difficulty swallowing. Respiratory: Denies cough, congestion, shortness of breath, RICE, wheezing or stridor. Cardiovascular: Denies precordial pain, palpitations, edema or PND  Gastrointestinal: Denies poor appetite, indigestion, abdominal pain or blood in stool  Genitourinary: Denies hematuria, dysuria, increased urinary frequency  Musculoskeletal: Denies joint pain or swelling from muscles or joints  Neurologic: Denies tremor, paresthesias, headache, or sensory motor disturbance  Psychiatric: Denies confusion, insomnia, depression  Integumentray: Denies rash, itching or ulcers. Hematologic: Denies easy bruising, bleeding       PHYSICAL EXAMINATION      Vitals: see vitals section  General: Well developed, in no acute distress. HEENT: No jaundice, oral mucosa moist, no oral ulcers  Neck: Supple, no stiffness, no lymphadenopathy, supple  Heart:  Normal S1/S2 negative S3 or S4. Regular, no murmur, gallop or rub, no jugular venous distention  Respiratory: Clear bilaterally x 4, no wheezing or rales  Abdomen:   Soft, non-tender, bowel sounds are active. Extremities:  No edema, normal cap refill, no cyanosis. Musculoskeletal: No clubbing, no deformities  Neuro: A&Ox3, speech clear, gait stable, cooperative, no focal neurologic deficits  Skin: Skin color is normal. No rashes or lesions.  Non diaphoretic, moist.  Vascular: 2+ pulses symmetric in all extremities       DIAGNOSTIC DATA      EKG:        LABORATORY DATA      Lab Results   Component Value Date/Time    WBC 8.2 12/26/2020 06:16 PM    HGB 12.6 12/26/2020 06:16 PM    HCT 38.7 12/26/2020 06:16 PM    PLATELET 688 60/95/9961 06:16 PM    MCV 85.6 12/26/2020 06:16 PM      Lab Results   Component Value Date/Time    Sodium 138 12/26/2020 06:16 PM    Potassium 3.6 12/26/2020 06:16 PM    Chloride 103 12/26/2020 06:16 PM    CO2 25 12/26/2020 06:16 PM    Anion gap 10 12/26/2020 06:16 PM    Glucose 187 (H) 12/26/2020 06:16 PM    BUN 9 12/26/2020 06:16 PM    Creatinine 1.24 (H) 12/26/2020 06:16 PM    BUN/Creatinine ratio 7 (L) 12/26/2020 06:16 PM    GFR est AA 57 (L) 12/26/2020 06:16 PM    GFR est non-AA 47 (L) 12/26/2020 06:16 PM    Calcium 8.8 12/26/2020 06:16 PM    Bilirubin, total 0.2 12/26/2020 06:16 PM    Alk. phosphatase 53 12/26/2020 06:16 PM    Protein, total 7.0 12/26/2020 06:16 PM    Albumin 3.7 12/26/2020 06:16 PM    Globulin 3.3 12/26/2020 06:16 PM    A-G Ratio 1.1 12/26/2020 06:16 PM    ALT (SGPT) 24 12/26/2020 06:16 PM           ASSESSMENT      1. Inappropriate sinus tachycardia         PLAN     Trial of tikosyn loading. DC rythmol; continue acebutolol. If unsuccessful, plan for sinus node ablation/poss dual chamber pacemaker. ICD-10-CM ICD-9-CM    1. SVT (supraventricular tachycardia) (HCC)  I47.1 427.89 AMB POC EKG ROUTINE W/ 12 LEADS, INTER & REP   2. Palpitations  R00.2 785.1      Orders Placed This Encounter    AMB POC EKG ROUTINE W/ 12 LEADS, INTER & REP     Order Specific Question:   Reason for Exam:     Answer:   SVT          FOLLOW-UP     After tikosyn loading      Thank you, Alisha Orta NP for allowing me to participate in the care of this extraordinarily pleasant female. Please do not hesitate to contact me for further questions/concerns.          Yelitza Casanova MD  Cardiac Electrophysiology / Cardiology    [address]  [address] [place] [address], [address] [place] [address]  [phone] / [phone] Fax   [phone] / [phone] Fax

## 2021-01-05 ENCOUNTER — DOCUMENTATION ONLY (OUTPATIENT)
Dept: CARDIOLOGY CLINIC | Age: 41
End: 2021-01-05

## 2021-01-05 NOTE — PROGRESS NOTES
[address] [place] [address] to schedule direct admit to [facility] on 1/11/21 for Tikosyn loading.

## 2021-01-05 NOTE — TELEPHONE ENCOUNTER
From: Lenka Miner  To: Zaheer Patricio MD  Sent: 1/4/2021 3:07 PM EST  Subject: Non-Urgent Medical Question    Hey, quick question when I go stay in the hospital for this medicine, is it ok to be taking antibiotics?  I have a sinus infection an was just wondering thanks

## 2021-01-06 RX ORDER — ERGOCALCIFEROL 1.25 MG/1
CAPSULE ORAL
Qty: 24 CAP | Refills: 0 | Status: SHIPPED | OUTPATIENT
Start: 2021-01-06 | End: 2021-02-04 | Stop reason: SDUPTHER

## 2021-01-11 ENCOUNTER — HOSPITAL ENCOUNTER (INPATIENT)
Age: 41
LOS: 2 days | Discharge: HOME | DRG: 201 | End: 2021-01-13
Attending: INTERNAL MEDICINE | Admitting: NURSE PRACTITIONER
Payer: MEDICAID

## 2021-01-11 PROBLEM — I47.1 ATRIAL TACHYCARDIA (HCC): Status: ACTIVE | Noted: 2021-01-11

## 2021-01-11 LAB
ALBUMIN SERPL-MCNC: 4.2 G/DL (ref 3.5–5)
ALBUMIN/GLOB SERPL: 1.3 {RATIO} (ref 1.1–2.2)
ALP SERPL-CCNC: 54 U/L (ref 45–117)
ALT SERPL-CCNC: 27 U/L (ref 12–78)
ANION GAP SERPL CALC-SCNC: 6 MMOL/L (ref 5–15)
AST SERPL-CCNC: 13 U/L (ref 15–37)
ATRIAL RATE: 100 BPM
BILIRUB SERPL-MCNC: 0.4 MG/DL (ref 0.2–1)
BUN SERPL-MCNC: 11 MG/DL (ref 6–20)
BUN/CREAT SERPL: 12 (ref 12–20)
CALCIUM SERPL-MCNC: 9.5 MG/DL (ref 8.5–10.1)
CALCULATED P AXIS, ECG09: 44 DEGREES
CALCULATED R AXIS, ECG10: 31 DEGREES
CALCULATED T AXIS, ECG11: 8 DEGREES
CHLORIDE SERPL-SCNC: 110 MMOL/L (ref 97–108)
CO2 SERPL-SCNC: 24 MMOL/L (ref 21–32)
CREAT SERPL-MCNC: 0.91 MG/DL (ref 0.55–1.02)
DIAGNOSIS, 93000: NORMAL
ERYTHROCYTE [DISTWIDTH] IN BLOOD BY AUTOMATED COUNT: 13.1 % (ref 11.5–14.5)
GLOBULIN SER CALC-MCNC: 3.2 G/DL (ref 2–4)
GLUCOSE BLD STRIP.AUTO-MCNC: 108 MG/DL (ref 65–100)
GLUCOSE BLD STRIP.AUTO-MCNC: 115 MG/DL (ref 65–100)
GLUCOSE BLD STRIP.AUTO-MCNC: 83 MG/DL (ref 65–100)
GLUCOSE SERPL-MCNC: 92 MG/DL (ref 65–100)
HCT VFR BLD AUTO: 44 % (ref 35–47)
HGB BLD-MCNC: 14.6 G/DL (ref 11.5–16)
MAGNESIUM SERPL-MCNC: 2.2 MG/DL (ref 1.6–2.4)
MCH RBC QN AUTO: 28.8 PG (ref 26–34)
MCHC RBC AUTO-ENTMCNC: 33.2 G/DL (ref 30–36.5)
MCV RBC AUTO: 86.8 FL (ref 80–99)
NRBC # BLD: 0 K/UL (ref 0–0.01)
NRBC BLD-RTO: 0 PER 100 WBC
P-R INTERVAL, ECG05: 142 MS
PLATELET # BLD AUTO: 281 K/UL (ref 150–400)
PMV BLD AUTO: 10.8 FL (ref 8.9–12.9)
POTASSIUM SERPL-SCNC: 4.2 MMOL/L (ref 3.5–5.1)
PROT SERPL-MCNC: 7.4 G/DL (ref 6.4–8.2)
Q-T INTERVAL, ECG07: 354 MS
QRS DURATION, ECG06: 78 MS
QTC CALCULATION (BEZET), ECG08: 456 MS
RBC # BLD AUTO: 5.07 M/UL (ref 3.8–5.2)
SERVICE CMNT-IMP: ABNORMAL
SERVICE CMNT-IMP: ABNORMAL
SERVICE CMNT-IMP: NORMAL
SODIUM SERPL-SCNC: 140 MMOL/L (ref 136–145)
VENTRICULAR RATE, ECG03: 100 BPM
WBC # BLD AUTO: 10.3 K/UL (ref 3.6–11)

## 2021-01-11 PROCEDURE — 65270000032 HC RM SEMIPRIVATE

## 2021-01-11 PROCEDURE — 83735 ASSAY OF MAGNESIUM: CPT

## 2021-01-11 PROCEDURE — 74011250637 HC RX REV CODE- 250/637: Performed by: SPECIALIST

## 2021-01-11 PROCEDURE — 99221 1ST HOSP IP/OBS SF/LOW 40: CPT | Performed by: NURSE PRACTITIONER

## 2021-01-11 PROCEDURE — 93005 ELECTROCARDIOGRAM TRACING: CPT

## 2021-01-11 PROCEDURE — 36415 COLL VENOUS BLD VENIPUNCTURE: CPT

## 2021-01-11 PROCEDURE — 82962 GLUCOSE BLOOD TEST: CPT

## 2021-01-11 PROCEDURE — 85027 COMPLETE CBC AUTOMATED: CPT

## 2021-01-11 PROCEDURE — 80053 COMPREHEN METABOLIC PANEL: CPT

## 2021-01-11 PROCEDURE — 74011250637 HC RX REV CODE- 250/637: Performed by: NURSE PRACTITIONER

## 2021-01-11 RX ORDER — SODIUM CHLORIDE 0.9 % (FLUSH) 0.9 %
5-40 SYRINGE (ML) INJECTION EVERY 8 HOURS
Status: DISCONTINUED | OUTPATIENT
Start: 2021-01-11 | End: 2021-01-14 | Stop reason: HOSPADM

## 2021-01-11 RX ORDER — ALBUTEROL SULFATE 90 UG/1
2 AEROSOL, METERED RESPIRATORY (INHALATION)
Status: DISCONTINUED | OUTPATIENT
Start: 2021-01-11 | End: 2021-01-11

## 2021-01-11 RX ORDER — SODIUM CHLORIDE 0.9 % (FLUSH) 0.9 %
5-40 SYRINGE (ML) INJECTION AS NEEDED
Status: DISCONTINUED | OUTPATIENT
Start: 2021-01-11 | End: 2021-01-14 | Stop reason: HOSPADM

## 2021-01-11 RX ORDER — INSULIN LISPRO 100 [IU]/ML
INJECTION, SOLUTION INTRAVENOUS; SUBCUTANEOUS
Status: DISCONTINUED | OUTPATIENT
Start: 2021-01-11 | End: 2021-01-14 | Stop reason: HOSPADM

## 2021-01-11 RX ORDER — MAGNESIUM SULFATE 100 %
4 CRYSTALS MISCELLANEOUS AS NEEDED
Status: DISCONTINUED | OUTPATIENT
Start: 2021-01-11 | End: 2021-01-14 | Stop reason: HOSPADM

## 2021-01-11 RX ORDER — ACEBUTOLOL HYDROCHLORIDE 200 MG/1
400 CAPSULE ORAL 2 TIMES DAILY
Status: DISCONTINUED | OUTPATIENT
Start: 2021-01-11 | End: 2021-01-14 | Stop reason: HOSPADM

## 2021-01-11 RX ORDER — DIPHENHYDRAMINE HCL 25 MG
50 CAPSULE ORAL
Status: DISCONTINUED | OUTPATIENT
Start: 2021-01-11 | End: 2021-01-14 | Stop reason: HOSPADM

## 2021-01-11 RX ORDER — DOFETILIDE 0.25 MG/1
500 CAPSULE ORAL EVERY 12 HOURS
Status: DISCONTINUED | OUTPATIENT
Start: 2021-01-11 | End: 2021-01-12

## 2021-01-11 RX ORDER — ALBUTEROL SULFATE 0.83 MG/ML
2.5 SOLUTION RESPIRATORY (INHALATION)
Status: DISCONTINUED | OUTPATIENT
Start: 2021-01-11 | End: 2021-01-14 | Stop reason: HOSPADM

## 2021-01-11 RX ORDER — DEXTROSE 50 % IN WATER (D50W) INTRAVENOUS SYRINGE
25-50 AS NEEDED
Status: DISCONTINUED | OUTPATIENT
Start: 2021-01-11 | End: 2021-01-14 | Stop reason: HOSPADM

## 2021-01-11 RX ORDER — ASPIRIN 81 MG/1
81 TABLET ORAL
Status: COMPLETED | OUTPATIENT
Start: 2021-01-11 | End: 2021-01-11

## 2021-01-11 RX ADMIN — Medication 10 ML: at 17:28

## 2021-01-11 RX ADMIN — GABAPENTIN 400 MG: 300 CAPSULE ORAL at 21:05

## 2021-01-11 RX ADMIN — Medication 10 ML: at 21:05

## 2021-01-11 RX ADMIN — ACEBUTOLOL HYDROCHLORIDE 400 MG: 200 CAPSULE ORAL at 17:26

## 2021-01-11 RX ADMIN — DOFETILIDE 500 MCG: 0.25 CAPSULE ORAL at 17:15

## 2021-01-11 RX ADMIN — DIPHENHYDRAMINE HYDROCHLORIDE 50 MG: 25 CAPSULE ORAL at 19:07

## 2021-01-11 RX ADMIN — ASPIRIN 81 MG: 81 TABLET, COATED ORAL at 17:17

## 2021-01-11 NOTE — H&P
HISTORY OF PRESENTING ILLNESS      Krista Javier is a 41 y.o. female with long RP tachycardia suspected to be IST after recent EPS presents after rythmol was added to her acebutolol. She reports addition of the rythmol has failed to improve her tachycardia. Her acebutolol was increased to 400mg BID and she now presents for Tikosyn loading. PAST MEDICAL HISTORY     Past Medical History:   Diagnosis Date    Anxiety     Arrhythmia     HX TACHYCARDIA - NEG.  STRESS TEST 2013 PER PATIENT    Asthma     LAST EPISODE 2016    Diabetes ([address] 2008    NIDDM    Ear infection 11/30/2018    BILATERAL    GERD (gastroesophageal reflux disease)     Headache     Headache(784.0)     Hypertension     HX HTN - NO MEDS CURRENTLY(11-30-18)    Inappropriate sinus tachycardia 5/20/2019    Rash 7/14/2016    Recurrent umbilical hernia 61/20/4563    S/P dilatation and curettage     Tachycardia            PAST SURGICAL HISTORY     Past Surgical History:   Procedure Laterality Date    HX APPENDECTOMY  2/2008    HX CHOLECYSTECTOMY  2001    HX DILATION AND CURETTAGE      HX GYN  3/2008    tubal ligation    HX HERNIA REPAIR  2/2009    HX HERNIA REPAIR  12/06/2018    open recurrent umbilical hernia repair    HX HYSTERECTOMY  03/2012    HX LEFT SALPINGO-OOPHORECTOMY Left     HX LUMBAR DISKECTOMY      2006    HX ORTHOPAEDIC  9/2006    ruptured discs    MD COMPRE ELECTROPHYSIOL XM W/LEFT ATRIAL PACNG/REC N/A 11/20/2020    Lt Atrial Pace & Record During Ep Study performed by Valente Shi MD at [address] CATH LAB    MD COMPRE ELECTROPHYSIOLOGIC ARRHYTHMIA INDUCTION N/A 11/20/2020    EP STUDY COMPLETE performed by Valente Shi MD at [address] CATH LAB    MD EPHYS EVAL W/ABLATION SUPRAVENT ARRHYTHMIA N/A 11/20/2020    Ablation Svt performed by Valente Shi MD at [address] CATH LAB    MD INTRACARDIAC ELECTROPHYSIOLOGIC 3D MAPPING N/A 11/20/2020    Ep 3d Mapping performed [facility] at [facility] CARDIAC C[facility] LAB    UPPER GI ENDOSCOPY,BIOPSY  5/11/2017               ALLERGIES     Allergies   Allergen Reactions    Beef Containing Products Rash    Green Pepper Hives     Red, Yellow and Orange Peppers also. OK with Black Pepper    Other Plant, Animal, Environmental Swelling     Red meat    Pork Derived (Porcine) Rash          FAMILY HISTORY     Family History   Problem Relation Age of Onset    Heart Disease Mother     Hypertension Father     Cancer Father         Lung    Hypertension Sister     No Known Problems Brother     No Known Problems Brother     No Known Problems Brother     Anesth Problems Neg Hx     negative for cardiac disease       SOCIAL HISTORY     Social History     Socioeconomic History    Marital status:      Spouse name: Not on file    Number of children: Not on file    Years of education: Not on file    Highest education level: Not on file   Tobacco Use    Smoking status: Never Smoker    Smokeless tobacco: Never Used   Substance and Sexual Activity    Alcohol use: No    Drug use: Never    Sexual activity: Yes     Partners: Male     Birth control/protection: Surgical         MEDICATIONS     Current Facility-Administered Medications   Medication Dose Route Frequency    sodium chloride (NS) flush 5-40 mL  5-40 mL IntraVENous Q8H    sodium chloride (NS) flush 5-40 mL  5-40 mL IntraVENous PRN    acebutoloL (SECTRAL) capsule 400 mg  400 mg Oral BID    albuterol (PROVENTIL HFA, VENTOLIN HFA, PROAIR HFA) inhaler 2 Puff  2 Puff Inhalation Q4H PRN    aspirin delayed-release tablet 81 mg  81 mg Oral NOW    . PHARMACY TO SUBSTITUTE PER PROTOCOL (Reordered from: canagliflozin (Invokana) 300 mg tablet)    Per Protocol       I have reviewed the nurses notes, vitals, problem list, allergy list, medical history, family, social history and medications. REVIEW OF SYMPTOMS      General: Pt denies excessive weight gain or loss.  Pt is able to conduct ADL's  HEENT: Denies blurred vision, headaches, hearing loss, epistaxis and difficulty swallowing. Respiratory: Denies cough, congestion, shortness of breath, RICE, wheezing or stridor. Cardiovascular: Denies precordial pain, palpitations, edema or PND  Gastrointestinal: Denies poor appetite, indigestion, abdominal pain or blood in stool  Genitourinary: Denies hematuria, dysuria, increased urinary frequency  Musculoskeletal: Denies joint pain or swelling from muscles or joints  Neurologic: Denies tremor, paresthesias, headache, or sensory motor disturbance  Psychiatric: Denies confusion, insomnia, depression  Integumentray: Denies rash, itching or ulcers. Hematologic: Denies easy bruising, bleeding       PHYSICAL EXAMINATION      Vitals: see vitals section  General: Well developed, in no acute distress. HEENT: No jaundice, oral mucosa moist, no oral ulcers  Neck: Supple, no stiffness, no lymphadenopathy, supple  Heart:  Normal S1/S2 negative S3 or S4. Regular, no murmur, gallop or rub, no jugular venous distention  Respiratory: Clear bilaterally x 4, no wheezing or rales  Abdomen:   Soft, non-tender, bowel sounds are active. Extremities:  No edema, normal cap refill, no cyanosis. Musculoskeletal: No clubbing, no deformities  Neuro: A&Ox3, speech clear, gait stable, cooperative, no focal neurologic deficits  Skin: Skin color is normal. No rashes or lesions.  Non diaphoretic, moist.  Vascular: 2+ pulses symmetric in all extremities       DIAGNOSTIC DATA      EKG:        LABORATORY DATA      Lab Results   Component Value Date/Time    WBC 8.2 12/26/2020 06:16 PM    HGB 12.6 12/26/2020 06:16 PM    HCT 38.7 12/26/2020 06:16 PM    PLATELET 988 08/04/7512 06:16 PM    MCV 85.6 12/26/2020 06:16 PM      Lab Results   Component Value Date/Time    Sodium 138 12/26/2020 06:16 PM    Potassium 3.6 12/26/2020 06:16 PM    Chloride 103 12/26/2020 06:16 PM    CO2 25 12/26/2020 06:16 PM    Anion gap 10 12/26/2020 06:16 PM    Glucose 187 (H) 12/26/2020 06:16 PM    BUN 9 12/26/2020 06:16 PM    Creatinine 1.24 (H) 12/26/2020 06:16 PM    BUN/Creatinine ratio 7 (L) 12/26/2020 06:16 PM    GFR est AA 57 (L) 12/26/2020 06:16 PM    GFR est non-AA 47 (L) 12/26/2020 06:16 PM    Calcium 8.8 12/26/2020 06:16 PM    Bilirubin, total 0.2 12/26/2020 06:16 PM    Alk. phosphatase 53 12/26/2020 06:16 PM    Protein, total 7.0 12/26/2020 06:16 PM    Albumin 3.7 12/26/2020 06:16 PM    Globulin 3.3 12/26/2020 06:16 PM    A-G Ratio 1.1 12/26/2020 06:16 PM    ALT (SGPT) 24 12/26/2020 06:16 PM           ASSESSMENT      1. Inappropriate sinus tachycardia  2. Diabetes mellitus  3. Orthostatic hypotension  4. GERD  5. Anxiety       PLAN     Plan to start Tikosyn 125mcg after labwork and EKG completed. Bj Morales NP    Cardiac Electrophysiology / Cardiology    [address] [facility] [address]  [address] [facility], [place] [address]  Terence Kearney[address], [facility]  [phone] / [phone] Fax   [phone] / [phone] Fax

## 2021-01-11 NOTE — PROGRESS NOTES
Labwork normal, EKG with stable QTC. Start Tikosyn 500mcg BID. EKG two hour post dose. Labs daily.     Valentino Stein NP

## 2021-01-11 NOTE — PROGRESS NOTES
1116 Patient arrived to unit. 1846 Patient complained of itching, generalized on upper body, no hives, patient unconcerned, no difficulty breathing. 1847 called to Dr Antelmo Walker, reported itching, orders noted. 1920 Bedside and Verbal shift change report given to [facility] (oncoming nurse) by Lulu Mclaughlin RN (offgoing nurse). Report included the following information SBAR and Kardex.

## 2021-01-12 LAB
ALBUMIN SERPL-MCNC: 3.5 G/DL (ref 3.5–5)
ALBUMIN SERPL-MCNC: 3.6 G/DL (ref 3.5–5)
ALBUMIN/GLOB SERPL: 1.2 {RATIO} (ref 1.1–2.2)
ALBUMIN/GLOB SERPL: 1.2 {RATIO} (ref 1.1–2.2)
ALP SERPL-CCNC: 36 U/L (ref 45–117)
ALP SERPL-CCNC: 42 U/L (ref 45–117)
ALT SERPL-CCNC: 24 U/L (ref 12–78)
ALT SERPL-CCNC: 28 U/L (ref 12–78)
ANION GAP SERPL CALC-SCNC: 5 MMOL/L (ref 5–15)
ANION GAP SERPL CALC-SCNC: 6 MMOL/L (ref 5–15)
AST SERPL-CCNC: 12 U/L (ref 15–37)
AST SERPL-CCNC: 18 U/L (ref 15–37)
ATRIAL RATE: 81 BPM
ATRIAL RATE: 89 BPM
BILIRUB SERPL-MCNC: 0.3 MG/DL (ref 0.2–1)
BILIRUB SERPL-MCNC: 0.4 MG/DL (ref 0.2–1)
BUN SERPL-MCNC: 11 MG/DL (ref 6–20)
BUN SERPL-MCNC: 16 MG/DL (ref 6–20)
BUN/CREAT SERPL: 12 (ref 12–20)
BUN/CREAT SERPL: 16 (ref 12–20)
CALCIUM SERPL-MCNC: 8.4 MG/DL (ref 8.5–10.1)
CALCIUM SERPL-MCNC: 9.2 MG/DL (ref 8.5–10.1)
CALCULATED P AXIS, ECG09: 56 DEGREES
CALCULATED P AXIS, ECG09: 63 DEGREES
CALCULATED R AXIS, ECG10: 48 DEGREES
CALCULATED R AXIS, ECG10: 52 DEGREES
CALCULATED T AXIS, ECG11: 14 DEGREES
CALCULATED T AXIS, ECG11: 26 DEGREES
CHLORIDE SERPL-SCNC: 105 MMOL/L (ref 97–108)
CHLORIDE SERPL-SCNC: 108 MMOL/L (ref 97–108)
CO2 SERPL-SCNC: 25 MMOL/L (ref 21–32)
CO2 SERPL-SCNC: 28 MMOL/L (ref 21–32)
CREAT SERPL-MCNC: 0.91 MG/DL (ref 0.55–1.02)
CREAT SERPL-MCNC: 1.03 MG/DL (ref 0.55–1.02)
DIAGNOSIS, 93000: NORMAL
DIAGNOSIS, 93000: NORMAL
ERYTHROCYTE [DISTWIDTH] IN BLOOD BY AUTOMATED COUNT: 12.7 % (ref 11.5–14.5)
GLOBULIN SER CALC-MCNC: 2.9 G/DL (ref 2–4)
GLOBULIN SER CALC-MCNC: 3.1 G/DL (ref 2–4)
GLUCOSE BLD STRIP.AUTO-MCNC: 120 MG/DL (ref 65–100)
GLUCOSE BLD STRIP.AUTO-MCNC: 134 MG/DL (ref 65–100)
GLUCOSE BLD STRIP.AUTO-MCNC: 85 MG/DL (ref 65–100)
GLUCOSE BLD STRIP.AUTO-MCNC: 98 MG/DL (ref 65–100)
GLUCOSE SERPL-MCNC: 83 MG/DL (ref 65–100)
GLUCOSE SERPL-MCNC: 99 MG/DL (ref 65–100)
HCT VFR BLD AUTO: 38.8 % (ref 35–47)
HGB BLD-MCNC: 12.9 G/DL (ref 11.5–16)
MAGNESIUM SERPL-MCNC: 2 MG/DL (ref 1.6–2.4)
MCH RBC QN AUTO: 29 PG (ref 26–34)
MCHC RBC AUTO-ENTMCNC: 33.2 G/DL (ref 30–36.5)
MCV RBC AUTO: 87.2 FL (ref 80–99)
NRBC # BLD: 0 K/UL (ref 0–0.01)
NRBC BLD-RTO: 0 PER 100 WBC
P-R INTERVAL, ECG05: 156 MS
P-R INTERVAL, ECG05: 162 MS
PLATELET # BLD AUTO: 249 K/UL (ref 150–400)
PMV BLD AUTO: 11 FL (ref 8.9–12.9)
POTASSIUM SERPL-SCNC: 3.7 MMOL/L (ref 3.5–5.1)
POTASSIUM SERPL-SCNC: 4.1 MMOL/L (ref 3.5–5.1)
PROT SERPL-MCNC: 6.4 G/DL (ref 6.4–8.2)
PROT SERPL-MCNC: 6.7 G/DL (ref 6.4–8.2)
Q-T INTERVAL, ECG07: 372 MS
Q-T INTERVAL, ECG07: 470 MS
QRS DURATION, ECG06: 80 MS
QRS DURATION, ECG06: 84 MS
QTC CALCULATION (BEZET), ECG08: 452 MS
QTC CALCULATION (BEZET), ECG08: 545 MS
RBC # BLD AUTO: 4.45 M/UL (ref 3.8–5.2)
SERVICE CMNT-IMP: ABNORMAL
SERVICE CMNT-IMP: ABNORMAL
SERVICE CMNT-IMP: NORMAL
SERVICE CMNT-IMP: NORMAL
SODIUM SERPL-SCNC: 138 MMOL/L (ref 136–145)
SODIUM SERPL-SCNC: 139 MMOL/L (ref 136–145)
VENTRICULAR RATE, ECG03: 81 BPM
VENTRICULAR RATE, ECG03: 89 BPM
WBC # BLD AUTO: 8.2 K/UL (ref 3.6–11)

## 2021-01-12 PROCEDURE — 74011250637 HC RX REV CODE- 250/637: Performed by: INTERNAL MEDICINE

## 2021-01-12 PROCEDURE — 36415 COLL VENOUS BLD VENIPUNCTURE: CPT

## 2021-01-12 PROCEDURE — 82962 GLUCOSE BLOOD TEST: CPT

## 2021-01-12 PROCEDURE — 80053 COMPREHEN METABOLIC PANEL: CPT

## 2021-01-12 PROCEDURE — 85027 COMPLETE CBC AUTOMATED: CPT

## 2021-01-12 PROCEDURE — 65270000032 HC RM SEMIPRIVATE

## 2021-01-12 PROCEDURE — 83735 ASSAY OF MAGNESIUM: CPT

## 2021-01-12 PROCEDURE — 93005 ELECTROCARDIOGRAM TRACING: CPT

## 2021-01-12 PROCEDURE — 74011250637 HC RX REV CODE- 250/637: Performed by: NURSE PRACTITIONER

## 2021-01-12 PROCEDURE — 74011250637 HC RX REV CODE- 250/637: Performed by: SPECIALIST

## 2021-01-12 RX ORDER — METFORMIN HYDROCHLORIDE 500 MG/1
1000 TABLET ORAL 2 TIMES DAILY WITH MEALS
Status: DISCONTINUED | OUTPATIENT
Start: 2021-01-12 | End: 2021-01-14 | Stop reason: HOSPADM

## 2021-01-12 RX ORDER — DOFETILIDE 0.25 MG/1
250 CAPSULE ORAL EVERY 12 HOURS
Status: DISCONTINUED | OUTPATIENT
Start: 2021-01-12 | End: 2021-01-14 | Stop reason: HOSPADM

## 2021-01-12 RX ORDER — LORATADINE 10 MG/1
10 TABLET ORAL DAILY
Status: DISCONTINUED | OUTPATIENT
Start: 2021-01-12 | End: 2021-01-14 | Stop reason: HOSPADM

## 2021-01-12 RX ORDER — FENOFIBRATE 145 MG/1
145 TABLET, COATED ORAL DAILY
Status: DISCONTINUED | OUTPATIENT
Start: 2021-01-12 | End: 2021-01-14 | Stop reason: HOSPADM

## 2021-01-12 RX ORDER — GLIPIZIDE 2.5 MG/1
5 TABLET, EXTENDED RELEASE ORAL DAILY
Status: DISCONTINUED | OUTPATIENT
Start: 2021-01-12 | End: 2021-01-14 | Stop reason: HOSPADM

## 2021-01-12 RX ORDER — LANOLIN ALCOHOL/MO/W.PET/CERES
1000 CREAM (GRAM) TOPICAL DAILY
Status: DISCONTINUED | OUTPATIENT
Start: 2021-01-12 | End: 2021-01-14 | Stop reason: HOSPADM

## 2021-01-12 RX ORDER — PANTOPRAZOLE SODIUM 40 MG/1
40 TABLET, DELAYED RELEASE ORAL
Status: DISCONTINUED | OUTPATIENT
Start: 2021-01-12 | End: 2021-01-14 | Stop reason: HOSPADM

## 2021-01-12 RX ORDER — CYCLOBENZAPRINE HCL 10 MG
10 TABLET ORAL
Status: DISCONTINUED | OUTPATIENT
Start: 2021-01-12 | End: 2021-01-14 | Stop reason: HOSPADM

## 2021-01-12 RX ORDER — BUTALBITAL, ACETAMINOPHEN AND CAFFEINE 50; 325; 40 MG/1; MG/1; MG/1
1 TABLET ORAL
Status: DISCONTINUED | OUTPATIENT
Start: 2021-01-12 | End: 2021-01-14 | Stop reason: HOSPADM

## 2021-01-12 RX ORDER — FAMOTIDINE 20 MG/1
40 TABLET, FILM COATED ORAL EVERY MORNING
Status: DISCONTINUED | OUTPATIENT
Start: 2021-01-13 | End: 2021-01-14 | Stop reason: HOSPADM

## 2021-01-12 RX ORDER — ATORVASTATIN CALCIUM 10 MG/1
10 TABLET, FILM COATED ORAL DAILY
Status: DISCONTINUED | OUTPATIENT
Start: 2021-01-12 | End: 2021-01-14 | Stop reason: HOSPADM

## 2021-01-12 RX ADMIN — GLIPIZIDE 5 MG: 2.5 TABLET, FILM COATED, EXTENDED RELEASE ORAL at 09:36

## 2021-01-12 RX ADMIN — ATORVASTATIN CALCIUM 10 MG: 10 TABLET, FILM COATED ORAL at 09:36

## 2021-01-12 RX ADMIN — ACEBUTOLOL HYDROCHLORIDE 400 MG: 200 CAPSULE ORAL at 17:37

## 2021-01-12 RX ADMIN — CYANOCOBALAMIN TAB 500 MCG 1000 MCG: 500 TAB at 09:36

## 2021-01-12 RX ADMIN — ACEBUTOLOL HYDROCHLORIDE 400 MG: 200 CAPSULE ORAL at 05:28

## 2021-01-12 RX ADMIN — METFORMIN HYDROCHLORIDE 1000 MG: 500 TABLET ORAL at 09:36

## 2021-01-12 RX ADMIN — FENOFIBRATE 145 MG: 145 TABLET ORAL at 11:48

## 2021-01-12 RX ADMIN — Medication 10 ML: at 21:29

## 2021-01-12 RX ADMIN — GABAPENTIN 400 MG: 300 CAPSULE ORAL at 17:36

## 2021-01-12 RX ADMIN — DOFETILIDE 250 MCG: 0.25 CAPSULE ORAL at 17:37

## 2021-01-12 RX ADMIN — GABAPENTIN 400 MG: 300 CAPSULE ORAL at 09:36

## 2021-01-12 RX ADMIN — POTASSIUM BICARBONATE 40 MEQ: 782 TABLET, EFFERVESCENT ORAL at 11:48

## 2021-01-12 RX ADMIN — Medication 10 ML: at 05:28

## 2021-01-12 RX ADMIN — CYCLOBENZAPRINE HYDROCHLORIDE 10 MG: 10 TABLET, FILM COATED ORAL at 21:28

## 2021-01-12 RX ADMIN — LORATADINE 10 MG: 10 TABLET ORAL at 09:36

## 2021-01-12 RX ADMIN — METFORMIN HYDROCHLORIDE 1000 MG: 500 TABLET ORAL at 17:37

## 2021-01-12 RX ADMIN — DIPHENHYDRAMINE HYDROCHLORIDE 50 MG: 25 CAPSULE ORAL at 21:28

## 2021-01-12 RX ADMIN — GABAPENTIN 400 MG: 300 CAPSULE ORAL at 21:27

## 2021-01-12 RX ADMIN — PANTOPRAZOLE SODIUM 40 MG: 40 TABLET, DELAYED RELEASE ORAL at 17:37

## 2021-01-12 RX ADMIN — DOFETILIDE 500 MCG: 0.25 CAPSULE ORAL at 05:28

## 2021-01-12 NOTE — PROGRESS NOTES
Pt transferred to room 302 at [address].    1900 Pt complaining of itchiness, requesting Benadryl.

## 2021-01-12 NOTE — PROGRESS NOTES
Shift Change:    Bedside and Verbal shift change report given to SHELL Day (oncoming nurse) by Anabella Finley RN (offgoing nurse). Report included the following information SBAR, Kardex, Intake/Output, MAR, Recent Results and Cardiac Rhythm NSR. No acute changes      Shift Change:    Bedside and Verbal shift change report given to SHELL Day (oncoming nurse) by Art Ziegler RN (offgoing nurse). Report included the following information SBAR, Kardex, Intake/Output, MAR, Recent Results and Cardiac Rhythm NSR.

## 2021-01-12 NOTE — PROGRESS NOTES
Shift Change:    Bedside and Verbal shift change report given to [facility] (oncoming nurse) by April (offgoing nurse). Report included the following information SBAR, Kardex, and Recent Results. Shift Report:    Bedside and Verbal shift change report given to [facility] (oncoming nurse) by [facility] (offgoing nurse). Report included the following information SBAR, Kardex, and Recent Results.

## 2021-01-12 NOTE — PROGRESS NOTES
HISTORY OF PRESENTING ILLNESS      Cesar Godoy is a 41 y.o. female with long RP tachycardia suspected to be IST after recent EPS presents after rythmol was added to her acebutolol. She reports addition of the rythmol has failed to improve her tachycardia. Her acebutolol was increased to 400mg BID and she now presents for Tikosyn loading. Labwork stable,  this morning. She feels improvement in her heart rate, telemetry showing sinus rhythm with average HR in the 80s. PAST MEDICAL HISTORY     Past Medical History:   Diagnosis Date    Anxiety     Arrhythmia     HX TACHYCARDIA - NEG.  STRESS TEST 2013 PER PATIENT    Asthma     LAST EPISODE 2016    Diabetes ([facility] [address] 2008    NIDDM    Ear infection 11/30/2018    BILATERAL    GERD (gastroesophageal reflux disease)     Headache     Headache(784.0)     Hypertension     HX HTN - NO MEDS CURRENTLY(11-30-18)    Inappropriate sinus tachycardia 5/20/2019    Rash 7/14/2016    Recurrent umbilical hernia 84/88/3739    S/P dilatation and curettage     Tachycardia            PAST SURGICAL HISTORY     Past Surgical History:   Procedure Laterality Date    HX APPENDECTOMY  2/2008    HX CHOLECYSTECTOMY  2001    HX DILATION AND CURETTAGE      HX GYN  3/2008    tubal ligation    HX HERNIA REPAIR  2/2009    HX HERNIA REPAIR  12/06/2018    open recurrent umbilical hernia repair    HX HYSTERECTOMY  03/2012    HX LEFT SALPINGO-OOPHORECTOMY Left     HX LUMBAR DISKECTOMY      2006    HX ORTHOPAEDIC  9/2006    ruptured discs    AR COMPRE ELECTROPHYSIOL XM W/LEFT ATRIAL PACNG/REC N/A 11/20/2020    Lt Atrial Pace & Record During Ep Study performed by Cece Berger MD at [address] CATH LAB    AR COMPRE ELECTROPHYSIOLOGIC ARRHYTHMIA INDUCTION N/A 11/20/2020    EP STUDY COMPLETE performed by Cece Berger MD at [address] CATH LAB    AR EPHYS EVAL W/ABLATION [address] N/A 11/20/2020    Ablation Svt performed by Cece Berger MD at [facility] CARDIAC CATH LAB    [place] INTRACARDIAC ELECTROPHYSIOLOGIC 3D MAPPING N/A 11/20/2020    Ep 3d Mapping performed by London Aguirre MD at [facility] CATH LAB    UPPER GI ENDOSCOPY,BIOPSY  5/11/2017               ALLERGIES     Allergies   Allergen Reactions    Beef Containing Products Rash    Green Pepper Hives     Red, Yellow and Orange Peppers also.   OK with Black Pepper    Other Plant, Animal, Environmental Swelling     Red meat    Pork Derived (Porcine) Rash          FAMILY HISTORY     Family History   Problem Relation Age of Onset    Heart Disease Mother     Hypertension Father     Cancer Father         Lung    Hypertension Sister     No Known Problems Brother     No Known Problems Brother     No Known Problems Brother     Anesth Problems Neg Hx     negative for cardiac disease       SOCIAL HISTORY     Social History     Socioeconomic History    Marital status:      Spouse name: Not on file    Number of children: Not on file    Years of education: Not on file    Highest education level: Not on file   Tobacco Use    Smoking status: Never Smoker    Smokeless tobacco: Never Used   Substance and Sexual Activity    Alcohol use: No    Drug use: Never    Sexual activity: Yes     Partners: Male     Birth control/protection: Surgical         MEDICATIONS     Current Facility-Administered Medications   Medication Dose Route Frequency    butalbital-acetaminophen-caffeine (FIORICET, ESGIC) -40 mg per tablet 1 Tab  1 Tab Oral Q6H PRN    cyanocobalamin (VITAMIN B12) tablet 1,000 mcg  1,000 mcg Oral DAILY    cyclobenzaprine (FLEXERIL) tablet 10 mg  10 mg Oral QHS    [START ON 1/13/2021] famotidine (PEPCID) tablet 40 mg  40 mg Oral QAM    glipiZIDE SR (GLUCOTROL XL) tablet 5 mg  5 mg Oral DAILY    loratadine (CLARITIN) tablet 10 mg  10 mg Oral DAILY    metFORMIN (GLUCOPHAGE) tablet 1,000 mg  1,000 mg Oral BID WITH MEALS    pantoprazole (PROTONIX) tablet 40 mg  40 mg Oral ACD    atorvastatin (LIPITOR) tablet 10 mg  10 mg Oral DAILY    fenofibrate nanocrystallized (TRICOR) tablet 145 mg  145 mg Oral DAILY    sodium chloride (NS) flush 5-40 mL  5-40 mL IntraVENous Q8H    sodium chloride (NS) flush 5-40 mL  5-40 mL IntraVENous PRN    acebutoloL (SECTRAL) capsule 400 mg  400 mg Oral BID    dofetilide (TIKOSYN) capsule 500 mcg  500 mcg Oral Q12H    albuterol (PROVENTIL VENTOLIN) nebulizer solution 2.5 mg  2.5 mg Nebulization Q4H PRN    Dofetilide - ECG reminder - please document QTc on MAR  1 Each Other Q12H    glucose chewable tablet 16 g  4 Tab Oral PRN    dextrose (D50W) injection syrg 12.5-25 g  25-50 mL IntraVENous PRN    glucagon (GLUCAGEN) injection 1 mg  1 mg IntraMUSCular PRN    insulin lispro (HUMALOG) injection   SubCUTAneous AC&HS    diphenhydrAMINE (BENADRYL) capsule 50 mg  50 mg Oral Q6H PRN    gabapentin (NEURONTIN) capsule 400 mg  400 mg Oral TID       I have reviewed the nurses notes, vitals, problem list, allergy list, medical history, family, social history and medications. REVIEW OF SYMPTOMS      General: Pt denies excessive weight gain or loss. Pt is able to conduct ADL's  HEENT: Denies blurred vision, headaches, hearing loss, epistaxis and difficulty swallowing. Respiratory: Denies cough, congestion, shortness of breath, RICE, wheezing or stridor. Cardiovascular: Denies precordial pain, palpitations, edema or PND  Gastrointestinal: Denies poor appetite, indigestion, abdominal pain or blood in stool  Genitourinary: Denies hematuria, dysuria, increased urinary frequency  Musculoskeletal: Denies joint pain or swelling from muscles or joints  Neurologic: Denies tremor, paresthesias, headache, or sensory motor disturbance  Psychiatric: Denies confusion, insomnia, depression  Integumentray: Denies rash, itching or ulcers.   Hematologic: Denies easy bruising, bleeding       PHYSICAL EXAMINATION      Vitals: see vitals section  General: Well developed, in no acute distress. HEENT: No jaundice, oral mucosa moist, no oral ulcers  Neck: Supple, no stiffness, no lymphadenopathy, supple  Heart:  Normal S1/S2 negative S3 or S4. Regular, no murmur, gallop or rub, no jugular venous distention  Respiratory: Clear bilaterally x 4, no wheezing or rales  Abdomen:   Soft, non-tender, bowel sounds are active. Extremities:  No edema, normal cap refill, no cyanosis. Musculoskeletal: No clubbing, no deformities  Neuro: A&Ox3, speech clear, gait stable, cooperative, no focal neurologic deficits  Skin: Skin color is normal. No rashes or lesions. Non diaphoretic, moist.  Vascular: 2+ pulses symmetric in all extremities       DIAGNOSTIC DATA      EKG:        LABORATORY DATA      Lab Results   Component Value Date/Time    WBC 8.2 01/12/2021 03:23 AM    HGB 12.9 01/12/2021 03:23 AM    HCT 38.8 01/12/2021 03:23 AM    PLATELET 472 56/35/0294 03:23 AM    MCV 87.2 01/12/2021 03:23 AM      Lab Results   Component Value Date/Time    Sodium 139 01/12/2021 03:23 AM    Potassium 3.7 01/12/2021 03:23 AM    Chloride 108 01/12/2021 03:23 AM    CO2 25 01/12/2021 03:23 AM    Anion gap 6 01/12/2021 03:23 AM    Glucose 83 01/12/2021 03:23 AM    BUN 11 01/12/2021 03:23 AM    Creatinine 0.91 01/12/2021 03:23 AM    BUN/Creatinine ratio 12 01/12/2021 03:23 AM    GFR est AA >60 01/12/2021 03:23 AM    GFR est non-AA >60 01/12/2021 03:23 AM    Calcium 8.4 (L) 01/12/2021 03:23 AM    Bilirubin, total 0.4 01/12/2021 03:23 AM    Alk. phosphatase 36 (L) 01/12/2021 03:23 AM    Protein, total 6.4 01/12/2021 03:23 AM    Albumin 3.5 01/12/2021 03:23 AM    Globulin 2.9 01/12/2021 03:23 AM    A-G Ratio 1.2 01/12/2021 03:23 AM    ALT (SGPT) 24 01/12/2021 03:23 AM           ASSESSMENT      1. Inappropriate sinus tachycardia  2. Diabetes mellitus  3. Orthostatic hypotension  4. GERD  5. Anxiety          PLAN     Decrease Tikosyn to 250mcg. Shannen Maynard NP    Cardiac Electrophysiology / Cardiology    [facility] [address], [facility], [address], Terence [address]    Anais Grover  [phone] / [phone] Fax   [phone] / [phone] Fax

## 2021-01-12 NOTE — PROGRESS NOTES
1/12/2021  9:25 AM    Reason for Admission:   history of long RP tachycardia suspected to be an ISD after recent EPS. She has failed Rythmol for her tachycardia. She is admitted for Tikosyn loading                   RUR Score: 11%                    Plan for utilizing home health:     Does not anticipate needs      PCP: First and Last name:  Prema Garcia, GILBERTO   Name of Practice:    Are you a current patient: Yes/No:  YES   Approximate date of last visit:  Pt does not remember   Can you participate in a virtual visit with your PCP:   YES                     Current Advanced Directive/Advance Care Plan: Does not have one    Transition of Care Plan:                    CM spoke with pt via phone to complete initial assessment and begin discharge planning. Pt lives with her partner- Naila Presley ([phone]) along with her children ages 13, and 16.  Pt does not work. Pt is independent with iADSLs. Pt has Rx coverage and uses [facility]. Pt reports no hx of hh/rehab, and report no DME. Pt's PCP verified: Prema Garcia NP. Pt reported she drove herself to facility and will drive home. Transition Plan:  1. Discharge home when medically stable  2. F/u with PCP  3. CM following for needs. Care Management Interventions  PCP Verified by CM: Yes(Selina Orta)  Mode of Transport at Discharge: Other (see comment)  Transition of Care Consult (CM Consult): Discharge Planning  Current Support Network: Own Home  Confirm Follow Up Transport: Self  The Plan for Transition of Care is Related to the Following Treatment Goals : history of long RP tachycardia suspected to be an ISD after recent EPS. She has failed Rythmol for her tachycardia.   She is admitted for Tikosyn loading  Discharge Location  Discharge Placement: Home with outpatient services

## 2021-01-13 VITALS
RESPIRATION RATE: 16 BRPM | BODY MASS INDEX: 34.78 KG/M2 | WEIGHT: 209 LBS | HEART RATE: 90 BPM | OXYGEN SATURATION: 98 % | TEMPERATURE: 98 F | DIASTOLIC BLOOD PRESSURE: 64 MMHG | SYSTOLIC BLOOD PRESSURE: 111 MMHG

## 2021-01-13 LAB
ANION GAP SERPL CALC-SCNC: 5 MMOL/L (ref 5–15)
BUN SERPL-MCNC: 14 MG/DL (ref 6–20)
BUN/CREAT SERPL: 14 (ref 12–20)
CALCIUM SERPL-MCNC: 9 MG/DL (ref 8.5–10.1)
CHLORIDE SERPL-SCNC: 108 MMOL/L (ref 97–108)
CO2 SERPL-SCNC: 24 MMOL/L (ref 21–32)
CREAT SERPL-MCNC: 1.02 MG/DL (ref 0.55–1.02)
ERYTHROCYTE [DISTWIDTH] IN BLOOD BY AUTOMATED COUNT: 12.8 % (ref 11.5–14.5)
GLUCOSE BLD STRIP.AUTO-MCNC: 96 MG/DL (ref 65–100)
GLUCOSE SERPL-MCNC: 129 MG/DL (ref 65–100)
HCT VFR BLD AUTO: 40.1 % (ref 35–47)
HGB BLD-MCNC: 13.3 G/DL (ref 11.5–16)
MAGNESIUM SERPL-MCNC: 2.2 MG/DL (ref 1.6–2.4)
MCH RBC QN AUTO: 29.2 PG (ref 26–34)
MCHC RBC AUTO-ENTMCNC: 33.2 G/DL (ref 30–36.5)
MCV RBC AUTO: 87.9 FL (ref 80–99)
NRBC # BLD: 0 K/UL (ref 0–0.01)
NRBC BLD-RTO: 0 PER 100 WBC
PLATELET # BLD AUTO: 242 K/UL (ref 150–400)
PMV BLD AUTO: 11.2 FL (ref 8.9–12.9)
POTASSIUM SERPL-SCNC: 4.3 MMOL/L (ref 3.5–5.1)
RBC # BLD AUTO: 4.56 M/UL (ref 3.8–5.2)
SERVICE CMNT-IMP: NORMAL
SODIUM SERPL-SCNC: 137 MMOL/L (ref 136–145)
WBC # BLD AUTO: 8.3 K/UL (ref 3.6–11)

## 2021-01-13 PROCEDURE — 74011250637 HC RX REV CODE- 250/637: Performed by: NURSE PRACTITIONER

## 2021-01-13 PROCEDURE — 80048 BASIC METABOLIC PNL TOTAL CA: CPT

## 2021-01-13 PROCEDURE — 85027 COMPLETE CBC AUTOMATED: CPT

## 2021-01-13 PROCEDURE — 36415 COLL VENOUS BLD VENIPUNCTURE: CPT

## 2021-01-13 PROCEDURE — 74011250637 HC RX REV CODE- 250/637: Performed by: SPECIALIST

## 2021-01-13 PROCEDURE — 83735 ASSAY OF MAGNESIUM: CPT

## 2021-01-13 PROCEDURE — 82962 GLUCOSE BLOOD TEST: CPT

## 2021-01-13 PROCEDURE — 93005 ELECTROCARDIOGRAM TRACING: CPT

## 2021-01-13 PROCEDURE — 65270000032 HC RM SEMIPRIVATE

## 2021-01-13 RX ORDER — DOFETILIDE 0.25 MG/1
250 CAPSULE ORAL EVERY 12 HOURS
Qty: 60 CAP | Refills: 0 | Status: SHIPPED | OUTPATIENT
Start: 2021-01-13 | End: 2021-02-12

## 2021-01-13 RX ORDER — DOFETILIDE 0.25 MG/1
250 CAPSULE ORAL EVERY 12 HOURS
Qty: 28 CAP | Refills: 0 | Status: SHIPPED | OUTPATIENT
Start: 2021-01-13 | End: 2021-01-13

## 2021-01-13 RX ADMIN — DOFETILIDE 250 MCG: 0.25 CAPSULE ORAL at 05:19

## 2021-01-13 RX ADMIN — GLIPIZIDE 5 MG: 2.5 TABLET, FILM COATED, EXTENDED RELEASE ORAL at 08:41

## 2021-01-13 RX ADMIN — ACEBUTOLOL HYDROCHLORIDE 400 MG: 200 CAPSULE ORAL at 17:07

## 2021-01-13 RX ADMIN — METFORMIN HYDROCHLORIDE 1000 MG: 500 TABLET ORAL at 17:07

## 2021-01-13 RX ADMIN — ATORVASTATIN CALCIUM 10 MG: 10 TABLET, FILM COATED ORAL at 08:40

## 2021-01-13 RX ADMIN — FENOFIBRATE 145 MG: 145 TABLET ORAL at 08:40

## 2021-01-13 RX ADMIN — PANTOPRAZOLE SODIUM 40 MG: 40 TABLET, DELAYED RELEASE ORAL at 17:07

## 2021-01-13 RX ADMIN — GABAPENTIN 400 MG: 300 CAPSULE ORAL at 08:40

## 2021-01-13 RX ADMIN — METFORMIN HYDROCHLORIDE 1000 MG: 500 TABLET ORAL at 08:40

## 2021-01-13 RX ADMIN — ACEBUTOLOL HYDROCHLORIDE 400 MG: 200 CAPSULE ORAL at 05:19

## 2021-01-13 RX ADMIN — GABAPENTIN 400 MG: 300 CAPSULE ORAL at 17:07

## 2021-01-13 RX ADMIN — FAMOTIDINE 40 MG: 20 TABLET, FILM COATED ORAL at 08:41

## 2021-01-13 RX ADMIN — LORATADINE 10 MG: 10 TABLET ORAL at 08:40

## 2021-01-13 RX ADMIN — Medication 10 ML: at 07:02

## 2021-01-13 RX ADMIN — CYANOCOBALAMIN TAB 500 MCG 1000 MCG: 500 TAB at 08:41

## 2021-01-13 RX ADMIN — DOFETILIDE 250 MCG: 0.25 CAPSULE ORAL at 17:07

## 2021-01-13 NOTE — DISCHARGE SUMMARY
HISTORY OF PRESENTING ILLNESS      Lenka Miner is a 41 y.o. female with long RP tachycardia suspected to be IST after recent EPS presents after rythmol was added to her acebutolol. She reports addition of the rythmol has failed to improve her tachycardia. Her acebutolol was increased to 400mg BID and she has been admitted for Tikosyn loading. Her Tikosyn has been adjusted to 250mcg based off of her QTC. She notes improvement in symptoms with additional rate control. PAST MEDICAL HISTORY     Past Medical History:   Diagnosis Date    Anxiety     Arrhythmia     HX TACHYCARDIA - NEG.  STRESS TEST 2013 PER PATIENT    Asthma     LAST EPISODE 2016    Diabetes ([facility] [address] 2008    NIDDM    Ear infection 11/30/2018    BILATERAL    GERD (gastroesophageal reflux disease)     Headache     Headache(784.0)     Hypertension     HX HTN - NO MEDS CURRENTLY(11-30-18)    Inappropriate sinus tachycardia 5/20/2019    Rash 7/14/2016    Recurrent umbilical hernia 37/80/9007    S/P dilatation and curettage     Tachycardia            PAST SURGICAL HISTORY     Past Surgical History:   Procedure Laterality Date    HX APPENDECTOMY  2/2008    HX CHOLECYSTECTOMY  2001    HX DILATION AND CURETTAGE      HX GYN  3/2008    tubal ligation    HX HERNIA REPAIR  2/2009    HX HERNIA REPAIR  12/06/2018    open recurrent umbilical hernia repair    HX HYSTERECTOMY  03/2012    HX LEFT SALPINGO-OOPHORECTOMY Left     HX LUMBAR DISKECTOMY      2006    HX ORTHOPAEDIC  9/2006    ruptured discs    [place] COMPRE ELECTROPHYSIOL XM W/LEFT ATRIAL PACNG/REC N/A 11/20/2020    Lt Atrial Pace & Record During Ep Study performed by Torito Giron MD at [address] CATH LAB    [place] COMPRE ELECTROPHYSIOLOGIC ARRHYTHMIA INDUCTION N/A 11/20/2020    EP STUDY COMPLETE performed by Torito Giron MD at [address] CATH LAB    [place] EPHYS EVAL W/ABLATION [address] N/A 11/20/2020    Ablation Svt performed by Torito Giron MD at [facility] CARDIAC CATH LAB    [place] INTRACARDIAC ELECTROPHYSIOLOGIC 3D MAPPING N/A 11/20/2020    Ep 3d Mapping performed by Valente Shi MD at [facility] CATH LAB    UPPER GI ENDOSCOPY,BIOPSY  5/11/2017               ALLERGIES     Allergies   Allergen Reactions    Beef Containing Products Rash    Green Pepper Hives     Red, Yellow and Orange Peppers also.   OK with Black Pepper    Other Plant, Animal, Environmental Swelling     Red meat    Pork Derived (Porcine) Rash          FAMILY HISTORY     Family History   Problem Relation Age of Onset    Heart Disease Mother     Hypertension Father     Cancer Father         Lung    Hypertension Sister     No Known Problems Brother     No Known Problems Brother     No Known Problems Brother     Anesth Problems Neg Hx     negative for cardiac disease       SOCIAL HISTORY     Social History     Socioeconomic History    Marital status:      Spouse name: Not on file    Number of children: Not on file    Years of education: Not on file    Highest education level: Not on file   Tobacco Use    Smoking status: Never Smoker    Smokeless tobacco: Never Used   Substance and Sexual Activity    Alcohol use: No    Drug use: Never    Sexual activity: Yes     Partners: Male     Birth control/protection: Surgical         MEDICATIONS     Current Facility-Administered Medications   Medication Dose Route Frequency    butalbital-acetaminophen-caffeine (FIORICET, ESGIC) -40 mg per tablet 1 Tab  1 Tab Oral Q6H PRN    cyanocobalamin (VITAMIN B12) tablet 1,000 mcg  1,000 mcg Oral DAILY    cyclobenzaprine (FLEXERIL) tablet 10 mg  10 mg Oral QHS    famotidine (PEPCID) tablet 40 mg  40 mg Oral QAM    glipiZIDE SR (GLUCOTROL XL) tablet 5 mg  5 mg Oral DAILY    loratadine (CLARITIN) tablet 10 mg  10 mg Oral DAILY    metFORMIN (GLUCOPHAGE) tablet 1,000 mg  1,000 mg Oral BID WITH MEALS    pantoprazole (PROTONIX) tablet 40 mg  40 mg Oral ACD    atorvastatin (LIPITOR) tablet 10 mg  10 mg Oral DAILY    fenofibrate nanocrystallized (TRICOR) tablet 145 mg  145 mg Oral DAILY    dofetilide (TIKOSYN) capsule 250 mcg  250 mcg Oral Q12H    SITagliptin (JANUVIA) tablet 100 mg (Patient Supplied)  100 mg Oral DAILY    canagliflozin (INVOKANA) tablet 300 mg (Patient Supplied)  300 mg Oral ACB    sodium chloride (NS) flush 5-40 mL  5-40 mL IntraVENous Q8H    sodium chloride (NS) flush 5-40 mL  5-40 mL IntraVENous PRN    acebutoloL (SECTRAL) capsule 400 mg  400 mg Oral BID    albuterol (PROVENTIL VENTOLIN) nebulizer solution 2.5 mg  2.5 mg Nebulization Q4H PRN    Dofetilide - ECG reminder - please document QTc on MAR  1 Each Other Q12H    glucose chewable tablet 16 g  4 Tab Oral PRN    dextrose (D50W) injection syrg 12.5-25 g  25-50 mL IntraVENous PRN    glucagon (GLUCAGEN) injection 1 mg  1 mg IntraMUSCular PRN    insulin lispro (HUMALOG) injection   SubCUTAneous AC&HS    diphenhydrAMINE (BENADRYL) capsule 50 mg  50 mg Oral Q6H PRN    gabapentin (NEURONTIN) capsule 400 mg  400 mg Oral TID       I have reviewed the nurses notes, vitals, problem list, allergy list, medical history, family, social history and medications. REVIEW OF SYMPTOMS      General: Pt denies excessive weight gain or loss. Pt is able to conduct ADL's  HEENT: Denies blurred vision, headaches, hearing loss, epistaxis and difficulty swallowing. Respiratory: Denies cough, congestion, shortness of breath, RICE, wheezing or stridor. Cardiovascular: Denies precordial pain, palpitations, edema or PND  Gastrointestinal: Denies poor appetite, indigestion, abdominal pain or blood in stool  Genitourinary: Denies hematuria, dysuria, increased urinary frequency  Musculoskeletal: Denies joint pain or swelling from muscles or joints  Neurologic: Denies tremor, paresthesias, headache, or sensory motor disturbance  Psychiatric: Denies confusion, insomnia, depression  Integumentray: Denies rash, itching or ulcers.   Hematologic: Denies easy bruising, bleeding       PHYSICAL EXAMINATION      Vitals: see vitals section  General: Well developed, in no acute distress. HEENT: No jaundice, oral mucosa moist, no oral ulcers  Neck: Supple, no stiffness, no lymphadenopathy, supple  Heart:  Normal S1/S2 negative S3 or S4. Regular, no murmur, gallop or rub, no jugular venous distention  Respiratory: Clear bilaterally x 4, no wheezing or rales  Abdomen:   Soft, non-tender, bowel sounds are active. Extremities:  No edema, normal cap refill, no cyanosis. Musculoskeletal: No clubbing, no deformities  Neuro: A&Ox3, speech clear, gait stable, cooperative, no focal neurologic deficits  Skin: Skin color is normal. No rashes or lesions. Non diaphoretic, moist.  Vascular: 2+ pulses symmetric in all extremities       DIAGNOSTIC DATA      EKG:        LABORATORY DATA      Lab Results   Component Value Date/Time    WBC 8.3 01/13/2021 05:20 AM    HGB 13.3 01/13/2021 05:20 AM    HCT 40.1 01/13/2021 05:20 AM    PLATELET 724 65/94/8222 05:20 AM    MCV 87.9 01/13/2021 05:20 AM      Lab Results   Component Value Date/Time    Sodium 137 01/13/2021 05:20 AM    Potassium 4.3 01/13/2021 05:20 AM    Chloride 108 01/13/2021 05:20 AM    CO2 24 01/13/2021 05:20 AM    Anion gap 5 01/13/2021 05:20 AM    Glucose 129 (H) 01/13/2021 05:20 AM    BUN 14 01/13/2021 05:20 AM    Creatinine 1.02 01/13/2021 05:20 AM    BUN/Creatinine ratio 14 01/13/2021 05:20 AM    GFR est AA >60 01/13/2021 05:20 AM    GFR est non-AA 59 (L) 01/13/2021 05:20 AM    Calcium 9.0 01/13/2021 05:20 AM    Bilirubin, total 0.3 01/12/2021 05:10 PM    Alk. phosphatase 42 (L) 01/12/2021 05:10 PM    Protein, total 6.7 01/12/2021 05:10 PM    Albumin 3.6 01/12/2021 05:10 PM    Globulin 3.1 01/12/2021 05:10 PM    A-G Ratio 1.2 01/12/2021 05:10 PM    ALT (SGPT) 28 01/12/2021 05:10 PM           ASSESSMENT      1. Inappropriate sinus tachycardia  2. Diabetes mellitus  3. Orthostatic hypotension  4. GERD  5.  Anxiety PLAN     She will receive 5th dose this evening. If EKG remains stable will be okay for discharge tonight. Tikosyn discharge instructions/coordination plan sent to pharmacy. Brandyn Rooney NP    Cardiac Electrophysiology / Cardiology    [place] [id number].  [address] [place] [address]  [phone] / [phone] Fax   [phone] / [phone] Fax

## 2021-01-13 NOTE — PROGRESS NOTES
1/13/2021  9:41 AM    Reason for Admission:   history of long RP tachycardia suspected to be an ISD after recent EPS.  She has failed Rythmol for her tachycardia.  She is admitted for Tikosyn loading                   RUR Score: 11%     Transition Plan:  1. Discharge home when medically stable  2. Tikosyn 250mcg- 5th dose is schedule for this evening. If QTC remains appropriate, will be okay for discharge  3. F/u with PCP  4. CM following for needs.     Alberto Nguyen

## 2021-01-13 NOTE — PROGRESS NOTES
Shift Change:    Bedside and Verbal shift change report given to Shay RN (oncoming nurse) by Heydi Hoskins RN (offgoing nurse). Report included the following information SBAR, Kardex, Intake/Output, MAR, Recent Results and Cardiac Rhythm NSR.       1400: Informed patient that Tikosyn will be at [facility] in [place] for her to  when d/c. All other meds will remain through [address]. Patient agrees     1900: Tikosyn given at 1700 and EKG completed at 1900. Will notify on call cards     1920: Spoke with Dr. Sheila Ravi who gave orders to discharge patient. Discharge:    Discharge medications reviewed with patient and appropriate educational materials and side effects teaching were provided. I have reviewed discharge instructions with the patient. The patient verbalized understanding. Patient educated to  Tikosyn at [facility]     Current Discharge Medication List      START taking these medications    Details   dofetilide (TIKOSYN) 250 mcg capsule Take 1 Cap by mouth every twelve (12) hours every twelve (12) hours for 30 days.   Qty: 60 Cap, Refills: 0

## 2021-01-13 NOTE — PROGRESS NOTES
Shift Change:    Bedside and Verbal shift change report given to Josephine (oncoming nurse) by Liane Robbins (offgoing nurse). Report included the following information SBAR, Kardex, and Recent Results. Shift Report:    Bedside and Verbal shift change report given to Shay (oncoming nurse) by Danny Friday (offgoing nurse). Report included the following information SBAR, Kardex, and Recent Results.

## 2021-01-13 NOTE — PROGRESS NOTES
Brief progress note:     QTC appropriate this morning, continue Tikosyn 250mcg- 5th dose is schedule for this evening. If QTC remains appropriate, will be okay for discharge.      Smitha Verde, NP

## 2021-01-14 ENCOUNTER — PATIENT MESSAGE (OUTPATIENT)
Dept: CASE MANAGEMENT | Age: 41
End: 2021-01-14

## 2021-01-14 ENCOUNTER — TELEPHONE (OUTPATIENT)
Dept: CASE MANAGEMENT | Age: 41
End: 2021-01-14

## 2021-01-14 LAB
ATRIAL RATE: 85 BPM
ATRIAL RATE: 85 BPM
ATRIAL RATE: 88 BPM
CALCULATED P AXIS, ECG09: 53 DEGREES
CALCULATED P AXIS, ECG09: 53 DEGREES
CALCULATED P AXIS, ECG09: 59 DEGREES
CALCULATED R AXIS, ECG10: 48 DEGREES
CALCULATED R AXIS, ECG10: 51 DEGREES
CALCULATED R AXIS, ECG10: 53 DEGREES
CALCULATED T AXIS, ECG11: 27 DEGREES
CALCULATED T AXIS, ECG11: 31 DEGREES
CALCULATED T AXIS, ECG11: 35 DEGREES
DIAGNOSIS, 93000: NORMAL
P-R INTERVAL, ECG05: 160 MS
P-R INTERVAL, ECG05: 160 MS
P-R INTERVAL, ECG05: 162 MS
Q-T INTERVAL, ECG07: 406 MS
Q-T INTERVAL, ECG07: 416 MS
Q-T INTERVAL, ECG07: 418 MS
QRS DURATION, ECG06: 82 MS
QRS DURATION, ECG06: 84 MS
QRS DURATION, ECG06: 84 MS
QTC CALCULATION (BEZET), ECG08: 483 MS
QTC CALCULATION (BEZET), ECG08: 497 MS
QTC CALCULATION (BEZET), ECG08: 503 MS
VENTRICULAR RATE, ECG03: 85 BPM
VENTRICULAR RATE, ECG03: 85 BPM
VENTRICULAR RATE, ECG03: 88 BPM

## 2021-01-14 NOTE — TELEPHONE ENCOUNTER
21 11:25 AM     Patient contacted regarding recent discharge and COVID-19 risk. Discussed COVID-19 related testing which was available at this time. Test results were negative. Patient informed of results, if available? yes    Outreach made within 2 business days of discharge: Yes    Care Transition Nurse/ Ambulatory Care Manager/ LPN Care Coordinator contacted the patient by telephone to perform post discharge assessment. Verified name and  with patient as identifiers. Patient has following risk factors of: asthma and diabetes. CTN/ACM/LPN reviewed discharge instructions, medical action plan and red flags related to discharge diagnosis. Reviewed and educated them on any new and changed medications related to discharge diagnosis. Pt states she was able to obtain Tikosyn from her local pharmacy and the staff at [facility] made certain it would be available there before she was discharged. She states she has a good understanding of her current medication regimen and confirms that she has appt with cardiology in February. Encouraged her to make a 1-wk appt with her PCP which she tells me will not be a problem. Advance Care Planning:   Does patient have an Advance Directive: pt states she completed around 10 years ago and I advised her to review it to be sure it is current with her health care decision makers information and her wishes. I advised her that if any changes need to be made, she will need to start over with a new AMD form and offered to send her one via her active [employer] acct to which she has agreed. Primary Decision Maker: Nell Margot - Brother - [phone]    Secondary Decision Maker: Khadijah Valenzuela, [address] [place] [address] Partner - [phone]    Education not provided regarding infection prevention, and signs and symptoms of COVID-19 and when to seek medical attention because pt denies needing any additional information or having any COVID-related questions/concerns at this time.     Advised I'll send our COVID-19 information and phone resources to her via [employer] for reference as needed. From CDC: Are you at higher risk for severe illness?  Wash your hands often.  Avoid close contact (6 feet, which is about two arm lengths) with people who are sick.  Put distance between yourself and other people if COVID-19 is spreading in your community.  Clean and disinfect frequently touched surfaces.  Avoid all cruise travel and non-essential air travel.  Call your healthcare professional if you have concerns about COVID-19 and your underlying condition or if you are sick. For more information on steps you can take to protect yourself, see CDC's How to Protect Yourself      Patient/family/caregiver given information for [employer] Loop and agrees to enroll no, declined  Patient's preferred e-mail:  N/A  Patient's preferred phone number: N/A  Based on Loop alert triggers, patient will be contacted by nurse care manager for worsening symptoms. Plan for follow-up call in 14 days based on severity of symptoms and risk factors.

## 2021-01-20 DIAGNOSIS — E11.9 TYPE 2 DIABETES MELLITUS WITHOUT COMPLICATION, WITHOUT LONG-TERM CURRENT USE OF INSULIN (HCC): ICD-10-CM

## 2021-01-21 ENCOUNTER — VIRTUAL VISIT (OUTPATIENT)
Dept: FAMILY MEDICINE CLINIC | Age: 41
End: 2021-01-21
Payer: MEDICAID

## 2021-01-21 DIAGNOSIS — I47.1 ATRIAL TACHYCARDIA (HCC): Primary | ICD-10-CM

## 2021-01-21 PROCEDURE — 99213 OFFICE O/P EST LOW 20 MIN: CPT | Performed by: NURSE PRACTITIONER

## 2021-01-21 NOTE — PROGRESS NOTES
HISTORY OF PRESENT ILLNESS  Cesar Godoy is a 41 y.o. female. HPI  Pt presents with \"hospital discharge follow up\"  Visit was conducted via "[employer]. me  Pt was located at home, provider was located at [facility]  Visit lasted 3 minutes  Cesar Godoy, who was evaluated through a synchronous (real-time) audio-video encounter, and/or her healthcare decision maker, is aware that it is a billable service, with coverage as determined by her insurance carrier. She provided verbal consent to proceed: Yes, and patient identification was verified. It was conducted pursuant to the emergency declaration under the [facility], [facility] authority and the [facility] [employer] and [facility] Act. A caregiver was present when appropriate. Ability to conduct physical exam was limited. I was in the office. The patient was at home. Pt was admitted to [facility] on 1/11 for Tikosyn loading. 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 She failed Rythmol for her tachycardia. Plan via cardiology was to start Tikosyn 125 mcg. Pt is followed by Dr Awa Tabares, cardiology. Pt states that she has been doing well since being home from the hospital  She has been taking the Tikosyn as prescribed  She does note that yesterday she could feel her heart rate rise back up into the 100's again. She states that this only happens when she is doing stuff, like laundry, not at rest.  She does sometimes feel a flutter in her heart. She has appointment with her cardiologist, Dr Awa Tabares, on 1/28. Review of Systems   Constitutional: Negative for fever. Physical Exam  Constitutional:       Appearance: Normal appearance. Neurological:      Mental Status: She is alert and oriented to person, place, and time.          ASSESSMENT and PLAN    Educated about calling cardiology should she continue to feel flutter or elevated HR  Should keep appointment with cardiology as previously scheduled  Should go to ER with ANY chest pain, shortness of breath, etc.    Pt informed to return to office with worsening of symptoms, or PRN with any questions or concerns. Pt verbalizes understanding of plan of care and denies further questions or concerns at this time.

## 2021-01-23 ENCOUNTER — HOSPITAL ENCOUNTER (EMERGENCY)
Age: 41
Discharge: HOME | End: 2021-01-23
Attending: EMERGENCY MEDICINE | Admitting: EMERGENCY MEDICINE
Payer: MEDICAID

## 2021-01-23 ENCOUNTER — APPOINTMENT (OUTPATIENT)
Dept: GENERAL RADIOLOGY | Age: 41
End: 2021-01-23
Attending: EMERGENCY MEDICINE
Payer: MEDICAID

## 2021-01-23 VITALS
RESPIRATION RATE: 24 BRPM | DIASTOLIC BLOOD PRESSURE: 68 MMHG | OXYGEN SATURATION: 96 % | HEART RATE: 117 BPM | BODY MASS INDEX: 35.19 KG/M2 | TEMPERATURE: 97.3 F | SYSTOLIC BLOOD PRESSURE: 114 MMHG | HEIGHT: 65 IN | WEIGHT: 211.2 LBS

## 2021-01-23 DIAGNOSIS — Z77.098 CHEMICAL EXPOSURE: Primary | ICD-10-CM

## 2021-01-23 DIAGNOSIS — J98.01 ACUTE BRONCHOSPASM: ICD-10-CM

## 2021-01-23 PROCEDURE — 71045 X-RAY EXAM CHEST 1 VIEW: CPT

## 2021-01-23 PROCEDURE — 74011000250 HC RX REV CODE- 250: Performed by: EMERGENCY MEDICINE

## 2021-01-23 PROCEDURE — 94640 AIRWAY INHALATION TREATMENT: CPT

## 2021-01-23 PROCEDURE — 99283 EMERGENCY DEPT VISIT LOW MDM: CPT

## 2021-01-23 PROCEDURE — 2709999900 HC NON-CHARGEABLE SUPPLY

## 2021-01-23 PROCEDURE — 74011636637 HC RX REV CODE- 636/637: Performed by: EMERGENCY MEDICINE

## 2021-01-23 PROCEDURE — 77030013140 HC MSK NEB VYRM -A

## 2021-01-23 RX ORDER — PREDNISONE 20 MG/1
60 TABLET ORAL
Status: COMPLETED | OUTPATIENT
Start: 2021-01-23 | End: 2021-01-23

## 2021-01-23 RX ORDER — IPRATROPIUM BROMIDE AND ALBUTEROL SULFATE 2.5; .5 MG/3ML; MG/3ML
3 SOLUTION RESPIRATORY (INHALATION)
Status: COMPLETED | OUTPATIENT
Start: 2021-01-23 | End: 2021-01-23

## 2021-01-23 RX ORDER — PREDNISONE 20 MG/1
20 TABLET ORAL DAILY
Qty: 5 TAB | Refills: 0 | Status: SHIPPED | OUTPATIENT
Start: 2021-01-23 | End: 2021-01-28

## 2021-01-23 RX ADMIN — PREDNISONE 60 MG: 20 TABLET ORAL at 18:06

## 2021-01-23 RX ADMIN — IPRATROPIUM BROMIDE AND ALBUTEROL SULFATE 3 ML: .5; 3 SOLUTION RESPIRATORY (INHALATION) at 18:15

## 2021-01-23 NOTE — ED TRIAGE NOTES
Pt presents to ED with c/o soreness in throat after mixing bleach and Lysol in toilet bowl this am. Dr Read Earthly in to examine pt. The pt appears in NAD.

## 2021-01-23 NOTE — ED PROVIDER NOTES
HPI the patient inhaled chlorine fumes approximately 4 hours ago and since then has developed wheezing, coughing and a burning sensation of her upper chest.    Past Medical History:   Diagnosis Date    Anxiety     Arrhythmia     HX TACHYCARDIA - NEG.  STRESS TEST 2013 PER PATIENT    Asthma     LAST EPISODE 2016    Diabetes ([address] 2008    NIDDM    Ear infection 11/30/2018    BILATERAL    GERD (gastroesophageal reflux disease)     Headache     Headache(784.0)     Hypertension     HX HTN - NO MEDS CURRENTLY(11-30-18)    Inappropriate sinus tachycardia 5/20/2019    Rash 7/14/2016    Recurrent umbilical hernia 57/63/4611    S/P dilatation and curettage     Tachycardia        Past Surgical History:   Procedure Laterality Date    HX APPENDECTOMY  2/2008    HX CHOLECYSTECTOMY  2001    HX DILATION AND CURETTAGE      HX GYN  3/2008    tubal ligation    HX HERNIA REPAIR  2/2009    HX HERNIA REPAIR  12/06/2018    open recurrent umbilical hernia repair    HX HYSTERECTOMY  03/2012    HX LEFT SALPINGO-OOPHORECTOMY Left     HX LUMBAR DISKECTOMY      2006    HX ORTHOPAEDIC  9/2006    ruptured discs    [place] COMPRE ELECTROPHYSIOL XM W/LEFT ATRIAL PACNG/REC N/A 11/20/2020    Lt Atrial Pace & Record During Ep Study performed by Guanaco Lee MD at [facility] CATH LAB    [place] COMPRE ELECTROPHYSIOLOGIC ARRHYTHMIA INDUCTION N/A 11/20/2020    EP STUDY COMPLETE performed by Guanaco Lee MD at [address] [facility] CATH LAB    [place] EPHYS EVAL W/ABLATION [facility] ARRHYTHMIA N/A 11/20/2020    Ablation Svt performed by Guanaco Lee MD at [address] [facility] CATH LAB    [place] INTRACARDIAC ELECTROPHYSIOLOGIC 3D MAPPING N/A 11/20/2020    Ep 3d Mapping performed by Guanaco Lee MD at [address] [place] [address] CATH LAB    UPPER GI ENDOSCOPY,BIOPSY  5/11/2017              Family History:   Problem Relation Age of Onset    Heart Disease Mother     Hypertension Father     Cancer Father         Lung    Hypertension Sister     No Known Problems Brother  No Known Problems Brother     No Known Problems Brother     Anesth Problems Neg Hx        Social History     Socioeconomic History    Marital status:      Spouse name: Not on file    Number of children: Not on file    Years of education: Not on file    Highest education level: Not on file   Occupational History    Not on file   Social Needs    Financial resource strain: Not on file    Food insecurity     Worry: Not on file     Inability: Not on file    Transportation needs     Medical: Not on file     Non-medical: Not on file   Tobacco Use    Smoking status: Never Smoker    Smokeless tobacco: Never Used   Substance and Sexual Activity    Alcohol use: No    Drug use: Never    Sexual activity: Yes     Partners: Male     Birth control/protection: Surgical   Lifestyle    Physical activity     Days per week: Not on file     Minutes per session: Not on file    Stress: Not on file   Relationships    Social connections     Talks on phone: Not on file     Gets together: Not on file     Attends Gnosticist service: Not on file     Active member of club or organization: Not on file     Attends meetings of clubs or organizations: Not on file     Relationship status: Not on file    Intimate partner violence     Fear of current or ex partner: Not on file     Emotionally abused: Not on file     Physically abused: Not on file     Forced sexual activity: Not on file   Other Topics Concern    Not on file   Social History Narrative    Not on file         ALLERGIES: Beef containing products; Green pepper; Other plant, animal, environmental; and Pork derived (porcine)    Review of Systems   Respiratory: Positive for cough and wheezing. Cardiovascular: Positive for chest pain. There were no vitals filed for this visit. Physical Exam  Constitutional:       General: She is not in acute distress. Appearance: She is well-developed. HENT:      Head: Normocephalic.    Neck: Musculoskeletal: Normal range of motion. Cardiovascular:      Rate and Rhythm: Normal rate. Heart sounds: No murmur. Pulmonary:      Effort: Pulmonary effort is normal.      Breath sounds: Normal breath sounds. Abdominal:      Palpations: Abdomen is soft. Tenderness: There is no abdominal tenderness. Musculoskeletal: Normal range of motion. Skin:     General: Skin is warm and dry. Capillary Refill: Capillary refill takes less than 2 seconds. Neurological:      Mental Status: She is alert. Psychiatric:         Behavior: Behavior normal.          MDM       Procedures      6:25 PM--patient with no wheezing at this point. Oxygen saturation 97%.

## 2021-01-23 NOTE — ED NOTES
Patient discharged home in stable condition by Dr Chirag Lees. Discharge instructions given and signed.

## 2021-01-27 RX ORDER — SIMVASTATIN 20 MG/1
TABLET, FILM COATED ORAL
Qty: 30 TAB | Refills: 5 | Status: SHIPPED | OUTPATIENT
Start: 2021-01-27 | End: 2021-02-04 | Stop reason: SDUPTHER

## 2021-01-27 RX ORDER — FAMOTIDINE 40 MG/1
TABLET, FILM COATED ORAL
Qty: 30 TAB | Refills: 5 | Status: SHIPPED | OUTPATIENT
Start: 2021-01-27 | End: 2021-02-04 | Stop reason: SDUPTHER

## 2021-01-27 NOTE — PROGRESS NOTES
HISTORY OF PRESENTING ILLNESS      Kirit Auguste is a 41 y.o. female with long RP tachycardia suspected to be IST after recent EPS presents after rythmol was added to her acebutolol. She reports addition of the rythmol has failed to improve her tachycardia. Her acebutolol was increased to 400mg BID. She underwent Tikosyn loading and was discharged with improvement in her symptoms; however, she had recurrence of tachycardia/dizziness and palpitations since her discharge. EKG shows sinus tachycardia. PAST MEDICAL HISTORY     Past Medical History:   Diagnosis Date    Anxiety     Arrhythmia     HX TACHYCARDIA - NEG.  STRESS TEST 2013 PER PATIENT    Asthma     LAST EPISODE 2016    Diabetes ([address] 2008    NIDDM    Ear infection 11/30/2018    BILATERAL    GERD (gastroesophageal reflux disease)     Headache     Headache(784.0)     Hypertension     HX HTN - NO MEDS CURRENTLY(11-30-18)    Inappropriate sinus tachycardia 5/20/2019    Rash 7/14/2016    Recurrent umbilical hernia 91/04/3383    S/P dilatation and curettage     Tachycardia            PAST SURGICAL HISTORY     Past Surgical History:   Procedure Laterality Date    HX APPENDECTOMY  2/2008    HX CHOLECYSTECTOMY  2001    HX DILATION AND CURETTAGE      HX GYN  3/2008    tubal ligation    HX HERNIA REPAIR  2/2009    HX HERNIA REPAIR  12/06/2018    open recurrent umbilical hernia repair    HX HYSTERECTOMY  03/2012    HX LEFT SALPINGO-OOPHORECTOMY Left     HX LUMBAR DISKECTOMY      2006    HX ORTHOPAEDIC  9/2006    ruptured discs    [place] KARUNAE ELECTROPHYSIOL XM W/LEFT ATRIAL PACNG/REC N/A 11/20/2020    Lt Atrial Pace & Record During Ep Study performed by Sheryle Grade, MD at [address] CATH LAB    [place] KARUNAE ELECTROPHYSIOLOGIC ARRHYTHMIA INDUCTION N/A 11/20/2020    EP STUDY COMPLETE performed by Sheryle Grade, MD at [address] CATH LAB    [place] EPHYS EVAL W/ABLATION [address] N/A 11/20/2020    Ablation Svt performed by London Aguirre MD at [facility] LAB    [place] INTRACARDIAC ELECTROPHYSIOLOGIC 3D MAPPING N/A 11/20/2020    Ep 3d Mapping performed by London Aguirre MD at [facility] LAB    UPPER GI ENDOSCOPY,BIOPSY  5/11/2017               ALLERGIES     Allergies   Allergen Reactions    Beef Containing Products Rash    Green Pepper Hives     Red, Yellow and Orange Peppers also. OK with Black Pepper    Other Plant, Animal, Environmental Swelling     Red meat    Pork Derived (Porcine) Rash          FAMILY HISTORY     Family History   Problem Relation Age of Onset    Heart Disease Mother     Hypertension Father     Cancer Father         Lung    Hypertension Sister     No Known Problems Brother     No Known Problems Brother     No Known Problems Brother     Anesth Problems Neg Hx     negative for cardiac disease       SOCIAL HISTORY     Social History     Socioeconomic History    Marital status:      Spouse name: Not on file    Number of children: Not on file    Years of education: Not on file    Highest education level: Not on file   Tobacco Use    Smoking status: Never Smoker    Smokeless tobacco: Never Used   Substance and Sexual Activity    Alcohol use: No    Drug use: Never    Sexual activity: Yes     Partners: Male     Birth control/protection: Surgical         MEDICATIONS     Current Outpatient Medications   Medication Sig    predniSONE (DELTASONE) 20 mg tablet Take 20 mg by mouth daily for 5 days.  dofetilide (TIKOSYN) 250 mcg capsule Take 1 Cap by mouth every twelve (12) hours every twelve (12) hours for 30 days.  ergocalciferol (ERGOCALCIFEROL) 1,250 mcg (50,000 unit) capsule TAKE 1 CAPSULE BY MOUTH TWICE A WEEK    omeprazole (PRILOSEC) 20 mg capsule Take 1 capsule by mouth once daily    canagliflozin (Invokana) 300 mg tablet TAKE 1 TABLET BY MOUTH ONCE DAILY BEFORE BREAKFAST    acebutoloL (SECTRAL) 400 mg capsule Take 1 Cap by mouth two (2) times a day.     gabapentin (NEURONTIN) 400 mg capsule TAKE 1 CAPSULE BY MOUTH THREE TIMES DAILY . DO NOT EXCEED 3 PER 24 HOURS    cyanocobalamin 1,000 mcg tablet Take 1 Tab by mouth daily.  glipiZIDE SR (GLUCOTROL XL) 5 mg CR tablet Take 1 Tab by mouth daily.  EPINEPHrine (EPIPEN) 0.3 mg/0.3 mL injection INJECT CONTENTS OF 1 PEN INTRAMUSCULARLY ONCE AS NEEDED FOR ALLERGIC REACTION FOR UP TO 1 DOSE    loratadine (CLARITIN) 10 mg tablet Take 1 Tab by mouth daily. To replace xyzal.    fluticasone propionate (FLONASE) 50 mcg/actuation nasal spray 2 Sprays by Both Nostrils route daily.  aspirin delayed-release 81 mg tablet Take 1 tablet by mouth once daily    glucose blood VI test strips (True Metrix Glucose Test Strip) strip To use to check blood sugar daily,  Dx: E11.9    fenofibrate (LOFIBRA) 160 mg tablet TAKE 1 TABLET BY MOUTH EVERY DAY    albuterol (PROVENTIL HFA, VENTOLIN HFA, PROAIR HFA) 90 mcg/actuation inhaler Take 2 Puffs by inhalation every four (4) hours as needed for Wheezing.  butalbital-acetaminophen-caffeine (FIORICET, ESGIC) -40 mg per tablet Take 1 Tab by mouth every six (6) hours as needed for Headache.  famotidine (PEPCID) 40 mg tablet TAKE 1 TABLET BY MOUTH EVERY DAY TAKES EVERY BEDTIME (Patient taking differently: Every morning. T)    metFORMIN (GLUCOPHAGE) 1,000 mg tablet TAKE 1 TABLET BY MOUTH TWICE A DAY    simvastatin (ZOCOR) 20 mg tablet TAKE 1 TABLET BY MOUTH EVERY DAY    SITagliptin (Januvia) 100 mg tablet Take 1 tablet by mouth once daily    SUMAtriptan (IMITREX) 50 mg tablet TAKE 1 TABLET BY MOUTH AT THE ONSET OF A MIGRAINE HEADACHE. REPEAT IN 1 HOUR IF NEEDED.  TAKE NO MORE THAN 2 TABLETS PER 24 HOURS    topiramate (TOPAMAX) 50 mg tablet Take 1 tab twice a day for migraine prevention    cyclobenzaprine (FLEXERIL) 10 mg tablet TAKE 1 TABLET BY MOUTH THREE TIMES DAILY AS NEEDED FOR MUSCLE SPASM DO  NOT  DRIVE  WHILE  TAKING  THIS  MEDICATION    Lancets (MICROLET LANCET) Misc USE TO CHECK BLOOD SUGAR ONE TO TWO TIMES DAILY (Patient taking differently: as needed. USE TO CHECK BLOOD SUGAR ONE TO TWO TIMES DAILY)     No current facility-administered medications for this visit. I have reviewed the nurses notes, vitals, problem list, allergy list, medical history, family, social history and medications. REVIEW OF SYMPTOMS      General: Pt denies excessive weight gain or loss. Pt is able to conduct ADL's  HEENT: Denies blurred vision, headaches, hearing loss, epistaxis and difficulty swallowing. Respiratory: Denies cough, congestion, shortness of breath, RICE, wheezing or stridor. Cardiovascular: Denies precordial pain, palpitations, edema or PND  Gastrointestinal: Denies poor appetite, indigestion, abdominal pain or blood in stool  Genitourinary: Denies hematuria, dysuria, increased urinary frequency  Musculoskeletal: Denies joint pain or swelling from muscles or joints  Neurologic: Denies tremor, paresthesias, headache, or sensory motor disturbance  Psychiatric: Denies confusion, insomnia, depression  Integumentray: Denies rash, itching or ulcers. Hematologic: Denies easy bruising, bleeding       PHYSICAL EXAMINATION      Vitals: see vitals section  General: Well developed, in no acute distress. HEENT: No jaundice, oral mucosa moist, no oral ulcers  Neck: Supple, no stiffness, no lymphadenopathy, supple  Heart:  Normal S1/S2 negative S3 or S4. Regular, no murmur, gallop or rub, no jugular venous distention  Respiratory: Clear bilaterally x 4, no wheezing or rales  Abdomen:   Soft, non-tender, bowel sounds are active. Extremities:  No edema, normal cap refill, no cyanosis. Musculoskeletal: No clubbing, no deformities  Neuro: A&Ox3, speech clear, gait stable, cooperative, no focal neurologic deficits  Skin: Skin color is normal. No rashes or lesions.  Non diaphoretic, moist.  Vascular: 2+ pulses symmetric in all extremities       DIAGNOSTIC DATA      EKG: sinus tachycardia LABORATORY DATA      Lab Results   Component Value Date/Time    WBC 8.3 01/13/2021 05:20 AM    HGB 13.3 01/13/2021 05:20 AM    HCT 40.1 01/13/2021 05:20 AM    PLATELET 320 03/05/8315 05:20 AM    MCV 87.9 01/13/2021 05:20 AM      Lab Results   Component Value Date/Time    Sodium 137 01/13/2021 05:20 AM    Potassium 4.3 01/13/2021 05:20 AM    Chloride 108 01/13/2021 05:20 AM    CO2 24 01/13/2021 05:20 AM    Anion gap 5 01/13/2021 05:20 AM    Glucose 129 (H) 01/13/2021 05:20 AM    BUN 14 01/13/2021 05:20 AM    Creatinine 1.02 01/13/2021 05:20 AM    BUN/Creatinine ratio 14 01/13/2021 05:20 AM    GFR est AA >60 01/13/2021 05:20 AM    GFR est non-AA 59 (L) 01/13/2021 05:20 AM    Calcium 9.0 01/13/2021 05:20 AM    Bilirubin, total 0.3 01/12/2021 05:10 PM    Alk. phosphatase 42 (L) 01/12/2021 05:10 PM    Protein, total 6.7 01/12/2021 05:10 PM    Albumin 3.6 01/12/2021 05:10 PM    Globulin 3.1 01/12/2021 05:10 PM    A-G Ratio 1.2 01/12/2021 05:10 PM    ALT (SGPT) 28 01/12/2021 05:10 PM           ASSESSMENT         1. Inappropriate sinus tachycardia  2. Diabetes mellitus  3. Orthostatic hypotension  4. GERD  5. Anxiety          PLAN     Stop tikosyn. Continue acebutolol, offered additional diltiazem but she will monitor symptoms. Follow up with Dr. Renato Lyons in 2 weeks to discuss possible sinus node ablation/ppm.      ICD-10-CM ICD-9-CM    1. Atrial tachycardia (HCC)  I47.1 427.89    2. Inappropriate sinus tachycardia  R00.0 427.89    3. Essential hypertension  I10 401.9    4. Type 2 diabetes mellitus without complication, without long-term current use of insulin (HCC)  E11.9 250.00    5. Fatigue, unspecified type  R53.83 780.79      No orders of the defined types were placed in this encounter. FOLLOW-UP   2 weeks    Thank you, Juan Manuel Smith NP for allowing me to participate in the care of this extraordinarily pleasant female. Please do not hesitate to contact me for further questions/concerns.        Mary Anne Belle, NP    [address]  [address], [address] [place] [address]  [phone] / [phone] Fax   [phone] / [phone] Fax

## 2021-01-28 ENCOUNTER — OFFICE VISIT (OUTPATIENT)
Dept: CARDIOLOGY CLINIC | Age: 41
End: 2021-01-28
Payer: MEDICAID

## 2021-01-28 VITALS
RESPIRATION RATE: 18 BRPM | SYSTOLIC BLOOD PRESSURE: 150 MMHG | OXYGEN SATURATION: 98 % | BODY MASS INDEX: 35.19 KG/M2 | HEART RATE: 126 BPM | WEIGHT: 211.2 LBS | HEIGHT: 65 IN | DIASTOLIC BLOOD PRESSURE: 80 MMHG

## 2021-01-28 DIAGNOSIS — I47.1 ATRIAL TACHYCARDIA (HCC): Primary | ICD-10-CM

## 2021-01-28 DIAGNOSIS — R53.83 FATIGUE, UNSPECIFIED TYPE: ICD-10-CM

## 2021-01-28 DIAGNOSIS — R00.0 INAPPROPRIATE SINUS TACHYCARDIA: ICD-10-CM

## 2021-01-28 DIAGNOSIS — E11.9 TYPE 2 DIABETES MELLITUS WITHOUT COMPLICATION, WITHOUT LONG-TERM CURRENT USE OF INSULIN (HCC): ICD-10-CM

## 2021-01-28 DIAGNOSIS — I10 ESSENTIAL HYPERTENSION: ICD-10-CM

## 2021-01-28 PROCEDURE — 93000 ELECTROCARDIOGRAM COMPLETE: CPT | Performed by: NURSE PRACTITIONER

## 2021-01-28 PROCEDURE — 99215 OFFICE O/P EST HI 40 MIN: CPT | Performed by: NURSE PRACTITIONER

## 2021-01-28 NOTE — LETTER
1/28/2021 Patient: Betty Elder YOB: 1980 Date of Visit: 1/28/2021 Nathaniel Meredith NP 
[address] [facility] [phone] [address] [employer] Dear Nathaniel Meredith NP, Thank you for referring Ms. Diane Riedel to [facility] [address] [place] [address] for evaluation. My notes for this consultation are attached. If you have questions, please do not hesitate to call me. I look forward to following your patient along with you. Sincerely, Tony Scales NP

## 2021-01-28 NOTE — PROGRESS NOTES
Room #: 2    Patient complaining of dizziness all the time. She states her heart rate is still elevated and she can feel more fluttering events. Her blood pressures have been elevated at home. Visit Vitals  BP (!) 150/80 (BP 1 Location: Left arm, BP Patient Position: Sitting)   Pulse (!) 126   Resp 18   Ht 5' 5\" (1.651 m)   Wt 211 lb 3.2 oz (95.8 kg)   LMP 01/25/2012   SpO2 98%   BMI 35.15 kg/m²         Chest pain:  NO  Shortness of breath:  NO  Edema: NO  Palpitations, skipped beats, rapid heartbeat:  YES  Dizziness:  YES    1. Have you been to the ER, urgent care clinic since your last visit? Hospitalized since your last visit? Yes 1/23/21 - Chemical exposure    2. Have you seen or consulted any other health care providers outside of the [address] [place] [address] since your last visit? Include any pap smears or colon screening.  No      Refills:  NO

## 2021-01-29 ENCOUNTER — TELEPHONE (OUTPATIENT)
Dept: CASE MANAGEMENT | Age: 41
End: 2021-01-29

## 2021-02-04 DIAGNOSIS — E11.9 TYPE 2 DIABETES MELLITUS WITHOUT COMPLICATION, WITHOUT LONG-TERM CURRENT USE OF INSULIN (HCC): ICD-10-CM

## 2021-02-04 DIAGNOSIS — E53.8 VITAMIN B12 DEFICIENCY: ICD-10-CM

## 2021-02-04 DIAGNOSIS — E11.49 OTHER DIABETIC NEUROLOGICAL COMPLICATION ASSOCIATED WITH TYPE 2 DIABETES MELLITUS (HCC): ICD-10-CM

## 2021-02-04 DIAGNOSIS — E11.40 TYPE 2 DIABETES MELLITUS WITH DIABETIC NEUROPATHY, WITHOUT LONG-TERM CURRENT USE OF INSULIN (HCC): ICD-10-CM

## 2021-02-04 RX ORDER — FAMOTIDINE 40 MG/1
TABLET, FILM COATED ORAL
Qty: 30 TAB | Refills: 5 | Status: SHIPPED | OUTPATIENT
Start: 2021-02-04 | End: 2022-01-07

## 2021-02-04 RX ORDER — SIMVASTATIN 20 MG/1
TABLET, FILM COATED ORAL
Qty: 30 TAB | Refills: 5 | Status: SHIPPED | OUTPATIENT
Start: 2021-02-04 | End: 2021-05-19 | Stop reason: SDUPTHER

## 2021-02-04 RX ORDER — OMEPRAZOLE 20 MG/1
CAPSULE, DELAYED RELEASE ORAL
Qty: 30 CAP | Refills: 0 | OUTPATIENT
Start: 2021-02-04

## 2021-02-04 RX ORDER — LANOLIN ALCOHOL/MO/W.PET/CERES
1000 CREAM (GRAM) TOPICAL DAILY
Qty: 30 TAB | Refills: 5 | Status: SHIPPED | OUTPATIENT
Start: 2021-02-04 | End: 2021-12-21 | Stop reason: SDUPTHER

## 2021-02-04 RX ORDER — GABAPENTIN 400 MG/1
400 CAPSULE ORAL 3 TIMES DAILY
Qty: 90 CAP | Refills: 5 | Status: SHIPPED | OUTPATIENT
Start: 2021-02-04 | End: 2021-08-23

## 2021-02-04 RX ORDER — GLIPIZIDE 5 MG/1
5 TABLET, FILM COATED, EXTENDED RELEASE ORAL DAILY
Qty: 30 TAB | Refills: 5 | Status: SHIPPED | OUTPATIENT
Start: 2021-02-04 | End: 2021-05-19 | Stop reason: SDUPTHER

## 2021-02-04 RX ORDER — ERGOCALCIFEROL 1.25 MG/1
CAPSULE ORAL
Qty: 24 CAP | Refills: 1 | Status: SHIPPED | OUTPATIENT
Start: 2021-02-04 | End: 2021-10-07 | Stop reason: SDUPTHER

## 2021-02-08 RX ORDER — OMEPRAZOLE 20 MG/1
CAPSULE, DELAYED RELEASE ORAL
Qty: 30 CAP | Refills: 5 | Status: SHIPPED | OUTPATIENT
Start: 2021-02-08 | End: 2021-07-13

## 2021-02-08 NOTE — PROGRESS NOTES
HISTORY OF PRESENTING ILLNESS      Silvio Islas is a 41 y.o. female  with long RP tachycardia suspected to be IST after recent EPS presents after rythmol was added to her acebutolol. She reports addition of the rythmol has failed to improve her tachycardia. Her acebutolol was increased to 400mg BID. She underwent Tikosyn loading and was discharged with improvement in her symptoms; however, she had recurrence of tachycardia/dizziness and palpitations since her discharge and reported feeling worsening with Tikosyn. EKG shows sinus tachycardia. She was seen 1/28/2021 and stopped tikosyn. She presents to discuss possible sinus node ablation/ppm.          PAST MEDICAL HISTORY     Past Medical History:   Diagnosis Date    Anxiety     Arrhythmia     HX TACHYCARDIA - NEG.  STRESS TEST 2013 PER PATIENT    Asthma     LAST EPISODE 2016    Diabetes ([facility] [address] 2008    NIDDM    Ear infection 11/30/2018    BILATERAL    GERD (gastroesophageal reflux disease)     Headache     Headache(784.0)     Hypertension     HX HTN - NO MEDS CURRENTLY(11-30-18)    Inappropriate sinus tachycardia 5/20/2019    Rash 7/14/2016    Recurrent umbilical hernia 79/23/2463    S/P dilatation and curettage     Tachycardia            PAST SURGICAL HISTORY     Past Surgical History:   Procedure Laterality Date    HX APPENDECTOMY  2/2008    HX CHOLECYSTECTOMY  2001    HX DILATION AND CURETTAGE      HX GYN  3/2008    tubal ligation    HX HERNIA REPAIR  2/2009    HX HERNIA REPAIR  12/06/2018    open recurrent umbilical hernia repair    HX HYSTERECTOMY  03/2012    HX LEFT SALPINGO-OOPHORECTOMY Left     HX LUMBAR DISKECTOMY      2006    HX ORTHOPAEDIC  9/2006    ruptured discs    NV COMPRE ELECTROPHYSIOL XM W/LEFT ATRIAL PACNG/REC N/A 11/20/2020    Lt Atrial Pace & Record During Ep Study performed by Yulia Tinsley MD at [address] [place] [address] CATH LAB    NV COMPRE ELECTROPHYSIOLOGIC ARRHYTHMIA INDUCTION N/A 11/20/2020    EP STUDY COMPLETE performed by Stefan Russell MD at [facility] CATH LAB    IN EPHYS EVAL W/ABLATION [address] N/A 11/20/2020    Ablation Svt performed by Stefan Russell MD at [facility] CATH LAB    IN INTRACARDIAC ELECTROPHYSIOLOGIC 3D MAPPING N/A 11/20/2020    Ep 3d Mapping performed by Stefan Russell MD at [facility] CATH LAB    UPPER GI ENDOSCOPY,BIOPSY  5/11/2017               ALLERGIES     Allergies   Allergen Reactions    Beef Containing Products Rash    Green Pepper Hives     Red, Yellow and Orange Peppers also. OK with Black Pepper    Other Plant, Animal, Environmental Swelling     Red meat    Pork Derived (Porcine) Rash          FAMILY HISTORY     Family History   Problem Relation Age of Onset    Heart Disease Mother     Hypertension Father     Cancer Father         Lung    Hypertension Sister     No Known Problems Brother     No Known Problems Brother     No Known Problems Brother     Anesth Problems Neg Hx     negative for cardiac disease       SOCIAL HISTORY     Social History     Socioeconomic History    Marital status:      Spouse name: Not on file    Number of children: Not on file    Years of education: Not on file    Highest education level: Not on file   Tobacco Use    Smoking status: Never Smoker    Smokeless tobacco: Never Used   Substance and Sexual Activity    Alcohol use: No    Drug use: Never    Sexual activity: Yes     Partners: Male     Birth control/protection: Surgical         MEDICATIONS     Current Outpatient Medications   Medication Sig    omeprazole (PRILOSEC) 20 mg capsule Take 1 capsule by mouth once daily    gabapentin (NEURONTIN) 400 mg capsule Take 1 Cap by mouth three (3) times daily. Max Daily Amount: 1,200 mg. TAKE 1 CAPSULE BY MOUTH THREE TIMES DAILY .  DO NOT EXCEED 3 PER 24 HOURS    canagliflozin (Invokana) 300 mg tablet TAKE 1 TABLET BY MOUTH ONCE DAILY BEFORE BREAKFAST    simvastatin (ZOCOR) 20 mg tablet TAKE 1 TABLET BY MOUTH EVERY DAY    famotidine (PEPCID) 40 mg tablet TAKE 1 TABLET BY MOUTH EVERY DAY TAKES EVERY BEDTIME    cyanocobalamin 1,000 mcg tablet Take 1 Tab by mouth daily.  glipiZIDE SR (GLUCOTROL XL) 5 mg CR tablet Take 1 Tab by mouth daily.  ergocalciferol (ERGOCALCIFEROL) 1,250 mcg (50,000 unit) capsule TAKE 1 CAPSULE BY MOUTH TWICE A WEEK    dofetilide (TIKOSYN) 250 mcg capsule Take 1 Cap by mouth every twelve (12) hours every twelve (12) hours for 30 days.  acebutoloL (SECTRAL) 400 mg capsule Take 1 Cap by mouth two (2) times a day.  EPINEPHrine (EPIPEN) 0.3 mg/0.3 mL injection INJECT CONTENTS OF 1 PEN INTRAMUSCULARLY ONCE AS NEEDED FOR ALLERGIC REACTION FOR UP TO 1 DOSE    loratadine (CLARITIN) 10 mg tablet Take 1 Tab by mouth daily. To replace xyzal.    fluticasone propionate (FLONASE) 50 mcg/actuation nasal spray 2 Sprays by Both Nostrils route daily.  aspirin delayed-release 81 mg tablet Take 1 tablet by mouth once daily    glucose blood VI test strips (True Metrix Glucose Test Strip) strip To use to check blood sugar daily,  Dx: E11.9    fenofibrate (LOFIBRA) 160 mg tablet TAKE 1 TABLET BY MOUTH EVERY DAY    albuterol (PROVENTIL HFA, VENTOLIN HFA, PROAIR HFA) 90 mcg/actuation inhaler Take 2 Puffs by inhalation every four (4) hours as needed for Wheezing.  butalbital-acetaminophen-caffeine (FIORICET, ESGIC) -40 mg per tablet Take 1 Tab by mouth every six (6) hours as needed for Headache.  metFORMIN (GLUCOPHAGE) 1,000 mg tablet TAKE 1 TABLET BY MOUTH TWICE A DAY    SITagliptin (Januvia) 100 mg tablet Take 1 tablet by mouth once daily    SUMAtriptan (IMITREX) 50 mg tablet TAKE 1 TABLET BY MOUTH AT THE ONSET OF A MIGRAINE HEADACHE. REPEAT IN 1 HOUR IF NEEDED.  TAKE NO MORE THAN 2 TABLETS PER 24 HOURS    topiramate (TOPAMAX) 50 mg tablet Take 1 tab twice a day for migraine prevention    cyclobenzaprine (FLEXERIL) 10 mg tablet TAKE 1 TABLET BY MOUTH THREE TIMES DAILY AS NEEDED FOR MUSCLE SPASM DO  NOT  DRIVE  WHILE  TAKING  THIS  MEDICATION    Lancets (MICROLET LANCET) Misc USE TO CHECK BLOOD SUGAR ONE TO TWO TIMES DAILY (Patient taking differently: as needed. USE TO CHECK BLOOD SUGAR ONE TO TWO TIMES DAILY)     No current facility-administered medications for this visit. I have reviewed the nurses notes, vitals, problem list, allergy list, medical history, family, social history and medications. REVIEW OF SYMPTOMS      General: Pt denies excessive weight gain or loss. Pt is able to conduct ADL's  HEENT: Denies blurred vision, headaches, hearing loss, epistaxis and difficulty swallowing. Respiratory: Denies cough, congestion, shortness of breath, RICE, wheezing or stridor. Cardiovascular: Denies precordial pain, palpitations, edema or PND  Gastrointestinal: Denies poor appetite, indigestion, abdominal pain or blood in stool  Genitourinary: Denies hematuria, dysuria, increased urinary frequency  Musculoskeletal: Denies joint pain or swelling from muscles or joints  Neurologic: Denies tremor, paresthesias, headache, or sensory motor disturbance  Psychiatric: Denies confusion, insomnia, depression  Integumentray: Denies rash, itching or ulcers. Hematologic: Denies easy bruising, bleeding       PHYSICAL EXAMINATION      Vitals: see vitals section  General: Well developed, in no acute distress. HEENT: No jaundice, oral mucosa moist, no oral ulcers  Neck: Supple, no stiffness, no lymphadenopathy, supple  Heart:  Normal S1/S2 negative S3 or S4. Regular, no murmur, gallop or rub, no jugular venous distention  Respiratory: Clear bilaterally x 4, no wheezing or rales  Abdomen:   Soft, non-tender, bowel sounds are active. Extremities:  No edema, normal cap refill, no cyanosis. Musculoskeletal: No clubbing, no deformities  Neuro: A&Ox3, speech clear, gait stable, cooperative, no focal neurologic deficits  Skin: Skin color is normal. No rashes or lesions.  Non diaphoretic, moist.  Vascular: 2+ pulses symmetric in all extremities       DIAGNOSTIC DATA      EKG:        LABORATORY DATA      Lab Results   Component Value Date/Time    WBC 8.3 01/13/2021 05:20 AM    HGB 13.3 01/13/2021 05:20 AM    HCT 40.1 01/13/2021 05:20 AM    PLATELET 192 10/06/9760 05:20 AM    MCV 87.9 01/13/2021 05:20 AM      Lab Results   Component Value Date/Time    Sodium 137 01/13/2021 05:20 AM    Potassium 4.3 01/13/2021 05:20 AM    Chloride 108 01/13/2021 05:20 AM    CO2 24 01/13/2021 05:20 AM    Anion gap 5 01/13/2021 05:20 AM    Glucose 129 (H) 01/13/2021 05:20 AM    BUN 14 01/13/2021 05:20 AM    Creatinine 1.02 01/13/2021 05:20 AM    BUN/Creatinine ratio 14 01/13/2021 05:20 AM    GFR est AA >60 01/13/2021 05:20 AM    GFR est non-AA 59 (L) 01/13/2021 05:20 AM    Calcium 9.0 01/13/2021 05:20 AM    Bilirubin, total 0.3 01/12/2021 05:10 PM    Alk. phosphatase 42 (L) 01/12/2021 05:10 PM    Protein, total 6.7 01/12/2021 05:10 PM    Albumin 3.6 01/12/2021 05:10 PM    Globulin 3.1 01/12/2021 05:10 PM    A-G Ratio 1.2 01/12/2021 05:10 PM    ALT (SGPT) 28 01/12/2021 05:10 PM           ASSESSMENT      1. Inappropriate sinus tachycardia  2. Diabetes mellitus  3. Orthostatic hypotension  4. GERD  5. Anxiety       PLAN     Patient has failed multiple drugs and remains highly symptomatic. Will plan for EPS and possible sinus node modification/ablation +/- dual chamber Medtronic pacemaker if necessary at that time as well. MAC anesthesia. [address] [place] [address]. Hold rate controlling meds 3 days prior. ICD-10-CM ICD-9-CM    1. Atrial tachycardia (HCC)  I47.1 427.89    2. Inappropriate sinus tachycardia  R00.0 427.89    3. Essential hypertension  I10 401.9    4. Type 2 diabetes mellitus without complication, without long-term current use of insulin (HCC)  E11.9 250.00    5. SVT (supraventricular tachycardia) ([facility])  I47.1 427.89    6. Palpitations  R00.2 785.1      No orders of the defined types were placed in this encounter. FOLLOW-UP     Post procedure      Thank you, Margarette Valdovinos NP for allowing me to participate in the care of this extraordinarily pleasant female. Please do not hesitate to contact me for further questions/concerns.          Sai Cassidy MD  Cardiac Electrophysiology / Cardiology    [address]  [address] [place] [address], [facility]. Sandee Juarez.    Niels Grover  [phone] / [phone] Fax   [phone] / [phone] Fax

## 2021-02-10 ENCOUNTER — OFFICE VISIT (OUTPATIENT)
Dept: CARDIOLOGY CLINIC | Age: 41
End: 2021-02-10
Payer: MEDICAID

## 2021-02-10 VITALS
SYSTOLIC BLOOD PRESSURE: 142 MMHG | DIASTOLIC BLOOD PRESSURE: 82 MMHG | WEIGHT: 208.8 LBS | OXYGEN SATURATION: 98 % | BODY MASS INDEX: 34.79 KG/M2 | HEIGHT: 65 IN | RESPIRATION RATE: 16 BRPM | HEART RATE: 112 BPM

## 2021-02-10 DIAGNOSIS — E11.9 TYPE 2 DIABETES MELLITUS WITHOUT COMPLICATION, WITHOUT LONG-TERM CURRENT USE OF INSULIN (HCC): ICD-10-CM

## 2021-02-10 DIAGNOSIS — I10 ESSENTIAL HYPERTENSION: ICD-10-CM

## 2021-02-10 DIAGNOSIS — R00.2 PALPITATIONS: ICD-10-CM

## 2021-02-10 DIAGNOSIS — I47.1 ATRIAL TACHYCARDIA (HCC): Primary | ICD-10-CM

## 2021-02-10 DIAGNOSIS — I47.1 SVT (SUPRAVENTRICULAR TACHYCARDIA) (HCC): ICD-10-CM

## 2021-02-10 DIAGNOSIS — I47.1 ATRIAL TACHYCARDIA (HCC): ICD-10-CM

## 2021-02-10 DIAGNOSIS — R00.0 INAPPROPRIATE SINUS TACHYCARDIA: ICD-10-CM

## 2021-02-10 PROCEDURE — 93000 ELECTROCARDIOGRAM COMPLETE: CPT | Performed by: INTERNAL MEDICINE

## 2021-02-10 PROCEDURE — 99215 OFFICE O/P EST HI 40 MIN: CPT | Performed by: INTERNAL MEDICINE

## 2021-02-10 NOTE — PROGRESS NOTES
Room # 2    Chest pain: yes  Shortness of breath: no  Edema: no  Palpitations, Skipped beats, Rapid heartbeat: yes  Dizziness: no  Fatigue: yes    New diagnosis/Surgeries: no    ER/Hospitalizations: no    Refills:no     Visit Vitals  BP (!) 142/82 (BP 1 Location: Left upper arm, BP Patient Position: Sitting)   Pulse (!) 112 Comment: lying  124 sitting   Resp 16   Ht 5' 5\" (1.651 m)   Wt 208 lb 12.8 oz (94.7 kg)   LMP 01/25/2012   SpO2 98%   BMI 34.75 kg/m²

## 2021-02-10 NOTE — PATIENT INSTRUCTIONS
You are scheduled for the following procedure with Dr. Malissa Barros:  SA Node Ablation & Dual Chamber Pacemaker @ [facility] 
 
 
PLEASE be aware that your procedure time is a tentative time and subject to change due to emergency cases. Procedure date/time:    March 16, 2021  Time: 10:00  am - please arrive by 8:00 am 
 
 
 
o PRE-PROCEDURE LABS NEEDED: YES  
o Forms for labwork may be given at appointment. If not, they will be mailed to you. Labs should be completed within 5-7 days of procedure. These can be done at any [facility] lab (one is located in [address] [place] [address]. No appointment needed). YES A COVID swab is needed 4 days prior to your procedure. o YOU MAY NEED PRE-PROCEDURE IMAGING, CHEST CTA OR CARDIAC MRI.  
   
[]  Chest CTA []  Cardiac MRI    (hospital [facility] scheduling to call you) Nothing to eat or drink after midnight before your procedure. ARRIVAL time:  (You will need  to be discharged home with.)  
 
o [facility] procedures: please arrive to check in on 2nd floor two hours prior to your procedure the day of your procedure. 
 
o [facility] procedures: arrive to check in on 1st floor near [facility] two hours prior to your procedure. Please review the following medication instructions: Do stop Acebutolol 3 days prior to procedure. If you take any of the following Diabetic medications, please use as follows: 
 
[X]  Glucophage/Metformin  -  Hold morning dose on day of procedure. [X]  Insulin  -  Hold morning dose on day of procedure. 
 
[]  Lantus  -  Reduce dose by ½ evening before procedure. You may take all other medications as directed the morning of your procedure with a sip of water unless otherwise indicated. Please contact the office [phone] and ask for a member of Dr. Malissa Barros' team for procedure questions. There is a physician on call for the office after hours for immediate needs.   
 
FOLLOW UP: 
 Your appointments will be made for you post procedure based off of discharge instructions. You may have driving restrictions for a short time after your procedure (usually 2-3 days). Pacemaker/ICD patients will be unable to have an MRI until 6 weeks after implant, NO dental work for 8 weeks after your implant.

## 2021-02-15 ENCOUNTER — PREP FOR PROCEDURE (OUTPATIENT)
Dept: CARDIOLOGY CLINIC | Age: 41
End: 2021-02-15

## 2021-02-15 RX ORDER — SODIUM CHLORIDE 0.9 % (FLUSH) 0.9 %
5-40 SYRINGE (ML) INJECTION AS NEEDED
Status: CANCELLED | OUTPATIENT
Start: 2021-02-15

## 2021-02-15 RX ORDER — SODIUM CHLORIDE 0.9 % (FLUSH) 0.9 %
5-40 SYRINGE (ML) INJECTION EVERY 8 HOURS
Status: CANCELLED | OUTPATIENT
Start: 2021-02-15

## 2021-02-22 NOTE — DISCHARGE SUMMARY
Cardiac Electrophysiology Discharge Summary       Patient ID:  Martha Simpson  [id number]  46 y.o.  1980    Admit Date: 1/11/2021    Discharge Date: 1/13/21    Admitting Physician: Lian Delaney NP     Discharge Physician: Nicole Badillo MD    Admission Diagnoses: Atrial tachycardia [facility]) [I47.1]    Discharge Diagnoses: Active Problems:    Atrial tachycardia ([address] (1/11/2021)        Discharge Condition: stable    Cardiology Procedures this Admission:  [facility]    Hospital Course: Patient underwent above procedure without complication and remained stable without acute events. Discharge Exam:  Visit Vitals  /64 (BP 1 Location: Right arm, BP Patient Position: At rest)   Pulse 90   Temp 98 °F (36.7 °C)   Resp 16   Wt 208 lb 15.9 oz (94.8 kg)   SpO2 98%   BMI 34.78 kg/m²       Abdomen: soft, non-tender  Extremities: extremities normal  Heart: regular rate and rhythm  Lungs: clear to auscultation bilaterally  Neck: no JVD  Neurologic: Grossly normal  Pulses: 2+ and symmetric    Disposition: Home    Patient Instructions:   Cannot display discharge medications since this patient is not currently admitted. Referenced discharge instructions provided by nursing for diet and activity.     Follow-up with Nicole Badillo MD             Signed:  Mamie Carnes MD  Cardiac Electrophysiology  1/13/21  11:53 AM

## 2021-02-23 ENCOUNTER — VIRTUAL VISIT (OUTPATIENT)
Dept: FAMILY MEDICINE CLINIC | Age: 41
End: 2021-02-23
Payer: MEDICAID

## 2021-02-23 DIAGNOSIS — R21 RASH: Primary | ICD-10-CM

## 2021-02-23 PROCEDURE — 99213 OFFICE O/P EST LOW 20 MIN: CPT | Performed by: NURSE PRACTITIONER

## 2021-02-23 RX ORDER — NYSTATIN 100000 U/G
OINTMENT TOPICAL 2 TIMES DAILY
Qty: 15 G | Refills: 0 | Status: ON HOLD | OUTPATIENT
Start: 2021-02-23 | End: 2021-03-16

## 2021-02-23 NOTE — PROGRESS NOTES
HISTORY OF PRESENT ILLNESS  Tuyet Mackay is a 41 y.o. female. HPI  Pt presents with \"rash\"  Visit was conducted via [employer]. me  Pt was located at home, provider was located at [facility]  Visit lasted 2 minutes  Tuyet Mackay, who was evaluated through a synchronous (real-time) audio-video encounter, and/or her healthcare decision maker, is aware that it is a billable service, with coverage as determined by her insurance carrier. She provided verbal consent to proceed: Yes, and patient identification was verified. It was conducted pursuant to the emergency declaration under the [facility], [address] [place] [address] authority and the [employer] General Act. A caregiver was present when appropriate. Ability to conduct physical exam was limited. I was in the office. The patient was at home. Pt states that she has had a rash between her breasts for about a week  She states that she has been applying hydrocortisone to the area, with no relief  The area is itchy and red  Review of Systems   Constitutional: Negative for fever. Skin: Positive for itching and rash. Physical Exam  Constitutional:       Appearance: Normal appearance. Neurological:      Mental Status: She is alert and oriented to person, place, and time. ASSESSMENT and PLAN    ICD-10-CM ICD-9-CM    1. Rash  R21 782.1 nystatin (MYCOSTATIN) 100,000 unit/gram ointment     Educated about using ointment as prescribed  Should notify office should symptoms continue    Pt informed to return to office with worsening of symptoms, or PRN with any questions or concerns. Pt verbalizes understanding of plan of care and denies further questions or concerns at this time.

## 2021-03-11 LAB
APPEARANCE UR: CLEAR
BILIRUB UR QL STRIP: NEGATIVE
BUN SERPL-MCNC: 12 MG/DL (ref 6–24)
BUN/CREAT SERPL: 16 (ref 9–23)
CALCIUM SERPL-MCNC: 10 MG/DL (ref 8.7–10.2)
CHLORIDE SERPL-SCNC: 105 MMOL/L (ref 96–106)
CO2 SERPL-SCNC: 22 MMOL/L (ref 20–29)
COLOR UR: ABNORMAL
CREAT SERPL-MCNC: 0.76 MG/DL (ref 0.57–1)
ERYTHROCYTE [DISTWIDTH] IN BLOOD BY AUTOMATED COUNT: 12.8 % (ref 11.7–15.4)
GLUCOSE SERPL-MCNC: 120 MG/DL (ref 65–99)
GLUCOSE UR QL: ABNORMAL
HCT VFR BLD AUTO: 42.8 % (ref 34–46.6)
HGB BLD-MCNC: 14.8 G/DL (ref 11.1–15.9)
HGB UR QL STRIP: NEGATIVE
INR PPP: 0.9 (ref 0.9–1.2)
KETONES UR QL STRIP: NEGATIVE
LEUKOCYTE ESTERASE UR QL STRIP: NEGATIVE
MCH RBC QN AUTO: 29.1 PG (ref 26.6–33)
MCHC RBC AUTO-ENTMCNC: 34.6 G/DL (ref 31.5–35.7)
MCV RBC AUTO: 84 FL (ref 79–97)
MICRO URNS: ABNORMAL
NITRITE UR QL STRIP: NEGATIVE
PH UR STRIP: 5 [PH] (ref 5–7.5)
PLATELET # BLD AUTO: 245 X10E3/UL (ref 150–450)
POTASSIUM SERPL-SCNC: 4.4 MMOL/L (ref 3.5–5.2)
PROT UR QL STRIP: NEGATIVE
PROTHROMBIN TIME: 9.8 SEC (ref 9.1–12)
RBC # BLD AUTO: 5.08 X10E6/UL (ref 3.77–5.28)
SODIUM SERPL-SCNC: 143 MMOL/L (ref 134–144)
SP GR UR: >=1.03 (ref 1–1.03)
UROBILINOGEN UR STRIP-MCNC: 0.2 MG/DL (ref 0.2–1)
WBC # BLD AUTO: 10.7 X10E3/UL (ref 3.4–10.8)

## 2021-03-12 ENCOUNTER — HOSPITAL ENCOUNTER (OUTPATIENT)
Dept: LAB | Age: 41
Discharge: HOME | End: 2021-03-12
Payer: MEDICAID

## 2021-03-12 ENCOUNTER — TRANSCRIBE ORDER (OUTPATIENT)
Dept: REGISTRATION | Age: 41
End: 2021-03-12

## 2021-03-12 DIAGNOSIS — Z01.812 PRE-PROCEDURAL LABORATORY EXAMINATIONS: ICD-10-CM

## 2021-03-12 DIAGNOSIS — Z01.812 PRE-PROCEDURAL LABORATORY EXAMINATIONS: Primary | ICD-10-CM

## 2021-03-12 PROCEDURE — U0003 INFECTIOUS AGENT DETECTION BY NUCLEIC ACID (DNA OR RNA); SEVERE ACUTE RESPIRATORY SYNDROME CORONAVIRUS 2 (SARS-COV-2) (CORONAVIRUS DISEASE [COVID-19]), AMPLIFIED PROBE TECHNIQUE, MAKING USE OF HIGH THROUGHPUT TECHNOLOGIES AS DESCRIBED BY CMS-2020-01-R: HCPCS

## 2021-03-13 LAB — SARS-COV-2, COV2NT: NOT DETECTED

## 2021-03-16 ENCOUNTER — DOCUMENTATION ONLY (OUTPATIENT)
Dept: CARDIOLOGY CLINIC | Age: 41
End: 2021-03-16

## 2021-03-16 ENCOUNTER — HOSPITAL ENCOUNTER (OUTPATIENT)
Age: 41
Setting detail: OUTPATIENT SURGERY
Discharge: HOME | End: 2021-03-16
Attending: INTERNAL MEDICINE | Admitting: INTERNAL MEDICINE
Payer: MEDICAID

## 2021-03-16 ENCOUNTER — ANESTHESIA (OUTPATIENT)
Dept: CARDIAC CATH/INVASIVE PROCEDURES | Age: 41
End: 2021-03-16
Payer: MEDICAID

## 2021-03-16 ENCOUNTER — ANESTHESIA EVENT (OUTPATIENT)
Dept: CARDIAC CATH/INVASIVE PROCEDURES | Age: 41
End: 2021-03-16
Payer: MEDICAID

## 2021-03-16 VITALS
RESPIRATION RATE: 17 BRPM | WEIGHT: 205 LBS | HEIGHT: 65 IN | HEART RATE: 104 BPM | SYSTOLIC BLOOD PRESSURE: 111 MMHG | BODY MASS INDEX: 34.16 KG/M2 | DIASTOLIC BLOOD PRESSURE: 70 MMHG | OXYGEN SATURATION: 98 % | TEMPERATURE: 98.3 F

## 2021-03-16 DIAGNOSIS — I47.1 SVT (SUPRAVENTRICULAR TACHYCARDIA) (HCC): ICD-10-CM

## 2021-03-16 LAB
GLUCOSE BLD STRIP.AUTO-MCNC: 121 MG/DL (ref 65–100)
GLUCOSE BLD STRIP.AUTO-MCNC: 122 MG/DL (ref 65–100)
SERVICE CMNT-IMP: ABNORMAL
SERVICE CMNT-IMP: ABNORMAL

## 2021-03-16 PROCEDURE — 93620 COMP EP EVL R AT VEN PAC&REC: CPT | Performed by: INTERNAL MEDICINE

## 2021-03-16 PROCEDURE — C1893 INTRO/SHEATH, FIXED,NON-PEEL: HCPCS | Performed by: INTERNAL MEDICINE

## 2021-03-16 PROCEDURE — 77030029065 HC DRSG HEMO QCLOT ZMED -B: Performed by: INTERNAL MEDICINE

## 2021-03-16 PROCEDURE — 74011000250 HC RX REV CODE- 250: Performed by: NURSE ANESTHETIST, CERTIFIED REGISTERED

## 2021-03-16 PROCEDURE — 77030013797 HC KT TRNSDUC PRSSR EDWD -A: Performed by: INTERNAL MEDICINE

## 2021-03-16 PROCEDURE — 77030041243 HC CBL DIAG SNSR J&J -D: Performed by: INTERNAL MEDICINE

## 2021-03-16 PROCEDURE — C1730 CATH, EP, 19 OR FEW ELECT: HCPCS | Performed by: INTERNAL MEDICINE

## 2021-03-16 PROCEDURE — C1732 CATH, EP, DIAG/ABL, 3D/VECT: HCPCS | Performed by: INTERNAL MEDICINE

## 2021-03-16 PROCEDURE — 74011250636 HC RX REV CODE- 250/636: Performed by: NURSE ANESTHETIST, CERTIFIED REGISTERED

## 2021-03-16 PROCEDURE — 93613 INTRACARDIAC EPHYS 3D MAPG: CPT | Performed by: INTERNAL MEDICINE

## 2021-03-16 PROCEDURE — 82962 GLUCOSE BLOOD TEST: CPT

## 2021-03-16 PROCEDURE — 93621 COMP EP EVL L PAC&REC C SINS: CPT | Performed by: INTERNAL MEDICINE

## 2021-03-16 PROCEDURE — 77030041242 HC CBL CONN CARTO 3 J&J -D: Performed by: INTERNAL MEDICINE

## 2021-03-16 PROCEDURE — C1733 CATH, EP, OTHR THAN COOL-TIP: HCPCS | Performed by: INTERNAL MEDICINE

## 2021-03-16 PROCEDURE — 74011000250 HC RX REV CODE- 250: Performed by: INTERNAL MEDICINE

## 2021-03-16 PROCEDURE — 77030027107 HC PTCH EXT REF CARTO3 J&J -F: Performed by: INTERNAL MEDICINE

## 2021-03-16 PROCEDURE — 74011250636 HC RX REV CODE- 250/636: Performed by: INTERNAL MEDICINE

## 2021-03-16 PROCEDURE — 93623 PRGRMD STIMJ&PACG IV RX NFS: CPT | Performed by: INTERNAL MEDICINE

## 2021-03-16 PROCEDURE — 76060000033 HC ANESTHESIA 1 TO 1.5 HR: Performed by: INTERNAL MEDICINE

## 2021-03-16 PROCEDURE — 93653 COMPRE EP EVAL TX SVT: CPT | Performed by: INTERNAL MEDICINE

## 2021-03-16 PROCEDURE — C1894 INTRO/SHEATH, NON-LASER: HCPCS | Performed by: INTERNAL MEDICINE

## 2021-03-16 PROCEDURE — 77030039046 HC PAD DEFIB RADIOTRNSPNT CNMD -B: Performed by: INTERNAL MEDICINE

## 2021-03-16 PROCEDURE — C1760 CLOSURE DEV, VASC: HCPCS | Performed by: INTERNAL MEDICINE

## 2021-03-16 PROCEDURE — 77030035291 HC TBNG PMP SMARTABLATE J&J -B: Performed by: INTERNAL MEDICINE

## 2021-03-16 RX ORDER — ONDANSETRON 2 MG/ML
INJECTION INTRAMUSCULAR; INTRAVENOUS AS NEEDED
Status: DISCONTINUED | OUTPATIENT
Start: 2021-03-16 | End: 2021-03-16 | Stop reason: HOSPADM

## 2021-03-16 RX ORDER — GENTAMICIN SULFATE 80 MG/100ML
80 INJECTION, SOLUTION INTRAVENOUS ONCE
Status: DISCONTINUED | OUTPATIENT
Start: 2021-03-16 | End: 2021-03-16 | Stop reason: HOSPADM

## 2021-03-16 RX ORDER — MIDAZOLAM HYDROCHLORIDE 1 MG/ML
INJECTION, SOLUTION INTRAMUSCULAR; INTRAVENOUS AS NEEDED
Status: DISCONTINUED | OUTPATIENT
Start: 2021-03-16 | End: 2021-03-16 | Stop reason: HOSPADM

## 2021-03-16 RX ORDER — DEXMEDETOMIDINE HYDROCHLORIDE 100 UG/ML
INJECTION, SOLUTION INTRAVENOUS AS NEEDED
Status: DISCONTINUED | OUTPATIENT
Start: 2021-03-16 | End: 2021-03-16 | Stop reason: HOSPADM

## 2021-03-16 RX ORDER — SODIUM CHLORIDE 0.9 % (FLUSH) 0.9 %
5-40 SYRINGE (ML) INJECTION AS NEEDED
Status: DISCONTINUED | OUTPATIENT
Start: 2021-03-16 | End: 2021-03-17 | Stop reason: HOSPADM

## 2021-03-16 RX ORDER — SODIUM CHLORIDE 9 MG/ML
INJECTION, SOLUTION INTRAVENOUS
Status: DISCONTINUED | OUTPATIENT
Start: 2021-03-16 | End: 2021-03-16 | Stop reason: HOSPADM

## 2021-03-16 RX ORDER — ONDANSETRON 2 MG/ML
4 INJECTION INTRAMUSCULAR; INTRAVENOUS
Status: DISCONTINUED | OUTPATIENT
Start: 2021-03-16 | End: 2021-03-17 | Stop reason: HOSPADM

## 2021-03-16 RX ORDER — SODIUM CHLORIDE 0.9 % (FLUSH) 0.9 %
5-40 SYRINGE (ML) INJECTION EVERY 8 HOURS
Status: DISCONTINUED | OUTPATIENT
Start: 2021-03-16 | End: 2021-03-17 | Stop reason: HOSPADM

## 2021-03-16 RX ORDER — PROPOFOL 10 MG/ML
INJECTION, EMULSION INTRAVENOUS
Status: DISCONTINUED | OUTPATIENT
Start: 2021-03-16 | End: 2021-03-16 | Stop reason: HOSPADM

## 2021-03-16 RX ORDER — FENTANYL CITRATE 50 UG/ML
INJECTION, SOLUTION INTRAMUSCULAR; INTRAVENOUS AS NEEDED
Status: DISCONTINUED | OUTPATIENT
Start: 2021-03-16 | End: 2021-03-16 | Stop reason: HOSPADM

## 2021-03-16 RX ORDER — LIDOCAINE HYDROCHLORIDE 20 MG/ML
INJECTION, SOLUTION EPIDURAL; INFILTRATION; INTRACAUDAL; PERINEURAL AS NEEDED
Status: DISCONTINUED | OUTPATIENT
Start: 2021-03-16 | End: 2021-03-16 | Stop reason: HOSPADM

## 2021-03-16 RX ORDER — HYDROCODONE BITARTRATE AND ACETAMINOPHEN 5; 325 MG/1; MG/1
1 TABLET ORAL
Status: DISCONTINUED | OUTPATIENT
Start: 2021-03-16 | End: 2021-03-17 | Stop reason: HOSPADM

## 2021-03-16 RX ORDER — PROPOFOL 10 MG/ML
INJECTION, EMULSION INTRAVENOUS AS NEEDED
Status: DISCONTINUED | OUTPATIENT
Start: 2021-03-16 | End: 2021-03-16 | Stop reason: HOSPADM

## 2021-03-16 RX ORDER — ACETAMINOPHEN 325 MG/1
650 TABLET ORAL
Status: DISCONTINUED | OUTPATIENT
Start: 2021-03-16 | End: 2021-03-17 | Stop reason: HOSPADM

## 2021-03-16 RX ADMIN — FENTANYL CITRATE 25 MCG: 50 INJECTION, SOLUTION INTRAMUSCULAR; INTRAVENOUS at 10:47

## 2021-03-16 RX ADMIN — MIDAZOLAM 2 MG: 1 INJECTION INTRAMUSCULAR; INTRAVENOUS at 10:37

## 2021-03-16 RX ADMIN — FENTANYL CITRATE 25 MCG: 50 INJECTION, SOLUTION INTRAMUSCULAR; INTRAVENOUS at 10:41

## 2021-03-16 RX ADMIN — PROPOFOL 50 MG: 10 INJECTION, EMULSION INTRAVENOUS at 10:42

## 2021-03-16 RX ADMIN — PROPOFOL 40 MG: 10 INJECTION, EMULSION INTRAVENOUS at 11:50

## 2021-03-16 RX ADMIN — FENTANYL CITRATE 25 MCG: 50 INJECTION, SOLUTION INTRAMUSCULAR; INTRAVENOUS at 11:14

## 2021-03-16 RX ADMIN — ONDANSETRON HYDROCHLORIDE 4 MG: 2 INJECTION, SOLUTION INTRAMUSCULAR; INTRAVENOUS at 10:52

## 2021-03-16 RX ADMIN — SODIUM CHLORIDE: 900 INJECTION, SOLUTION INTRAVENOUS at 10:28

## 2021-03-16 RX ADMIN — DEXMEDETOMIDINE HYDROCHLORIDE 10 MCG: 100 INJECTION, SOLUTION, CONCENTRATE INTRAVENOUS at 10:51

## 2021-03-16 RX ADMIN — FENTANYL CITRATE 25 MCG: 50 INJECTION, SOLUTION INTRAMUSCULAR; INTRAVENOUS at 11:45

## 2021-03-16 RX ADMIN — DEXMEDETOMIDINE HYDROCHLORIDE 10 MCG: 100 INJECTION, SOLUTION, CONCENTRATE INTRAVENOUS at 10:41

## 2021-03-16 RX ADMIN — PROPOFOL 75 MCG/KG/MIN: 10 INJECTION, EMULSION INTRAVENOUS at 10:42

## 2021-03-16 RX ADMIN — LIDOCAINE HYDROCHLORIDE 40 MG: 20 INJECTION, SOLUTION EPIDURAL; INFILTRATION; INTRACAUDAL; PERINEURAL at 10:42

## 2021-03-16 RX ADMIN — DEXMEDETOMIDINE HYDROCHLORIDE 10 MCG: 100 INJECTION, SOLUTION, CONCENTRATE INTRAVENOUS at 11:06

## 2021-03-16 NOTE — ANESTHESIA PREPROCEDURE EVALUATION
Anesthetic History   No history of anesthetic complications            Review of Systems / Medical History  Patient summary reviewed, nursing notes reviewed and pertinent labs reviewed    Pulmonary            Asthma : well controlled    Comments: Seasonal asthma: no meds for years   Neuro/Psych         Headaches    Comments: Frequent headaches  Migraines: last 1 month ago Cardiovascular    Hypertension        Dysrhythmias : SVT      Exercise tolerance: >4 METS     GI/Hepatic/Renal     GERD: well controlled           Endo/Other    Diabetes: poorly controlled, type 2    Obesity     Other Findings              Physical Exam    Airway  Mallampati: II    Neck ROM: normal range of motion   Mouth opening: Normal     Cardiovascular    Rhythm: regular  Rate: normal         Dental    Dentition: Lower dentition intact and Upper dentition intact     Pulmonary  Breath sounds clear to auscultation               Abdominal         Other Findings            Anesthetic Plan    ASA: 3  Anesthesia type: MAC          Induction: Intravenous  Anesthetic plan and risks discussed with: Patient

## 2021-03-16 NOTE — PROGRESS NOTES
TRANSFER - OUT REPORT:    Verbal report given to Alton Olivo RN on Emily Granger being transferred to cath alb recovery for routine progression of care       Report consisted of patients Situation, Background, Assessment and   Recommendations(SBAR). Information from the following report(s) Procedure Summary was reviewed with the receiving nurse. Opportunity for questions and clarification was provided. Cardiac Cath Lab Procedure Area Arrival Note:    Emily Granger arrived to Cardiac Cath Lab, Procedure Area. Patient identifiers verified with NAME and DATE OF BIRTH. Procedure verified with patient. Consent forms verified. Allergies verified. Patient informed of procedure and plan of care. Questions answered with review. Patient voiced understanding of procedure and plan of care. Patient on cardiac monitor, non-invasive blood pressure, SPO2 monitor. Airway management and monitoring along with all vital signs to be performed by CRNA. Patient status doing well without problems. Patient is A&Ox 4. Patient reports no pain. Patient medicated during procedure with orders obtained and verified by Dr. MARTELL [facility]. Refer to patients Cardiac Cath Lab PROCEDURE REPORT for vital signs, assessment, status, and response during procedure, printed at end of case. Printed report on chart or scanned into chart.

## 2021-03-16 NOTE — PROGRESS NOTES
Most recent office visit, EKG and Procedure encounter faxed to Dr. Ramonita Titus at [facility] per Dr. Yara Baron.

## 2021-03-16 NOTE — DISCHARGE INSTRUCTIONS
Electrophysiology Study (EPS)/Cardiac Ablation   Discharge Instructions      You have just had an electrophysiology study. There were catheters temporarily placed in your heart through a puncture in the Veins and/or Arteries in your groin. WHAT TO EXPECT      If you have had a Cardiac Ablation please be aware that you may experience mild chest pain that will resolve within 24-48 hours. If the Chest Pain begins radiating down your shoulders or arms, becomes severe or breathing becomes difficult, call 911 or go to the closest emergency room.  Mild to moderate, non-painful, bruising or mild swelling at the puncture site is not un-common, and will resolve in 7 - 14 days, and may extend down your thigh as it heals.  If a closure device was used to seal your artery or vein, a separate pamphlet will be given to you with these discharge instructions. It is very important that you review the information in the pamphlet for different restrictions and precautions.  You have a small gauze dressing applied to the puncture site in your groin. You may remove this the following morning.  You MAY receive a 30 day heart monitor in the mail to wear after your procedure. This is to evaluate your heart rhythm post ablation. MEDICATIONS      Take only the medications prescribed to you at discharge. ACTIVITY      A responsible adult must take you home. Do not drive a car for 24 hours.  Rest quietly for the remainder of the day.  Do not lift anything greater than 10 pounds for 3 days.  Limit bending at the puncture site and use of stairs for at least 3 days.  You may remove the bandage and shower the morning after the procedure. Do not take a bath for 3 days. SYMPTOMS THAT NEED TO BE REPORTED IMMEDIATELY      Bleeding at the puncture site. If there is bleeding, lie down and hold firm direct pressure for at least 5 minutes.   If the bleeding does not stop, go to the closest emergency room, or call 911.  Temperature more than 100.5 F.   Redness or warmth at the puncture site.  Increasing pain, numbness, coolness or blue discoloration of the extremity where the puncture is located.  Pulsating mass at the puncture site.  A new lump at the puncture site, or increasing swelling at the site. Please be aware that a pea or marble sized lump is normal, anything larger or increasing in size should be reported.  Bruising at the puncture site that enlarges or becomes painful (some bruising at the site is common and will go away in 7-14 days).  Rapid heart rate or palpitations.  Dizziness, lightheadedness, fainting.  REMEMBER: If you feel something is an emergency or cannot be handled over the phone, call 911 or go to the closest emergency room.       FOLLOW UP CARE     Karen Lizama NP in 2 weeks  Dr. Julisa Alvarez in 3 months        Rosana Rivera MD  Cardiac Electrophysiology / Cardiology    [place] [address].  [address] [facility], [address] [facility], Suite [facility]  Terence Kearney     Jovanny Grovermojovanny  [phone] / [phone] Fax   [phone] / [phone] Fax

## 2021-03-16 NOTE — ROUTINE PROCESS
Cardiac Cath Lab Recovery Arrival Note: 
 
 
Tara Buerger arrived to Cardiac Cath Lab, Recovery Area. Staff introduced to patient. Patient identifiers verified with NAME and DATE OF BIRTH. Procedure verified with patient. Consent forms reviewed and signed by patient or authorized representative and verified. Allergies verified. Patient informed of procedure and plan of care. Questions answered with review. Patient prepped for procedure, per orders from physician, prior to arrival. 
 
Patient on cardiac monitor, non-invasive blood pressure, SPO2 monitor. Patient is A&Ox 4. Patient reports no complaints. Patient in stretcher, in low position, with side rails up, call bell within reach, patient instructed to call of assistance as needed. Patient prep in: [facility] Recovery Area, Bed# 6. Family in: waiting room or outside of the hospital.  
Prep by: BOBY Martin

## 2021-03-16 NOTE — PROCEDURES
Patient underwent EPS in 11/2020: Activation identified the earliest atrial activation along the anterolateral aspect of the SVC-RA junction where several fractionated potentials were observed. This region of early activity appeared consistent with the sinus node region. Given the patient presented tachycardic, ablation was performed at sites of early activation that exhibited a unipolar QS configuration electrogram with a bipolar electrogram that was 16 msec pre-P wave. Ablation at this site resulted in acceleration of the tachycardia up to 200 bpm however failed to terminate the tachycardia. Ablation was performed at the next earliest site however again failed to terminate the tachycardia despite acceleration of the tachycardia. Further attempts to ablate in this region were deferred due to concern for possible resultant junctional rhythm. Mapping of the LA was not possible due to case being performed at [facility].     After failing dofetilide, we discussed repeat EPS and possible ablation of IST vs left atrial tachycardia with consideration for pacemaker implantation if necessary at [facility].     Comprehensive electrophysiology performed with drug infusion. Patient with HR's in the 120-130's in prep area  However, once under sedation heart rates were in the 100's and elevated to 120's but only while on isuprel infusion. Previously, we targeted sites of earliest activation in the sinus region without elimination of tachycardia. Our plan was to target sites of earliest activation while tachycardic which could include exploring sites in the LA that may be earlier than those targeted during our last procedure which were 16 msec preP in activation with plan to proceed with pacemaker should ablating these sites result in bradycardia due to loss of her sinus drivers.  With some degree of uncertainty about whether an LA focus could be driving her tachycardia as well ablating non-tachycardia inducing areas in the RA instead of the true drivers of her tachycardia, we aimed to map her tachycardia for added confidence in our targeted sites for ablation. Given inability to sustain tachycardia, I could not target the true sites of earliest activation as we were able to during her previous case. Due to concern for ablating her sinus node (rather than modification) but potentially not targeting the true  of her tachycardia, I became concerned that her clinical symptoms may potentially worsen if we empirically ablated her SN. This would also delay repeat attempts at ablation in the near future with a fresh atrial lead in position. Therefore, I opted to defer ablation.      Plan:  Referral to U

## 2021-03-16 NOTE — H&P
HISTORY OF PRESENTING ILLNESS      Krista Javier is a 41 y.o. female  with long RP tachycardia suspected to be IST after recent EPS presents after rythmol was added to her acebutolol. She reports addition of the rythmol has failed to improve her tachycardia. Her acebutolol was increased to 400mg BID. She underwent Tikosyn loading and was discharged with improvement in her symptoms; however, she had recurrence of tachycardia/dizziness and palpitations since her discharge and reported feeling worsening with Tikosyn. EKG shows sinus tachycardia.      She was seen 1/28/2021 and stopped tikosyn. She presents to discuss possible sinus node ablation/ppm.             PAST MEDICAL HISTORY           Past Medical History:   Diagnosis Date    Anxiety      Arrhythmia       HX TACHYCARDIA - NEG.  STRESS TEST 2013 PER PATIENT    Asthma       LAST EPISODE 2016    Diabetes ([facility] [address] 2008     NIDDM    Ear infection 11/30/2018     BILATERAL    GERD (gastroesophageal reflux disease)      Headache      Headache(784.0)      Hypertension       HX HTN - NO MEDS CURRENTLY(11-30-18)    Inappropriate sinus tachycardia 5/20/2019    Rash 7/14/2016    Recurrent umbilical hernia 53/08/2804    S/P dilatation and curettage      Tachycardia               PAST SURGICAL HISTORY            Past Surgical History:   Procedure Laterality Date    HX APPENDECTOMY   2/2008    HX CHOLECYSTECTOMY   2001    HX DILATION AND CURETTAGE        HX GYN   3/2008     tubal ligation    HX HERNIA REPAIR   2/2009    HX HERNIA REPAIR   12/06/2018     open recurrent umbilical hernia repair    HX HYSTERECTOMY   03/2012    HX LEFT SALPINGO-OOPHORECTOMY Left      HX LUMBAR DISKECTOMY         2006    HX ORTHOPAEDIC   9/2006     ruptured discs    WI MARYLIN ELECTROPHYSIOL XM W/LEFT ATRIAL PACNG/REC N/A 11/20/2020     Lt Atrial Pace & Record During Ep Study performed by Valente Shi MD at [facility] CATH LAB    WI KARUNAE ELECTROPHYSIOLOGIC ARRHYTHMIA INDUCTION N/A 11/20/2020     EP STUDY COMPLETE performed by Marcie Crocker MD at [facility] CATH LAB    RI EPHYS EVAL W/ABLATION SUPRAVENT ARRHYTHMIA N/A 11/20/2020     Ablation Svt performed by Marcie Crocker MD at [facility] CATH LAB    RI INTRACARDIAC ELECTROPHYSIOLOGIC 3D MAPPING N/A 11/20/2020     Ep 3d Mapping performed by Marcie Crocker MD at [facility] LAB    UPPER GI ENDOSCOPY,BIOPSY   5/11/2017                   ALLERGIES            Allergies   Allergen Reactions    Beef Containing Products Rash    Green Pepper Hives       Red, Yellow and Orange Peppers also. OK with Black Pepper    Other Plant, Animal, Environmental Swelling       Red meat    Pork Derived (Porcine) Rash            FAMILY HISTORY            Family History   Problem Relation Age of Onset    Heart Disease Mother      Hypertension Father      Cancer Father           Lung    Hypertension Sister      No Known Problems Brother      No Known Problems Brother      No Known Problems Brother      Anesth Problems Neg Hx      negative for cardiac disease         SOCIAL HISTORY      Social History               Socioeconomic History    Marital status:        Spouse name: Not on file    Number of children: Not on file    Years of education: Not on file    Highest education level: Not on file   Tobacco Use    Smoking status: Never Smoker    Smokeless tobacco: Never Used   Substance and Sexual Activity    Alcohol use: No    Drug use: Never    Sexual activity: Yes       Partners: Male       Birth control/protection: Surgical               MEDICATIONS           Current Outpatient Medications   Medication Sig    omeprazole (PRILOSEC) 20 mg capsule Take 1 capsule by mouth once daily    gabapentin (NEURONTIN) 400 mg capsule Take 1 Cap by mouth three (3) times daily. Max Daily Amount: 1,200 mg. TAKE 1 CAPSULE BY MOUTH THREE TIMES DAILY .  DO NOT EXCEED 3 PER 24 HOURS    canagliflozin (Invokana) 300 mg tablet TAKE 1 TABLET BY MOUTH ONCE DAILY BEFORE BREAKFAST    simvastatin (ZOCOR) 20 mg tablet TAKE 1 TABLET BY MOUTH EVERY DAY    famotidine (PEPCID) 40 mg tablet TAKE 1 TABLET BY MOUTH EVERY DAY TAKES EVERY BEDTIME    cyanocobalamin 1,000 mcg tablet Take 1 Tab by mouth daily.  glipiZIDE SR (GLUCOTROL XL) 5 mg CR tablet Take 1 Tab by mouth daily.  ergocalciferol (ERGOCALCIFEROL) 1,250 mcg (50,000 unit) capsule TAKE 1 CAPSULE BY MOUTH TWICE A WEEK    dofetilide (TIKOSYN) 250 mcg capsule Take 1 Cap by mouth every twelve (12) hours every twelve (12) hours for 30 days.  acebutoloL (SECTRAL) 400 mg capsule Take 1 Cap by mouth two (2) times a day.  EPINEPHrine (EPIPEN) 0.3 mg/0.3 mL injection INJECT CONTENTS OF 1 PEN INTRAMUSCULARLY ONCE AS NEEDED FOR ALLERGIC REACTION FOR UP TO 1 DOSE    loratadine (CLARITIN) 10 mg tablet Take 1 Tab by mouth daily. To replace xyzal.    fluticasone propionate (FLONASE) 50 mcg/actuation nasal spray 2 Sprays by Both Nostrils route daily.  aspirin delayed-release 81 mg tablet Take 1 tablet by mouth once daily    glucose blood VI test strips (True Metrix Glucose Test Strip) strip To use to check blood sugar daily,  Dx: E11.9    fenofibrate (LOFIBRA) 160 mg tablet TAKE 1 TABLET BY MOUTH EVERY DAY    albuterol (PROVENTIL HFA, VENTOLIN HFA, PROAIR HFA) 90 mcg/actuation inhaler Take 2 Puffs by inhalation every four (4) hours as needed for Wheezing.  butalbital-acetaminophen-caffeine (FIORICET, ESGIC) -40 mg per tablet Take 1 Tab by mouth every six (6) hours as needed for Headache.  metFORMIN (GLUCOPHAGE) 1,000 mg tablet TAKE 1 TABLET BY MOUTH TWICE A DAY    SITagliptin (Januvia) 100 mg tablet Take 1 tablet by mouth once daily    SUMAtriptan (IMITREX) 50 mg tablet TAKE 1 TABLET BY MOUTH AT THE ONSET OF A MIGRAINE HEADACHE. REPEAT IN 1 HOUR IF NEEDED.  TAKE NO MORE THAN 2 TABLETS PER 24 HOURS    topiramate (TOPAMAX) 50 mg tablet Take 1 tab twice a day for migraine prevention    cyclobenzaprine (FLEXERIL) 10 mg tablet TAKE 1 TABLET BY MOUTH THREE TIMES DAILY AS NEEDED FOR MUSCLE SPASM DO  NOT  DRIVE  WHILE  TAKING  THIS  MEDICATION    Lancets (MICROLET LANCET) Misc USE TO CHECK BLOOD SUGAR ONE TO TWO TIMES DAILY (Patient taking differently: as needed. USE TO CHECK BLOOD SUGAR ONE TO TWO TIMES DAILY)      No current facility-administered medications for this visit.          I have reviewed the nurses notes, vitals, problem list, allergy list, medical history, family, social history and medications.         REVIEW OF SYMPTOMS      General: Pt denies excessive weight gain or loss. Pt is able to conduct ADL's  HEENT: Denies blurred vision, headaches, hearing loss, epistaxis and difficulty swallowing. Respiratory: Denies cough, congestion, shortness of breath, RICE, wheezing or stridor. Cardiovascular: Denies precordial pain, palpitations, edema or PND  Gastrointestinal: Denies poor appetite, indigestion, abdominal pain or blood in stool  Genitourinary: Denies hematuria, dysuria, increased urinary frequency  Musculoskeletal: Denies joint pain or swelling from muscles or joints  Neurologic: Denies tremor, paresthesias, headache, or sensory motor disturbance  Psychiatric: Denies confusion, insomnia, depression  Integumentray: Denies rash, itching or ulcers. Hematologic: Denies easy bruising, bleeding         PHYSICAL EXAMINATION      Vitals: see vitals section  General: Well developed, in no acute distress. HEENT: No jaundice, oral mucosa moist, no oral ulcers  Neck: Supple, no stiffness, no lymphadenopathy, supple  Heart:  Normal S1/S2 negative S3 or S4. Regular, no murmur, gallop or rub, no jugular venous distention  Respiratory: Clear bilaterally x 4, no wheezing or rales  Abdomen:   Soft, non-tender, bowel sounds are active.   Extremities:  No edema, normal cap refill, no cyanosis.   Musculoskeletal: No clubbing, no deformities  Neuro: A&Ox3, speech clear, gait stable, cooperative, no focal neurologic deficits  Skin: Skin color is normal. No rashes or lesions. Non diaphoretic, moist.  Vascular: 2+ pulses symmetric in all extremities         DIAGNOSTIC DATA      EKG:          LABORATORY DATA            Lab Results   Component Value Date/Time     WBC 8.3 01/13/2021 05:20 AM     HGB 13.3 01/13/2021 05:20 AM     HCT 40.1 01/13/2021 05:20 AM     PLATELET 901 18/70/1219 05:20 AM     MCV 87.9 01/13/2021 05:20 AM            Lab Results   Component Value Date/Time     Sodium 137 01/13/2021 05:20 AM     Potassium 4.3 01/13/2021 05:20 AM     Chloride 108 01/13/2021 05:20 AM     CO2 24 01/13/2021 05:20 AM     Anion gap 5 01/13/2021 05:20 AM     Glucose 129 (H) 01/13/2021 05:20 AM     BUN 14 01/13/2021 05:20 AM     Creatinine 1.02 01/13/2021 05:20 AM     BUN/Creatinine ratio 14 01/13/2021 05:20 AM     GFR est AA >60 01/13/2021 05:20 AM     GFR est non-AA 59 (L) 01/13/2021 05:20 AM     Calcium 9.0 01/13/2021 05:20 AM     Bilirubin, total 0.3 01/12/2021 05:10 PM     Alk. phosphatase 42 (L) 01/12/2021 05:10 PM     Protein, total 6.7 01/12/2021 05:10 PM     Albumin 3.6 01/12/2021 05:10 PM     Globulin 3.1 01/12/2021 05:10 PM     A-G Ratio 1.2 01/12/2021 05:10 PM     ALT (SGPT) 28 01/12/2021 05:10 PM             ASSESSMENT      1. Inappropriate sinus tachycardia  2. Diabetes mellitus  3. Orthostatic hypotension  4.  GERD  5. Anxiety         PLAN      Patient has failed multiple drugs and remains highly symptomatic.  Will plan for EPS and possible sinus node modification/ablation +/- dual chamber Medtronic pacemaker if necessary           Sobeida Verma MD  Cardiac Electrophysiology / Cardiology     [address], [place] [address], [address] [place], [place] [phone]) [phone] / [phone] Fax                            [phone] / [phone] Fax

## 2021-03-16 NOTE — PROGRESS NOTES
TRANSFER - IN REPORT:    Verbal report received from [facility] CVT on Kirit Auguste  being received from procedure area for routine progression of care. Report consisted of patients Situation, Background, Assessment and Recommendations(SBAR). Information from the following report(s) SBAR, Procedure Summary, MAR, Recent Results and Cardiac Rhythm NSR was reviewed with the receiving clinician. Opportunity for questions and clarification was provided. Assessment completed upon patients arrival to [address] [place] [address] and care assumed. Cardiac Cath Lab Recovery Arrival Note:    Kirit Auguste arrived to [facility] recovery area. Patient procedure= EP Study. Patient on cardiac monitor, non-invasive blood pressure, SPO2 monitor. On  O2 @ 2 lpm via NC.  IV  of NS on pump at 25 ml/hr. Patient status doing well without problems. Patient is A&Ox 4. Patient reports No Pain. PROCEDURE SITE CHECK:    Procedure site:without any bleeding and No Hematoma, No pain/discomfort reported at procedure site. No change in patient status. Continue to monitor patient and status.

## 2021-03-16 NOTE — ROUTINE PROCESS
1415:  Kirit Hearing ambulated @ 4146 (time) approximately 20 feet. Patient tolerated ambulation without adverse advents. right groin (right/left, groin/arm)  without bleeding or hematoma noted. 1421: I have reviewed discharge instructions with the patient. The patient verbalized understanding. 1433:  Patient wheeled out via wheelchair for discharge.

## 2021-03-16 NOTE — ANESTHESIA POSTPROCEDURE EVALUATION
Post-Anesthesia Evaluation and Assessment    Patient: Barbara Garcia MRN: [id number]  SSN: [id number]    YOB: 1980  Age: 41 y.o. Sex: female      I have evaluated the patient and they are stable and ready for discharge from the PACU. Cardiovascular Function/Vital Signs  Visit Vitals  /83 (BP 1 Location: Right arm, BP Patient Position: At rest)   Pulse (!) 110   Temp 36.8 °C (98.3 °F)   Resp 13   Ht 5' 5\" (1.651 m)   Wt 93 kg (205 lb)   SpO2 97%   Breastfeeding No   BMI 34.11 kg/m²       Patient is status post * No anesthesia type entered * anesthesia for Procedure(s):  Ep 3d Mapping  Ep Study Complete  Lt Atrial Pace & Record During Ep Study  Drug Stimulation. Nausea/Vomiting: None    Postoperative hydration reviewed and adequate. Pain:  Pain Scale 1: Numeric (0 - 10) (03/16/21 1400)  Pain Intensity 1: 0 (03/16/21 1400)   Managed    Neurological Status: At baseline    Mental Status, Level of Consciousness: Alert and  oriented to person, place, and time    Pulmonary Status:   O2 Device: Room air (03/16/21 1400)   Adequate oxygenation and airway patent    Complications related to anesthesia: None    Post-anesthesia assessment completed. No concerns    Signed By: Chuck Luis MD     March 16, 2021              Procedure(s):  Ep 3d Mapping  Ep Study Complete  Lt Atrial Pace & Record During Ep Study  Drug Stimulation. MAC    <[email or web address]>    INITIAL Post-op Vital signs:   Vitals Value Taken Time   /70 03/16/21 1415   Temp 36.8 °C (98.3 °F) 03/16/21 1215   Pulse 105 03/16/21 1421   Resp 17 03/16/21 1421   SpO2 95 % 03/16/21 1420   Vitals shown include unvalidated device data.

## 2021-03-16 NOTE — Clinical Note
TRANSFER - OUT REPORT:     Verbal report given to: BEDSIDE. Report consisted of patient's Situation, Background, Assessment and   Recommendations(SBAR). Opportunity for questions and clarification was provided. Patient transported to: [facility].

## 2021-03-18 ENCOUNTER — TELEPHONE (OUTPATIENT)
Dept: CARDIOLOGY CLINIC | Age: 41
End: 2021-03-18

## 2021-03-18 NOTE — TELEPHONE ENCOUNTER
Returned patient call, ID verified using two patient identifiers. Patient states she noticed a knot this morning at her groin site about the size of a quarter and she's a little sore. She didn't notice it yesterday. She states she took the dressing off yesterday and it stuck to the site so it did bleed a little bit. She held pressure for a few minutes until the bleeding stopped. She denies any bleeding, bruising, oozing, or redness at this time. Advised patient to apply ice to the site for 20 minutes at a time about 3-4 times a day, to avoid any strenuous activity for at least 3 days post procedure, and to monitor the site. If the size of the knot increases or she notices any oozing or bleeding from the site she should notify us immediately. Patient verbalized understanding and will call with any other questions.

## 2021-03-18 NOTE — TELEPHONE ENCOUNTER
Patient would like to speak with someone regarding her recent procedure. Patient states that she was advised if there was a knot bigger than a marble to give the office a call and that she has a knot that is as big as a quarter to a half dollar.      Phone: [phone]

## 2021-03-22 DIAGNOSIS — R00.0 INAPPROPRIATE SINUS TACHYCARDIA: ICD-10-CM

## 2021-03-22 DIAGNOSIS — I47.1 SVT (SUPRAVENTRICULAR TACHYCARDIA) (HCC): Primary | ICD-10-CM

## 2021-03-22 DIAGNOSIS — I47.1 ATRIAL TACHYCARDIA (HCC): ICD-10-CM

## 2021-03-22 DIAGNOSIS — R00.2 PALPITATIONS: ICD-10-CM

## 2021-03-31 ENCOUNTER — OFFICE VISIT (OUTPATIENT)
Dept: CARDIOLOGY CLINIC | Age: 41
End: 2021-03-31

## 2021-03-31 VITALS
RESPIRATION RATE: 18 BRPM | BODY MASS INDEX: 34.75 KG/M2 | OXYGEN SATURATION: 98 % | HEIGHT: 65 IN | DIASTOLIC BLOOD PRESSURE: 90 MMHG | SYSTOLIC BLOOD PRESSURE: 170 MMHG | HEART RATE: 118 BPM | WEIGHT: 208.6 LBS

## 2021-03-31 DIAGNOSIS — R00.2 PALPITATIONS: Primary | ICD-10-CM

## 2021-03-31 NOTE — PROGRESS NOTES
Patient presented to discuss the findings of her recent procedure. She was tearful and upset at being referred to [facility]. I explained my concerns for the complexity of her case and the dynamics that occurred when communicating with her significant other. I am concerned that should she continue to experience further cardiac issues that require attention that her significant other will be unable to comprehend the nature of the complexity. I have referred her to a renowned electrophysiologist at [facility], Dr. Alyson Norwood and have offered to call him personally to explain the details of her case.

## 2021-03-31 NOTE — PROGRESS NOTES
Room #: 3    Has not heard from Dr. Moore Ny      Visit Vitals  BP (!) 170/90 (BP 1 Location: Left upper arm, BP Patient Position: Sitting, BP Cuff Size: Adult)   Pulse (!) 118   Resp 18   Ht 5' 5\" (1.651 m)   Wt 208 lb 9.6 oz (94.6 kg)   LMP 01/25/2012   SpO2 98%   BMI 34.71 kg/m²         Chest pain:  NO  Shortness of breath:  NO  Edema: NO  Palpitations, skipped beats, rapid heartbeat:  YES  Dizziness:  YES    1. Have you been to the ER, urgent care clinic since your last visit? Hospitalized since your last visit? No    2. Have you seen or consulted any other health care providers outside of the [address] [place] [address] since your last visit? Include any pap smears or colon screening.  No      Refills:  NO

## 2021-04-07 ENCOUNTER — DOCUMENTATION ONLY (OUTPATIENT)
Dept: FAMILY MEDICINE CLINIC | Age: 41
End: 2021-04-07

## 2021-04-12 ENCOUNTER — OFFICE VISIT (OUTPATIENT)
Dept: FAMILY MEDICINE CLINIC | Age: 41
End: 2021-04-12
Payer: MEDICAID

## 2021-04-12 VITALS
SYSTOLIC BLOOD PRESSURE: 124 MMHG | WEIGHT: 205 LBS | OXYGEN SATURATION: 97 % | BODY MASS INDEX: 34.16 KG/M2 | RESPIRATION RATE: 20 BRPM | DIASTOLIC BLOOD PRESSURE: 74 MMHG | HEIGHT: 65 IN | TEMPERATURE: 98.6 F | HEART RATE: 108 BPM

## 2021-04-12 DIAGNOSIS — R19.09 MASS OF RIGHT INGUINAL REGION: ICD-10-CM

## 2021-04-12 DIAGNOSIS — E53.8 VITAMIN B12 DEFICIENCY: Primary | ICD-10-CM

## 2021-04-12 PROCEDURE — 99213 OFFICE O/P EST LOW 20 MIN: CPT | Performed by: FAMILY MEDICINE

## 2021-04-12 NOTE — PATIENT INSTRUCTIONS
Neuropathic Pain: Care Instructions Your Care Instructions Neuropathic pain is caused by pressure on or damage to your nerves. It's often simply called nerve pain. Some people feel this type of pain all the time. For others, it comes and goes. Diabetes, shingles, or an injury can cause nerve pain. Many people say the pain feels sharp, burning, or stabbing. But some people feel it as a dull ache. In some cases, it makes your skin very sensitive. So touch, pressure, and other sensations that did not hurt before may now cause pain. It's important to know that this kind of pain is real and can affect your quality of life. It's also important to know that treatment can help. Treatment includes pain medicines, exercise, and physical therapy. Medicines can help reduce the number of pain signals that travel over the nerves. This can make the painful areas less sensitive. It can also help you sleep better and improve your mood. But medicines are only one part of successful treatment. Most people do best with more than one kind of treatment. Your doctor may recommend that you try cognitive-behavioral therapy and stress management. Or, if needed, you may decide to try to quit smoking, lower your blood pressure, or better control blood sugar. These kinds of healthy changes can also make a difference. If you feel that your treatment is not working, talk to your doctor. And be sure to tell your doctor if you think you might be depressed or anxious. These are common problems that can also be treated. Follow-up care is a key part of your treatment and safety. Be sure to make and go to all appointments, and call your doctor if you are having problems. It's also a good idea to know your test results and keep a list of the medicines you take. How can you care for yourself at home? · Be safe with medicines. Read and follow all instructions on the label.  
? If the doctor gave you a prescription medicine for pain, take it as prescribed. ? If you are not taking a prescription pain medicine, ask your doctor if you can take an over-the-counter medicine. · Save hard tasks for days when you have less pain. Follow a hard task with an easy task. And remember to take breaks. · Relax, and reduce stress. You may want to try deep breathing or meditation. These can help. · Keep moving. Gentle, daily exercise can help reduce pain. Your doctor or physical therapist can tell you what type of exercise is best for you. This may include walking, swimming, and stationary biking. It may also include stretches and range-of-motion exercises. · Try heat, cold packs, and massage. · Get enough sleep. Constant pain can make you more tired. If the pain makes it hard to sleep, talk with your doctor. · Think positively. Your thoughts can affect your pain. Do fun things to distract yourself from the pain. See a movie, read a book, listen to music, or spend time with a friend. · Keep a pain diary. Try to write down how strong your pain is and what it feels like. Also try to notice and write down how your moods, thoughts, sleep, activities, and medicine affect your pain. These notes can help you and your doctor find the best ways to treat your pain. Reducing constipation caused by pain medicine Pain medicines often cause constipation. To reduce constipation: 
· Include fruits, vegetables, beans, and whole grains in your diet each day. These foods are high in fiber. · Drink plenty of fluids, enough so that your urine is light yellow or clear like water. If you have kidney, heart, or liver disease and have to limit fluids, talk with your doctor before you increase the amount of fluids you drink. · Get some exercise every day. Build up slowly to 30 to 60 minutes a day on 5 or more days of the week. · Take a fiber supplement, such as Citrucel or Metamucil, every day if needed. Read and follow all instructions on the label.  
· Schedule time each day for a bowel movement. Having a daily routine may help. Take your time and do not strain when having a bowel movement. · Ask your doctor about a laxative. The goal is to have one easy bowel movement every 1 to 2 days. Do not let constipation go untreated for more than 3 days. When should you call for help? Call your doctor now or seek immediate medical care if: 
  · You feel sad, anxious, or hopeless for more than a few days. This could mean you are depressed. Depression is common in people who have a lot of pain. But it can be treated.  
  · You have trouble with bowel movements, such as: 
? No bowel movement in 3 days. ? Blood in the anal area, in your stool, or on the toilet paper. ? Diarrhea for more than 24 hours. Watch closely for changes in your health, and be sure to contact your doctor if: 
  · Your pain is getting worse.  
  · You can't sleep because of pain.  
  · You are very worried or anxious about your pain.  
  · You have trouble taking your pain medicine.  
  · You have any concerns about your pain medicine or its side effects.  
  · You have vomiting or cramps for more than 2 hours. Where can you learn more? Go to http://www.gray.com/ Enter L787 in the search box to learn more about \"Neuropathic Pain: Care Instructions. \" Current as of: August 4, 2020               Content Version: 12.8 © 7937-6439 [employer]. Care instructions adapted under license by [employer] (which disclaims liability or warranty for this information). If you have questions about a medical condition or this instruction, always ask your healthcare professional. [place] [address] any warranty or liability for your use of this information.

## 2021-04-12 NOTE — PROGRESS NOTES
Subjective:      Carlos Del Valle is a 41 y.o. female here with c/o worsening foot paresthesias over the last week or so. Now with burning in the feet which is new. Over the past few days, has been having daytime pain. Currently on vitamin B12 (oral) and gabapentin which she reports compliance with. Has not had B12 checked on daily supplement. Pain in the right groin with mass. In area of heart cath on 3/16/21. Now with pain radiating from this site into the anterior thigh. Pain has been intermittent with no identifiable triggers. Pain is sharp in quality. Current Outpatient Medications   Medication Sig Dispense Refill    Aspirin Low Dose 81 mg tablet Take 1 tablet by mouth once daily 30 Tab 5    ProAir HFA 90 mcg/actuation inhaler INHALE 2 PUFFS BY MOUTH EVERY 4 HOURS AS NEEDED FOR WHEEZING 9 g 5    metFORMIN (GLUCOPHAGE) 1,000 mg tablet Take 1 tablet by mouth twice daily 60 Tab 2    SITagliptin (Januvia) 100 mg tablet Take 1 tablet by mouth once daily 30 Tab 2    omeprazole (PRILOSEC) 20 mg capsule Take 1 capsule by mouth once daily 30 Cap 5    gabapentin (NEURONTIN) 400 mg capsule Take 1 Cap by mouth three (3) times daily. Max Daily Amount: 1,200 mg. TAKE 1 CAPSULE BY MOUTH THREE TIMES DAILY . DO NOT EXCEED 3 PER 24 HOURS 90 Cap 5    canagliflozin (Invokana) 300 mg tablet TAKE 1 TABLET BY MOUTH ONCE DAILY BEFORE BREAKFAST 30 Tab 5    simvastatin (ZOCOR) 20 mg tablet TAKE 1 TABLET BY MOUTH EVERY DAY 30 Tab 5    famotidine (PEPCID) 40 mg tablet TAKE 1 TABLET BY MOUTH EVERY DAY TAKES EVERY BEDTIME 30 Tab 5    cyanocobalamin 1,000 mcg tablet Take 1 Tab by mouth daily. 30 Tab 5    glipiZIDE SR (GLUCOTROL XL) 5 mg CR tablet Take 1 Tab by mouth daily. 30 Tab 5    ergocalciferol (ERGOCALCIFEROL) 1,250 mcg (50,000 unit) capsule TAKE 1 CAPSULE BY MOUTH TWICE A WEEK 24 Cap 1    acebutoloL (SECTRAL) 400 mg capsule Take 1 Cap by mouth two (2) times a day.  60 Cap 0    EPINEPHrine (EPIPEN) 0.3 mg/0.3 mL injection INJECT CONTENTS OF 1 PEN INTRAMUSCULARLY ONCE AS NEEDED FOR ALLERGIC REACTION FOR UP TO 1 DOSE      loratadine (CLARITIN) 10 mg tablet Take 1 Tab by mouth daily. To replace xyzal. 30 Tab 5    fluticasone propionate (FLONASE) 50 mcg/actuation nasal spray 2 Sprays by Both Nostrils route daily. 1 Bottle 0    glucose blood VI test strips (True Metrix Glucose Test Strip) strip To use to check blood sugar daily,  Dx: E11.9 100 Strip 2    fenofibrate (LOFIBRA) 160 mg tablet TAKE 1 TABLET BY MOUTH EVERY DAY 30 Tab 1    butalbital-acetaminophen-caffeine (FIORICET, ESGIC) -40 mg per tablet Take 1 Tab by mouth every six (6) hours as needed for Headache. 20 Tab 1    SUMAtriptan (IMITREX) 50 mg tablet TAKE 1 TABLET BY MOUTH AT THE ONSET OF A MIGRAINE HEADACHE. REPEAT IN 1 HOUR IF NEEDED. TAKE NO MORE THAN 2 TABLETS PER 24 HOURS 9 Tab 5    topiramate (TOPAMAX) 50 mg tablet Take 1 tab twice a day for migraine prevention 60 Tab 5    cyclobenzaprine (FLEXERIL) 10 mg tablet TAKE 1 TABLET BY MOUTH THREE TIMES DAILY AS NEEDED FOR MUSCLE SPASM DO  NOT  DRIVE  WHILE  TAKING  THIS  MEDICATION 30 Tab 0    Lancets (MICROLET LANCET) Misc USE TO CHECK BLOOD SUGAR ONE TO TWO TIMES DAILY (Patient taking differently: as needed. USE TO CHECK BLOOD SUGAR ONE TO TWO TIMES DAILY) 100 Each 0       Allergies   Allergen Reactions    Beef Containing Products Rash    Green Pepper Hives     Red, Yellow and Orange Peppers also. OK with Black Pepper    Other Plant, Animal, Environmental Swelling     Red meat    Pork Derived (Porcine) Rash       Past Medical History:   Diagnosis Date    Anxiety     Arrhythmia     HX TACHYCARDIA - NEG.  STRESS TEST 2013 PER PATIENT    Asthma     LAST EPISODE 2016    Diabetes ([facility] [address] 2008    NIDDM    Ear infection 11/30/2018    BILATERAL    GERD (gastroesophageal reflux disease)     Headache     Headache(784.0)     Hypertension     HX HTN - NO MEDS CURRENTLY(11-30-18)    Inappropriate sinus tachycardia 5/20/2019    Rash 7/14/2016    Recurrent umbilical hernia 83/14/1523    S/P dilatation and curettage     Tachycardia        Social History     Tobacco Use    Smoking status: Never Smoker    Smokeless tobacco: Never Used   Substance Use Topics    Alcohol use: No        Review of Systems  Pertinent items are noted in HPI. Objective:     Visit Vitals  /74 (BP 1 Location: Right arm, BP Patient Position: Sitting, BP Cuff Size: Adult)   Pulse (!) 108   Temp 98.6 °F (37 °C) (Temporal)   Resp 20   Ht 5' 5\" (1.651 m)   Wt 205 lb (93 kg)   LMP 01/25/2012   SpO2 97%   BMI 34.11 kg/m²      General appearance - alert, well appearing, and in no distress  Chest - clear to auscultation, no wheezes, rales or rhonchi, symmetric air entry, no tachypnea, retractions or cyanosis  Heart - tachycardic, regular rhythm, normal S1, S2, no murmurs, rubs, clicks or gallops  Abdomen - tender palpable mass in the right inguinal canal without overlying skin changes, no appreciable fluctuance or induration    Assessment/Plan:   Lane Delgado is a 41 y.o. female seen for:     1. Vitamin B12 deficiency: with paresthesias in the feet on daily vitamin B12 supplementation. Check B12 level. Discussed B12 injection if there has been no considerable improvement in levels. - VITAMIN B12; Future    2. Mass of right inguinal region: in reported site of recent cardiac cath. Check US for further evaluation.   - US EXT NONVAS RT LTD; Future    I have discussed the diagnosis with the patient and the intended plan as seen in the above orders. The patient has received an after-visit summary and questions were answered concerning future plans. I have discussed medication side effects and warnings with the patient as well. Patient verbalizes understanding of plan of care and denies further questions or concerns at this time. Informed patient to return to the office if symptoms worsen or if new symptoms arise.

## 2021-04-12 NOTE — PROGRESS NOTES
Identified pt with two pt identifiers(name and ). Chief Complaint   Patient presents with    Foot Pain     neuropathy worse in both feet x 2wks    Mass     marble sized knot and pain in theigh- heart cath 3/16/21        Health Maintenance Due   Topic    Hepatitis C Screening     COVID-19 Vaccine (1)    Eye Exam Retinal or Dilated     DTaP/Tdap/Td series (2 - Td)    PAP AKA CERVICAL CYTOLOGY        Wt Readings from Last 3 Encounters:   21 205 lb (93 kg)   21 208 lb 9.6 oz (94.6 kg)   21 205 lb (93 kg)     Temp Readings from Last 3 Encounters:   21 98.6 °F (37 °C) (Temporal)   21 98.3 °F (36.8 °C)   21 97.3 °F (36.3 °C)     BP Readings from Last 3 Encounters:   21 124/74   21 (!) 170/90   21 111/70     Pulse Readings from Last 3 Encounters:   21 (!) 108   21 (!) 118   21 (!) 104         Learning Assessment:  :     Learning Assessment 2017   PRIMARY LEARNER Patient Patient   HIGHEST LEVEL OF EDUCATION - PRIMARY LEARNER  GRADUATED HIGH SCHOOL OR GED GRADUATED HIGH SCHOOL OR GED   BARRIERS PRIMARY LEARNER NONE NONE   PRIMARY LANGUAGE ENGLISH ENGLISH   LEARNER PREFERENCE PRIMARY READING READING   ANSWERED BY patient patient   RELATIONSHIP SELF SELF       Depression Screening:  :     3 most recent PHQ Screens 3/31/2021   Little interest or pleasure in doing things Not at all   Feeling down, depressed, irritable, or hopeless Not at all   Total Score PHQ 2 0       Fall Risk Assessment:  :     Fall Risk Assessment, last 12 mths 2017   Able to walk? Yes   Fall in past 12 months? No       Abuse Screening:  :     Abuse Screening Questionnaire 2021   Do you ever feel afraid of your partner? N N N N N N   Are you in a relationship with someone who physically or mentally threatens you? N N N N N N   Is it safe for you to go home?  Drea Tobar       Coordination of Care Questionnaire:  :     1) Have you been to an emergency room, urgent care clinic since your last visit? no   Hospitalized since your last visit? no             2) Have you seen or consulted any other health care providers outside of [address] [place] [address] since your last visit? no  (Include any pap smears or colon screenings in this section.)    3) Do you have an Advance Directive on file? yes  Are you interested in receiving information about Advance Directives? no    Reviewed record in preparation for visit and have obtained necessary documentation.

## 2021-04-13 LAB — VIT B12 SERPL-MCNC: 824 PG/ML (ref 232–1245)

## 2021-04-16 ENCOUNTER — HOSPITAL ENCOUNTER (OUTPATIENT)
Dept: ULTRASOUND IMAGING | Age: 41
Discharge: HOME | End: 2021-04-16
Attending: FAMILY MEDICINE
Payer: MEDICAID

## 2021-04-16 DIAGNOSIS — R19.09 MASS OF RIGHT INGUINAL REGION: ICD-10-CM

## 2021-04-16 PROCEDURE — 76882 US LMTD JT/FCL EVL NVASC XTR: CPT

## 2021-04-28 ENCOUNTER — OFFICE VISIT (OUTPATIENT)
Dept: FAMILY MEDICINE CLINIC | Age: 41
End: 2021-04-28
Payer: MEDICAID

## 2021-04-28 VITALS
SYSTOLIC BLOOD PRESSURE: 120 MMHG | HEIGHT: 65 IN | RESPIRATION RATE: 18 BRPM | OXYGEN SATURATION: 97 % | DIASTOLIC BLOOD PRESSURE: 79 MMHG | WEIGHT: 205 LBS | HEART RATE: 108 BPM | BODY MASS INDEX: 34.16 KG/M2 | TEMPERATURE: 97 F

## 2021-04-28 DIAGNOSIS — L30.4 INTERTRIGO: Primary | ICD-10-CM

## 2021-04-28 PROCEDURE — 99213 OFFICE O/P EST LOW 20 MIN: CPT | Performed by: FAMILY MEDICINE

## 2021-04-28 RX ORDER — FLUCONAZOLE 150 MG/1
150 TABLET ORAL
Qty: 4 TAB | Refills: 0 | Status: SHIPPED | OUTPATIENT
Start: 2021-04-28 | End: 2021-05-19 | Stop reason: ALTCHOICE

## 2021-04-28 RX ORDER — LOSARTAN POTASSIUM 25 MG/1
1 TABLET ORAL
COMMUNITY
Start: 2021-04-20

## 2021-04-28 NOTE — PROGRESS NOTES
Subjective:      Giovanna Serrato is a 41 y.o. female here with c/o rash between the breast. Onset was about a month ago. Associated with itching which worsens when she is hot or in the shower. Has had virtual visit for this complaint and treating with nystatin ointment BID for the past few weeks. States that area will dry up, but will then flare again and increase in size. No other skin rash reported. No change in soaps, lotions, detergents used. Current Outpatient Medications   Medication Sig Dispense Refill    losartan (COZAAR) 25 mg tablet Take 1 Tab by mouth once over twenty-four (24) hours.  Aspirin Low Dose 81 mg tablet Take 1 tablet by mouth once daily 30 Tab 5    ProAir HFA 90 mcg/actuation inhaler INHALE 2 PUFFS BY MOUTH EVERY 4 HOURS AS NEEDED FOR WHEEZING 9 g 5    metFORMIN (GLUCOPHAGE) 1,000 mg tablet Take 1 tablet by mouth twice daily 60 Tab 2    SITagliptin (Januvia) 100 mg tablet Take 1 tablet by mouth once daily 30 Tab 2    omeprazole (PRILOSEC) 20 mg capsule Take 1 capsule by mouth once daily 30 Cap 5    gabapentin (NEURONTIN) 400 mg capsule Take 1 Cap by mouth three (3) times daily. Max Daily Amount: 1,200 mg. TAKE 1 CAPSULE BY MOUTH THREE TIMES DAILY . DO NOT EXCEED 3 PER 24 HOURS 90 Cap 5    canagliflozin (Invokana) 300 mg tablet TAKE 1 TABLET BY MOUTH ONCE DAILY BEFORE BREAKFAST 30 Tab 5    simvastatin (ZOCOR) 20 mg tablet TAKE 1 TABLET BY MOUTH EVERY DAY 30 Tab 5    famotidine (PEPCID) 40 mg tablet TAKE 1 TABLET BY MOUTH EVERY DAY TAKES EVERY BEDTIME 30 Tab 5    cyanocobalamin 1,000 mcg tablet Take 1 Tab by mouth daily. 30 Tab 5    glipiZIDE SR (GLUCOTROL XL) 5 mg CR tablet Take 1 Tab by mouth daily. 30 Tab 5    ergocalciferol (ERGOCALCIFEROL) 1,250 mcg (50,000 unit) capsule TAKE 1 CAPSULE BY MOUTH TWICE A WEEK 24 Cap 1    acebutoloL (SECTRAL) 400 mg capsule Take 1 Cap by mouth two (2) times a day.  60 Cap 0    EPINEPHrine (EPIPEN) 0.3 mg/0.3 mL injection INJECT CONTENTS OF 1 PEN INTRAMUSCULARLY ONCE AS NEEDED FOR ALLERGIC REACTION FOR UP TO 1 DOSE      loratadine (CLARITIN) 10 mg tablet Take 1 Tab by mouth daily. To replace xyzal. 30 Tab 5    fluticasone propionate (FLONASE) 50 mcg/actuation nasal spray 2 Sprays by Both Nostrils route daily. 1 Bottle 0    glucose blood VI test strips (True Metrix Glucose Test Strip) strip To use to check blood sugar daily,  Dx: E11.9 100 Strip 2    fenofibrate (LOFIBRA) 160 mg tablet TAKE 1 TABLET BY MOUTH EVERY DAY 30 Tab 1    butalbital-acetaminophen-caffeine (FIORICET, ESGIC) -40 mg per tablet Take 1 Tab by mouth every six (6) hours as needed for Headache. 20 Tab 1    SUMAtriptan (IMITREX) 50 mg tablet TAKE 1 TABLET BY MOUTH AT THE ONSET OF A MIGRAINE HEADACHE. REPEAT IN 1 HOUR IF NEEDED. TAKE NO MORE THAN 2 TABLETS PER 24 HOURS 9 Tab 5    topiramate (TOPAMAX) 50 mg tablet Take 1 tab twice a day for migraine prevention 60 Tab 5    cyclobenzaprine (FLEXERIL) 10 mg tablet TAKE 1 TABLET BY MOUTH THREE TIMES DAILY AS NEEDED FOR MUSCLE SPASM DO  NOT  DRIVE  WHILE  TAKING  THIS  MEDICATION 30 Tab 0    Lancets (MICROLET LANCET) Misc USE TO CHECK BLOOD SUGAR ONE TO TWO TIMES DAILY (Patient taking differently: as needed. USE TO CHECK BLOOD SUGAR ONE TO TWO TIMES DAILY) 100 Each 0       Allergies   Allergen Reactions    Beef Containing Products Rash    Green Pepper Hives     Red, Yellow and Orange Peppers also. OK with Black Pepper    Other Plant, Animal, Environmental Swelling     Red meat    Pork Derived (Porcine) Rash       Past Medical History:   Diagnosis Date    Anxiety     Arrhythmia     HX TACHYCARDIA - NEG.  STRESS TEST 2013 PER PATIENT    Asthma     LAST EPISODE 2016    Diabetes ([facility] [address] 2008    NIDDM    Ear infection 11/30/2018    BILATERAL    GERD (gastroesophageal reflux disease)     Headache     Headache(784.0)     Hypertension     HX HTN - NO MEDS CURRENTLY(11-30-18)    Inappropriate sinus tachycardia 5/20/2019    Rash 7/14/2016    Recurrent umbilical hernia 44/73/7564    S/P dilatation and curettage     Tachycardia        Social History     Tobacco Use    Smoking status: Never Smoker    Smokeless tobacco: Never Used   Substance Use Topics    Alcohol use: No        Review of Systems  Pertinent items are noted in HPI. Objective:     Visit Vitals  /79 (BP 1 Location: Left upper arm, BP Patient Position: Sitting, BP Cuff Size: Large adult)   Pulse (!) 108   Temp 97 °F (36.1 °C) (Temporal)   Resp 18   Ht 5' 5\" (1.651 m)   Wt 205 lb (93 kg)   LMP 01/25/2012   SpO2 97%   BMI 34.11 kg/m²      General appearance - alert, well appearing, and in no distress  Chest - clear to auscultation, no wheezes, rales or rhonchi, symmetric air entry, no tachypnea, retractions or cyanosis  Heart - normal rate, regular rhythm, normal S1, S2, no murmurs, rubs, clicks or gallops  Skin - slightly raised erythematous patch between the breast with fine scale at borders, no underlying fluctuance or induration    Assessment/Plan:   Leroy Boogie is a 41 y.o. female seen for:     1. Intertrigo: between the breast not responsive to topical nystatin. Will treat with Diflucan weekly for 4 weeks. Keep area clean and dry. Return for reevaluation if no improvement noted. - fluconazole (DIFLUCAN) 150 mg tablet; Take 1 Tab by mouth every seven (7) days for 4 doses. FDA advises cautious prescribing of oral fluconazole in pregnancy. Dispense: 4 Tab; Refill: 0    I have discussed the diagnosis with the patient and the intended plan as seen in the above orders. The patient has received an after-visit summary and questions were answered concerning future plans. I have discussed medication side effects and warnings with the patient as well. Patient verbalizes understanding of plan of care and denies further questions or concerns at this time. Informed patient to return to the office if symptoms worsen or if new symptoms arise.

## 2021-04-28 NOTE — PROGRESS NOTES
Identified pt with two pt identifiers(name and ). Chief Complaint   Patient presents with    Rash     on chest        Health Maintenance Due   Topic    Hepatitis C Screening     COVID-19 Vaccine (1)    Eye Exam Retinal or Dilated     DTaP/Tdap/Td series (2 - Td)    PAP AKA CERVICAL CYTOLOGY        Wt Readings from Last 3 Encounters:   21 205 lb (93 kg)   21 205 lb (93 kg)   21 208 lb 9.6 oz (94.6 kg)     Temp Readings from Last 3 Encounters:   21 97 °F (36.1 °C) (Temporal)   21 98.6 °F (37 °C) (Temporal)   21 98.3 °F (36.8 °C)     BP Readings from Last 3 Encounters:   21 120/79   21 124/74   21 (!) 170/90     Pulse Readings from Last 3 Encounters:   21 (!) 108   21 (!) 108   21 (!) 118         Learning Assessment:  :     Learning Assessment 2017   PRIMARY LEARNER Patient Patient   HIGHEST LEVEL OF EDUCATION - PRIMARY LEARNER  GRADUATED HIGH SCHOOL OR GED GRADUATED HIGH SCHOOL OR GED   BARRIERS PRIMARY LEARNER NONE NONE   PRIMARY LANGUAGE ENGLISH ENGLISH   LEARNER PREFERENCE PRIMARY READING READING   ANSWERED BY patient patient   RELATIONSHIP SELF SELF       Depression Screening:  :     3 most recent PHQ Screens 3/31/2021   Little interest or pleasure in doing things Not at all   Feeling down, depressed, irritable, or hopeless Not at all   Total Score PHQ 2 0       Fall Risk Assessment:  :     Fall Risk Assessment, last 12 mths 2017   Able to walk? Yes   Fall in past 12 months? No       Abuse Screening:  :     Abuse Screening Questionnaire 2021   Do you ever feel afraid of your partner? N N N N N N   Are you in a relationship with someone who physically or mentally threatens you? N N N N N N   Is it safe for you to go home?  Y Y Y Y Y Y       Coordination of Care Questionnaire:  :     1) Have you been to an emergency room, urgent care clinic since your last visit? no Hospitalized since your last visit? no             2) Have you seen or consulted any other health care providers outside of [address] [place] [address] since your last visit? no  (Include any pap smears or colon screenings in this section.)    3) Do you have an Advance Directive on file? no  Are you interested in receiving information about Advance Directives? no    Patient is accompanied by self. Reviewed record in preparation for visit and have obtained necessary documentation. Medication reconciliation up to date and corrected with patient at this time.

## 2021-05-13 DIAGNOSIS — G89.29 CHRONIC MIDLINE LOW BACK PAIN WITH RIGHT-SIDED SCIATICA: ICD-10-CM

## 2021-05-13 DIAGNOSIS — M54.41 CHRONIC MIDLINE LOW BACK PAIN WITH RIGHT-SIDED SCIATICA: ICD-10-CM

## 2021-05-16 RX ORDER — CYCLOBENZAPRINE HCL 10 MG
TABLET ORAL
Qty: 30 TAB | Refills: 0 | Status: SHIPPED | OUTPATIENT
Start: 2021-05-16 | End: 2021-12-03 | Stop reason: SDUPTHER

## 2021-05-19 ENCOUNTER — TRANSCRIBE ORDER (OUTPATIENT)
Dept: SCHEDULING | Age: 41
End: 2021-05-19

## 2021-05-19 ENCOUNTER — OFFICE VISIT (OUTPATIENT)
Dept: FAMILY MEDICINE CLINIC | Age: 41
End: 2021-05-19
Payer: MEDICAID

## 2021-05-19 VITALS
BODY MASS INDEX: 34.16 KG/M2 | WEIGHT: 205 LBS | TEMPERATURE: 97.2 F | RESPIRATION RATE: 16 BRPM | HEIGHT: 65 IN | DIASTOLIC BLOOD PRESSURE: 72 MMHG | OXYGEN SATURATION: 98 % | SYSTOLIC BLOOD PRESSURE: 120 MMHG | HEART RATE: 110 BPM

## 2021-05-19 DIAGNOSIS — Z12.31 SCREENING MAMMOGRAM FOR HIGH-RISK PATIENT: Primary | ICD-10-CM

## 2021-05-19 DIAGNOSIS — E11.40 TYPE 2 DIABETES MELLITUS WITH DIABETIC NEUROPATHY, WITHOUT LONG-TERM CURRENT USE OF INSULIN (HCC): Primary | ICD-10-CM

## 2021-05-19 PROCEDURE — 99214 OFFICE O/P EST MOD 30 MIN: CPT | Performed by: FAMILY MEDICINE

## 2021-05-19 RX ORDER — METFORMIN HYDROCHLORIDE 1000 MG/1
TABLET ORAL
Qty: 60 TABLET | Refills: 5 | Status: SHIPPED | OUTPATIENT
Start: 2021-05-19 | End: 2021-12-14 | Stop reason: SDUPTHER

## 2021-05-19 RX ORDER — SIMVASTATIN 20 MG/1
TABLET, FILM COATED ORAL
Qty: 30 TABLET | Refills: 5 | Status: SHIPPED | OUTPATIENT
Start: 2021-05-19 | End: 2021-09-30 | Stop reason: SDUPTHER

## 2021-05-19 RX ORDER — GLIPIZIDE 5 MG/1
5 TABLET, FILM COATED, EXTENDED RELEASE ORAL DAILY
Qty: 30 TABLET | Refills: 5 | Status: SHIPPED | OUTPATIENT
Start: 2021-05-19 | End: 2022-05-10 | Stop reason: SDUPTHER

## 2021-05-19 NOTE — PROGRESS NOTES
Subjective:     Aneta Kang is a 41 y.o. female who presents for follow up of diabetes. Diabetes mellitus:   · Medication compliance: compliant all of the time,   · Diabetic diet compliance: compliant most of the time,   · Home glucose monitorin's-130's in the morning,   · Further diabetic ROS: no polyuria or polydipsia, no unusual visual symptoms, no hypoglycemia, no medication side effects noted. · Diabetic eye exam performed by [facility] in [facility]      Current Outpatient Medications   Medication Sig Dispense Refill    cyclobenzaprine (FLEXERIL) 10 mg tablet TAKE 1 TABLET BY MOUTH THREE TIMES DAILY AS NEEDED FOR MUSCLE SPASM . DO  NOT  DRIVE  WHILE  TAKING  THIS  MEDICATION 30 Tab 0    losartan (COZAAR) 25 mg tablet Take 1 Tab by mouth once over twenty-four (24) hours.  Aspirin Low Dose 81 mg tablet Take 1 tablet by mouth once daily 30 Tab 5    ProAir HFA 90 mcg/actuation inhaler INHALE 2 PUFFS BY MOUTH EVERY 4 HOURS AS NEEDED FOR WHEEZING 9 g 5    metFORMIN (GLUCOPHAGE) 1,000 mg tablet Take 1 tablet by mouth twice daily 60 Tab 2    SITagliptin (Januvia) 100 mg tablet Take 1 tablet by mouth once daily 30 Tab 2    omeprazole (PRILOSEC) 20 mg capsule Take 1 capsule by mouth once daily 30 Cap 5    gabapentin (NEURONTIN) 400 mg capsule Take 1 Cap by mouth three (3) times daily. Max Daily Amount: 1,200 mg. TAKE 1 CAPSULE BY MOUTH THREE TIMES DAILY . DO NOT EXCEED 3 PER 24 HOURS 90 Cap 5    canagliflozin (Invokana) 300 mg tablet TAKE 1 TABLET BY MOUTH ONCE DAILY BEFORE BREAKFAST 30 Tab 5    simvastatin (ZOCOR) 20 mg tablet TAKE 1 TABLET BY MOUTH EVERY DAY 30 Tab 5    famotidine (PEPCID) 40 mg tablet TAKE 1 TABLET BY MOUTH EVERY DAY TAKES EVERY BEDTIME 30 Tab 5    cyanocobalamin 1,000 mcg tablet Take 1 Tab by mouth daily. 30 Tab 5    glipiZIDE SR (GLUCOTROL XL) 5 mg CR tablet Take 1 Tab by mouth daily.  30 Tab 5    ergocalciferol (ERGOCALCIFEROL) 1,250 mcg (50,000 unit) capsule TAKE 1 CAPSULE BY MOUTH TWICE A WEEK 24 Cap 1    EPINEPHrine (EPIPEN) 0.3 mg/0.3 mL injection INJECT CONTENTS OF 1 PEN INTRAMUSCULARLY ONCE AS NEEDED FOR ALLERGIC REACTION FOR UP TO 1 DOSE      loratadine (CLARITIN) 10 mg tablet Take 1 Tab by mouth daily. To replace xyzal. 30 Tab 5    fluticasone propionate (FLONASE) 50 mcg/actuation nasal spray 2 Sprays by Both Nostrils route daily. 1 Bottle 0    glucose blood VI test strips (True Metrix Glucose Test Strip) strip To use to check blood sugar daily,  Dx: E11.9 100 Strip 2    fenofibrate (LOFIBRA) 160 mg tablet TAKE 1 TABLET BY MOUTH EVERY DAY 30 Tab 1    butalbital-acetaminophen-caffeine (FIORICET, ESGIC) -40 mg per tablet Take 1 Tab by mouth every six (6) hours as needed for Headache. 20 Tab 1    SUMAtriptan (IMITREX) 50 mg tablet TAKE 1 TABLET BY MOUTH AT THE ONSET OF A MIGRAINE HEADACHE. REPEAT IN 1 HOUR IF NEEDED. TAKE NO MORE THAN 2 TABLETS PER 24 HOURS 9 Tab 5    topiramate (TOPAMAX) 50 mg tablet Take 1 tab twice a day for migraine prevention 60 Tab 5    Lancets (MICROLET LANCET) Misc USE TO CHECK BLOOD SUGAR ONE TO TWO TIMES DAILY (Patient taking differently: as needed. USE TO CHECK BLOOD SUGAR ONE TO TWO TIMES DAILY) 100 Each 0       Allergies   Allergen Reactions    Beef Containing Products Rash    Green Pepper Hives     Red, Yellow and Orange Peppers also. OK with Black Pepper    Other Plant, Animal, Environmental Swelling     Red meat    Pork Derived (Porcine) Rash       Past Medical History:   Diagnosis Date    Anxiety     Arrhythmia     HX TACHYCARDIA - NEG.  STRESS TEST 2013 PER PATIENT    Asthma     LAST EPISODE 2016    Diabetes ([facility] [address] 2008    NIDDM    Ear infection 11/30/2018    BILATERAL    GERD (gastroesophageal reflux disease)     Headache     Headache(784.0)     Hypertension     HX HTN - NO MEDS CURRENTLY(11-30-18)    Inappropriate sinus tachycardia 5/20/2019    Rash 7/14/2016    Recurrent umbilical hernia 54/74/2792  S/P dilatation and curettage     Tachycardia        Social History     Tobacco Use    Smoking status: Never Smoker    Smokeless tobacco: Never Used   Substance Use Topics    Alcohol use: No        Lab Results   Component Value Date/Time    Hemoglobin A1c 7.3 (H) 12/09/2020 12:00 AM    Hemoglobin A1c 7.6 (H) 06/24/2020 08:50 AM    Hemoglobin A1c 7.1 (H) 01/29/2020 08:37 AM    Glucose 120 (H) 03/09/2021 08:12 AM    Glucose (POC) 122 (H) 03/16/2021 12:22 PM    Microalb/Creat ratio (ug/mg creat.) <5 12/09/2020 12:00 AM    LDL, calculated 64 06/24/2020 08:50 AM    Creatinine (POC) 0.7 06/04/2013 09:57 AM    Creatinine 0.76 03/09/2021 08:12 AM         Review of Systems, additional:  Pertinent items are noted in HPI. Objective:     Visit Vitals  /72 (BP 1 Location: Right arm, BP Patient Position: Sitting, BP Cuff Size: Large adult)   Pulse (!) 110   Temp 97.2 °F (36.2 °C) (Temporal)   Resp 16   Ht 5' 5\" (1.651 m)   Wt 205 lb (93 kg)   LMP 01/25/2012   SpO2 98%   BMI 34.11 kg/m²     General appearance - alert, well appearing, and in no distress  Mental status - alert, oriented to person, place, and time, normal mood, behavior, speech, dress, motor activity, and thought processes  Eyes - pupils equal and reactive, extraocular eye movements intact, sclera anicteric  Ears - bilateral TM's and external ear canals normal  Neck - supple, no significant adenopathy  Chest - clear to auscultation, no wheezes, rales or rhonchi, symmetric air entry, no tachypnea, retractions or cyanosis  Heart - tachycardic, regular rhythm, normal S1, S2, no murmurs, rubs, clicks or gallops    Lab review: orders written for new lab studies as appropriate; see orders. Assessment/Plan:   Leanna Ramirez is a 41 y.o. female seen today for:     1. Type 2 diabetes mellitus with diabetic neuropathy, without long-term current use of insulin ([address]): stable, continue with current therapy. Check fasting labs.    - HEMOGLOBIN A1C WITH EAG; Future  - METABOLIC PANEL, COMPREHENSIVE; Future  - LIPID PANEL; Future  - metFORMIN (GLUCOPHAGE) 1,000 mg tablet; Take 1 tablet by mouth twice daily  Dispense: 60 Tablet; Refill: 5  - SITagliptin (Januvia) 100 mg tablet; Take 1 tablet by mouth once daily  Dispense: 30 Tablet; Refill: 5  - canagliflozin (Invokana) 300 mg tablet; TAKE 1 TABLET BY MOUTH ONCE DAILY BEFORE BREAKFAST  Dispense: 30 Tablet; Refill: 5  - simvastatin (ZOCOR) 20 mg tablet; TAKE 1 TABLET BY MOUTH EVERY DAY  Dispense: 30 Tablet; Refill: 5  - glipiZIDE SR (GLUCOTROL XL) 5 mg CR tablet; Take 1 Tablet by mouth daily. Dispense: 30 Tablet; Refill: 5    I have discussed the diagnosis with the patient and the intended plan as seen in the above orders. The patient has received an after-visit summary and questions were answered concerning future plans. I have discussed medication side effects and warnings with the patient as well. Patient verbalizes understanding of plan of care and denies further questions or concerns at this time. Informed patient to return to the office if symptoms worsen or if new symptoms arise. Follow-up and Dispositions    · Return in about 6 months (around 11/19/2021) for follow up or sooner as needed.

## 2021-05-19 NOTE — PROGRESS NOTES
Paul Louise is a 41 y.o. female    Chief Complaint   Patient presents with    Blood sugar problem     f/u       Health Maintenance Due   Topic Date Due    Hepatitis C Screening  Never done    COVID-19 Vaccine (1) Never done    Eye Exam Retinal or Dilated  09/01/2015    DTaP/Tdap/Td series (2 - Td) 01/01/2018    PAP AKA CERVICAL CYTOLOGY  06/15/2019       Visit Vitals  /72 (BP 1 Location: Right arm, BP Patient Position: Sitting, BP Cuff Size: Large adult)   Pulse (!) 110   Temp 97.2 °F (36.2 °C) (Temporal)   Resp 16   Ht 5' 5\" (1.651 m)   Wt 205 lb (93 kg)   LMP 01/25/2012   SpO2 98%   BMI 34.11 kg/m²       3 most recent PHQ Screens 5/19/2021   Little interest or pleasure in doing things Not at all   Feeling down, depressed, irritable, or hopeless Not at all   Total Score PHQ 2 0       Fall Risk Assessment, last 12 mths 8/1/2017   Able to walk? Yes   Fall in past 12 months? No       Abuse Screening Questionnaire 4/12/2021   Do you ever feel afraid of your partner? N   Are you in a relationship with someone who physically or mentally threatens you? N   Is it safe for you to go home? Y         1. Have you been to the ER, urgent care clinic since your last visit? Hospitalized since your last visit? No    2. Have you seen or consulted any other health care providers outside of the [address] [place] [address] since your last visit? Include any pap smears or colon screening.  No

## 2021-05-19 NOTE — PATIENT INSTRUCTIONS
A Healthy Lifestyle: Care Instructions Your Care Instructions A healthy lifestyle can help you feel good, stay at a healthy weight, and have plenty of energy for both work and play. A healthy lifestyle is something you can share with your whole family. A healthy lifestyle also can lower your risk for serious health problems, such as high blood pressure, heart disease, and diabetes. You can follow a few steps listed below to improve your health and the health of your family. Follow-up care is a key part of your treatment and safety. Be sure to make and go to all appointments, and call your doctor if you are having problems. It's also a good idea to know your test results and keep a list of the medicines you take. How can you care for yourself at home? · Do not eat too much sugar, fat, or fast foods. You can still have dessert and treats now and then. The goal is moderation. · Start small to improve your eating habits. Pay attention to portion sizes, drink less juice and soda pop, and eat more fruits and vegetables. ? Eat a healthy amount of food. A 3-ounce serving of meat, for example, is about the size of a deck of cards. Fill the rest of your plate with vegetables and whole grains. ? Limit the amount of soda and sports drinks you have every day. Drink more water when you are thirsty. ? Eat plenty of fruits and vegetables every day. Have an apple or some carrot sticks as an afternoon snack instead of a candy bar. Try to have fruits and/or vegetables at every meal. 
· Make exercise part of your daily routine. You may want to start with simple activities, such as walking, bicycling, or slow swimming. Try to be active 30 to 60 minutes every day. You do not need to do all 30 to 60 minutes all at once. For example, you can exercise 3 times a day for 10 or 20 minutes.  Moderate exercise is safe for most people, but it is always a good idea to talk to your doctor before starting an exercise program. 
· Keep moving. [place] the lawn, work in the garden, or [employer]. Take the stairs instead of the elevator at work. · If you smoke, quit. People who smoke have an increased risk for heart attack, stroke, cancer, and other lung illnesses. Quitting is hard, but there are ways to boost your chance of quitting tobacco for good. ? Use nicotine gum, patches, or lozenges. ? Ask your doctor about stop-smoking programs and medicines. ? Keep trying. In addition to reducing your risk of diseases in the future, you will notice some benefits soon after you stop using tobacco. If you have shortness of breath or asthma symptoms, they will likely get better within a few weeks after you quit. · Limit how much alcohol you drink. Moderate amounts of alcohol (up to 2 drinks a day for men, 1 drink a day for women) are okay. But drinking too much can lead to liver problems, high blood pressure, and other health problems. Family health If you have a family, there are many things you can do together to improve your health. · Eat meals together as a family as often as possible. · Eat healthy foods. This includes fruits, vegetables, lean meats and dairy, and whole grains. · Include your family in your fitness plan. Most people think of activities such as jogging or tennis as the way to fitness, but there are many ways you and your family can be more active. Anything that makes you breathe hard and gets your heart pumping is exercise. Here are some tips: 
? Walk to do errands or to take your child to school or the bus. 
? Go for a family bike ride after dinner instead of watching TV. Where can you learn more? Go to http://www.gray.com/ Enter C283 in the search box to learn more about \"A Healthy Lifestyle: Care Instructions. \" Current as of: September 23, 2020               Content Version: 12.8 © 1079-4129 [employer].   
Care instructions adapted under license by [employer] (which disclaims liability or warranty for this information). If you have questions about a medical condition or this instruction, always ask your healthcare professional. [address] any warranty or liability for your use of this information.

## 2021-05-20 LAB
ALBUMIN SERPL-MCNC: 4.4 G/DL (ref 3.8–4.8)
ALBUMIN/GLOB SERPL: 1.8 {RATIO} (ref 1.2–2.2)
ALP SERPL-CCNC: 50 IU/L (ref 48–121)
ALT SERPL-CCNC: 15 IU/L (ref 0–32)
AST SERPL-CCNC: 14 IU/L (ref 0–40)
BILIRUB SERPL-MCNC: 0.2 MG/DL (ref 0–1.2)
BUN SERPL-MCNC: 9 MG/DL (ref 6–24)
BUN/CREAT SERPL: 11 (ref 9–23)
CALCIUM SERPL-MCNC: 9.4 MG/DL (ref 8.7–10.2)
CHLORIDE SERPL-SCNC: 104 MMOL/L (ref 96–106)
CHOLEST SERPL-MCNC: 165 MG/DL (ref 100–199)
CO2 SERPL-SCNC: 22 MMOL/L (ref 20–29)
CREAT SERPL-MCNC: 0.8 MG/DL (ref 0.57–1)
EST. AVERAGE GLUCOSE BLD GHB EST-MCNC: 131 MG/DL
GLOBULIN SER CALC-MCNC: 2.4 G/DL (ref 1.5–4.5)
GLUCOSE SERPL-MCNC: 72 MG/DL (ref 65–99)
HBA1C MFR BLD: 6.2 % (ref 4.8–5.6)
HDLC SERPL-MCNC: 35 MG/DL
IMP & REVIEW OF LAB RESULTS: NORMAL
LDLC SERPL CALC-MCNC: 92 MG/DL (ref 0–99)
POTASSIUM SERPL-SCNC: 4.3 MMOL/L (ref 3.5–5.2)
PROT SERPL-MCNC: 6.8 G/DL (ref 6–8.5)
SODIUM SERPL-SCNC: 141 MMOL/L (ref 134–144)
TRIGL SERPL-MCNC: 222 MG/DL (ref 0–149)
VLDLC SERPL CALC-MCNC: 38 MG/DL (ref 5–40)

## 2021-05-26 RX ORDER — OMEPRAZOLE 20 MG/1
CAPSULE, DELAYED RELEASE ORAL
Qty: 30 CAPSULE | Refills: 0 | OUTPATIENT
Start: 2021-05-26

## 2021-05-26 RX ORDER — SIMVASTATIN 20 MG/1
TABLET, FILM COATED ORAL
Qty: 30 TABLET | Refills: 0 | OUTPATIENT
Start: 2021-05-26

## 2021-05-26 RX ORDER — FAMOTIDINE 40 MG/1
TABLET, FILM COATED ORAL
Qty: 30 TABLET | Refills: 0 | OUTPATIENT
Start: 2021-05-26

## 2021-05-26 NOTE — PROGRESS NOTES
Diabetes is well controlled. Electrolytes, kidney and liver function normal. Cholesterol with improvement in triglycerides and HDL levels. Continue with current medications.

## 2021-06-04 DIAGNOSIS — L30.4 INTERTRIGO: ICD-10-CM

## 2021-06-11 RX ORDER — FLUCONAZOLE 150 MG/1
150 TABLET ORAL ONCE
Qty: 1 TABLET | Refills: 0 | Status: SHIPPED | OUTPATIENT
Start: 2021-06-11 | End: 2021-06-11

## 2021-06-13 RX ORDER — FENOFIBRATE 160 MG/1
TABLET ORAL
Qty: 30 TABLET | Refills: 5 | Status: SHIPPED | OUTPATIENT
Start: 2021-06-13 | End: 2021-12-14

## 2021-06-18 ENCOUNTER — HOSPITAL ENCOUNTER (OUTPATIENT)
Dept: MAMMOGRAPHY | Age: 41
Discharge: HOME | End: 2021-06-18
Attending: OBSTETRICS & GYNECOLOGY
Payer: MEDICAID

## 2021-06-18 DIAGNOSIS — Z12.31 SCREENING MAMMOGRAM FOR HIGH-RISK PATIENT: ICD-10-CM

## 2021-06-18 PROCEDURE — 77067 SCR MAMMO BI INCL CAD: CPT

## 2021-07-06 ENCOUNTER — APPOINTMENT (OUTPATIENT)
Dept: GENERAL RADIOLOGY | Age: 41
End: 2021-07-06
Attending: EMERGENCY MEDICINE
Payer: MEDICAID

## 2021-07-06 ENCOUNTER — HOSPITAL ENCOUNTER (EMERGENCY)
Age: 41
Discharge: HOME | End: 2021-07-07
Attending: EMERGENCY MEDICINE
Payer: MEDICAID

## 2021-07-06 DIAGNOSIS — R55 VASOVAGAL SYNCOPE: Primary | ICD-10-CM

## 2021-07-06 DIAGNOSIS — S60.222A CONTUSION OF LEFT HAND, INITIAL ENCOUNTER: ICD-10-CM

## 2021-07-06 LAB
ALBUMIN SERPL-MCNC: 3.8 G/DL (ref 3.5–5)
ALBUMIN/GLOB SERPL: 1.1 {RATIO} (ref 1.1–2.2)
ALP SERPL-CCNC: 58 U/L (ref 45–117)
ALT SERPL-CCNC: 40 U/L (ref 12–78)
ANION GAP SERPL CALC-SCNC: 12 MMOL/L (ref 5–15)
AST SERPL-CCNC: 14 U/L (ref 15–37)
BASOPHILS # BLD: 0.1 K/UL (ref 0–0.1)
BASOPHILS NFR BLD: 1 % (ref 0–1)
BILIRUB SERPL-MCNC: 0.3 MG/DL (ref 0.2–1)
BNP SERPL-MCNC: 17 PG/ML (ref 0–125)
BUN SERPL-MCNC: 7 MG/DL (ref 6–20)
BUN/CREAT SERPL: 8 (ref 12–20)
CALCIUM SERPL-MCNC: 8.6 MG/DL (ref 8.5–10.1)
CHLORIDE SERPL-SCNC: 104 MMOL/L (ref 97–108)
CK SERPL-CCNC: 71 U/L (ref 26–192)
CO2 SERPL-SCNC: 26 MMOL/L (ref 21–32)
CREAT SERPL-MCNC: 0.92 MG/DL (ref 0.55–1.02)
D DIMER PPP FEU-MCNC: 0.24 MG/L FEU (ref 0–0.65)
DIFFERENTIAL METHOD BLD: ABNORMAL
EOSINOPHIL # BLD: 0.1 K/UL (ref 0–0.4)
EOSINOPHIL NFR BLD: 1 % (ref 0–7)
ERYTHROCYTE [DISTWIDTH] IN BLOOD BY AUTOMATED COUNT: 13.2 % (ref 11.5–14.5)
GLOBULIN SER CALC-MCNC: 3.4 G/DL (ref 2–4)
GLUCOSE SERPL-MCNC: 150 MG/DL (ref 65–100)
HCT VFR BLD AUTO: 39.6 % (ref 35–47)
HGB BLD-MCNC: 13.5 G/DL (ref 11.5–16)
IMM GRANULOCYTES # BLD AUTO: 0.1 K/UL (ref 0–0.04)
IMM GRANULOCYTES NFR BLD AUTO: 1 % (ref 0–0.5)
LYMPHOCYTES # BLD: 4.1 K/UL (ref 0.8–3.5)
LYMPHOCYTES NFR BLD: 38 % (ref 12–49)
MAGNESIUM SERPL-MCNC: 1.8 MG/DL (ref 1.6–2.4)
MCH RBC QN AUTO: 28.7 PG (ref 26–34)
MCHC RBC AUTO-ENTMCNC: 34.1 G/DL (ref 30–36.5)
MCV RBC AUTO: 84.1 FL (ref 80–99)
MONOCYTES # BLD: 0.9 K/UL (ref 0–1)
MONOCYTES NFR BLD: 8 % (ref 5–13)
NEUTS SEG # BLD: 5.3 K/UL (ref 1.8–8)
NEUTS SEG NFR BLD: 50 % (ref 32–75)
NRBC # BLD: 0 K/UL (ref 0–0.01)
NRBC BLD-RTO: 0 PER 100 WBC
PHOSPHATE SERPL-MCNC: 3.4 MG/DL (ref 2.6–4.7)
PLATELET # BLD AUTO: 259 K/UL (ref 150–400)
PMV BLD AUTO: 11.6 FL (ref 8.9–12.9)
POTASSIUM SERPL-SCNC: 3.7 MMOL/L (ref 3.5–5.1)
PROT SERPL-MCNC: 7.2 G/DL (ref 6.4–8.2)
RBC # BLD AUTO: 4.71 M/UL (ref 3.8–5.2)
SODIUM SERPL-SCNC: 142 MMOL/L (ref 136–145)
TROPONIN I SERPL-MCNC: <0.05 NG/ML
TROPONIN I SERPL-MCNC: <0.05 NG/ML
WBC # BLD AUTO: 10.7 K/UL (ref 3.6–11)

## 2021-07-06 PROCEDURE — 80053 COMPREHEN METABOLIC PANEL: CPT

## 2021-07-06 PROCEDURE — 85379 FIBRIN DEGRADATION QUANT: CPT

## 2021-07-06 PROCEDURE — 73130 X-RAY EXAM OF HAND: CPT

## 2021-07-06 PROCEDURE — 84100 ASSAY OF PHOSPHORUS: CPT

## 2021-07-06 PROCEDURE — 74011250636 HC RX REV CODE- 250/636: Performed by: EMERGENCY MEDICINE

## 2021-07-06 PROCEDURE — 85025 COMPLETE CBC W/AUTO DIFF WBC: CPT

## 2021-07-06 PROCEDURE — 36415 COLL VENOUS BLD VENIPUNCTURE: CPT

## 2021-07-06 PROCEDURE — 96360 HYDRATION IV INFUSION INIT: CPT

## 2021-07-06 PROCEDURE — 93005 ELECTROCARDIOGRAM TRACING: CPT

## 2021-07-06 PROCEDURE — 83880 ASSAY OF NATRIURETIC PEPTIDE: CPT

## 2021-07-06 PROCEDURE — 84484 ASSAY OF TROPONIN QUANT: CPT

## 2021-07-06 PROCEDURE — 83735 ASSAY OF MAGNESIUM: CPT

## 2021-07-06 PROCEDURE — 99285 EMERGENCY DEPT VISIT HI MDM: CPT

## 2021-07-06 PROCEDURE — 82550 ASSAY OF CK (CPK): CPT

## 2021-07-06 RX ADMIN — SODIUM CHLORIDE 1000 ML: 9 INJECTION, SOLUTION INTRAVENOUS at 21:42

## 2021-07-06 NOTE — Clinical Note
[address] EMERGENCY DEPT  [address] [id number]  [phone]    Work/School Note    Date: 7/6/2021    To Whom It May concern:    Carmela Rucker was seen and treated today in the emergency room by the following provider(s):  Attending Provider: Good Roman MD.      Carmela Rucker is excused from work/school on 07/06/21 and 07/07/21. She is medically clear to return to work/school on 7/8/2021.        Sincerely,          Zena Lindsey MD

## 2021-07-07 VITALS
HEART RATE: 98 BPM | TEMPERATURE: 97.4 F | OXYGEN SATURATION: 98 % | RESPIRATION RATE: 22 BRPM | SYSTOLIC BLOOD PRESSURE: 117 MMHG | DIASTOLIC BLOOD PRESSURE: 66 MMHG

## 2021-07-07 NOTE — ED PROVIDER NOTES
38-year-old female with multiple chronic medical problems diagnosed anxiety, arrhythmia, asthma, diabetes, GERD, headaches, hypertension status post appendectomy, cholecystectomy, D&C who presents to the ED with  at bedside who state that the patient had a syncopal episode while at home this evening in the kitchen and letting her dogs out. The patient states that the environment was very hot. She landed on the left side complaining of left hand pain. She denies headache, neck pain, back pain, chest pain, shortness of breath, nausea, vomiting, abdominal pain, diarrhea, constipation, dysuria, hematuria, dizziness, extremity weakness or numbness, sick contact, skin rash, recent travel, prior history of same. Past Medical History:   Diagnosis Date    Anxiety     Arrhythmia     HX TACHYCARDIA - NEG.  STRESS TEST 2013 PER PATIENT    Asthma     LAST EPISODE 2016    Diabetes ([address] 2008    NIDDM    Ear infection 11/30/2018    BILATERAL    GERD (gastroesophageal reflux disease)     Headache     Headache(784.0)     Hypertension     HX HTN - NO MEDS CURRENTLY(11-30-18)    Inappropriate sinus tachycardia 5/20/2019    Rash 7/14/2016    Recurrent umbilical hernia 39/98/7900    S/P dilatation and curettage     Tachycardia        Past Surgical History:   Procedure Laterality Date    HX APPENDECTOMY  2/2008    HX CHOLECYSTECTOMY  2001    HX DILATION AND CURETTAGE      HX GYN  3/2008    tubal ligation    HX HERNIA REPAIR  2/2009    HX HERNIA REPAIR  12/06/2018    open recurrent umbilical hernia repair    HX HYSTERECTOMY  03/2012    HX LEFT SALPINGO-OOPHORECTOMY Left     HX LUMBAR DISKECTOMY      2006    HX ORTHOPAEDIC  9/2006    ruptured discs    NH COMPRE ELECTROPHYSIOL XM W/LEFT ATRIAL PACNG/REC N/A 11/20/2020    Lt Atrial Pace & Record During Ep Study performed by Nesha Causey MD at [facility] CATH LAB    NH COMPRE ELECTROPHYSIOL XM W/LEFT ATRIAL PACNG/REC N/A 3/16/2021    Lt Atrial Pace & Record During Ep Study performed by José Luis Pickard MD at [facility] [address] CATH LAB   [address] INDUCTION N/A 11/20/2020    EP STUDY COMPLETE performed by José Luis Pickard MD at [address] CATH LAB    RI COMPRE ELECTROPHYSIOLOGIC ARRHYTHMIA INDUCTION N/A 3/16/2021    Ep Study Complete performed by José Luis Pickard MD at [facility] [address] CATH LAB    RI EPHYS EVAL W/ABLATION [facility] ARRHYTHMIA N/A 11/20/2020    Ablation Svt performed by José Luis Pickard MD at [address] CATH LAB    RI INTRACARDIAC ELECTROPHYSIOLOGIC 3D MAPPING N/A 11/20/2020    Ep 3d Mapping performed by José Luis Pickard MD at [facility] CATH LAB    RI INTRACARDIAC ELECTROPHYSIOLOGIC 3D MAPPING N/A 3/16/2021    Ep 3d Mapping performed by José Luis Pickard MD at [address] [place] [address] CATH LAB    STIM/PACING HEART POST IV DRUG INFU N/A 3/16/2021    Drug Stimulation performed by José Luis Pickard MD at [address] [place] [address] CATH LAB    UPPER GI ENDOSCOPY,BIOPSY  5/11/2017              Family History:   Problem Relation Age of Onset    Heart Disease Mother     Hypertension Father     Cancer Father         Lung    Hypertension Sister     No Known Problems Brother     No Known Problems Brother     No Known Problems Brother     Anesth Problems Neg Hx        Social History     Socioeconomic History    Marital status:      Spouse name: Not on file    Number of children: Not on file    Years of education: Not on file    Highest education level: Not on file   Occupational History    Not on file   Tobacco Use    Smoking status: Never Smoker    Smokeless tobacco: Never Used   Vaping Use    Vaping Use: Never used   Substance and Sexual Activity    Alcohol use: No    Drug use: Never    Sexual activity: Yes     Partners: Male     Birth control/protection: Surgical   Other Topics Concern    Not on file   Social History Narrative    Not on file     Social Determinants of Health     Financial Resource Strain:     Difficulty of Paying Living Expenses:    Food Insecurity:     Worried About [address] in the Last Year:     [address] in the Last Year:    Transportation Needs:     Lack of Transportation (Medical):  Lack of Transportation (Non-Medical):    Physical Activity:     Days of Exercise per Week:     Minutes of Exercise per Session:    Stress:     Feeling of Stress :    Social Connections:     Frequency of Communication with Friends and Family:     Frequency of Social Gatherings with Friends and Family:     Attends Yazdanism Services:     Active Member of Clubs or Organizations:     Attends Club or Organization Meetings:     Marital Status:    Intimate Partner Violence:     Fear of Current or Ex-Partner:     Emotionally Abused:     Physically Abused:     Sexually Abused: ALLERGIES: Beef containing products; Green pepper; Other plant, animal, environmental; and Pork derived (porcine)    Review of Systems   All other systems reviewed and are negative. Vitals:    07/06/21 2145 07/06/21 2200 07/06/21 2210 07/06/21 2215   BP: 133/75      Pulse: (!) 101 99  100   SpO2: 96% 98% 98% 98%            Physical Exam  Vitals and nursing note reviewed. Exam conducted with a chaperone present. CONSTITUTIONAL: Well-appearing; well-nourished; in no apparent distress  HEAD: Normocephalic; atraumatic  EYES: PERRL; EOM intact; conjunctiva and sclera are clear bilaterally. ENT: No rhinorrhea; normal pharynx with no tonsillar hypertrophy; mucous membranes pink/moist, no erythema, no exudate. NECK: Supple; non-tender; no cervical lymphadenopathy  CARD: Normal S1, S2; no murmurs, rubs, or gallops. Regular rate and rhythm. RESP: Normal respiratory effort; breath sounds clear and equal bilaterally; no wheezes, rhonchi, or rales. ABD: Normal bowel sounds; non-distended; non-tender; no palpable organomegaly, no masses, no bruits. Back Exam: Normal inspection; no vertebral point tenderness, no CVA tenderness. Normal range of motion. EXT: Normal ROM in all four extremities; non-tender to palpation; no swelling or deformity; distal pulses are normal, no edema. SKIN: Warm; dry; no rash. NEURO:Alert and oriented x 3, coherent, GRADY-XII grossly intact, sensory and motor are non-focal.      MDM  Number of Diagnoses or Management Options  Diagnosis management comments: Assessment: 42-year-old female who presents to the ED with a complaint of syncope and left hand injury in the process suspect vasovagal episode rule out left arm injury, ACS, VTE, electrolyte abnormality and dehydration. Plan: Lab/IV fluid/EKG/chest x-ray/orthostatics/x-ray of left hand /education, reassurance, symptomatic treatment/serial exam/ Monitor and Reevaluate. Amount and/or Complexity of Data Reviewed  Clinical lab tests: ordered and reviewed  Tests in the radiology section of CPT®: ordered and reviewed  Tests in the medicine section of CPT®: reviewed and ordered  Discussion of test results with the performing providers: yes  Decide to obtain previous medical records or to obtain history from someone other than the patient: yes  Obtain history from someone other than the patient: yes  Review and summarize past medical records: yes  Discuss the patient with other providers: yes  Independent visualization of images, tracings, or specimens: yes    Risk of Complications, Morbidity, and/or Mortality  Presenting problems: moderate  Diagnostic procedures: moderate  Management options: moderate    Patient Progress  Patient progress: stable         Procedures      ED EKG interpretation:  Rhythm: sinus tachycardia; and regular . Rate (approx.): 102; Axis: normal; P wave: normal; QRS interval: normal ; ST/T wave: normal; in  Lead: Diffusely; Other findings: abnormal ekg. This EKG was interpreted by Delilah Sumner MD,ED Provider. XRAY INTERPRETATION (ED MD)  Xray of left hand shows no fracture. No subluxation/dislocation. No bony abnormality.   Pauline Persaud Misti Yates MD 10:36 PM    Repeat ED EKG interpretation:  Rhythm: normal sinus rhythm; and regular . Rate (approx.): 98; Axis: normal; P wave: normal; QRS interval: normal ; ST/T wave: normal; in  Lead: Diffusely; Other findings: borderline ekg. This EKG was interpreted by Jo Stewart MD,ED Provider. Progress Note:   Pt has been reexamined by Jo Stewart MD. Pt is feeling much better. Symptoms have improved. All available results have been reviewed with pt and any available family. Pt understands sx, dx, and tx in ED. Care plan has been outlined and questions have been answered. Pt is ready to go home. Will send home on syncope and contusion of the right hand instruction. . Outpatient referral with PCP as needed. Written by Jo Stewart MD,11:57 PM    .   .

## 2021-07-08 LAB
ATRIAL RATE: 102 BPM
ATRIAL RATE: 98 BPM
CALCULATED P AXIS, ECG09: 54 DEGREES
CALCULATED P AXIS, ECG09: 61 DEGREES
CALCULATED R AXIS, ECG10: 52 DEGREES
CALCULATED R AXIS, ECG10: 60 DEGREES
CALCULATED T AXIS, ECG11: 32 DEGREES
CALCULATED T AXIS, ECG11: 42 DEGREES
DIAGNOSIS, 93000: NORMAL
DIAGNOSIS, 93000: NORMAL
P-R INTERVAL, ECG05: 140 MS
P-R INTERVAL, ECG05: 154 MS
Q-T INTERVAL, ECG07: 346 MS
Q-T INTERVAL, ECG07: 352 MS
QRS DURATION, ECG06: 84 MS
QRS DURATION, ECG06: 84 MS
QTC CALCULATION (BEZET), ECG08: 449 MS
QTC CALCULATION (BEZET), ECG08: 450 MS
VENTRICULAR RATE, ECG03: 102 BPM
VENTRICULAR RATE, ECG03: 98 BPM

## 2021-07-13 RX ORDER — OMEPRAZOLE 20 MG/1
CAPSULE, DELAYED RELEASE ORAL
Qty: 30 CAPSULE | Refills: 0 | Status: SHIPPED | OUTPATIENT
Start: 2021-07-13 | End: 2021-09-13

## 2021-07-21 ENCOUNTER — TRANSCRIBE ORDER (OUTPATIENT)
Dept: SCHEDULING | Age: 41
End: 2021-07-21

## 2021-07-21 DIAGNOSIS — R10.2 VAGINAL PAIN: Primary | ICD-10-CM

## 2021-07-21 DIAGNOSIS — R10.2 PELVIC PAIN IN FEMALE: Primary | ICD-10-CM

## 2021-07-22 ENCOUNTER — OFFICE VISIT (OUTPATIENT)
Dept: NEUROLOGY | Age: 41
End: 2021-07-22
Payer: MEDICAID

## 2021-07-22 VITALS
BODY MASS INDEX: 34.5 KG/M2 | HEART RATE: 97 BPM | OXYGEN SATURATION: 99 % | TEMPERATURE: 97.7 F | SYSTOLIC BLOOD PRESSURE: 132 MMHG | WEIGHT: 207.3 LBS | DIASTOLIC BLOOD PRESSURE: 82 MMHG

## 2021-07-22 DIAGNOSIS — R20.2 PARESTHESIA: Primary | ICD-10-CM

## 2021-07-22 PROCEDURE — 99214 OFFICE O/P EST MOD 30 MIN: CPT | Performed by: PSYCHIATRY & NEUROLOGY

## 2021-07-22 NOTE — PROGRESS NOTES
Chief Complaint   Patient presents with    Follow-up     Follow up neuropathies, states worsening at night time     Visit Vitals  /82 (BP 1 Location: Right arm, BP Patient Position: Sitting, BP Cuff Size: Large adult)   Pulse 97   Temp 97.7 °F (36.5 °C) (Temporal)   Wt 94 kg (207 lb 4.8 oz)   LMP 01/25/2012   SpO2 99%   BMI 34.50 kg/m²

## 2021-07-22 NOTE — PROGRESS NOTES
Neurology Progress Note    Patient ID:  Breezy Matthews  [id number]  40 y.o.  1980      Subjective:   History:  Rimma Hou a past medical history of lower back surgery 15 yrs ago with residual R lower back pain, Anxiety, Arrhythmia, Asthma, Diabetes ([address] (2008), Ear infection (11/30/2018), GERD (gastroesophageal reflux disease), Headache, Headache(784.0), Hypertension, Inappropriate sinus tachycardia (5/20/2019), Rash (7/14/2016), Recurrent umbilical hernia (31/78/3976), S/P dilatation and curettage, and Tachycardia who since her late 20's, has been noticing baseline heart rate in the 100s with sudden episodes of increased heart rate (Max 160s) associated with chest tightness and feeling \"drained\" lasting for 1-2 mins. Happening daily, no aggravating/relieving factors. Tilt table negative for POTS. Since June 2020, patient noted burning numbness/discomfort of forefront of both feet, involving all toes and symmetric. Patient was placed on gabapentin 400 mg TID with benefit c/o PCP (-) weakness (-) falls    Since last time, patient remains the same with good and bad days.   Patient is now on Gabapentin 400 mg TID with some benefit. Insurance will not pay fof compounded pain cream.  Tried OTC pain cream with no clear benefit. Patient was found to have Vit B12 deficiency and was placed on Vit B12 1000 mg every day c/o PCP with some benefit.   Blood sugar is better        ROS:  Per HPI-  Otherwise the remainder of ROS was negative    Social Hx  Social History     Socioeconomic History    Marital status:      Spouse name: Not on file    Number of children: Not on file    Years of education: Not on file    Highest education level: Not on file   Tobacco Use    Smoking status: Never Smoker    Smokeless tobacco: Never Used   Vaping Use    Vaping Use: Never used   Substance and Sexual Activity    Alcohol use: No    Drug use: Never    Sexual activity: Yes     Partners: Male Birth control/protection: Surgical     Social Determinants of Health     Financial Resource Strain:     Difficulty of Paying Living Expenses:    Food Insecurity:     Worried About Running Out of Food in the Last Year:     [address] in the Last Year:    Transportation Needs:     Lack of Transportation (Medical):  Lack of Transportation (Non-Medical):    Physical Activity:     Days of Exercise per Week:     Minutes of Exercise per Session:    Stress:     Feeling of Stress :    Social Connections:     Frequency of Communication with Friends and Family:     Frequency of Social Gatherings with Friends and Family:     Attends Orthodoxy Services:     Active Member of Clubs or Organizations:     Attends Club or Organization Meetings:     Marital Status:        Meds:  Current Outpatient Medications on File Prior to Visit   Medication Sig Dispense Refill    omeprazole (PRILOSEC) 20 mg capsule Take 1 capsule by mouth once daily 30 Capsule 0    fenofibrate (LOFIBRA) 160 mg tablet Take 1 tablet by mouth once daily 30 Tablet 5    metFORMIN (GLUCOPHAGE) 1,000 mg tablet Take 1 tablet by mouth twice daily 60 Tablet 5    SITagliptin (Januvia) 100 mg tablet Take 1 tablet by mouth once daily 30 Tablet 5    canagliflozin (Invokana) 300 mg tablet TAKE 1 TABLET BY MOUTH ONCE DAILY BEFORE BREAKFAST 30 Tablet 5    simvastatin (ZOCOR) 20 mg tablet TAKE 1 TABLET BY MOUTH EVERY DAY 30 Tablet 5    glipiZIDE SR (GLUCOTROL XL) 5 mg CR tablet Take 1 Tablet by mouth daily. 30 Tablet 5    cyclobenzaprine (FLEXERIL) 10 mg tablet TAKE 1 TABLET BY MOUTH THREE TIMES DAILY AS NEEDED FOR MUSCLE SPASM . DO  NOT  DRIVE  WHILE  TAKING  THIS  MEDICATION 30 Tab 0    losartan (COZAAR) 25 mg tablet Take 1 Tab by mouth once over twenty-four (24) hours.       Aspirin Low Dose 81 mg tablet Take 1 tablet by mouth once daily 30 Tab 5    ProAir HFA 90 mcg/actuation inhaler INHALE 2 PUFFS BY MOUTH EVERY 4 HOURS AS NEEDED FOR WHEEZING 9 g 5    gabapentin (NEURONTIN) 400 mg capsule Take 1 Cap by mouth three (3) times daily. Max Daily Amount: 1,200 mg. TAKE 1 CAPSULE BY MOUTH THREE TIMES DAILY . DO NOT EXCEED 3 PER 24 HOURS 90 Cap 5    famotidine (PEPCID) 40 mg tablet TAKE 1 TABLET BY MOUTH EVERY DAY TAKES EVERY BEDTIME 30 Tab 5    cyanocobalamin 1,000 mcg tablet Take 1 Tab by mouth daily. 30 Tab 5    ergocalciferol (ERGOCALCIFEROL) 1,250 mcg (50,000 unit) capsule TAKE 1 CAPSULE BY MOUTH TWICE A WEEK 24 Cap 1    EPINEPHrine (EPIPEN) 0.3 mg/0.3 mL injection INJECT CONTENTS OF 1 PEN INTRAMUSCULARLY ONCE AS NEEDED FOR ALLERGIC REACTION FOR UP TO 1 DOSE      loratadine (CLARITIN) 10 mg tablet Take 1 Tab by mouth daily. To replace xyzal. 30 Tab 5    fluticasone propionate (FLONASE) 50 mcg/actuation nasal spray 2 Sprays by Both Nostrils route daily. 1 Bottle 0    glucose blood VI test strips (True Metrix Glucose Test Strip) strip To use to check blood sugar daily,  Dx: E11.9 100 Strip 2    butalbital-acetaminophen-caffeine (FIORICET, ESGIC) -40 mg per tablet Take 1 Tab by mouth every six (6) hours as needed for Headache. 20 Tab 1    SUMAtriptan (IMITREX) 50 mg tablet TAKE 1 TABLET BY MOUTH AT THE ONSET OF A MIGRAINE HEADACHE. REPEAT IN 1 HOUR IF NEEDED. TAKE NO MORE THAN 2 TABLETS PER 24 HOURS 9 Tab 5    Lancets (MICROLET LANCET) Misc USE TO CHECK BLOOD SUGAR ONE TO TWO TIMES DAILY (Patient taking differently: as needed. USE TO CHECK BLOOD SUGAR ONE TO TWO TIMES DAILY) 100 Each 0    topiramate (TOPAMAX) 50 mg tablet Take 1 tab twice a day for migraine prevention 60 Tab 5     No current facility-administered medications on file prior to visit. Imaging:    CT Results (recent):  Results from Hospital Encounter encounter on 03/15/19    CT ABD PELV W CONT    Narrative  EXAM:  CT abdomen and pelvis with contrast    INDICATION: Generalized body aches and fever. Left abdominal pain. Oophorectomy  yesterday.     COMPARISON: CT 3/2/2017. TECHNIQUE: Helical CT of the abdomen  and pelvis  following the uneventful  intravenous administration of nonionic contrast.  Coronal and sagittal reformats  are performed. CT dose reduction was achieved through use of a standardized  protocol tailored for this examination and automatic exposure control for dose  modulation. FINDINGS:  The visualized lung bases demonstrate no mass or consolidation. The heart size  is normal. There is no pericardial or pleural effusion. The liver is diffusely low in attenuation. The spleen, pancreas, and adrenal  glands are normal. The gall bladder is surgically absent without intra- or  extra-hepatic biliary dilatation. The kidneys are symmetric without hydronephrosis. There is a moderate to large amount of colonic stool. There are no dilated bowel  loops. The appendix is surgically absent. There are no enlarged lymph nodes. The aorta tapers without aneurysm. A small amount of postsurgical fluid and fat stranding are noted in the pelvis. There is a small amount of air in the urinary bladder and scattered air in the  anterior abdominal wall, which are likely post catheter/postsurgical changes. There is no abscess or drainable collection. The uterus is surgically absent. There is degenerative change at L5-S1. There is no aggressive bony lesion. Impression  IMPRESSION:  Expected postsurgical changes in the pelvis without evidence for abscess,  drainable collection, or other acute abnormality in the abdomen or pelvis. A  moderate to large amount of colonic stool is noted. Hepatic steatosis. MRI Results (recent):  Results from [place] encounter on 01/20/20    MRA PELV W CONT    Narrative  History: Sinus tachycardia evaluate for May Thurner    INDICATION: Sinus tachycardia    COMPARISON:  None    TECHNIQUE:  3-D time-of-flight and contrast-enhanced MRA of the pelvis was  performed.  Multiplanar reconstructions were obtained. FINDINGS:    The abdominal aorta is normal in caliber. Celiac and SMA are normal.. The renal  arteries are normal as well. . There are single renal arteries on the right and  on the left. . There is no evidence of mesenteric arterial stenosis. . Visualized  venous structures including the right and left common iliac veins and IVC are  patent. . There is no evidence of venous stenosis. .    Impression  IMPRESSION:  No evidence of venous stenosis. .  No arterial abnormalities are demonstrated. IR Results (recent):  No results found for this or any previous visit. VAS/US Results (recent):  Results from [place] encounter on 11/15/17    [facility]   FINAL REPORT     Name: Hiram Jeong  MRN: [id number]    Outpatient  : 1980  HIS Order #: [id number]  [address] [facility] Visit #: [id number]  Date: 15 Nov 2017    TYPE OF TEST: Peripheral Arterial Testing    REASON FOR TEST  Rest pain (both sides)    Right Leg  Segmentals: Normal    mmHg  Brachial         132  High thigh  Low thigh  Calf  Posterior tibial 141  Dorsalis pedis   138  Peroneal  Metatarsal  Toe pressure     122  Doppler:    Normal  PVR:  Ankle/Brachial: 1.03    Left Leg  Segmentals: Normal    mmHg  Brachial         137  High thigh  Low thigh  Calf  Posterior tibial 139  Dorsalis pedis   145  Peroneal  Metatarsal  Toe pressure     117  Doppler:    Normal  PVR:  Ankle/Brachial: 1.06    INTERPRETATION/FINDINGS  PROCEDURE:  Evaluation of lower extremity arteries with systolic blood  pressure measurement at the ankle and brachial level and calculation  of the ankle/brachial pressure index (INOCENCIO). The exam may also include  pulse volume recording (PVR) plethysmography at the ankle level. Impression  :  1. No evidence of significant peripheral arterial disease at rest in  the right leg. 2. No evidence of significant peripheral arterial disease at rest in  the left leg.   3. The right ankle/brachial index is 1.03 and the left ankle/brachial  index is 1.06.  4. The right toe/brachial index is 0.89 and the left toe/brachial  index is 0.85. ADDITIONAL COMMENTS    I have personally reviewed the data relevant to the interpretation of  this  study. TECHNOLOGIST: Mike Zhang RDCS  Signed: 11/15/2017 09:28 AM    PHYSICIAN: Emiliano Rios.  Sterling Tanner MD  Signed: 11/16/2017 09:11 AM      Reviewed records in [employer] [facility] [employer] tab today    Lab Review     Admission on 07/06/2021, Discharged on 07/07/2021   Component Date Value Ref Range Status    Ventricular Rate 07/06/2021 102  BPM Final    Atrial Rate 07/06/2021 102  BPM Final    P-R Interval 07/06/2021 154  ms Final    QRS Duration 07/06/2021 84  ms Final    Q-T Interval 07/06/2021 346  ms Final    QTC Calculation (Bezet) 07/06/2021 450  ms Final    Calculated P Axis 07/06/2021 54  degrees Final    Calculated R Axis 07/06/2021 52  degrees Final    Calculated T Axis 07/06/2021 42  degrees Final    Diagnosis 07/06/2021    Final                    Value:Sinus tachycardia  Low voltage QRS  Borderline ECG  When compared with ECG of 13-JAN-2021 18:58,  No significant change was found  Confirmed by Tran Don MD. ([id number]) on 7/8/2021 7:37:58 PM      WBC 07/06/2021 10.7  3.6 - 11.0 K/uL Final    RBC 07/06/2021 4.71  3.80 - 5.20 M/uL Final    HGB 07/06/2021 13.5  11.5 - 16.0 g/dL Final    HCT 07/06/2021 39.6  35.0 - 47.0 % Final    MCV 07/06/2021 84.1  80.0 - 99.0 FL Final    MCH 07/06/2021 28.7  26.0 - 34.0 PG Final    MCHC 07/06/2021 34.1  30.0 - 36.5 g/dL Final    RDW 07/06/2021 13.2  11.5 - 14.5 % Final    PLATELET 94/64/6706 351  150 - 400 K/uL Final    MPV 07/06/2021 11.6  8.9 - 12.9 FL Final    NRBC 07/06/2021 0.0  0.0  WBC Final    ABSOLUTE NRBC 07/06/2021 0.00  0.00 - 0.01 K/uL Final    NEUTROPHILS 07/06/2021 50  32 - 75 % Final    LYMPHOCYTES 07/06/2021 38  12 - 49 % Final    MONOCYTES 07/06/2021 8  5 - 13 % Final    EOSINOPHILS 07/06/2021 1  0 - 7 % Final    BASOPHILS 07/06/2021 1  0 - 1 % Final    IMMATURE GRANULOCYTES 07/06/2021 1* 0 - 0.5 % Final    ABS. NEUTROPHILS 07/06/2021 5.3  1.8 - 8.0 K/UL Final    ABS. LYMPHOCYTES 07/06/2021 4.1* 0.8 - 3.5 K/UL Final    ABS. MONOCYTES 07/06/2021 0.9  0.0 - 1.0 K/UL Final    ABS. EOSINOPHILS 07/06/2021 0.1  0.0 - 0.4 K/UL Final    ABS. BASOPHILS 07/06/2021 0.1  0.0 - 0.1 K/UL Final    ABS. IMM. GRANS. 07/06/2021 0.1* 0.00 - 0.04 K/UL Final    DF 07/06/2021 AUTOMATED    Final    CK 07/06/2021 71  26 - 192 U/L Final    NOT ELEVATED,CKMB-QUANT NOT PERFORMED    Sodium 07/06/2021 142  136 - 145 mmol/L Final    Potassium 07/06/2021 3.7  3.5 - 5.1 mmol/L Final    Chloride 07/06/2021 104  97 - 108 mmol/L Final    CO2 07/06/2021 26  21 - 32 mmol/L Final    Anion gap 07/06/2021 12  5 - 15 mmol/L Final    Glucose 07/06/2021 150* 65 - 100 mg/dL Final    BUN 07/06/2021 7  6 - 20 MG/DL Final    Creatinine 07/06/2021 0.92  0.55 - 1.02 MG/DL Final    BUN/Creatinine ratio 07/06/2021 8* 12 - 20   Final    GFR est AA 07/06/2021 >60  >60 ml/min/1.73m2 Final    GFR est non-AA 07/06/2021 >60  >60 ml/min/1.73m2 Final    Estimated GFR is calculated using the IDMS-traceable Modification of Diet in Renal Disease (MDRD) Study equation, reported for both  Americans (GFRAA) and non- Americans (GFRNA), and normalized to 1.73m2 body surface area. The physician must decide which value applies to the patient.  Calcium 07/06/2021 8.6  8.5 - 10.1 MG/DL Final    Bilirubin, total 07/06/2021 0.3  0.2 - 1.0 MG/DL Final    ALT (SGPT) 07/06/2021 40  12 - 78 U/L Final    AST (SGOT) 07/06/2021 14* 15 - 37 U/L Final    Alk.  phosphatase 07/06/2021 58  45 - 117 U/L Final    Protein, total 07/06/2021 7.2  6.4 - 8.2 g/dL Final    Albumin 07/06/2021 3.8  3.5 - 5.0 g/dL Final    Globulin 07/06/2021 3.4  2.0 - 4.0 g/dL Final    A-G Ratio 07/06/2021 1.1  1.1 - 2.2   Final    Troponin-I, Qt. 07/06/2021 <0.05  <0.05 ng/mL Final    Comment: The presence of detectable troponin above the reference range indicates myocardial injury which may be due to ischemia, myocarditis, trauma, etc.  Clinical correlation is necessary to establish the significance of this finding. Sequential testing is recommended to determine if the typical rise and fall of cTnI is demonstrated. Note:  Cardiac troponin I has a relatively long half life and may be present well after the CK MB has returned to baseline. The reference range is based on the 99th percentile of the referent population.  D-dimer 07/06/2021 0.24  0.00 - 0.65 mg/L FEU Final    Comment: (NOTE)  The combination of a low pre-test probability based on Wells criteria  and a D-Dimer result below the cutoff value of 0.5 mg/L increases the   negative predictive value for DVT to %.  Magnesium 07/06/2021 1.8  1.6 - 2.4 mg/dL Final    Phosphorus 07/06/2021 3.4  2.6 - 4.7 MG/DL Final    NT pro-BNP 07/06/2021 17  0 - 125 PG/ML Final    Comment:      NT-proBNP is highly sensitive for the detection of acute congestive heart failure in dyspneic patients. A NT-proBNP result <300 pg/mL has a negative predictive value of 98% for acute heart failure. Marked elevations in NT-proBNP levels may be observed in patients with left ventricular congestive failure, atrial fibrillation without clear heart failure, acute coronary syndromes, right heart strain/failure (including pulmonary embolism and cor pulmonale), critical illness, renal failure or advanced age. Falsely low NT-proBNP may be observed in obese CHF patients.        Recent research  suggests the following cutoff values are appropriate based on patient age and clinical setting, reduce false positive (FP) results, and improve positive predictive values for acute heart failure:  Acutely dyspneic patient: age <50, use cutoff of 450 pg/mL  Acutely dyspneic patient: age 54-65, use cutoff of 900 pg/mL  Acutely dyspneic patient: age                            >76, use cutoff of 1800 pg/mL       FDA approved cutpoints for ruling-out heart failure in the office:  Ambulatory patient: age <73, use cutoff of 125 pg/mL  Ambulatory patient: age >76, use cutoff of 450 pg/mL      Ventricular Rate 07/06/2021 98  BPM Final    Atrial Rate 07/06/2021 98  BPM Final    P-R Interval 07/06/2021 140  ms Final    QRS Duration 07/06/2021 84  ms Final    Q-T Interval 07/06/2021 352  ms Final    QTC Calculation (Bezet) 07/06/2021 449  ms Final    Calculated P Axis 07/06/2021 61  degrees Final    Calculated R Axis 07/06/2021 60  degrees Final    Calculated T Axis 07/06/2021 32  degrees Final    Diagnosis 07/06/2021    Final                    Value:Normal sinus rhythm  Borderline ECG  When compared with ECG of 06-JUL-2021 21:23,  No significant change was found    Confirmed by Elijah Vera ([id number]) on 7/8/2021 11:04:36 PM      Troponin-I, Qt. 07/06/2021 <0.05  <0.05 ng/mL Final   Office Visit on 05/19/2021   Component Date Value Ref Range Status    Hemoglobin A1c 05/19/2021 6.2* 4.8 - 5.6 % Final    Comment:          Prediabetes: 5.7 - 6.4           Diabetes: >6.4           Glycemic control for adults with diabetes: <7.0      Estimated average glucose 05/19/2021 131  mg/dL Final    Glucose 05/19/2021 72  65 - 99 mg/dL Final    BUN 05/19/2021 9  6 - 24 mg/dL Final    Creatinine 05/19/2021 0.80  0.57 - 1.00 mg/dL Final    GFR est non-AA 05/19/2021 91  >59 mL/min/1.73 Final    GFR est AA 05/19/2021 105  >59 mL/min/1.73 Final    Comment: **[facility] currently reports eGFR in compliance with the current**    recommendations of the [employer]. Emily Jones will    update reporting as new guidelines are published from the NKF-ASN    Task force.       BUN/Creatinine ratio 05/19/2021 11  9 - 23 Final    Sodium 05/19/2021 141  134 - 144 mmol/L Final    Potassium 05/19/2021 4.3  3.5 - 5.2 mmol/L Final    Chloride 05/19/2021 104  96 - 106 mmol/L Final    CO2 05/19/2021 22  20 - 29 mmol/L Final    Calcium 05/19/2021 9.4  8.7 - 10.2 mg/dL Final    Protein, total 05/19/2021 6.8  6.0 - 8.5 g/dL Final    Albumin 05/19/2021 4.4  3.8 - 4.8 g/dL Final    GLOBULIN, TOTAL 05/19/2021 2.4  1.5 - 4.5 g/dL Final    A-G Ratio 05/19/2021 1.8  1.2 - 2.2 Final    Bilirubin, total 05/19/2021 0.2  0.0 - 1.2 mg/dL Final    Alk. phosphatase 05/19/2021 50  48 - 121 IU/L Final                  **Please note reference interval change**    AST (SGOT) 05/19/2021 14  0 - 40 IU/L Final    ALT (SGPT) 05/19/2021 15  0 - 32 IU/L Final    Cholesterol, total 05/19/2021 165  100 - 199 mg/dL Final    Triglyceride 05/19/2021 222* 0 - 149 mg/dL Final    HDL Cholesterol 05/19/2021 35* >39 mg/dL Final    VLDL, calculated 05/19/2021 38  5 - 40 mg/dL Final    LDL, calculated 05/19/2021 92  0 - 99 mg/dL Final    INTERPRETATION 05/19/2021 Note   Final    Supplemental report is available. Objective:       Exam:  Visit Vitals  /82 (BP 1 Location: Right arm, BP Patient Position: Sitting, BP Cuff Size: Large adult)   Pulse 97   Temp 97.7 °F (36.5 °C) (Temporal)   Wt 94 kg (207 lb 4.8 oz)   SpO2 99%   BMI 34.50 kg/m²     Gen: Awake, alert, follows commands  Appropriate appearance, normal speech. Oriented to all spheres. No visual field defect on confrontation exam.  Full eyes movement, with no nystagmus, no diplopia, no ptosis. Normal gag and swallow. All remaining cranial nerves were normal  Motor function: 5/5 in all extremities  Sensory: dec PP bottom of both feet  DTRs ++ UE and knees, absent R ankle and 1+ L ankle (-) Babinski  Good FTN and HTS   Gait: Able to walk on toes and heels    Assessment:       ICD-10-CM ICD-9-CM    1. Paresthesia  R20.2 782.0 EMG LIMITED       Possible sensory polyneuropathy due to long history of diabetes and vit B12 deficiency    History of lower back surgery with residual R lower back pain. Plan:   1. Trial of alpha lipoic acid 600 mg every day then BID as tolerated  2. EMG/NCS of both LE with medial plantar study If sural sensory is normal (Of note, has history of lower back surgery with residual R lower back pain)  3. Continue Gabapentin 400 mg TID c/o PCP  4. Advise weight loss and exercise  5. Optimize medical management of diabetes to prevent worsening of neuropathy  6. Continue Vit B12 1000 mcg every day       Follow-up and Dispositions    · Return for above testing.                Felicia Herrera MD  Diplomate, American Board of Psychiatry and Neurology  Diplomate, Neuromuscular Medicine  Diplomate, American Board of Electrodiagnostic Medicine

## 2021-07-22 NOTE — LETTER
7/22/2021    Patient: Josh Matson   YOB: 1980   Date of Visit: 7/22/2021     Joelle Powell NP  [address] [place] [address]  [address]  [address] [facility]  Via In Basket    Dear Joelle Powell NP,      Thank you for referring Ms. Brenda Delgado to [facility] for evaluation. My notes for this consultation are attached. If you have questions, please do not hesitate to call me. I look forward to following your patient along with you.       Sincerely,    Junior Shipman MD

## 2021-07-26 ENCOUNTER — HOSPITAL ENCOUNTER (OUTPATIENT)
Dept: ULTRASOUND IMAGING | Age: 41
Discharge: HOME | End: 2021-07-26
Attending: OBSTETRICS & GYNECOLOGY
Payer: MEDICAID

## 2021-07-26 DIAGNOSIS — R10.2 VAGINAL PAIN: ICD-10-CM

## 2021-07-26 DIAGNOSIS — R10.2 PELVIC PAIN IN FEMALE: ICD-10-CM

## 2021-07-26 PROCEDURE — 76856 US EXAM PELVIC COMPLETE: CPT

## 2021-07-26 PROCEDURE — 76830 TRANSVAGINAL US NON-OB: CPT

## 2021-08-03 RX ORDER — EPINEPHRINE 0.3 MG/.3ML
0.3 INJECTION SUBCUTANEOUS
Qty: 1 SYRINGE | Refills: 1 | Status: SHIPPED | OUTPATIENT
Start: 2021-08-03 | End: 2021-08-03

## 2021-08-09 ENCOUNTER — TELEPHONE (OUTPATIENT)
Dept: FAMILY MEDICINE CLINIC | Age: 41
End: 2021-08-09

## 2021-08-22 DIAGNOSIS — E11.49 OTHER DIABETIC NEUROLOGICAL COMPLICATION ASSOCIATED WITH TYPE 2 DIABETES MELLITUS (HCC): ICD-10-CM

## 2021-08-23 RX ORDER — GABAPENTIN 400 MG/1
CAPSULE ORAL
Qty: 90 CAPSULE | Refills: 0 | Status: SHIPPED | OUTPATIENT
Start: 2021-08-23 | End: 2021-09-13

## 2021-08-25 ENCOUNTER — OFFICE VISIT (OUTPATIENT)
Dept: FAMILY MEDICINE CLINIC | Age: 41
End: 2021-08-25
Payer: MEDICAID

## 2021-08-25 VITALS
RESPIRATION RATE: 16 BRPM | HEART RATE: 103 BPM | WEIGHT: 207 LBS | TEMPERATURE: 96.2 F | OXYGEN SATURATION: 97 % | HEIGHT: 65 IN | SYSTOLIC BLOOD PRESSURE: 128 MMHG | BODY MASS INDEX: 34.49 KG/M2 | DIASTOLIC BLOOD PRESSURE: 80 MMHG

## 2021-08-25 DIAGNOSIS — J01.00 ACUTE NON-RECURRENT MAXILLARY SINUSITIS: ICD-10-CM

## 2021-08-25 DIAGNOSIS — E78.5 DYSLIPIDEMIA (HIGH LDL; LOW HDL): ICD-10-CM

## 2021-08-25 DIAGNOSIS — E11.40 TYPE 2 DIABETES MELLITUS WITH DIABETIC NEUROPATHY, WITHOUT LONG-TERM CURRENT USE OF INSULIN (HCC): Primary | ICD-10-CM

## 2021-08-25 DIAGNOSIS — Z11.59 ENCOUNTER FOR HEPATITIS C SCREENING TEST FOR LOW RISK PATIENT: ICD-10-CM

## 2021-08-25 DIAGNOSIS — Z91.018 ALLERGY TO ALPHA-GAL: ICD-10-CM

## 2021-08-25 PROCEDURE — 99214 OFFICE O/P EST MOD 30 MIN: CPT | Performed by: NURSE PRACTITIONER

## 2021-08-25 RX ORDER — LISINOPRIL 10 MG/1
10 TABLET ORAL DAILY
COMMUNITY
Start: 2021-07-30

## 2021-08-25 RX ORDER — AMOXICILLIN AND CLAVULANATE POTASSIUM 875; 125 MG/1; MG/1
1 TABLET, FILM COATED ORAL 2 TIMES DAILY
Qty: 20 TABLET | Refills: 0 | Status: SHIPPED | OUTPATIENT
Start: 2021-08-25 | End: 2021-09-04

## 2021-08-25 RX ORDER — EPINEPHRINE 0.3 MG/.3ML
INJECTION SUBCUTANEOUS
COMMUNITY
Start: 2021-08-04 | End: 2022-06-01

## 2021-08-25 NOTE — PROGRESS NOTES
Subjective:     Marcelo Hardy is a 41 y.o. female seen for follow up of diabetes. She also has hypertension and hyperlipidemia. Diabetic Review of Systems - medication compliance: compliant most of the time, diabetic diet compliance: compliant all of the time. Other symptoms and concerns: Pt is here for follow up. She will return for fasting labs, as she did not fast today. She is requesting that her Aplha Gal panel be drawn again today, to see if there has been any improvement in this    Lastly, she believes that she has a sinus infection. She has had right ear pain and right sided sinus pressure for a couple of days  NO fever.     Patient Active Problem List    Diagnosis Date Noted    Atrial tachycardia ([facility] 01/11/2021    Vitamin B12 deficiency 12/09/2020    Type 2 diabetes mellitus with diabetic neuropathy ([facility] 02/18/2020    [facility] ([facility] 02/18/2020    Inappropriate sinus tachycardia 05/20/2019    Palpitations 04/08/2019    Recurrent umbilical hernia 67/53/9234    Acute midline thoracic back pain 09/26/2017    Acute mucoid otitis media of right ear 06/23/2017    Dermatitis 03/09/2017    Acute non-recurrent frontal sinusitis 01/03/2017    Left lower quadrant pain 12/29/2016    Left ear pain 12/02/2016    Strep throat 11/09/2016    Nausea 10/07/2016    Right acute serous otitis media 07/14/2016    Rash 07/14/2016    Gastroesophageal reflux disease without esophagitis 07/14/2016    Environmental allergies 07/14/2016    Right ear pain 06/21/2016    Diabetes ([facility] 07/18/2014    Sore throat 05/06/2014    Migraine headache 01/03/2012    Vitamin D deficiency 11/06/2011    Dyslipidemia (high LDL; low HDL) 01/24/2011    Hypertension 01/06/2011    Asthma 01/06/2011     Current Outpatient Medications   Medication Sig Dispense Refill    EPINEPHrine (EPIPEN) 0.3 mg/0.3 mL injection INJECT CONTENTS OF 1 PEN BY INTRAMUSCULAR ROUTE AS NEEDED FOR ALLERGIC RESPONSE FOR UP TO 1 DOSE      lisinopriL (PRINIVIL, ZESTRIL) 10 mg tablet Take 10 mg by mouth daily.  ivabradine (CORLANOR) 5 mg tablet Take  by mouth two (2) times daily (with meals).  amoxicillin-clavulanate (AUGMENTIN) 875-125 mg per tablet Take 1 Tablet by mouth two (2) times a day for 10 days. 20 Tablet 0    gabapentin (NEURONTIN) 400 mg capsule TAKE 1 CAPSULE BY MOUTH THREE TIMES DAILY . DO NOT EXCEED 3 PER 24 HOURS 90 Capsule 0    omeprazole (PRILOSEC) 20 mg capsule Take 1 capsule by mouth once daily 30 Capsule 0    fenofibrate (LOFIBRA) 160 mg tablet Take 1 tablet by mouth once daily 30 Tablet 5    metFORMIN (GLUCOPHAGE) 1,000 mg tablet Take 1 tablet by mouth twice daily 60 Tablet 5    SITagliptin (Januvia) 100 mg tablet Take 1 tablet by mouth once daily 30 Tablet 5    canagliflozin (Invokana) 300 mg tablet TAKE 1 TABLET BY MOUTH ONCE DAILY BEFORE BREAKFAST 30 Tablet 5    simvastatin (ZOCOR) 20 mg tablet TAKE 1 TABLET BY MOUTH EVERY DAY 30 Tablet 5    glipiZIDE SR (GLUCOTROL XL) 5 mg CR tablet Take 1 Tablet by mouth daily. 30 Tablet 5    cyclobenzaprine (FLEXERIL) 10 mg tablet TAKE 1 TABLET BY MOUTH THREE TIMES DAILY AS NEEDED FOR MUSCLE SPASM . DO  NOT  DRIVE  WHILE  TAKING  THIS  MEDICATION 30 Tab 0    losartan (COZAAR) 25 mg tablet Take 1 Tab by mouth once over twenty-four (24) hours.  Aspirin Low Dose 81 mg tablet Take 1 tablet by mouth once daily 30 Tab 5    ProAir HFA 90 mcg/actuation inhaler INHALE 2 PUFFS BY MOUTH EVERY 4 HOURS AS NEEDED FOR WHEEZING 9 g 5    famotidine (PEPCID) 40 mg tablet TAKE 1 TABLET BY MOUTH EVERY DAY TAKES EVERY BEDTIME 30 Tab 5    cyanocobalamin 1,000 mcg tablet Take 1 Tab by mouth daily. 30 Tab 5    ergocalciferol (ERGOCALCIFEROL) 1,250 mcg (50,000 unit) capsule TAKE 1 CAPSULE BY MOUTH TWICE A WEEK 24 Cap 1    loratadine (CLARITIN) 10 mg tablet Take 1 Tab by mouth daily.  To replace xyzal. 30 Tab 5    fluticasone propionate (FLONASE) 50 mcg/actuation nasal spray 2 Sprays by Both Nostrils route daily. 1 Bottle 0    glucose blood VI test strips (True Metrix Glucose Test Strip) strip To use to check blood sugar daily,  Dx: E11.9 100 Strip 2    butalbital-acetaminophen-caffeine (FIORICET, ESGIC) -40 mg per tablet Take 1 Tab by mouth every six (6) hours as needed for Headache. 20 Tab 1    SUMAtriptan (IMITREX) 50 mg tablet TAKE 1 TABLET BY MOUTH AT THE ONSET OF A MIGRAINE HEADACHE. REPEAT IN 1 HOUR IF NEEDED. TAKE NO MORE THAN 2 TABLETS PER 24 HOURS 9 Tab 5     Allergies   Allergen Reactions    Beef Containing Products Rash    Green Pepper Hives     Red, Yellow and Orange Peppers also. OK with Black Pepper    Other Plant, Animal, Environmental Swelling     Red meat    Pork Derived (Porcine) Rash     Past Medical History:   Diagnosis Date    Anxiety     Arrhythmia     HX TACHYCARDIA - NEG.  STRESS TEST 2013 PER PATIENT    Asthma     LAST EPISODE 2016    Diabetes ([facility] 2008    NIDDM    Ear infection 11/30/2018    BILATERAL    GERD (gastroesophageal reflux disease)     Headache     Headache(784.0)     Hypertension     HX HTN - NO MEDS CURRENTLY(11-30-18)    Inappropriate sinus tachycardia 5/20/2019    Rash 7/14/2016    Recurrent umbilical hernia 95/65/1514    S/P dilatation and curettage     Tachycardia      Past Surgical History:   Procedure Laterality Date    HX APPENDECTOMY  2/2008    HX CHOLECYSTECTOMY  2001    HX DILATION AND CURETTAGE      HX GYN  3/2008    tubal ligation    HX HERNIA REPAIR  2/2009    HX HERNIA REPAIR  12/06/2018    open recurrent umbilical hernia repair    HX HYSTERECTOMY  03/2012    HX LEFT SALPINGO-OOPHORECTOMY Left     HX LUMBAR DISKECTOMY      2006    HX ORTHOPAEDIC  9/2006    ruptured discs    FL COMPRE ELECTROPHYSIOL XM W/LEFT ATRIAL PACNG/REC N/A 11/20/2020    Lt Atrial Pace & Record During Ep Study performed by Nik Perdue MD at [facility] CATH LAB    FL COMPRE ELECTROPHYSIOL XM W/LEFT ATRIAL PACNG/REC N/A 3/16/2021    Lt Atrial Pace & Record During Ep Study performed by Ilsa Pallas, MD at [place] [address], [facility] CATH LAB    SD COMPRE ELECTROPHYSIOLOGIC ARRHYTHMIA INDUCTION N/A 11/20/2020    EP STUDY COMPLETE performed by Ilsa Pallas, MD at [facility] CATH LAB    SD COMPRE ELECTROPHYSIOLOGIC ARRHYTHMIA INDUCTION N/A 3/16/2021    Ep Study Complete performed by Ilsa Pallas, MD at [place] [address], [facility] CATH LAB    SD EPHYS EVAL W/ABLATION [address] N/A 11/20/2020    Ablation Svt performed by Ilsa Pallas, MD at [facility] CATH LAB    SD INTRACARDIAC ELECTROPHYSIOLOGIC 3D MAPPING N/A 11/20/2020    Ep 3d Mapping performed by Ilsa Pallas, MD at [facility] CATH LAB    SD INTRACARDIAC ELECTROPHYSIOLOGIC 3D MAPPING N/A 3/16/2021    Ep 3d Mapping performed by Ilsa Pallas, MD at [place] [address], [facility] CATH LAB    STIM/PACING HEART POST IV DRUG INFU N/A 3/16/2021    Drug Stimulation performed by Ilsa Pallas, MD at [place] [address], [facility] CATH LAB    UPPER GI ENDOSCOPY,BIOPSY  5/11/2017          Family History   Problem Relation Age of Onset    Heart Disease Mother     Hypertension Father     Cancer Father         Lung    Hypertension Sister     No Known Problems Brother     No Known Problems Brother     No Known Problems Brother     Anesth Problems Neg Hx      Social History     Tobacco Use    Smoking status: Never Smoker    Smokeless tobacco: Never Used   Substance Use Topics    Alcohol use: No        Lab Results   Component Value Date/Time    WBC 10.7 07/06/2021 09:28 PM    HGB 13.5 07/06/2021 09:28 PM    HCT 39.6 07/06/2021 09:28 PM    PLATELET 093 59/61/4116 09:28 PM    MCV 84.1 07/06/2021 09:28 PM     Lab Results   Component Value Date/Time    Hemoglobin A1c 6.2 (H) 05/19/2021 12:00 AM    Hemoglobin A1c 7.3 (H) 12/09/2020 12:00 AM    Hemoglobin A1c 7.6 (H) 06/24/2020 08:50 AM    Glucose 150 (H) 07/06/2021 09:28 PM    Glucose (POC) 122 (H) 03/16/2021 12:22 PM    Microalb/Creat ratio (ug/mg creat.) <5 12/09/2020 12:00 AM    LDL, calculated 92 05/19/2021 12:00 AM    LDL, calculated 64 06/24/2020 08:50 AM    Creatinine (POC) 0.7 06/04/2013 09:57 AM    Creatinine 0.92 07/06/2021 09:28 PM      Lab Results   Component Value Date/Time    Cholesterol, total 165 05/19/2021 12:00 AM    HDL Cholesterol 35 (L) 05/19/2021 12:00 AM    LDL, calculated 92 05/19/2021 12:00 AM    LDL, calculated 64 06/24/2020 08:50 AM    Triglyceride 222 (H) 05/19/2021 12:00 AM     Lab Results   Component Value Date/Time    GFR est non-AA >60 07/06/2021 09:28 PM    GFRNA, POC >60 06/04/2013 09:57 AM    GFR est AA >60 07/06/2021 09:28 PM    GFRAA, POC >60 06/04/2013 09:57 AM    Creatinine 0.92 07/06/2021 09:28 PM    Creatinine (POC) 0.7 06/04/2013 09:57 AM    BUN 7 07/06/2021 09:28 PM    Sodium 142 07/06/2021 09:28 PM    Potassium 3.7 07/06/2021 09:28 PM    Chloride 104 07/06/2021 09:28 PM    CO2 26 07/06/2021 09:28 PM    Magnesium 1.8 07/06/2021 09:28 PM    Phosphorus 3.4 07/06/2021 09:28 PM        Review of Systems  Pertinent items are noted in HPI. Objective:     Visit Vitals  /80 (BP 1 Location: Right arm, BP Patient Position: Sitting, BP Cuff Size: Adult)   Pulse (!) 103   Temp (!) 96.2 °F (35.7 °C) (Oral)   Resp 16   Ht 5' 5\" (1.651 m)   Wt 207 lb (93.9 kg)   LMP 01/25/2012   SpO2 97%   BMI 34.45 kg/m²     Appearance: alert, well appearing, and in no distress. Exam: heart sounds normal rate, regular rhythm, normal S1, S2, no murmurs, rubs, clicks or gallops, right ear TM erythematous, right maxillary sinus tenderness  Lab review: orders written for new lab studies as appropriate; see orders. Assessment/Plan:     diabetes stable. Diabetic issues reviewed with her: diabetic diet discussed in detail, written exchange diet given. ICD-10-CM ICD-9-CM    1.  Type 2 diabetes mellitus with diabetic neuropathy, without long-term current use of insulin ([facility])  E11.40 250.60 CBC W/O DIFF     460.4 METABOLIC PANEL, COMPREHENSIVE      HEMOGLOBIN A1C WITH EAG      CBC W/O DIFF      METABOLIC PANEL, COMPREHENSIVE      HEMOGLOBIN A1C WITH EAG      REFERRAL TO OPHTHALMOLOGY   2. Encounter for hepatitis C screening test for low risk patient  Z11.59 V73.89 HEPATITIS C AB      HEPATITIS C AB   3. Dyslipidemia (high LDL; low HDL)  E78.5 272.4 LIPID PANEL      LIPID PANEL   4. Allergy to alpha-gal  Z91.018 V15.05 ALPHA-GAL FOOD PANEL      ALPHA-GAL FOOD PANEL   5. Acute non-recurrent maxillary sinusitis  J01.00 461.0 amoxicillin-clavulanate (AUGMENTIN) 875-125 mg per tablet     Will notify when labs return, and inform her of any change in plan of care at that time    Educated about taking medication as prescribed for sinus infection, and notifying office should symptoms continue and/or worsen    Should call for eye doctor appointment ASAP    Pt informed to return to office with worsening of symptoms, or PRN with any questions or concerns. Pt verbalizes understanding of plan of care and denies further questions or concerns at this time.

## 2021-08-25 NOTE — PROGRESS NOTES
Identified pt with two pt identifiers(name and ). Chief Complaint   Patient presents with    Diabetes        Health Maintenance Due   Topic    Hepatitis C Screening     COVID-19 Vaccine (1)    Eye Exam Retinal or Dilated     DTaP/Tdap/Td series (2 - Td or Tdap)    PAP AKA CERVICAL CYTOLOGY     Foot Exam Q1        Wt Readings from Last 3 Encounters:   21 207 lb (93.9 kg)   21 207 lb 4.8 oz (94 kg)   21 205 lb (93 kg)     Temp Readings from Last 3 Encounters:   21 (!) 96.2 °F (35.7 °C) (Oral)   21 97.7 °F (36.5 °C) (Temporal)   21 97.4 °F (36.3 °C)     BP Readings from Last 3 Encounters:   21 128/80   21 132/82   21 117/66     Pulse Readings from Last 3 Encounters:   21 (!) 103   21 97   21 98         Learning Assessment:  :     Learning Assessment 2017   PRIMARY LEARNER Patient Patient   HIGHEST LEVEL OF EDUCATION - PRIMARY LEARNER  GRADUATED HIGH SCHOOL OR GED GRADUATED HIGH SCHOOL OR GED   BARRIERS PRIMARY LEARNER NONE NONE   PRIMARY LANGUAGE ENGLISH ENGLISH   LEARNER PREFERENCE PRIMARY READING READING   ANSWERED BY patient patient   RELATIONSHIP SELF SELF       Depression Screening:  :     3 most recent PHQ Screens 2021   Little interest or pleasure in doing things Not at all   Feeling down, depressed, irritable, or hopeless Not at all   Total Score PHQ 2 0       Fall Risk Assessment:  :     Fall Risk Assessment, last 12 mths 2017   Able to walk? Yes   Fall in past 12 months? No       Abuse Screening:  :     Abuse Screening Questionnaire 2021   Do you ever feel afraid of your partner? N N N N N N N   Are you in a relationship with someone who physically or mentally threatens you? N N N N N N N   Is it safe for you to go home?  Y Y Y Y Y Y Y       Coordination of Care Questionnaire:  :     1) Have you been to an emergency room, urgent care clinic since your last visit? no   Hospitalized since your last visit? no             2) Have you seen or consulted any other health care providers outside of [address] [place] [address] since your last visit? no  (Include any pap smears or colon screenings in this section.)    3) Do you have an Advance Directive on file? no  Are you interested in receiving information about Advance Directives?  no      Reviewed record in preparation for visit and have obtained necessary documentation

## 2021-08-25 NOTE — PATIENT INSTRUCTIONS
Noninsulin Medicines for Type 2 Diabetes: Care Instructions  Overview     There are different types of noninsulin medicines for diabetes. Each works in a different way. But they all help you control your blood sugar. Some types help your body make insulin to lower your blood sugar. Others lower how much insulin your body needs. Some can slow how fast your body digests sugars. And some can remove extra glucose through your urine. You may need to take more than one medicine for diabetes. Two or more medicines may work better to lower your blood sugar level than just one does. · Metformin. This lowers how much glucose your liver makes. And it helps you respond better to insulin. It also lowers the amount of stored sugar that your liver releases when you are not eating. · Sulfonylureas. These help your body release more insulin. Some work for many hours. They can cause low blood sugar if you don't eat as you planned. An example is glipizide. · Thiazolidinediones. These reduce the amount of blood glucose. They also help you respond better to insulin. An example is pioglitazone. · SGLT2 inhibitors. These help to remove extra glucose through your urine. They may also help some people lose weight. An example is ertugliflozin. · DPP-4 inhibitors. These help your body raise the level of insulin after you eat. They also help your body make less of a hormone that raises blood sugar. An example is alogliptin. · Incretin hormones (GLP-1 receptor agonists). These help your body make a protein that can raise your insulin level and make you less hungry. They're given as shots or pills. An example is semaglutide. · Meglitinides. These help your body release insulin. They also help slow how your body digests sugars. So they can keep your blood sugar from rising too fast after you eat. · Alpha-glucosidase inhibitors. These keep starches from breaking down.  This means that they lower the amount of glucose absorbed when you eat. They don't help your body make more insulin. So they will not cause low blood sugar unless you use them with other medicines for diabetes. Follow-up care is a key part of your treatment and safety. Be sure to make and go to all appointments, and call your doctor if you are having problems. It's also a good idea to know your test results and keep a list of the medicines you take. How can you care for yourself at home? · Eat a healthy diet. Get some exercise each day. This may help you to reduce how much medicine you need. · Do not take other prescription or over-the-counter medicines, vitamins, herbal products, or supplements without talking to your doctor first. Some medicines for type 2 diabetes can cause problems with other medicines or supplements. · Tell your doctor if you plan to get pregnant. Some of these drugs are not safe for pregnant women. · Be safe with medicines. Take your medicines exactly as prescribed. Meglitinides and sulfonylureas can cause your blood sugar to drop very low. Call your doctor if you think you are having a problem with your medicine. · Check your blood sugar often. You can use a glucose monitor. Keeping track can help you know how certain foods, activities, and medicines affect your blood sugar. And it can help you keep your blood sugar from getting so low that it's not safe. When should you call for help? Call 911 anytime you think you may need emergency care. For example, call if:    · You passed out (lost consciousness).     · You are confused or cannot think clearly.     · Your blood sugar is very high or very low. Watch closely for changes in your health, and be sure to contact your doctor if:    · Your blood sugar stays outside the level your doctor set for you.     · You have any problems. Where can you learn more?   Go to http://www.gray.com/  Enter H153 in the search box to learn more about \"Noninsulin Medicines for Type 2 Diabetes: Care Instructions. \"  Current as of: August 31, 2020               Content Version: 12.8  © 6960-3872 [employer]. Care instructions adapted under license by [employer] (which disclaims liability or warranty for this information). If you have questions about a medical condition or this instruction, always ask your healthcare professional. [place] [address] any warranty or liability for your use of this information.

## 2021-08-26 ENCOUNTER — APPOINTMENT (OUTPATIENT)
Dept: FAMILY MEDICINE CLINIC | Age: 41
End: 2021-08-26

## 2021-09-01 ENCOUNTER — PATIENT MESSAGE (OUTPATIENT)
Dept: NEUROLOGY | Age: 41
End: 2021-09-01

## 2021-09-01 LAB
ALBUMIN SERPL-MCNC: 4.3 G/DL (ref 3.8–4.8)
ALBUMIN/GLOB SERPL: 1.5 {RATIO} (ref 1.2–2.2)
ALP SERPL-CCNC: 62 IU/L (ref 48–121)
ALPHA-GAL IGE QN: 4.78 KU/L
ALT SERPL-CCNC: 31 IU/L (ref 0–32)
AST SERPL-CCNC: 21 IU/L (ref 0–40)
BEEF IGE QN: 2.95 KU/L
BILIRUB SERPL-MCNC: 0.3 MG/DL (ref 0–1.2)
BUN SERPL-MCNC: 7 MG/DL (ref 6–24)
BUN/CREAT SERPL: 9 (ref 9–23)
CALCIUM SERPL-MCNC: 9.7 MG/DL (ref 8.7–10.2)
CHLORIDE SERPL-SCNC: 104 MMOL/L (ref 96–106)
CHOLEST SERPL-MCNC: 160 MG/DL (ref 100–199)
CO2 SERPL-SCNC: 24 MMOL/L (ref 20–29)
CREAT SERPL-MCNC: 0.75 MG/DL (ref 0.57–1)
DEPRECATED BEEF IGE RAST QL: 2
DEPRECATED LAMB IGE RAST QL: 2
DEPRECATED PORK IGE RAST QL: 2
ERYTHROCYTE [DISTWIDTH] IN BLOOD BY AUTOMATED COUNT: 12.9 % (ref 11.7–15.4)
EST. AVERAGE GLUCOSE BLD GHB EST-MCNC: 143 MG/DL
GLOBULIN SER CALC-MCNC: 2.8 G/DL (ref 1.5–4.5)
GLUCOSE SERPL-MCNC: 161 MG/DL (ref 65–99)
HBA1C MFR BLD: 6.6 % (ref 4.8–5.6)
HCT VFR BLD AUTO: 41 % (ref 34–46.6)
HCV AB S/CO SERPL IA: 0.2 S/CO RATIO (ref 0–0.9)
HDLC SERPL-MCNC: 33 MG/DL
HGB BLD-MCNC: 14.1 G/DL (ref 11.1–15.9)
IMP & REVIEW OF LAB RESULTS: NORMAL
LAMB IGE QN: 0.86 KU/L
LDLC SERPL CALC-MCNC: 93 MG/DL (ref 0–99)
MCH RBC QN AUTO: 29.8 PG (ref 26.6–33)
MCHC RBC AUTO-ENTMCNC: 34.4 G/DL (ref 31.5–35.7)
MCV RBC AUTO: 87 FL (ref 79–97)
PLATELET # BLD AUTO: 225 X10E3/UL (ref 150–450)
PORK IGE QN: 1.46 KU/L
POTASSIUM SERPL-SCNC: 4.9 MMOL/L (ref 3.5–5.2)
PROT SERPL-MCNC: 7.1 G/DL (ref 6–8.5)
RBC # BLD AUTO: 4.73 X10E6/UL (ref 3.77–5.28)
SODIUM SERPL-SCNC: 140 MMOL/L (ref 134–144)
TRIGL SERPL-MCNC: 195 MG/DL (ref 0–149)
VLDLC SERPL CALC-MCNC: 34 MG/DL (ref 5–40)
WBC # BLD AUTO: 8 X10E3/UL (ref 3.4–10.8)

## 2021-09-01 NOTE — PROGRESS NOTES
Please call patient and let her know that her labs returned. Stable. Still showing positive Alpha Gal to Beef, Ansari and Pork.   Should return for office visit and fasting labs in 6 months  Thanks

## 2021-09-01 NOTE — TELEPHONE ENCOUNTER
----- Message from Frank Rivera NP sent at 9/1/2021  7:53 AM EDT -----  Please call patient and let her know that her labs returned. Stable. Still showing positive Alpha Gal to Beef, Ansari and Pork.   Should return for office visit and fasting labs in 6 months  Thanks

## 2021-09-12 DIAGNOSIS — E78.1 HYPERTRIGLYCERIDEMIA: ICD-10-CM

## 2021-09-12 DIAGNOSIS — E11.49 OTHER DIABETIC NEUROLOGICAL COMPLICATION ASSOCIATED WITH TYPE 2 DIABETES MELLITUS (HCC): ICD-10-CM

## 2021-09-12 DIAGNOSIS — I10 ESSENTIAL HYPERTENSION: ICD-10-CM

## 2021-09-12 DIAGNOSIS — Z79.4 TYPE 2 DIABETES MELLITUS WITHOUT COMPLICATION, WITH LONG-TERM CURRENT USE OF INSULIN (HCC): ICD-10-CM

## 2021-09-12 DIAGNOSIS — E11.9 TYPE 2 DIABETES MELLITUS WITHOUT COMPLICATION, WITH LONG-TERM CURRENT USE OF INSULIN (HCC): ICD-10-CM

## 2021-09-13 RX ORDER — ASPIRIN 81 MG/1
TABLET ORAL
Qty: 30 TABLET | Refills: 0 | Status: SHIPPED | OUTPATIENT
Start: 2021-09-13 | End: 2021-10-04

## 2021-09-13 RX ORDER — OMEPRAZOLE 20 MG/1
CAPSULE, DELAYED RELEASE ORAL
Qty: 30 CAPSULE | Refills: 0 | Status: SHIPPED | OUTPATIENT
Start: 2021-09-13 | End: 2021-10-04

## 2021-09-13 RX ORDER — GABAPENTIN 400 MG/1
CAPSULE ORAL
Qty: 90 CAPSULE | Refills: 0 | Status: SHIPPED | OUTPATIENT
Start: 2021-09-13 | End: 2021-10-04

## 2021-09-30 DIAGNOSIS — E11.40 TYPE 2 DIABETES MELLITUS WITH DIABETIC NEUROPATHY, WITHOUT LONG-TERM CURRENT USE OF INSULIN (HCC): ICD-10-CM

## 2021-09-30 RX ORDER — SIMVASTATIN 20 MG/1
TABLET, FILM COATED ORAL
Qty: 30 TABLET | Refills: 5 | Status: SHIPPED | OUTPATIENT
Start: 2021-09-30 | End: 2022-05-23

## 2021-10-03 DIAGNOSIS — E78.1 HYPERTRIGLYCERIDEMIA: ICD-10-CM

## 2021-10-03 DIAGNOSIS — I10 ESSENTIAL HYPERTENSION: ICD-10-CM

## 2021-10-03 DIAGNOSIS — E11.49 OTHER DIABETIC NEUROLOGICAL COMPLICATION ASSOCIATED WITH TYPE 2 DIABETES MELLITUS (HCC): ICD-10-CM

## 2021-10-03 DIAGNOSIS — E11.9 TYPE 2 DIABETES MELLITUS WITHOUT COMPLICATION, WITH LONG-TERM CURRENT USE OF INSULIN (HCC): ICD-10-CM

## 2021-10-03 DIAGNOSIS — Z79.4 TYPE 2 DIABETES MELLITUS WITHOUT COMPLICATION, WITH LONG-TERM CURRENT USE OF INSULIN (HCC): ICD-10-CM

## 2021-10-04 RX ORDER — ASPIRIN 81 MG/1
TABLET ORAL
Qty: 30 TABLET | Refills: 5 | Status: SHIPPED | OUTPATIENT
Start: 2021-10-04 | End: 2022-05-03

## 2021-10-04 RX ORDER — GABAPENTIN 400 MG/1
CAPSULE ORAL
Qty: 90 CAPSULE | Refills: 2 | Status: SHIPPED | OUTPATIENT
Start: 2021-10-04 | End: 2022-01-07

## 2021-10-04 RX ORDER — OMEPRAZOLE 20 MG/1
CAPSULE, DELAYED RELEASE ORAL
Qty: 30 CAPSULE | Refills: 5 | Status: SHIPPED | OUTPATIENT
Start: 2021-10-04 | End: 2022-05-03

## 2021-10-04 RX ORDER — ALBUTEROL SULFATE 90 UG/1
AEROSOL, METERED RESPIRATORY (INHALATION)
Qty: 9 G | Refills: 5 | Status: SHIPPED | OUTPATIENT
Start: 2021-10-04 | End: 2022-08-05

## 2021-10-12 RX ORDER — ERGOCALCIFEROL 1.25 MG/1
CAPSULE ORAL
Qty: 24 CAPSULE | Refills: 1 | Status: SHIPPED | OUTPATIENT
Start: 2021-10-12 | End: 2022-05-16

## 2021-11-01 DIAGNOSIS — E11.40 TYPE 2 DIABETES MELLITUS WITH DIABETIC NEUROPATHY, WITHOUT LONG-TERM CURRENT USE OF INSULIN (HCC): ICD-10-CM

## 2021-11-22 ENCOUNTER — VIRTUAL VISIT (OUTPATIENT)
Dept: FAMILY MEDICINE CLINIC | Age: 41
End: 2021-11-22
Payer: MEDICAID

## 2021-11-22 DIAGNOSIS — J01.00 ACUTE NON-RECURRENT MAXILLARY SINUSITIS: Primary | ICD-10-CM

## 2021-11-22 PROCEDURE — 99213 OFFICE O/P EST LOW 20 MIN: CPT | Performed by: NURSE PRACTITIONER

## 2021-11-22 RX ORDER — AMOXICILLIN AND CLAVULANATE POTASSIUM 875; 125 MG/1; MG/1
1 TABLET, FILM COATED ORAL 2 TIMES DAILY
Qty: 20 TABLET | Refills: 0 | Status: SHIPPED | OUTPATIENT
Start: 2021-11-22 | End: 2021-12-02

## 2021-11-22 RX ORDER — BENZONATATE 100 MG/1
100 CAPSULE ORAL
Qty: 21 CAPSULE | Refills: 0 | Status: SHIPPED | OUTPATIENT
Start: 2021-11-22 | End: 2021-11-29

## 2021-11-22 NOTE — PROGRESS NOTES
HISTORY OF PRESENT ILLNESS  Rob Kimball is a 35 y.o. female. HPI  Pt presents with \"sinus infection\"  Visit was conducted via [employer] me  Rob Kimball, who was evaluated through a synchronous (real-time) audio-video encounter, and/or her healthcare decision maker, is aware that it is a billable service, with coverage as determined by her insurance carrier. She provided verbal consent to proceed: Yes, and patient identification was verified. This visit was conducted pursuant to the emergency declaration under the [address] [place] [address], [address] [place] [address] authority and the [employer] Act. A caregiver was present when appropriate. Ability to conduct physical exam was limited. The patient was located in a state where the provider was credentialed to provide care. --Margie Champion NP on 11/22/2021 at 8:04 AM    Pt states that she believes that she has a sinus infection  Sinus pain and pressure  Head congestion  Pt's daughter was seen at [place] [address] over the weekend and diagnosed with bronchitis. No fever  OTC: halima seltzer cold and sinus  Review of Systems   Constitutional: Negative for fever. HENT: Positive for congestion and sinus pain. Respiratory: Positive for cough. Physical Exam  Constitutional:       Appearance: Normal appearance. Cardiovascular:      Rate and Rhythm: Normal rate and regular rhythm. Heart sounds: Normal heart sounds. Pulmonary:      Effort: Pulmonary effort is normal.      Breath sounds: Normal breath sounds. Neurological:      Mental Status: She is alert. Psychiatric:         Mood and Affect: Mood normal.         Behavior: Behavior normal.         ASSESSMENT and PLAN    ICD-10-CM ICD-9-CM    1.  Acute non-recurrent maxillary sinusitis  J01.00 461.0 amoxicillin-clavulanate (AUGMENTIN) 875-125 mg per tablet      benzonatate (Tessalon Perles) 100 mg capsule     Educated about taking medication as prescribed, with food  Should stay well hydrated and treat fever as needed    Pt informed to return to office with worsening of symptoms, or PRN with any questions or concerns. Pt verbalizes understanding of plan of care and denies further questions or concerns at this time.

## 2021-12-03 ENCOUNTER — OFFICE VISIT (OUTPATIENT)
Dept: FAMILY MEDICINE CLINIC | Age: 41
End: 2021-12-03
Payer: MEDICAID

## 2021-12-03 VITALS
TEMPERATURE: 97.8 F | HEIGHT: 65 IN | RESPIRATION RATE: 16 BRPM | DIASTOLIC BLOOD PRESSURE: 78 MMHG | WEIGHT: 210 LBS | HEART RATE: 91 BPM | BODY MASS INDEX: 34.99 KG/M2 | SYSTOLIC BLOOD PRESSURE: 122 MMHG | OXYGEN SATURATION: 98 %

## 2021-12-03 DIAGNOSIS — G89.29 CHRONIC MIDLINE LOW BACK PAIN WITH RIGHT-SIDED SCIATICA: ICD-10-CM

## 2021-12-03 DIAGNOSIS — T14.8XXA MUSCLE STRAIN: Primary | ICD-10-CM

## 2021-12-03 DIAGNOSIS — M54.41 CHRONIC MIDLINE LOW BACK PAIN WITH RIGHT-SIDED SCIATICA: ICD-10-CM

## 2021-12-03 PROCEDURE — 99213 OFFICE O/P EST LOW 20 MIN: CPT | Performed by: NURSE PRACTITIONER

## 2021-12-03 RX ORDER — CYCLOBENZAPRINE HCL 10 MG
TABLET ORAL
Qty: 30 TABLET | Refills: 0 | Status: SHIPPED | OUTPATIENT
Start: 2021-12-03 | End: 2021-12-21

## 2021-12-03 RX ORDER — METHYLPREDNISOLONE 4 MG/1
TABLET ORAL
Qty: 1 DOSE PACK | Refills: 0 | Status: SHIPPED | OUTPATIENT
Start: 2021-12-03 | End: 2022-09-27 | Stop reason: ALTCHOICE

## 2021-12-03 NOTE — PROGRESS NOTES
HISTORY OF PRESENT ILLNESS  Ary Gerard is a 35 y.o. female. HPI   Pt presents with \"muscle strain\"  Pt states that 4 days ago, she was stretching in the car, and pulled something on the left side of her chest.    She called her cardiologist, and he did not believe that her symptoms were cardiac in origin. She states that the pain is from the left side of her chest, up to the left side of her neck and shoulder  The muscles in the area are tender to the touch  She states that movement makes the pain worse  No pain with rest  She has been trying Tylenol, with on relief  She has no pain with deep breathing  Review of Systems   Constitutional: Negative for fever. Musculoskeletal: Positive for myalgias. Physical Exam  Constitutional:       Appearance: Normal appearance. Cardiovascular:      Rate and Rhythm: Normal rate and regular rhythm. Heart sounds: Normal heart sounds. Pulmonary:      Effort: Pulmonary effort is normal.      Breath sounds: Normal breath sounds. Chest:       Neurological:      Mental Status: She is alert. Psychiatric:         Mood and Affect: Mood normal.         Behavior: Behavior normal.         ASSESSMENT and PLAN    ICD-10-CM ICD-9-CM    1. Muscle strain  T14. 8XXA 848.9 methylPREDNISolone (MEDROL DOSEPACK) 4 mg tablet   2. Chronic midline low back pain with right-sided sciatica  M54.41 724.2 cyclobenzaprine (FLEXERIL) 10 mg tablet    G89.29 724.3      338.29      Educated about taking medications as prescribed  Should notify office should symptoms continue and/or worsen    Pt informed to return to office with worsening of symptoms, or PRN with any questions or concerns. Pt verbalizes understanding of plan of care and denies further questions or concerns at this time.

## 2021-12-03 NOTE — PROGRESS NOTES
Identified pt with two pt identifiers(name and ). Chief Complaint   Patient presents with    Arm Pain     for 5 days        Health Maintenance Due   Topic    COVID-19 Vaccine (1)    Eye Exam Retinal or Dilated     DTaP/Tdap/Td series (2 - Td or Tdap)    Foot Exam Q1     Flu Vaccine (1)    MICROALBUMIN Q1        Wt Readings from Last 3 Encounters:   21 210 lb (95.3 kg)   21 207 lb (93.9 kg)   21 207 lb 4.8 oz (94 kg)     Temp Readings from Last 3 Encounters:   21 97.8 °F (36.6 °C) (Temporal)   21 (!) 96.2 °F (35.7 °C) (Oral)   21 97.7 °F (36.5 °C) (Temporal)     BP Readings from Last 3 Encounters:   21 122/78   21 128/80   21 132/82     Pulse Readings from Last 3 Encounters:   21 91   21 (!) 103   21 97         Learning Assessment:  :     Learning Assessment 2017   PRIMARY LEARNER Patient Patient   HIGHEST LEVEL OF EDUCATION - PRIMARY LEARNER  GRADUATED HIGH SCHOOL OR GED GRADUATED HIGH SCHOOL OR GED   BARRIERS PRIMARY LEARNER NONE NONE   PRIMARY LANGUAGE ENGLISH ENGLISH   LEARNER PREFERENCE PRIMARY READING READING   ANSWERED BY patient patient   RELATIONSHIP SELF SELF       Depression Screening:  :     3 most recent PHQ Screens 12/3/2021   Little interest or pleasure in doing things Not at all   Feeling down, depressed, irritable, or hopeless Not at all   Total Score PHQ 2 0       Fall Risk Assessment:  :     Fall Risk Assessment, last 12 mths 2017   Able to walk? Yes   Fall in past 12 months? No       Abuse Screening:  :     Abuse Screening Questionnaire 12/3/2021 2021 2021 2020 2019 2018 2017   Do you ever feel afraid of your partner? N N N N N N N   Are you in a relationship with someone who physically or mentally threatens you? N N N N N N N   Is it safe for you to go home?  Y Y Y Y Y Y Y       Coordination of Care Questionnaire:  :     1) Have you been to an emergency room, urgent care clinic since your last visit? no   Hospitalized since your last visit? no             2) Have you seen or consulted any other health care providers outside of [address] [place] [address] since your last visit? no  (Include any pap smears or colon screenings in this section.)    3) Do you have an Advance Directive on file? no  Are you interested in receiving information about Advance Directives? no      Reviewed record in preparation for visit and have obtained necessary documentation.

## 2021-12-03 NOTE — PATIENT INSTRUCTIONS
Neck Pain: Care Instructions  Your Care Instructions     You can have neck pain anywhere from the bottom of your head to the top of your shoulders. It can spread to the upper back or arms. Injuries, painting a ceiling, sleeping with your neck twisted, staying in one position for too long, and many other activities can cause neck pain. Most neck pain gets better with home care. Your doctor may recommend medicine to relieve pain or relax your muscles. He or she may suggest exercise and physical therapy to increase flexibility and relieve stress. You may need to wear a special (cervical) collar to support your neck for a day or two. Follow-up care is a key part of your treatment and safety. Be sure to make and go to all appointments, and call your doctor if you are having problems. It's also a good idea to know your test results and keep a list of the medicines you take. How can you care for yourself at home? · Try using a heating pad on a low or medium setting for 15 to 20 minutes every 2 or 3 hours. Try a warm shower in place of one session with the heating pad. · You can also try an ice pack for 10 to 15 minutes every 2 to 3 hours. Put a thin cloth between the ice and your skin. · Take pain medicines exactly as directed. ? If the doctor gave you a prescription medicine for pain, take it as prescribed. ? If you are not taking a prescription pain medicine, ask your doctor if you can take an over-the-counter medicine. · If your doctor recommends a cervical collar, wear it exactly as directed. When should you call for help? Call your doctor now or seek immediate medical care if:    · You have new or worsening numbness in your arms, buttocks or legs.     · You have new or worsening weakness in your arms or legs. (This could make it hard to stand up.)     · You lose control of your bladder or bowels.    Watch closely for changes in your health, and be sure to contact your doctor if:    · Your neck pain is getting worse.     · You are not getting better after 1 week.     · You do not get better as expected. Where can you learn more? Go to http://www.[employer].com/  Enter V723 in the search box to learn more about \"Neck Pain: Care Instructions. \"  Current as of: July 1, 2021               Content Version: 13.0  © 4387-1421 [employer]. Care instructions adapted under license by [employer] (which disclaims liability or warranty for this information). If you have questions about a medical condition or this instruction, always ask your healthcare professional. [place] [address] any warranty or liability for your use of this information.

## 2021-12-14 DIAGNOSIS — E11.40 TYPE 2 DIABETES MELLITUS WITH DIABETIC NEUROPATHY, WITHOUT LONG-TERM CURRENT USE OF INSULIN (HCC): ICD-10-CM

## 2021-12-14 RX ORDER — METFORMIN HYDROCHLORIDE 1000 MG/1
TABLET ORAL
Qty: 60 TABLET | Refills: 5 | Status: SHIPPED | OUTPATIENT
Start: 2021-12-14 | End: 2022-05-16

## 2021-12-21 ENCOUNTER — PATIENT MESSAGE (OUTPATIENT)
Dept: FAMILY MEDICINE CLINIC | Age: 41
End: 2021-12-21

## 2021-12-21 DIAGNOSIS — E53.8 VITAMIN B12 DEFICIENCY: ICD-10-CM

## 2021-12-21 DIAGNOSIS — G89.29 CHRONIC MIDLINE LOW BACK PAIN WITH RIGHT-SIDED SCIATICA: ICD-10-CM

## 2021-12-21 DIAGNOSIS — M54.41 CHRONIC MIDLINE LOW BACK PAIN WITH RIGHT-SIDED SCIATICA: ICD-10-CM

## 2021-12-21 RX ORDER — LANOLIN ALCOHOL/MO/W.PET/CERES
1000 CREAM (GRAM) TOPICAL DAILY
Qty: 30 TABLET | Refills: 5 | Status: SHIPPED | OUTPATIENT
Start: 2021-12-21 | End: 2022-04-29 | Stop reason: DRUGHIGH

## 2021-12-21 RX ORDER — CYCLOBENZAPRINE HCL 10 MG
TABLET ORAL
Qty: 30 TABLET | Refills: 0 | Status: SHIPPED | OUTPATIENT
Start: 2021-12-21 | End: 2022-01-31

## 2021-12-27 DIAGNOSIS — E11.40 TYPE 2 DIABETES MELLITUS WITH DIABETIC NEUROPATHY, WITHOUT LONG-TERM CURRENT USE OF INSULIN (HCC): ICD-10-CM

## 2022-01-07 DIAGNOSIS — E11.49 OTHER DIABETIC NEUROLOGICAL COMPLICATION ASSOCIATED WITH TYPE 2 DIABETES MELLITUS (HCC): ICD-10-CM

## 2022-01-07 RX ORDER — GABAPENTIN 400 MG/1
CAPSULE ORAL
Qty: 90 CAPSULE | Refills: 0 | Status: SHIPPED | OUTPATIENT
Start: 2022-01-07 | End: 2022-01-31

## 2022-01-07 RX ORDER — FAMOTIDINE 40 MG/1
TABLET, FILM COATED ORAL
Qty: 30 TABLET | Refills: 0 | Status: SHIPPED | OUTPATIENT
Start: 2022-01-07 | End: 2022-03-01

## 2022-01-11 ENCOUNTER — VIRTUAL VISIT (OUTPATIENT)
Dept: FAMILY MEDICINE CLINIC | Age: 42
End: 2022-01-11
Payer: MEDICAID

## 2022-01-11 DIAGNOSIS — R30.0 DYSURIA: ICD-10-CM

## 2022-01-11 DIAGNOSIS — J01.10 ACUTE NON-RECURRENT FRONTAL SINUSITIS: Primary | ICD-10-CM

## 2022-01-11 PROCEDURE — 99213 OFFICE O/P EST LOW 20 MIN: CPT | Performed by: NURSE PRACTITIONER

## 2022-01-11 RX ORDER — AMOXICILLIN AND CLAVULANATE POTASSIUM 875; 125 MG/1; MG/1
1 TABLET, FILM COATED ORAL 2 TIMES DAILY
Qty: 20 TABLET | Refills: 0 | Status: SHIPPED | OUTPATIENT
Start: 2022-01-11 | End: 2022-01-21

## 2022-01-11 NOTE — PROGRESS NOTES
HISTORY OF PRESENT ILLNESS  Ermias Berrios is a 35 y.o. female. HPI   Pt presents with \"sinus infection\"  Visit was conducted via [employer] me  Ermias Berrios, who was evaluated through a synchronous (real-time) audio-video encounter, and/or her healthcare decision maker, is aware that it is a billable service, with coverage as determined by her insurance carrier. She provided verbal consent to proceed: Yes, and patient identification was verified. This visit was conducted pursuant to the emergency declaration under the [facility], [address] [place] [address] authority and the [employer] and [employer] General Act. A caregiver was present when appropriate. Ability to conduct physical exam was limited. The patient was located in a state where the provider was credentialed to provide care. --Alanna Porter NP on 1/11/2022 at 11:06 AM    Pt states that she has had symptoms for 2 days  Right ear pain  Right side of sinuses are congested  Forehead congestion  No sick contacts    Otc: maritza seltzer sinus pills    In addition, she notes that she has had some burning with urination, after starting the Maritza [place] pills  No blood noted in urine  No abdominal pain  Review of Systems   Constitutional: Negative for fever. HENT: Positive for congestion, ear pain and sinus pain. Physical Exam  Constitutional:       Appearance: Normal appearance. Neurological:      Mental Status: She is alert. Psychiatric:         Mood and Affect: Mood normal.         Behavior: Behavior normal.         ASSESSMENT and PLAN    ICD-10-CM ICD-9-CM    1. Acute non-recurrent frontal sinusitis  J01.10 461.1 amoxicillin-clavulanate (AUGMENTIN) 875-125 mg per tablet   2.  Dysuria  R30.0 788.1 amoxicillin-clavulanate (AUGMENTIN) 875-125 mg per tablet     Educated about taking medication as prescribed, with food  Should stay well hydrated, and treat fever as needed  Should notify office should symptoms continue and/or worsen    Pt informed to return to office with worsening of symptoms, or PRN with any questions or concerns. Pt verbalizes understanding of plan of care and denies further questions or concerns at this time.

## 2022-01-30 DIAGNOSIS — G89.29 CHRONIC MIDLINE LOW BACK PAIN WITH RIGHT-SIDED SCIATICA: ICD-10-CM

## 2022-01-30 DIAGNOSIS — M54.41 CHRONIC MIDLINE LOW BACK PAIN WITH RIGHT-SIDED SCIATICA: ICD-10-CM

## 2022-01-31 RX ORDER — CYCLOBENZAPRINE HCL 10 MG
TABLET ORAL
Qty: 30 TABLET | Refills: 0 | Status: SHIPPED | OUTPATIENT
Start: 2022-01-31 | End: 2022-03-01

## 2022-03-01 DIAGNOSIS — M54.41 CHRONIC MIDLINE LOW BACK PAIN WITH RIGHT-SIDED SCIATICA: ICD-10-CM

## 2022-03-01 DIAGNOSIS — G89.29 CHRONIC MIDLINE LOW BACK PAIN WITH RIGHT-SIDED SCIATICA: ICD-10-CM

## 2022-03-01 RX ORDER — FAMOTIDINE 40 MG/1
TABLET, FILM COATED ORAL
Qty: 30 TABLET | Refills: 0 | Status: SHIPPED | OUTPATIENT
Start: 2022-03-01 | End: 2022-03-25

## 2022-03-01 RX ORDER — CYCLOBENZAPRINE HCL 10 MG
TABLET ORAL
Qty: 30 TABLET | Refills: 0 | Status: SHIPPED | OUTPATIENT
Start: 2022-03-01 | End: 2022-03-14

## 2022-03-14 DIAGNOSIS — M54.41 CHRONIC MIDLINE LOW BACK PAIN WITH RIGHT-SIDED SCIATICA: ICD-10-CM

## 2022-03-14 DIAGNOSIS — G89.29 CHRONIC MIDLINE LOW BACK PAIN WITH RIGHT-SIDED SCIATICA: ICD-10-CM

## 2022-03-14 RX ORDER — CYCLOBENZAPRINE HCL 10 MG
TABLET ORAL
Qty: 30 TABLET | Refills: 0 | Status: SHIPPED | OUTPATIENT
Start: 2022-03-14 | End: 2022-03-25

## 2022-03-18 PROBLEM — R00.2 PALPITATIONS: Status: ACTIVE | Noted: 2019-04-08

## 2022-03-19 PROBLEM — I73.9 PERIPHERAL VASCULAR DISEASE (HCC): Status: ACTIVE | Noted: 2020-02-18

## 2022-03-19 PROBLEM — L30.9 DERMATITIS: Status: ACTIVE | Noted: 2017-03-09

## 2022-03-19 PROBLEM — I47.11 INAPPROPRIATE SINUS TACHYCARDIA: Status: ACTIVE | Noted: 2019-05-20

## 2022-03-19 PROBLEM — M54.6 ACUTE MIDLINE THORACIC BACK PAIN: Status: ACTIVE | Noted: 2017-09-26

## 2022-03-19 PROBLEM — K42.9 RECURRENT UMBILICAL HERNIA: Status: ACTIVE | Noted: 2018-11-28

## 2022-03-19 PROBLEM — R00.0 INAPPROPRIATE SINUS TACHYCARDIA: Status: ACTIVE | Noted: 2019-05-20

## 2022-03-19 PROBLEM — J01.10 ACUTE NON-RECURRENT FRONTAL SINUSITIS: Status: ACTIVE | Noted: 2017-01-03

## 2022-03-20 PROBLEM — H65.191 ACUTE MUCOID OTITIS MEDIA OF RIGHT EAR: Status: ACTIVE | Noted: 2017-06-23

## 2022-03-20 PROBLEM — E11.40 TYPE 2 DIABETES MELLITUS WITH DIABETIC NEUROPATHY (HCC): Status: ACTIVE | Noted: 2020-02-18

## 2022-03-20 PROBLEM — E53.8 VITAMIN B12 DEFICIENCY: Status: ACTIVE | Noted: 2020-12-09

## 2022-03-20 PROBLEM — I47.1 ATRIAL TACHYCARDIA (HCC): Status: ACTIVE | Noted: 2021-01-11

## 2022-03-20 PROBLEM — I47.19 ATRIAL TACHYCARDIA: Status: ACTIVE | Noted: 2021-01-11

## 2022-03-20 PROBLEM — H65.111 ACUTE MUCOID OTITIS MEDIA OF RIGHT EAR: Status: ACTIVE | Noted: 2017-06-23

## 2022-04-07 ENCOUNTER — VIRTUAL VISIT (OUTPATIENT)
Dept: FAMILY MEDICINE CLINIC | Age: 42
End: 2022-04-07
Payer: MEDICAID

## 2022-04-07 DIAGNOSIS — Z91.09 ENVIRONMENTAL ALLERGIES: ICD-10-CM

## 2022-04-07 DIAGNOSIS — J01.01 ACUTE RECURRENT MAXILLARY SINUSITIS: Primary | ICD-10-CM

## 2022-04-07 PROCEDURE — 99213 OFFICE O/P EST LOW 20 MIN: CPT | Performed by: INTERNAL MEDICINE

## 2022-04-07 RX ORDER — FLUTICASONE PROPIONATE 50 MCG
SPRAY, SUSPENSION (ML) NASAL
Qty: 1 EACH | Refills: 5 | Status: SHIPPED | OUTPATIENT
Start: 2022-04-07

## 2022-04-07 RX ORDER — AZITHROMYCIN 250 MG/1
TABLET, FILM COATED ORAL
Qty: 6 TABLET | Refills: 0 | Status: SHIPPED | OUTPATIENT
Start: 2022-04-07 | End: 2022-04-12

## 2022-04-07 NOTE — PROGRESS NOTES
Chief Complaint   Patient presents with    Sinus Infection         Evfilipe Matthews, who was evaluated through a synchronous (real-time) audio-video encounter, and/or her healthcare decision maker, is aware that it is a billable service, which includes applicable co-pays, with coverage as determined by her insurance carrier. She provided verbal consent to proceed and patient identification was verified. This visit was conducted pursuant to the emergency declaration under the [address] [place] [address], [address] [place] [address] and the [employer] General Act. A caregiver was present when appropriate. Ability to conduct physical exam was limited. The patient was located at home in a state where the provider was licensed to provide care. --Radha Narvaez MD on 4/7/2022 at 1:11 PM      Assessment & Plan:   Diagnoses and all orders for this visit:    1. Acute recurrent maxillary sinusitis  -     azithromycin (ZITHROMAX) 250 mg tablet; Take 2 tablets today, then take 1 tablet daily    2. Environmental allergies  -     fluticasone propionate (FLONASE) 50 mcg/actuation nasal spray; 2 squirts each nostril daily    I spent at least 15 minutes on this visit with this established patient. We discussed the expected course, resolution and complications of the diagnosis(es) in detail. Medication risks, benefits, costs, interactions, and alternatives were discussed as indicated. I advised her to contact the office if her condition worsens, changes or fails to improve as anticipated. She expressed understanding with the diagnosis(es) and plan. Subjective:   41F with recurrent sinusitis who presents with new symptoms of nasal congesiton, right ear pain and intermittent non-productive cough. She has had 1 COVID-19 vaccination, but did end up catching COVID-19, so has to wait for 3-months before getting the 2nd shot. She reports that she has had no fever. Just the lingering congestion and ear pain. Review of Systems   Constitutional: Positive for chills. HENT: Positive for congestion, ear pain (right er) and sinus pain. Eyes: Negative. Respiratory: Positive for cough and sputum production. Cardiovascular: Negative. Gastrointestinal: Negative. Genitourinary: Negative. Musculoskeletal: Negative. Skin: Negative. Neurological: Negative. Endo/Heme/Allergies: Negative. Psychiatric/Behavioral: Negative. All other systems reviewed and are negative. Objective: There were no vitals taken for this virtual visit.      General: alert, cooperative, no distress   Mental  status: normal mood, behavior, speech, dress, motor activity, and thought processes, able to follow commands   HENT: NCAT   Neck: no visualized mass   Resp: no respiratory distress   Neuro: no gross deficits   Skin: no discoloration or lesions of concern on visible areas   Psychiatric: normal affect, consistent with stated mood, no evidence of hallucinations

## 2022-04-23 DIAGNOSIS — E11.40 TYPE 2 DIABETES MELLITUS WITH DIABETIC NEUROPATHY, WITHOUT LONG-TERM CURRENT USE OF INSULIN (HCC): ICD-10-CM

## 2022-04-23 DIAGNOSIS — E11.9 TYPE 2 DIABETES MELLITUS WITHOUT COMPLICATION, WITH LONG-TERM CURRENT USE OF INSULIN (HCC): ICD-10-CM

## 2022-04-23 DIAGNOSIS — E78.1 HYPERTRIGLYCERIDEMIA: ICD-10-CM

## 2022-04-23 DIAGNOSIS — I10 ESSENTIAL HYPERTENSION: ICD-10-CM

## 2022-04-23 DIAGNOSIS — Z79.4 TYPE 2 DIABETES MELLITUS WITHOUT COMPLICATION, WITH LONG-TERM CURRENT USE OF INSULIN (HCC): ICD-10-CM

## 2022-04-25 RX ORDER — ASPIRIN 81 MG/1
TABLET ORAL
Qty: 30 TABLET | Refills: 0 | OUTPATIENT
Start: 2022-04-25

## 2022-04-25 RX ORDER — OMEPRAZOLE 20 MG/1
CAPSULE, DELAYED RELEASE ORAL
Qty: 30 CAPSULE | Refills: 0 | OUTPATIENT
Start: 2022-04-25

## 2022-04-26 ENCOUNTER — TELEPHONE (OUTPATIENT)
Dept: FAMILY MEDICINE CLINIC | Age: 42
End: 2022-04-26

## 2022-04-26 ENCOUNTER — OFFICE VISIT (OUTPATIENT)
Dept: FAMILY MEDICINE CLINIC | Age: 42
End: 2022-04-26
Payer: MEDICAID

## 2022-04-26 VITALS
DIASTOLIC BLOOD PRESSURE: 86 MMHG | BODY MASS INDEX: 33.99 KG/M2 | SYSTOLIC BLOOD PRESSURE: 136 MMHG | RESPIRATION RATE: 16 BRPM | HEART RATE: 89 BPM | OXYGEN SATURATION: 98 % | WEIGHT: 204 LBS | HEIGHT: 65 IN | TEMPERATURE: 97.8 F

## 2022-04-26 DIAGNOSIS — E55.9 VITAMIN D DEFICIENCY: ICD-10-CM

## 2022-04-26 DIAGNOSIS — I10 PRIMARY HYPERTENSION: ICD-10-CM

## 2022-04-26 DIAGNOSIS — E53.8 VITAMIN B 12 DEFICIENCY: ICD-10-CM

## 2022-04-26 DIAGNOSIS — E11.40 TYPE 2 DIABETES MELLITUS WITH DIABETIC NEUROPATHY, WITHOUT LONG-TERM CURRENT USE OF INSULIN (HCC): Primary | ICD-10-CM

## 2022-04-26 PROCEDURE — 99213 OFFICE O/P EST LOW 20 MIN: CPT | Performed by: NURSE PRACTITIONER

## 2022-04-26 RX ORDER — VERAPAMIL HYDROCHLORIDE 120 MG/1
120 TABLET, FILM COATED, EXTENDED RELEASE ORAL DAILY
COMMUNITY
Start: 2022-04-15

## 2022-04-26 NOTE — PROGRESS NOTES
Subjective:     Zaynab Sinha is a 35 y.o. female seen for follow up of diabetes. She also has hypertension and hyperlipidemia. Diabetic Review of Systems - medication compliance: compliant most of the time, diabetic diet compliance: compliant most of the time. Other symptoms and concerns: Pt is here for fasting labs and follow up. She brought in her eye exam with her, will scan into chart. .    Patient Active Problem List    Diagnosis Date Noted    Atrial tachycardia ([facility] 01/11/2021    Vitamin B12 deficiency 12/09/2020    Type 2 diabetes mellitus with diabetic neuropathy ([facility] 02/18/2020    Peripheral vascular disease ([facility] 02/18/2020    Inappropriate sinus tachycardia 05/20/2019    Palpitations 04/08/2019    Recurrent umbilical hernia 34/55/9005    Acute midline thoracic back pain 09/26/2017    Acute mucoid otitis media of right ear 06/23/2017    Dermatitis 03/09/2017    Acute non-recurrent frontal sinusitis 01/03/2017    Left lower quadrant pain 12/29/2016    Left ear pain 12/02/2016    Strep throat 11/09/2016    Nausea 10/07/2016    Right acute serous otitis media 07/14/2016    Rash 07/14/2016    Gastroesophageal reflux disease without esophagitis 07/14/2016    Environmental allergies 07/14/2016    Right ear pain 06/21/2016    Diabetes ([facility] 07/18/2014    Sore throat 05/06/2014    Migraine headache 01/03/2012    Vitamin D deficiency 11/06/2011    Dyslipidemia (high LDL; low HDL) 01/24/2011    Hypertension 01/06/2011    Asthma 01/06/2011     Current Outpatient Medications   Medication Sig Dispense Refill    verapamil ER (CALAN-SR) 120 mg tablet Take 120 mg by mouth daily.       fluticasone propionate (FLONASE) 50 mcg/actuation nasal spray 2 squirts each nostril daily 1 Each 5    famotidine (PEPCID) 40 mg tablet TAKE 1 TABLET BY MOUTH ONCE DAILY AT BEDTIME 30 Tablet 5    cyclobenzaprine (FLEXERIL) 10 mg tablet TAKE 1 TABLET BY MOUTH THREE TIMES DAILY AS NEEDED FOR MUSCLE SPASM DO  NOT  DRIVE  WHILE  TAKING  THIS  MEDICATION 30 Tablet 5    gabapentin (NEURONTIN) 400 mg capsule TAKE 1 CAPSULE BY MOUTH THREE TIMES DAILY . DO NOT EXCEED 3 PER 24 HOURS 90 Capsule 2    canagliflozin (Invokana) 300 mg tablet TAKE 1 TABLET BY MOUTH ONCE DAILY BEFORE BREAKFAST 30 Tablet 5    cyanocobalamin 1,000 mcg tablet Take 1 Tablet by mouth daily. 30 Tablet 5    fenofibrate (LOFIBRA) 160 mg tablet Take 1 tablet by mouth once daily 30 Tablet 5    metFORMIN (GLUCOPHAGE) 1,000 mg tablet Take 1 tablet by mouth twice daily 60 Tablet 5    methylPREDNISolone (MEDROL DOSEPACK) 4 mg tablet Take as prescribed. 1 Dose Pack 0    SITagliptin (Januvia) 100 mg tablet Take 1 tablet by mouth once daily 30 Tablet 5    ergocalciferol (ERGOCALCIFEROL) 1,250 mcg (50,000 unit) capsule TAKE 1 CAPSULE BY MOUTH TWICE A WEEK 24 Capsule 1    aspirin delayed-release 81 mg tablet Take 1 tablet by mouth once daily 30 Tablet 5    omeprazole (PRILOSEC) 20 mg capsule Take 1 capsule by mouth once daily 30 Capsule 5    ProAir HFA 90 mcg/actuation inhaler INHALE 2 PUFFS BY MOUTH EVERY 4 HOURS AS NEEDED FOR WHEEZING 9 g 5    simvastatin (ZOCOR) 20 mg tablet TAKE 1 TABLET BY MOUTH EVERY DAY 30 Tablet 5    EPINEPHrine (EPIPEN) 0.3 mg/0.3 mL injection INJECT CONTENTS OF 1 PEN BY INTRAMUSCULAR ROUTE AS NEEDED FOR ALLERGIC RESPONSE FOR UP TO 1 DOSE      lisinopriL (PRINIVIL, ZESTRIL) 10 mg tablet Take 10 mg by mouth daily.  ivabradine (CORLANOR) 5 mg tablet Take  by mouth two (2) times daily (with meals).  glipiZIDE SR (GLUCOTROL XL) 5 mg CR tablet Take 1 Tablet by mouth daily. 30 Tablet 5    losartan (COZAAR) 25 mg tablet Take 1 Tab by mouth once over twenty-four (24) hours.  loratadine (CLARITIN) 10 mg tablet Take 1 Tab by mouth daily. To replace xyzal. 30 Tab 5    fluticasone propionate (FLONASE) 50 mcg/actuation nasal spray 2 Sprays by Both Nostrils route daily.  1 Bottle 0    glucose blood VI test strips (True Metrix Glucose Test Strip) strip To use to check blood sugar daily,  Dx: E11.9 100 Strip 2    butalbital-acetaminophen-caffeine (FIORICET, ESGIC) -40 mg per tablet Take 1 Tab by mouth every six (6) hours as needed for Headache. 20 Tab 1    SUMAtriptan (IMITREX) 50 mg tablet TAKE 1 TABLET BY MOUTH AT THE ONSET OF A MIGRAINE HEADACHE. REPEAT IN 1 HOUR IF NEEDED. TAKE NO MORE THAN 2 TABLETS PER 24 HOURS 9 Tab 5     Allergies   Allergen Reactions    Beef Containing Products Rash    Green Pepper Hives     Red, Yellow and Orange Peppers also. OK with Black Pepper    Other Plant, Animal, Environmental Swelling     Red meat    Pork Derived (Porcine) Rash     Past Medical History:   Diagnosis Date    Anxiety     Arrhythmia     HX TACHYCARDIA - NEG.  STRESS TEST 2013 PER PATIENT    Asthma     LAST EPISODE 2016    Diabetes ([facility] 2008    NIDDM    Ear infection 11/30/2018    BILATERAL    GERD (gastroesophageal reflux disease)     Headache     Headache(784.0)     Hypertension     HX HTN - NO MEDS CURRENTLY(11-30-18)    Inappropriate sinus tachycardia 5/20/2019    Rash 7/14/2016    Recurrent umbilical hernia 02/13/8119    S/P dilatation and curettage     Tachycardia      Past Surgical History:   Procedure Laterality Date    HX APPENDECTOMY  2/2008    HX CHOLECYSTECTOMY  2001    HX DILATION AND CURETTAGE      HX GYN  3/2008    tubal ligation    HX HERNIA REPAIR  2/2009    HX HERNIA REPAIR  12/06/2018    open recurrent umbilical hernia repair    HX HYSTERECTOMY  03/2012    HX LEFT SALPINGO-OOPHORECTOMY Left     HX LUMBAR DISKECTOMY      2006    HX ORTHOPAEDIC  9/2006    ruptured discs    WA COMPRE ELECTROPHYSIOL XM W/LEFT ATRIAL PACNG/REC N/A 11/20/2020    Lt Atrial Pace & Record During Ep Study performed by Christi Fuller MD at [address] [place] [address] LAB    WA COMPRE ELECTROPHYSIOL XM W/LEFT ATRIAL PACNG/REC N/A 3/16/2021    Lt Atrial Pace & Record During Ep Study performed by [facility] Fuller [facility] at [facility] CARDIAC CATH LAB    MT COMPRE ELECTROPHYSIOLOGIC ARRHYTHMIA INDUCTION N/A 11/20/2020    EP STUDY COMPLETE performed by Christi Fuller MD at [address] CATH LAB    MT COMPRE ELECTROPHYSIOLOGIC ARRHYTHMIA INDUCTION N/A 3/16/2021    Ep Study Complete performed by [facility] Fuller MD at [facility] [address] CATH LAB    MT COMPRE EP EVAL ABLTJ 3D MAPG TX SVT N/A 11/20/2020    Ablation Svt performed by Christi Fuller MD at [address] CATH LAB    MT INTRACARDIAC ELECTROPHYSIOLOGIC 3D MAPPING N/A 11/20/2020    Ep 3d Mapping performed by Christi Fuller MD at [address] CATH LAB    MT INTRACARDIAC ELECTROPHYSIOLOGIC 3D MAPPING N/A 3/16/2021    Ep 3d Mapping performed by Christi Fuller MD at [facility] [address] CATH LAB    MT STIM/PACING HEART POST IV DRUG INFU N/A 3/16/2021    Drug Stimulation performed by Christi Fuller MD at [place] [address], [facility] CATH LAB    UPPER GI ENDOSCOPY,BIOPSY  5/11/2017          Family History   Problem Relation Age of Onset    Heart Disease Mother     Hypertension Father     Cancer Father         Lung    Hypertension Sister     No Known Problems Brother     No Known Problems Brother     No Known Problems Brother     Anesth Problems Neg Hx      Social History     Tobacco Use    Smoking status: Never Smoker    Smokeless tobacco: Never Used   Substance Use Topics    Alcohol use: No        Lab Results   Component Value Date/Time    WBC 8.0 08/26/2021 12:00 AM    HGB 14.1 08/26/2021 12:00 AM    HCT 41.0 08/26/2021 12:00 AM    PLATELET 653 85/93/9246 12:00 AM    MCV 87 08/26/2021 12:00 AM     Lab Results   Component Value Date/Time    Hemoglobin A1c 6.6 (H) 08/26/2021 12:00 AM    Hemoglobin A1c 6.2 (H) 05/19/2021 12:00 AM    Hemoglobin A1c 7.3 (H) 12/09/2020 12:00 AM    Glucose 161 (H) 08/26/2021 12:00 AM    Glucose (POC) 122 (H) 03/16/2021 12:22 PM    Microalb/Creat ratio (ug/mg creat.) <5 12/09/2020 12:00 AM    LDL, calculated 93 08/26/2021 12:00 AM    LDL, calculated 64 06/24/2020 08:50 AM    Creatinine (POC) 0.7 06/04/2013 09:57 AM    Creatinine 0.75 08/26/2021 12:00 AM      Lab Results   Component Value Date/Time    Cholesterol, total 160 08/26/2021 12:00 AM    HDL Cholesterol 33 (L) 08/26/2021 12:00 AM    LDL, calculated 93 08/26/2021 12:00 AM    LDL, calculated 64 06/24/2020 08:50 AM    Triglyceride 195 (H) 08/26/2021 12:00 AM     Lab Results   Component Value Date/Time    GFR est non-AA 99 08/26/2021 12:00 AM    GFRNA, POC >60 06/04/2013 09:57 AM    GFR est  08/26/2021 12:00 AM    GFRAA, POC >60 06/04/2013 09:57 AM    Creatinine 0.75 08/26/2021 12:00 AM    Creatinine (POC) 0.7 06/04/2013 09:57 AM    BUN 7 08/26/2021 12:00 AM    Sodium 140 08/26/2021 12:00 AM    Potassium 4.9 08/26/2021 12:00 AM    Chloride 104 08/26/2021 12:00 AM    CO2 24 08/26/2021 12:00 AM    Magnesium 1.8 07/06/2021 09:28 PM    Phosphorus 3.4 07/06/2021 09:28 PM        Review of Systems  A comprehensive review of systems was negative. Objective:     Visit Vitals  /86 (BP 1 Location: Right arm, BP Patient Position: Sitting, BP Cuff Size: Adult)   Pulse 89   Temp 97.8 °F (36.6 °C) (Temporal)   Resp 16   Ht 5' 5\" (1.651 m)   Wt 204 lb (92.5 kg)   LMP 01/25/2012   SpO2 98%   BMI 33.95 kg/m²     Appearance: alert, well appearing, and in no distress. Exam: heart sounds normal rate, regular rhythm, normal S1, S2, no murmurs, rubs, clicks or gallops  Lab review: orders written for new lab studies as appropriate; see orders. Assessment/Plan:     diabetes stable. Diabetic issues reviewed with her: diabetic diet discussed in detail, written exchange diet given. ICD-10-CM ICD-9-CM    1.  Type 2 diabetes mellitus with diabetic neuropathy, without long-term current use of insulin (HCC)  E11.40 250.60 CBC W/O DIFF     769.1 METABOLIC PANEL, COMPREHENSIVE      LIPID PANEL      HEMOGLOBIN A1C WITH EAG      MICROALBUMIN, UR, RAND W/ MICROALB/CREAT RATIO      CBC W/O DIFF      METABOLIC PANEL, COMPREHENSIVE      LIPID PANEL HEMOGLOBIN A1C WITH EAG      MICROALBUMIN, UR, RAND W/ MICROALB/CREAT RATIO   2. Primary hypertension  I10 401.9    3. Vitamin D deficiency  E55.9 268.9 VITAMIN D, 25 HYDROXY      VITAMIN D, 25 HYDROXY   4. Vitamin B 12 deficiency  E53.8 266.2 VITAMIN B12      VITAMIN B12     Educated about taking medication as prescribed  Will notify when labs return, and inform her of any change in plan of care at that time    Pt informed to return to office with worsening of symptoms, or PRN with any questions or concerns. Pt verbalizes understanding of plan of care and denies further questions or concerns at this time.

## 2022-04-26 NOTE — TELEPHONE ENCOUNTER
----- Message from Kevin Lou sent at 4/26/2022  9:36 AM EDT -----  Subject: Message to Provider    QUESTIONS  Information for Provider? Ottonielmike Rodriguez from Disability   determination services called following up on a medical records request   sent in January and she received no response. Have you received this   request? She has already gone through the process on the [facility] website. Sohan Clayton? [phone] Dates requested? 10/01/2020 - present  ---------------------------------------------------------------------------  --------------  CALL BACK INFO  What is the best way for the office to contact you? OK to leave message on   voicemail  Preferred Call Back Phone Number? [phone]  ---------------------------------------------------------------------------  --------------  SCRIPT ANSWERS  Relationship to Patient? Third Party  Third Party Type? Other  Other Third Party Type? Disability determination services  Representative Name?  Sohan Clayton

## 2022-04-26 NOTE — PROGRESS NOTES
Identified pt with two pt identifiers(name and ). Chief Complaint   Patient presents with    Follow-up     diabetes    Labs     fasting        Health Maintenance Due   Topic    Eye Exam Retinal or Dilated     DTaP/Tdap/Td series (2 - Td or Tdap)    Pneumococcal 0-64 years (2 - PCV)    Foot Exam Q1     MICROALBUMIN Q1     COVID-19 Vaccine (2 - Moderna 3-dose series)       Wt Readings from Last 3 Encounters:   22 204 lb (92.5 kg)   21 210 lb (95.3 kg)   21 207 lb (93.9 kg)     Temp Readings from Last 3 Encounters:   22 97.8 °F (36.6 °C) (Temporal)   21 97.8 °F (36.6 °C) (Temporal)   21 (!) 96.2 °F (35.7 °C) (Oral)     BP Readings from Last 3 Encounters:   22 136/86   21 122/78   21 128/80     Pulse Readings from Last 3 Encounters:   22 89   21 91   21 (!) 103         Learning Assessment:  :     Learning Assessment 2017   PRIMARY LEARNER Patient Patient   HIGHEST LEVEL OF EDUCATION - PRIMARY LEARNER  GRADUATED HIGH SCHOOL OR GED GRADUATED HIGH SCHOOL OR GED   BARRIERS PRIMARY LEARNER NONE NONE   PRIMARY LANGUAGE ENGLISH ENGLISH   LEARNER PREFERENCE PRIMARY READING READING   ANSWERED BY patient patient   RELATIONSHIP SELF SELF       Depression Screening:  :     3 most recent PHQ Screens 2022   Little interest or pleasure in doing things Not at all   Feeling down, depressed, irritable, or hopeless Not at all   Total Score PHQ 2 0       Fall Risk Assessment:  :     Fall Risk Assessment, last 12 mths 2017   Able to walk? Yes   Fall in past 12 months? No       Abuse Screening:  :     Abuse Screening Questionnaire 2022 12/3/2021 2021 2021 2020 2019 2018   Do you ever feel afraid of your partner? N N N N N N N   Are you in a relationship with someone who physically or mentally threatens you? N N N N N N N   Is it safe for you to go home?  Hari Salgado       Coordination of Care Questionnaire:  :     1) Have you been to an emergency room, urgent care clinic since your last visit? no   Hospitalized since your last visit? no             2) Have you seen or consulted any other health care providers outside of [address] [place] [address] since your last visit? no  (Include any pap smears or colon screenings in this section.)    3) Do you have an Advance Directive on file? no  Are you interested in receiving information about Advance Directives? no    Patient is accompanied by N/A I have received verbal consent from Leela Stephen to discuss any/all medical information while they are present in the room. 4.  For patients aged 39-70: Has the patient had a colonoscopy / FIT/ Cologuard? NA - based on age      If the patient is female:    11. For patients aged 41-77: Has the patient had a mammogram within the past 2 years? Yes - no Care Gap present      6. For patients aged 21-65: Has the patient had a pap smear?  Yes - no Care Gap present

## 2022-04-27 ENCOUNTER — APPOINTMENT (OUTPATIENT)
Dept: FAMILY MEDICINE CLINIC | Age: 42
End: 2022-04-27

## 2022-04-28 LAB
25(OH)D3+25(OH)D2 SERPL-MCNC: 50.9 NG/ML (ref 30–100)
ALBUMIN SERPL-MCNC: 4.5 G/DL (ref 3.8–4.8)
ALBUMIN/CREAT UR: <6 MG/G CREAT (ref 0–29)
ALBUMIN/GLOB SERPL: 1.7 {RATIO} (ref 1.2–2.2)
ALP SERPL-CCNC: 60 IU/L (ref 44–121)
ALT SERPL-CCNC: 27 IU/L (ref 0–32)
AST SERPL-CCNC: 19 IU/L (ref 0–40)
BILIRUB SERPL-MCNC: <0.2 MG/DL (ref 0–1.2)
BUN SERPL-MCNC: 9 MG/DL (ref 6–24)
BUN/CREAT SERPL: 10 (ref 9–23)
CALCIUM SERPL-MCNC: 10.4 MG/DL (ref 8.7–10.2)
CHLORIDE SERPL-SCNC: 104 MMOL/L (ref 96–106)
CHOLEST SERPL-MCNC: 165 MG/DL (ref 100–199)
CO2 SERPL-SCNC: 24 MMOL/L (ref 20–29)
CREAT SERPL-MCNC: 0.93 MG/DL (ref 0.57–1)
CREAT UR-MCNC: 53.4 MG/DL
EGFR: 78 ML/MIN/1.73
ERYTHROCYTE [DISTWIDTH] IN BLOOD BY AUTOMATED COUNT: 13.1 % (ref 11.7–15.4)
EST. AVERAGE GLUCOSE BLD GHB EST-MCNC: 146 MG/DL
GLOBULIN SER CALC-MCNC: 2.6 G/DL (ref 1.5–4.5)
GLUCOSE SERPL-MCNC: 157 MG/DL (ref 65–99)
HBA1C MFR BLD: 6.7 % (ref 4.8–5.6)
HCT VFR BLD AUTO: 43.1 % (ref 34–46.6)
HDLC SERPL-MCNC: 37 MG/DL
HGB BLD-MCNC: 14.6 G/DL (ref 11.1–15.9)
IMP & REVIEW OF LAB RESULTS: NORMAL
LDLC SERPL CALC-MCNC: 95 MG/DL (ref 0–99)
MCH RBC QN AUTO: 29 PG (ref 26.6–33)
MCHC RBC AUTO-ENTMCNC: 33.9 G/DL (ref 31.5–35.7)
MCV RBC AUTO: 86 FL (ref 79–97)
MICROALBUMIN UR-MCNC: <3 UG/ML
PLATELET # BLD AUTO: 256 X10E3/UL (ref 150–450)
POTASSIUM SERPL-SCNC: 4.4 MMOL/L (ref 3.5–5.2)
PROT SERPL-MCNC: 7.1 G/DL (ref 6–8.5)
RBC # BLD AUTO: 5.03 X10E6/UL (ref 3.77–5.28)
SODIUM SERPL-SCNC: 141 MMOL/L (ref 134–144)
TRIGL SERPL-MCNC: 191 MG/DL (ref 0–149)
VIT B12 SERPL-MCNC: 1248 PG/ML (ref 232–1245)
VLDLC SERPL CALC-MCNC: 33 MG/DL (ref 5–40)
WBC # BLD AUTO: 11 X10E3/UL (ref 3.4–10.8)

## 2022-04-29 ENCOUNTER — TELEPHONE (OUTPATIENT)
Dept: FAMILY MEDICINE CLINIC | Age: 42
End: 2022-04-29

## 2022-04-29 RX ORDER — LANOLIN ALCOHOL/MO/W.PET/CERES
500 CREAM (GRAM) TOPICAL DAILY
Qty: 30 TABLET | Refills: 5 | Status: SHIPPED | OUTPATIENT
Start: 2022-04-29 | End: 2022-10-04

## 2022-04-29 NOTE — PROGRESS NOTES
Please call patient and let her know that her labs returned. Stable. Vit b12 is mildly above range. Will send in decreased dose of medication to pharmacy.   Should return to office for office visit and fasting labs in 6 months  Thanks

## 2022-04-29 NOTE — TELEPHONE ENCOUNTER
----- Message from Cleve Esqueda NP sent at 4/29/2022  7:58 AM EDT -----  Please call patient and let her know that her labs returned. Stable. Vit b12 is mildly above range. Will send in decreased dose of medication to pharmacy.   Should return to office for office visit and fasting labs in 6 months  Thanks

## 2022-05-10 DIAGNOSIS — E11.40 TYPE 2 DIABETES MELLITUS WITH DIABETIC NEUROPATHY, WITHOUT LONG-TERM CURRENT USE OF INSULIN (HCC): ICD-10-CM

## 2022-05-10 RX ORDER — GLIPIZIDE 5 MG/1
5 TABLET, FILM COATED, EXTENDED RELEASE ORAL DAILY
Qty: 30 TABLET | Refills: 5 | Status: SHIPPED | OUTPATIENT
Start: 2022-05-10 | End: 2022-10-31

## 2022-06-01 RX ORDER — EPINEPHRINE 0.3 MG/.3ML
INJECTION SUBCUTANEOUS
Qty: 2 EACH | Refills: 0 | Status: SHIPPED | OUTPATIENT
Start: 2022-06-01

## 2022-06-07 ENCOUNTER — TRANSCRIBE ORDER (OUTPATIENT)
Dept: SCHEDULING | Age: 42
End: 2022-06-07

## 2022-06-07 DIAGNOSIS — Z12.31 SCREENING MAMMOGRAM FOR HIGH-RISK PATIENT: Primary | ICD-10-CM

## 2022-06-10 DIAGNOSIS — T78.40XD ALLERGY, SUBSEQUENT ENCOUNTER: Primary | ICD-10-CM

## 2022-06-13 ENCOUNTER — APPOINTMENT (OUTPATIENT)
Dept: FAMILY MEDICINE CLINIC | Age: 42
End: 2022-06-13

## 2022-06-17 ENCOUNTER — PATIENT MESSAGE (OUTPATIENT)
Dept: FAMILY MEDICINE CLINIC | Age: 42
End: 2022-06-17

## 2022-06-18 LAB
ALPHA-GAL IGE QN: 1.7 KU/L
BEEF IGE QN: 1.08 KU/L
IGE SERPL-ACNC: 7 IU/ML (ref 6–495)
LAMB IGE QN: 0.42 KU/L
Lab: ABNORMAL
PORK IGE QN: 0.33 KU/L

## 2022-06-20 ENCOUNTER — TELEPHONE (OUTPATIENT)
Dept: FAMILY MEDICINE CLINIC | Age: 42
End: 2022-06-20

## 2022-06-20 NOTE — TELEPHONE ENCOUNTER
----- Message from Pantera Lemon NP sent at 6/20/2022  7:58 AM EDT -----  Please call patient and let her know that her Alpha Gal returned still positive  Thanks

## 2022-06-20 NOTE — LETTER
6/20/2022    Ms.  Rowdy [facility] [id number]      Dear Juarez Clancy  Results for orders placed or performed in visit on 06/10/22   ALPHA-GAL IGE PANEL   Result Value Ref Range    CLASS DESCRIPTION Comment     Immunoglobulin E 7 6 - 495 IU/mL    ALPHA GAL IGE* 1.70 (A) Class III kU/L    Beef 1.08 (A) Class II kU/L    Pork 0.33 (A) Class I kU/L    Lamb 0.42 (A) Class I kU/L       Please call me if you have any questions: [phone]        Sincerely,      Melani Guillaume NP

## 2022-06-27 ENCOUNTER — HOSPITAL ENCOUNTER (OUTPATIENT)
Dept: MAMMOGRAPHY | Age: 42
Discharge: HOME | End: 2022-06-27
Attending: OBSTETRICS & GYNECOLOGY
Payer: MEDICAID

## 2022-06-27 DIAGNOSIS — Z12.31 SCREENING MAMMOGRAM FOR HIGH-RISK PATIENT: ICD-10-CM

## 2022-06-27 PROCEDURE — 77067 SCR MAMMO BI INCL CAD: CPT

## 2022-06-27 NOTE — ED PROVIDER NOTES
76-year-old female with past medical history significant for inappropriate sinus tachycardia presents with complaints of fluctuating heart rate today with palpitations. Reports heart rate fluctuating between 120 and 50. Also reports 3 episodes of 30 seconds long chest pressure. Denies pain radiation, nausea, vomiting, shortness of breath. Patient reports she is currently on diltiazem 240mg and has been for 3 weeks now. Denies history of DVT/PE. Denies leg swelling. Denies any recent immobilization. Denies recent illness. Denies fever, chills, nausea, vomiting, urinary complaints, URI complaints. Denies tobacco use, drug use, alcohol use  Primary care physician-Jaime  Cardiologist at [facility]           Past Medical History:   Diagnosis Date    Anxiety     Arrhythmia     HX TACHYCARDIA - NEG.  STRESS TEST 2013 PER PATIENT    Asthma     LAST EPISODE 2016    Diabetes ([facility] [address] 2008    NIDDM    Ear infection 11/30/2018    BILATERAL    GERD (gastroesophageal reflux disease)     Headache     Headache(784.0)     Hypertension     HX HTN - NO MEDS CURRENTLY(11-30-18)    Inappropriate sinus tachycardia 5/20/2019    Rash 7/14/2016    Recurrent umbilical hernia 45/26/8668    S/P dilatation and curettage     Tachycardia        Past Surgical History:   Procedure Laterality Date    HX APPENDECTOMY  2/2008    HX CHOLECYSTECTOMY  2001    HX DILATION AND CURETTAGE      HX GYN  3/2008    tubal ligation    HX HERNIA REPAIR  2/2009    HX HERNIA REPAIR  12/06/2018    open recurrent umbilical hernia repair    HX HYSTERECTOMY  03/2012    HX LEFT SALPINGO-OOPHORECTOMY Left     HX LUMBAR DISKECTOMY      2006    HX ORTHOPAEDIC  9/2006    ruptured discs    UPPER GI ENDOSCOPY,BIOPSY  5/11/2017              Family History:   Problem Relation Age of Onset    Heart Disease Mother     Hypertension Father     Cancer Father         Lung    Hypertension Sister     No Known Problems Brother     No Known Problems Brother     Patient:  Raquel Jacobson Date:  2022   :  1957 Attending:  Ian Ellsworth DO   65 year old female      CARDIOLOGY Transfer of Care  2022       ATTENDING     Ian Ellsworth DO    CHIEF COMPLAINT     Increased shortness of breath    HISTORY     Raquel Jacobson is a 65 year old female who is seen at the request of Dr. Ian Ellsworth DO for increased shortness of breath. Patient of Dr. Gandara has history of morbid obesity, chronic hypoxic respiratory failure on 5 LNC of oxygen, asthma, hypertension, paroxysmal atrial fibrillation, MORRO on CPAP, insulin-dependent diabetes, GERD, MGUS, JEIMY, B12 deficiency anemia and polymyalgia/fibromyalgia on chronic steroids who was seen by a virtual visit with Dr. Ellsworth complaining of shortness of breath , much worse with exertion, over the past couple of weeks as well as progressing severe right hip pain.   She was admitted -2021 for anasarca. Echocardiogram showed normal LV size and EF was 62%. There was aortic sclerosis without stenosis or regurgitation, mild mitral valve sclerosis without stenosis, trace mitral valve reguritation, trace to mild TVR and RVSP 32.6 mmHg. There were no  significant changes from prior echo in 2020. Despite normal left atrial size and RVSP, cardiology thought that  she may have had a degree of diastolic dysfunction or pulmonary hypertension so she was treated with lasix and diuresed 2.8 L.  Beta blockers were not started due to asthma. She is allergic to ACE inhibitors. She has declined anticoagulation for PAF. She has been intolerant of dyslipidemia medications. A referral to [facility] pulmonary hypertension clinic, however she has not yet followed up. She had a dobutamine stress echo negative 2020 and a cardiac cath 2015 that showed normal coronary arteries  She had worsening shortness of breath and tremors on 3/16/22 and was admitted. Cardiology, pulmonology and Neurology were consulted.  A  No Known Problems Brother     Anesth Problems Neg Hx        Social History     Socioeconomic History    Marital status:      Spouse name: Not on file    Number of children: Not on file    Years of education: Not on file    Highest education level: Not on file   Occupational History    Not on file   Social Needs    Financial resource strain: Not on file    Food insecurity:     Worry: Not on file     Inability: Not on file    Transportation needs:     Medical: Not on file     Non-medical: Not on file   Tobacco Use    Smoking status: Never Smoker    Smokeless tobacco: Never Used   Substance and Sexual Activity    Alcohol use: No    Drug use: No    Sexual activity: Yes     Partners: Male     Birth control/protection: Surgical   Lifestyle    Physical activity:     Days per week: Not on file     Minutes per session: Not on file    Stress: Not on file   Relationships    Social connections:     Talks on phone: Not on file     Gets together: Not on file     Attends Hindu service: Not on file     Active member of club or organization: Not on file     Attends meetings of clubs or organizations: Not on file     Relationship status: Not on file    Intimate partner violence:     Fear of current or ex partner: Not on file     Emotionally abused: Not on file     Physically abused: Not on file     Forced sexual activity: Not on file   Other Topics Concern    Not on file   Social History Narrative    Not on file         ALLERGIES: Green pepper    Review of Systems   Constitutional: Negative for chills and fever. HENT: Negative for congestion, nosebleeds and rhinorrhea. Eyes: Negative for pain and redness. Respiratory: Negative for cough and shortness of breath. Cardiovascular: Positive for chest pain and palpitations. Negative for leg swelling. Gastrointestinal: Negative for abdominal pain, nausea and vomiting.    Genitourinary: Negative for dysuria, frequency, vaginal bleeding and vaginal repeat echocardiogram revealed an ejection fraction of 65%, normal left ventricular systolic function, grade 1/4 diastolic dysfunction, RSVP 34mmHg, mild AVS. Cardiology started Entresto.   D-dimer ordered an elevated at 0.69.  Lower extremity duplex ultrasound and CTA chest negative for thrombus.   S pneumo/Legionella (negative). Sputum culture, mycoplasma and respiratory panel negative.   Neurology was concerned that her \"tremors\" are seizures and has recommended VEEG . The patient was initially agreeable, but has not be compliant with this as an outpatient. While admitted, she developed a cellulitis along the right hip, was treated with doxycycline with improvement. Soft tissue US and CT of hip with contrast were negative.   Today she was seen by Dr. Ellsworth and complained of worsening right hip and groin pain. She is able to bear weight on the leg with some discomfort but notes that any rotation at the hip is extremely painful. She is finding it hard to walk and move around. Over the past couple of days, she has noted intermittent low grade fevers and some warmth along the right lateral hip but denies any injuries, wounds or redness. She has also been struggling with increasing shortness of breath. At baseline, she is short of breath and on 5LNC at all times and CPAP at night. Over the past week, at least, this has been worse. She has a hard time with conversations and any type of exertion, including the 10 feet of walking from her bed to the bathroom. She is on prednisone 35mg daily chronically for a palliative approach to her shortness of breath and chronic pain/chronic fatigue/fibromyalgia. She has been taking her lasix on and off depending on how she feels and how her edema is looking. She did not take the dose yesterday or today as her hip has been so painful that she did not want to get up to go to the bathroom frequently.   She was directly admitted for further evaluation.    Echocardiogram 03/16/2022  LVEF  65%  Normal left ventricular chamber size and systolic function  Grade I/IV diastolic dysfunction  Normal right ventricular size and systolic function, RVSP 34 mm Hg  Mild aortic valve stenosis mean gradient 22 mm Hg, JENNY 1.7 sq cm    Stress Test 11/23/2021:  1. Negative EKG portion of regadenoson nuclear stress test.   2. Nuclear study results to follow and reported separately.   Nuclear Portion:  1. No evidence of regadenosen induced ischemia.  2. No evidence of previous myocardial injury pattern.  3. Normal left ventricular systolic function.     CT Angiogram Chest Abdomen Pelvis 11/18/2021:  Evaluation is limited due to suboptimal bolus timing. No gross acute aortic  findings. Mild atherosclerosis of the thoracoabdominal aorta.     No acute findings in the chest, abdomen and pelvis.     Echo M-Mode/2D/Doppler 09/15/2021: EF 62.0%  Normal left ventricular size and systolic function, EF 62 %.  Mildly increased left ventricular wall thickness.  Aortic valve sclerosis without stenosis or regurgitation.  Mitral valve sclerosis without stenosis. Trace mitral valve regurgitation.  Trace to mild tricuspid valve regurgitation. Right ventricular systolic pressure 32.6 mmHg.  No significant change since the prior study.     Echo Stress 08/05/2020: EF 66.0%  Normal Dobutamine stress echocardiogram.  ECHO FINDINGS:  Definity contrast was utilized to better visualize the endocardial definition.  Normal LV regional wall motion at baseline. Estimated left ventricular ejection fraction 66 %.  No LV regional wall motion abnormalities with stress. LV systolic volume decreased with stress. Left ventricular ejection fraction  increased with stress. 20 mg IV esmolol given in the recovery phase.     Echo M-Mode/2D/Doppler 01/23/2020: EF 64.0%  Normal left ventricular size and systolic function, EF 64%.  Mildly increased left ventricular wall thickness.  Mitral annular calcification. Mitral valve sclerosis without stenosis. Trace  pain.   Musculoskeletal: Negative for myalgias. Skin: Negative for rash and wound. Neurological: Negative for seizures, syncope and weakness. Hematological: Does not bruise/bleed easily. Psychiatric/Behavioral: Negative for agitation, confusion, dysphoric mood and suicidal ideas. The patient is not nervous/anxious. Vitals:    08/01/19 2052   BP: (!) 151/92   Pulse: (!) 115   Resp: 14   Temp: 98.7 °F (37.1 °C)   SpO2: 98%   Weight: 91.6 kg (202 lb)   Height: 5' 5\" (1.651 m)            Physical Exam   Constitutional: She is oriented to person, place, and time. She appears well-developed and well-nourished. HENT:   Head: Normocephalic and atraumatic. Eyes: Pupils are equal, round, and reactive to light. EOM are normal.   Neck: Normal range of motion. Neck supple. No tracheal deviation present. Cardiovascular: Normal rate, regular rhythm, normal heart sounds and intact distal pulses. Pulmonary/Chest: Effort normal and breath sounds normal. No stridor. No respiratory distress. She has no wheezes. She has no rales. She exhibits no tenderness. Abdominal: Soft. Bowel sounds are normal. She exhibits no distension. There is no tenderness. There is no rebound. Musculoskeletal: Normal range of motion. She exhibits no edema or tenderness. Neurological: She is alert and oriented to person, place, and time. No cranial nerve deficit. Skin: Skin is warm and dry. No rash noted. No pallor. Psychiatric: She has a normal mood and affect. Nursing note and vitals reviewed. MDM  Number of Diagnoses or Management Options  Chest pain, unspecified type: Inappropriate sinus tachycardia:   Diagnosis management comments:     55-year-old female with history of inappropriate sinus tachycardia presents with complaints of atypical chest pain and fluctuating heart rate.  Patient is well-appearing, in no acute distress, hemodynamically stable, no respiratory distress, clear to auscultation bilaterally, normal mitral valve regurgitation.  Aortic valve sclerosis without stenosiss     Dobutamine stress echocardiogram 6/21/2019  Negative for ischemia.     CT chest 12/13/2018:  IMPRESSION:  No evidence for pulmonary emboli.  Patchy groundglass type infiltrates throughout both lungs. Reference is made to the high-resolution CT scan of the chest performed on the same day for a more complete description of the findings  Cholelithiasis present on prior survey.  Stable thyroid nodules.     Echocardiogram 10/31/2018:  Agitated saline was injected through a peripheral vein and did not show evidence of a shunt.  Normal left ventricular size and systolic function, EF 67%.  Mild tricuspid valve regurgitation.     Cardiac catheterization 04/01/2015:   HEMODYNAMICS:  Heart rate: 97  Left ventricular pressure: 133/13/26  Aortic pressure on pullback: 125/73/92  There was no significant gradient on pullback across the aortic valve.  CORONARY ANATOMY:  1. Left main: Bifurcates into the LAD and circumflex. Angiographically normal.  2. Left anterior descending: Type III vessel. Supplies usual septal  and diagonal branches. Angiographically normal.  3. Circumflex: Nondominant. Supplies one medium size marginal branch and then becomes a small vessel traveling in the AV groove. Angiographically normal.  4. right coronary artery: Dominant. Distally bifurcates into PDA and a posterolateral system. Angiographically normal.  LEFT VENTRICULOGRAM:  Normal left ventricular systolic function and normal wall motion. Estimated ejection fraction 65%.  CONCLUSION:  Pt is a 57-year-old female who presented for coronary angiogram. She found to have normal coronary arteries and normal left ventricular function. Plan to optimize medical therapy for prevention of coronary artery disease. We will increase her Lasix from 20 mg to 40 mg given elevated left ventricular pressure.      Stress test 02/09/2015:  The patient has some EKG changes that are  room air oxygen saturation. Plan-EKG, cardiac monitor, CBC/CMP/cardiac enzymes, consult cardiology, chest x-ray. Labs and chest x-ray unremarkable         Procedures  Records-echo 5/17/19 EF 65%, no wall motion abnl. Stress echo 7/25/14-nml stress test, no ekg changes    ED EKG interpretation:  Rhythm: sinus tachycardia; and regular . Rate (approx.): 115; Axis: normal; P wave: normal; QRS interval: normal ; ST/T wave: normal; no acute ischemia, poor baseline 2/2 movement, unchanged from 7/19/19. This EKG was interpreted by Wale Joseph MD,ED Provider. 10:49 PM  Wale Joseph MD spoke with Dr. Orestes Lantigua, Consult for Cardiology. Discussed available diagnostic tests and clinical findings. He/She is in agreement with care plans as outlined. He/she agreeable with plan for CP w/u, pt can increase dose of diltiazem to 360mg and fu with Dr. Guillermina Olivarez. 10:54 PM  Pt agreeable with plan. Advised to keep log of BP and start 360mg diltiazem if systolic over 360. C/o burping and indegestion now. Well appearing, nad, hd stable. . compatible with ischemia. At the time of this dictation, the Cardiolite portion is still pending. Correlation with nuclear scan is recommended.   Nuclear portion revealed:  IMPRESSION:  1. Probably normal study with no evidence of Regadenoson-induced ischemia.  2. No evidence of previous myocardial injury pattern.  3. Normal left ventricular size with satisfactory resting systolic performance.  She had a heart scan done about 9 years ago and she had a calcium score of 0. The patient had an echocardiogram done which revealed aortic valve sclerosis with normal left ventricular systolic function with an ejection fraction of 65%.      On 10/9/2014 she had a Holter monitor done which revealed short paroxysms of atrial fibrillation.         MEDICAL HISTORY     Past Medical History:   Diagnosis Date    Anemia     Anesthesia complication     difficulty waking up after D & C in the past. Episode of anesthesia awareness during back surgery in 1989- was having anaphylactic RX to vancomycin, felt someone touching her and heard voices in the middle of OR)    Anxiety and depression     Arthritis     Atrial fibrillation (CMS/HCC) 10/26/14    Back pain     needs to have a pillow under knees to relieve back pain.     Colon polyps     Diabetes mellitus (CMS/HCC)     type II    Essential (primary) hypertension     Fibromyalgia     GERD (gastroesophageal reflux disease)     Hemorrhoids     Hypercholesterolemia     IBS (irritable bowel syndrome)     Migraines     Mitral valve prolapse     Osteopenia     Osteoporosis, unspecified 01/25/2012    Pneumonia     HX of    PONV (postoperative nausea and vomiting)     RAD (reactive airway disease)     Sleep apnea     uses CPAP    Stress incontinence, female     Thyroid disease     hypothyroid       SURGICAL HISTORY     Past Surgical History:   Procedure Laterality Date    Appendectomy  age 10    Back surgery  1989    L4-5 fusion    Breast biopsy  1980's    left breast, benign     Breast surgery       right (pre-cancer)    Cardiac catheterization/possible ptca/possible stent  2015    Carpal tunnel release  2005    right (plus tendon wrap / more extensive)    Colonoscopy  2011    along with EGD    D and c  X 3    Dexa bone density axial skeleton  2011    Ectopic pregnancy surgery      Endometrial ablation      Hand finger surgery unlisted      right hand / thumb surgery    Hysterectomy  2011    TAHBSO (pre-cancer)    Hysteroscopy  3/2011    with D & C       SOCIAL HISTORY     Social History     Tobacco Use    Smoking status: Former Smoker     Packs/day: 2.00     Years: 14.00     Pack years: 28.00     Types: Cigarettes     Quit date: 1981     Years since quittin.9    Smokeless tobacco: Never Used   Vaping Use    Vaping Use: never used   Substance Use Topics    Alcohol use: No     Alcohol/week: 0.0 standard drinks    Drug use: No       FAMILY HISTORY     Family History   Problem Relation Age of Onset    Heart disease Mother     High blood pressure Mother     COPD Mother     Cancer Father         colon    High blood pressure Father     Diabetes Sister     Asthma Sister     High blood pressure Sister     Heart disease Paternal Grandmother     Stroke Paternal Grandmother     Arthritis Paternal Grandfather     Asthma Daughter     Depression Daughter     Cancer Maternal Aunt         ovarian    High blood pressure Maternal Aunt     Cancer, Colon Maternal Uncle     High blood pressure Maternal Uncle     Cancer Maternal Uncle         leukemia and melanoma    High blood pressure Maternal Uncle     High blood pressure Maternal Uncle     High blood pressure Maternal Uncle     High blood pressure Maternal Uncle     High blood pressure Maternal Uncle     Cancer Other         melanoma on paternal side of family /3 materal first cousins with ovarian cancer.       MEDICATIONS     Current Facility-Administered Medications   Medication    senna (SENOKOT) 8.6 mg    albuterol inhaler 2 puff    NON  FORMULARY    aspirin (ECOTRIN) enteric coated tablet 81 mg    dilTIAZem (TIAZAC,CARDIZEM CD) 24 hr capsule 360 mg    diphenhydrAMINE (BENADRYL) capsule 25 mg    docusate sodium (COLACE) capsule 100 mg    DULoxetine (CYMBALTA) capsule 20 mg    NON FORMULARY    [Held by provider] furosemide (LASIX) tablet 40 mg    furosemide (LASIX) injection 40 mg    gabapentin (NEURONTIN) capsule 200 mg    gabapentin (NEURONTIN) capsule 500 mg    insulin lispro (ADMELOG, HumaLOG) injection 20 Units    insulin glargine (LANTUS) injection 30 Units    mupirocin (BACTROBAN) 2 % ointment    nystatin (MYCOSTATIN) powder    potassium CHLORIDE ER tablet 45 mEq    predniSONE (DELTASONE) tablet 35 mg    sacubitril-valsartan (ENTRESTO) 49-51 MG per tablet 1 tablet    cholecalciferol (VITAMIN D) tablet 100 mcg    NON FORMULARY    sodium chloride (NORMAL SALINE) 0.9 % bolus 500 mL    dextrose 50 % injection 25 g    dextrose 50 % injection 12.5 g    glucagon (GLUCAGEN) injection 1 mg    dextrose (GLUTOSE) 40 % gel 15 g    dextrose (GLUTOSE) 40 % gel 30 g    sodium chloride 0.9 % flush bag 25 mL    sodium chloride (PF) 0.9 % injection 2 mL    sodium chloride 0.9 % flush bag 25 mL    enoxaparin (LOVENOX) injection 40 mg    insulin lispro (ADMELOG,HumaLOG) - Correction Dose    Potassium Standard Replacement Protocol    Magnesium Standard Replacement Protocol    ondansetron (ZOFRAN) injection 4 mg    polyethylene glycol (MIRALAX) packet 17 g    docusate sodium-sennosides (SENOKOT S) 50-8.6 MG 2 tablet    bisacodyl (DULCOLAX) suppository 10 mg    doxycycline hyclate (VIBRAMYCIN) capsule 100 mg       ALLERGIES     ALLERGIES:   Allergen Reactions    Hydralazine SHORTNESS OF BREATH and SWELLING    Latex   (Environmental) Other (See Comments)     Causes Eczema to flare up, Blisters     Linaclotide GI UPSET     Patient reported severe diarrhea and GI upset with one dose     Lisinopril Other (See Comments)     Fluid retention    Mefoxin HIVES    Metoprolol  ANXIETY, DEPRESSION, Other (See Comments) and SHORTNESS OF BREATH    Mushroom   (Food) HIVES    Neomycin HIVES    Penicillins HIVES    Spinach   (Food Or Med) HIVES, SWELLING and ANAPHYLAXIS    Sulfa Antibiotics HIVES    Tape [Adhesive   (Environmental)] RASH    Vancomycin ANAPHYLAXIS     Anaphylaxis NOS, due to adverse effect of correct medicinal substance prop     Nutrasweet Aspartame [Aspartame   (Food Or Med)] HEADACHES    Oxycontin HIVES    Acetaminophen HIVES    Codeine      Excessive drowsiness     Doxycycline Other (See Comments)    Floxin Otic HIVES    Morphine SWELLING    Neosporin [Neomycin-Bacitracin-Polymyxin] HIVES    Novocain Other (See Comments)     \"does not work at all\" per pt    Oxycodone      Excessive drowsiness     Proair Hfa [Kdc:Albuterol]      Itchy mouth    Quinapril Other (See Comments)     Fluid retention    Savella Other (See Comments)     Disturbing dreams       REVIEW OF SYSTEMS     Constitutional:  Patient denies fever, chills, +fatigue.    Eyes:  Denies change in visual acuity, pain, burning or itching.    HENT:  Denies sinus problems, ear infections, nasal congestion or sore throat.    Respiratory:  Denies cough, +shortness of breath, worse with activity  Cardiovascular:  Denies chest pain  Gastrointestinal:  Denies abdominal pain, nausea, vomiting, bloody stools or diarrhea.    Genitourinary:  Denies urine retention, painful urination, urinary frequency, blood in urine or nocturia.    Musculoskeletal:  Denies back pain, neck pain, +right hip pain.    Integument:  Denies rash, itching.    Neurologic:  Denies headache, focal weakness or sensory changes.        PHYSICAL EXAM     Vital Signs:    Vitals:    06/27/22 1308   BP: (!) 176/78   BP Location: RFA - Right forearm   Patient Position: Sitting   Pulse: (!) 117   Resp: 19   Temp: 97.7 °F (36.5 °C)   TempSrc: Oral   SpO2: 94%   Weight: (!) 193.3 kg (426 lb 2.4 oz)   Height: 5' 3\" (1.6 m)   ,   Ht Readings from Last 1 Encounters:    06/27/22 5' 3\" (1.6 m)   ,   Wt Readings from Last 1 Encounters:   06/27/22 (!) 193.3 kg (426 lb 2.4 oz)   , Body mass index is 75.49 kg/m².     General:  Mild respiratory distress, pleasant.   HEENT:  Normocephalic, atraumatic, EOM (extraocular movements) intact, no scleral icterus or conjunctival pallor, no nasal discharge  Neck:  no carotid bruits, no JVD (jugular venous distension).    Pulmonary:  No retractions or increased work of breathing, clear to auscultation bilaterally.  No crackles or wheezing.  Cardiovascular:  PMI in 5th intercostal space.  RRR (Regular rate and rhythm), normal S1 S2, no S3 or S4 appreciated.  There are no murmurs.  Abdomen:  Bowel sounds normoactive, soft, nontender, nondistended  Extremities: bilateral lower extremity edema-pitting, with right anterior weeping/sore. Pain in right hip with any rotation of the leg. Able to stand with full weight on both legs   Neuro:  Alert and oriented X3   Neck exam reveals no jugular venous distention normal carotid upstroke with no carotid bruits.  Heart exam the PMI is not displaced regular rate and rhythm normal S1-S2 with no S3-S4 appreciated there are no murmurs.  Lungs are clear to auscultation bilaterally with no rales or wheezing.  Abdomen is soft nontender bowel sounds are normoactive.  Extremity exam reveals no cyanosis clubbing with 2+ edema bilaterally  On neurologic exam cranial nerves 2-12 are intact with no focal deficits.    EKG:        Laboratory Results:          ASSESSMENT     Acute on chronic Hypoxic respiratory failure- BL is 5L of oxygen, currently on 6L  Hx of chronic diastolic heart failure  Hx of intermittent asthma  Hx of tobacco use  Severe right hip pain  Paroxysmal atrial fibrillation-refuses long term anti coagulation  Type 2 DM  Obstructive sleep apnea- on cpap at night    PLAN   Mrs. Raquel Davidson is a pleasant 65-year-old white woman with history of paroxysmal atrial fibrillation, diabetes, obstructive sleep  apnea, and history of diastolic congestive heart failure presented with complaints of increasing shortness of breath.  The patient has acute hypoxic respiratory failure.  -Patient in mild respiratory distress at this time, she is requiring 6L of oxygen, baseline is 5L.   -Echocardiogram in March shows LVEF 65% with normal left ventricular chamber size and systolic function, Grade I/IV diastolic dysfunction, Normal right ventricular size and systolic function, RVSP 34 mm Hg and Mild aortic valve stenosis. No need for another echo at this time.  -Continue home regimen with diltiazem, entresto, furosemide .  She refuses to take metoprolol which she states caused her to have a 40 lb weight gain and increased blood pressure.  -Patient refuses long term anticoagulation, aspirin okay if she agrees  -Will await blood work, ekg, chest xray, inectious respiratory panel, since she is a direct admission and make further recommendations     Thank you for allowing me to participate in the care of this patient.    Angelica Reyes, APNP/ Jd Alexandra M.D. -Ph.D. [facility]

## 2022-06-28 ENCOUNTER — VIRTUAL VISIT (OUTPATIENT)
Dept: FAMILY MEDICINE CLINIC | Age: 42
End: 2022-06-28
Payer: MEDICAID

## 2022-06-28 DIAGNOSIS — H92.02 LEFT EAR PAIN: Primary | ICD-10-CM

## 2022-06-28 PROCEDURE — 99213 OFFICE O/P EST LOW 20 MIN: CPT | Performed by: NURSE PRACTITIONER

## 2022-06-28 RX ORDER — AMOXICILLIN AND CLAVULANATE POTASSIUM 875; 125 MG/1; MG/1
1 TABLET, FILM COATED ORAL 2 TIMES DAILY
Qty: 20 TABLET | Refills: 0 | Status: SHIPPED | OUTPATIENT
Start: 2022-06-28 | End: 2022-07-08

## 2022-06-28 NOTE — PROGRESS NOTES
HISTORY OF PRESENT ILLNESS  Eder Stone is a 35 y.o. female. HPI  Pt presents with \"left ear pain\"  Visit was conducted via [employer]. me  Eder Stone, who was evaluated through a synchronous (real-time) audio-video encounter, and/or her healthcare decision maker, is aware that it is a billable service, which includes applicable co-pays, with coverage as determined by her insurance carrier. She provided verbal consent to proceed and patient identification was verified. This visit was conducted pursuant to the emergency declaration under the [facility], [facility] authority and the [employer] General Act. A caregiver was present when appropriate. Ability to conduct physical exam was limited. The patient was located at: Home: [address] [place] [phone] [phone]  The provider was located at: Facility (Appt Department): [address] [facility],  on 6/28/2022 at 3:13 PM        Pt states that her left ear has an ear ache  Symptoms started day  It feels like an ear infection to the patient, as she has had these in the past  She states that she went swimming this past weekend, and is wondering if this could have triggered the infection? No fever  No sinus or nasal congestion  Review of Systems   Constitutional: Negative for fever. HENT: Positive for ear pain. Physical Exam  Constitutional:       Appearance: Normal appearance. Neurological:      Mental Status: She is alert. Psychiatric:         Mood and Affect: Mood normal.         Behavior: Behavior normal.         ASSESSMENT and PLAN    ICD-10-CM ICD-9-CM    1.  Left ear pain  H92.02 388.70 amoxicillin-clavulanate (AUGMENTIN) 875-125 mg per tablet     Educated about taking medication as prescribed  Should notify office should symptoms continue and/or worsen    Pt informed to return to office with worsening of symptoms, or PRN with any questions or concerns. Pt verbalizes understanding of plan of care and denies further questions or concerns at this time.

## 2022-08-23 RX ORDER — OMEPRAZOLE 20 MG/1
CAPSULE, DELAYED RELEASE ORAL
Qty: 90 CAPSULE | Refills: 0 | Status: SHIPPED | OUTPATIENT
Start: 2022-08-23

## 2022-09-05 DIAGNOSIS — E11.40 TYPE 2 DIABETES MELLITUS WITH DIABETIC NEUROPATHY, WITHOUT LONG-TERM CURRENT USE OF INSULIN (HCC): ICD-10-CM

## 2022-09-06 ENCOUNTER — VIRTUAL VISIT (OUTPATIENT)
Dept: FAMILY MEDICINE CLINIC | Age: 42
End: 2022-09-06
Payer: MEDICAID

## 2022-09-06 DIAGNOSIS — J01.00 ACUTE NON-RECURRENT MAXILLARY SINUSITIS: Primary | ICD-10-CM

## 2022-09-06 PROCEDURE — 99213 OFFICE O/P EST LOW 20 MIN: CPT | Performed by: NURSE PRACTITIONER

## 2022-09-06 RX ORDER — AZITHROMYCIN 250 MG/1
TABLET, FILM COATED ORAL
Qty: 6 TABLET | Refills: 0 | Status: SHIPPED | OUTPATIENT
Start: 2022-09-06 | End: 2022-09-11

## 2022-09-06 NOTE — PROGRESS NOTES
HISTORY OF PRESENT ILLNESS  Ary Gerard is a 35 y.o. female. HPI  Pt presents with \"sinus infection\"  Visit was conducted via [employer]. me  Ary Gerard, who was evaluated through a synchronous (real-time) audio-video encounter, and/or her healthcare decision maker, is aware that it is a billable service, which includes applicable co-pays, with coverage as determined by her insurance carrier. She provided verbal consent to proceed and patient identification was verified. This visit was conducted pursuant to the emergency declaration under the [address], [address] [place] [address] authority and the [employer] General Act. A caregiver was present when appropriate. Ability to conduct physical exam was limited. The patient was located at: Home: [address] [place] [phone]  The provider was located at: Facility (Appt Department): [address] [facility],  on 9/6/2022 at 11:38 AM    Pt states that she has been dealing with sinus pressure and pain for a number of days  Nasal drainage has been worsening over past day or so  Sore throat  No fever  She took covid swab, which was negative  OTC: Maritza meritzer cold  Review of Systems   Constitutional:  Negative for fever. HENT:  Positive for congestion, sinus pain and sore throat. Physical Exam  Constitutional:       Appearance: Normal appearance. Neurological:      Mental Status: She is alert. Psychiatric:         Mood and Affect: Mood normal.         Behavior: Behavior normal.       ASSESSMENT and PLAN    ICD-10-CM ICD-9-CM    1.  Acute non-recurrent maxillary sinusitis  J01.00 461.0 azithromycin (ZITHROMAX) 250 mg tablet      Educated about taking medication as prescribed, with food  Should stay well hydrated and treat fever as needed    Pt informed to return to office with worsening of symptoms, or PRN with any questions or concerns. Pt verbalizes understanding of plan of care and denies further questions or concerns at this time.

## 2022-09-27 ENCOUNTER — OFFICE VISIT (OUTPATIENT)
Dept: FAMILY MEDICINE CLINIC | Age: 42
End: 2022-09-27
Payer: MEDICAID

## 2022-09-27 VITALS
HEART RATE: 84 BPM | HEIGHT: 65 IN | DIASTOLIC BLOOD PRESSURE: 80 MMHG | SYSTOLIC BLOOD PRESSURE: 132 MMHG | OXYGEN SATURATION: 98 % | RESPIRATION RATE: 18 BRPM | BODY MASS INDEX: 33.32 KG/M2 | WEIGHT: 200 LBS | TEMPERATURE: 97.8 F

## 2022-09-27 DIAGNOSIS — E55.9 VITAMIN D DEFICIENCY: ICD-10-CM

## 2022-09-27 DIAGNOSIS — I10 PRIMARY HYPERTENSION: ICD-10-CM

## 2022-09-27 DIAGNOSIS — E11.40 TYPE 2 DIABETES MELLITUS WITH DIABETIC NEUROPATHY, WITHOUT LONG-TERM CURRENT USE OF INSULIN (HCC): Primary | ICD-10-CM

## 2022-09-27 DIAGNOSIS — E53.8 VITAMIN B12 DEFICIENCY: ICD-10-CM

## 2022-09-27 PROCEDURE — 3044F HG A1C LEVEL LT 7.0%: CPT | Performed by: NURSE PRACTITIONER

## 2022-09-27 PROCEDURE — 99213 OFFICE O/P EST LOW 20 MIN: CPT | Performed by: NURSE PRACTITIONER

## 2022-09-27 RX ORDER — FUROSEMIDE 20 MG/1
TABLET ORAL
COMMUNITY
Start: 2022-09-06

## 2022-09-27 NOTE — PROGRESS NOTES
Identified pt with two pt identifiers(name and ). Chief Complaint   Patient presents with    Diabetes    Labs     fasting        Health Maintenance Due   Topic    Eye Exam Retinal or Dilated     Foot Exam Q1     COVID-19 Vaccine (2 - Moderna series)    Flu Vaccine (1)       Wt Readings from Last 3 Encounters:   22 200 lb (90.7 kg)   22 204 lb (92.5 kg)   21 210 lb (95.3 kg)     Temp Readings from Last 3 Encounters:   22 97.8 °F (36.6 °C) (Temporal)   22 97.8 °F (36.6 °C) (Temporal)   21 97.8 °F (36.6 °C) (Temporal)     BP Readings from Last 3 Encounters:   22 132/80   22 136/86   21 122/78     Pulse Readings from Last 3 Encounters:   22 84   22 89   21 91         Learning Assessment:  :     Learning Assessment 2017   PRIMARY LEARNER Patient Patient   HIGHEST LEVEL OF EDUCATION - PRIMARY LEARNER  GRADUATED HIGH SCHOOL OR GED GRADUATED HIGH SCHOOL OR GED   BARRIERS PRIMARY LEARNER NONE NONE   PRIMARY LANGUAGE ENGLISH ENGLISH   LEARNER PREFERENCE PRIMARY READING READING   ANSWERED BY patient patient   RELATIONSHIP SELF SELF       Depression Screening:  :     3 most recent PHQ Screens 2022   Little interest or pleasure in doing things Not at all   Feeling down, depressed, irritable, or hopeless Not at all   Total Score PHQ 2 0       Fall Risk Assessment:  :     Fall Risk Assessment, last 12 mths 2017   Able to walk? Yes   Fall in past 12 months? No       Abuse Screening:  :     Abuse Screening Questionnaire 2022 2022 12/3/2021 2021 2021 2020 2019   Do you ever feel afraid of your partner? N N N N N N N   Are you in a relationship with someone who physically or mentally threatens you? N N N N N N N   Is it safe for you to go home?  Y Y Y Y Y Y Y       Coordination of Care Questionnaire:  :     1) Have you been to an emergency room, urgent care clinic since your last visit? no   Hospitalized since your last visit? no             2) Have you seen or consulted any other health care providers outside of [address] [place] [address] since your last visit? no  (Include any pap smears or colon screenings in this section.)    3) Do you have an Advance Directive on file? yes  Are you interested in receiving information about Advance Directives? no    Patient is accompanied by N/A I have received verbal consent from Lenka Miner to discuss any/all medical information while they are present in the room. 4.  For patients aged 39-70: Has the patient had a colonoscopy / FIT/ Cologuard? NA - based on age      If the patient is female:    11. For patients aged 41-77: Has the patient had a mammogram within the past 2 years? Yes - no Care Gap present      6. For patients aged 21-65: Has the patient had a pap smear?  Yes - no Care Gap present

## 2022-09-27 NOTE — PROGRESS NOTES
Subjective:     Catalino Ochoa is a 35 y.o. female seen for follow up of diabetes. She also has hypertension. Diabetic Review of Systems - medication compliance: compliant most of the time. Other symptoms and concerns: Pt is here for follow up and fasting labs. She denies concerns or complaints at this time  She denies flu vaccine at this time.     Patient Active Problem List    Diagnosis Date Noted    Atrial tachycardia ([facility] 01/11/2021    Vitamin B12 deficiency 12/09/2020    Type 2 diabetes mellitus with diabetic neuropathy ([facility] 02/18/2020    Peripheral vascular disease ([facility] 02/18/2020    Inappropriate sinus tachycardia 05/20/2019    Palpitations 04/08/2019    Recurrent umbilical hernia 22/25/9328    Acute midline thoracic back pain 09/26/2017    Acute mucoid otitis media of right ear 06/23/2017    Dermatitis 03/09/2017    Acute non-recurrent frontal sinusitis 01/03/2017    Left lower quadrant pain 12/29/2016    Left ear pain 12/02/2016    Strep throat 11/09/2016    Nausea 10/07/2016    Right acute serous otitis media 07/14/2016    Rash 07/14/2016    Gastroesophageal reflux disease without esophagitis 07/14/2016    Environmental allergies 07/14/2016    Right ear pain 06/21/2016    Diabetes ([facility] 07/18/2014    Sore throat 05/06/2014    Migraine headache 01/03/2012    Vitamin D deficiency 11/06/2011    Dyslipidemia (high LDL; low HDL) 01/24/2011    Hypertension 01/06/2011    Asthma 01/06/2011     Current Outpatient Medications   Medication Sig Dispense Refill    furosemide (LASIX) 20 mg tablet       SITagliptin (Januvia) 100 mg tablet Take 1 tablet by mouth once daily 90 Tablet 0    omeprazole (PRILOSEC) 20 mg capsule Take 1 capsule by mouth once daily 90 Capsule 0    cyclobenzaprine (FLEXERIL) 10 mg tablet TAKE 1 TABLET BY MOUTH THREE TIMES DAILY AS NEEDED FOR MUSCLE SPASM DO  NOT  DRIVE  WHILE  TAKING  THIS  MEDICATION 30 Tablet 5    ProAir HFA 90 mcg/actuation inhaler INHALE 2 PUFFS BY MOUTH EVERY 4 HOURS AS NEEDED FOR WHEEZING 9 g 2    gabapentin (NEURONTIN) 400 mg capsule TAKE 1 CAPSULE BY MOUTH THREE TIMES DAILY . DO NOT EXCEED 3 PER 24 HOURS 270 Capsule 0    canagliflozin (Invokana) 300 mg tablet TAKE 1 TABLET BY MOUTH ONCE DAILY BEFORE BREAKFAST 30 Tablet 5    fenofibrate (LOFIBRA) 160 mg tablet Take 1 tablet by mouth once daily 30 Tablet 5    EPINEPHrine (EPIPEN) 0.3 mg/0.3 mL injection INJECT CONTENTS OF 1 PEN BY INTRAMUSCULAR ROUTE AS NEEDED FOR ALLERGIC RESPONSE FOR UP TO 1 DOSE 2 Each 0    simvastatin (ZOCOR) 20 mg tablet Take 1 tablet by mouth once daily 30 Tablet 5    ergocalciferol (ERGOCALCIFEROL) 1,250 mcg (50,000 unit) capsule TAKE 1 CAPSULE BY MOUTH TWICE A WEEK 8 Capsule 5    metFORMIN (GLUCOPHAGE) 1,000 mg tablet Take 1 tablet by mouth twice daily 60 Tablet 5    glipiZIDE SR (GLUCOTROL XL) 5 mg CR tablet Take 1 Tablet by mouth daily. 30 Tablet 5    aspirin delayed-release 81 mg tablet Take 1 tablet by mouth once daily 90 Tablet 1    cyanocobalamin (VITAMIN B12) 500 mcg tablet Take 1 Tablet by mouth daily. 30 Tablet 5    verapamil ER (CALAN-SR) 120 mg tablet Take 120 mg by mouth daily. fluticasone propionate (FLONASE) 50 mcg/actuation nasal spray 2 squirts each nostril daily 1 Each 5    famotidine (PEPCID) 40 mg tablet TAKE 1 TABLET BY MOUTH ONCE DAILY AT BEDTIME 30 Tablet 5    lisinopriL (PRINIVIL, ZESTRIL) 10 mg tablet Take 10 mg by mouth daily. ivabradine (CORLANOR) 5 mg tablet Take  by mouth two (2) times daily (with meals). losartan (COZAAR) 25 mg tablet Take 1 Tab by mouth once over twenty-four (24) hours. loratadine (CLARITIN) 10 mg tablet Take 1 Tab by mouth daily. To replace xyzal. 30 Tab 5    glucose blood VI test strips (True Metrix Glucose Test Strip) strip To use to check blood sugar daily,  Dx: E11.9 100 Strip 2    butalbital-acetaminophen-caffeine (FIORICET, ESGIC) -40 mg per tablet Take 1 Tab by mouth every six (6) hours as needed for Headache.  20 Tab 1 SUMAtriptan (IMITREX) 50 mg tablet TAKE 1 TABLET BY MOUTH AT THE ONSET OF A MIGRAINE HEADACHE. REPEAT IN 1 HOUR IF NEEDED. TAKE NO MORE THAN 2 TABLETS PER 24 HOURS 9 Tab 5     Allergies   Allergen Reactions    Beef Containing Products Rash    Green Pepper Hives     Red, Yellow and Orange Peppers also. OK with Black Pepper    Other Plant, Animal, Environmental Swelling     Red meat    Pork Derived (Porcine) Rash     Past Medical History:   Diagnosis Date    Anxiety     Arrhythmia     HX TACHYCARDIA - NEG.  STRESS TEST 2013 PER PATIENT    Asthma     LAST EPISODE 2016    Diabetes ([facility] 2008    NIDDM    Ear infection 11/30/2018    BILATERAL    GERD (gastroesophageal reflux disease)     Headache     Headache(784.0)     Hypertension     HX HTN - NO MEDS CURRENTLY(11-30-18)    Inappropriate sinus tachycardia 5/20/2019    Rash 7/14/2016    Recurrent umbilical hernia 20/81/7129    S/P dilatation and curettage     Tachycardia      Past Surgical History:   Procedure Laterality Date    HX APPENDECTOMY  2/2008    HX CHOLECYSTECTOMY  2001    HX DILATION AND CURETTAGE      HX GYN  3/2008    tubal ligation    HX HERNIA REPAIR  2/2009    HX HERNIA REPAIR  12/06/2018    open recurrent umbilical hernia repair    HX HYSTERECTOMY  03/2012    HX LEFT SALPINGO-OOPHORECTOMY Left     HX LUMBAR DISKECTOMY      2006    HX ORTHOPAEDIC  9/2006    ruptured discs    [facility] COMPRE ELECTROPHYSIOL XM W/LEFT ATRIAL PACNG/REC N/A 11/20/2020    Lt Atrial Pace & Record During Ep Study performed by Verner Ripa, MD at [address] [place] [address] CATH LAB    VA COMPRE ELECTROPHYSIOL XM W/LEFT ATRIAL PACNG/REC N/A 3/16/2021    Lt Atrial Pace & Record During Ep Study performed by Verner Ripa, MD at [address] [address], [facility] CATH LAB    VA COMPRE ELECTROPHYSIOLOGIC ARRHYTHMIA INDUCTION N/A 11/20/2020    EP STUDY COMPLETE performed by Verner Ripa, MD at [address] [place] [address] CATH LAB    VA COMPRE ELECTROPHYSIOLOGIC ARRHYTHMIA INDUCTION N/A 3/16/2021    Ep Study Complete performed by Verner Ripa, MD at [facility] CARDIAC CATH LAB    [place] COMPRE EP EVAL ABLTJ 3D MAPG TX SVT N/A 11/20/2020    Ablation Svt performed by Torito Giron MD at [address] CATH LAB    MI INTRACARDIAC ELECTROPHYSIOLOGIC 3D MAPPING N/A 11/20/2020    Ep 3d Mapping performed by Torito Giron MD at [address] CATH LAB    MI INTRACARDIAC ELECTROPHYSIOLOGIC 3D MAPPING N/A 3/16/2021    Ep 3d Mapping performed by Torito Giron MD at [address] CATH LAB    [place] STIM/PACING HEART POST IV DRUG INFU N/A 3/16/2021    Drug Stimulation performed by Toirto Giron MD at [address] CATH LAB    UPPER GI ENDOSCOPY,BIOPSY  5/11/2017          Family History   Problem Relation Age of Onset    Heart Disease Mother     Hypertension Father     Cancer Father         Lung    Hypertension Sister     No Known Problems Brother     No Known Problems Brother     No Known Problems Brother     Anesth Problems Neg Hx      Social History     Tobacco Use    Smoking status: Never    Smokeless tobacco: Never   Substance Use Topics    Alcohol use: No        Lab Results   Component Value Date/Time    WBC 11.0 (H) 04/27/2022 12:00 AM    HGB 14.6 04/27/2022 12:00 AM    HCT 43.1 04/27/2022 12:00 AM    PLATELET 082 13/56/8124 12:00 AM    MCV 86 04/27/2022 12:00 AM     Lab Results   Component Value Date/Time    Cholesterol, total 165 04/27/2022 12:00 AM    HDL Cholesterol 37 (L) 04/27/2022 12:00 AM    LDL, calculated 95 04/27/2022 12:00 AM    LDL, calculated 64 06/24/2020 08:50 AM    Triglyceride 191 (H) 04/27/2022 12:00 AM     Lab Results   Component Value Date/Time    GFR est non-AA 99 08/26/2021 12:00 AM    GFRNA, POC >60 06/04/2013 09:57 AM    GFR est  08/26/2021 12:00 AM    GFRAA, POC >60 06/04/2013 09:57 AM    Creatinine 0.93 04/27/2022 12:00 AM    Creatinine (POC) 0.7 06/04/2013 09:57 AM    BUN 9 04/27/2022 12:00 AM    Sodium 141 04/27/2022 12:00 AM    Potassium 4.4 04/27/2022 12:00 AM    Chloride 104 04/27/2022 12:00 AM    CO2 24 04/27/2022 12:00 AM    Magnesium 1.8 07/06/2021 09:28 PM    Phosphorus 3.4 07/06/2021 09:28 PM        Review of Systems  A comprehensive review of systems was negative. Objective:     Visit Vitals  /80 (BP 1 Location: Right arm, BP Patient Position: Sitting, BP Cuff Size: Adult)   Pulse 84   Temp 97.8 °F (36.6 °C) (Temporal)   Resp 18   Ht 5' 5\" (1.651 m)   Wt 200 lb (90.7 kg)   LMP 01/25/2012   SpO2 98%   BMI 33.28 kg/m²     Appearance: alert, well appearing, and in no distress. Exam: heart sounds normal rate, regular rhythm, normal S1, S2, no murmurs, rubs, clicks or gallops  Lab review: orders written for new lab studies as appropriate; see orders. Assessment/Plan:     diabetes stable, hypertension stable. Diabetic issues reviewed with her: diabetic diet discussed in detail, written exchange diet given. ICD-10-CM ICD-9-CM    1. Type 2 diabetes mellitus with diabetic neuropathy, without long-term current use of insulin ([facility])  E11.40 250.60 CBC W/O DIFF     622.0 METABOLIC PANEL, COMPREHENSIVE      LIPID PANEL      HEMOGLOBIN A1C WITH EAG      CBC W/O DIFF      METABOLIC PANEL, COMPREHENSIVE      LIPID PANEL      HEMOGLOBIN A1C WITH EAG      2. Primary hypertension  I10 401.9       3. Vitamin D deficiency  E55.9 268.9 VITAMIN D, 25 HYDROXY      VITAMIN D, 25 HYDROXY      4. Vitamin B12 deficiency  E53.8 266.2 VITAMIN B12 & FOLATE      VITAMIN B12 & FOLATE        Will notify when labs return and inform her of any change in plan of care at that time    Pt informed to return to office with worsening of symptoms, or PRN with any questions or concerns. Pt verbalizes understanding of plan of care and denies further questions or concerns at this time.

## 2022-10-06 ENCOUNTER — VIRTUAL VISIT (OUTPATIENT)
Dept: FAMILY MEDICINE CLINIC | Age: 42
End: 2022-10-06
Payer: MEDICAID

## 2022-10-06 DIAGNOSIS — J06.9 VIRAL UPPER RESPIRATORY TRACT INFECTION: Primary | ICD-10-CM

## 2022-10-06 PROCEDURE — 99212 OFFICE O/P EST SF 10 MIN: CPT | Performed by: FAMILY MEDICINE

## 2022-11-28 DIAGNOSIS — E11.40 TYPE 2 DIABETES MELLITUS WITH DIABETIC NEUROPATHY, WITHOUT LONG-TERM CURRENT USE OF INSULIN (HCC): ICD-10-CM

## 2022-11-29 RX ORDER — METFORMIN HYDROCHLORIDE 1000 MG/1
TABLET ORAL
Qty: 60 TABLET | Refills: 0 | Status: SHIPPED | OUTPATIENT
Start: 2022-11-29

## 2022-11-29 RX ORDER — OMEPRAZOLE 20 MG/1
CAPSULE, DELAYED RELEASE ORAL
Qty: 90 CAPSULE | Refills: 0 | Status: SHIPPED | OUTPATIENT
Start: 2022-11-29

## 2022-11-29 RX ORDER — FENOFIBRATE 160 MG/1
TABLET ORAL
Qty: 30 TABLET | Refills: 0 | Status: SHIPPED | OUTPATIENT
Start: 2022-11-29

## 2022-12-26 DIAGNOSIS — E11.40 TYPE 2 DIABETES MELLITUS WITH DIABETIC NEUROPATHY, WITHOUT LONG-TERM CURRENT USE OF INSULIN (HCC): ICD-10-CM

## 2022-12-28 RX ORDER — FENOFIBRATE 160 MG/1
TABLET ORAL
Qty: 30 TABLET | Refills: 0 | OUTPATIENT
Start: 2022-12-28

## 2022-12-28 RX ORDER — METFORMIN HYDROCHLORIDE 1000 MG/1
TABLET ORAL
Qty: 60 TABLET | Refills: 0 | OUTPATIENT
Start: 2022-12-28

## 2022-12-29 ENCOUNTER — APPOINTMENT (OUTPATIENT)
Dept: FAMILY MEDICINE CLINIC | Age: 42
End: 2022-12-29

## 2022-12-30 NOTE — TELEPHONE ENCOUNTER
----- Message from Konstantin Mandel NP sent at 12/30/2022  2:06 PM EST -----  Please call patient and let her know that her labs returned:  1. Triglycerides and LDL are high. Needs to focus on diet changes. Decrease red meat, fatty fried foods and creamy sauces.   Should return to office in 6 months for office visit and fasting labs  Thanks

## 2022-12-30 NOTE — PROGRESS NOTES
Please call patient and let her know that her labs returned:  1. Triglycerides and LDL are high. Needs to focus on diet changes. Decrease red meat, fatty fried foods and creamy sauces.   Should return to office in 6 months for office visit and fasting labs  Thanks

## 2023-01-03 DIAGNOSIS — E11.40 TYPE 2 DIABETES MELLITUS WITH DIABETIC NEUROPATHY, WITHOUT LONG-TERM CURRENT USE OF INSULIN (HCC): ICD-10-CM

## 2023-01-03 RX ORDER — METFORMIN HYDROCHLORIDE 1000 MG/1
1000 TABLET ORAL 2 TIMES DAILY
Qty: 60 TABLET | Refills: 5 | Status: SHIPPED | OUTPATIENT
Start: 2023-01-03

## 2023-01-03 RX ORDER — FENOFIBRATE 160 MG/1
160 TABLET ORAL DAILY
Qty: 30 TABLET | Refills: 5 | Status: SHIPPED | OUTPATIENT
Start: 2023-01-03

## 2023-01-26 DIAGNOSIS — G89.29 CHRONIC MIDLINE LOW BACK PAIN WITH RIGHT-SIDED SCIATICA: ICD-10-CM

## 2023-01-26 DIAGNOSIS — M54.41 CHRONIC MIDLINE LOW BACK PAIN WITH RIGHT-SIDED SCIATICA: ICD-10-CM

## 2023-01-27 RX ORDER — CYCLOBENZAPRINE HCL 10 MG
TABLET ORAL
Qty: 30 TABLET | Refills: 5 | Status: SHIPPED | OUTPATIENT
Start: 2023-01-27

## 2023-02-06 DIAGNOSIS — E11.40 TYPE 2 DIABETES MELLITUS WITH DIABETIC NEUROPATHY, WITHOUT LONG-TERM CURRENT USE OF INSULIN (HCC): ICD-10-CM

## 2023-03-02 DIAGNOSIS — E11.40 TYPE 2 DIABETES MELLITUS WITH DIABETIC NEUROPATHY, WITHOUT LONG-TERM CURRENT USE OF INSULIN (HCC): ICD-10-CM

## 2023-03-02 RX ORDER — GLIPIZIDE 5 MG/1
TABLET, FILM COATED, EXTENDED RELEASE ORAL
Qty: 90 TABLET | Refills: 0 | Status: SHIPPED | OUTPATIENT
Start: 2023-03-02

## 2023-03-02 RX ORDER — OMEPRAZOLE 20 MG/1
CAPSULE, DELAYED RELEASE ORAL
Qty: 90 CAPSULE | Refills: 0 | Status: SHIPPED | OUTPATIENT
Start: 2023-03-02

## 2023-04-02 ENCOUNTER — HOSPITAL ENCOUNTER (EMERGENCY)
Age: 43
Discharge: HOME | End: 2023-04-02
Attending: STUDENT IN AN ORGANIZED HEALTH CARE EDUCATION/TRAINING PROGRAM
Payer: MEDICAID

## 2023-04-02 DIAGNOSIS — E11.40 TYPE 2 DIABETES MELLITUS WITH DIABETIC NEUROPATHY, WITHOUT LONG-TERM CURRENT USE OF INSULIN (HCC): ICD-10-CM

## 2023-04-02 DIAGNOSIS — L03.90 WOUND CELLULITIS: Primary | ICD-10-CM

## 2023-04-02 PROCEDURE — 99283 EMERGENCY DEPT VISIT LOW MDM: CPT

## 2023-04-02 RX ORDER — CEPHALEXIN 500 MG/1
500 CAPSULE ORAL 4 TIMES DAILY
Qty: 28 CAPSULE | Refills: 0 | Status: SHIPPED | OUTPATIENT
Start: 2023-04-02 | End: 2023-04-09

## 2023-04-02 NOTE — ED PROVIDER NOTES
EMERGENCY DEPARTMENT HISTORY AND PHYSICAL EXAM      Date: 4/2/2023  Patient Name: Narciso Alarcon    History of Presenting Illness     HPI: Narciso Alarcon, 38 y.o. female with pmhx of sinus tachycardia, SVTs, diabetes, status post recent ablation in [place] on 3/24, presents to the ED with cc of concern for postop wound infection. Patient reports she had a chest tube placed on the right side of her chest wall after the recent procedure. States she has noticed the wound edges becoming more red and more tender over the past 2 days. She denies any drainage from the wound. No fevers or chills. Patient contacted the physician who did the procedure, and advised them of her symptoms--who told her to come to the ER for evaluation for potential wound infection. PCP: Elida Moses NP    No current facility-administered medications on file prior to encounter. Current Outpatient Medications on File Prior to Encounter   Medication Sig Dispense Refill    glipiZIDE SR (GLUCOTROL XL) 5 mg CR tablet Take 1 tablet by mouth once daily 90 Tablet 0    fluticasone propionate (FLONASE) 50 mcg/actuation nasal spray Use 2 spray(s) in each nostril once daily 16 g 5    SITagliptin phosphate (Januvia) 100 mg tablet Take 1 tablet by mouth once daily 90 Tablet 0    cyclobenzaprine (FLEXERIL) 10 mg tablet TAKE 1 TABLET BY MOUTH THREE TIMES DAILY AS NEEDED FOR MUSCLE SPASM DO  NOT  DRIVE  WHILE  TAKING  THIS  MEDICATION 30 Tablet 5    SUMAtriptan (IMITREX) 50 mg tablet TAKE 1 TABLET BY MOUTH AT THE ONSET OF A MIGRAINE HEADACHE. REPEAT IN 1 HOUR IF NEEDED. TAKE NO MORE THAN 2 TABLETS PER 24 HOURS 9 Tablet 5    metFORMIN (GLUCOPHAGE) 1,000 mg tablet Take 1 Tablet by mouth two (2) times a day. 60 Tablet 5    fenofibrate (LOFIBRA) 160 mg tablet Take 1 Tablet by mouth daily.  30 Tablet 5    aspirin delayed-release 81 mg tablet Take 1 tablet by mouth once daily 90 Tablet 1    gabapentin (NEURONTIN) 400 mg capsule TAKE 1 CAPSULE BY MOUTH THREE TIMES DAILY . DO NOT EXCEED 3 PER 24 HOURS 270 Capsule 1    ergocalciferol (ERGOCALCIFEROL) 1,250 mcg (50,000 unit) capsule TAKE 1 CAPSULE BY MOUTH TWICE A WEEK 8 Capsule 5    ProAir HFA 90 mcg/actuation inhaler INHALE 2 PUFFS BY MOUTH EVERY 4 HOURS AS NEEDED FOR WHEEZING 9 g 5    cyanocobalamin (VITAMIN B12) 500 mcg tablet Take 1 tablet by mouth once daily 30 Tablet 5    famotidine (PEPCID) 40 mg tablet TAKE 1 TABLET BY MOUTH ONCE DAILY AT BEDTIME 30 Tablet 5    furosemide (LASIX) 20 mg tablet       canagliflozin (Invokana) 300 mg tablet TAKE 1 TABLET BY MOUTH ONCE DAILY BEFORE BREAKFAST 30 Tablet 5    loratadine (CLARITIN) 10 mg tablet Take 1 Tab by mouth daily. To replace xyzal. 30 Tab 5    omeprazole (PRILOSEC) 20 mg capsule Take 1 capsule by mouth once daily 90 Capsule 0    glucose blood VI test strips (True Metrix Glucose Test Strip) strip To use to check blood sugar daily,  Dx: E11.9 100 Strip 2    simvastatin (ZOCOR) 20 mg tablet Take 1 tablet by mouth once daily (Patient not taking: Reported on 4/2/2023) 30 Tablet 5    EPINEPHrine (EPIPEN) 0.3 mg/0.3 mL injection INJECT CONTENTS OF 1 PEN BY INTRAMUSCULAR ROUTE AS NEEDED FOR ALLERGIC RESPONSE FOR UP TO 1 DOSE (Patient not taking: Reported on 4/2/2023) 2 Each 0    verapamil ER (CALAN-SR) 120 mg tablet Take 120 mg by mouth daily. (Patient not taking: Reported on 4/2/2023)      lisinopriL (PRINIVIL, ZESTRIL) 10 mg tablet Take 10 mg by mouth daily. (Patient not taking: Reported on 4/2/2023)      ivabradine (CORLANOR) 5 mg tablet Take  by mouth two (2) times daily (with meals). (Patient not taking: Reported on 4/2/2023)      losartan (COZAAR) 25 mg tablet Take 1 Tab by mouth once over twenty-four (24) hours. (Patient not taking: Reported on 4/2/2023)      butalbital-acetaminophen-caffeine (FIORICET, ESGIC) -40 mg per tablet Take 1 Tab by mouth every six (6) hours as needed for Headache.  20 Tab 1       Past History     Past Medical History:  Past Medical History:   Diagnosis Date    Anxiety     Arrhythmia     HX TACHYCARDIA - NEG.  STRESS TEST 2013 PER PATIENT    Asthma     LAST EPISODE 2016    Diabetes ([address] 2008    NIDDM    Ear infection 11/30/2018    BILATERAL    GERD (gastroesophageal reflux disease)     Headache     Headache(784.0)     Hypertension     HX HTN - NO MEDS CURRENTLY(11-30-18)    Inappropriate sinus tachycardia 5/20/2019    Rash 7/14/2016    Recurrent umbilical hernia 40/72/4616    S/P dilatation and curettage     Tachycardia        Past Surgical History:  Past Surgical History:   Procedure Laterality Date    HX APPENDECTOMY  2/2008    HX CHOLECYSTECTOMY  2001    HX DILATION AND CURETTAGE      HX GYN  3/2008    tubal ligation    HX HERNIA REPAIR  2/2009    HX HERNIA REPAIR  12/06/2018    open recurrent umbilical hernia repair    HX HYSTERECTOMY  03/2012    HX LEFT SALPINGO-OOPHORECTOMY Left     HX LUMBAR DISKECTOMY      2006    HX ORTHOPAEDIC  9/2006    ruptured discs    WI COMPRE ELECTROPHYSIOL XM W/LEFT ATRIAL PACNG/REC N/A 11/20/2020    Lt Atrial Pace & Record During Ep Study performed by Kristi Blanco MD at [address] [facility] CATH LAB    WI COMPRE ELECTROPHYSIOL XM W/LEFT ATRIAL PACNG/REC N/A 3/16/2021    Lt Atrial Pace & Record During Ep Study performed by Kristi Blanco MD at [facility] [address] CATH LAB    WI COMPRE ELECTROPHYSIOLOGIC ARRHYTHMIA INDUCTION N/A 11/20/2020    EP STUDY COMPLETE performed by Kristi Blanco MD at [address] CATH LAB    WI COMPRE ELECTROPHYSIOLOGIC ARRHYTHMIA INDUCTION N/A 3/16/2021    Ep Study Complete performed by Kristi Blanco MD at [facility] [address] CATH LAB    WI COMPRE EP EVAL ABLTJ 3D MAPG TX SVT N/A 11/20/2020    Ablation Svt performed by Kristi Blanco MD at [address] CATH LAB    WI INTRACARDIAC ELECTROPHYSIOLOGIC 3D MAPPING N/A 11/20/2020    Ep 3d Mapping performed by Kristi Blanco MD at [address] [place] [address] CATH LAB    WI INTRACARDIAC ELECTROPHYSIOLOGIC 3D MAPPING N/A 3/16/2021    Ep 3d Mapping [facility] CATH LAB    NE STIM/PACING HEART POST IV DRUG INFU N/A 3/16/2021    Drug Stimulation [place] [address], [facility]  CATH LAB    UPPER GI ENDOSCOPY,BIOPSY  5/11/2017            Family History:  Family History   Problem Relation Age of Onset    Heart Disease Mother     Hypertension Father     Cancer Father         Lung    Hypertension Sister     No Known Problems Brother     No Known Problems Brother     No Known Problems Brother     Anesth Problems Neg Hx        Social History:  Social History     Tobacco Use    Smoking status: Never    Smokeless tobacco: Never   Vaping Use    Vaping Use: Never used   Substance Use Topics    Alcohol use: No    Drug use: Never       Allergies: Allergies   Allergen Reactions    Beef Containing Products Rash    Green Pepper Hives     Red, Yellow and Orange Peppers also. OK with Black Pepper    Other Plant, Animal, Environmental Swelling     Red meat    Pork Derived (Porcine) Rash         Review of Systems   Review of Systems   All other systems reviewed and are negative. Physical Exam   Physical Exam  Vitals and nursing note reviewed. Constitutional:       General: She is not in acute distress. Appearance: She is not ill-appearing or toxic-appearing. HENT:      Head: Normocephalic and atraumatic. Nose: Nose normal.      Mouth/Throat:      Mouth: Mucous membranes are moist.   Eyes:      Extraocular Movements: Extraocular movements intact. Pupils: Pupils are equal, round, and reactive to light. Cardiovascular:      Rate and Rhythm: Normal rate and regular rhythm. Pulses: Normal pulses. Pulmonary:      Effort: Pulmonary effort is normal.      Breath sounds: No stridor. No wheezing or rhonchi. Chest:       Abdominal:      General: Abdomen is flat. There is no distension. Tenderness: There is no abdominal tenderness. Musculoskeletal:         General: Normal range of motion.       Cervical back: Normal range of motion and neck supple. Skin:     General: Skin is warm and dry. Neurological:      General: No focal deficit present. Mental Status: She is alert and oriented to person, place, and time. Psychiatric:         Judgment: Judgment normal.       Diagnostic Study Results     Labs -   No results found for this or any previous visit (from the past 24 hour(s)). Radiologic Studies -   No orders to display     CT Results  (Last 48 hours)      None          CXR Results  (Last 48 hours)      None            Medical Decision Making   I, Que Soares MD-- am the first provider for this patient, and I am the attending of record for this patient encounter. I reviewed the vital signs, available nursing notes, past medical history, past surgical history, family history and social history. Vital Signs-Reviewed the patient's vital signs. Patient Vitals for the past 24 hrs:   Temp Pulse Resp BP SpO2   04/02/23 1904 -- -- -- 117/75 97 %   04/02/23 1854 98.7 °F (37.1 °C) 89 16 (!) 161/65 97 %     Records Reviewed: Prior medical records and Nursing notes    Provider Notes (Medical Decision Making):   History and exam suspicious for mild post op wound cellulitis. No concern for abscess, deep tissue infection, necrotizing fasciitis, osteomyelitis, etc.  Patient is afebrile in the ER, nontoxic, extremely well-appearing. No labs or imaging necessary. Will provide rx for keflex. Patient discharged in stable condition. Reasons to return discussed. ED Course:   Initial assessment performed. The patient's presenting problems have been discussed, and they are in agreement with the care plan formulated and outlined with them. I have encouraged them to ask questions as they arise throughout their visit. Que Soares MD      Disposition:  DC      DISCHARGE PLAN:  1.    Discharge Medication List as of 4/2/2023  7:35 PM        START taking these medications    Details   cephALEXin (Keflex) 500 mg capsule Take 1 Capsule by mouth four (4) times daily for 7 days. , Normal, Disp-28 Capsule, R-0           CONTINUE these medications which have NOT CHANGED    Details   glipiZIDE SR (GLUCOTROL XL) 5 mg CR tablet Take 1 tablet by mouth once daily, Normal, Disp-90 Tablet, R-0      fluticasone propionate (FLONASE) 50 mcg/actuation nasal spray Use 2 spray(s) in each nostril once daily, Normal, Disp-16 g, R-5      SITagliptin phosphate (Januvia) 100 mg tablet Take 1 tablet by mouth once daily, Normal, Disp-90 Tablet, R-0      cyclobenzaprine (FLEXERIL) 10 mg tablet TAKE 1 TABLET BY MOUTH THREE TIMES DAILY AS NEEDED FOR MUSCLE SPASM DO  NOT  DRIVE  WHILE  TAKING  THIS  MEDICATION, Normal, Disp-30 Tablet, R-5      SUMAtriptan (IMITREX) 50 mg tablet TAKE 1 TABLET BY MOUTH AT THE ONSET OF A MIGRAINE HEADACHE. REPEAT IN 1 HOUR IF NEEDED. TAKE NO MORE THAN 2 TABLETS PER 24 HOURS, Normal, Disp-9 Tablet, R-5      metFORMIN (GLUCOPHAGE) 1,000 mg tablet Take 1 Tablet by mouth two (2) times a day., Normal, Disp-60 Tablet, R-5      fenofibrate (LOFIBRA) 160 mg tablet Take 1 Tablet by mouth daily. , Normal, Disp-30 Tablet, R-5      aspirin delayed-release 81 mg tablet Take 1 tablet by mouth once daily, Normal, Disp-90 Tablet, R-1      gabapentin (NEURONTIN) 400 mg capsule TAKE 1 CAPSULE BY MOUTH THREE TIMES DAILY .  DO NOT EXCEED 3 PER 24 HOURS, Normal, Disp-270 Capsule, R-1      ergocalciferol (ERGOCALCIFEROL) 1,250 mcg (50,000 unit) capsule TAKE 1 CAPSULE BY MOUTH TWICE A WEEK, Normal, Disp-8 Capsule, R-5      ProAir HFA 90 mcg/actuation inhaler INHALE 2 PUFFS BY MOUTH EVERY 4 HOURS AS NEEDED FOR WHEEZING, Normal, Disp-9 g, R-5, BRITNEY      cyanocobalamin (VITAMIN B12) 500 mcg tablet Take 1 tablet by mouth once daily, Normal, Disp-30 Tablet, R-5      famotidine (PEPCID) 40 mg tablet TAKE 1 TABLET BY MOUTH ONCE DAILY AT BEDTIME, Normal, Disp-30 Tablet, R-5      furosemide (LASIX) 20 mg tablet Historical Med      canagliflozin (Invokana) 300 mg tablet TAKE 1 TABLET BY MOUTH ONCE DAILY BEFORE BREAKFAST, Normal, Disp-30 Tablet, R-5      loratadine (CLARITIN) 10 mg tablet Take 1 Tab by mouth daily. To replace xyzal., Normal, Disp-30 Tab,R-5      omeprazole (PRILOSEC) 20 mg capsule Take 1 capsule by mouth once daily, Normal, Disp-90 Capsule, R-0      glucose blood VI test strips (True Metrix Glucose Test Strip) strip To use to check blood sugar daily,  Dx: E11.9, Normal, Disp-100 Strip, R-2      simvastatin (ZOCOR) 20 mg tablet Take 1 tablet by mouth once daily, Normal, Disp-30 Tablet, R-5      EPINEPHrine (EPIPEN) 0.3 mg/0.3 mL injection INJECT CONTENTS OF 1 PEN BY INTRAMUSCULAR ROUTE AS NEEDED FOR ALLERGIC RESPONSE FOR UP TO 1 DOSE, Normal, Disp-2 Each, R-0      verapamil ER (CALAN-SR) 120 mg tablet Take 120 mg by mouth daily. , Historical Med      lisinopriL (PRINIVIL, ZESTRIL) 10 mg tablet Take 10 mg by mouth daily. , Historical Med      ivabradine (CORLANOR) 5 mg tablet Take  by mouth two (2) times daily (with meals). , Historical Med      losartan (COZAAR) 25 mg tablet Take 1 Tab by mouth once over twenty-four (24) hours. , Historical Med      butalbital-acetaminophen-caffeine (FIORICET, ESGIC) -40 mg per tablet Take 1 Tab by mouth every six (6) hours as needed for Headache., Normal, Disp-20 Tab, R-1           2. Follow-up Information       Follow up With Specialties Details Why Contact Jamila Mcghee NP Family Nurse Practitioner, Nurse Practitioner In 2 days For wound re-check [address] [place] [id number] [phone]            3. Return to ED if worse     Diagnosis     Clinical Impression:   1. Wound cellulitis        Attestations:    Tessie Craven MD    Please note that this dictation was completed with [employer], the [employer] voice recognition software. Quite often unanticipated grammatical, syntax, homophones, and other interpretive errors are inadvertently transcribed by the computer software. Please disregard these errors.   Please excuse any errors that have escaped final proofreading. Thank you.

## 2023-04-02 NOTE — ED TRIAGE NOTES
[place]. Surgery indication was tachycardia and syncope. Pt reports issue with chest tube site on the right chest. Area is raise and red.

## 2023-04-03 RX ORDER — FAMOTIDINE 40 MG/1
TABLET, FILM COATED ORAL
Qty: 30 TABLET | Refills: 0 | Status: SHIPPED | OUTPATIENT
Start: 2023-04-03

## 2023-04-04 NOTE — PROGRESS NOTES
No evidence of arterial disease in the left or right leg at this time. That is good news. Follow up as directed. no

## 2023-04-30 DIAGNOSIS — E11.49 OTHER DIABETIC NEUROLOGICAL COMPLICATION ASSOCIATED WITH TYPE 2 DIABETES MELLITUS (HCC): ICD-10-CM

## 2023-04-30 DIAGNOSIS — I10 ESSENTIAL HYPERTENSION: ICD-10-CM

## 2023-04-30 DIAGNOSIS — E11.9 TYPE 2 DIABETES MELLITUS WITHOUT COMPLICATION, WITH LONG-TERM CURRENT USE OF INSULIN (HCC): ICD-10-CM

## 2023-04-30 DIAGNOSIS — E78.1 HYPERTRIGLYCERIDEMIA: ICD-10-CM

## 2023-04-30 DIAGNOSIS — Z79.4 TYPE 2 DIABETES MELLITUS WITHOUT COMPLICATION, WITH LONG-TERM CURRENT USE OF INSULIN (HCC): ICD-10-CM

## 2023-04-30 RX ORDER — FAMOTIDINE 40 MG/1
TABLET, FILM COATED ORAL
Qty: 30 TABLET | Refills: 0 | Status: SHIPPED | OUTPATIENT
Start: 2023-04-30

## 2023-04-30 RX ORDER — GABAPENTIN 400 MG/1
CAPSULE ORAL
Qty: 90 CAPSULE | Refills: 0 | Status: SHIPPED | OUTPATIENT
Start: 2023-04-30

## 2023-04-30 RX ORDER — ASPIRIN 81 MG/1
TABLET ORAL
Qty: 90 TABLET | Refills: 0 | Status: SHIPPED | OUTPATIENT
Start: 2023-04-30

## 2023-04-30 RX ORDER — ERGOCALCIFEROL 1.25 MG/1
CAPSULE ORAL
Qty: 8 CAPSULE | Refills: 0 | Status: SHIPPED | OUTPATIENT
Start: 2023-04-30

## 2023-05-01 RX ORDER — OMEPRAZOLE 20 MG/1
CAPSULE, DELAYED RELEASE ORAL
Qty: 90 CAPSULE | Refills: 0 | Status: SHIPPED | OUTPATIENT
Start: 2023-05-01

## 2023-06-07 ENCOUNTER — OFFICE VISIT (OUTPATIENT)
Age: 43
End: 2023-06-07
Payer: MEDICAID

## 2023-06-07 ENCOUNTER — NURSE ONLY (OUTPATIENT)
Age: 43
End: 2023-06-07
Payer: MEDICAID

## 2023-06-07 VITALS
HEART RATE: 82 BPM | BODY MASS INDEX: 33.99 KG/M2 | WEIGHT: 204 LBS | DIASTOLIC BLOOD PRESSURE: 78 MMHG | SYSTOLIC BLOOD PRESSURE: 118 MMHG | OXYGEN SATURATION: 98 % | HEIGHT: 65 IN | RESPIRATION RATE: 16 BRPM | TEMPERATURE: 97.8 F

## 2023-06-07 DIAGNOSIS — E11.40 TYPE 2 DIABETES MELLITUS WITH DIABETIC NEUROPATHY, WITHOUT LONG-TERM CURRENT USE OF INSULIN (HCC): ICD-10-CM

## 2023-06-07 DIAGNOSIS — Z11.4 SCREENING FOR HIV (HUMAN IMMUNODEFICIENCY VIRUS): ICD-10-CM

## 2023-06-07 DIAGNOSIS — Z91.018 ALLERGY TO ALPHA-GAL: ICD-10-CM

## 2023-06-07 DIAGNOSIS — E53.8 VITAMIN B12 DEFICIENCY: ICD-10-CM

## 2023-06-07 DIAGNOSIS — E78.5 DYSLIPIDEMIA (HIGH LDL; LOW HDL): ICD-10-CM

## 2023-06-07 DIAGNOSIS — I10 PRIMARY HYPERTENSION: ICD-10-CM

## 2023-06-07 DIAGNOSIS — E55.9 VITAMIN D DEFICIENCY: ICD-10-CM

## 2023-06-07 DIAGNOSIS — Z91.09 ENVIRONMENTAL ALLERGIES: ICD-10-CM

## 2023-06-07 DIAGNOSIS — E11.40 TYPE 2 DIABETES MELLITUS WITH DIABETIC NEUROPATHY, WITHOUT LONG-TERM CURRENT USE OF INSULIN (HCC): Primary | ICD-10-CM

## 2023-06-07 PROCEDURE — 99214 OFFICE O/P EST MOD 30 MIN: CPT | Performed by: NURSE PRACTITIONER

## 2023-06-07 PROCEDURE — 3078F DIAST BP <80 MM HG: CPT | Performed by: NURSE PRACTITIONER

## 2023-06-07 PROCEDURE — 3074F SYST BP LT 130 MM HG: CPT | Performed by: NURSE PRACTITIONER

## 2023-06-07 RX ORDER — LEVOCETIRIZINE DIHYDROCHLORIDE 5 MG/1
5 TABLET, FILM COATED ORAL NIGHTLY
Qty: 90 TABLET | Refills: 1 | Status: SHIPPED | OUTPATIENT
Start: 2023-06-07

## 2023-06-07 RX ORDER — EPINEPHRINE 0.3 MG/.3ML
INJECTION SUBCUTANEOUS
Qty: 2 EACH | Refills: 2 | Status: SHIPPED | OUTPATIENT
Start: 2023-06-07

## 2023-06-07 SDOH — ECONOMIC STABILITY: HOUSING INSECURITY
IN THE LAST 12 MONTHS, WAS THERE A TIME WHEN YOU DID NOT HAVE A STEADY PLACE TO SLEEP OR SLEPT IN A SHELTER (INCLUDING NOW)?: NO

## 2023-06-07 SDOH — ECONOMIC STABILITY: INCOME INSECURITY: HOW HARD IS IT FOR YOU TO PAY FOR THE VERY BASICS LIKE FOOD, HOUSING, MEDICAL CARE, AND HEATING?: NOT HARD AT ALL

## 2023-06-07 SDOH — ECONOMIC STABILITY: FOOD INSECURITY: WITHIN THE PAST 12 MONTHS, THE FOOD YOU BOUGHT JUST DIDN'T LAST AND YOU DIDN'T HAVE MONEY TO GET MORE.: NEVER TRUE

## 2023-06-07 SDOH — ECONOMIC STABILITY: FOOD INSECURITY: WITHIN THE PAST 12 MONTHS, YOU WORRIED THAT YOUR FOOD WOULD RUN OUT BEFORE YOU GOT MONEY TO BUY MORE.: NEVER TRUE

## 2023-06-07 ASSESSMENT — PATIENT HEALTH QUESTIONNAIRE - PHQ9
SUM OF ALL RESPONSES TO PHQ QUESTIONS 1-9: 0
1. LITTLE INTEREST OR PLEASURE IN DOING THINGS: 0
SUM OF ALL RESPONSES TO PHQ QUESTIONS 1-9: 0
2. FEELING DOWN, DEPRESSED OR HOPELESS: 0
SUM OF ALL RESPONSES TO PHQ9 QUESTIONS 1 & 2: 0

## 2023-06-07 NOTE — PROGRESS NOTES
Subjective:       Delia Fermin is an 38 y.o. female who presents for follow up of diabetes. Current symptoms include: none. Patient denies hyperglycemia. Evaluation to date has included: fasting blood sugar, fasting lipid panel, and hemoglobin A1C. Home sugars: BGs consistently in an acceptable range. Patient's medications, allergies, past medical, surgical, social and family histories were reviewed and updated as appropriate. Review of Systems  A comprehensive review of systems was negative. Objective:      General appearance: alert, appears stated age, and cooperative  Lungs: clear to auscultation bilaterally  Heart: regular rate and rhythm, S1, S2 normal, no murmur, click, rub or gallop    Laboratory:  No components found for: A1C      Assessment:      Diabetes mellitus Type II, under good control. Plan:      Addressed ADA diet. Diagnosis Orders   1. Type 2 diabetes mellitus with diabetic neuropathy, without long-term current use of insulin (HCC)  CBC    Comprehensive Metabolic Panel    Hemoglobin A1C    Microalbumin / Creatinine Urine Ratio      2. Primary hypertension        3. Dyslipidemia (high LDL; low HDL)  Lipid Panel      4. Vitamin D deficiency  Vitamin D 25 Hydroxy      5. Screening for HIV (human immunodeficiency virus)  HIV 1/2 Ag/Ab, 4TH Generation,W Rflx Confirm      6. Environmental allergies  levocetirizine (XYZAL ALLERGY 24HR) 5 MG tablet      7. Vitamin B12 deficiency        8. Allergy to alpha-gal  Allergen, Food, Alpha-Gal Panel, IgE    EPINEPHrine (EPIPEN) 0.3 MG/0.3ML SOAJ injection        Educated about taking medications as prescribed  Will notify when labs return, and inform her of any change in plan of care at that time    Pt informed to return to office with worsening of symptoms, or PRN with any questions or concerns. Pt verbalizes understanding of plan of care and denies further questions or concerns at this time.

## 2023-06-07 NOTE — PROGRESS NOTES
Identified pt with two pt identifiers(name and ). Chief Complaint   Patient presents with    Diabetes    Lab Collection     fasting        Health Maintenance Due   Topic    HIV screen     Hepatitis B vaccine (1 of 3 - Risk 3-dose series)    DTaP/Tdap/Td vaccine (2 - Td or Tdap)    Diabetic foot exam     COVID-19 Vaccine (2 - Booster for Moderna series)    Diabetic retinal exam     Diabetic Alb to Cr ratio (uACR) test        Wt Readings from Last 3 Encounters:   23 204 lb (92.5 kg)   22 200 lb (90.7 kg)   22 204 lb (92.5 kg)     Temp Readings from Last 3 Encounters:   23 97.8 °F (36.6 °C) (Temporal)     BP Readings from Last 3 Encounters:   23 118/78   22 132/80   22 136/86     Pulse Readings from Last 3 Encounters:   23 82   22 84   22 89           Depression Screening:  :     PHQ-9 Questionaire 2023   Little interest or pleasure in doing things 0 0 0   Feeling down, depressed, or hopeless 0 0 0   PHQ-9 Total Score 0 0 0        Fall Risk Assessment:  :   No flowsheet data found. Abuse Screening:  :   No flowsheet data found. Coordination of Care Questionnaire:  :     1. \"Have you been to the ER, urgent care clinic since your last visit? Hospitalized since your last visit? \" no    2. \"Have you seen or consulted any other health care providers outside of the [address] [place] [address] since your last visit? \" no     3. This patient is accompanied in the office by her self. 4. For patients aged 39-70: Has the patient had a colonoscopy / FIT/ Cologuard? NA - based on age      If the patient is female:    11. For patients aged 41-77: Has the patient had a mammogram within the past 2 years? Yes - no Care Gap present      6. For patients aged 21-65: Has the patient had a pap smear?  Yes - no Care Gap present

## 2023-06-09 ENCOUNTER — TELEPHONE (OUTPATIENT)
Age: 43
End: 2023-06-09

## 2023-06-09 LAB
25(OH)D3+25(OH)D2 SERPL-MCNC: 46 NG/ML (ref 30–100)
ALBUMIN SERPL-MCNC: 4.5 G/DL (ref 3.8–4.8)
ALBUMIN/CREAT UR: <4 MG/G CREAT (ref 0–29)
ALBUMIN/GLOB SERPL: 1.6 {RATIO} (ref 1.2–2.2)
ALP SERPL-CCNC: 44 IU/L (ref 44–121)
ALT SERPL-CCNC: 24 IU/L (ref 0–32)
AST SERPL-CCNC: 20 IU/L (ref 0–40)
BILIRUB SERPL-MCNC: 0.3 MG/DL (ref 0–1.2)
BUN SERPL-MCNC: 12 MG/DL (ref 6–24)
BUN/CREAT SERPL: 12 (ref 9–23)
CALCIUM SERPL-MCNC: 9.6 MG/DL (ref 8.7–10.2)
CHLORIDE SERPL-SCNC: 103 MMOL/L (ref 96–106)
CHOLEST SERPL-MCNC: 179 MG/DL (ref 100–199)
CO2 SERPL-SCNC: 19 MMOL/L (ref 20–29)
CREAT SERPL-MCNC: 0.98 MG/DL (ref 0.57–1)
CREAT UR-MCNC: 67.7 MG/DL
EGFRCR SERPLBLD CKD-EPI 2021: 73 ML/MIN/1.73
ERYTHROCYTE [DISTWIDTH] IN BLOOD BY AUTOMATED COUNT: 13.4 % (ref 11.7–15.4)
GLOBULIN SER CALC-MCNC: 2.8 G/DL (ref 1.5–4.5)
GLUCOSE SERPL-MCNC: 122 MG/DL (ref 70–99)
HBA1C MFR BLD: 7.1 % (ref 4.8–5.6)
HCT VFR BLD AUTO: 47 % (ref 34–46.6)
HDLC SERPL-MCNC: 35 MG/DL
HGB BLD-MCNC: 14.7 G/DL (ref 11.1–15.9)
HIV 1+2 AB+HIV1 P24 AG SERPL QL IA: NON REACTIVE
IMP & REVIEW OF LAB RESULTS: NORMAL
LDLC SERPL CALC-MCNC: 113 MG/DL (ref 0–99)
MCH RBC QN AUTO: 27.5 PG (ref 26.6–33)
MCHC RBC AUTO-ENTMCNC: 31.3 G/DL (ref 31.5–35.7)
MCV RBC AUTO: 88 FL (ref 79–97)
MICROALBUMIN UR-MCNC: <3 UG/ML
PLATELET # BLD AUTO: 299 X10E3/UL (ref 150–450)
POTASSIUM SERPL-SCNC: 4.7 MMOL/L (ref 3.5–5.2)
PROT SERPL-MCNC: 7.3 G/DL (ref 6–8.5)
RBC # BLD AUTO: 5.35 X10E6/UL (ref 3.77–5.28)
SODIUM SERPL-SCNC: 140 MMOL/L (ref 134–144)
TRIGL SERPL-MCNC: 174 MG/DL (ref 0–149)
VLDLC SERPL CALC-MCNC: 31 MG/DL (ref 5–40)
WBC # BLD AUTO: 8.7 X10E3/UL (ref 3.4–10.8)

## 2023-06-09 NOTE — TELEPHONE ENCOUNTER
----- Message from ANTOINE Ha NP sent at 6/9/2023  7:43 AM EDT -----  Please call patient and let her know that her labs returned, awaiting alpha gal.  Stable.   Should follow up in 6 months for office visit and fasting labs  Thanks

## 2023-06-27 ENCOUNTER — TELEPHONE (OUTPATIENT)
Age: 43
End: 2023-06-27

## 2023-06-27 ENCOUNTER — PATIENT MESSAGE (OUTPATIENT)
Age: 43
End: 2023-06-27

## 2023-06-27 ENCOUNTER — NURSE TRIAGE (OUTPATIENT)
Dept: OTHER | Facility: CLINIC | Age: 43
End: 2023-06-27

## 2023-06-28 DIAGNOSIS — K21.9 GASTROESOPHAGEAL REFLUX DISEASE WITHOUT ESOPHAGITIS: Primary | ICD-10-CM

## 2023-06-28 DIAGNOSIS — E55.9 VITAMIN D DEFICIENCY: ICD-10-CM

## 2023-06-28 DIAGNOSIS — E11.40 TYPE 2 DIABETES MELLITUS WITH DIABETIC NEUROPATHY, WITHOUT LONG-TERM CURRENT USE OF INSULIN (HCC): ICD-10-CM

## 2023-06-28 DIAGNOSIS — E78.5 DYSLIPIDEMIA (HIGH LDL; LOW HDL): ICD-10-CM

## 2023-06-28 DIAGNOSIS — G62.9 NEUROPATHY: ICD-10-CM

## 2023-06-29 ENCOUNTER — OFFICE VISIT (OUTPATIENT)
Age: 43
End: 2023-06-29
Payer: MEDICAID

## 2023-06-29 VITALS
TEMPERATURE: 98.4 F | HEIGHT: 65 IN | RESPIRATION RATE: 17 BRPM | DIASTOLIC BLOOD PRESSURE: 67 MMHG | HEART RATE: 78 BPM | SYSTOLIC BLOOD PRESSURE: 120 MMHG | WEIGHT: 206.2 LBS | BODY MASS INDEX: 34.35 KG/M2 | OXYGEN SATURATION: 97 %

## 2023-06-29 DIAGNOSIS — R20.2 NUMBNESS AND TINGLING OF BOTH FEET: Primary | ICD-10-CM

## 2023-06-29 DIAGNOSIS — R20.0 NUMBNESS AND TINGLING OF BOTH FEET: Primary | ICD-10-CM

## 2023-06-29 PROCEDURE — 99212 OFFICE O/P EST SF 10 MIN: CPT | Performed by: FAMILY MEDICINE

## 2023-06-29 PROCEDURE — 3074F SYST BP LT 130 MM HG: CPT | Performed by: FAMILY MEDICINE

## 2023-06-29 PROCEDURE — 3078F DIAST BP <80 MM HG: CPT | Performed by: FAMILY MEDICINE

## 2023-06-29 RX ORDER — GABAPENTIN 400 MG/1
CAPSULE ORAL
Qty: 270 CAPSULE | Refills: 0 | Status: SHIPPED | OUTPATIENT
Start: 2023-06-29 | End: 2023-09-13

## 2023-06-29 RX ORDER — ERGOCALCIFEROL 1.25 MG/1
1 CAPSULE ORAL
Qty: 24 CAPSULE | Refills: 1 | Status: SHIPPED | OUTPATIENT
Start: 2023-06-29

## 2023-06-29 RX ORDER — ASPIRIN 81 MG/1
81 TABLET ORAL DAILY
Qty: 90 TABLET | Refills: 1 | Status: SHIPPED | OUTPATIENT
Start: 2023-06-29

## 2023-06-29 RX ORDER — OMEPRAZOLE 20 MG/1
20 CAPSULE, DELAYED RELEASE ORAL DAILY
Qty: 90 CAPSULE | Refills: 1 | Status: SHIPPED | OUTPATIENT
Start: 2023-06-29

## 2023-06-29 RX ORDER — FENOFIBRATE 160 MG/1
160 TABLET ORAL DAILY
Qty: 90 TABLET | Refills: 1 | Status: SHIPPED | OUTPATIENT
Start: 2023-06-29

## 2023-06-29 RX ORDER — FAMOTIDINE 40 MG/1
40 TABLET, FILM COATED ORAL NIGHTLY
Qty: 90 TABLET | Refills: 1 | Status: SHIPPED | OUTPATIENT
Start: 2023-06-29

## 2023-06-30 ENCOUNTER — TELEPHONE (OUTPATIENT)
Age: 43
End: 2023-06-30

## 2023-07-14 ENCOUNTER — PATIENT MESSAGE (OUTPATIENT)
Age: 43
End: 2023-07-14

## 2023-07-17 NOTE — PROGRESS NOTES
Identified pt with two pt identifiers(name and ). Chief Complaint   Patient presents with    Follow-up    Request For New Medication        Health Maintenance Due   Topic    EYE EXAM RETINAL OR DILATED Q1     FOOT EXAM Q1     MICROALBUMIN Q1        Wt Readings from Last 3 Encounters:   03/15/17 207 lb (93.9 kg)   17 205 lb (93 kg)   17 205 lb 11 oz (93.3 kg)     Temp Readings from Last 3 Encounters:   03/15/17 97.1 °F (36.2 °C) (Oral)   17 98.4 °F (36.9 °C) (Oral)   17 98.6 °F (37 °C)     BP Readings from Last 3 Encounters:   03/15/17 106/75   17 120/74   17 125/68     Pulse Readings from Last 3 Encounters:   03/15/17 (!) 101   17 (!) 112   17 99         Learning Assessment:  :     Learning Assessment 2014   PRIMARY LEARNER Patient   HIGHEST LEVEL OF EDUCATION - PRIMARY LEARNER  GRADUATED HIGH SCHOOL OR GED   BARRIERS PRIMARY LEARNER NONE   PRIMARY LANGUAGE ENGLISH   LEARNER PREFERENCE PRIMARY READING   ANSWERED BY patient   RELATIONSHIP SELF       Depression Screening:  :     PHQ 2 / 9, over the last two weeks 3/9/2017   Little interest or pleasure in doing things Not at all   Feeling down, depressed or hopeless Not at all   Total Score PHQ 2 0       Fall Risk Assessment:  :     No flowsheet data found. Abuse Screening:  :     Abuse Screening Questionnaire 2016   Do you ever feel afraid of your partner? N   Are you in a relationship with someone who physically or mentally threatens you? N   Is it safe for you to go home? Y       Coordination of Care Questionnaire:  :     1) Have you been to an emergency room, urgent care clinic since your last visit? no   Hospitalized since your last visit? no             2) Have you seen or consulted any other health care providers outside of [facility] since your last visit? no  (Include any pap smears or colon screenings in this section.)    3) Do you have an Advance Directive on file?  no  Are you interested in receiving information about Advance Directives? no    Reviewed record in preparation for visit and have obtained necessary documentation. Medication reconciliation up to date and corrected with patient at this time. DISPLAY PLAN FREE TEXT

## 2023-07-27 ENCOUNTER — OFFICE VISIT (OUTPATIENT)
Age: 43
End: 2023-07-27
Payer: MEDICAID

## 2023-07-27 VITALS
HEIGHT: 65 IN | RESPIRATION RATE: 17 BRPM | TEMPERATURE: 98 F | DIASTOLIC BLOOD PRESSURE: 65 MMHG | OXYGEN SATURATION: 98 % | BODY MASS INDEX: 34.42 KG/M2 | HEART RATE: 95 BPM | WEIGHT: 206.6 LBS | SYSTOLIC BLOOD PRESSURE: 139 MMHG

## 2023-07-27 DIAGNOSIS — L50.9 URTICARIA: Primary | ICD-10-CM

## 2023-07-27 DIAGNOSIS — E74.39 GLUCOSE INTOLERANCE: ICD-10-CM

## 2023-07-27 DIAGNOSIS — E66.09 CLASS 1 OBESITY DUE TO EXCESS CALORIES WITHOUT SERIOUS COMORBIDITY WITH BODY MASS INDEX (BMI) OF 34.0 TO 34.9 IN ADULT: ICD-10-CM

## 2023-07-27 DIAGNOSIS — L74.0 HEAT RASH: ICD-10-CM

## 2023-07-27 PROCEDURE — 3078F DIAST BP <80 MM HG: CPT | Performed by: FAMILY MEDICINE

## 2023-07-27 PROCEDURE — 3074F SYST BP LT 130 MM HG: CPT | Performed by: FAMILY MEDICINE

## 2023-07-27 PROCEDURE — 99213 OFFICE O/P EST LOW 20 MIN: CPT | Performed by: FAMILY MEDICINE

## 2023-07-27 RX ORDER — HYDROXYZINE HYDROCHLORIDE 25 MG/1
25 TABLET, FILM COATED ORAL NIGHTLY
Qty: 30 TABLET | Refills: 0 | Status: SHIPPED | OUTPATIENT
Start: 2023-07-27 | End: 2023-08-26

## 2023-08-01 ENCOUNTER — TRANSCRIBE ORDERS (OUTPATIENT)
Facility: HOSPITAL | Age: 43
End: 2023-08-01

## 2023-08-01 DIAGNOSIS — Z12.31 SCREENING MAMMOGRAM FOR BREAST CANCER: Primary | ICD-10-CM

## 2023-08-01 ASSESSMENT — ENCOUNTER SYMPTOMS
SORE THROAT: 0
SHORTNESS OF BREATH: 0
EYE PAIN: 0
DIARRHEA: 0
VOMITING: 0
RHINORRHEA: 0
NAIL CHANGES: 0
COUGH: 0

## 2023-08-01 NOTE — PROGRESS NOTES
Christina Swanson (:  1980) is a 38 y.o. female,Established patient, here for evaluation of the following chief complaint(s):  Rash (Patient is has noticed a rash on both arms and it worsened in the evenings they are flaring up and she is having to take benadryl, patient states its been happening for about two weeks )         ASSESSMENT/PLAN:  1. Urticaria  -     hydrOXYzine HCl (ATARAX) 25 MG tablet; Take 1 tablet by mouth nightly, Disp-30 tablet, R-0Normal  2. Heat rash  -     hydrOXYzine HCl (ATARAX) 25 MG tablet; Take 1 tablet by mouth nightly, Disp-30 tablet, R-0Normal      No follow-ups on file. Subjective   SUBJECTIVE/OBJECTIVE:  Rash  This is a new problem. The current episode started 1 to 4 weeks ago. The problem has been gradually worsening since onset. The affected locations include the right arm and left arm. The rash is characterized by burning, dryness, itchiness and redness. Pertinent negatives include no anorexia, congestion, cough, diarrhea, eye pain, facial edema, fatigue, fever, joint pain, nail changes, rhinorrhea, shortness of breath, sore throat or vomiting. Past treatments include nothing. The treatment provided no relief. Her past medical history is significant for allergies. Review of Systems   Constitutional:  Negative for fatigue and fever. HENT:  Negative for congestion, rhinorrhea and sore throat. Eyes:  Negative for pain. Respiratory:  Negative for cough and shortness of breath. Gastrointestinal:  Negative for anorexia, diarrhea and vomiting. Musculoskeletal:  Negative for joint pain. Skin:  Positive for rash. Negative for nail changes. Objective   Physical Exam  Skin:     General: Skin is warm. Capillary Refill: Capillary refill takes less than 2 seconds. Findings: Erythema and rash present. Rash is macular, papular and urticarial.            Patient is also interested in discussing her weight and her possible glucose intolerance.

## 2023-08-02 ENCOUNTER — HOSPITAL ENCOUNTER (OUTPATIENT)
Facility: HOSPITAL | Age: 43
Discharge: HOME | End: 2023-08-05
Attending: OBSTETRICS & GYNECOLOGY
Payer: MEDICAID

## 2023-08-02 VITALS — WEIGHT: 206 LBS | BODY MASS INDEX: 34.32 KG/M2 | HEIGHT: 65 IN

## 2023-08-02 DIAGNOSIS — Z12.31 SCREENING MAMMOGRAM FOR BREAST CANCER: ICD-10-CM

## 2023-08-02 PROCEDURE — 77067 SCR MAMMO BI INCL CAD: CPT

## 2023-08-02 RX ORDER — GLIPIZIDE 5 MG/1
5 TABLET, FILM COATED, EXTENDED RELEASE ORAL DAILY
Qty: 90 TABLET | Refills: 1 | Status: SHIPPED | OUTPATIENT
Start: 2023-08-02

## 2023-08-16 NOTE — PROGRESS NOTES
Head CT with no acute abnormality. Results discussed with patient 9/21/18. [de-identified] : Pt diagnosed with mycosis fungoidis dx'ed in 2010. Getting light therapy twice a week to three times a week. \par  \par  Pt had b/l PE's 4/14/14 on anticoagulation. Had multiple bleeding episodes requiring transfusions and hospitalization (GI tract bleed) on Xarelto. Anticoagulation was held due to the bleeding, and she was started on Coumadin 10/2014.She has had a repeat colonoscopy and endoscopy done before starting the Coumadin given the bleed post Xarelto, they were all normal tests.The patient had CT Chest with contrast on 12/14/14 which showed no PE, stable lung nodule. MRI abdomen done in 5/2014 which showed hemorrhagic renal cysts, stable. She has had repeated imaging of the cyst -stable. \par  \par  In August 2018, she felt SOB -went to a Dr. Hansen (pulmonologist) and had a CT chest done at [facility] on 8/28/18-she had abnormal findings on it. Repeat CT chest was done 12/18/18 showed stable MARISOL 8mm nodule, L breast nodule -the same. \par  \par  pt has not had a bmbx\par  \par  \par   [de-identified] : She was hospitalized recently (11/5-11/12/21) for chest pain & headache; this was the third hospitalization of 2021 due to cardiopulmonary issues. Seeing pulmonary for TANA. Today she is having pain in her R kidney region and complains of insomnia despite good sleep hygiene. +ALONZO, +peripheral neuropathy. Patient denies fever, chills, night sweats, abdominal pain, or chest pain.  Poor appetite attributed to life stressors, unintentional weight loss. Weight loss started over the past year but continues to lose weight. She is getting weaker Getting light therapy 2x/wk since 2007. Lesions are not getting lighter like previously  She had PET/CT done in 12/2021 but pt remains concerned with her weight loss and weakness.   Patient presented to the ED 7/3 w/ SOB that had been ongoing for the past 3-4 days which worsened with activity. She is on Coumadin and gets her INR checked each week which had been fluctuating between 1-3 per patient. She endorsed missing 1 dose/week on average. At [facility], pt was found to have bilateral PE with elevated BNP and troponin. Patient transferred to [facility] for further management. She was started on heparin gtt. Pt was bridged from heparin to warfarin. Therapeutic INR reached before discharged. 7/5 Duplex found Left posterior tibial vein acute deep vein thrombosis. Acute deep venous thrombosis: below the knee. Bilateral popliteal Baker's cysts. 7/3 CT angio: Bilateral pulmonary emboli.  Mildly dilated right ventricle with RV: LV ratio of approximately 1.25.  I discussed the findings with Dr. Arrington on 07/03/2022 at 8:10 PM.  Read back verification was obtained Patchy linear and ground-glass opacities in both lungs predominantly involving lower lobes are grossly stable dating back to 10/02/2020,  compatible with scarring and/or pulmonary fibrosis. An irregularly shaped 12 x 9 mm subserosal opacity in the left upper lobe is stable from 10/02/2020. Scattered 2 mm nodules in the upper lobes.  CT C/A/P 8/5/22: No acute pathology. A 9 mm groundglass nodule in the left upper lobe as at recent chest CT 7/30/2022. Again continued surveillance is recommended. Bilateral indeterminate renal lesions including a 3.3 cm left renal lesion with nodular internal foci. Contrast-enhanced MRI is recommended for further evaluation to evaluate for a renal cell carcinoma.  c/o R sided back pain -took Tylenol this morning.  MRI abd 10/26/22: no change in a complex 2.9 x 2.4 cm left renal cyst compared to 1/19/22. Additional benign-appearing renal cysts  She went to ED after spitting up blood. She notes having intermittent tarry stools too. She will see GI -said she scheduled appt for next month.   She was admitted 11/28/22 with dizziness. Brain CT: No acute hemorrhage, mass effect, hydrocephalus or extra-axial collections. Neck CTA: No evidence for hemodynamically significant stenosis or arterial dissection. Brain CTA: No large vessel occlusion or stenosis. She was found to have a UTI.   She went to ED on 1/30/23 for HA -she thought she was having a stroke. Also had CP/SOB. CT head No acute intracranial bleeding. Chronic left pontine lacunar infarction and chronic microvascular ischemic changes. She had a CTA done showing: No pulmonary embolism. Stable bilateral nonspecific groundglass opacities. A 9 mm left upper lobe groundglass nodule requires additional  follow-up. Indeterminate bilateral renal masses. Dedicated urology follow-up is advised.  She was admitted again 3 times since last visit: 1. admitted in May with dyspnea and chest pain, noted acute on chronic systolic Congestive Heart Failure- CTA neg PE  2. admitted in June with hematemesis. She also endorsed some blood in her stool, in addition to esophageal dysphagia to solids, burning sensation in chest, as well as dyspnea.  Hgb/HD stable (baseline Hgb ~10). EGD 6/20: normal mucosa was found in the entire esophagus. Biopsied. Small hiatal hernia. Mild gastritis. Normal examined duodenum. Esophagus, proximal, biopsy: Rare fungal profiles identified consistent with Candida. Gastric antrum, biopsy: No morphological evidence of Helicobacter microorganisms. 3. admitted in July with left calf pain (cramping) that started 7/11/2023. Pt presented to her PCP who advised she come to the ED for further evaluation. In ED, imaging revealed Arterial duplex shows The left dorsalis pedis artery was thrombosed. CT angio was performed revealing Moderate to severe stenosis of distal right SFA, Moderate stenosis of the distal left SFA, Moderate stenosis of the bilateral popliteal arteries, Severe luminal calcification of the bilateral infrapopliteal arteries as above, right worse than left which markedly limits evaluation, probably occluded or severely stenotic as above. Pt was seen by vascular surgery, initially started on Heparin Drip but transitioned back to home warfarin, with no vascular intervention warranted at this time.   Because of frequent admissions, she missed a lot of therapy for MF. In the meantime, skin lesions reappearing and causing itchiness. She restarted her light treatments.

## 2023-08-16 NOTE — PROGRESS NOTES
R ankle and 1+ L ankle (-) Babinski  Good FTN and HTS   Gait: Able to walk on toes and heels    Assessment:       ICD-10-CM    1. Paresthesia  R20.2 EMG LIMITED      2. Intractable migraine without aura and without status migrainosus  G43.019             Possible sensory polyneuropathy due to long history of diabetes and vit B12 deficiency     History of lower back surgery with residual R lower back pain      Plan:   1. Trial of alpha lipoic acid 600 mg every day then BID as tolerated  2. EMG/NCS of both LE   3. Will put back on Nortriptyline 10 mg QHS as migraine prophylaxis and can help with neuropathy and sleep. Monitor for toxicity  4. Trial of Maxalt 10 mg prn to try to abort headaches  5. Discussed the need for headache diary and went through the list that can trigger headache (I.e. banana, stress)   6. Continue Gabapentin 400 mg TID c/o PCP  7. Advise weight loss and exercise  8. Optimize medical management of diabetes to prevent worsening of neuropathy  9. Continue Vit B12 1000 mcg every day         Return for above testing. Chantell Raymundo MD  Diplomate, American Board of Psychiatry and Neurology  Diplomate, Neuromuscular Medicine  Diplomate, American Board of Electrodiagnostic Medicine      I spent total of 40 mins with the patient, greater than 50% of the visit was spent on providing counseling and coordination of care.

## 2023-08-17 ENCOUNTER — OFFICE VISIT (OUTPATIENT)
Age: 43
End: 2023-08-17
Payer: MEDICAID

## 2023-08-17 VITALS
SYSTOLIC BLOOD PRESSURE: 120 MMHG | OXYGEN SATURATION: 99 % | DIASTOLIC BLOOD PRESSURE: 70 MMHG | HEART RATE: 64 BPM | BODY MASS INDEX: 34.28 KG/M2 | TEMPERATURE: 97 F | HEIGHT: 65 IN

## 2023-08-17 DIAGNOSIS — G43.019 INTRACTABLE MIGRAINE WITHOUT AURA AND WITHOUT STATUS MIGRAINOSUS: ICD-10-CM

## 2023-08-17 DIAGNOSIS — R20.2 PARESTHESIA: Primary | ICD-10-CM

## 2023-08-17 PROCEDURE — 3078F DIAST BP <80 MM HG: CPT | Performed by: PSYCHIATRY & NEUROLOGY

## 2023-08-17 PROCEDURE — 99215 OFFICE O/P EST HI 40 MIN: CPT | Performed by: PSYCHIATRY & NEUROLOGY

## 2023-08-17 PROCEDURE — 3074F SYST BP LT 130 MM HG: CPT | Performed by: PSYCHIATRY & NEUROLOGY

## 2023-08-17 RX ORDER — RIZATRIPTAN BENZOATE 10 MG/1
TABLET, ORALLY DISINTEGRATING ORAL
Qty: 9 TABLET | Refills: 2 | Status: SHIPPED | OUTPATIENT
Start: 2023-08-17

## 2023-08-17 RX ORDER — NORTRIPTYLINE HYDROCHLORIDE 10 MG/1
10 CAPSULE ORAL NIGHTLY
Qty: 30 CAPSULE | Refills: 3 | Status: SHIPPED | OUTPATIENT
Start: 2023-08-17

## 2023-08-29 DIAGNOSIS — G62.9 NEUROPATHY: ICD-10-CM

## 2023-08-29 DIAGNOSIS — Z91.018 ALLERGY TO ALPHA-GAL: ICD-10-CM

## 2023-08-29 DIAGNOSIS — Z91.09 ENVIRONMENTAL ALLERGIES: ICD-10-CM

## 2023-08-29 DIAGNOSIS — E11.40 TYPE 2 DIABETES MELLITUS WITH DIABETIC NEUROPATHY, WITHOUT LONG-TERM CURRENT USE OF INSULIN (HCC): ICD-10-CM

## 2023-08-29 DIAGNOSIS — K21.9 GASTROESOPHAGEAL REFLUX DISEASE WITHOUT ESOPHAGITIS: ICD-10-CM

## 2023-08-29 DIAGNOSIS — E55.9 VITAMIN D DEFICIENCY: ICD-10-CM

## 2023-09-05 ENCOUNTER — OFFICE VISIT (OUTPATIENT)
Age: 43
End: 2023-09-05
Payer: MEDICAID

## 2023-09-05 ENCOUNTER — NURSE ONLY (OUTPATIENT)
Age: 43
End: 2023-09-05
Payer: MEDICAID

## 2023-09-05 VITALS
HEART RATE: 94 BPM | RESPIRATION RATE: 16 BRPM | SYSTOLIC BLOOD PRESSURE: 118 MMHG | HEIGHT: 65 IN | OXYGEN SATURATION: 97 % | WEIGHT: 203 LBS | DIASTOLIC BLOOD PRESSURE: 70 MMHG | TEMPERATURE: 97.7 F | BODY MASS INDEX: 33.82 KG/M2

## 2023-09-05 DIAGNOSIS — R30.0 DYSURIA: ICD-10-CM

## 2023-09-05 DIAGNOSIS — R10.32 LEFT LOWER QUADRANT ABDOMINAL PAIN: Primary | ICD-10-CM

## 2023-09-05 DIAGNOSIS — R10.32 LEFT LOWER QUADRANT ABDOMINAL PAIN: ICD-10-CM

## 2023-09-05 LAB
BILIRUBIN, URINE, POC: NEGATIVE
BLOOD URINE, POC: NEGATIVE
GLUCOSE URINE, POC: ABNORMAL
KETONES, URINE, POC: NEGATIVE
LEUKOCYTE ESTERASE, URINE, POC: NEGATIVE
NITRITE, URINE, POC: NEGATIVE
PH, URINE, POC: 5 (ref 4.6–8)
PROTEIN,URINE, POC: NEGATIVE
SPECIFIC GRAVITY, URINE, POC: 1.01 (ref 1–1.03)
URINALYSIS CLARITY, POC: CLEAR
URINALYSIS COLOR, POC: YELLOW
UROBILINOGEN, POC: ABNORMAL

## 2023-09-05 PROCEDURE — 3074F SYST BP LT 130 MM HG: CPT | Performed by: NURSE PRACTITIONER

## 2023-09-05 PROCEDURE — 81002 URINALYSIS NONAUTO W/O SCOPE: CPT | Performed by: NURSE PRACTITIONER

## 2023-09-05 PROCEDURE — PBSHW AMB POC URINALYSIS DIP STICK MANUAL W/O MICRO: Performed by: NURSE PRACTITIONER

## 2023-09-05 PROCEDURE — 99214 OFFICE O/P EST MOD 30 MIN: CPT | Performed by: NURSE PRACTITIONER

## 2023-09-05 PROCEDURE — 3078F DIAST BP <80 MM HG: CPT | Performed by: NURSE PRACTITIONER

## 2023-09-05 RX ORDER — CIPROFLOXACIN 500 MG/1
500 TABLET, FILM COATED ORAL 2 TIMES DAILY
Qty: 20 TABLET | Refills: 0 | Status: SHIPPED | OUTPATIENT
Start: 2023-09-05 | End: 2023-09-15

## 2023-09-05 RX ORDER — AMOXICILLIN AND CLAVULANATE POTASSIUM 875; 125 MG/1; MG/1
1 TABLET, FILM COATED ORAL 2 TIMES DAILY
Qty: 20 TABLET | Refills: 0 | Status: SHIPPED | OUTPATIENT
Start: 2023-09-05 | End: 2023-09-15

## 2023-09-05 ASSESSMENT — PATIENT HEALTH QUESTIONNAIRE - PHQ9
1. LITTLE INTEREST OR PLEASURE IN DOING THINGS: 0
2. FEELING DOWN, DEPRESSED OR HOPELESS: 0
SUM OF ALL RESPONSES TO PHQ9 QUESTIONS 1 & 2: 0
SUM OF ALL RESPONSES TO PHQ QUESTIONS 1-9: 0

## 2023-09-05 ASSESSMENT — ENCOUNTER SYMPTOMS
DIARRHEA: 0
ABDOMINAL PAIN: 1
CONSTIPATION: 0

## 2023-09-05 NOTE — PROGRESS NOTES
Subjective:      Patient ID: Louann Tejada is a 38 y.o. female. HPI  Pt presents with \"possible UTI\"    Pt states that last week she had right lower abdominal pain, that resolved on its own  Yesterday morning, when she got out of bed, she noted that her whole lower abdomen was painful  It felt like she had pulled a muscle or exercised  She states that today, all the pain is in left lower abdomen  The area is tender to the touch  She had normal BP yesterday, no diarrhea  No blood in stool or urine  Nausea, but no vomiting  No fever  Review of Systems   Constitutional:  Negative for fatigue and fever. Gastrointestinal:  Positive for abdominal pain. Negative for constipation and diarrhea. Objective:   Physical Exam  Constitutional:       Appearance: Normal appearance. Cardiovascular:      Rate and Rhythm: Normal rate and regular rhythm. Heart sounds: Normal heart sounds. Pulmonary:      Effort: Pulmonary effort is normal.      Breath sounds: Normal breath sounds. Abdominal:      Tenderness: There is abdominal tenderness in the left lower quadrant. Neurological:      Mental Status: She is alert. Psychiatric:         Mood and Affect: Mood normal.         Behavior: Behavior normal.       Assessment / Plan:          Diagnosis Orders   1. Left lower quadrant abdominal pain  CBC    Comprehensive Metabolic Panel    ciprofloxacin (CIPRO) 500 MG tablet      2. Dysuria  AMB POC URINALYSIS DIP STICK MANUAL W/O MICRO    Culture, Urine        Will notify when labs return, and inform her of any change in plan of care at that time    Educated about taking cipro as prescribed    Pt informed to return to office with worsening of symptoms, or PRN with any questions or concerns. Pt verbalizes understanding of plan of care and denies further questions or concerns at this time.

## 2023-09-06 LAB
ALBUMIN SERPL-MCNC: 4.5 G/DL (ref 3.9–4.9)
ALBUMIN/GLOB SERPL: 1.7 {RATIO} (ref 1.2–2.2)
ALP SERPL-CCNC: 45 IU/L (ref 44–121)
ALT SERPL-CCNC: 19 IU/L (ref 0–32)
AST SERPL-CCNC: 17 IU/L (ref 0–40)
BILIRUB SERPL-MCNC: 0.4 MG/DL (ref 0–1.2)
BUN SERPL-MCNC: 10 MG/DL (ref 6–24)
BUN/CREAT SERPL: 12 (ref 9–23)
CALCIUM SERPL-MCNC: 9.8 MG/DL (ref 8.7–10.2)
CHLORIDE SERPL-SCNC: 101 MMOL/L (ref 96–106)
CO2 SERPL-SCNC: 21 MMOL/L (ref 20–29)
CREAT SERPL-MCNC: 0.83 MG/DL (ref 0.57–1)
EGFRCR SERPLBLD CKD-EPI 2021: 89 ML/MIN/1.73
ERYTHROCYTE [DISTWIDTH] IN BLOOD BY AUTOMATED COUNT: 13.5 % (ref 11.7–15.4)
GLOBULIN SER CALC-MCNC: 2.6 G/DL (ref 1.5–4.5)
GLUCOSE SERPL-MCNC: 126 MG/DL (ref 70–99)
HCT VFR BLD AUTO: 41.6 % (ref 34–46.6)
HGB BLD-MCNC: 15.1 G/DL (ref 11.1–15.9)
MCH RBC QN AUTO: 31 PG (ref 26.6–33)
MCHC RBC AUTO-ENTMCNC: 36.3 G/DL (ref 31.5–35.7)
MCV RBC AUTO: 85 FL (ref 79–97)
PLATELET # BLD AUTO: 265 X10E3/UL (ref 150–450)
POTASSIUM SERPL-SCNC: 4.6 MMOL/L (ref 3.5–5.2)
PROT SERPL-MCNC: 7.1 G/DL (ref 6–8.5)
RBC # BLD AUTO: 4.87 X10E6/UL (ref 3.77–5.28)
SODIUM SERPL-SCNC: 139 MMOL/L (ref 134–144)
WBC # BLD AUTO: 10.2 X10E3/UL (ref 3.4–10.8)

## 2023-09-07 ENCOUNTER — TELEPHONE (OUTPATIENT)
Age: 43
End: 2023-09-07

## 2023-09-07 DIAGNOSIS — R10.32 LEFT LOWER QUADRANT ABDOMINAL PAIN: Primary | ICD-10-CM

## 2023-09-07 NOTE — TELEPHONE ENCOUNTER
----- Message from ANTOINE Lentz NP sent at 9/6/2023 11:40 AM EDT -----  Please call patient and let her know that her blood work returned stable.   Will notify when urine culture returns  Thanks

## 2023-09-07 NOTE — TELEPHONE ENCOUNTER
Called patient and she stated that provider told her that if pain persists that she was going to send her for some imaging. She stated that her side is still very painful.

## 2023-09-07 NOTE — TELEPHONE ENCOUNTER
Called pt to make her aware that a CT was ordered and someone would be reaching out to her to  have it scheduled. She understood.

## 2023-09-08 ENCOUNTER — HOSPITAL ENCOUNTER (OUTPATIENT)
Facility: HOSPITAL | Age: 43
End: 2023-09-08
Payer: MEDICAID

## 2023-09-08 DIAGNOSIS — R10.32 LEFT LOWER QUADRANT ABDOMINAL PAIN: ICD-10-CM

## 2023-09-08 LAB — BACTERIA UR CULT: NORMAL

## 2023-09-08 PROCEDURE — 6360000004 HC RX CONTRAST MEDICATION: Performed by: NURSE PRACTITIONER

## 2023-09-08 PROCEDURE — 74177 CT ABD & PELVIS W/CONTRAST: CPT

## 2023-09-08 RX ADMIN — IOPAMIDOL 100 ML: 755 INJECTION, SOLUTION INTRAVENOUS at 15:59

## 2023-09-11 ENCOUNTER — TELEPHONE (OUTPATIENT)
Age: 43
End: 2023-09-11

## 2023-09-11 NOTE — TELEPHONE ENCOUNTER
Called pt and left vm advising pt to call back if she has any questions regarding her labs she saw via [facility].

## 2023-09-11 NOTE — TELEPHONE ENCOUNTER
----- Message from ANTOINE Ellis NP sent at 9/11/2023  8:19 AM EDT -----  Please call patient and let her know that her CT returned stable. Incidental findings of hernia, but this is chronic.   If she is still having pain, should call OBGYN for eval  Thanks

## 2023-09-12 RX ORDER — CYCLOBENZAPRINE HCL 10 MG
TABLET ORAL
Qty: 30 TABLET | Refills: 2 | Status: SHIPPED | OUTPATIENT
Start: 2023-09-12

## 2023-09-12 NOTE — TELEPHONE ENCOUNTER
cyclobenzaprine (FLEXERIL) 10 MG tablet    Sent to [address] [place] [address] [address], [address] [phone] - f [phone]

## 2023-09-14 RX ORDER — LEVOCETIRIZINE DIHYDROCHLORIDE 5 MG/1
5 TABLET, FILM COATED ORAL NIGHTLY
Qty: 90 TABLET | Refills: 1 | Status: CANCELLED | OUTPATIENT
Start: 2023-09-14

## 2023-09-14 RX ORDER — ERGOCALCIFEROL 1.25 MG/1
1 CAPSULE ORAL
Qty: 24 CAPSULE | Refills: 1 | Status: CANCELLED | OUTPATIENT
Start: 2023-09-14

## 2023-09-14 RX ORDER — GLIPIZIDE 5 MG/1
5 TABLET, FILM COATED, EXTENDED RELEASE ORAL DAILY
Qty: 90 TABLET | Refills: 1 | Status: CANCELLED | OUTPATIENT
Start: 2023-09-14

## 2023-09-14 RX ORDER — OMEPRAZOLE 20 MG/1
20 CAPSULE, DELAYED RELEASE ORAL DAILY
Qty: 90 CAPSULE | Refills: 1 | Status: CANCELLED | OUTPATIENT
Start: 2023-09-14

## 2023-09-14 RX ORDER — FAMOTIDINE 40 MG/1
40 TABLET, FILM COATED ORAL NIGHTLY
Qty: 90 TABLET | Refills: 1 | Status: CANCELLED | OUTPATIENT
Start: 2023-09-14

## 2023-09-17 DIAGNOSIS — G62.9 NEUROPATHY: ICD-10-CM

## 2023-09-18 RX ORDER — GABAPENTIN 400 MG/1
CAPSULE ORAL
Qty: 270 CAPSULE | Refills: 0 | Status: SHIPPED | OUTPATIENT
Start: 2023-09-18 | End: 2023-12-18

## 2023-09-21 ENCOUNTER — TELEPHONE (OUTPATIENT)
Age: 43
End: 2023-09-21

## 2023-09-21 NOTE — TELEPHONE ENCOUNTER
Returned the patient's call to reschedule her EMG. I left a message that our next available is 10/13 so I wouldn't be able to reschedule it unless she changed her insurance plan with medicaid.

## 2023-09-21 NOTE — TELEPHONE ENCOUNTER
Patient is requesting a call to see if she can get EMG rescheduled for before 10/01    Please contact.

## 2023-09-29 ENCOUNTER — NURSE ONLY (OUTPATIENT)
Age: 43
End: 2023-09-29
Payer: MEDICAID

## 2023-09-29 ENCOUNTER — TELEPHONE (OUTPATIENT)
Age: 43
End: 2023-09-29

## 2023-09-29 ENCOUNTER — OFFICE VISIT (OUTPATIENT)
Age: 43
End: 2023-09-29
Payer: MEDICAID

## 2023-09-29 VITALS
TEMPERATURE: 97.8 F | BODY MASS INDEX: 33.82 KG/M2 | HEIGHT: 65 IN | SYSTOLIC BLOOD PRESSURE: 132 MMHG | HEART RATE: 88 BPM | RESPIRATION RATE: 16 BRPM | WEIGHT: 203 LBS | DIASTOLIC BLOOD PRESSURE: 86 MMHG | OXYGEN SATURATION: 98 %

## 2023-09-29 DIAGNOSIS — E66.9 CLASS 1 OBESITY WITH SERIOUS COMORBIDITY AND BODY MASS INDEX (BMI) OF 33.0 TO 33.9 IN ADULT, UNSPECIFIED OBESITY TYPE: ICD-10-CM

## 2023-09-29 DIAGNOSIS — E11.40 TYPE 2 DIABETES MELLITUS WITH DIABETIC NEUROPATHY, WITHOUT LONG-TERM CURRENT USE OF INSULIN (HCC): ICD-10-CM

## 2023-09-29 DIAGNOSIS — Z00.00 ENCOUNTER FOR WELL ADULT EXAM WITHOUT ABNORMAL FINDINGS: Primary | ICD-10-CM

## 2023-09-29 PROCEDURE — 99396 PREV VISIT EST AGE 40-64: CPT | Performed by: NURSE PRACTITIONER

## 2023-09-29 PROCEDURE — 3079F DIAST BP 80-89 MM HG: CPT | Performed by: NURSE PRACTITIONER

## 2023-09-29 PROCEDURE — 3075F SYST BP GE 130 - 139MM HG: CPT | Performed by: NURSE PRACTITIONER

## 2023-09-29 ASSESSMENT — PATIENT HEALTH QUESTIONNAIRE - PHQ9
1. LITTLE INTEREST OR PLEASURE IN DOING THINGS: 0
2. FEELING DOWN, DEPRESSED OR HOPELESS: 0
SUM OF ALL RESPONSES TO PHQ QUESTIONS 1-9: 0
SUM OF ALL RESPONSES TO PHQ9 QUESTIONS 1 & 2: 0
SUM OF ALL RESPONSES TO PHQ QUESTIONS 1-9: 0

## 2023-09-30 LAB
ALBUMIN SERPL-MCNC: 4.5 G/DL (ref 3.9–4.9)
ALBUMIN/GLOB SERPL: 1.7 {RATIO} (ref 1.2–2.2)
ALP SERPL-CCNC: 47 IU/L (ref 44–121)
ALT SERPL-CCNC: 18 IU/L (ref 0–32)
AST SERPL-CCNC: 18 IU/L (ref 0–40)
BILIRUB SERPL-MCNC: 0.4 MG/DL (ref 0–1.2)
BUN SERPL-MCNC: 8 MG/DL (ref 6–24)
BUN/CREAT SERPL: 10 (ref 9–23)
CALCIUM SERPL-MCNC: 9.4 MG/DL (ref 8.7–10.2)
CHLORIDE SERPL-SCNC: 104 MMOL/L (ref 96–106)
CO2 SERPL-SCNC: 23 MMOL/L (ref 20–29)
CREAT SERPL-MCNC: 0.83 MG/DL (ref 0.57–1)
EGFRCR SERPLBLD CKD-EPI 2021: 89 ML/MIN/1.73
GLOBULIN SER CALC-MCNC: 2.6 G/DL (ref 1.5–4.5)
GLUCOSE SERPL-MCNC: 154 MG/DL (ref 70–99)
HBA1C MFR BLD: 7.2 % (ref 4.8–5.6)
POTASSIUM SERPL-SCNC: 4.6 MMOL/L (ref 3.5–5.2)
PROT SERPL-MCNC: 7.1 G/DL (ref 6–8.5)
SODIUM SERPL-SCNC: 141 MMOL/L (ref 134–144)

## 2023-10-02 DIAGNOSIS — G62.9 NEUROPATHY: ICD-10-CM

## 2023-10-02 DIAGNOSIS — K21.9 GASTROESOPHAGEAL REFLUX DISEASE WITHOUT ESOPHAGITIS: ICD-10-CM

## 2023-10-02 DIAGNOSIS — E78.5 DYSLIPIDEMIA (HIGH LDL; LOW HDL): ICD-10-CM

## 2023-10-02 DIAGNOSIS — Z91.09 ENVIRONMENTAL ALLERGIES: ICD-10-CM

## 2023-10-02 DIAGNOSIS — E11.40 TYPE 2 DIABETES MELLITUS WITH DIABETIC NEUROPATHY, WITHOUT LONG-TERM CURRENT USE OF INSULIN (HCC): ICD-10-CM

## 2023-10-02 DIAGNOSIS — E55.9 VITAMIN D DEFICIENCY: ICD-10-CM

## 2023-10-02 RX ORDER — PEN NEEDLE, DIABETIC 30 GX3/16"
NEEDLE, DISPOSABLE MISCELLANEOUS
Qty: 100 EACH | Refills: 2 | Status: SHIPPED | OUTPATIENT
Start: 2023-10-02

## 2023-10-02 RX ORDER — LEVOCETIRIZINE DIHYDROCHLORIDE 5 MG/1
5 TABLET, FILM COATED ORAL NIGHTLY
Qty: 30 TABLET | Refills: 5 | Status: SHIPPED | OUTPATIENT
Start: 2023-10-02

## 2023-10-02 RX ORDER — ERGOCALCIFEROL 1.25 MG/1
1 CAPSULE ORAL
Qty: 24 CAPSULE | Refills: 1 | Status: SHIPPED | OUTPATIENT
Start: 2023-10-02

## 2023-10-02 RX ORDER — CYCLOBENZAPRINE HCL 10 MG
TABLET ORAL
Qty: 30 TABLET | Refills: 5 | Status: SHIPPED | OUTPATIENT
Start: 2023-10-02

## 2023-10-02 RX ORDER — GLIPIZIDE 5 MG/1
5 TABLET, FILM COATED, EXTENDED RELEASE ORAL DAILY
Qty: 30 TABLET | Refills: 5 | Status: SHIPPED | OUTPATIENT
Start: 2023-10-02 | End: 2023-11-06

## 2023-10-02 RX ORDER — FENOFIBRATE 160 MG/1
160 TABLET ORAL DAILY
Qty: 30 TABLET | Refills: 5 | Status: SHIPPED | OUTPATIENT
Start: 2023-10-02

## 2023-10-02 RX ORDER — OMEPRAZOLE 20 MG/1
20 CAPSULE, DELAYED RELEASE ORAL DAILY
Qty: 30 CAPSULE | Refills: 5 | Status: SHIPPED | OUTPATIENT
Start: 2023-10-02

## 2023-10-02 RX ORDER — GABAPENTIN 400 MG/1
CAPSULE ORAL
Qty: 90 CAPSULE | Refills: 2 | Status: SHIPPED | OUTPATIENT
Start: 2023-10-02 | End: 2024-01-01

## 2023-10-04 RX ORDER — ASPIRIN 81 MG/1
81 TABLET ORAL DAILY
Qty: 90 TABLET | Refills: 1 | Status: SHIPPED | OUTPATIENT
Start: 2023-10-04

## 2023-10-04 RX ORDER — EPINEPHRINE 0.3 MG/.3ML
INJECTION SUBCUTANEOUS
Qty: 2 EACH | Refills: 0 | OUTPATIENT
Start: 2023-10-04

## 2023-10-04 RX ORDER — GABAPENTIN 400 MG/1
CAPSULE ORAL
Qty: 270 CAPSULE | Refills: 0 | OUTPATIENT
Start: 2023-10-04 | End: 2023-11-13

## 2023-10-04 RX ORDER — EPINEPHRINE 0.3 MG/.3ML
INJECTION SUBCUTANEOUS
Qty: 2 EACH | Refills: 2 | OUTPATIENT
Start: 2023-10-04

## 2023-11-02 ENCOUNTER — PROCEDURE VISIT (OUTPATIENT)
Age: 43
End: 2023-11-02
Payer: MEDICAID

## 2023-11-02 DIAGNOSIS — R20.2 PARESTHESIA: Primary | ICD-10-CM

## 2023-11-02 DIAGNOSIS — M54.50 CHRONIC RIGHT-SIDED LOW BACK PAIN, UNSPECIFIED WHETHER SCIATICA PRESENT: ICD-10-CM

## 2023-11-02 DIAGNOSIS — G89.29 CHRONIC RIGHT-SIDED LOW BACK PAIN, UNSPECIFIED WHETHER SCIATICA PRESENT: ICD-10-CM

## 2023-11-02 PROCEDURE — 95911 NRV CNDJ TEST 9-10 STUDIES: CPT | Performed by: PSYCHIATRY & NEUROLOGY

## 2023-11-02 PROCEDURE — 95886 MUSC TEST DONE W/N TEST COMP: CPT | Performed by: PSYCHIATRY & NEUROLOGY

## 2023-11-02 PROCEDURE — 99213 OFFICE O/P EST LOW 20 MIN: CPT | Performed by: PSYCHIATRY & NEUROLOGY

## 2023-11-02 RX ORDER — NORTRIPTYLINE HYDROCHLORIDE 25 MG/1
25 CAPSULE ORAL NIGHTLY
Qty: 30 CAPSULE | Refills: 3 | Status: SHIPPED | OUTPATIENT
Start: 2023-11-02

## 2023-11-06 ENCOUNTER — OFFICE VISIT (OUTPATIENT)
Age: 43
End: 2023-11-06
Payer: MEDICAID

## 2023-11-06 VITALS
TEMPERATURE: 97.8 F | DIASTOLIC BLOOD PRESSURE: 64 MMHG | HEART RATE: 97 BPM | RESPIRATION RATE: 16 BRPM | BODY MASS INDEX: 34.49 KG/M2 | SYSTOLIC BLOOD PRESSURE: 122 MMHG | WEIGHT: 207 LBS | OXYGEN SATURATION: 98 % | HEIGHT: 65 IN

## 2023-11-06 DIAGNOSIS — E11.649 NOCTURNAL HYPOGLYCEMIA IN PATIENT WITH TYPE 2 DIABETES MELLITUS (HCC): Primary | ICD-10-CM

## 2023-11-06 DIAGNOSIS — E66.9 CLASS 1 OBESITY WITH SERIOUS COMORBIDITY AND BODY MASS INDEX (BMI) OF 33.0 TO 33.9 IN ADULT, UNSPECIFIED OBESITY TYPE: ICD-10-CM

## 2023-11-06 DIAGNOSIS — E11.40 TYPE 2 DIABETES MELLITUS WITH DIABETIC NEUROPATHY, WITHOUT LONG-TERM CURRENT USE OF INSULIN (HCC): ICD-10-CM

## 2023-11-06 DIAGNOSIS — Z79.4 TYPE 2 DIABETES MELLITUS WITH DIABETIC NEUROPATHY, WITH LONG-TERM CURRENT USE OF INSULIN (HCC): ICD-10-CM

## 2023-11-06 DIAGNOSIS — E16.2 HYPOGLYCEMIA: ICD-10-CM

## 2023-11-06 DIAGNOSIS — E11.40 TYPE 2 DIABETES MELLITUS WITH DIABETIC NEUROPATHY, WITH LONG-TERM CURRENT USE OF INSULIN (HCC): ICD-10-CM

## 2023-11-06 PROCEDURE — 99214 OFFICE O/P EST MOD 30 MIN: CPT | Performed by: FAMILY MEDICINE

## 2023-11-06 PROCEDURE — 3051F HG A1C>EQUAL 7.0%<8.0%: CPT | Performed by: FAMILY MEDICINE

## 2023-11-06 PROCEDURE — 3078F DIAST BP <80 MM HG: CPT | Performed by: FAMILY MEDICINE

## 2023-11-06 PROCEDURE — 3074F SYST BP LT 130 MM HG: CPT | Performed by: FAMILY MEDICINE

## 2023-11-06 ASSESSMENT — PATIENT HEALTH QUESTIONNAIRE - PHQ9
1. LITTLE INTEREST OR PLEASURE IN DOING THINGS: 0
SUM OF ALL RESPONSES TO PHQ QUESTIONS 1-9: 0
2. FEELING DOWN, DEPRESSED OR HOPELESS: 0
SUM OF ALL RESPONSES TO PHQ QUESTIONS 1-9: 0
SUM OF ALL RESPONSES TO PHQ9 QUESTIONS 1 & 2: 0
SUM OF ALL RESPONSES TO PHQ QUESTIONS 1-9: 0
SUM OF ALL RESPONSES TO PHQ QUESTIONS 1-9: 0

## 2023-11-06 ASSESSMENT — ENCOUNTER SYMPTOMS
RESPIRATORY NEGATIVE: 1
GASTROINTESTINAL NEGATIVE: 1
ALLERGIC/IMMUNOLOGIC NEGATIVE: 1
PHOTOPHOBIA: 0

## 2023-11-06 NOTE — PROGRESS NOTES
Diabetes Mellitus Type 2: Current symptoms/problems include none and hypoglycemia and nausea . Medication compliance:  compliant all of the time  Diabetic diet compliance:  compliant all of the time,  Weight trend: stable  Current exercise: walks 2 time(s) per day and aerobics 1 time(s) per day  Barriers: none    Home blood sugar records: fasting range: 120 mg/dL, postprandial range: 200 mg/dL, patient tests 4 time(s) per day  Any episodes of hypoglycemia? yes - 53 mg/dL  Eye exam current (within one year): no   reports that she has never smoked. She has never used smokeless tobacco.   Daily Aspirin? No: not     Lab Results   Component Value Date    LABA1C 7.2 (H) 09/29/2023    LABA1C 7.1 (H) 06/07/2023    LABA1C 7.1 (H) 12/29/2022     Lab Results   Component Value Date    CREATININE 0.83 09/29/2023     Lab Results   Component Value Date    ALT 18 09/29/2023    AST 18 09/29/2023     Lab Results   Component Value Date    CHOL 179 06/07/2023    TRIG 174 (H) 06/07/2023    HDL 35 (L) 06/07/2023    LDLCALC 113 (H) 06/07/2023        Review of Systems   Constitutional:  Negative for activity change, appetite change, diaphoresis, fatigue and unexpected weight change. Eyes:  Negative for photophobia and visual disturbance. Respiratory: Negative. Cardiovascular: Negative. Gastrointestinal: Negative. Endocrine: Negative. Genitourinary: Negative. Musculoskeletal: Negative. Allergic/Immunologic: Negative. Neurological:  Negative for dizziness, seizures, weakness, light-headedness and numbness. Hematological: Negative. Psychiatric/Behavioral: Negative. Physical Exam  Constitutional:       Appearance: She is obese. HENT:      Right Ear: External ear normal.      Left Ear: External ear normal.      Nose: Nose normal.      Mouth/Throat:      Mouth: Mucous membranes are moist.      Pharynx: Oropharynx is clear. Eyes:      Extraocular Movements: Extraocular movements intact.

## 2023-11-07 ENCOUNTER — TELEPHONE (OUTPATIENT)
Age: 43
End: 2023-11-07

## 2023-11-10 RX ORDER — SIMVASTATIN 20 MG
20 TABLET ORAL DAILY
Qty: 90 TABLET | Refills: 0 | Status: SHIPPED | OUTPATIENT
Start: 2023-11-10

## 2023-11-10 NOTE — TELEPHONE ENCOUNTER
----- Message from Jennifer Alba.  Lani Garcia sent at 11/9/2023  9:07 PM EST -----  Regarding: Refill  Contact: [phone]  Can I please get a refill on simvastatin 20 MG tablet please

## 2023-11-10 NOTE — TELEPHONE ENCOUNTER
[facility] DR REY CATALAN called for this pt and said that [facility] needs to be changed to use daily instead of every 14 days. Pharmacy said they do not want her to do the every 14 days.  Call back number for Iker is [phone]

## 2023-11-10 NOTE — PROGRESS NOTES
HISTORY OF PRESENT ILLNESS  Deny Ennis is a 37 y.o. female. HPI   Pt presents with \"low back pain and sinus congestion\"  Visit was conducted via [employer]. me  Pt was located at home, provider was located at [facility]  Visit lasted 4 minutes  Deny Ennis, who was evaluated through a synchronous (real-time) audio-video encounter, and/or her healthcare decision maker, is aware that it is a billable service, with coverage as determined by her insurance carrier. She provided verbal consent to proceed: Yes, and patient identification was verified. It was conducted pursuant to the emergency declaration under the [facility], [address] [place] [address] authority and the [employer] General Act. A caregiver was present when appropriate. Ability to conduct physical exam was limited. I was in the office. The patient was at home. Pt states that she has a sinus infection. She has had symptoms building since last week, and when she went to the allergy doctor last week they said her sinuses were very inflamed. Pt states that when she blows her nose, it is thick and nasty  She is coughing up thick mucous  She has had no fever  OTC: none    Pt also states that she has been dealing with lower back pain. She had a back fusion years ago, and believes that the pain is coming from this. The pain is an aching type pain  If she sits too long, the pain gets worse  It has been aching for about a week  No radiation of the pain  OTC: Tylenol  Review of Systems   Constitutional: Negative for fever. HENT: Positive for congestion and sinus pain. Respiratory: Positive for cough. Musculoskeletal: Positive for back pain. Physical Exam  Constitutional:       Appearance: Normal appearance. Pulmonary:      Effort: Pulmonary effort is normal.   Neurological:      Mental Status: She is alert.    Psychiatric:         Mood and Affect: Mood normal.         ASSESSMENT and PLAN    ICD-10-CM ICD-9-CM    1. Acute non-recurrent maxillary sinusitis  J01.00 461.0 amoxicillin-clavulanate (AUGMENTIN) 875-125 mg per tablet   2. Chronic midline low back pain without sciatica  M54.5 724.2 cyclobenzaprine (FLEXERIL) 10 mg tablet    G89.29 338.29    3. Type 2 diabetes mellitus with diabetic neuropathy, without long-term current use of insulin ([facility])  E11.40 250.60 glucose blood VI test strips (True Metrix Glucose Test Strip) strip     357.2      Educated about taking medication as prescribed  Should pain continue in back, will refer to PT/Ortho    Pt informed to return to office with worsening of symptoms, or PRN with any questions or concerns. Pt verbalizes understanding of plan of care and denies further questions or concerns at this time. Solar[employer] Counseling:  I discussed with the patient the risks of Solaraze including but not limited to erythema, scaling, itching, weeping, crusting, and pain.

## 2023-11-14 ENCOUNTER — HOSPITAL ENCOUNTER (OUTPATIENT)
Facility: HOSPITAL | Age: 43
Discharge: HOME | End: 2023-11-17
Attending: PSYCHIATRY & NEUROLOGY
Payer: MEDICAID

## 2023-11-14 DIAGNOSIS — G89.29 CHRONIC RIGHT-SIDED LOW BACK PAIN, UNSPECIFIED WHETHER SCIATICA PRESENT: ICD-10-CM

## 2023-11-14 DIAGNOSIS — M54.50 CHRONIC RIGHT-SIDED LOW BACK PAIN, UNSPECIFIED WHETHER SCIATICA PRESENT: ICD-10-CM

## 2023-11-14 DIAGNOSIS — R20.2 PARESTHESIA: ICD-10-CM

## 2023-11-14 PROCEDURE — 72148 MRI LUMBAR SPINE W/O DYE: CPT

## 2023-11-21 ENCOUNTER — OFFICE VISIT (OUTPATIENT)
Age: 43
End: 2023-11-21
Payer: MEDICAID

## 2023-11-21 VITALS
OXYGEN SATURATION: 98 % | HEART RATE: 83 BPM | BODY MASS INDEX: 34.45 KG/M2 | DIASTOLIC BLOOD PRESSURE: 70 MMHG | HEIGHT: 65 IN | TEMPERATURE: 97 F | SYSTOLIC BLOOD PRESSURE: 120 MMHG

## 2023-11-21 DIAGNOSIS — G43.019 INTRACTABLE MIGRAINE WITHOUT AURA AND WITHOUT STATUS MIGRAINOSUS: ICD-10-CM

## 2023-11-21 DIAGNOSIS — R20.2 PARESTHESIA: Primary | ICD-10-CM

## 2023-11-21 DIAGNOSIS — G89.29 CHRONIC RIGHT-SIDED LOW BACK PAIN, UNSPECIFIED WHETHER SCIATICA PRESENT: ICD-10-CM

## 2023-11-21 DIAGNOSIS — M54.50 CHRONIC RIGHT-SIDED LOW BACK PAIN, UNSPECIFIED WHETHER SCIATICA PRESENT: ICD-10-CM

## 2023-11-21 PROCEDURE — 3074F SYST BP LT 130 MM HG: CPT | Performed by: PSYCHIATRY & NEUROLOGY

## 2023-11-21 PROCEDURE — 99214 OFFICE O/P EST MOD 30 MIN: CPT | Performed by: PSYCHIATRY & NEUROLOGY

## 2023-11-21 PROCEDURE — 3078F DIAST BP <80 MM HG: CPT | Performed by: PSYCHIATRY & NEUROLOGY

## 2023-11-21 RX ORDER — NORTRIPTYLINE HYDROCHLORIDE 50 MG/1
50 CAPSULE ORAL NIGHTLY
Qty: 30 CAPSULE | Refills: 5 | Status: SHIPPED | OUTPATIENT
Start: 2023-11-21

## 2023-11-21 NOTE — PROGRESS NOTES
Neurology Progress Note    Patient ID:  Danie Darden  [id number]  37 y.o.  1980      Subjective:   History:  Danie Darden is a female who  has a past medical history of lower back surgery 15 yrs ago with residual R lower back pain, Anxiety, Arrhythmia, Asthma, Diabetes ([address]) (2008), Ear infection (11/30/2018), GERD (gastroesophageal reflux disease), Headache, Headache(784.0), Hypertension, Inappropriate sinus tachycardia (5/20/2019), Rash (7/14/2016), Recurrent umbilical hernia (96/76/8174), S/P dilatation and curettage, and Tachycardia who since her late 20's, has been noticing baseline heart rate in the 100s with sudden episodes of increased heart rate (Max 160s) associated with chest tightness and feeling \"drained\" lasting for 1-2 mins. Happening daily, no aggravating/relieving factors. Tilt table negative for POTS. Since June 2020, patient noted burning numbness/discomfort of forefront of both feet, involving all toes and symmetric. Patient was placed on gabapentin 400 mg TID with benefit c/o PCP (-) weakness (-) falls. Tried OTC pain cream with no clear benefit. Patient was found to have Vit B12 deficiency and was placed on Vit B12 1000 mg every day c/o PCP with some benefit. Since 2 yo, patient has migraines occurring 2-3/ week, bitemporal, (+) nausea (+) vomiting (+) photophobia (+) phonophobia . Tried Topamax, Nortriptyline, Imitrex 50 mg. Patient now on Nortriptyline 25 mg QHS with no clear benefit but no side effects. Still not sleeping. Maxalt did not work. Now on Ozempic and blood sugar monitoring with improvement of blood sugar. Will start alpha lipoic acid. Still on Gabapentin 400 mg TID c/o PCP         EMG/NCS of both LE (11/2/23)  1) Intact sensory responses with no evidence of a generalized polyneuropathy of the large fiber type     2) Absent right H-reflex. Chronic, without active, motor axon loss changes in muscles innervated by right L5 root/segment.  These can be

## 2023-11-29 ENCOUNTER — PATIENT MESSAGE (OUTPATIENT)
Age: 43
End: 2023-11-29

## 2023-11-29 DIAGNOSIS — Z20.822 EXPOSURE TO CONFIRMED CASE OF COVID-19: Primary | ICD-10-CM

## 2023-11-29 RX ORDER — COVID-19 ANTIGEN TEST
1 KIT MISCELLANEOUS ONCE
Qty: 2 KIT | Refills: 5 | Status: SHIPPED | OUTPATIENT
Start: 2023-11-29 | End: 2023-11-29

## 2023-11-29 NOTE — TELEPHONE ENCOUNTER
11/29/2023  1:28 PM    1. Exposure to confirmed case of COVID-19    - COVID-19 At Home Antigen Test ([facility] COVID-19 AT HOME TEST KIT) KIT; 1 kit by In Vitro route once for 1 dose  Dispense: 2 kit;  Refill: 5  - [facility] COVID-[facility] Patient Tracking

## 2023-11-30 ENCOUNTER — TELEPHONE (OUTPATIENT)
Age: 43
End: 2023-11-30

## 2023-11-30 NOTE — TELEPHONE ENCOUNTER
Patient went to [facility] this morning and tested positive for covid the doctor did not want to prescribe her anything because she did not want her to have any med interactions with her current medications  she told her to contact her PCP to get prescriptions       [phone]

## 2024-01-05 ENCOUNTER — NURSE ONLY (OUTPATIENT)
Age: 44
End: 2024-01-05
Payer: MEDICAID

## 2024-01-05 ENCOUNTER — OFFICE VISIT (OUTPATIENT)
Age: 44
End: 2024-01-05
Payer: MEDICAID

## 2024-01-05 VITALS
WEIGHT: 195.2 LBS | BODY MASS INDEX: 32.52 KG/M2 | HEART RATE: 84 BPM | OXYGEN SATURATION: 99 % | RESPIRATION RATE: 18 BRPM | HEIGHT: 65 IN | SYSTOLIC BLOOD PRESSURE: 125 MMHG | DIASTOLIC BLOOD PRESSURE: 74 MMHG | TEMPERATURE: 97.8 F

## 2024-01-05 DIAGNOSIS — E11.649 NOCTURNAL HYPOGLYCEMIA IN PATIENT WITH TYPE 2 DIABETES MELLITUS (HCC): ICD-10-CM

## 2024-01-05 DIAGNOSIS — E11.9 TYPE 2 DIABETES MELLITUS WITHOUT COMPLICATION, WITHOUT LONG-TERM CURRENT USE OF INSULIN (HCC): Primary | ICD-10-CM

## 2024-01-05 DIAGNOSIS — E74.39 GLUCOSE INTOLERANCE: ICD-10-CM

## 2024-01-05 DIAGNOSIS — E11.9 TYPE 2 DIABETES MELLITUS WITHOUT COMPLICATION, WITHOUT LONG-TERM CURRENT USE OF INSULIN (HCC): ICD-10-CM

## 2024-01-05 LAB — GLUCOSE, POC: 152 MG/DL

## 2024-01-05 PROCEDURE — PBSHW AMB POC GLUCOSE BLOOD, BY GLUCOSE MONITORING DEVICE: Performed by: FAMILY MEDICINE

## 2024-01-05 PROCEDURE — 3051F HG A1C>EQUAL 7.0%<8.0%: CPT | Performed by: FAMILY MEDICINE

## 2024-01-05 PROCEDURE — 99214 OFFICE O/P EST MOD 30 MIN: CPT | Performed by: FAMILY MEDICINE

## 2024-01-05 PROCEDURE — 82962 GLUCOSE BLOOD TEST: CPT | Performed by: FAMILY MEDICINE

## 2024-01-05 PROCEDURE — 3078F DIAST BP <80 MM HG: CPT | Performed by: FAMILY MEDICINE

## 2024-01-05 PROCEDURE — 3074F SYST BP LT 130 MM HG: CPT | Performed by: FAMILY MEDICINE

## 2024-01-05 RX ORDER — SEMAGLUTIDE 0.68 MG/ML
INJECTION, SOLUTION SUBCUTANEOUS
COMMUNITY
Start: 2024-01-04

## 2024-01-05 ASSESSMENT — PATIENT HEALTH QUESTIONNAIRE - PHQ9
SUM OF ALL RESPONSES TO PHQ QUESTIONS 1-9: 0
1. LITTLE INTEREST OR PLEASURE IN DOING THINGS: 0
SUM OF ALL RESPONSES TO PHQ QUESTIONS 1-9: 0
SUM OF ALL RESPONSES TO PHQ9 QUESTIONS 1 & 2: 0
SUM OF ALL RESPONSES TO PHQ9 QUESTIONS 1 & 2: 0
SUM OF ALL RESPONSES TO PHQ QUESTIONS 1-9: 0
2. FEELING DOWN, DEPRESSED OR HOPELESS: 0
2. FEELING DOWN, DEPRESSED OR HOPELESS: 0
SUM OF ALL RESPONSES TO PHQ QUESTIONS 1-9: 0
1. LITTLE INTEREST OR PLEASURE IN DOING THINGS: 0
SUM OF ALL RESPONSES TO PHQ QUESTIONS 1-9: 0

## 2024-01-05 NOTE — PROGRESS NOTES
Identified pt with two pt identifiers(name and ).    Chief Complaint   Patient presents with    Blood Sugar Problem     Patient has been higher than normal sugar levels its been around the 200's and a couple time its been 400's. In the mornings its elevated and after taking metform it is dropping it down to 180ish and its been happening the past 2/3 weeks.         Health Maintenance Due   Topic    Hepatitis B vaccine (1 of 3 - 3-dose series)    DTaP/Tdap/Td vaccine (2 - Td or Tdap)    Pneumococcal 0-64 years Vaccine (2 - PCV)    Diabetic foot exam     Diabetic retinal exam     COVID-19 Vaccine ( season)    Colorectal Cancer Screen        Wt Readings from Last 3 Encounters:   23 93.9 kg (207 lb)   23 92.1 kg (203 lb)   23 92.1 kg (203 lb)     Temp Readings from Last 3 Encounters:   23 97 °F (36.1 °C)   23 97.8 °F (36.6 °C) (Temporal)   23 97.8 °F (36.6 °C) (Temporal)     BP Readings from Last 3 Encounters:   23 120/70   23 122/64   23 132/86     Pulse Readings from Last 3 Encounters:   23 83   23 97   23 88           Depression Screening:  :         2023     9:13 AM 2023     8:50 AM 2023    10:00 AM 2023     9:49 AM 2022     9:00 AM 2022     9:00 AM   PHQ-9 Questionaire   Little interest or pleasure in doing things 0 0 0 0 0 0   Feeling down, depressed, or hopeless 0 0 0 0 0 0   PHQ-9 Total Score 0 0 0 0 0 0        Fall Risk Assessment:  :          No data to display                 Abuse Screening:  :          No data to display                 Coordination of Care Questionnaire:  :     \"Have you been to the ER, urgent care clinic since your last visit?  Hospitalized since your last visit?\"    NO    “Have you seen or consulted any other health care providers outside of [facility] since your last visit?”    NO    “Have you had a colorectal cancer screening such as a

## 2024-01-06 LAB
ALBUMIN SERPL-MCNC: 4.5 G/DL (ref 3.9–4.9)
ALBUMIN/GLOB SERPL: 1.8 {RATIO} (ref 1.2–2.2)
ALP SERPL-CCNC: 61 IU/L (ref 44–121)
ALT SERPL-CCNC: 17 IU/L (ref 0–32)
APPEARANCE UR: CLEAR
AST SERPL-CCNC: 15 IU/L (ref 0–40)
BACTERIA #/AREA URNS HPF: ABNORMAL /[HPF]
BILIRUB SERPL-MCNC: 0.3 MG/DL (ref 0–1.2)
BILIRUB UR QL STRIP: NEGATIVE
BUN SERPL-MCNC: 12 MG/DL (ref 6–24)
BUN/CREAT SERPL: 16 (ref 9–23)
CALCIUM SERPL-MCNC: 9.6 MG/DL (ref 8.7–10.2)
CASTS URNS QL MICRO: ABNORMAL /LPF
CHLORIDE SERPL-SCNC: 101 MMOL/L (ref 96–106)
CHOLEST SERPL-MCNC: 162 MG/DL (ref 100–199)
CO2 SERPL-SCNC: 22 MMOL/L (ref 20–29)
COLOR UR: YELLOW
CREAT SERPL-MCNC: 0.75 MG/DL (ref 0.57–1)
CRYSTALS URNS MICRO: ABNORMAL
EGFRCR SERPLBLD CKD-EPI 2021: 100 ML/MIN/1.73
EPI CELLS #/AREA URNS HPF: ABNORMAL /HPF (ref 0–10)
GLOBULIN SER CALC-MCNC: 2.5 G/DL (ref 1.5–4.5)
GLUCOSE SERPL-MCNC: 145 MG/DL (ref 70–99)
GLUCOSE UR QL STRIP: ABNORMAL
HBA1C MFR BLD: 7 % (ref 4.8–5.6)
HDLC SERPL-MCNC: 39 MG/DL
HGB UR QL STRIP: NEGATIVE
KETONES UR QL STRIP: NEGATIVE
LDLC SERPL CALC-MCNC: 102 MG/DL (ref 0–99)
LEUKOCYTE ESTERASE UR QL STRIP: NEGATIVE
MICRO URNS: ABNORMAL
MICRO URNS: ABNORMAL
NITRITE UR QL STRIP: NEGATIVE
PH UR STRIP: 5.5 [PH] (ref 5–7.5)
POTASSIUM SERPL-SCNC: 4.5 MMOL/L (ref 3.5–5.2)
PROT SERPL-MCNC: 7 G/DL (ref 6–8.5)
PROT UR QL STRIP: NEGATIVE
RBC #/AREA URNS HPF: ABNORMAL /HPF (ref 0–2)
SODIUM SERPL-SCNC: 139 MMOL/L (ref 134–144)
SP GR UR STRIP: 1.02 (ref 1–1.03)
TRIGL SERPL-MCNC: 115 MG/DL (ref 0–149)
UNIDENT CRYS URNS QL MICRO: PRESENT
URINALYSIS REFLEX: ABNORMAL
UROBILINOGEN UR STRIP-MCNC: 0.2 MG/DL (ref 0.2–1)
VLDLC SERPL CALC-MCNC: 21 MG/DL (ref 5–40)
WBC #/AREA URNS HPF: ABNORMAL /HPF (ref 0–5)

## 2024-01-07 LAB
IMP & REVIEW OF LAB RESULTS: NORMAL
Lab: NORMAL

## 2024-01-08 DIAGNOSIS — K21.9 GASTROESOPHAGEAL REFLUX DISEASE WITHOUT ESOPHAGITIS: ICD-10-CM

## 2024-01-08 RX ORDER — FAMOTIDINE 40 MG/1
40 TABLET, FILM COATED ORAL NIGHTLY
Qty: 90 TABLET | Refills: 1 | Status: CANCELLED | OUTPATIENT
Start: 2024-01-08

## 2024-01-09 ENCOUNTER — HOSPITAL ENCOUNTER (EMERGENCY)
Facility: HOSPITAL | Age: 44
Discharge: HOME | End: 2024-01-09
Attending: EMERGENCY MEDICINE
Payer: MEDICAID

## 2024-01-09 ENCOUNTER — APPOINTMENT (OUTPATIENT)
Facility: HOSPITAL | Age: 44
End: 2024-01-09
Payer: MEDICAID

## 2024-01-09 VITALS
HEIGHT: 65 IN | SYSTOLIC BLOOD PRESSURE: 134 MMHG | OXYGEN SATURATION: 100 % | TEMPERATURE: 98.1 F | RESPIRATION RATE: 18 BRPM | BODY MASS INDEX: 32.49 KG/M2 | HEART RATE: 89 BPM | DIASTOLIC BLOOD PRESSURE: 77 MMHG | WEIGHT: 195 LBS

## 2024-01-09 DIAGNOSIS — R07.9 CHEST PAIN, UNSPECIFIED TYPE: Primary | ICD-10-CM

## 2024-01-09 DIAGNOSIS — K21.9 GASTROESOPHAGEAL REFLUX DISEASE WITHOUT ESOPHAGITIS: ICD-10-CM

## 2024-01-09 LAB
ALBUMIN SERPL-MCNC: 3.8 G/DL (ref 3.5–5)
ALBUMIN/GLOB SERPL: 0.9 (ref 1.1–2.2)
ALP SERPL-CCNC: 56 U/L (ref 45–117)
ALT SERPL-CCNC: 23 U/L (ref 12–78)
ANION GAP SERPL CALC-SCNC: 3 MMOL/L (ref 5–15)
AST SERPL-CCNC: 31 U/L (ref 15–37)
BASOPHILS # BLD: 0.1 K/UL (ref 0–0.1)
BASOPHILS NFR BLD: 1 % (ref 0–1)
BILIRUB SERPL-MCNC: 0.4 MG/DL (ref 0.2–1)
BUN SERPL-MCNC: 13 MG/DL (ref 6–20)
BUN/CREAT SERPL: 14 (ref 12–20)
CALCIUM SERPL-MCNC: 9.9 MG/DL (ref 8.5–10.1)
CHLORIDE SERPL-SCNC: 103 MMOL/L (ref 97–108)
CO2 SERPL-SCNC: 26 MMOL/L (ref 21–32)
CREAT SERPL-MCNC: 0.96 MG/DL (ref 0.55–1.02)
DIFFERENTIAL METHOD BLD: ABNORMAL
EOSINOPHIL # BLD: 0.1 K/UL (ref 0–0.4)
EOSINOPHIL NFR BLD: 2 % (ref 0–7)
ERYTHROCYTE [DISTWIDTH] IN BLOOD BY AUTOMATED COUNT: 13.1 % (ref 11.5–14.5)
GLOBULIN SER CALC-MCNC: 4.2 G/DL (ref 2–4)
GLUCOSE SERPL-MCNC: 129 MG/DL (ref 65–100)
HCT VFR BLD AUTO: 40.6 % (ref 35–47)
HGB BLD-MCNC: 14.3 G/DL (ref 11.5–16)
IMM GRANULOCYTES # BLD AUTO: 0.1 K/UL (ref 0–0.04)
IMM GRANULOCYTES NFR BLD AUTO: 1 % (ref 0–0.5)
LIPASE SERPL-CCNC: 69 U/L (ref 13–75)
LYMPHOCYTES # BLD: 3.6 K/UL (ref 0.8–3.5)
LYMPHOCYTES NFR BLD: 38 % (ref 12–49)
MCH RBC QN AUTO: 28.8 PG (ref 26–34)
MCHC RBC AUTO-ENTMCNC: 35.2 G/DL (ref 30–36.5)
MCV RBC AUTO: 81.7 FL (ref 80–99)
MONOCYTES # BLD: 0.6 K/UL (ref 0–1)
MONOCYTES NFR BLD: 6 % (ref 5–13)
NEUTS SEG # BLD: 5 K/UL (ref 1.8–8)
NEUTS SEG NFR BLD: 52 % (ref 32–75)
NRBC # BLD: 0 K/UL (ref 0–0.01)
NRBC BLD-RTO: 0 PER 100 WBC
PLATELET # BLD AUTO: 340 K/UL (ref 150–400)
PMV BLD AUTO: 11.2 FL (ref 8.9–12.9)
POTASSIUM SERPL-SCNC: 5 MMOL/L (ref 3.5–5.1)
PROT SERPL-MCNC: 8 G/DL (ref 6.4–8.2)
RBC # BLD AUTO: 4.97 M/UL (ref 3.8–5.2)
SODIUM SERPL-SCNC: 132 MMOL/L (ref 136–145)
TROPONIN I SERPL HS-MCNC: <4 NG/L (ref 0–51)
WBC # BLD AUTO: 9.5 K/UL (ref 3.6–11)

## 2024-01-09 PROCEDURE — 84484 ASSAY OF TROPONIN QUANT: CPT

## 2024-01-09 PROCEDURE — 96374 THER/PROPH/DIAG INJ IV PUSH: CPT

## 2024-01-09 PROCEDURE — 83690 ASSAY OF LIPASE: CPT

## 2024-01-09 PROCEDURE — 6370000000 HC RX 637 (ALT 250 FOR IP): Performed by: NURSE PRACTITIONER

## 2024-01-09 PROCEDURE — 93005 ELECTROCARDIOGRAM TRACING: CPT | Performed by: NURSE PRACTITIONER

## 2024-01-09 PROCEDURE — 36415 COLL VENOUS BLD VENIPUNCTURE: CPT

## 2024-01-09 PROCEDURE — 85025 COMPLETE CBC W/AUTO DIFF WBC: CPT

## 2024-01-09 PROCEDURE — 71046 X-RAY EXAM CHEST 2 VIEWS: CPT

## 2024-01-09 PROCEDURE — 99285 EMERGENCY DEPT VISIT HI MDM: CPT

## 2024-01-09 PROCEDURE — 80053 COMPREHEN METABOLIC PANEL: CPT

## 2024-01-09 PROCEDURE — 6360000002 HC RX W HCPCS: Performed by: NURSE PRACTITIONER

## 2024-01-09 RX ORDER — KETOROLAC TROMETHAMINE 30 MG/ML
30 INJECTION, SOLUTION INTRAMUSCULAR; INTRAVENOUS
Status: COMPLETED | OUTPATIENT
Start: 2024-01-09 | End: 2024-01-09

## 2024-01-09 RX ORDER — FAMOTIDINE 40 MG/1
40 TABLET, FILM COATED ORAL NIGHTLY
Qty: 90 TABLET | Refills: 1 | Status: SHIPPED | OUTPATIENT
Start: 2024-01-09

## 2024-01-09 RX ORDER — ASPIRIN 81 MG/1
81 TABLET, CHEWABLE ORAL
Status: COMPLETED | OUTPATIENT
Start: 2024-01-09 | End: 2024-01-09

## 2024-01-09 RX ADMIN — ALUMINUM HYDROXIDE, MAGNESIUM HYDROXIDE, AND SIMETHICONE 40 ML: 200; 200; 20 SUSPENSION ORAL at 21:36

## 2024-01-09 RX ADMIN — ASPIRIN 81 MG: 81 TABLET, CHEWABLE ORAL at 21:36

## 2024-01-09 RX ADMIN — KETOROLAC TROMETHAMINE 30 MG: 30 INJECTION, SOLUTION INTRAMUSCULAR; INTRAVENOUS at 21:37

## 2024-01-09 ASSESSMENT — PAIN - FUNCTIONAL ASSESSMENT
PAIN_FUNCTIONAL_ASSESSMENT: 0-10
PAIN_FUNCTIONAL_ASSESSMENT: 0-10

## 2024-01-09 ASSESSMENT — PAIN DESCRIPTION - LOCATION: LOCATION: CHEST

## 2024-01-09 ASSESSMENT — PAIN SCALES - GENERAL
PAINLEVEL_OUTOF10: 0
PAINLEVEL_OUTOF10: 8

## 2024-01-10 LAB
EKG ATRIAL RATE: 96 BPM
EKG DIAGNOSIS: NORMAL
EKG P AXIS: 6 DEGREES
EKG P-R INTERVAL: 132 MS
EKG Q-T INTERVAL: 326 MS
EKG QRS DURATION: 76 MS
EKG QTC CALCULATION (BAZETT): 411 MS
EKG R AXIS: 26 DEGREES
EKG T AXIS: 132 DEGREES
EKG VENTRICULAR RATE: 96 BPM

## 2024-01-10 PROCEDURE — 93010 ELECTROCARDIOGRAM REPORT: CPT | Performed by: STUDENT IN AN ORGANIZED HEALTH CARE EDUCATION/TRAINING PROGRAM

## 2024-01-10 ASSESSMENT — ENCOUNTER SYMPTOMS
DIARRHEA: 0
SHORTNESS OF BREATH: 0
RHINORRHEA: 0
NAUSEA: 0
ABDOMINAL PAIN: 0
VOMITING: 0

## 2024-01-10 NOTE — ED PROVIDER NOTES
SOCIAL HISTORY       Social History     Socioeconomic History    Marital status:    Tobacco Use    Smoking status: Never    Smokeless tobacco: Never   Substance and Sexual Activity    Alcohol use: No    Drug use: Never     Social Determinants of Health     Financial Resource Strain: Low Risk  (6/7/2023)    Overall Financial Resource Strain (CARDIA)     Difficulty of Paying Living Expenses: Not hard at all   Transportation Needs: Unknown (6/7/2023)    PRAPARE - Transportation     Lack of Transportation (Non-Medical): No   Housing Stability: Unknown (6/7/2023)    Housing Stability Vital Sign     Unstable Housing in the Last Year: No           PHYSICAL EXAM    (up to 7 for level 4, 8 or more for level 5)     ED Triage Vitals   BP Temp Temp src Pulse Resp SpO2 Height Weight   -- -- -- -- -- -- -- --       Body mass index is 32.45 kg/m².    Physical Exam  Vitals and nursing note reviewed.   Constitutional:       Appearance: Normal appearance.   HENT:      Head: Normocephalic.      Right Ear: External ear normal.      Left Ear: External ear normal.      Nose: Nose normal.      Mouth/Throat:      Mouth: Mucous membranes are moist.   Eyes:      Extraocular Movements: Extraocular movements intact.      Conjunctiva/sclera: Conjunctivae normal.      Pupils: Pupils are equal, round, and reactive to light.   Cardiovascular:      Rate and Rhythm: Normal rate and regular rhythm.      Comments: Guarding R sided of chest on exam   Pulmonary:      Effort: Pulmonary effort is normal.      Breath sounds: Normal breath sounds.   Abdominal:      General: Abdomen is flat.   Musculoskeletal:         General: Normal range of motion.      Cervical back: Normal range of motion and neck supple.   Skin:     General: Skin is warm and dry.      Capillary Refill: Capillary refill takes less than 2 seconds.   Neurological:      General: No focal deficit present.      Mental Status: She is alert and oriented to person, place, and time.

## 2024-01-10 NOTE — ED TRIAGE NOTES
Patient reports right chest pressure that radiates down her right breast and high blood pressure since this evening. Denies any shortness of breath or dizziness.

## 2024-01-10 NOTE — DISCHARGE INSTRUCTIONS
Today, you were seen in the ER for chest pain. Your EKG, chest x-ray, and bloodwork were reassuring.  Take medications as prescribed at discharge. However, things can change, and you should return to the ER or call 911 if you have: worsening of your chest pain, difficulty breathing, or any other new or concerning symptoms, as these may be a sign of a new problem or worsening of your condition. Please follow-up with your primary doctor within 1-2 days for re-evaluation. We also recommend that you follow up with a specific cardiologist as well, please call today to set up an appointment.  If for any reason you cannot get in touch with that cardiologist, please see the list of local groups for follow up:     [facility] [place]  [address] , [address]  [place], [place] [address] [phone]    [facility], [place]  [address], [address]  [place], [place] [address] [phone]    [facility], [place]  [address], [address]  [place], [place] [address] [phone]    [facility], [place]  [address]  [place], [place] [address] [phone]    [facility]:  [phone]    [facility] Phone: [phone]

## 2024-01-21 DIAGNOSIS — E66.9 CLASS 1 OBESITY WITH SERIOUS COMORBIDITY AND BODY MASS INDEX (BMI) OF 33.0 TO 33.9 IN ADULT, UNSPECIFIED OBESITY TYPE: ICD-10-CM

## 2024-01-21 DIAGNOSIS — E11.40 TYPE 2 DIABETES MELLITUS WITH DIABETIC NEUROPATHY, WITHOUT LONG-TERM CURRENT USE OF INSULIN (HCC): ICD-10-CM

## 2024-01-22 DIAGNOSIS — E11.40 TYPE 2 DIABETES MELLITUS WITH DIABETIC NEUROPATHY, WITHOUT LONG-TERM CURRENT USE OF INSULIN (HCC): ICD-10-CM

## 2024-01-22 DIAGNOSIS — E66.9 CLASS 1 OBESITY WITH SERIOUS COMORBIDITY AND BODY MASS INDEX (BMI) OF 33.0 TO 33.9 IN ADULT, UNSPECIFIED OBESITY TYPE: ICD-10-CM

## 2024-01-23 RX ORDER — SIMVASTATIN 20 MG
20 TABLET ORAL DAILY
Qty: 90 TABLET | Refills: 0 | Status: SHIPPED | OUTPATIENT
Start: 2024-01-23

## 2024-01-23 RX ORDER — SEMAGLUTIDE 0.68 MG/ML
INJECTION, SOLUTION SUBCUTANEOUS
Qty: 3 ML | Refills: 0 | Status: SHIPPED | OUTPATIENT
Start: 2024-01-23

## 2024-01-30 ENCOUNTER — TELEPHONE (OUTPATIENT)
Age: 44
End: 2024-01-30

## 2024-01-30 NOTE — TELEPHONE ENCOUNTER
----- Message from Kacy Lancaster sent at 1/30/2024 10:17 AM EST -----  Subject: Message to Provider    QUESTIONS  Information for Provider? pt wants to know should she have her blood work   done before the pre op appointment   ---------------------------------------------------------------------------  --------------  CALL BACK INFO  [phone]; OK to leave message on voicemail  ---------------------------------------------------------------------------  --------------  SCRIPT ANSWERS  Relationship to Patient? Self  Do you have questions for your provider that need to be answered prior to   scheduling your pre-op appointment? Yes

## 2024-02-12 PROBLEM — M43.16 SPONDYLOLISTHESIS OF LUMBAR REGION: Status: ACTIVE | Noted: 2024-02-12

## 2024-02-12 PROBLEM — M54.42 ACUTE BACK PAIN WITH SCIATICA, LEFT: Status: ACTIVE | Noted: 2024-02-12

## 2024-02-12 PROBLEM — M48.062 SPINAL STENOSIS, LUMBAR REGION, WITH NEUROGENIC CLAUDICATION: Status: ACTIVE | Noted: 2024-02-12

## 2024-02-12 NOTE — PERIOP NOTE
PATIENT STATED HER SURGERY IS 3/20/24 NOT 2/20/24 SHE WILL CALL THE SURGEONS OFFICE AND CALL ME BACK LATER TO LET ME KNOW WHAT DAY IS CORRECT

## 2024-02-26 NOTE — PROGRESS NOTES
Neurology Progress Note    Patient ID:  Laurie Becerra  [id number]  43 y.o.  1980      Subjective:   History:  Laurie Becerra is a female who  has a past medical history of lower back surgery 15 yrs ago with residual R lower back pain, Anxiety, Arrhythmia, Asthma, Diabetes (HCC) (2008), GERD (gastroesophageal reflux disease), Headache, Hypertension, Inappropriate sinus tachycardia (5/20/2019), Rash (7/14/2016) who since her late 20's, has been noticing baseline heart rate in the 100s with sudden episodes of increased heart rate (Max 160s) associated with chest tightness and feeling \"drained\" lasting for 1-2 mins. Happening daily, no aggravating/relieving factors. Tilt table negative for POTS. Since June 2020, patient noted burning numbness/discomfort of forefront of both feet, involving all toes and symmetric. Patient was placed on gabapentin 400 mg TID with benefit c/o PCP (-) weakness (-) falls. Tried OTC pain cream with no clear benefit. Patient was found to have Vit B12 deficiency and was placed on Vit B12 1000 mg every day c/o PCP with some benefit. Since 8 yo, patient has migraines occurring 2-3/ week, bitemporal, (+) nausea (+) vomiting (+) photophobia (+) phonophobia . Tried Topamax, Nortriptyline, Imitrex 50 mg.     Since last time, Nortriptyline was increased to 50 mg QHS with some improvement of headache, but still not sleeping well.    Started alpha lipoic acid 600 mg BID with no clear benefit.    Started Ozempic with better control of blood sugar and 10 lbs weight loss    Patient is scheduled for lower back surgery in March 20 c/o ortho Dr Covington.      Still on Gabapentin 400 mg TID c/o PCP                 ROS:  Per HPI-  Otherwise the remainder of ROS was negative    Social Hx  Social History     Socioeconomic History    Marital status:    Tobacco Use    Smoking status: Never    Smokeless tobacco: Never   Substance and Sexual Activity    Alcohol use: No    Drug use: Never     Social

## 2024-02-27 ENCOUNTER — OFFICE VISIT (OUTPATIENT)
Age: 44
End: 2024-02-27
Payer: MEDICAID

## 2024-02-27 VITALS
DIASTOLIC BLOOD PRESSURE: 70 MMHG | HEART RATE: 104 BPM | TEMPERATURE: 96.8 F | SYSTOLIC BLOOD PRESSURE: 120 MMHG | OXYGEN SATURATION: 99 % | BODY MASS INDEX: 32.78 KG/M2 | HEIGHT: 65 IN

## 2024-02-27 DIAGNOSIS — M54.50 CHRONIC RIGHT-SIDED LOW BACK PAIN, UNSPECIFIED WHETHER SCIATICA PRESENT: ICD-10-CM

## 2024-02-27 DIAGNOSIS — R20.2 PARESTHESIA: Primary | ICD-10-CM

## 2024-02-27 DIAGNOSIS — G43.019 INTRACTABLE MIGRAINE WITHOUT AURA AND WITHOUT STATUS MIGRAINOSUS: ICD-10-CM

## 2024-02-27 DIAGNOSIS — G89.29 CHRONIC RIGHT-SIDED LOW BACK PAIN, UNSPECIFIED WHETHER SCIATICA PRESENT: ICD-10-CM

## 2024-02-27 DIAGNOSIS — F51.01 PRIMARY INSOMNIA: ICD-10-CM

## 2024-02-27 PROCEDURE — 3074F SYST BP LT 130 MM HG: CPT | Performed by: PSYCHIATRY & NEUROLOGY

## 2024-02-27 PROCEDURE — 99214 OFFICE O/P EST MOD 30 MIN: CPT | Performed by: PSYCHIATRY & NEUROLOGY

## 2024-02-27 PROCEDURE — 3078F DIAST BP <80 MM HG: CPT | Performed by: PSYCHIATRY & NEUROLOGY

## 2024-02-27 RX ORDER — NORTRIPTYLINE HYDROCHLORIDE 75 MG/1
75 CAPSULE ORAL NIGHTLY
Qty: 30 CAPSULE | Refills: 3 | Status: SHIPPED | OUTPATIENT
Start: 2024-02-27

## 2024-02-28 ENCOUNTER — OFFICE VISIT (OUTPATIENT)
Age: 44
End: 2024-02-28
Payer: MEDICAID

## 2024-02-28 VITALS
HEART RATE: 87 BPM | HEIGHT: 65 IN | TEMPERATURE: 97.8 F | DIASTOLIC BLOOD PRESSURE: 80 MMHG | SYSTOLIC BLOOD PRESSURE: 120 MMHG | OXYGEN SATURATION: 99 % | RESPIRATION RATE: 18 BRPM | WEIGHT: 195.8 LBS | BODY MASS INDEX: 32.62 KG/M2

## 2024-02-28 DIAGNOSIS — J45.20 MILD INTERMITTENT REACTIVE AIRWAY DISEASE WITHOUT COMPLICATION: Primary | ICD-10-CM

## 2024-02-28 PROCEDURE — 90732 PPSV23 VACC 2 YRS+ SUBQ/IM: CPT | Performed by: FAMILY MEDICINE

## 2024-02-28 PROCEDURE — 99214 OFFICE O/P EST MOD 30 MIN: CPT | Performed by: FAMILY MEDICINE

## 2024-02-28 RX ORDER — ALBUTEROL SULFATE 90 UG/1
2 AEROSOL, METERED RESPIRATORY (INHALATION) EVERY 4 HOURS PRN
Qty: 18 G | Refills: 3 | Status: SHIPPED | OUTPATIENT
Start: 2024-02-28

## 2024-02-28 RX ORDER — GLIPIZIDE 5 MG/1
TABLET, FILM COATED, EXTENDED RELEASE ORAL
COMMUNITY

## 2024-02-28 NOTE — PROGRESS NOTES
Identified pt with two pt identifiers(name and ).    Chief Complaint   Patient presents with    Pre-op Exam     Patient [facility] Maintenance Due   Topic    Hepatitis B vaccine (1 of 3 - 3-dose series)    DTaP/Tdap/Td vaccine (2 - Td or Tdap)    Pneumococcal 0-64 years Vaccine (2 - PCV)    Diabetic foot exam     Diabetic retinal exam     COVID-19 Vaccine ( season)    Colorectal Cancer Screen        Wt Readings from Last 3 Encounters:   24 89.4 kg (197 lb)   24 88.5 kg (195 lb)   24 88.5 kg (195 lb 3.2 oz)     Temp Readings from Last 3 Encounters:   24 96.8 °F (36 °C)   24 98.1 °F (36.7 °C) (Oral)   24 97.8 °F (36.6 °C) (Temporal)     BP Readings from Last 3 Encounters:   24 120/70   24 134/77   24 125/74     Pulse Readings from Last 3 Encounters:   24 (!) 104   24 89   24 84           Depression Screening:  :         2024     8:25 AM 2024     8:23 AM 2023     9:13 AM 2023     8:50 AM 2023    10:00 AM 2023     9:49 AM 2022     9:00 AM   PHQ-9 Questionaire   Little interest or pleasure in doing things 0 0 0 0 0 0 0   Feeling down, depressed, or hopeless 0 0 0 0 0 0 0   PHQ-9 Total Score 0 0 0 0 0 0 0        Fall Risk Assessment:  :          No data to display                 Abuse Screening:  :          No data to display                 Coordination of Care Questionnaire:  :     \"Have you been to the ER, urgent care clinic since your last visit?  Hospitalized since your last visit?\"    NO    “Have you seen or consulted any other health care providers outside of [facility] since your last visit?”    NO    “Have you had a colorectal cancer screening such as a colonoscopy/FIT/Cologuard?    NO

## 2024-02-28 NOTE — PROGRESS NOTES
PRE-OPERATIVE ASSESSMENT PREADMISSION TESTING       Surgeon: KERRY    Type of Surgery: Revision laminectomy    Patient scheduled for surgery on 3/20/24.    Diagnosis:posterior lumbar fusion L4-S1      PAIN ASSESSMENT:Are you currently having pain? No pain/0 on scale of 0-10    /80 (Site: Left Upper Arm, Position: Sitting, Cuff Size: Medium Adult)   Pulse 87   Temp 97.8 °F (36.6 °C) (Temporal)   Resp 18   Ht 1.651 m (5' 5\")   Wt 88.8 kg (195 lb 12.8 oz)   SpO2 99%   BMI 32.58 kg/m²     Body mass index is 32.58 kg/m².     MED ALLERGIES:   Allergies as of 02/28/2024 - Fully Reviewed 02/28/2024   Allergen Reaction Noted    Beef-derived products Anaphylaxis 02/28/2024    Goat-derived products Anaphylaxis 02/28/2024    Pork-derived products Anaphylaxis 02/28/2024    Rabbit protein Anaphylaxis 02/28/2024    Alpha-gal  06/07/2023    Green pepper Hives 06/28/2012        MEDICATIONS:  Current Outpatient Medications   Medication Sig Dispense Refill    albuterol sulfate HFA (PROVENTIL;VENTOLIN;PROAIR) 108 (90 Base) MCG/ACT inhaler Inhale 2 puffs into the lungs every 4 hours as needed for Wheezing 18 g 3    nortriptyline (PAMELOR) 75 MG capsule Take 1 capsule by mouth nightly 30 capsule 3    simvastatin (ZOCOR) 20 MG tablet Take 1 tablet by mouth once daily 90 tablet 0    OZEMPIC, 0.25 OR 0.5 MG/DOSE, 2 MG/3ML SOPN INJECT 0.5MG SUBCUTANEOUSLY EVERY 7 DAYS 3 mL 0    Semaglutide,0.25 or 0.5MG/DOS, 2 MG/1.5ML SOPN Inject 0.5 mg into the skin every 7 days 3 Adjustable Dose Pre-filled Pen Syringe 2    famotidine (PEPCID) 40 MG tablet Take 1 tablet by mouth nightly 90 tablet 1    OZEMPIC, 0.25 OR 0.5 MG/DOSE, 2 MG/3ML SOPN       Continuous Blood Gluc  (FREESTYLE GURPREET 2 READER) ZACK 1 each by Does not apply route daily 1 each 0    Continuous Blood Gluc Sensor (FREESTYLE GURPREET 2 SENSOR) MISC 1 each by Does not apply route every 14 days 2 each 5    cyanocobalamin 500 MCG tablet Take 1 tablet by mouth daily

## 2024-03-06 ENCOUNTER — OFFICE VISIT (OUTPATIENT)
Age: 44
End: 2024-03-06
Payer: MEDICAID

## 2024-03-06 VITALS
TEMPERATURE: 98.4 F | HEIGHT: 65 IN | SYSTOLIC BLOOD PRESSURE: 118 MMHG | RESPIRATION RATE: 16 BRPM | HEART RATE: 103 BPM | OXYGEN SATURATION: 97 % | DIASTOLIC BLOOD PRESSURE: 78 MMHG | BODY MASS INDEX: 32.15 KG/M2 | WEIGHT: 193 LBS

## 2024-03-06 DIAGNOSIS — S00.83XA CONTUSION OF CHIN, INITIAL ENCOUNTER: Primary | ICD-10-CM

## 2024-03-06 DIAGNOSIS — L03.811 CELLULITIS OF HEAD EXCEPT FACE: ICD-10-CM

## 2024-03-06 DIAGNOSIS — Z12.11 SCREENING FOR COLON CANCER: ICD-10-CM

## 2024-03-06 PROCEDURE — 99213 OFFICE O/P EST LOW 20 MIN: CPT | Performed by: INTERNAL MEDICINE

## 2024-03-06 PROCEDURE — 3074F SYST BP LT 130 MM HG: CPT | Performed by: INTERNAL MEDICINE

## 2024-03-06 PROCEDURE — 3078F DIAST BP <80 MM HG: CPT | Performed by: INTERNAL MEDICINE

## 2024-03-06 RX ORDER — AMOXICILLIN AND CLAVULANATE POTASSIUM 875; 125 MG/1; MG/1
1 TABLET, FILM COATED ORAL 2 TIMES DAILY
Qty: 20 TABLET | Refills: 0 | Status: SHIPPED | OUTPATIENT
Start: 2024-03-06 | End: 2024-03-16

## 2024-03-06 NOTE — PROGRESS NOTES
Chief Complaint   Patient presents with   • Swelling     Patient reports swelling of her chin after being hit by her dog in the face on Saturday. Pain level is at a 6 with talking or touching.   • Referral - General     Colonoscopy   • Other     Patient completed diabetic eye exam on 2/26/24 at [facility].     Assessment/ Plan:   Laurie was seen today for swelling, referral - general and other.    Diagnoses and all orders for this visit:    Contusion of chin, initial encounter  -     amoxicillin-clavulanate (AUGMENTIN) 875-125 MG per tablet; Take 1 tablet by mouth 2 times daily for 10 days    Cellulitis of head except face  -     amoxicillin-clavulanate (AUGMENTIN) 875-125 MG per tablet; Take 1 tablet by mouth 2 times daily for 10 days    Screening for colon cancer  -     [facility] - Dayron Haider MD, [facility] (Kiran Tate)    Will treat with antibiotics as above, warm compress. Continue with Ibuprofen.     I have discussed the diagnosis with the patient and the intended treatment plan as seen in the above orders. The patient has received an after-visit summary and questions were answered concerning future plans. Asked to return should symptoms worsen or not improve with treatment. Any pending labs and studies will be relayed to patient when they become available.     Pt verbalizes understanding of plan of care and denies further questions or concerns at this time.     Follow Up:  Return if symptoms worsen or fail to improve.  Also, if not better or worse in 2-3 days. May need antibiotic change and addition of steroid.     Subjective:   Laurie Becerra is a 43 y.o. female who presents today for swelling and tenderness of her chin with mild redness that occurred after her dog bumped into her. No break in the skin. She did develop swelling shortly after and actually saw her dentist. Films were done and normal. No fractured teeth. Now the area is throbbing. She has been using Ibuprofen and that

## 2024-03-06 NOTE — PROGRESS NOTES
Room 21     Identified pt with two pt identifiers(name and ). Reviewed record in preparation for visit and have obtained necessary documentation. All patient medications has been reviewed.  Chief Complaint   Patient presents with    Swelling     Patient reports swelling of her chin after being hit by her dog in the face on Saturday. Pain level is at a 6 with talk or touching.    Referral - General     Colonoscopy    Other     Patient completed diabetic eye exam on 24 at [facility].             Health Maintenance Due   Topic    Hepatitis B vaccine (1 of 3 - 3-dose series)    DTaP/Tdap/Td vaccine (2 - Td or Tdap)    Diabetic foot exam     Diabetic retinal exam     COVID-19 Vaccine ( season)    Colorectal Cancer Screen      Health Maintenance Review: Patient reminded of \"due or due soon\" health maintenance. I have asked the patient to contact his/her primary care provider (PCP) for follow-up on his/her health maintenance.        Wt Readings from Last 3 Encounters:   24 87.5 kg (193 lb)   24 88.8 kg (195 lb 12.8 oz)   24 89.4 kg (197 lb)     Temp Readings from Last 3 Encounters:   24 98.4 °F (36.9 °C) (Temporal)   24 97.8 °F (36.6 °C) (Temporal)   24 96.8 °F (36 °C)     BP Readings from Last 3 Encounters:   24 118/78   24 120/80   24 120/70     Pulse Readings from Last 3 Encounters:   24 (!) 103   24 87   24 (!) 104       1. \"Have you been to the ER, urgent care clinic since your last visit?  Hospitalized since your last visit?\" No    2. \"Have you seen or consulted any other health care providers outside of the [facility] since your last visit?\" Yes Seen by Dentistry for teeth pain on 3/4/24.       3. For patients aged 45-75: Has the patient had a colonoscopy / FIT/ Cologuard? No      If the patient is female:    4. For patients aged 40-74: Has the patient had a mammogram within the past 2 years? Yes -

## 2024-03-08 ENCOUNTER — HOSPITAL ENCOUNTER (OUTPATIENT)
Facility: HOSPITAL | Age: 44
End: 2024-03-08
Payer: MEDICAID

## 2024-03-08 VITALS
TEMPERATURE: 98.3 F | DIASTOLIC BLOOD PRESSURE: 74 MMHG | WEIGHT: 195.33 LBS | HEIGHT: 65 IN | BODY MASS INDEX: 32.54 KG/M2 | SYSTOLIC BLOOD PRESSURE: 123 MMHG | HEART RATE: 85 BPM

## 2024-03-08 LAB
ABO + RH BLD: NORMAL
ANION GAP SERPL CALC-SCNC: 6 MMOL/L (ref 5–15)
APPEARANCE UR: CLEAR
BACTERIA URNS QL MICRO: NEGATIVE /HPF
BILIRUB UR QL: NEGATIVE
BLOOD GROUP ANTIBODIES SERPL: NORMAL
BUN SERPL-MCNC: 9 MG/DL (ref 6–20)
BUN/CREAT SERPL: 9 (ref 12–20)
CALCIUM SERPL-MCNC: 9.3 MG/DL (ref 8.5–10.1)
CHLORIDE SERPL-SCNC: 107 MMOL/L (ref 97–108)
CO2 SERPL-SCNC: 26 MMOL/L (ref 21–32)
COLOR UR: ABNORMAL
CREAT SERPL-MCNC: 0.96 MG/DL (ref 0.55–1.02)
EPITH CASTS URNS QL MICRO: ABNORMAL /LPF
ERYTHROCYTE [DISTWIDTH] IN BLOOD BY AUTOMATED COUNT: 13.2 % (ref 11.5–14.5)
GLUCOSE SERPL-MCNC: 110 MG/DL (ref 65–100)
GLUCOSE UR STRIP.AUTO-MCNC: NEGATIVE MG/DL
HCT VFR BLD AUTO: 40.6 % (ref 35–47)
HGB BLD-MCNC: 13.8 G/DL (ref 11.5–16)
HGB UR QL STRIP: NEGATIVE
INR PPP: 1 (ref 0.9–1.1)
KETONES UR QL STRIP.AUTO: NEGATIVE MG/DL
LEUKOCYTE ESTERASE UR QL STRIP.AUTO: NEGATIVE
MCH RBC QN AUTO: 28.7 PG (ref 26–34)
MCHC RBC AUTO-ENTMCNC: 34 G/DL (ref 30–36.5)
MCV RBC AUTO: 84.4 FL (ref 80–99)
NITRITE UR QL STRIP.AUTO: NEGATIVE
NRBC # BLD: 0 K/UL (ref 0–0.01)
NRBC BLD-RTO: 0 PER 100 WBC
PH UR STRIP: 5 (ref 5–8)
PLATELET # BLD AUTO: 293 K/UL (ref 150–400)
PMV BLD AUTO: 11.8 FL (ref 8.9–12.9)
POTASSIUM SERPL-SCNC: 4.3 MMOL/L (ref 3.5–5.1)
PROT UR STRIP-MCNC: NEGATIVE MG/DL
PROTHROMBIN TIME: 10.2 SEC (ref 9–11.1)
RBC # BLD AUTO: 4.81 M/UL (ref 3.8–5.2)
RBC #/AREA URNS HPF: ABNORMAL /HPF (ref 0–5)
SODIUM SERPL-SCNC: 139 MMOL/L (ref 136–145)
SP GR UR REFRACTOMETRY: 1.02 (ref 1–1.03)
SPECIMEN EXP DATE BLD: NORMAL
URINE CULTURE IF INDICATED: ABNORMAL
UROBILINOGEN UR QL STRIP.AUTO: 0.2 EU/DL (ref 0.2–1)
WBC # BLD AUTO: 8.5 K/UL (ref 3.6–11)
WBC URNS QL MICRO: ABNORMAL /HPF (ref 0–4)
YEAST BUDDING URNS QL: PRESENT

## 2024-03-08 PROCEDURE — 85610 PROTHROMBIN TIME: CPT

## 2024-03-08 PROCEDURE — 36415 COLL VENOUS BLD VENIPUNCTURE: CPT

## 2024-03-08 PROCEDURE — 86900 BLOOD TYPING SEROLOGIC ABO: CPT

## 2024-03-08 PROCEDURE — 81001 URINALYSIS AUTO W/SCOPE: CPT

## 2024-03-08 PROCEDURE — 86901 BLOOD TYPING SEROLOGIC RH(D): CPT

## 2024-03-08 PROCEDURE — 86850 RBC ANTIBODY SCREEN: CPT

## 2024-03-08 PROCEDURE — 80048 BASIC METABOLIC PNL TOTAL CA: CPT

## 2024-03-08 PROCEDURE — 85027 COMPLETE CBC AUTOMATED: CPT

## 2024-03-08 NOTE — PERIOP NOTE
[facility]'S PREOPERATIVE INSTRUCTIONS  ORTHOPAEDIC    Surgery Date:   03/20/2024    Your surgeon's office or [facility] staff will call you between 4 PM- 8 PM the day before surgery with your arrival time.  If your surgery is on a Monday, you will receive a call the preceding Friday. If your surgeon's office has given you, your arrival time then go by that time.    Please report to [facility] Patient Access/Admitting on the 1st floor.  Bring your insurance card, photo identification, and any copayment (if applicable).   If you are going home the same day of your surgery, you must have a responsible adult to drive you home. You need to have a responsible adult to stay with you the first 24 hours after surgery and you should not drive a car for 24 hours following your surgery.  If you are being admitted to the hospital,please leave personal belongings/luggage in your car until you have an assigned hospital room number.  Please wear comfortable clothes. Wear your glasses instead of contacts. We ask that all money, jewelry and valuables be left at home. Wear no make up, particularly mascara, the day of surgery.  Do NOT drink alcohol or smoke 24 hours before surgery.    All body piercings, rings, and jewelry need to be removed and left at home. Do not wear any fingernail polish except for clear. Please wear your hair loose or down. Please no pony-tails, buns, or any metal hair accessories. You may wear deodorant. Do not shave any body area within 24 hours of your surgery.  Please follow all instructions to avoid any potential surgical cancellation.  Should your physical condition change, (i.e. fever, cold, flu, etc.) please notify your surgeon as soon as possible.  It is important to be on time. If a situation occurs where you may be delayed, please call:  [phone] / [phone] on the day of surgery.  The Preadmission Testing staff can be reached at [phone].      Eating and Drinking Before  OF SURGERY ONLY IF NEEDED: ALBUTEROL    HOLD these prescription medications BEFORE Surgery: NO METFORMIN 3/19 AND 3/20.       Stop any non-steroidal anti-inflammatory drugs (i.e. Ibuprofen, Naproxen, Advil, Aleve,) 3 days before surgery. You may take Tylenol.      STOP all vitamins, herbal supplements, BC Powder, Excedrin and Aspirin 1 week prior to  surgery.      Preventing Infections Before and After - Your Surgery    IMPORTANT INSTRUCTIONS    You play an important role in your health and preparation for surgery. To reduce the germs on your skin you will need to shower with CHG soap (Chorhexidine gluconate 4%) two times before surgery.    CHG soap (Hibiclens, Hex-A-Clens or store brand)  CHG soap will be provided at your Preadmission Testing (PAT) appointment.  If you do not have a PAT appointment before surgery, you may arrange to  CHG soap from our office or purchase CHG soap at a pharmacy, grocery or department store.  You need to purchase TWO 4 ounce bottles to use for your 2 showers.    Steps to follow:  Wash your hair with your normal shampoo and your body with regular soap and rinse well to remove shampoo and soap from your skin.  Wet a clean washcloth and turn off the shower.  Put CHG soap on washcloth and apply to your entire body from the neck down. Do not use on your head, face or private parts(genitals). Do not use CHG soap on open sores, wounds or areas of skin irritation.  Wash you body gently for 5 minutes. Do not wash your skin too hard. This soap does not create lather. Pay special attention to your underarms and from your belly button to your feet.  Turn the shower back on and rinse well to get CHG soap off your body.  Pat your skin dry with a clean, dry towel. Do not apply lotions or moisturizer.  Put on clean clothes and sleep on fresh bed sheets and do not allow pets to sleep with you.    Shower with CHG soap 2 times before your surgery  The evening before your surgery  The morning of

## 2024-03-08 NOTE — PERIOP NOTE
PAT Nutrition Screen    1. Has the patient had an unplanned weight loss of 10% of body weight or more in the last 6 months? No = 0   2. Has the patient been eating less than 50% of their normal diet in the preceding week? No = 0       Patient instructed on Diabetic pre-operative nutrition plan to start 5 days prior to surgery. Patient given 10 shakes and 1 pre-surgery drink. Opportunity given for questions and all questions answered.        [facility] MSG SENT TO EMILIANO ROWLEY:      Received: Today  Merary Eubanks RN Dalton, SONI Lew AMANDA,    I SAW MS. SPENCER TODAY, AND SHE RECENTLY INJURED HER CHIN (HER LARGE DOG BUMPED IT HARD WITH HIS HEAD). SHE SAW HER PCP FOR IT, AND SHE WAS PRESCRIBED AUGMENTIN FOR CELLULITIS. IT WILL BE COMPLETED BY [facility], BUT I JUST WANTED YOU TO BE AWARE OF THIS. SHE SAW HER PCP ON 3/6 AND THAT NOTE IS IN EPIC.

## 2024-03-09 LAB
BACTERIA SPEC CULT: NORMAL
BACTERIA SPEC CULT: NORMAL
SERVICE CMNT-IMP: NORMAL

## 2024-03-11 NOTE — PERIOP NOTE
SENT NOTE VIA EPIC TO EMILIANO ROWLEY, NURSE FOR DR. PAYNE RE: BUDDING YEAST IN URINE SAMPLE GIVEN IN P.A.T

## 2024-03-14 NOTE — H&P
Expand All Collapse All            Laurie Becerra (: 1980) is a 43 y.o. female, patient, here for evaluation of the following chief complaint(s):  Lower Back Pain (New patient Mri L spine completed.)        ASSESSMENT/PLAN:     Below is the assessment and plan developed based on review of pertinent history, physical exam, labs, studies, and medications.     He has chronic lower back pain that is worsened over the last couple of years.  History is significant for the laminectomy on the right at L5-S1 in .  She has progressive lower back pain with bilateral leg symptoms.  She has an EMG recently which shows a chronic right-sided L5 radiculopathy.  She has severe spondylolysis at L5-S1 with severe stenosis.  She has a retrolisthesis at L5-S1 worsening the stenosis.  She also has a retrolisthesis at L4-5 of approximately 3 mm that reduces with extension.  She cannot get injection therapy because it worsens her diabetes.  We will restart physical therapy.  Ultimately I think she is a good candidate for a revision laminectomy L4-S1 with a posterior lumbar fusion L4-S1.  She is not a smoker.     Procedure: Revision laminectomy L4-S1.  Posterior lumbar fusion L4-S1        The risks and benefits were discussed at length with the patient and the patient has elected to proceed.  Indications for surgery include failed conservative treatment.  Alternative treatments, risks and the perioperative course were discussed with the patient.  All questions were answered.  The risks and benefits of the procedure were explained.  Benefits include definitive diagnosis, relief of pain, elimination of deformity and improved function.  Risks of surgery including bleeding, infection, weakness, numbness, CSF leak, failure to improve symptoms, exacerbation of medical co-morbidities and even death were discussed with the patient.      1. History of laminectomy  -     XR LUMBAR SPINE (MIN 4 VIEWS); Future  -     External Referral To

## 2024-03-18 ENCOUNTER — ANESTHESIA EVENT (OUTPATIENT)
Facility: HOSPITAL | Age: 44
DRG: 304 | End: 2024-03-18
Payer: MEDICAID

## 2024-03-19 RX ORDER — SIMVASTATIN 20 MG
20 TABLET ORAL NIGHTLY
Qty: 30 TABLET | Refills: 0 | Status: ON HOLD | OUTPATIENT
Start: 2024-03-19

## 2024-03-20 ENCOUNTER — ANESTHESIA (OUTPATIENT)
Facility: HOSPITAL | Age: 44
DRG: 304 | End: 2024-03-20
Payer: MEDICAID

## 2024-03-20 ENCOUNTER — APPOINTMENT (OUTPATIENT)
Facility: HOSPITAL | Age: 44
DRG: 304 | End: 2024-03-20
Attending: ORTHOPAEDIC SURGERY
Payer: MEDICAID

## 2024-03-20 ENCOUNTER — HOSPITAL ENCOUNTER (INPATIENT)
Facility: HOSPITAL | Age: 44
LOS: 5 days | Discharge: HOME | DRG: 304 | End: 2024-03-25
Attending: ORTHOPAEDIC SURGERY | Admitting: ORTHOPAEDIC SURGERY
Payer: MEDICAID

## 2024-03-20 DIAGNOSIS — Z98.1 STATUS POST LUMBAR SPINAL FUSION: Primary | ICD-10-CM

## 2024-03-20 PROBLEM — M48.062 LUMBAR STENOSIS WITH NEUROGENIC CLAUDICATION: Status: ACTIVE | Noted: 2024-03-20

## 2024-03-20 LAB
GLUCOSE BLD STRIP.AUTO-MCNC: 173 MG/DL (ref 65–117)
GLUCOSE BLD STRIP.AUTO-MCNC: 215 MG/DL (ref 65–117)
GLUCOSE BLD STRIP.AUTO-MCNC: 247 MG/DL (ref 65–117)
SERVICE CMNT-IMP: ABNORMAL

## 2024-03-20 PROCEDURE — 2580000003 HC RX 258: Performed by: PHYSICIAN ASSISTANT

## 2024-03-20 PROCEDURE — 01NR0ZZ RELEASE SACRAL NERVE, OPEN APPROACH: ICD-10-PCS | Performed by: ORTHOPAEDIC SURGERY

## 2024-03-20 PROCEDURE — 6360000002 HC RX W HCPCS: Performed by: PHYSICIAN ASSISTANT

## 2024-03-20 PROCEDURE — 07DR3ZZ EXTRACTION OF ILIAC BONE MARROW, PERCUTANEOUS APPROACH: ICD-10-PCS | Performed by: ORTHOPAEDIC SURGERY

## 2024-03-20 PROCEDURE — 8E0W0CZ ROBOTIC ASSISTED PROCEDURE OF TRUNK REGION, OPEN APPROACH: ICD-10-PCS | Performed by: ORTHOPAEDIC SURGERY

## 2024-03-20 PROCEDURE — P9045 ALBUMIN (HUMAN), 5%, 250 ML: HCPCS | Performed by: NURSE ANESTHETIST, CERTIFIED REGISTERED

## 2024-03-20 PROCEDURE — 2709999900 HC NON-CHARGEABLE SUPPLY: Performed by: ORTHOPAEDIC SURGERY

## 2024-03-20 PROCEDURE — 2580000003 HC RX 258: Performed by: ANESTHESIOLOGY

## 2024-03-20 PROCEDURE — 6360000002 HC RX W HCPCS: Performed by: NURSE ANESTHETIST, CERTIFIED REGISTERED

## 2024-03-20 PROCEDURE — 6370000000 HC RX 637 (ALT 250 FOR IP): Performed by: PHYSICIAN ASSISTANT

## 2024-03-20 PROCEDURE — 6360000002 HC RX W HCPCS: Performed by: ORTHOPAEDIC SURGERY

## 2024-03-20 PROCEDURE — C1776 JOINT DEVICE (IMPLANTABLE): HCPCS | Performed by: ORTHOPAEDIC SURGERY

## 2024-03-20 PROCEDURE — 3600000004 HC SURGERY LEVEL 4 BASE: Performed by: ORTHOPAEDIC SURGERY

## 2024-03-20 PROCEDURE — 2500000003 HC RX 250 WO HCPCS: Performed by: NURSE ANESTHETIST, CERTIFIED REGISTERED

## 2024-03-20 PROCEDURE — 0SG0071 FUSION OF LUMBAR VERTEBRAL JOINT WITH AUTOLOGOUS TISSUE SUBSTITUTE, POSTERIOR APPROACH, POSTERIOR COLUMN, OPEN APPROACH: ICD-10-PCS | Performed by: ORTHOPAEDIC SURGERY

## 2024-03-20 PROCEDURE — 1100000000 HC RM PRIVATE

## 2024-03-20 PROCEDURE — 2720000010 HC SURG SUPPLY STERILE: Performed by: ORTHOPAEDIC SURGERY

## 2024-03-20 PROCEDURE — 0SG00AJ FUSION OF LUMBAR VERTEBRAL JOINT WITH INTERBODY FUSION DEVICE, POSTERIOR APPROACH, ANTERIOR COLUMN, OPEN APPROACH: ICD-10-PCS | Performed by: ORTHOPAEDIC SURGERY

## 2024-03-20 PROCEDURE — 0ST20ZZ RESECTION OF LUMBAR VERTEBRAL DISC, OPEN APPROACH: ICD-10-PCS | Performed by: ORTHOPAEDIC SURGERY

## 2024-03-20 PROCEDURE — 01NB0ZZ RELEASE LUMBAR NERVE, OPEN APPROACH: ICD-10-PCS | Performed by: ORTHOPAEDIC SURGERY

## 2024-03-20 PROCEDURE — 3600000014 HC SURGERY LEVEL 4 ADDTL 15MIN: Performed by: ORTHOPAEDIC SURGERY

## 2024-03-20 PROCEDURE — 3700000000 HC ANESTHESIA ATTENDED CARE: Performed by: ORTHOPAEDIC SURGERY

## 2024-03-20 PROCEDURE — C1889 IMPLANT/INSERT DEVICE, NOC: HCPCS | Performed by: ORTHOPAEDIC SURGERY

## 2024-03-20 PROCEDURE — 2580000003 HC RX 258: Performed by: NURSE ANESTHETIST, CERTIFIED REGISTERED

## 2024-03-20 PROCEDURE — C1713 ANCHOR/SCREW BN/BN,TIS/BN: HCPCS | Performed by: ORTHOPAEDIC SURGERY

## 2024-03-20 PROCEDURE — 6370000000 HC RX 637 (ALT 250 FOR IP): Performed by: ORTHOPAEDIC SURGERY

## 2024-03-20 PROCEDURE — 82962 GLUCOSE BLOOD TEST: CPT

## 2024-03-20 PROCEDURE — 7100000000 HC PACU RECOVERY - FIRST 15 MIN: Performed by: ORTHOPAEDIC SURGERY

## 2024-03-20 PROCEDURE — 7100000001 HC PACU RECOVERY - ADDTL 15 MIN: Performed by: ORTHOPAEDIC SURGERY

## 2024-03-20 PROCEDURE — 3700000001 HC ADD 15 MINUTES (ANESTHESIA): Performed by: ORTHOPAEDIC SURGERY

## 2024-03-20 PROCEDURE — 0SG3071 FUSION OF LUMBOSACRAL JOINT WITH AUTOLOGOUS TISSUE SUBSTITUTE, POSTERIOR APPROACH, POSTERIOR COLUMN, OPEN APPROACH: ICD-10-PCS | Performed by: ORTHOPAEDIC SURGERY

## 2024-03-20 PROCEDURE — 4A11X4G MONITORING OF PERIPHERAL NERVOUS ELECTRICAL ACTIVITY, INTRAOPERATIVE, EXTERNAL APPROACH: ICD-10-PCS | Performed by: ORTHOPAEDIC SURGERY

## 2024-03-20 DEVICE — MAS/SET SCREW PK 559200008 STERILE 4PK
Type: IMPLANTABLE DEVICE | Site: SPINE LUMBAR | Status: FUNCTIONAL
Brand: CD HORIZON™ MODULEX™ SPINAL SYSTEM

## 2024-03-20 DEVICE — GRAFT BONE 5 CC: Type: IMPLANTABLE DEVICE | Site: SPINE LUMBAR | Status: FUNCTIONAL

## 2024-03-20 DEVICE — SPACER 6069093 CATALYFT PL40 SHORT 9MM
Type: IMPLANTABLE DEVICE | Site: SPINE LUMBAR | Status: FUNCTIONAL
Brand: CATALYFT PL EXPANDABLE INTERBODY SYSTEM

## 2024-03-20 DEVICE — GRAFT BNE INJ 6 CC DEMINERALIZED FIBER GRFT: Type: IMPLANTABLE DEVICE | Site: SPINE LUMBAR | Status: FUNCTIONAL

## 2024-03-20 DEVICE — ROD 1553201070 5.5 TI CP4 NS CURV 70MM
Type: IMPLANTABLE DEVICE | Site: SPINE LUMBAR | Status: FUNCTIONAL
Brand: CD HORIZON® SPINAL SYSTEM

## 2024-03-20 DEVICE — GRAFT BNE FIBER 15 CC OSTEOAMP SEL: Type: IMPLANTABLE DEVICE | Site: SPINE LUMBAR | Status: FUNCTIONAL

## 2024-03-20 RX ORDER — OXYCODONE HYDROCHLORIDE 5 MG/1
5 TABLET ORAL EVERY 4 HOURS PRN
Status: DISCONTINUED | OUTPATIENT
Start: 2024-03-20 | End: 2024-03-25 | Stop reason: HOSPADM

## 2024-03-20 RX ORDER — SODIUM CHLORIDE, SODIUM LACTATE, POTASSIUM CHLORIDE, CALCIUM CHLORIDE 600; 310; 30; 20 MG/100ML; MG/100ML; MG/100ML; MG/100ML
INJECTION, SOLUTION INTRAVENOUS CONTINUOUS PRN
Status: DISCONTINUED | OUTPATIENT
Start: 2024-03-20 | End: 2024-03-20 | Stop reason: SDUPTHER

## 2024-03-20 RX ORDER — PROCHLORPERAZINE EDISYLATE 5 MG/ML
5 INJECTION INTRAMUSCULAR; INTRAVENOUS
Status: DISCONTINUED | OUTPATIENT
Start: 2024-03-20 | End: 2024-03-21

## 2024-03-20 RX ORDER — SODIUM CHLORIDE 0.9 % (FLUSH) 0.9 %
5-40 SYRINGE (ML) INJECTION EVERY 12 HOURS SCHEDULED
Status: DISCONTINUED | OUTPATIENT
Start: 2024-03-20 | End: 2024-03-25 | Stop reason: HOSPADM

## 2024-03-20 RX ORDER — MAGNESIUM HYDROXIDE/ALUMINUM HYDROXICE/SIMETHICONE 120; 1200; 1200 MG/30ML; MG/30ML; MG/30ML
15 SUSPENSION ORAL EVERY 6 HOURS PRN
Status: DISCONTINUED | OUTPATIENT
Start: 2024-03-20 | End: 2024-03-25 | Stop reason: HOSPADM

## 2024-03-20 RX ORDER — HYDRALAZINE HYDROCHLORIDE 20 MG/ML
10 INJECTION INTRAMUSCULAR; INTRAVENOUS
Status: DISCONTINUED | OUTPATIENT
Start: 2024-03-20 | End: 2024-03-21

## 2024-03-20 RX ORDER — FENTANYL CITRATE 50 UG/ML
INJECTION, SOLUTION INTRAMUSCULAR; INTRAVENOUS PRN
Status: DISCONTINUED | OUTPATIENT
Start: 2024-03-20 | End: 2024-03-20 | Stop reason: SDUPTHER

## 2024-03-20 RX ORDER — OXYCODONE HYDROCHLORIDE 5 MG/1
10 TABLET ORAL EVERY 4 HOURS PRN
Status: DISCONTINUED | OUTPATIENT
Start: 2024-03-20 | End: 2024-03-25 | Stop reason: HOSPADM

## 2024-03-20 RX ORDER — ACETAMINOPHEN 500 MG
1000 TABLET ORAL ONCE
Status: DISCONTINUED | OUTPATIENT
Start: 2024-03-20 | End: 2024-03-20 | Stop reason: HOSPADM

## 2024-03-20 RX ORDER — FAMOTIDINE 20 MG/1
20 TABLET, FILM COATED ORAL 2 TIMES DAILY
Status: DISCONTINUED | OUTPATIENT
Start: 2024-03-20 | End: 2024-03-25 | Stop reason: HOSPADM

## 2024-03-20 RX ORDER — FENTANYL CITRATE 50 UG/ML
100 INJECTION, SOLUTION INTRAMUSCULAR; INTRAVENOUS
Status: DISCONTINUED | OUTPATIENT
Start: 2024-03-20 | End: 2024-03-20 | Stop reason: HOSPADM

## 2024-03-20 RX ORDER — OXYCODONE HYDROCHLORIDE 5 MG/1
5 TABLET ORAL
Status: DISCONTINUED | OUTPATIENT
Start: 2024-03-20 | End: 2024-03-21

## 2024-03-20 RX ORDER — SODIUM CHLORIDE 0.9 % (FLUSH) 0.9 %
5-40 SYRINGE (ML) INJECTION EVERY 12 HOURS SCHEDULED
Status: DISCONTINUED | OUTPATIENT
Start: 2024-03-20 | End: 2024-03-20 | Stop reason: HOSPADM

## 2024-03-20 RX ORDER — SODIUM CHLORIDE 0.9 % (FLUSH) 0.9 %
5-40 SYRINGE (ML) INJECTION PRN
Status: DISCONTINUED | OUTPATIENT
Start: 2024-03-20 | End: 2024-03-20 | Stop reason: HOSPADM

## 2024-03-20 RX ORDER — HYDROXYZINE HYDROCHLORIDE 10 MG/1
10 TABLET, FILM COATED ORAL EVERY 8 HOURS PRN
Status: DISCONTINUED | OUTPATIENT
Start: 2024-03-20 | End: 2024-03-25 | Stop reason: HOSPADM

## 2024-03-20 RX ORDER — POLYETHYLENE GLYCOL 3350 17 G/17G
17 POWDER, FOR SOLUTION ORAL DAILY PRN
Status: DISCONTINUED | OUTPATIENT
Start: 2024-03-20 | End: 2024-03-25 | Stop reason: HOSPADM

## 2024-03-20 RX ORDER — PREGABALIN 75 MG/1
75 CAPSULE ORAL ONCE
Status: COMPLETED | OUTPATIENT
Start: 2024-03-20 | End: 2024-03-20

## 2024-03-20 RX ORDER — DEXAMETHASONE SODIUM PHOSPHATE 4 MG/ML
INJECTION, SOLUTION INTRA-ARTICULAR; INTRALESIONAL; INTRAMUSCULAR; INTRAVENOUS; SOFT TISSUE PRN
Status: DISCONTINUED | OUTPATIENT
Start: 2024-03-20 | End: 2024-03-20 | Stop reason: SDUPTHER

## 2024-03-20 RX ORDER — SODIUM CHLORIDE 9 MG/ML
INJECTION, SOLUTION INTRAVENOUS PRN
Status: DISCONTINUED | OUTPATIENT
Start: 2024-03-20 | End: 2024-03-21

## 2024-03-20 RX ORDER — DEXTROSE MONOHYDRATE 100 MG/ML
INJECTION, SOLUTION INTRAVENOUS CONTINUOUS PRN
Status: DISCONTINUED | OUTPATIENT
Start: 2024-03-20 | End: 2024-03-25 | Stop reason: HOSPADM

## 2024-03-20 RX ORDER — SUCCINYLCHOLINE CHLORIDE 20 MG/ML
INJECTION INTRAMUSCULAR; INTRAVENOUS PRN
Status: DISCONTINUED | OUTPATIENT
Start: 2024-03-20 | End: 2024-03-20 | Stop reason: SDUPTHER

## 2024-03-20 RX ORDER — ROCURONIUM BROMIDE 10 MG/ML
INJECTION, SOLUTION INTRAVENOUS PRN
Status: DISCONTINUED | OUTPATIENT
Start: 2024-03-20 | End: 2024-03-20 | Stop reason: SDUPTHER

## 2024-03-20 RX ORDER — LIDOCAINE HYDROCHLORIDE 10 MG/ML
1 INJECTION, SOLUTION EPIDURAL; INFILTRATION; INTRACAUDAL; PERINEURAL
Status: DISCONTINUED | OUTPATIENT
Start: 2024-03-20 | End: 2024-03-20 | Stop reason: HOSPADM

## 2024-03-20 RX ORDER — SENNA AND DOCUSATE SODIUM 50; 8.6 MG/1; MG/1
1 TABLET, FILM COATED ORAL 2 TIMES DAILY
Status: DISCONTINUED | OUTPATIENT
Start: 2024-03-20 | End: 2024-03-25 | Stop reason: HOSPADM

## 2024-03-20 RX ORDER — INSULIN LISPRO 100 [IU]/ML
0-8 INJECTION, SOLUTION INTRAVENOUS; SUBCUTANEOUS
Status: DISCONTINUED | OUTPATIENT
Start: 2024-03-21 | End: 2024-03-21 | Stop reason: SDUPTHER

## 2024-03-20 RX ORDER — SODIUM CHLORIDE 0.9 % (FLUSH) 0.9 %
5-40 SYRINGE (ML) INJECTION PRN
Status: DISCONTINUED | OUTPATIENT
Start: 2024-03-20 | End: 2024-03-25 | Stop reason: HOSPADM

## 2024-03-20 RX ORDER — KETAMINE HCL IN NACL, ISO-OSM 100MG/10ML
SYRINGE (ML) INJECTION CONTINUOUS PRN
Status: DISCONTINUED | OUTPATIENT
Start: 2024-03-20 | End: 2024-03-20 | Stop reason: SDUPTHER

## 2024-03-20 RX ORDER — FENTANYL CITRATE 50 UG/ML
25 INJECTION, SOLUTION INTRAMUSCULAR; INTRAVENOUS EVERY 5 MIN PRN
Status: DISCONTINUED | OUTPATIENT
Start: 2024-03-20 | End: 2024-03-21

## 2024-03-20 RX ORDER — CELECOXIB 200 MG/1
200 CAPSULE ORAL ONCE
Status: COMPLETED | OUTPATIENT
Start: 2024-03-20 | End: 2024-03-20

## 2024-03-20 RX ORDER — NALOXONE HYDROCHLORIDE 0.4 MG/ML
INJECTION, SOLUTION INTRAMUSCULAR; INTRAVENOUS; SUBCUTANEOUS PRN
Status: DISCONTINUED | OUTPATIENT
Start: 2024-03-20 | End: 2024-03-25 | Stop reason: HOSPADM

## 2024-03-20 RX ORDER — VANCOMYCIN HYDROCHLORIDE 1 G/20ML
INJECTION, POWDER, LYOPHILIZED, FOR SOLUTION INTRAVENOUS PRN
Status: DISCONTINUED | OUTPATIENT
Start: 2024-03-20 | End: 2024-03-20 | Stop reason: HOSPADM

## 2024-03-20 RX ORDER — SODIUM CHLORIDE 9 MG/ML
INJECTION, SOLUTION INTRAVENOUS PRN
Status: DISCONTINUED | OUTPATIENT
Start: 2024-03-20 | End: 2024-03-25 | Stop reason: HOSPADM

## 2024-03-20 RX ORDER — ACETAMINOPHEN 500 MG
1000 TABLET ORAL ONCE
Status: COMPLETED | OUTPATIENT
Start: 2024-03-20 | End: 2024-03-20

## 2024-03-20 RX ORDER — ACETAMINOPHEN 325 MG/1
650 TABLET ORAL EVERY 6 HOURS
Status: DISCONTINUED | OUTPATIENT
Start: 2024-03-20 | End: 2024-03-25 | Stop reason: HOSPADM

## 2024-03-20 RX ORDER — SODIUM CHLORIDE 0.9 % (FLUSH) 0.9 %
5-40 SYRINGE (ML) INJECTION EVERY 12 HOURS SCHEDULED
Status: DISCONTINUED | OUTPATIENT
Start: 2024-03-20 | End: 2024-03-21

## 2024-03-20 RX ORDER — ONDANSETRON 2 MG/ML
4 INJECTION INTRAMUSCULAR; INTRAVENOUS EVERY 6 HOURS PRN
Status: DISCONTINUED | OUTPATIENT
Start: 2024-03-20 | End: 2024-03-25 | Stop reason: HOSPADM

## 2024-03-20 RX ORDER — SODIUM CHLORIDE 9 MG/ML
INJECTION, SOLUTION INTRAVENOUS PRN
Status: DISCONTINUED | OUTPATIENT
Start: 2024-03-20 | End: 2024-03-20 | Stop reason: HOSPADM

## 2024-03-20 RX ORDER — MIDAZOLAM HYDROCHLORIDE 1 MG/ML
INJECTION INTRAMUSCULAR; INTRAVENOUS PRN
Status: DISCONTINUED | OUTPATIENT
Start: 2024-03-20 | End: 2024-03-20 | Stop reason: SDUPTHER

## 2024-03-20 RX ORDER — CYCLOBENZAPRINE HCL 10 MG
10 TABLET ORAL EVERY 12 HOURS PRN
Status: DISCONTINUED | OUTPATIENT
Start: 2024-03-20 | End: 2024-03-25 | Stop reason: HOSPADM

## 2024-03-20 RX ORDER — ONDANSETRON 2 MG/ML
4 INJECTION INTRAMUSCULAR; INTRAVENOUS AS NEEDED
Status: DISCONTINUED | OUTPATIENT
Start: 2024-03-20 | End: 2024-03-20 | Stop reason: HOSPADM

## 2024-03-20 RX ORDER — LIDOCAINE HYDROCHLORIDE 20 MG/ML
INJECTION, SOLUTION EPIDURAL; INFILTRATION; INTRACAUDAL; PERINEURAL PRN
Status: DISCONTINUED | OUTPATIENT
Start: 2024-03-20 | End: 2024-03-20 | Stop reason: SDUPTHER

## 2024-03-20 RX ORDER — MAGNESIUM SULFATE HEPTAHYDRATE 40 MG/ML
INJECTION, SOLUTION INTRAVENOUS PRN
Status: DISCONTINUED | OUTPATIENT
Start: 2024-03-20 | End: 2024-03-20 | Stop reason: SDUPTHER

## 2024-03-20 RX ORDER — SODIUM CHLORIDE 9 MG/ML
INJECTION, SOLUTION INTRAVENOUS CONTINUOUS
Status: DISCONTINUED | OUTPATIENT
Start: 2024-03-20 | End: 2024-03-25 | Stop reason: HOSPADM

## 2024-03-20 RX ORDER — KETOROLAC TROMETHAMINE 30 MG/ML
30 INJECTION, SOLUTION INTRAMUSCULAR; INTRAVENOUS EVERY 6 HOURS PRN
Status: DISCONTINUED | OUTPATIENT
Start: 2024-03-20 | End: 2024-03-25 | Stop reason: HOSPADM

## 2024-03-20 RX ORDER — HYDROMORPHONE HYDROCHLORIDE 1 MG/ML
0.5 INJECTION, SOLUTION INTRAMUSCULAR; INTRAVENOUS; SUBCUTANEOUS EVERY 5 MIN PRN
Status: DISCONTINUED | OUTPATIENT
Start: 2024-03-20 | End: 2024-03-21

## 2024-03-20 RX ORDER — SODIUM CHLORIDE, SODIUM LACTATE, POTASSIUM CHLORIDE, CALCIUM CHLORIDE 600; 310; 30; 20 MG/100ML; MG/100ML; MG/100ML; MG/100ML
INJECTION, SOLUTION INTRAVENOUS CONTINUOUS
Status: DISCONTINUED | OUTPATIENT
Start: 2024-03-20 | End: 2024-03-20 | Stop reason: HOSPADM

## 2024-03-20 RX ORDER — HYDROMORPHONE HYDROCHLORIDE 1 MG/ML
0.5 INJECTION, SOLUTION INTRAMUSCULAR; INTRAVENOUS; SUBCUTANEOUS
Status: DISCONTINUED | OUTPATIENT
Start: 2024-03-20 | End: 2024-03-25 | Stop reason: HOSPADM

## 2024-03-20 RX ORDER — ONDANSETRON 4 MG/1
4 TABLET, ORALLY DISINTEGRATING ORAL EVERY 8 HOURS PRN
Status: DISCONTINUED | OUTPATIENT
Start: 2024-03-20 | End: 2024-03-25 | Stop reason: HOSPADM

## 2024-03-20 RX ORDER — BISACODYL 10 MG
10 SUPPOSITORY, RECTAL RECTAL DAILY PRN
Status: DISCONTINUED | OUTPATIENT
Start: 2024-03-20 | End: 2024-03-25 | Stop reason: HOSPADM

## 2024-03-20 RX ORDER — SODIUM CHLORIDE 0.9 % (FLUSH) 0.9 %
5-40 SYRINGE (ML) INJECTION PRN
Status: DISCONTINUED | OUTPATIENT
Start: 2024-03-20 | End: 2024-03-21

## 2024-03-20 RX ORDER — ONDANSETRON 2 MG/ML
INJECTION INTRAMUSCULAR; INTRAVENOUS PRN
Status: DISCONTINUED | OUTPATIENT
Start: 2024-03-20 | End: 2024-03-20

## 2024-03-20 RX ORDER — ONDANSETRON 2 MG/ML
INJECTION INTRAMUSCULAR; INTRAVENOUS PRN
Status: DISCONTINUED | OUTPATIENT
Start: 2024-03-20 | End: 2024-03-20 | Stop reason: SDUPTHER

## 2024-03-20 RX ORDER — INSULIN LISPRO 100 [IU]/ML
0-4 INJECTION, SOLUTION INTRAVENOUS; SUBCUTANEOUS NIGHTLY
Status: DISCONTINUED | OUTPATIENT
Start: 2024-03-20 | End: 2024-03-21 | Stop reason: SDUPTHER

## 2024-03-20 RX ORDER — ALBUMIN, HUMAN INJ 5% 5 %
SOLUTION INTRAVENOUS PRN
Status: DISCONTINUED | OUTPATIENT
Start: 2024-03-20 | End: 2024-03-20 | Stop reason: SDUPTHER

## 2024-03-20 RX ORDER — MIDAZOLAM HYDROCHLORIDE 2 MG/2ML
2 INJECTION, SOLUTION INTRAMUSCULAR; INTRAVENOUS
Status: DISCONTINUED | OUTPATIENT
Start: 2024-03-20 | End: 2024-03-20 | Stop reason: HOSPADM

## 2024-03-20 RX ORDER — NALOXONE HYDROCHLORIDE 0.4 MG/ML
INJECTION, SOLUTION INTRAMUSCULAR; INTRAVENOUS; SUBCUTANEOUS PRN
Status: DISCONTINUED | OUTPATIENT
Start: 2024-03-20 | End: 2024-03-21

## 2024-03-20 RX ORDER — POLYETHYLENE GLYCOL 3350 17 G/17G
17 POWDER, FOR SOLUTION ORAL DAILY
Status: DISCONTINUED | OUTPATIENT
Start: 2024-03-21 | End: 2024-03-25 | Stop reason: HOSPADM

## 2024-03-20 RX ORDER — DEXMEDETOMIDINE HYDROCHLORIDE 100 UG/ML
INJECTION, SOLUTION INTRAVENOUS PRN
Status: DISCONTINUED | OUTPATIENT
Start: 2024-03-20 | End: 2024-03-20 | Stop reason: SDUPTHER

## 2024-03-20 RX ORDER — ONDANSETRON 2 MG/ML
4 INJECTION INTRAMUSCULAR; INTRAVENOUS
Status: DISCONTINUED | OUTPATIENT
Start: 2024-03-20 | End: 2024-03-21

## 2024-03-20 RX ADMIN — ACETAMINOPHEN 650 MG: 325 TABLET ORAL at 21:59

## 2024-03-20 RX ADMIN — MIDAZOLAM 1 MG: 1 INJECTION INTRAMUSCULAR; INTRAVENOUS at 15:48

## 2024-03-20 RX ADMIN — FENTANYL CITRATE 50 MCG: 50 INJECTION, SOLUTION INTRAMUSCULAR; INTRAVENOUS at 15:53

## 2024-03-20 RX ADMIN — SODIUM CHLORIDE: 9 INJECTION, SOLUTION INTRAVENOUS at 19:03

## 2024-03-20 RX ADMIN — MIDAZOLAM 1 MG: 1 INJECTION INTRAMUSCULAR; INTRAVENOUS at 15:50

## 2024-03-20 RX ADMIN — ALBUMIN (HUMAN) 12.5 G: 12.5 INJECTION, SOLUTION INTRAVENOUS at 16:12

## 2024-03-20 RX ADMIN — LIDOCAINE HYDROCHLORIDE 60 MG: 20 INJECTION, SOLUTION EPIDURAL; INFILTRATION; INTRACAUDAL; PERINEURAL at 15:52

## 2024-03-20 RX ADMIN — ROCURONIUM BROMIDE 10 MG: 10 SOLUTION INTRAVENOUS at 15:52

## 2024-03-20 RX ADMIN — DEXMEDETOMIDINE HYDROCHLORIDE 8 MCG: 100 INJECTION, SOLUTION, CONCENTRATE INTRAVENOUS at 18:38

## 2024-03-20 RX ADMIN — MAGNESIUM SULFATE HEPTAHYDRATE 2000 MG: 40 INJECTION, SOLUTION INTRAVENOUS at 16:20

## 2024-03-20 RX ADMIN — MIDAZOLAM 3 MG: 1 INJECTION INTRAMUSCULAR; INTRAVENOUS at 15:45

## 2024-03-20 RX ADMIN — DEXMEDETOMIDINE HYDROCHLORIDE 8 MCG: 100 INJECTION, SOLUTION, CONCENTRATE INTRAVENOUS at 18:27

## 2024-03-20 RX ADMIN — PROPOFOL 100 MCG/KG/MIN: 10 INJECTION, EMULSION INTRAVENOUS at 16:00

## 2024-03-20 RX ADMIN — Medication: at 19:28

## 2024-03-20 RX ADMIN — ACETAMINOPHEN 1000 MG: 500 TABLET ORAL at 15:31

## 2024-03-20 RX ADMIN — DEXAMETHASONE SODIUM PHOSPHATE 8 MG: 4 INJECTION, SOLUTION INTRAMUSCULAR; INTRAVENOUS at 16:24

## 2024-03-20 RX ADMIN — SODIUM CHLORIDE, PRESERVATIVE FREE 10 ML: 5 INJECTION INTRAVENOUS at 22:00

## 2024-03-20 RX ADMIN — PREGABALIN 75 MG: 75 CAPSULE ORAL at 15:31

## 2024-03-20 RX ADMIN — PROPOFOL 180 MG: 10 INJECTION, EMULSION INTRAVENOUS at 15:53

## 2024-03-20 RX ADMIN — FAMOTIDINE 20 MG: 20 TABLET ORAL at 21:59

## 2024-03-20 RX ADMIN — SODIUM CHLORIDE, PRESERVATIVE FREE 10 ML: 5 INJECTION INTRAVENOUS at 22:01

## 2024-03-20 RX ADMIN — WATER 2000 MG: 1 INJECTION INTRAMUSCULAR; INTRAVENOUS; SUBCUTANEOUS at 23:58

## 2024-03-20 RX ADMIN — WATER 2000 MG: 1 INJECTION INTRAMUSCULAR; INTRAVENOUS; SUBCUTANEOUS at 16:18

## 2024-03-20 RX ADMIN — FENTANYL CITRATE 50 MCG: 50 INJECTION, SOLUTION INTRAMUSCULAR; INTRAVENOUS at 15:48

## 2024-03-20 RX ADMIN — SUCCINYLCHOLINE CHLORIDE 180 MG: 20 INJECTION, SOLUTION INTRAMUSCULAR; INTRAVENOUS at 15:53

## 2024-03-20 RX ADMIN — ONDANSETRON 4 MG: 2 INJECTION INTRAMUSCULAR; INTRAVENOUS at 18:00

## 2024-03-20 RX ADMIN — PHENYLEPHRINE HYDROCHLORIDE 20 MCG/MIN: 10 INJECTION INTRAVENOUS at 17:42

## 2024-03-20 RX ADMIN — DOCUSATE SODIUM 50MG AND SENNOSIDES 8.6MG 1 TABLET: 8.6; 5 TABLET, FILM COATED ORAL at 21:59

## 2024-03-20 RX ADMIN — Medication 0.5 MG/KG/HR: at 16:10

## 2024-03-20 RX ADMIN — SODIUM CHLORIDE, POTASSIUM CHLORIDE, SODIUM LACTATE AND CALCIUM CHLORIDE: 600; 310; 30; 20 INJECTION, SOLUTION INTRAVENOUS at 15:45

## 2024-03-20 RX ADMIN — CELECOXIB 200 MG: 200 CAPSULE ORAL at 15:31

## 2024-03-20 ASSESSMENT — PAIN - FUNCTIONAL ASSESSMENT: PAIN_FUNCTIONAL_ASSESSMENT: NONE - DENIES PAIN

## 2024-03-20 NOTE — ANESTHESIA PRE PROCEDURE
Department of Anesthesiology  Preprocedure Note       Name:  Laurie Becerra   Age:  43 y.o.  :  1980                                          MRN:  [id number]         Date:  3/20/2024      Surgeon: Surgeon(s):  Naveen Covington MD    Procedure: Procedure(s):  REVISION LAMINECTOMY L4-S1,  POSTERIOR LUMBAR FUSION L4-S1 (MAZOR, O-ARM) (E R A S)    Medications prior to admission:   Prior to Admission medications    Medication Sig Start Date End Date Taking? Authorizing Provider   simvastatin (ZOCOR) 20 MG tablet Take 1 tablet by mouth nightly 3/19/24   Leann Chun MD   glipiZIDE (GLUCOTROL XL) 5 MG extended release tablet Take 1 tablet by mouth as needed (PT TAKES IF INSULIN IS @ 200)    Provider, MD Marian   albuterol sulfate HFA (PROVENTIL;VENTOLIN;PROAIR) 108 (90 Base) MCG/ACT inhaler Inhale 2 puffs into the lungs every 4 hours as needed for Wheezing  Patient not taking: Reported on 3/20/2024 2/28/24   Leann Chun MD   nortriptyline (PAMELOR) 75 MG capsule Take 1 capsule by mouth nightly 24   Eddie Liu MD   OZEMPIC, 0.25 OR 0.5 MG/DOSE, 2 MG/3ML SOPN INJECT 0.5MG SUBCUTANEOUSLY EVERY 7 DAYS  Patient taking differently: Inject 0.5 mg into the skin once a week 24   Leann Chun MD   famotidine (PEPCID) 40 MG tablet Take 1 tablet by mouth nightly  Patient taking differently: Take 1 tablet by mouth every morning 24   Brandy Sorenson MD   Continuous Blood Gluc  (FREESTYLE GURPREET 2 READER) ZACK 1 each by Does not apply route daily 11/10/23   Leann Chun MD   Continuous Blood Gluc Sensor (FREESTYLE GURPREET 2 SENSOR) MISC 1 each by Does not apply route every 14 days 23   Leann Chun MD   cyanocobalamin 500 MCG tablet Take 1 tablet by mouth daily 10/4/23   Leann Chun MD   blood glucose test strips (ASCENSIA AUTODISC VI;ONE TOUCH ULTRA TEST VI) strip 1 each by In Vitro route daily As needed. 10/4/23   Leann Chun MD   Insulin Pen Needle (PEN

## 2024-03-20 NOTE — FLOWSHEET NOTE
03/20/24 1633   Family Communication    Relationship to Patient Spouse    Phone Number Nam Barrera   Family/Significant Other Update Called   Delivery Origin Nurse   Message Disposition Family present - message delivered   Update Given Yes   Family Communication   Family Update Message Procedure started

## 2024-03-20 NOTE — TELEPHONE ENCOUNTER
cyclobenzaprine (FLEXERIL) 10 MG tablet    [place] [id number] [address] - P [phone] - F [phone]

## 2024-03-20 NOTE — PERIOP NOTE
Family notified of pt status in PACU by Dinorah Wallace RN Awaiting bed for transfer.    2030 TRANSFER - OUT REPORT:    Verbal report given to SONI Buenrostro(name) on Laurie Becerra  being transferred to Room 528(unit) for routine post-op       Report consisted of patient’s Situation, Background, Assessment and   Recommendations(SBAR).     Time Pre op antibiotic given:1618  Anesthesia Stop time: 1844    Information from the following report(s) OR Summary, Intake/Output, MAR, Recent Results, and Cardiac Rhythm Sinus Rhythm  was reviewed with the receiving nurse.    Opportunity for questions and clarification was provided.     Is the patient on 02? No       L/Min      Other     Is the patient on a monitor? No    Is the nurse transporting with the patient? No    Surgical Waiting Area notified of patient's transfer from PACU? Yes

## 2024-03-21 LAB
ANION GAP SERPL CALC-SCNC: 5 MMOL/L (ref 5–15)
BUN SERPL-MCNC: 9 MG/DL (ref 6–20)
BUN/CREAT SERPL: 9 (ref 12–20)
CALCIUM SERPL-MCNC: 8.6 MG/DL (ref 8.5–10.1)
CHLORIDE SERPL-SCNC: 107 MMOL/L (ref 97–108)
CO2 SERPL-SCNC: 23 MMOL/L (ref 21–32)
CREAT SERPL-MCNC: 0.99 MG/DL (ref 0.55–1.02)
GLUCOSE BLD STRIP.AUTO-MCNC: 163 MG/DL (ref 65–117)
GLUCOSE BLD STRIP.AUTO-MCNC: 166 MG/DL (ref 65–117)
GLUCOSE BLD STRIP.AUTO-MCNC: 181 MG/DL (ref 65–117)
GLUCOSE BLD STRIP.AUTO-MCNC: 204 MG/DL (ref 65–117)
GLUCOSE SERPL-MCNC: 270 MG/DL (ref 65–100)
HCT VFR BLD AUTO: 32.7 % (ref 35–47)
HGB BLD-MCNC: 11.2 G/DL (ref 11.5–16)
POTASSIUM SERPL-SCNC: 4.8 MMOL/L (ref 3.5–5.1)
SERVICE CMNT-IMP: ABNORMAL
SODIUM SERPL-SCNC: 135 MMOL/L (ref 136–145)

## 2024-03-21 PROCEDURE — 6370000000 HC RX 637 (ALT 250 FOR IP): Performed by: PHYSICIAN ASSISTANT

## 2024-03-21 PROCEDURE — 80048 BASIC METABOLIC PNL TOTAL CA: CPT

## 2024-03-21 PROCEDURE — 85018 HEMOGLOBIN: CPT

## 2024-03-21 PROCEDURE — 97161 PT EVAL LOW COMPLEX 20 MIN: CPT

## 2024-03-21 PROCEDURE — 97530 THERAPEUTIC ACTIVITIES: CPT

## 2024-03-21 PROCEDURE — 82962 GLUCOSE BLOOD TEST: CPT

## 2024-03-21 PROCEDURE — 97116 GAIT TRAINING THERAPY: CPT

## 2024-03-21 PROCEDURE — 97535 SELF CARE MNGMENT TRAINING: CPT

## 2024-03-21 PROCEDURE — 6370000000 HC RX 637 (ALT 250 FOR IP): Performed by: STUDENT IN AN ORGANIZED HEALTH CARE EDUCATION/TRAINING PROGRAM

## 2024-03-21 PROCEDURE — 2580000003 HC RX 258: Performed by: PHYSICIAN ASSISTANT

## 2024-03-21 PROCEDURE — L0628 LSO FLEX NO RI STAYS PRE OTS: HCPCS

## 2024-03-21 PROCEDURE — 1100000000 HC RM PRIVATE

## 2024-03-21 PROCEDURE — 97165 OT EVAL LOW COMPLEX 30 MIN: CPT

## 2024-03-21 PROCEDURE — 6360000002 HC RX W HCPCS: Performed by: PHYSICIAN ASSISTANT

## 2024-03-21 PROCEDURE — 36415 COLL VENOUS BLD VENIPUNCTURE: CPT

## 2024-03-21 PROCEDURE — 85014 HEMATOCRIT: CPT

## 2024-03-21 RX ORDER — PANTOPRAZOLE SODIUM 40 MG/1
40 TABLET, DELAYED RELEASE ORAL
Status: DISCONTINUED | OUTPATIENT
Start: 2024-03-21 | End: 2024-03-25 | Stop reason: HOSPADM

## 2024-03-21 RX ORDER — INSULIN LISPRO 100 [IU]/ML
0-8 INJECTION, SOLUTION INTRAVENOUS; SUBCUTANEOUS
Status: DISCONTINUED | OUTPATIENT
Start: 2024-03-21 | End: 2024-03-25 | Stop reason: HOSPADM

## 2024-03-21 RX ORDER — LISINOPRIL 10 MG/1
10 TABLET ORAL EVERY MORNING
Status: DISCONTINUED | OUTPATIENT
Start: 2024-03-21 | End: 2024-03-25 | Stop reason: HOSPADM

## 2024-03-21 RX ORDER — CYCLOBENZAPRINE HCL 10 MG
10 TABLET ORAL 3 TIMES DAILY PRN
Status: DISCONTINUED | OUTPATIENT
Start: 2024-03-21 | End: 2024-03-25 | Stop reason: HOSPADM

## 2024-03-21 RX ORDER — ATORVASTATIN CALCIUM 10 MG/1
10 TABLET, FILM COATED ORAL NIGHTLY
Status: DISCONTINUED | OUTPATIENT
Start: 2024-03-21 | End: 2024-03-25 | Stop reason: HOSPADM

## 2024-03-21 RX ORDER — SUMATRIPTAN 25 MG/1
100 TABLET, FILM COATED ORAL
Status: ACTIVE | OUTPATIENT
Start: 2024-03-21 | End: 2024-03-22

## 2024-03-21 RX ORDER — CHOLECALCIFEROL (VITAMIN D3) 125 MCG
500 CAPSULE ORAL DAILY
Status: DISCONTINUED | OUTPATIENT
Start: 2024-03-21 | End: 2024-03-25 | Stop reason: HOSPADM

## 2024-03-21 RX ORDER — NORTRIPTYLINE HYDROCHLORIDE 25 MG/1
75 CAPSULE ORAL NIGHTLY
Status: DISCONTINUED | OUTPATIENT
Start: 2024-03-21 | End: 2024-03-25 | Stop reason: HOSPADM

## 2024-03-21 RX ORDER — INSULIN LISPRO 100 [IU]/ML
0-4 INJECTION, SOLUTION INTRAVENOUS; SUBCUTANEOUS NIGHTLY
Status: DISCONTINUED | OUTPATIENT
Start: 2024-03-21 | End: 2024-03-25 | Stop reason: HOSPADM

## 2024-03-21 RX ORDER — FAMOTIDINE 20 MG/1
40 TABLET, FILM COATED ORAL EVERY MORNING
Status: DISCONTINUED | OUTPATIENT
Start: 2024-03-21 | End: 2024-03-21 | Stop reason: SDUPTHER

## 2024-03-21 RX ORDER — ERGOCALCIFEROL 1.25 MG/1
50000 CAPSULE ORAL
Status: DISCONTINUED | OUTPATIENT
Start: 2024-03-21 | End: 2024-03-25 | Stop reason: HOSPADM

## 2024-03-21 RX ORDER — CETIRIZINE HYDROCHLORIDE 10 MG/1
10 TABLET ORAL DAILY
Status: DISCONTINUED | OUTPATIENT
Start: 2024-03-21 | End: 2024-03-25 | Stop reason: HOSPADM

## 2024-03-21 RX ORDER — GABAPENTIN 400 MG/1
400 CAPSULE ORAL 3 TIMES DAILY
Status: DISCONTINUED | OUTPATIENT
Start: 2024-03-21 | End: 2024-03-25 | Stop reason: HOSPADM

## 2024-03-21 RX ORDER — FENOFIBRATE 160 MG/1
160 TABLET ORAL DAILY
Status: DISCONTINUED | OUTPATIENT
Start: 2024-03-21 | End: 2024-03-25 | Stop reason: HOSPADM

## 2024-03-21 RX ADMIN — DOCUSATE SODIUM 50MG AND SENNOSIDES 8.6MG 1 TABLET: 8.6; 5 TABLET, FILM COATED ORAL at 20:48

## 2024-03-21 RX ADMIN — NORTRIPTYLINE HYDROCHLORIDE 75 MG: 25 CAPSULE ORAL at 20:48

## 2024-03-21 RX ADMIN — GABAPENTIN 400 MG: 400 CAPSULE ORAL at 13:45

## 2024-03-21 RX ADMIN — OXYCODONE 10 MG: 5 TABLET ORAL at 18:31

## 2024-03-21 RX ADMIN — ACETAMINOPHEN 650 MG: 325 TABLET ORAL at 13:45

## 2024-03-21 RX ADMIN — FAMOTIDINE 20 MG: 20 TABLET ORAL at 08:48

## 2024-03-21 RX ADMIN — GABAPENTIN 400 MG: 400 CAPSULE ORAL at 10:41

## 2024-03-21 RX ADMIN — SODIUM CHLORIDE, PRESERVATIVE FREE 10 ML: 5 INJECTION INTRAVENOUS at 08:49

## 2024-03-21 RX ADMIN — ATORVASTATIN CALCIUM 10 MG: 10 TABLET, FILM COATED ORAL at 20:48

## 2024-03-21 RX ADMIN — ACETAMINOPHEN 650 MG: 325 TABLET ORAL at 08:48

## 2024-03-21 RX ADMIN — FAMOTIDINE 20 MG: 20 TABLET ORAL at 20:48

## 2024-03-21 RX ADMIN — DOCUSATE SODIUM 50MG AND SENNOSIDES 8.6MG 1 TABLET: 8.6; 5 TABLET, FILM COATED ORAL at 08:48

## 2024-03-21 RX ADMIN — GABAPENTIN 400 MG: 400 CAPSULE ORAL at 20:48

## 2024-03-21 RX ADMIN — PANTOPRAZOLE SODIUM 40 MG: 40 TABLET, DELAYED RELEASE ORAL at 10:41

## 2024-03-21 RX ADMIN — WATER 2000 MG: 1 INJECTION INTRAMUSCULAR; INTRAVENOUS; SUBCUTANEOUS at 07:32

## 2024-03-21 RX ADMIN — OXYCODONE 10 MG: 5 TABLET ORAL at 10:40

## 2024-03-21 RX ADMIN — INSULIN LISPRO 2 UNITS: 100 INJECTION, SOLUTION INTRAVENOUS; SUBCUTANEOUS at 07:32

## 2024-03-21 RX ADMIN — POLYETHYLENE GLYCOL 3350 17 G: 17 POWDER, FOR SOLUTION ORAL at 08:49

## 2024-03-21 RX ADMIN — SODIUM CHLORIDE: 9 INJECTION, SOLUTION INTRAVENOUS at 03:58

## 2024-03-21 RX ADMIN — OXYCODONE 10 MG: 5 TABLET ORAL at 14:29

## 2024-03-21 RX ADMIN — CYANOCOBALAMIN TAB 500 MCG 500 MCG: 500 TAB at 10:41

## 2024-03-21 RX ADMIN — SODIUM CHLORIDE: 9 INJECTION, SOLUTION INTRAVENOUS at 22:22

## 2024-03-21 RX ADMIN — FENOFIBRATE 160 MG: 160 TABLET ORAL at 10:41

## 2024-03-21 RX ADMIN — ERGOCALCIFEROL 50000 UNITS: 1.25 CAPSULE ORAL at 10:41

## 2024-03-21 RX ADMIN — SODIUM CHLORIDE, PRESERVATIVE FREE 10 ML: 5 INJECTION INTRAVENOUS at 20:48

## 2024-03-21 RX ADMIN — CETIRIZINE HYDROCHLORIDE 10 MG: 10 TABLET, FILM COATED ORAL at 10:41

## 2024-03-21 RX ADMIN — ACETAMINOPHEN 650 MG: 325 TABLET ORAL at 20:51

## 2024-03-21 RX ADMIN — OXYCODONE 5 MG: 5 TABLET ORAL at 06:17

## 2024-03-21 RX ADMIN — OXYCODONE 10 MG: 5 TABLET ORAL at 22:20

## 2024-03-21 ASSESSMENT — PAIN SCALES - GENERAL
PAINLEVEL_OUTOF10: 8
PAINLEVEL_OUTOF10: 8
PAINLEVEL_OUTOF10: 1
PAINLEVEL_OUTOF10: 8
PAINLEVEL_OUTOF10: 3
PAINLEVEL_OUTOF10: 7
PAINLEVEL_OUTOF10: 9

## 2024-03-21 ASSESSMENT — PAIN DESCRIPTION - DESCRIPTORS
DESCRIPTORS: ACHING

## 2024-03-21 ASSESSMENT — PAIN DESCRIPTION - LOCATION
LOCATION: BACK

## 2024-03-21 NOTE — H&P
History and Physical    Date of Service:  3/21/2024  Primary Care Provider: Leann Chun MD  Source of information: patient    Chief Complaint: No chief complaint on file.      History of Presenting Illness:   Laurie Becerra is a 43 y.o. female with pmhx of sinus tachycardia s/p maze procedure, anxiety, DM 2, GERD, and back pain s/p.revision laminectomy L4-S1, posterior lumbar fusion L4-S1 on 3/20.      Patient denies fever chest pain shortness of breath, no n/v/d       REVIEW OF SYSTEMS:  A comprehensive review of systems was negative except for that written in the History of Present Illness.     Past Medical History:   Diagnosis Date    Anesthesia complication     TACHYCARDIA DURING SURGERY    Anxiety     Asthma     LAST EPISODE 2016    Diabetes (HCC) 2008    NIDDM    Ear infection 11/30/2018    BILATERAL    GERD (gastroesophageal reflux disease)     Hypertension     HX HTN - NO MEDS CURRENTLY(11-30-18)    Migraine     Recurrent umbilical hernia 11/28/2018    Sleep apnea-like behavior 2019    NEG SLEEP STUDY    SVT (supraventricular tachycardia)       Past Surgical History:   Procedure Laterality Date    APPENDECTOMY  2/2008    CARDIAC SURGERY N/A 11/20/2020    Ablation Svt performed by Dayne Peralta MD at [facility] CARDIAC CATH LAB    CHOLECYSTECTOMY  2001    COLONOSCOPY      COMPRE ELECTROPHYSIOL XM W/LEFT ATRIAL PACNG/REC N/A 11/20/2020    Lt Atrial Pace & Record During Ep Study performed by Dayne Peralta MD at [facility] CARDIAC CATH LAB    COMPRE ELECTROPHYSIOL XM W/LEFT ATRIAL PACNG/REC N/A 3/16/2021    Lt Atrial Pace & Record During Ep Study performed by Dayne Peralta MD at [facility] CARDIAC CATH LAB    COMPRE ELECTROPHYSIOLOGIC ARRHYTHMIA INDUCTION N/A 11/20/2020    EP STUDY COMPLETE performed by Dayne Peralta MD at [facility] CARDIAC CATH LAB    COMPRE ELECTROPHYSIOLOGIC ARRHYTHMIA INDUCTION N/A 3/16/2021    Ep Study Complete performed by Dayne Peralta MD at [facility] CARDIAC CATH LAB    DILATION AND CURETTAGE OF

## 2024-03-21 NOTE — DISCHARGE INSTRUCTIONS
After Hospital Care Plan:  Discharge Instructions Lumbar Fusion Surgery   Dr. Mas   Patient Name: Laurie Becerra    Date of procedure: [unfilled]  Date of discharge: 3/21/2024    Procedure: Procedure(s):  REVISION LAMINECTOMY L4-S1,  POSTERIOR LUMBAR FUSION L4-S1 (TERESA FERNANDEZ-ARM) (E R A S)  PCP: @PCP@    Follow up appointments  -follow up with Dr. Dr. Mas in 2 weeks.  Call [phone] to make an appointment as soon as you get home from the hospital.    Home Health Agency: ____________________   phone: _______________________  The agency will contact you to arrange dates/times for visits.  Please call them if you do not hear from them within 24 hours after you are discharged  Physical therapy 3 times a week for 3 weeks  Nursing-initial assessment and as needed    When to call your Orthopaedic Surgeon:  -Signs of infection-if your incision is red; continues to have drainage; drainage has a foul odor or if you have a persistent fever over 101 degrees for 24 hours  -Nausea or vomiting, severe headache  -Loss of bowel or bladder function, inability to urinate  -Changes in sensation in your arms or legs (numbness, tingling, loss of color)  -Increased weakness-greater than before your surgery  -Severe pain or pain not relieved by medications  -Signs of a blood clot in your leg-calf pain, tenderness, redness, swelling of lower leg    When to call your Primary Care Physician:  -Concerns about medical conditions such as diabetes, high blood pressure, asthma, congestive heart failure  -Call if blood sugars are elevated, persistent headache or dizziness, coughing or congestion, constipation or diarrhea, burning with urination, abnormal heart rate    When to call 911 and go to the nearest emergency room:  -Acute onset of chest pain, shortness of breath, difficulty breathing    Activity  -You are going home a well person, be as active as possible.  Your only exercise should be walking.  Start with short frequent

## 2024-03-21 NOTE — OP NOTE
[address]  [address]                            OPERATIVE REPORT      PATIENT NAME: RAJESH SPENCER               : 1980  MED REC NO: [id number]                       ROOM: 528  ACCOUNT NO: [id number]                       ADMIT DATE: 2024  PROVIDER: Naveen Covington MD    DATE OF SERVICE:  2024    PREOPERATIVE DIAGNOSES:       1. Lumbar stenosis, L4-5 and L5-S1.     2. Lumbar spondylolisthesis, L4-5.     3. Lumbar degenerative disk disease, L5-S1.    POSTOPERATIVE DIAGNOSES:       1. Lumbar stenosis, L4-5 and L5-S1.     2. Lumbar spondylolisthesis, L4-5.     3. Lumbar degenerative disk disease, L5-S1.    PROCEDURES PERFORMED:  Lumbar laminectomy with facetectomy bilaterally at L4-5, L5-S1 with decompression of the L4, L5, and S1 nerve roots.  Transforaminal interbody fusion at L4-5.  Posterior lumbar fusion at L4 to S1 with segmental instrumentation.  Bone marrow aspirate through a separate skin incision for bone grafting and robotic guidance for placement of pedicle screw instrumentation.    SURGEON:  Naveen Covington MD    ASSISTANT:  Isaac Wilde PA-C.    ANESTHESIA:  General anesthesia.    ESTIMATED BLOOD LOSS:  Minimal.    SPECIMENS REMOVED:  None.    INTRAOPERATIVE FINDINGS:  Stenosis and spondylolisthesis     COMPLICATIONS:  None    IMPLANTS:  Medtronic ModuLeX pedicle screws and Medtronic Catalyft interbody graft.    INDICATIONS:  A very pleasant 43-year-old female with chronic lower back pain as well as a prior right sided L5-S1 diskectomy and now severe spondylosis at L5-S1 with loss of disk space height in the interforaminal area at both L4-5 and L5-S1.  Grade 1 anterolisthesis at L4-5.  She had failed conservative treatment.  Understood the risks and benefits and elected to proceed.    DESCRIPTION OF PROCEDURE:  The patient was identified, brought to the operative suite, underwent general anesthesia without

## 2024-03-22 LAB
GLUCOSE BLD STRIP.AUTO-MCNC: 128 MG/DL (ref 65–117)
GLUCOSE BLD STRIP.AUTO-MCNC: 146 MG/DL (ref 65–117)
GLUCOSE BLD STRIP.AUTO-MCNC: 167 MG/DL (ref 65–117)
GLUCOSE BLD STRIP.AUTO-MCNC: 169 MG/DL (ref 65–117)
HCT VFR BLD AUTO: 31.4 % (ref 35–47)
HGB BLD-MCNC: 10.2 G/DL (ref 11.5–16)
SERVICE CMNT-IMP: ABNORMAL

## 2024-03-22 PROCEDURE — 97530 THERAPEUTIC ACTIVITIES: CPT

## 2024-03-22 PROCEDURE — 6370000000 HC RX 637 (ALT 250 FOR IP): Performed by: STUDENT IN AN ORGANIZED HEALTH CARE EDUCATION/TRAINING PROGRAM

## 2024-03-22 PROCEDURE — 6370000000 HC RX 637 (ALT 250 FOR IP): Performed by: PHYSICIAN ASSISTANT

## 2024-03-22 PROCEDURE — A4216 STERILE WATER/SALINE, 10 ML: HCPCS | Performed by: PHYSICIAN ASSISTANT

## 2024-03-22 PROCEDURE — 36415 COLL VENOUS BLD VENIPUNCTURE: CPT

## 2024-03-22 PROCEDURE — 1100000000 HC RM PRIVATE

## 2024-03-22 PROCEDURE — 82962 GLUCOSE BLOOD TEST: CPT

## 2024-03-22 PROCEDURE — 85018 HEMOGLOBIN: CPT

## 2024-03-22 PROCEDURE — 97116 GAIT TRAINING THERAPY: CPT

## 2024-03-22 PROCEDURE — 85014 HEMATOCRIT: CPT

## 2024-03-22 PROCEDURE — 2580000003 HC RX 258: Performed by: PHYSICIAN ASSISTANT

## 2024-03-22 PROCEDURE — 2500000003 HC RX 250 WO HCPCS: Performed by: PHYSICIAN ASSISTANT

## 2024-03-22 RX ORDER — OXYCODONE HYDROCHLORIDE 5 MG/1
5 TABLET ORAL EVERY 4 HOURS PRN
Qty: 40 TABLET | Refills: 0 | Status: SHIPPED | OUTPATIENT
Start: 2024-03-22 | End: 2024-03-29

## 2024-03-22 RX ORDER — SENNA AND DOCUSATE SODIUM 50; 8.6 MG/1; MG/1
1 TABLET, FILM COATED ORAL 2 TIMES DAILY
Qty: 60 TABLET | Refills: 0 | Status: SHIPPED | OUTPATIENT
Start: 2024-03-22

## 2024-03-22 RX ADMIN — OXYCODONE 10 MG: 5 TABLET ORAL at 14:40

## 2024-03-22 RX ADMIN — GABAPENTIN 400 MG: 400 CAPSULE ORAL at 21:18

## 2024-03-22 RX ADMIN — GABAPENTIN 400 MG: 400 CAPSULE ORAL at 09:59

## 2024-03-22 RX ADMIN — FENOFIBRATE 160 MG: 160 TABLET ORAL at 09:59

## 2024-03-22 RX ADMIN — OXYCODONE 10 MG: 5 TABLET ORAL at 22:37

## 2024-03-22 RX ADMIN — SODIUM CHLORIDE, PRESERVATIVE FREE 10 ML: 5 INJECTION INTRAVENOUS at 10:01

## 2024-03-22 RX ADMIN — SODIUM CHLORIDE, PRESERVATIVE FREE 10 ML: 5 INJECTION INTRAVENOUS at 21:18

## 2024-03-22 RX ADMIN — DOCUSATE SODIUM 50MG AND SENNOSIDES 8.6MG 1 TABLET: 8.6; 5 TABLET, FILM COATED ORAL at 10:00

## 2024-03-22 RX ADMIN — PANTOPRAZOLE SODIUM 40 MG: 40 TABLET, DELAYED RELEASE ORAL at 06:17

## 2024-03-22 RX ADMIN — SODIUM CHLORIDE: 9 INJECTION, SOLUTION INTRAVENOUS at 06:45

## 2024-03-22 RX ADMIN — ATORVASTATIN CALCIUM 10 MG: 10 TABLET, FILM COATED ORAL at 21:18

## 2024-03-22 RX ADMIN — FAMOTIDINE 20 MG: 10 INJECTION, SOLUTION INTRAVENOUS at 10:00

## 2024-03-22 RX ADMIN — ACETAMINOPHEN 650 MG: 325 TABLET ORAL at 10:00

## 2024-03-22 RX ADMIN — OXYCODONE 10 MG: 5 TABLET ORAL at 06:17

## 2024-03-22 RX ADMIN — FAMOTIDINE 20 MG: 20 TABLET ORAL at 21:18

## 2024-03-22 RX ADMIN — PHENOL 1 SPRAY: 1.4 SPRAY ORAL at 07:04

## 2024-03-22 RX ADMIN — NORTRIPTYLINE HYDROCHLORIDE 75 MG: 25 CAPSULE ORAL at 21:18

## 2024-03-22 RX ADMIN — ACETAMINOPHEN 650 MG: 325 TABLET ORAL at 02:15

## 2024-03-22 RX ADMIN — SODIUM CHLORIDE: 9 INJECTION, SOLUTION INTRAVENOUS at 21:14

## 2024-03-22 RX ADMIN — OXYCODONE 10 MG: 5 TABLET ORAL at 02:16

## 2024-03-22 RX ADMIN — CYANOCOBALAMIN TAB 500 MCG 500 MCG: 500 TAB at 09:59

## 2024-03-22 RX ADMIN — OXYCODONE 10 MG: 5 TABLET ORAL at 18:46

## 2024-03-22 RX ADMIN — CETIRIZINE HYDROCHLORIDE 10 MG: 10 TABLET, FILM COATED ORAL at 10:01

## 2024-03-22 RX ADMIN — ACETAMINOPHEN 650 MG: 325 TABLET ORAL at 21:18

## 2024-03-22 RX ADMIN — GABAPENTIN 400 MG: 400 CAPSULE ORAL at 13:21

## 2024-03-22 RX ADMIN — ACETAMINOPHEN 650 MG: 325 TABLET ORAL at 14:40

## 2024-03-22 RX ADMIN — DOCUSATE SODIUM 50MG AND SENNOSIDES 8.6MG 1 TABLET: 8.6; 5 TABLET, FILM COATED ORAL at 21:18

## 2024-03-22 RX ADMIN — OXYCODONE 10 MG: 5 TABLET ORAL at 10:18

## 2024-03-22 ASSESSMENT — PAIN DESCRIPTION - LOCATION
LOCATION: BACK

## 2024-03-22 ASSESSMENT — PAIN SCALES - GENERAL
PAINLEVEL_OUTOF10: 7
PAINLEVEL_OUTOF10: 8
PAINLEVEL_OUTOF10: 7
PAINLEVEL_OUTOF10: 8
PAINLEVEL_OUTOF10: 7
PAINLEVEL_OUTOF10: 8
PAINLEVEL_OUTOF10: 2
PAINLEVEL_OUTOF10: 2
PAINLEVEL_OUTOF10: 7

## 2024-03-22 ASSESSMENT — PAIN DESCRIPTION - DESCRIPTORS
DESCRIPTORS: ACHING
DESCRIPTORS: DISCOMFORT;ACHING
DESCRIPTORS: ACHING
DESCRIPTORS: ACHING

## 2024-03-22 ASSESSMENT — PAIN DESCRIPTION - ORIENTATION
ORIENTATION: MID
ORIENTATION: LOWER;MID
ORIENTATION: MID
ORIENTATION: MID

## 2024-03-22 ASSESSMENT — PAIN - FUNCTIONAL ASSESSMENT
PAIN_FUNCTIONAL_ASSESSMENT: ACTIVITIES ARE NOT PREVENTED
PAIN_FUNCTIONAL_ASSESSMENT: PREVENTS OR INTERFERES SOME ACTIVE ACTIVITIES AND ADLS

## 2024-03-22 NOTE — CARE COORDINATION
Transition of Care Plan:    RUR: 6%   Prior Level of Functioning: Independent   Preferred Pharmacy:  [facility] [place]   Disposition: Home with Family Assistance   This cm acknowledges that Therapy is recommending HH services vs. None  The pt reported that she is not interested in the service at this time as the pt will have the support of her daughter and Significant other.   Follow up appointments: PCP   DME needed: None   Transportation at discharge: Daughter   IM/IMM Medicare/ letter given: N/A   Caregiver Contact: Nam Barrera (Domestic Partner) [phone]   Discharge Caregiver contacted prior to discharge? N/A   Care Conference needed? No   Barriers to discharge: Medical, PT Clearance, Drain      03/22/24 1490   Service Assessment   Patient Orientation Alert and Oriented   Cognition Alert   History Provided By Patient   Primary Caregiver Self   Support Systems Spouse/Significant Other;Children   PCP Verified by CM Yes   Last Visit to PCP Within last 3 months   Prior Functional Level Independent in ADLs/IADLs   Current Functional Level Independent in ADLs/IADLs   Can patient return to prior living arrangement Yes   Ability to make needs known: Good   Family able to assist with home care needs: Yes   Financial Resources Medicaid   Social/Functional History   Lives With Spouse   Type of Home House   Home Layout One level   Home Access Stairs to enter with rails   Entrance Stairs - Number of Steps 3   Entrance Stairs - Rails Right   Bathroom Shower/Tub Tub/Shower unit   Bathroom Toilet Standard   Receives Help From Family   ADL Assistance Independent   Homemaking Assistance Independent   Ambulation Assistance Independent   Transfer Assistance Independent   Active  Yes   Mode of Transportation Car   Discharge Planning   Type of Residence House   Living Arrangements Spouse/Significant Other   Current Services Prior To Admission None   Potential Assistance Needed N/A   Potential Assistance Purchasing

## 2024-03-23 LAB
GLUCOSE BLD STRIP.AUTO-MCNC: 130 MG/DL (ref 65–117)
GLUCOSE BLD STRIP.AUTO-MCNC: 132 MG/DL (ref 65–117)
GLUCOSE BLD STRIP.AUTO-MCNC: 146 MG/DL (ref 65–117)
GLUCOSE BLD STRIP.AUTO-MCNC: 161 MG/DL (ref 65–117)
HCT VFR BLD AUTO: 30.9 % (ref 35–47)
HGB BLD-MCNC: 10.2 G/DL (ref 11.5–16)
SERVICE CMNT-IMP: ABNORMAL

## 2024-03-23 PROCEDURE — 82962 GLUCOSE BLOOD TEST: CPT

## 2024-03-23 PROCEDURE — 2580000003 HC RX 258: Performed by: PHYSICIAN ASSISTANT

## 2024-03-23 PROCEDURE — 85018 HEMOGLOBIN: CPT

## 2024-03-23 PROCEDURE — 6370000000 HC RX 637 (ALT 250 FOR IP): Performed by: STUDENT IN AN ORGANIZED HEALTH CARE EDUCATION/TRAINING PROGRAM

## 2024-03-23 PROCEDURE — 6370000000 HC RX 637 (ALT 250 FOR IP)

## 2024-03-23 PROCEDURE — 1100000000 HC RM PRIVATE

## 2024-03-23 PROCEDURE — 85014 HEMATOCRIT: CPT

## 2024-03-23 PROCEDURE — 36415 COLL VENOUS BLD VENIPUNCTURE: CPT

## 2024-03-23 PROCEDURE — 6370000000 HC RX 637 (ALT 250 FOR IP): Performed by: ORTHOPAEDIC SURGERY

## 2024-03-23 PROCEDURE — 6370000000 HC RX 637 (ALT 250 FOR IP): Performed by: PHYSICIAN ASSISTANT

## 2024-03-23 RX ORDER — SUMATRIPTAN 6 MG/.5ML
6 INJECTION, SOLUTION SUBCUTANEOUS
Status: DISPENSED | OUTPATIENT
Start: 2024-03-23 | End: 2024-03-24

## 2024-03-23 RX ORDER — BUTALBITAL, ACETAMINOPHEN AND CAFFEINE 50; 325; 40 MG/1; MG/1; MG/1
1 TABLET ORAL EVERY 4 HOURS PRN
Status: DISCONTINUED | OUTPATIENT
Start: 2024-03-23 | End: 2024-03-25 | Stop reason: HOSPADM

## 2024-03-23 RX ORDER — SUMATRIPTAN 25 MG/1
25 TABLET, FILM COATED ORAL ONCE
Status: COMPLETED | OUTPATIENT
Start: 2024-03-23 | End: 2024-03-23

## 2024-03-23 RX ADMIN — OXYCODONE 5 MG: 5 TABLET ORAL at 14:29

## 2024-03-23 RX ADMIN — ACETAMINOPHEN 650 MG: 325 TABLET ORAL at 10:32

## 2024-03-23 RX ADMIN — ACETAMINOPHEN 650 MG: 325 TABLET ORAL at 13:38

## 2024-03-23 RX ADMIN — LISINOPRIL 10 MG: 10 TABLET ORAL at 10:32

## 2024-03-23 RX ADMIN — SODIUM CHLORIDE, PRESERVATIVE FREE 10 ML: 5 INJECTION INTRAVENOUS at 10:35

## 2024-03-23 RX ADMIN — SUMATRIPTAN SUCCINATE 25 MG: 25 TABLET ORAL at 04:05

## 2024-03-23 RX ADMIN — ACETAMINOPHEN 650 MG: 325 TABLET ORAL at 02:36

## 2024-03-23 RX ADMIN — NORTRIPTYLINE HYDROCHLORIDE 75 MG: 25 CAPSULE ORAL at 21:19

## 2024-03-23 RX ADMIN — PANTOPRAZOLE SODIUM 40 MG: 40 TABLET, DELAYED RELEASE ORAL at 06:33

## 2024-03-23 RX ADMIN — CETIRIZINE HYDROCHLORIDE 10 MG: 10 TABLET, FILM COATED ORAL at 10:32

## 2024-03-23 RX ADMIN — CYCLOBENZAPRINE 10 MG: 10 TABLET, FILM COATED ORAL at 06:39

## 2024-03-23 RX ADMIN — GABAPENTIN 400 MG: 400 CAPSULE ORAL at 13:38

## 2024-03-23 RX ADMIN — BUTALBITAL, ACETAMINOPHEN, AND CAFFEINE 1 TABLET: 50; 325; 40 TABLET ORAL at 13:38

## 2024-03-23 RX ADMIN — ACETAMINOPHEN 650 MG: 325 TABLET ORAL at 21:19

## 2024-03-23 RX ADMIN — DOCUSATE SODIUM 50MG AND SENNOSIDES 8.6MG 1 TABLET: 8.6; 5 TABLET, FILM COATED ORAL at 21:19

## 2024-03-23 RX ADMIN — ATORVASTATIN CALCIUM 10 MG: 10 TABLET, FILM COATED ORAL at 21:19

## 2024-03-23 RX ADMIN — POLYETHYLENE GLYCOL 3350 17 G: 17 POWDER, FOR SOLUTION ORAL at 10:31

## 2024-03-23 RX ADMIN — OXYCODONE 5 MG: 5 TABLET ORAL at 18:28

## 2024-03-23 RX ADMIN — GABAPENTIN 400 MG: 400 CAPSULE ORAL at 10:32

## 2024-03-23 RX ADMIN — GABAPENTIN 400 MG: 400 CAPSULE ORAL at 21:19

## 2024-03-23 RX ADMIN — OXYCODONE 5 MG: 5 TABLET ORAL at 10:32

## 2024-03-23 RX ADMIN — OXYCODONE 10 MG: 5 TABLET ORAL at 02:36

## 2024-03-23 RX ADMIN — FENOFIBRATE 160 MG: 160 TABLET ORAL at 10:31

## 2024-03-23 RX ADMIN — OXYCODONE 10 MG: 5 TABLET ORAL at 06:33

## 2024-03-23 RX ADMIN — SODIUM CHLORIDE, PRESERVATIVE FREE 10 ML: 5 INJECTION INTRAVENOUS at 21:22

## 2024-03-23 RX ADMIN — DOCUSATE SODIUM 50MG AND SENNOSIDES 8.6MG 1 TABLET: 8.6; 5 TABLET, FILM COATED ORAL at 10:31

## 2024-03-23 RX ADMIN — OXYCODONE 5 MG: 5 TABLET ORAL at 22:29

## 2024-03-23 RX ADMIN — FAMOTIDINE 20 MG: 20 TABLET ORAL at 10:32

## 2024-03-23 RX ADMIN — FAMOTIDINE 20 MG: 20 TABLET ORAL at 21:19

## 2024-03-23 RX ADMIN — CYANOCOBALAMIN TAB 500 MCG 500 MCG: 500 TAB at 10:32

## 2024-03-23 ASSESSMENT — PAIN DESCRIPTION - LOCATION
LOCATION: BACK
LOCATION: BACK
LOCATION: LEG
LOCATION: BACK

## 2024-03-23 ASSESSMENT — PAIN SCALES - GENERAL
PAINLEVEL_OUTOF10: 5
PAINLEVEL_OUTOF10: 1
PAINLEVEL_OUTOF10: 5
PAINLEVEL_OUTOF10: 3
PAINLEVEL_OUTOF10: 2
PAINLEVEL_OUTOF10: 8
PAINLEVEL_OUTOF10: 7

## 2024-03-23 ASSESSMENT — PAIN DESCRIPTION - ORIENTATION: ORIENTATION: LEFT

## 2024-03-23 ASSESSMENT — PAIN DESCRIPTION - DESCRIPTORS
DESCRIPTORS: ACHING

## 2024-03-24 ENCOUNTER — APPOINTMENT (OUTPATIENT)
Facility: HOSPITAL | Age: 44
DRG: 304 | End: 2024-03-24
Attending: ORTHOPAEDIC SURGERY
Payer: MEDICAID

## 2024-03-24 LAB
GLUCOSE BLD STRIP.AUTO-MCNC: 108 MG/DL (ref 65–117)
GLUCOSE BLD STRIP.AUTO-MCNC: 132 MG/DL (ref 65–117)
GLUCOSE BLD STRIP.AUTO-MCNC: 145 MG/DL (ref 65–117)
GLUCOSE BLD STRIP.AUTO-MCNC: 91 MG/DL (ref 65–117)
SERVICE CMNT-IMP: ABNORMAL
SERVICE CMNT-IMP: ABNORMAL
SERVICE CMNT-IMP: NORMAL
SERVICE CMNT-IMP: NORMAL

## 2024-03-24 PROCEDURE — 99222 1ST HOSP IP/OBS MODERATE 55: CPT | Performed by: PSYCHIATRY & NEUROLOGY

## 2024-03-24 PROCEDURE — 1100000000 HC RM PRIVATE

## 2024-03-24 PROCEDURE — 6360000004 HC RX CONTRAST MEDICATION: Performed by: HOSPITALIST

## 2024-03-24 PROCEDURE — A9579 GAD-BASE MR CONTRAST NOS,1ML: HCPCS | Performed by: HOSPITALIST

## 2024-03-24 PROCEDURE — 6370000000 HC RX 637 (ALT 250 FOR IP): Performed by: STUDENT IN AN ORGANIZED HEALTH CARE EDUCATION/TRAINING PROGRAM

## 2024-03-24 PROCEDURE — 70553 MRI BRAIN STEM W/O & W/DYE: CPT

## 2024-03-24 PROCEDURE — 6370000000 HC RX 637 (ALT 250 FOR IP): Performed by: PHYSICIAN ASSISTANT

## 2024-03-24 PROCEDURE — 82962 GLUCOSE BLOOD TEST: CPT

## 2024-03-24 PROCEDURE — 6370000000 HC RX 637 (ALT 250 FOR IP): Performed by: ORTHOPAEDIC SURGERY

## 2024-03-24 PROCEDURE — 2580000003 HC RX 258: Performed by: PHYSICIAN ASSISTANT

## 2024-03-24 PROCEDURE — 72148 MRI LUMBAR SPINE W/O DYE: CPT

## 2024-03-24 RX ADMIN — POLYETHYLENE GLYCOL 3350 17 G: 17 POWDER, FOR SOLUTION ORAL at 10:36

## 2024-03-24 RX ADMIN — GABAPENTIN 400 MG: 400 CAPSULE ORAL at 10:35

## 2024-03-24 RX ADMIN — OXYCODONE 5 MG: 5 TABLET ORAL at 14:31

## 2024-03-24 RX ADMIN — DOCUSATE SODIUM 50MG AND SENNOSIDES 8.6MG 1 TABLET: 8.6; 5 TABLET, FILM COATED ORAL at 20:23

## 2024-03-24 RX ADMIN — BUTALBITAL, ACETAMINOPHEN, AND CAFFEINE 1 TABLET: 50; 325; 40 TABLET ORAL at 14:36

## 2024-03-24 RX ADMIN — GADOTERIDOL 18 ML: 279.3 INJECTION, SOLUTION INTRAVENOUS at 08:26

## 2024-03-24 RX ADMIN — OXYCODONE 5 MG: 5 TABLET ORAL at 06:40

## 2024-03-24 RX ADMIN — ATORVASTATIN CALCIUM 10 MG: 10 TABLET, FILM COATED ORAL at 20:23

## 2024-03-24 RX ADMIN — BUTALBITAL, ACETAMINOPHEN, AND CAFFEINE 1 TABLET: 50; 325; 40 TABLET ORAL at 10:36

## 2024-03-24 RX ADMIN — GABAPENTIN 400 MG: 400 CAPSULE ORAL at 12:58

## 2024-03-24 RX ADMIN — CETIRIZINE HYDROCHLORIDE 10 MG: 10 TABLET, FILM COATED ORAL at 10:35

## 2024-03-24 RX ADMIN — FAMOTIDINE 20 MG: 20 TABLET ORAL at 20:23

## 2024-03-24 RX ADMIN — OXYCODONE 5 MG: 5 TABLET ORAL at 22:22

## 2024-03-24 RX ADMIN — ACETAMINOPHEN 650 MG: 325 TABLET ORAL at 20:23

## 2024-03-24 RX ADMIN — ACETAMINOPHEN 650 MG: 325 TABLET ORAL at 02:33

## 2024-03-24 RX ADMIN — FENOFIBRATE 160 MG: 160 TABLET ORAL at 10:35

## 2024-03-24 RX ADMIN — BUTALBITAL, ACETAMINOPHEN, AND CAFFEINE 1 TABLET: 50; 325; 40 TABLET ORAL at 18:18

## 2024-03-24 RX ADMIN — OXYCODONE 5 MG: 5 TABLET ORAL at 02:33

## 2024-03-24 RX ADMIN — NORTRIPTYLINE HYDROCHLORIDE 75 MG: 25 CAPSULE ORAL at 20:23

## 2024-03-24 RX ADMIN — GABAPENTIN 400 MG: 400 CAPSULE ORAL at 20:23

## 2024-03-24 RX ADMIN — BUTALBITAL, ACETAMINOPHEN, AND CAFFEINE 1 TABLET: 50; 325; 40 TABLET ORAL at 22:22

## 2024-03-24 RX ADMIN — OXYCODONE 5 MG: 5 TABLET ORAL at 18:18

## 2024-03-24 RX ADMIN — BUTALBITAL, ACETAMINOPHEN, AND CAFFEINE 1 TABLET: 50; 325; 40 TABLET ORAL at 05:45

## 2024-03-24 RX ADMIN — CYCLOBENZAPRINE 10 MG: 10 TABLET, FILM COATED ORAL at 18:23

## 2024-03-24 RX ADMIN — OXYCODONE 10 MG: 5 TABLET ORAL at 10:35

## 2024-03-24 RX ADMIN — DOCUSATE SODIUM 50MG AND SENNOSIDES 8.6MG 1 TABLET: 8.6; 5 TABLET, FILM COATED ORAL at 10:35

## 2024-03-24 RX ADMIN — PANTOPRAZOLE SODIUM 40 MG: 40 TABLET, DELAYED RELEASE ORAL at 06:40

## 2024-03-24 RX ADMIN — CYANOCOBALAMIN TAB 500 MCG 500 MCG: 500 TAB at 10:35

## 2024-03-24 RX ADMIN — SODIUM CHLORIDE, PRESERVATIVE FREE 10 ML: 5 INJECTION INTRAVENOUS at 20:24

## 2024-03-24 RX ADMIN — FAMOTIDINE 20 MG: 20 TABLET ORAL at 10:36

## 2024-03-24 ASSESSMENT — PAIN DESCRIPTION - DESCRIPTORS
DESCRIPTORS: ACHING
DESCRIPTORS: ACHING
DESCRIPTORS: THROBBING;HEAVINESS
DESCRIPTORS: ACHING
DESCRIPTORS: ACHING

## 2024-03-24 ASSESSMENT — PAIN SCALES - GENERAL
PAINLEVEL_OUTOF10: 5
PAINLEVEL_OUTOF10: 4
PAINLEVEL_OUTOF10: 8
PAINLEVEL_OUTOF10: 0
PAINLEVEL_OUTOF10: 3
PAINLEVEL_OUTOF10: 3
PAINLEVEL_OUTOF10: 1
PAINLEVEL_OUTOF10: 6
PAINLEVEL_OUTOF10: 5
PAINLEVEL_OUTOF10: 1
PAINLEVEL_OUTOF10: 5
PAINLEVEL_OUTOF10: 5
PAINLEVEL_OUTOF10: 8
PAINLEVEL_OUTOF10: 5

## 2024-03-24 ASSESSMENT — PAIN DESCRIPTION - ORIENTATION
ORIENTATION: POSTERIOR
ORIENTATION: MID
ORIENTATION: ANTERIOR
ORIENTATION: ANTERIOR

## 2024-03-24 ASSESSMENT — PAIN DESCRIPTION - LOCATION
LOCATION: BACK
LOCATION: HEAD
LOCATION: BACK;HEAD
LOCATION: BACK
LOCATION: HEAD

## 2024-03-24 ASSESSMENT — PAIN - FUNCTIONAL ASSESSMENT
PAIN_FUNCTIONAL_ASSESSMENT: ACTIVITIES ARE NOT PREVENTED

## 2024-03-24 NOTE — CONSULTS
Neurology Consult Note     NAME: Laurie Becerra   :  1980   MRN:  [id number]   DATE:  3/24/2024       HPI:  Pt is a 42yo female admitted 3/20/24 for revision laminectomy and posterior fusion at L4-S1. Per notes, pt c/o 8/10 HA yesterday when PT attempted to see her. She was given Imitrex without relief.  Notes mention HA not relieved by supine, other notes mention HA worsened by sitting or standing. Hospitalist obtained MRI of brain which is unremarkable. MRI L-spine with large fluid collection in laminectomy bed extending into the dorsal epidural and intrathecal space, possible post-op seroma/hematoma, abscess, or pseudomeningocele, and this contributes to mass effect on the cauda equina at L3-L4. Severe spinal stenosis seen at L4-L5 and L5-S1.    In review of EMR, pt seen by Dr. Liu 24 for sudden episodes of tachycardia to 160's associated with chest tightness and feeling drained, lasting 1-2 minutes, occurring daily. TTT neg for evidence of POTS. Also c/o bilateral foot burning pain and numbness since 2020, found to have B12 def.  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 On Nortriptyline 50mg qhs. EMG 23 negative for PN, evidence of chronic right L5 radiculopathy, no active denervation.  He increased nortriptyline for MHA prevention and possible small fiber nerve pain. She was also on gabapentin 400mg tid prescribed by PCP.   Pt reports onset of HA yesterday morning, her whole head throbbing while lying in bed, when she sat up she had sharp pains in her had. HA continued throughout the the day yesterday. This morning pain is in back of head. Better than it was yesterday, buts still sharp pain when she gets up. No N/V, +P/P. Her typical migraine pain is throbbing and can be in various locations. +N/V/P/P. No vision changes.

## 2024-03-25 ENCOUNTER — TELEPHONE (OUTPATIENT)
Age: 44
End: 2024-03-25

## 2024-03-25 VITALS
BODY MASS INDEX: 32.36 KG/M2 | RESPIRATION RATE: 12 BRPM | WEIGHT: 194.22 LBS | HEART RATE: 77 BPM | HEIGHT: 65 IN | OXYGEN SATURATION: 98 % | SYSTOLIC BLOOD PRESSURE: 111 MMHG | TEMPERATURE: 98.4 F | DIASTOLIC BLOOD PRESSURE: 68 MMHG

## 2024-03-25 LAB
GLUCOSE BLD STRIP.AUTO-MCNC: 157 MG/DL (ref 65–117)
SERVICE CMNT-IMP: ABNORMAL

## 2024-03-25 PROCEDURE — 6370000000 HC RX 637 (ALT 250 FOR IP): Performed by: STUDENT IN AN ORGANIZED HEALTH CARE EDUCATION/TRAINING PROGRAM

## 2024-03-25 PROCEDURE — 6360000002 HC RX W HCPCS: Performed by: PHYSICIAN ASSISTANT

## 2024-03-25 PROCEDURE — 6370000000 HC RX 637 (ALT 250 FOR IP): Performed by: PHYSICIAN ASSISTANT

## 2024-03-25 PROCEDURE — 82962 GLUCOSE BLOOD TEST: CPT

## 2024-03-25 PROCEDURE — 97116 GAIT TRAINING THERAPY: CPT

## 2024-03-25 RX ORDER — NALOXONE HYDROCHLORIDE 4 MG/.1ML
1 SPRAY NASAL PRN
Qty: 1 EACH | Refills: 0 | Status: SHIPPED | OUTPATIENT
Start: 2024-03-25

## 2024-03-25 RX ADMIN — ERGOCALCIFEROL 50000 UNITS: 1.25 CAPSULE ORAL at 08:14

## 2024-03-25 RX ADMIN — KETOROLAC TROMETHAMINE 30 MG: 30 INJECTION, SOLUTION INTRAMUSCULAR; INTRAVENOUS at 07:08

## 2024-03-25 RX ADMIN — ACETAMINOPHEN 650 MG: 325 TABLET ORAL at 08:14

## 2024-03-25 RX ADMIN — FENOFIBRATE 160 MG: 160 TABLET ORAL at 08:14

## 2024-03-25 RX ADMIN — GABAPENTIN 400 MG: 400 CAPSULE ORAL at 08:14

## 2024-03-25 RX ADMIN — FAMOTIDINE 20 MG: 20 TABLET ORAL at 08:14

## 2024-03-25 RX ADMIN — POLYETHYLENE GLYCOL 3350 17 G: 17 POWDER, FOR SOLUTION ORAL at 08:14

## 2024-03-25 RX ADMIN — LISINOPRIL 10 MG: 10 TABLET ORAL at 08:14

## 2024-03-25 RX ADMIN — CETIRIZINE HYDROCHLORIDE 10 MG: 10 TABLET, FILM COATED ORAL at 08:14

## 2024-03-25 RX ADMIN — PANTOPRAZOLE SODIUM 40 MG: 40 TABLET, DELAYED RELEASE ORAL at 07:08

## 2024-03-25 RX ADMIN — ACETAMINOPHEN 650 MG: 325 TABLET ORAL at 03:57

## 2024-03-25 RX ADMIN — DOCUSATE SODIUM 50MG AND SENNOSIDES 8.6MG 1 TABLET: 8.6; 5 TABLET, FILM COATED ORAL at 08:14

## 2024-03-25 RX ADMIN — CYANOCOBALAMIN TAB 500 MCG 500 MCG: 500 TAB at 08:14

## 2024-03-25 ASSESSMENT — PAIN DESCRIPTION - LOCATION
LOCATION: HEAD
LOCATION: BACK

## 2024-03-25 ASSESSMENT — PAIN SCALES - GENERAL
PAINLEVEL_OUTOF10: 4
PAINLEVEL_OUTOF10: 4
PAINLEVEL_OUTOF10: 5
PAINLEVEL_OUTOF10: 3

## 2024-03-25 ASSESSMENT — PAIN DESCRIPTION - ORIENTATION
ORIENTATION: POSTERIOR
ORIENTATION: OTHER (COMMENT)

## 2024-03-25 ASSESSMENT — PAIN DESCRIPTION - DESCRIPTORS
DESCRIPTORS: ACHING
DESCRIPTORS: ACHING

## 2024-03-25 ASSESSMENT — PAIN - FUNCTIONAL ASSESSMENT
PAIN_FUNCTIONAL_ASSESSMENT: ACTIVITIES ARE NOT PREVENTED
PAIN_FUNCTIONAL_ASSESSMENT: ACTIVITIES ARE NOT PREVENTED

## 2024-03-25 NOTE — PLAN OF CARE
Problem: Occupational Therapy - Adult  Goal: By Discharge: Performs self-care activities at highest level of function for planned discharge setting.  See evaluation for individualized goals.  Description: FUNCTIONAL STATUS PRIOR TO ADMISSION:  Pt was independent and active      , ADL Assistance: Independent,  ,  ,  ,  ,  , Homemaking Assistance: Independent, Ambulation Assistance: Independent,  , Active : Yes            HOME SUPPORT: Patient lived spouse and daughter.     Occupational Therapy Goals  Initiated 3/21/2024    1.  Patient will perform lower body dressing with Modified [place] using within 7 days.  2.  Patient will perform upper body dressing with Modified [place] within 7 days.   3.  Patient will standing ADLs 5 mins at Modified [place] within 7 days.   4.  Patient will don/doff back brace at Modified [place] within 7 days.  5.  Patient will verbalize/demonstrate 3/3 back precautions during ADL tasks without cues within 7 days.    Outcome: Progressing   OCCUPATIONAL THERAPY EVALUATION    Patient: Laurie Becerra (43 y.o. female)  Date: 3/21/2024  Primary Diagnosis: Spinal stenosis, lumbar region, with neurogenic claudication [M48.062]  Spondylolisthesis of lumbar region [M43.16]  Acute back pain with sciatica, left [M54.42]  Lumbar stenosis with neurogenic claudication [M48.062]  Procedure(s) (LRB):  REVISION LAMINECTOMY L4-S1,  POSTERIOR LUMBAR FUSION L4-S1 (TERESA FERNANDEZ-ARM) (E R A S) (N/A) 1 Day Post-Op     Precautions:           Spinal Precautions: No Bending, No Lifting, No Twisting        ASSESSMENT :  The patient is limited by decreased functional mobility, independence in ADLs, activity tolerance, endurance, balance.    Based on the impairments listed above pt presents at [facility] to min assist level with mobility and LB self-care.  Supportive mother present in the room. Reviewed back precautions, hand-out provided.  Pt demonstrated and verbalized understanding.  Feel pt would 
  Problem: Pain  Goal: Verbalizes/displays adequate comfort level or baseline comfort level  3/22/2024 2258 by Sandie Adame RN  Outcome: Progressing  3/22/2024 2256 by Sandie Adame RN  Outcome: Progressing     Problem: Safety - Adult  Goal: Free from fall injury  3/22/2024 2258 by Sandie Adame RN  Outcome: Progressing  3/22/2024 2256 by Sandie Adame RN  Outcome: Progressing     Problem: Discharge Planning  Goal: Discharge to home or other facility with appropriate resources  3/22/2024 2258 by Sandie dAame RN  Outcome: Progressing  3/22/2024 2256 by Sandie Adame RN  Outcome: Progressing     
  Problem: Pain  Goal: Verbalizes/displays adequate comfort level or baseline comfort level  3/25/2024 0939 by Nina James RN  Outcome: Progressing  3/25/2024 0425 by Kavita Winslow RN  Flowsheets (Taken 3/25/2024 0425)  Verbalizes/displays adequate comfort level or baseline comfort level:   Encourage patient to monitor pain and request assistance   Assess pain using appropriate pain scale   Notify Licensed Independent Practitioner if interventions unsuccessful or patient reports new pain   Consider cultural and social influences on pain and pain management   Implement non-pharmacological measures as appropriate and evaluate response   Administer analgesics based on type and severity of pain and evaluate response     Problem: Discharge Planning  Goal: Discharge to home or other facility with appropriate resources  3/25/2024 0425 by Kavita Winslow RN  Flowsheets (Taken 3/25/2024 0425)  Discharge to home or other facility with appropriate resources:   Arrange for interpreters to assist at discharge as needed   Identify discharge learning needs (meds, wound care, etc)   Arrange for needed discharge resources and transportation as appropriate   Identify barriers to discharge with patient and caregiver     Problem: Safety - Adult  Goal: Free from fall injury  3/25/2024 0425 by Kavita Winslow RN  Flowsheets (Taken 3/25/2024 0425)  Free From Fall Injury:   Instruct family/caregiver on patient safety   Based on caregiver fall risk screen, instruct family/caregiver to ask for assistance with transferring infant if caregiver noted to have fall risk factors     
  Problem: Pain  Goal: Verbalizes/displays adequate comfort level or baseline comfort level  Flowsheets (Taken 3/25/2024 0425)  Verbalizes/displays adequate comfort level or baseline comfort level:   Encourage patient to monitor pain and request assistance   Assess pain using appropriate pain scale   Notify Licensed Independent Practitioner if interventions unsuccessful or patient reports new pain   Consider cultural and social influences on pain and pain management   Implement non-pharmacological measures as appropriate and evaluate response   Administer analgesics based on type and severity of pain and evaluate response     Problem: Discharge Planning  Goal: Discharge to home or other facility with appropriate resources  Flowsheets (Taken 3/25/2024 0425)  Discharge to home or other facility with appropriate resources:   Arrange for interpreters to assist at discharge as needed   Identify discharge learning needs (meds, wound care, etc)   Arrange for needed discharge resources and transportation as appropriate   Identify barriers to discharge with patient and caregiver     Problem: Safety - Adult  Goal: Free from fall injury  Flowsheets (Taken 3/25/2024 0425)  Free From Fall Injury:   Instruct family/caregiver on patient safety   Based on caregiver fall risk screen, instruct family/caregiver to ask for assistance with transferring infant if caregiver noted to have fall risk factors     
  Problem: Pain  Goal: Verbalizes/displays adequate comfort level or baseline comfort level  Outcome: Progressing     Problem: Discharge Planning  Goal: Discharge to home or other facility with appropriate resources  Outcome: Progressing     Problem: Safety - Adult  Goal: Free from fall injury  Outcome: Progressing     
  Problem: Pain  Goal: Verbalizes/displays adequate comfort level or baseline comfort level  Outcome: Progressing     Problem: Discharge Planning  Goal: Discharge to home or other facility with appropriate resources  Outcome: Progressing  Flowsheets (Taken 3/20/2024 2120)  Discharge to home or other facility with appropriate resources: Identify barriers to discharge with patient and caregiver     
  Problem: Physical Therapy - Adult  Goal: By Discharge: Performs mobility at highest level of function for planned discharge setting.  See evaluation for individualized goals.  Description: FUNCTIONAL STATUS PRIOR TO ADMISSION: Patient was independent and active without use of DME.    HOME SUPPORT PRIOR TO ADMISSION: The patient lived with significant other and children but did not require assistance.    Physical Therapy Goals  Initiated 3/21/2024  1.  Patient will move from supine to sit and sit to supine, scoot up and down, and roll side to side in bed with modified independence within 4 day(s).    2.  Patient will perform sit to stand with independence within 4 day(s).  3.  Patient will transfer from bed to chair and chair to bed with independence using the least restrictive device within 4 day(s).  4.  Patient will ambulate with independence for 300 feet with the least restrictive device within 4 day(s).   5.  Patient will ascend/descend 4 stairs with 1 handrail(s) with modified independence within 4 day(s).  6. Patient will verbalize and demonstrate understanding of spinal precautions (No bending, lifting greater than 5 lbs, or twisting; log-roll technique; frequent repositioning as instructed) within 4 days.   3/25/2024 1004 by Mary Leone, PT  Outcome: Progressing     Problem: Pain  Goal: Verbalizes/displays adequate comfort level or baseline comfort level  3/25/2024 1059 by Nina James RN  Outcome: HH/HSPC Resolved Met  3/25/2024 0939 by Nina James RN  Outcome: Progressing  3/25/2024 0425 by Kavita Winslow RN  Flowsheets (Taken 3/25/2024 0425)  Verbalizes/displays adequate comfort level or baseline comfort level:   Encourage patient to monitor pain and request assistance   Assess pain using appropriate pain scale   Notify Licensed Independent Practitioner if interventions unsuccessful or patient reports new pain   Consider cultural and social influences on pain and pain management   Implement 
  Problem: Physical Therapy - Adult  Goal: By Discharge: Performs mobility at highest level of function for planned discharge setting.  See evaluation for individualized goals.  Description: FUNCTIONAL STATUS PRIOR TO ADMISSION: Patient was independent and active without use of DME.    HOME SUPPORT PRIOR TO ADMISSION: The patient lived with significant other and children but did not require assistance.    Physical Therapy Goals  Initiated 3/21/2024  1.  Patient will move from supine to sit and sit to supine, scoot up and down, and roll side to side in bed with modified independence within 4 day(s).    2.  Patient will perform sit to stand with independence within 4 day(s).  3.  Patient will transfer from bed to chair and chair to bed with independence using the least restrictive device within 4 day(s).  4.  Patient will ambulate with independence for 300 feet with the least restrictive device within 4 day(s).   5.  Patient will ascend/descend 4 stairs with 1 handrail(s) with modified independence within 4 day(s).  6. Patient will verbalize and demonstrate understanding of spinal precautions (No bending, lifting greater than 5 lbs, or twisting; log-roll technique; frequent repositioning as instructed) within 4 days.   Outcome: Progressing     PHYSICAL THERAPY TREATMENT-AFTERNOON SESSION    Patient: Laurie Becerra (43 y.o. female)  Date: 3/21/2024  Diagnosis: Spinal stenosis, lumbar region, with neurogenic claudication [M48.062]  Spondylolisthesis of lumbar region [M43.16]  Acute back pain with sciatica, left [M54.42]  Lumbar stenosis with neurogenic claudication [M48.062] Lumbar stenosis with neurogenic claudication  Procedure(s) (LRB):  REVISION LAMINECTOMY L4-S1,  POSTERIOR LUMBAR FUSION L4-S1 (MAZOR, O-ARM) (E R A S) (N/A) 1 Day Post-Op  Precautions:           Spinal Precautions: No Bending, No Lifting, No Twisting          ASSESSMENT:  Patient continues to benefit from skilled PT services and is progressing 
  Problem: Physical Therapy - Adult  Goal: By Discharge: Performs mobility at highest level of function for planned discharge setting.  See evaluation for individualized goals.  Description: FUNCTIONAL STATUS PRIOR TO ADMISSION: Patient was independent and active without use of DME.    HOME SUPPORT PRIOR TO ADMISSION: The patient lived with significant other and children but did not require assistance.    Physical Therapy Goals  Initiated 3/21/2024  1.  Patient will move from supine to sit and sit to supine, scoot up and down, and roll side to side in bed with modified independence within 4 day(s).    2.  Patient will perform sit to stand with independence within 4 day(s).  3.  Patient will transfer from bed to chair and chair to bed with independence using the least restrictive device within 4 day(s).  4.  Patient will ambulate with independence for 300 feet with the least restrictive device within 4 day(s).   5.  Patient will ascend/descend 4 stairs with 1 handrail(s) with modified independence within 4 day(s).  6. Patient will verbalize and demonstrate understanding of spinal precautions (No bending, lifting greater than 5 lbs, or twisting; log-roll technique; frequent repositioning as instructed) within 4 days.   Outcome: Progressing   PHYSICAL THERAPY TREATMENT    Patient: Laurie Becerra (43 y.o. female)  Date: 3/25/2024  Diagnosis: Spinal stenosis, lumbar region, with neurogenic claudication [M48.062]  Spondylolisthesis of lumbar region [M43.16]  Acute back pain with sciatica, left [M54.42]  Lumbar stenosis with neurogenic claudication [M48.062] Lumbar stenosis with neurogenic claudication  Procedure(s) (LRB):  REVISION LAMINECTOMY L4-S1,  POSTERIOR LUMBAR FUSION L4-S1 (TERESA FERNANDEZ-ARM) (E R A S) (N/A) 5 Days Post-Op  Precautions:           Spinal Precautions: No Bending, No Lifting, No Twisting     Required Braces or Orthoses  Spinal:  (Quick draw)      ASSESSMENT:  Patient continues to benefit from skilled PT 
Problem: Physical Therapy - Adult  Goal: By Discharge: Performs mobility at highest level of function for planned discharge setting.  See evaluation for individualized goals.  Description: FUNCTIONAL STATUS PRIOR TO ADMISSION: Patient was independent and active without use of DME.    HOME SUPPORT PRIOR TO ADMISSION: The patient lived with significant other and children but did not require assistance.    Physical Therapy Goals  Initiated 3/21/2024  1.  Patient will move from supine to sit and sit to supine, scoot up and down, and roll side to side in bed with modified independence within 4 day(s).    2.  Patient will perform sit to stand with independence within 4 day(s).  3.  Patient will transfer from bed to chair and chair to bed with independence using the least restrictive device within 4 day(s).  4.  Patient will ambulate with independence for 300 feet with the least restrictive device within 4 day(s).   5.  Patient will ascend/descend 4 stairs with 1 handrail(s) with modified independence within 4 day(s).  6. Patient will verbalize and demonstrate understanding of spinal precautions (No bending, lifting greater than 5 lbs, or twisting; log-roll technique; frequent repositioning as instructed) within 4 days.   Outcome: Progressing     PHYSICAL THERAPY TREATMENT    Patient: Laurie Becerra (43 y.o. female)  Date: 3/22/2024  Diagnosis: Spinal stenosis, lumbar region, with neurogenic claudication [M48.062]  Spondylolisthesis of lumbar region [M43.16]  Acute back pain with sciatica, left [M54.42]  Lumbar stenosis with neurogenic claudication [M48.062] Lumbar stenosis with neurogenic claudication  Procedure(s) (LRB):  REVISION LAMINECTOMY L4-S1,  POSTERIOR LUMBAR FUSION L4-S1 (TERESA FRENANDEZ-ARM) (E R A S) (N/A) 2 Days Post-Op  Precautions:           Spinal Precautions: No Bending, No Lifting, No Twisting     Required Braces or Orthoses  Spinal:  (Quick draw)    ASSESSMENT:  Patient continues to benefit from skilled PT 
Hypertension     HX HTN - NO MEDS CURRENTLY(11-30-18)    Migraine     Recurrent umbilical hernia 11/28/2018    Sleep apnea-like behavior 2019    NEG SLEEP STUDY    SVT (supraventricular tachycardia)      Past Surgical History:   Procedure Laterality Date    APPENDECTOMY  2/2008    CARDIAC SURGERY N/A 11/20/2020    Ablation Svt performed by Dayne Peralta MD at [facility] CARDIAC CATH LAB    CHOLECYSTECTOMY  2001    COLONOSCOPY      COMPRE ELECTROPHYSIOL XM W/LEFT ATRIAL PACNG/REC N/A 11/20/2020    Lt Atrial Pace & Record During Ep Study performed by Dayne Peralta MD at [facility] CARDIAC CATH LAB    COMPRE ELECTROPHYSIOL XM W/LEFT ATRIAL PACNG/REC N/A 3/16/2021    Lt Atrial Pace & Record During Ep Study performed by Dayne Peralta MD at [facility] CARDIAC CATH LAB    COMPRE ELECTROPHYSIOLOGIC ARRHYTHMIA INDUCTION N/A 11/20/2020    EP STUDY COMPLETE performed by Dayne Peralta MD at [facility] CARDIAC CATH LAB    COMPRE ELECTROPHYSIOLOGIC ARRHYTHMIA INDUCTION N/A 3/16/2021    Ep Study Complete performed by Dayne Peralta MD at [facility] CARDIAC CATH LAB    DILATION AND CURETTAGE OF UTERUS      GYN  3/2008    tubal ligation    HERNIA REPAIR  12/06/2018    open recurrent umbilical hernia repair    HERNIA REPAIR  2/2009    HYSTERECTOMY (CERVIX STATUS UNKNOWN)  03/2012    INTRACARDIAC ELECTROPHYSIOLOGIC 3D MAPPING N/A 11/20/2020    Ep 3d Mapping performed by Dayne Peralta MD at [facility] CARDIAC CATH LAB    INTRACARDIAC ELECTROPHYSIOLOGIC 3D MAPPING N/A 3/16/2021    Ep 3d Mapping performed by Dayne Peralta MD at [facility] CARDIAC CATH LAB    LUMBAR DISCECTOMY      2006    SALPINGO-OOPHORECTOMY Left     STIM/PACING HEART POST IV DRUG INFU N/A 3/16/2021    Drug Stimulation performed by Dayne Peralta MD at [facility] CARDIAC CATH LAB    UPPER GI ENDOSCOPY,BIOPSY  5/11/2017            Home Situation:  Social/Functional History  Lives With: Spouse  Type of Home: House  Home Layout: One level  Home Access: Stairs to enter with rails  Entrance Stairs - Number of Steps: 
swing;Decreased step clearance  Rail Use: Right  Stairs - Level of Assistance: Stand-by assistance (step-to)  Number of Stairs Trained: 6            Pain Rating:  Post-op pain, did not significantly impact    Pain Intervention(s):   rest    Activity Tolerance:   Good    After treatment:   Patient left in no apparent distress sitting up in chair and Call bell within reach    COMMUNICATION/EDUCATION:   The patient's plan of care was discussed with: occupational therapist, registered nurse, and     Patient Education  Education Given To: Patient;Family  Education Provided: Role of Therapy;Precautions;Family Education;Fall Prevention Strategies  Education Method: Verbal;Printed Information/Hand-outs  Barriers to Learning: None  Education Outcome: Verbalized understanding;Continued education needed      Bruna Dent, PT  Minutes: 17

## 2024-03-25 NOTE — DISCHARGE SUMMARY
Procedure(s):  REVISION LAMINECTOMY L4-S1,  POSTERIOR LUMBAR FUSION L4-S1 (JIM, O-ARM) (E R A S) Operative Report      Date of Surgery: [unfilled]     Preoperative Diagnosis:  Spinal stenosis, lumbar region, with neurogenic claudication [M48.062]  Spondylolisthesis of lumbar region [M43.16]  Acute back pain with sciatica, left [M54.42]    Postoperative Diagnosis: * No post-op diagnosis entered *    Procedure: Procedure(s):  REVISION LAMINECTOMY L4-S1,  POSTERIOR LUMBAR FUSION L4-S1 (JIM, O-ARM) (E R A S)     Surgeon: Dr. Naveen Covington    History and Hospital Course:  Laurie Becerra is a pleasant 43 y.o. year old female who has complaints of back pain.  Diagnostic testing found stenosis.  Having failed conservative treatment, the patient elected to undergo operative intervention.  She tolerated the procedure well and was admitted post-operatively for antibiotics and pain control.     On post-op day 1, the patient was doing well.  She had some complaints of lower back pain. Her diet was advanced as tolerated.  She was allowed to begin working with physical therapy.  PCA was discontinued and the patient was started on oral pain medications.  IV antibiotics were continued.      On post-op day 2, the patient continued to progress well. Pain remained controlled on oral pain medications.   She continued to work well with physical therapy.    On post-op day 3 and 4, the patient developed severe headache. MRI of lumbar spine and brain were obtained to rule out dural leak. Findings showed expected post operative fluid collection in lumbar spine. Neurology was consulted for recommendations regarding headache management.    On post-op day 5, the patient was deemed ready for discharge. She denied any headches  She was discharged to home.  She was tolerating a regular diet and pain was well controlled with oral pain medications.  The patient was discharged with prescriptions for pain medications.  She was instructed to wear

## 2024-03-25 NOTE — TELEPHONE ENCOUNTER
Care Transitions Initial Follow Up Call    Outreach made within 2 business days of discharge: Yes    Patient: Laurie Becerra Patient : 1980   MRN: [id number]  Reason for Admission: There are no discharge diagnoses documented for the most recent discharge.  Discharge Date: 3/25/24       Spoke with: patient    Discharge department/facility: [facility] Interactive Patient Contact:  Was patient able to fill all prescriptions: Yes  Was patient instructed to bring all medications to the follow-up visit: Yes  Is patient taking all medications as directed in the discharge summary? Yes  Does patient understand their discharge instructions: Yes  Does patient have questions or concerns that need addressed prior to 7-14 day follow up office visit: no    Scheduled appointment with PCP within 7-14 days    Follow Up  Future Appointments   Date Time Provider Department Center   2024 10:40 AM Isaac Wilde PA-C TOSP BS [facility]   2024  3:30 PM Naveen Covington MD TOSM BS [facility]   2024 10:40 AM Eddie Liu MD [id number] BS [facility]       JOSE MCCARTHY MA

## 2024-03-25 NOTE — ANESTHESIA POSTPROCEDURE EVALUATION
Department of Anesthesiology  Postprocedure Note    Patient: Laurie Becerra  MRN: [id number]  YOB: 1980  Date of evaluation: 3/25/2024    Procedure Summary       Date: 03/20/24 Room / Location: [facility] MAIN OR [address] [place] [address] MAIN OR    Anesthesia Start: 1545 Anesthesia Stop: 1845    Procedure: REVISION LAMINECTOMY L4-S1,  POSTERIOR LUMBAR FUSION L4-S1 (MAZOR, O-ARM) (E R A S) (Spine Lumbar) Diagnosis:       Spinal stenosis, lumbar region, with neurogenic claudication      Spondylolisthesis of lumbar region      Acute back pain with sciatica, left      (Spinal stenosis, lumbar region, with neurogenic claudication [M48.062])      (Spondylolisthesis of lumbar region [M43.16])      (Acute back pain with sciatica, left [M54.42])    Providers: Naveen Covington MD Responsible Provider: Son Mace MD    Anesthesia Type: General ASA Status: 2            Anesthesia Type: General    Sincere Phase I: Sincere Score: 10    Sincere Phase II:      Anesthesia Post Evaluation    Patient location during evaluation: PACU  Patient participation: complete - patient participated  Level of consciousness: responsive to verbal stimuli and sleepy but conscious  Pain score: 2  Airway patency: patent  Cardiovascular status: blood pressure returned to baseline  Respiratory status: acceptable  Hydration status: stable  Comments: +Post-Anesthesia Evaluation and Assessment    Patient: Laurie Becerra MRN: [id number]  SSN: [id number]   YOB: 1980  Age: 43 y.o.  Sex: female          Cardiovascular Function/Vital Signs    /75   Pulse 90   Temp 99.1 °F (37.3 °C) (Oral)   Resp 16   Ht 1.651 m (5' 5\")   Wt 88.1 kg (194 lb 3.6 oz)   SpO2 100%   BMI 32.32 kg/m²     Patient is status post Procedure(s):  REVISION LAMINECTOMY L4-S1,  POSTERIOR LUMBAR FUSION L4-S1 (MAZOR, O-ARM) (E R A S).    Nausea/Vomiting: Controlled.    Postoperative hydration reviewed and adequate.    Pain:      Managed.    Neurological Status:       At

## 2024-03-26 RX ORDER — CYCLOBENZAPRINE HCL 10 MG
TABLET ORAL
Qty: 30 TABLET | Refills: 2 | Status: SHIPPED | OUTPATIENT
Start: 2024-03-26

## 2024-03-27 NOTE — PROGRESS NOTES
[facility]  Hospitalist Group                                                                                          Hospitalist Progress Note  ANTOINE Wilkinson - CNP  Answering service: [phone] OR [phone] from in house phone        Date of Service:  3/24/2024  NAME:  Laurie Becerra  :  1980  MRN:  [id number]       Admission Summary:     Laurie Becerra is a 43 y.o. female with pmhx of sinus tachycardia s/p maze procedure, anxiety, DM 2, GERD, and back pain s/p.revision laminectomy L4-S1, posterior lumbar fusion L4-S1 on 3/20.       Patient denies fever chest pain shortness of breath, no n/v/d       Interval history / Subjective:   Patient complaining of severe headache, she suffers from migraine headache but this headache is different.  She has tried Imitrex and Fioricet without relief.  The headache is positional, i.e., worse when sitting and standing, I have her sit by the edge of the bed briefly ,she has to lay back down quickly as the headache got worse.    She also complains of \"haylee horse\" type of pain down the left thigh   Discussed with RN who communicated with Ortho team, they are okay    3/24  MRI done this am, headache improved slightly, denies fever chest pain shortness of breath     Assessment & Plan:     Severe headache, differentials include migraine attack, the headache has positional component, i.e., being worse when sitting or standing, turning for CSF leak.  -As needed Imitrex, Fioricet.  May try migraine cocktail.  -Brain MRI with and without contrast and lumbar MRI without contrast negative for CSF leak  -No HOB restrictions, activities as tolerated  --Orthospine team made aware  .  -neurology consulted for further evaluation of headaches      Back Pain s/p revision laminectomy on 3/20  -managed by primary team  -multimodal pain control  -PT/OT following  -SCDs for DVT prophylaxis  -hemovac in place     HTN  HLD  -continue home fenofibrate, 
        [facility]  H[facility]ist Group                                                                                          Hospitalist Progress Note  Katarina Solis PA-C  Answering service: [phone] OR [phone] from in house phone        Date of Service:  3/22/2024  NAME:  Laurie Becerra  :  1980  MRN:  [id number]       Admission Summary:     Laurie Becerra is a 43 y.o. female with pmhx of sinus tachycardia s/p maze procedure, anxiety, DM 2, GERD, and back pain s/p.revision laminectomy L4-S1, posterior lumbar fusion L4-S1 on 3/20.       Patient denies fever chest pain shortness of breath, no n/v/d       Interval history / Subjective:     Seen this morning. Complains of dull pain to her back, notes her oxycodone is due soon. Pain is tolerable. No other complaints.      Assessment & Plan:        Back Pain s/p revision laminectomy on 3/20  -managed by primary team  -multimodal pain control  -PT/OT following  -SCDs for DVT prophylaxis  -hemovac in place     HTN  HLD  -continue home fenofibrate, atorvastatin, holding lisinopril     History of Asthma  -albuterol prn     GERD  -continue protonix  -tolerating diet     DM 2  -holding ozempic, and metformin  -continue insulin sliding scale, check BS per protocol       Code status: Full code   Prophylaxis: SCDs  Care Plan discussed with:  pt, nurse  Anticipated Disposition: Per primary team   Inpatient  Cardiac monitoring: None     Principal Problem:    Lumbar stenosis with neurogenic claudication  Active Problems:    Spinal stenosis, lumbar region, with neurogenic claudication    Spondylolisthesis of lumbar region    Acute back pain with sciatica, left  Resolved Problems:    * No resolved hospital problems. *         Social Determinants of Health     Tobacco Use: Low Risk  (3/21/2024)    Patient History     Smoking Tobacco Use: Never     Smokeless Tobacco Use: Never     Passive Exposure: Not on file   Alcohol Use: Not on file   Financial Resource 
  Physician Progress Note      PATIENT:               RAJESH SPENCER  [facility] #:                  [id number]  :                       1980  ADMIT DATE:       3/20/2024 2:33 PM  DISCH DATE:        3/25/2024 11:22 AM  RESPONDING  PROVIDER #:        Naveen Covington MD          QUERY TEXT:    Patient was admitted for and underwent lumbar fusion. Neurology noted MRI of   the lumbar spine results showing a fluid collection contributing to mass   effect upon the cauda equina at L3-L4. If possible, please document in   progress notes and discharge summary if you were evaluating and/or treating   any of the following:    The medical record reflects the following:    Risk Factors: 43-year-old female was admitted for spinal stenosis and   underwent spinal fusion/laminectomy procedures  Clinical Indicators:  -- Neurology 3/24: \"MRI L-spine with large fluid collection in laminectomy bed   extending into the dorsal epidural and intrathecal space, possible post-op   seroma/hematoma, abscess, or pseudomeningocele, and this contributes to mass   effect on the cauda equina at L3-L4.\"  -- MRI lumbar spine 3/24 = 1.  Study degraded by motion. 2.  L4-S1 posterior   spinal instrumentation and laminectomies. Large complex fluid collection in   the laminectomy bed extending into the dorsal epidural and intrathecal space;   this could reflect a postoperative seroma/hematoma, abscess, and/or   pseudomeningocele. 3.  Complex fluid collection described above contributes to   mass effect upon the cauda equina at L3-L4, severe spinal stenosis at L4-L5,   and severe spinal stenosis at L5-S1. 4.  No significant foraminal stenosis.  Treatment: Neurology consult, imaging studies, Hospitalist consult, supportive   care, monitoring    Thank-you,  Abdulaziz Brooks RN, CCDS, CRCR  [email or web address]   [email or web address]  [phone]  You can also contact me via [employer].  Options provided:  -- Spinal nerve root 
Occupational Therapy    Completed chart review and nursing cleared for OT.  Received patient in chair following PT and starting lunch.   She denies any difficulty with ADL task (revision) and reports supportive family at home.  She demonstrated brief tailor sitting for distal LB dressing.     Will complete OT orders. Please reconsult if change in function.     Thank you,    Milana Casillas, OT    
Orthopedic Spine Progress Note  Post Op day: 2 Days Post-Op    2024 7:20 AM   Admit Date: 3/20/2024  Procedure: Procedure(s):  REVISION LAMINECTOMY L4-S1,  POSTERIOR LUMBAR FUSION L4-S1 (TERESA FERNANDEZ-ARM) (E R A S)    Subjective:     Laurie Becerra states moderate back pain. Pre op leg pain significantly improved. Tolerating diet. + flatus/voiding. Denies N/V, CP, SOB.    Objective:          Physical Exam:  General:  Alert and oriented. No acute distress.   Heart:  Respirations unlabored.   Abdomen:   Extremities: Soft, non-tender.  No evidence of cyanosis. Pulses palpable in both upper and lower extremities.       Neurologic:  Musculoskeletal:  No new motor deficits. Neurovascular exam within normal limits.  Sensation stable.  Motor: unchanged C5-T1 and L2-S1.   Tory's sign negative in bilateral lower extremities.  Calves soft, nontender upon palpation.  Moves both upper and lower extremities.   Incision: clean, dry, and intact.  No significant erythema or swelling.  No active drainage noted.        Vital Signs:    Blood pressure 115/69, pulse 79, temperature 97.3 °F (36.3 °C), temperature source Oral, resp. rate 20, height 1.651 m (5' 5\"), weight 88.1 kg (194 lb 3.6 oz), SpO2 99 %.  Temp (24hrs), Av.7 °F (36.5 °C), Min:97.3 °F (36.3 °C), Max:97.9 °F (36.6 °C)      LAB:    Recent Labs     24  0228   HCT 31.4*   HGB 10.2*     Lab Results   Component Value Date/Time     2024 03:48 AM    K 4.8 2024 03:48 AM     2024 03:48 AM    CO2 23 2024 03:48 AM    BUN 9 2024 03:48 AM       Intake/Output:  No intake/output data recorded.   1901 -  0700  In: -   Out: 370 [Drains:370]    PT/OT:   Gait:                    Assessment:   Patient is 2 Days Post-Op s/p Procedure(s):  REVISION LAMINECTOMY L4-S1,  POSTERIOR LUMBAR FUSION L4-S1 (TERESA FERNANDEZ-ARM) (E R A S)    Plan:     1.  Continue PT/OT - Mobilize as tolerated, spine precautions.   2.  Continue established 
Orthopedic Spine Progress Note  Post Op day: 3 Days Post-Op    2024 7:34 AM   Admit Date: 3/20/2024  Procedure: Procedure(s):  REVISION LAMINECTOMY L4-S1,  POSTERIOR LUMBAR FUSION L4-S1 (MAZOR, O-ARM) (E R A S)    Subjective:     Laurie Becerra states moderate back pain. Having episodes of \"haylee horse\" in left hip area. Pre op leg pain significantly improved. Tolerating diet. + flatus/voiding. Denies N/V, CP, SOB.    Objective:          Physical Exam:  General:  Alert and oriented. No acute distress.   Heart:  Respirations unlabored.   Abdomen:   Extremities: Soft, non-tender.  No evidence of cyanosis. Pulses palpable in both upper and lower extremities.       Neurologic:  Musculoskeletal:  No new motor deficits. Neurovascular exam within normal limits.  Sensation stable.  Motor: unchanged C5-T1 and L2-S1.   Tory's sign negative in bilateral lower extremities.  Calves soft, nontender upon palpation.  Moves both upper and lower extremities.    Incision: clean, dry, and intact.  No significant erythema or swelling.  No active drainage noted.        Vital Signs:    Blood pressure (!) 148/79, pulse (!) 104, temperature 100 °F (37.8 °C), temperature source Oral, resp. rate 16, height 1.651 m (5' 5\"), weight 88.1 kg (194 lb 3.6 oz), SpO2 95 %.  Temp (24hrs), Av.7 °F (37.1 °C), Min:97.7 °F (36.5 °C), Max:100 °F (37.8 °C)      LAB:    Recent Labs     24  0248   HCT 30.9*   HGB 10.2*     Lab Results   Component Value Date/Time     2024 03:48 AM    K 4.8 2024 03:48 AM     2024 03:48 AM    CO2 23 2024 03:48 AM    BUN 9 2024 03:48 AM       Intake/Output:  Output by Drain (mL) 24 0701 - 24 1900 24 1901 - 24 0700 24 0701 - 24 1900 24 1901 - 24 0700 24 0701 - 24 0735   Patient has no LDAs of requested type attached.       PT/OT:   Gait:                    Assessment:   Patient is 3 Days Post-Op s/p 
Orthopedic Spine Progress Note  Post Op day: 4 Days Post-Op    2024 6:10 AM   Admit Date: 3/20/2024  Procedure: Procedure(s):  REVISION LAMINECTOMY L4-S1,  POSTERIOR LUMBAR FUSION L4-S1 (MAZOR, O-ARM) (E R A S)    Subjective:     Laurie Becerra continues to report headache starting yesterday which continued to worsen. She note a history of migraines that responded well in past to sumatriptan. HA have not improved significantly despite sumatriptan and fiorecet. Her headache did not initially seem positional but when being evaluated by hospitalist team it worsened when she sat up compared to lying down. This AM she notices her HIGUERA is a 3/10 although she does notice increase in her symptoms when getting up to bedside commode. Medicine team has ordered an MRI w/ and without contrast of the brain/lumbar spine to evaluate for dura leak . Tolerating diet. + flatus/voiding. Denies N/V, CP, SOB.    Objective:          Physical Exam:  General:  Alert and oriented. No acute distress.   Heart:  Respirations unlabored.   Abdomen:   Extremities: Soft, non-tender.  No evidence of cyanosis. Pulses palpable in both upper and lower extremities.       Neurologic:  Musculoskeletal:  No new motor deficits. Neurovascular exam within normal limits.  Sensation stable.  Motor: unchanged C5-T1 and L2-S1.   Tory's sign negative in bilateral lower extremities.  Calves soft, nontender upon palpation.  Moves both upper and lower extremities.    Incision: clean, dry, and intact.  No significant erythema or swelling.  No active drainage noted.        Vital Signs:    Blood pressure 120/74, pulse 93, temperature 99.3 °F (37.4 °C), temperature source Oral, resp. rate 15, height 1.651 m (5' 5\"), weight 88.1 kg (194 lb 3.6 oz), SpO2 95 %.  Temp (24hrs), Av.6 °F (37.6 °C), Min:98.8 °F (37.1 °C), Max:100 °F (37.8 °C)      LAB:    Recent Labs     24  0248   HCT 30.9*   HGB 10.2*     Lab Results   Component Value Date/Time     
Orthopedic Spine Progress Note  Post Op day: 5 Days Post-Op    2024 8:49 AM   Admit Date: 3/20/2024  Procedure: Procedure(s):  REVISION LAMINECTOMY L4-S1,  POSTERIOR LUMBAR FUSION L4-S1 (MAZOR, O-ARM) (E R A S)    Subjective:     Laurie Becerra  resting comfortably in bed . Asking if she can be discharged today. Pain well controlled.    Not complaining of significant headache on rounds this morning.   Tolerating diet.  No N/V.    Pain Control:        Objective:          Physical Exam:  General:  Alert and oriented. No acute distress.   Heart:  Respirations unlabored.   Abdomen:   Extremities: Soft, non-tender.  No evidence of cyanosis. Pulses palpable in both upper and lower extremities.       Neurologic:  Musculoskeletal:  No new motor deficits. Neurovascular exam within normal limits.  Sensation stable.  Motor: unchanged C5-T1 and L2-S1.   Tory's sign negative in bilateral lower extremities.  Calves soft, nontender upon palpation and with passive twitch.  Moves both upper and lower extremities.   Incision: clean, dry, and intact.  No significant erythema or swelling.  No active drainage noted.         Vital Signs:    Blood pressure 111/68, pulse 77, temperature 98.4 °F (36.9 °C), temperature source Oral, resp. rate 12, height 1.651 m (5' 5\"), weight 88.1 kg (194 lb 3.6 oz), SpO2 98 %.  Temp (24hrs), Av.7 °F (37.1 °C), Min:98.1 °F (36.7 °C), Max:99.3 °F (37.4 °C)      LAB:    Recent Labs     24  0248   HCT 30.9*   HGB 10.2*     Lab Results   Component Value Date/Time     2024 03:48 AM    K 4.8 2024 03:48 AM     2024 03:48 AM    CO2 23 2024 03:48 AM    BUN 9 2024 03:48 AM       Intake/Output:No intake/output data recorded.   1901 -  0700  In: -   Out: 450 [Urine:450]    PT/OT:   Gait:                    Assessment:   Patient is 5 Days Post-Op s/p Procedure(s):  REVISION LAMINECTOMY L4-S1,  POSTERIOR LUMBAR FUSION L4-S1 (JIM O-ARM) (E R A 
Patient assessed for readiness to ambulate.   Patient ambulated with assistance of 1 nurse. Patient ambulated with gait belt and walker. Patient walked to standard walker . Patient returned safely to bed.     Vitals:    03/20/24 2345   BP: (!) 107/56   Pulse: 87   Resp: 15   Temp: 98.2 °F (36.8 °C)   SpO2: 95%     mo  
Patient attempts to lift head for 30 degrees pain is persistent 6/10, patient is lying flat pain not as bad.    
Patient has now had MRI of brain and lumbar spine. MRI of brain with no CSF leak. Suspect large fluid collection in lumbar region is secondary to normal and expected post operative changes. At this time, patient may begin returning back to her normal activities as she tolerates with spine precautions. Her HOB restrictions may be lifted. It is still unclear what is causing her headaches. She does have a history of migraines although it has been refractory to medications thus far. I suspect her headaches is due to a migraine but given that we have been unable to control the symptoms thus far and it has been holding up her recovery recommend a neurology consult for further evaluation.     Mary Rubin DO  Orthopedic Surgery Fellow    
Patient now reporting that headache does worsen while sitting up; she said, \"It feels like my eyes are going to pop out of my head when I sit up.\" Ortho team notified. Verbal orders given to keep HOB flat until Sunday AM.   
Patient still complaining of on-going, intense headache that started around 1800 on 3/22 and has gotten progressively worse throughout the night and into today. Pt reports she takes Imitrex at home for migraines which typically works well to relieve migraine symptoms but the few doses she's had here haven't relieved the pain at all. Headache not relieved by lying flat/any position changes. Notified on-call. Fioricet order added.      
Physical Therapy    Checked back with RN. She states pt's HA persists and with overall poor presentation. Spoke with pt's mother at bedside (pt resting in right SL)- she states she does not believe this to be a migraine as it is abnormal from her typical presentation. Mother describes pain at back of eyes and around entire head. No numbness/tingling reported from pt's family member, though pt not awoken to ask. Pt's pain meds have been decreased and her migraine meds provided/upped. Per flowsheet, pt with elevated HR, BP, and low grade temp. Her cheeks appear flushed. RN reports MD with no concerns for dura tear at this time. Will follow up tomorrow as appropriate for functional mobility progression (please note, pt cleared therapy for return home yesterday- this is a significant change in status today).     ADDIE RIVAS, PT    
Physical Therapy    Note patient had MRI earlier today with evidence of \"large complex fluid collection at laminectomy bed.\"  In the setting of persistent headache, we will hold PT at this time until clearance by surgery.    Richa Lewis, PT    
Physical Therapy    Pt initially cleared by RN for participation; however, upon arrival pt c/o >8/10 HA supine. Family bedside and reports pt with hx of migraines though takes preventative medication. Spoke with RN to further assess any changes HA with HOB elevation, etc... Will follow up this PM as appropriate with regards to medical POC.    ADDIE RIVAS, PT    
Pt. Discharged with all personal belongings and understanding of discharge paperwork. Pt. Reminded of post-op appointments.   
VTE Prophylaxes - TEDS &/or SCDs   4.  Advance diet  5.  Monitor BP. Hold home BP medications. Asymptomatic but laying in bed.   6.   DC drain when output < 80 cc per 8 hour shift   7.   Discharge pending       Signed By: ERNIE Ulrich    
in the Last Year: No   Interpersonal Safety: Not At Risk (3/20/2024)    Interpersonal Safety ([facility] HRSN)     How often does anyone, including family and friends, physically hurt you?: Never     How often does anyone, including family and friends, scream or curse at you?: Not on file     How often does anyone, including family and friends, insult or talk down to you?: Not on file     How often does anyone, including family and friends, threaten you with harm?: Not on file   Utilities: Not At Risk (3/20/2024)    [facility] Utilities     Threatened with loss of utilities: No       Review of Systems:   Pertinent items are noted in HPI.       Vital Signs:    Last 24hrs VS reviewed since prior progress note. Most recent are:  Vitals:    03/25/24 0745   BP: 111/68   Pulse: 77   Resp: 12   Temp: 98.4 °F (36.9 °C)   SpO2: 98%       No intake or output data in the 24 hours ending 03/25/24 1053       Physical Examination:     I had a face to face encounter with this patient and independently examined them on 3/25/2024 as outlined below:          General : Appears comfortable due to headache.   HEENT: PEERL, EOMI, moist mucus membrane  Neck: supple, no JVD, no meningeal signs  Chest: Clear to auscultation bilaterally   CVS: S1 S2 heard, Capillary refill less than 2 seconds  Abd: soft/ non tender, non distended, BS physiological,   Ext: SCDs in place, no clubbing, no cyanosis, no edema, brisk 2+ DP pulses   Lumbar dressing c/d/i  Neuro/Psych: Uncomfortable due to headache; CN 2-12 grossly intact, sensory grossly within normal limit, Strength 5/5 in all extremities  Skin: warm     Data Review:    Review and/or order of clinical lab test      I have personally and independently reviewed all pertinent labs, diagnostic studies, imaging, and have provided independent interpretation of the same.     Labs:     Recent Labs     03/23/24  0248   HGB 10.2*   HCT 30.9*       No results for input(s): \"NA\", \"K\", \"CL\", \"CO2\", \"BUN\", \"CREA\", \"GLU\", 
or talk down to you?: Not on file     How often does anyone, including family and friends, threaten you with harm?: Not on file   Utilities: Not At Risk (3/20/2024)    [facility] Utilities     Threatened with loss of utilities: No       Review of Systems:   Pertinent items are noted in HPI.       Vital Signs:    Last 24hrs VS reviewed since prior progress note. Most recent are:  Vitals:    03/23/24 1416   BP: 124/60   Pulse: 99   Resp: 14   Temp: 99.7 °F (37.6 °C)   SpO2: 96%       No intake or output data in the 24 hours ending 03/23/24 1456       Physical Examination:     I had a face to face encounter with this patient and independently examined them on 3/23/2024 as outlined below:          General : Appears comfortable due to headache.   HEENT: PEERL, EOMI, moist mucus membrane  Neck: supple, no JVD, no meningeal signs  Chest: Clear to auscultation bilaterally   CVS: S1 S2 heard, Capillary refill less than 2 seconds  Abd: soft/ non tender, non distended, BS physiological,   Ext: SCDs in place, no clubbing, no cyanosis, no edema, brisk 2+ DP pulses   Lumbar dressing c/d/i  Neuro/Psych: Uncomfortable due to headache; CN 2-12 grossly intact, sensory grossly within normal limit, Strength 5/5 in all extremities  Skin: warm     Data Review:    Review and/or order of clinical lab test      I have personally and independently reviewed all pertinent labs, diagnostic studies, imaging, and have provided independent interpretation of the same.     Labs:     Recent Labs     03/22/24  0228 03/23/24  0248   HGB 10.2* 10.2*   HCT 31.4* 30.9*       Recent Labs     03/21/24  0348   *   K 4.8      CO2 23   BUN 9       No results for input(s): \"ALT\", \"TP\", \"ALB\", \"GLOB\", \"GGT\", \"AML\" in the last 72 hours.    Invalid input(s): \"SGOT\", \"GPT\", \"AP\", \"TBIL\", \"TBILI\", \"AMYP\", \"LPSE\", \"HLPSE\"  No results for input(s): \"INR\", \"APTT\" in the last 72 hours.    Invalid input(s): \"PTP\"   No results for input(s): \"TIBC\", \"FERR\" in the last

## 2024-03-29 ENCOUNTER — OFFICE VISIT (OUTPATIENT)
Age: 44
End: 2024-03-29
Payer: MEDICAID

## 2024-03-29 VITALS
BODY MASS INDEX: 31.65 KG/M2 | HEART RATE: 102 BPM | SYSTOLIC BLOOD PRESSURE: 128 MMHG | OXYGEN SATURATION: 99 % | HEIGHT: 65 IN | TEMPERATURE: 97.7 F | WEIGHT: 190 LBS | DIASTOLIC BLOOD PRESSURE: 62 MMHG | RESPIRATION RATE: 16 BRPM

## 2024-03-29 DIAGNOSIS — R25.2 MUSCLE CRAMP: ICD-10-CM

## 2024-03-29 DIAGNOSIS — G62.9 NEUROPATHY: Primary | ICD-10-CM

## 2024-03-29 PROCEDURE — 99214 OFFICE O/P EST MOD 30 MIN: CPT | Performed by: FAMILY MEDICINE

## 2024-03-29 PROCEDURE — 3078F DIAST BP <80 MM HG: CPT | Performed by: FAMILY MEDICINE

## 2024-03-29 PROCEDURE — 3074F SYST BP LT 130 MM HG: CPT | Performed by: FAMILY MEDICINE

## 2024-03-29 NOTE — PROGRESS NOTES
Identified pt with two pt identifiers(name and ).    Chief Complaint   Patient presents with    Follow-Up from Hospital     Pt is here for follow up from hospital for lumbar fusion, patient has been having more headaches         Health Maintenance Due   Topic    Hepatitis B vaccine (1 of 3 - 3-dose series)    DTaP/Tdap/Td vaccine (2 - Td or Tdap)    Diabetic foot exam     COVID-19 Vaccine ( season)    Colorectal Cancer Screen        Wt Readings from Last 3 Encounters:   24 88.1 kg (194 lb 3.6 oz)   24 88.6 kg (195 lb 5.2 oz)   24 87.5 kg (193 lb)     Temp Readings from Last 3 Encounters:   24 98.4 °F (36.9 °C) (Oral)   24 98.3 °F (36.8 °C) (Oral)   24 98.4 °F (36.9 °C) (Temporal)     BP Readings from Last 3 Encounters:   24 111/68   24 123/74   24 118/78     Pulse Readings from Last 3 Encounters:   24 77   24 85   24 (!) 103           Depression Screening:  :         2024     8:25 AM 2024     8:23 AM 2023     9:13 AM 2023     8:50 AM 2023    10:00 AM 2023     9:49 AM 2022     9:00 AM   PHQ-9 Questionaire   Little interest or pleasure in doing things 0 0 0 0 0 0 0   Feeling down, depressed, or hopeless 0 0 0 0 0 0 0   PHQ-9 Total Score 0 0 0 0 0 0 0        Fall Risk Assessment:  :          No data to display                 Abuse Screening:  :          No data to display                 Coordination of Care Questionnaire:  :     \"Have you been to the ER, urgent care clinic since your last visit?  Hospitalized since your last visit?\"    s surgery 2 weeks ago at [facility]     “Have you seen or consulted any other health care providers outside of [facility] since your last visit?”    NO    “Have you had a colorectal cancer screening such as a colonoscopy/FIT/Cologuard?    NO    No colonoscopy on file  No cologuard on file  No FIT/FOBT on file   No flexible sigmoidoscopy on file

## 2024-04-09 DIAGNOSIS — G62.9 NEUROPATHY: ICD-10-CM

## 2024-04-11 DIAGNOSIS — G62.9 NEUROPATHY: ICD-10-CM

## 2024-04-11 NOTE — TELEPHONE ENCOUNTER
gabapentin (NEURONTIN) 400 MG capsule   
metFORMIN (GLUCOPHAGE) 1000 MG tablet [[id number]]    [facility] [address] [place] [address] [address] - P [phone] - F [phone]    
patient

## 2024-04-12 RX ORDER — SIMVASTATIN 20 MG
20 TABLET ORAL NIGHTLY
Qty: 30 TABLET | Refills: 0 | Status: SHIPPED | OUTPATIENT
Start: 2024-04-12

## 2024-04-12 RX ORDER — GABAPENTIN 400 MG/1
CAPSULE ORAL
Qty: 90 CAPSULE | Refills: 2 | Status: SHIPPED | OUTPATIENT
Start: 2024-04-12 | End: 2024-10-07

## 2024-04-12 RX ORDER — GABAPENTIN 400 MG/1
CAPSULE ORAL
Qty: 90 CAPSULE | Refills: 2 | OUTPATIENT
Start: 2024-04-12 | End: 2024-10-05

## 2024-05-02 ENCOUNTER — NURSE ONLY (OUTPATIENT)
Age: 44
End: 2024-05-02
Payer: MEDICAID

## 2024-05-02 ENCOUNTER — OFFICE VISIT (OUTPATIENT)
Age: 44
End: 2024-05-02
Payer: MEDICAID

## 2024-05-02 VITALS
RESPIRATION RATE: 16 BRPM | DIASTOLIC BLOOD PRESSURE: 60 MMHG | OXYGEN SATURATION: 98 % | WEIGHT: 193 LBS | HEIGHT: 65 IN | TEMPERATURE: 98.1 F | BODY MASS INDEX: 32.15 KG/M2 | SYSTOLIC BLOOD PRESSURE: 102 MMHG | HEART RATE: 97 BPM

## 2024-05-02 DIAGNOSIS — E11.9 TYPE 2 DIABETES MELLITUS WITHOUT COMPLICATION, WITH LONG-TERM CURRENT USE OF INSULIN (HCC): ICD-10-CM

## 2024-05-02 DIAGNOSIS — E11.9 TYPE 2 DIABETES MELLITUS WITHOUT COMPLICATION, WITH LONG-TERM CURRENT USE OF INSULIN (HCC): Primary | ICD-10-CM

## 2024-05-02 DIAGNOSIS — J30.5 ALLERGIC RHINITIS DUE TO FOOD: ICD-10-CM

## 2024-05-02 DIAGNOSIS — Z79.4 TYPE 2 DIABETES MELLITUS WITHOUT COMPLICATION, WITH LONG-TERM CURRENT USE OF INSULIN (HCC): Primary | ICD-10-CM

## 2024-05-02 DIAGNOSIS — Z79.4 TYPE 2 DIABETES MELLITUS WITHOUT COMPLICATION, WITH LONG-TERM CURRENT USE OF INSULIN (HCC): ICD-10-CM

## 2024-05-02 PROCEDURE — 99214 OFFICE O/P EST MOD 30 MIN: CPT | Performed by: FAMILY MEDICINE

## 2024-05-02 RX ORDER — HYDROGEN PEROXIDE 2.65 ML/100ML
LIQUID ORAL; TOPICAL
COMMUNITY
Start: 2024-04-19

## 2024-05-02 NOTE — PROGRESS NOTES
Identified pt with two pt identifiers(name and ).    Chief Complaint   Patient presents with    Diabetes     Patient is here for diabetes         Health Maintenance Due   Topic    Hepatitis B vaccine (1 of 3 - 3-dose series)    DTaP/Tdap/Td vaccine (2 - Td or Tdap)    Diabetic foot exam     COVID-19 Vaccine ( -  season)    Colorectal Cancer Screen        Wt Readings from Last 3 Encounters:   24 86.2 kg (190 lb)   24 86.2 kg (190 lb)   24 88.1 kg (194 lb 3.6 oz)     Temp Readings from Last 3 Encounters:   24 97.7 °F (36.5 °C) (Temporal)   24 98.4 °F (36.9 °C) (Oral)   24 98.3 °F (36.8 °C) (Oral)     BP Readings from Last 3 Encounters:   24 128/62   24 111/68   24 123/74     Pulse Readings from Last 3 Encounters:   24 (!) 102   24 77   24 85           Depression Screening:  :         2024     8:25 AM 2024     8:23 AM 2023     9:13 AM 2023     8:50 AM 2023    10:00 AM 2023     9:49 AM 2022     9:00 AM   PHQ-9 Questionaire   Little interest or pleasure in doing things 0 0 0 0 0 0 0   Feeling down, depressed, or hopeless 0 0 0 0 0 0 0   PHQ-9 Total Score 0 0 0 0 0 0 0        Fall Risk Assessment:  :          No data to display                 Abuse Screening:  :          No data to display                 Coordination of Care Questionnaire:  :     \"Have you been to the ER, urgent care clinic since your last visit?  Hospitalized since your last visit?\"    NO    “Have you seen or consulted any other health care providers outside of [facility] since your last visit?”    NO    “Have you had a colorectal cancer screening such as a colonoscopy/FIT/Cologuard?    NO    No colonoscopy on file  No cologuard on file  No FIT/FOBT on file   No flexible sigmoidoscopy on file

## 2024-05-03 ENCOUNTER — TELEPHONE (OUTPATIENT)
Age: 44
End: 2024-05-03

## 2024-05-03 LAB — HBA1C MFR BLD: 6.7 % (ref 4.8–5.6)

## 2024-05-03 RX ORDER — ACYCLOVIR 400 MG/1
TABLET ORAL
COMMUNITY

## 2024-05-03 NOTE — TELEPHONE ENCOUNTER
----- Message from Laurie Becerra sent at 5/3/2024  9:18 AM EDT -----  Regarding: Blood test  Contact: [phone]  Good morning, I was looking on my paper work and seen where colorectal cancer screening is on there saying never done. When do we need to do that blood work? Also forgot to mention to the Dr about trying the dexcom out if my insurance will pay for it! Thank you so much Loti

## 2024-05-04 NOTE — PROGRESS NOTES
SUBJECTIVE:  43 y.o. female for follow up of diabetes. Diabetic Review of Systems - medication compliance: compliant all of the time, diabetic diet compliance: compliant all of the time, home glucose monitoring: is performed regularly.  Other symptoms and concerns: none.    Current Outpatient Medications   Medication Sig Dispense Refill    EQ ASPIRIN ADULT LOW DOSE 81 MG EC tablet TAKE 1 BY MOUTH ONCE DAILY      metFORMIN (GLUCOPHAGE) 1000 MG tablet Take 1 tablet by mouth daily (with breakfast) 60 tablet 5    Continuous Blood Gluc Sensor (FREESTYLE GURPREET 2 SENSOR) [facility] USE 1 SENSOR AND CHANGE EVERY 14 DAYS 2 each 0    simvastatin (ZOCOR) 20 MG tablet Take 1 tablet by mouth nightly 30 tablet 0    gabapentin (NEURONTIN) 400 MG capsule TAKE 1 CAPSULE BY MOUTH THREE TIMES DAILY . DO NOT EXCEED 3 PER 24 HOURS 90 capsule 2    cyclobenzaprine (FLEXERIL) 10 MG tablet TAKE 1 TABLET BY MOUTH THREE TIMES DAILY AS NEEDED FOR MUSCLE SPASM DO  NOT  DRIVE  WHILE  TAKING  THIS  MEDICATION 30 tablet 2    naloxone 4 MG/0.1ML LIQD nasal spray 1 spray by Nasal route as needed for Opioid Reversal 1 each 0    sennosides-docusate sodium (SENOKOT-S) 8.6-50 MG tablet Take 1 tablet by mouth in the morning and 1 tablet in the evening. Take to prevent opioid induced constipation. 60 tablet 0    glipiZIDE (GLUCOTROL XL) 5 MG extended release tablet Take 1 tablet by mouth as needed (PT TAKES IF INSULIN IS @ 200)      nortriptyline (PAMELOR) 75 MG capsule Take 1 capsule by mouth nightly 30 capsule 3    OZEMPIC, 0.25 OR 0.5 MG/DOSE, 2 MG/3ML SOPN INJECT 0.5MG SUBCUTANEOUSLY EVERY 7 DAYS (Patient taking differently: Inject 0.5 mg into the skin once a week TUESDAY) 3 mL 0    famotidine (PEPCID) 40 MG tablet Take 1 tablet by mouth nightly (Patient taking differently: Take 1 tablet by mouth every morning) 90 tablet 1    Continuous Blood Gluc  (FREESTYLE GURPREET 2 READER) ZACK 1 each by Does not apply route daily 1 each 0    cyanocobalamin 500

## 2024-05-05 ENCOUNTER — TELEPHONE (OUTPATIENT)
Age: 44
End: 2024-05-05

## 2024-05-07 LAB
ALPHA-GAL IGE QN: 2.84 KU/L
BEEF IGE QN: 1.5 KU/L
IGE SERPL-ACNC: 27 IU/ML (ref 6–495)
LAMB IGE QN: 1.01 KU/L
Lab: ABNORMAL
PORK IGE QN: 1.11 KU/L

## 2024-05-08 ENCOUNTER — TELEPHONE (OUTPATIENT)
Age: 44
End: 2024-05-08

## 2024-05-08 DIAGNOSIS — E66.9 CLASS 1 OBESITY WITH SERIOUS COMORBIDITY AND BODY MASS INDEX (BMI) OF 33.0 TO 33.9 IN ADULT, UNSPECIFIED OBESITY TYPE: ICD-10-CM

## 2024-05-08 DIAGNOSIS — K21.9 GASTROESOPHAGEAL REFLUX DISEASE WITHOUT ESOPHAGITIS: ICD-10-CM

## 2024-05-08 DIAGNOSIS — E78.5 DYSLIPIDEMIA (HIGH LDL; LOW HDL): ICD-10-CM

## 2024-05-08 DIAGNOSIS — E11.40 TYPE 2 DIABETES MELLITUS WITH DIABETIC NEUROPATHY, WITHOUT LONG-TERM CURRENT USE OF INSULIN (HCC): ICD-10-CM

## 2024-05-08 NOTE — TELEPHONE ENCOUNTER
Contour Test Strips have been rejected and requires a prior authorization    [facility] - [place], [place] - [address] - P [phone] - F [phone]

## 2024-05-12 RX ORDER — FAMOTIDINE 40 MG/1
40 TABLET, FILM COATED ORAL NIGHTLY
Qty: 90 TABLET | Refills: 1 | Status: SHIPPED | OUTPATIENT
Start: 2024-05-12

## 2024-05-13 RX ORDER — SEMAGLUTIDE 0.68 MG/ML
INJECTION, SOLUTION SUBCUTANEOUS
Qty: 3 ML | Refills: 1 | OUTPATIENT
Start: 2024-05-13

## 2024-05-13 RX ORDER — FENOFIBRATE 160 MG/1
160 TABLET ORAL
Qty: 120 TABLET | Refills: 0 | Status: SHIPPED | OUTPATIENT
Start: 2024-05-13

## 2024-05-19 DIAGNOSIS — E66.9 CLASS 1 OBESITY WITH SERIOUS COMORBIDITY AND BODY MASS INDEX (BMI) OF 33.0 TO 33.9 IN ADULT, UNSPECIFIED OBESITY TYPE: ICD-10-CM

## 2024-05-19 DIAGNOSIS — E11.40 TYPE 2 DIABETES MELLITUS WITH DIABETIC NEUROPATHY, WITHOUT LONG-TERM CURRENT USE OF INSULIN (HCC): ICD-10-CM

## 2024-05-19 DIAGNOSIS — Z91.018 ALLERGY TO ALPHA-GAL: ICD-10-CM

## 2024-05-19 DIAGNOSIS — G62.9 NEUROPATHY: ICD-10-CM

## 2024-05-19 RX ORDER — CYCLOBENZAPRINE HCL 10 MG
TABLET ORAL
Qty: 30 TABLET | Refills: 2 | Status: SHIPPED | OUTPATIENT
Start: 2024-05-19

## 2024-05-19 RX ORDER — SEMAGLUTIDE 0.68 MG/ML
INJECTION, SOLUTION SUBCUTANEOUS
Qty: 3 ML | Refills: 0 | OUTPATIENT
Start: 2024-05-19

## 2024-05-19 RX ORDER — GABAPENTIN 400 MG/1
CAPSULE ORAL
Qty: 90 CAPSULE | Refills: 2 | Status: SHIPPED | OUTPATIENT
Start: 2024-05-19 | End: 2024-11-13

## 2024-05-19 RX ORDER — EPINEPHRINE 0.3 MG/.3ML
INJECTION SUBCUTANEOUS
Qty: 2 EACH | Refills: 2 | Status: SHIPPED | OUTPATIENT
Start: 2024-05-19

## 2024-05-19 RX ORDER — NORTRIPTYLINE HYDROCHLORIDE 75 MG/1
75 CAPSULE ORAL NIGHTLY
Qty: 30 CAPSULE | Refills: 3 | Status: SHIPPED | OUTPATIENT
Start: 2024-05-19

## 2024-05-20 DIAGNOSIS — E11.40 TYPE 2 DIABETES MELLITUS WITH DIABETIC NEUROPATHY, WITHOUT LONG-TERM CURRENT USE OF INSULIN (HCC): ICD-10-CM

## 2024-05-20 DIAGNOSIS — E66.9 CLASS 1 OBESITY WITH SERIOUS COMORBIDITY AND BODY MASS INDEX (BMI) OF 33.0 TO 33.9 IN ADULT, UNSPECIFIED OBESITY TYPE: ICD-10-CM

## 2024-05-20 RX ORDER — SEMAGLUTIDE 0.68 MG/ML
0.5 INJECTION, SOLUTION SUBCUTANEOUS WEEKLY
Qty: 3 ML | Refills: 3 | Status: SHIPPED | OUTPATIENT
Start: 2024-05-20

## 2024-05-20 RX ORDER — SEMAGLUTIDE 0.68 MG/ML
INJECTION, SOLUTION SUBCUTANEOUS
Qty: 3 ML | Refills: 0 | OUTPATIENT
Start: 2024-05-20

## 2024-05-20 NOTE — TELEPHONE ENCOUNTER
Pt Requesting Ozempic to go to [facility] [address] [place], [place] - [facility] [address] - P [phone] - F [phone] [[id number]]

## 2024-06-12 DIAGNOSIS — E11.40 TYPE 2 DIABETES MELLITUS WITH DIABETIC NEUROPATHY, WITHOUT LONG-TERM CURRENT USE OF INSULIN (HCC): ICD-10-CM

## 2024-06-12 DIAGNOSIS — E66.9 CLASS 1 OBESITY WITH SERIOUS COMORBIDITY AND BODY MASS INDEX (BMI) OF 33.0 TO 33.9 IN ADULT, UNSPECIFIED OBESITY TYPE: ICD-10-CM

## 2024-06-12 DIAGNOSIS — Z91.09 ENVIRONMENTAL ALLERGIES: ICD-10-CM

## 2024-06-12 RX ORDER — SIMVASTATIN 20 MG
20 TABLET ORAL NIGHTLY
Qty: 30 TABLET | Refills: 0 | Status: SHIPPED | OUTPATIENT
Start: 2024-06-12

## 2024-06-12 NOTE — TELEPHONE ENCOUNTER
levocetirizine (XYZAL ALLERGY 24HR) 5 MG tablet    [facility] [address] [place] [address] [address] - P [phone] - F [phone] [[id number]]

## 2024-06-14 RX ORDER — LEVOCETIRIZINE DIHYDROCHLORIDE 5 MG/1
5 TABLET, FILM COATED ORAL NIGHTLY
Qty: 30 TABLET | Refills: 5 | Status: SHIPPED | OUTPATIENT
Start: 2024-06-14

## 2024-06-26 ENCOUNTER — OFFICE VISIT (OUTPATIENT)
Age: 44
End: 2024-06-26
Payer: MEDICAID

## 2024-06-26 VITALS
HEART RATE: 84 BPM | HEIGHT: 65 IN | OXYGEN SATURATION: 98 % | DIASTOLIC BLOOD PRESSURE: 64 MMHG | BODY MASS INDEX: 32.26 KG/M2 | TEMPERATURE: 97.3 F | WEIGHT: 193.6 LBS | RESPIRATION RATE: 16 BRPM | SYSTOLIC BLOOD PRESSURE: 106 MMHG

## 2024-06-26 DIAGNOSIS — L81.9 DISCOLORATION OF SKIN OF FOOT: Primary | ICD-10-CM

## 2024-06-26 DIAGNOSIS — E66.09 CLASS 1 OBESITY DUE TO EXCESS CALORIES WITHOUT SERIOUS COMORBIDITY WITH BODY MASS INDEX (BMI) OF 32.0 TO 32.9 IN ADULT: ICD-10-CM

## 2024-06-26 DIAGNOSIS — E11.40 TYPE 2 DIABETES MELLITUS WITH DIABETIC NEUROPATHY, WITHOUT LONG-TERM CURRENT USE OF INSULIN (HCC): ICD-10-CM

## 2024-06-26 PROCEDURE — 3078F DIAST BP <80 MM HG: CPT | Performed by: FAMILY MEDICINE

## 2024-06-26 PROCEDURE — 3074F SYST BP LT 130 MM HG: CPT | Performed by: FAMILY MEDICINE

## 2024-06-26 PROCEDURE — 3044F HG A1C LEVEL LT 7.0%: CPT | Performed by: FAMILY MEDICINE

## 2024-06-26 PROCEDURE — 99214 OFFICE O/P EST MOD 30 MIN: CPT | Performed by: FAMILY MEDICINE

## 2024-06-26 SDOH — ECONOMIC STABILITY: FOOD INSECURITY: WITHIN THE PAST 12 MONTHS, YOU WORRIED THAT YOUR FOOD WOULD RUN OUT BEFORE YOU GOT MONEY TO BUY MORE.: NEVER TRUE

## 2024-06-26 SDOH — ECONOMIC STABILITY: FOOD INSECURITY: WITHIN THE PAST 12 MONTHS, THE FOOD YOU BOUGHT JUST DIDN'T LAST AND YOU DIDN'T HAVE MONEY TO GET MORE.: NEVER TRUE

## 2024-06-26 SDOH — ECONOMIC STABILITY: INCOME INSECURITY: HOW HARD IS IT FOR YOU TO PAY FOR THE VERY BASICS LIKE FOOD, HOUSING, MEDICAL CARE, AND HEATING?: NOT HARD AT ALL

## 2024-06-26 ASSESSMENT — PATIENT HEALTH QUESTIONNAIRE - PHQ9
SUM OF ALL RESPONSES TO PHQ QUESTIONS 1-9: 0
1. LITTLE INTEREST OR PLEASURE IN DOING THINGS: NOT AT ALL
SUM OF ALL RESPONSES TO PHQ QUESTIONS 1-9: 0
SUM OF ALL RESPONSES TO PHQ QUESTIONS 1-9: 0
2. FEELING DOWN, DEPRESSED OR HOPELESS: NOT AT ALL
SUM OF ALL RESPONSES TO PHQ9 QUESTIONS 1 & 2: 0
SUM OF ALL RESPONSES TO PHQ QUESTIONS 1-9: 0

## 2024-06-26 NOTE — PROGRESS NOTES
Identified pt with two pt identifiers(name and ).    Chief Complaint   Patient presents with    Foot Pain     Pt complains of her feet turning bluish at the end of the day and are cold. Her neurologist told her to come in to see us. Referral for vascular         Health Maintenance Due   Topic    Hepatitis B vaccine (1 of 3 - 3-dose series)    DTaP/Tdap/Td vaccine (2 - Td or Tdap)    Diabetic foot exam     COVID-19 Vaccine ( season)    Colorectal Cancer Screen     Diabetic Alb to Cr ratio (uACR) test        Wt Readings from Last 3 Encounters:   24 87.8 kg (193 lb 9.6 oz)   24 87.5 kg (193 lb)   24 87.5 kg (193 lb)     Temp Readings from Last 3 Encounters:   24 97.3 °F (36.3 °C) (Temporal)   24 98.1 °F (36.7 °C) (Temporal)   24 97.7 °F (36.5 °C) (Temporal)     BP Readings from Last 3 Encounters:   24 106/64   24 102/60   24 128/62     Pulse Readings from Last 3 Encounters:   24 84   24 97   24 (!) 102           Depression Screening:  :         2024     8:18 AM 2024     8:25 AM 2024     8:23 AM 2023     9:13 AM 2023     8:50 AM 2023    10:00 AM 2023     9:49 AM   PHQ-9 Questionaire   Little interest or pleasure in doing things 0 0 0 0 0 0 0   Feeling down, depressed, or hopeless 0 0 0 0 0 0 0   PHQ-9 Total Score 0 0 0 0 0 0 0        Fall Risk Assessment:  :          No data to display                 Abuse Screening:  :          No data to display                 Coordination of Care Questionnaire:  :     \"Have you been to the ER, urgent care clinic since your last visit?  Hospitalized since your last visit?\"    NO    “Have you seen or consulted any other health care providers outside of [facility] since your last visit?”    NO    “Have you had a colorectal cancer screening such as a colonoscopy/FIT/Cologuard?    NO    No colonoscopy on file  No cologuard on file  No FIT/FOBT on file   No 
or concerns at this time.     Roberto Browning MD, FAAFP

## 2024-06-27 ENCOUNTER — TELEPHONE (OUTPATIENT)
Age: 44
End: 2024-06-27

## 2024-07-17 DIAGNOSIS — E55.9 VITAMIN D DEFICIENCY: ICD-10-CM

## 2024-07-17 RX ORDER — ERGOCALCIFEROL 1.25 MG/1
1 CAPSULE ORAL
Qty: 24 CAPSULE | Refills: 1 | Status: SHIPPED | OUTPATIENT
Start: 2024-07-18 | End: 2024-07-17

## 2024-07-17 RX ORDER — SEMAGLUTIDE 0.68 MG/ML
0.5 INJECTION, SOLUTION SUBCUTANEOUS WEEKLY
Qty: 3 ML | Refills: 3 | OUTPATIENT
Start: 2024-07-17

## 2024-07-24 RX ORDER — ALBUTEROL SULFATE 90 UG/1
2 AEROSOL, METERED RESPIRATORY (INHALATION) EVERY 4 HOURS PRN
Qty: 18 G | Refills: 3 | Status: SHIPPED | OUTPATIENT
Start: 2024-07-24

## 2024-07-24 RX ORDER — SIMVASTATIN 20 MG
20 TABLET ORAL NIGHTLY
Qty: 30 TABLET | Refills: 0 | Status: SHIPPED | OUTPATIENT
Start: 2024-07-24

## 2024-07-25 RX ORDER — SIMVASTATIN 20 MG
20 TABLET ORAL NIGHTLY
Qty: 30 TABLET | Refills: 0 | OUTPATIENT
Start: 2024-07-25

## 2024-07-25 RX ORDER — HYDROGEN PEROXIDE 2.65 ML/100ML
81 LIQUID ORAL; TOPICAL DAILY
Qty: 90 TABLET | Refills: 0 | OUTPATIENT
Start: 2024-07-25

## 2024-07-26 DIAGNOSIS — E11.9 TYPE 2 DIABETES MELLITUS WITHOUT COMPLICATION, WITHOUT LONG-TERM CURRENT USE OF INSULIN (HCC): Primary | ICD-10-CM

## 2024-07-26 DIAGNOSIS — E55.9 VITAMIN D DEFICIENCY: ICD-10-CM

## 2024-07-26 NOTE — TELEPHONE ENCOUNTER
glipiZIDE (GLUCOTROL XL) 5 MG extended release tablet     VITAMIN D2 (ERGO) 50,000IU [place] [address] [address] - P [phone] - F [phone] [[id number]]

## 2024-07-31 RX ORDER — GLIPIZIDE 5 MG/1
5 TABLET, FILM COATED, EXTENDED RELEASE ORAL DAILY
Qty: 30 TABLET | Refills: 3 | Status: SHIPPED | OUTPATIENT
Start: 2024-07-31

## 2024-07-31 RX ORDER — ERGOCALCIFEROL 1.25 MG/1
1 CAPSULE ORAL
Refills: 1 | OUTPATIENT
Start: 2024-08-01

## 2024-07-31 RX ORDER — GLIPIZIDE 5 MG/1
5 TABLET, FILM COATED, EXTENDED RELEASE ORAL AS NEEDED
Qty: 90 TABLET | Refills: 0 | OUTPATIENT
Start: 2024-07-31

## 2024-08-10 ENCOUNTER — APPOINTMENT (OUTPATIENT)
Facility: HOSPITAL | Age: 44
End: 2024-08-10
Payer: MEDICAID

## 2024-08-10 ENCOUNTER — HOSPITAL ENCOUNTER (EMERGENCY)
Facility: HOSPITAL | Age: 44
Discharge: HOME | End: 2024-08-10
Attending: STUDENT IN AN ORGANIZED HEALTH CARE EDUCATION/TRAINING PROGRAM
Payer: MEDICAID

## 2024-08-10 VITALS
DIASTOLIC BLOOD PRESSURE: 63 MMHG | BODY MASS INDEX: 32.49 KG/M2 | SYSTOLIC BLOOD PRESSURE: 126 MMHG | WEIGHT: 195 LBS | HEART RATE: 84 BPM | HEIGHT: 65 IN | TEMPERATURE: 98.2 F | RESPIRATION RATE: 16 BRPM | OXYGEN SATURATION: 97 %

## 2024-08-10 DIAGNOSIS — R10.32 ABDOMINAL PAIN, LEFT LOWER QUADRANT: ICD-10-CM

## 2024-08-10 DIAGNOSIS — R39.15 URINARY URGENCY: Primary | ICD-10-CM

## 2024-08-10 DIAGNOSIS — K42.9 UMBILICAL HERNIA WITHOUT OBSTRUCTION AND WITHOUT GANGRENE: ICD-10-CM

## 2024-08-10 DIAGNOSIS — M54.50 LOW BACK PAIN, UNSPECIFIED BACK PAIN LATERALITY, UNSPECIFIED CHRONICITY, UNSPECIFIED WHETHER SCIATICA PRESENT: ICD-10-CM

## 2024-08-10 DIAGNOSIS — K43.9 ABDOMINAL WALL HERNIA: ICD-10-CM

## 2024-08-10 LAB
ALBUMIN SERPL-MCNC: 3.5 G/DL (ref 3.5–5)
ALBUMIN/GLOB SERPL: 0.9 (ref 1.1–2.2)
ALP SERPL-CCNC: 78 U/L (ref 45–117)
ALT SERPL-CCNC: 27 U/L (ref 12–78)
ANION GAP SERPL CALC-SCNC: 9 MMOL/L (ref 5–15)
APPEARANCE UR: ABNORMAL
AST SERPL-CCNC: 11 U/L (ref 15–37)
BACTERIA URNS QL MICRO: ABNORMAL /HPF
BASOPHILS # BLD: 0.1 K/UL (ref 0–0.1)
BASOPHILS NFR BLD: 1 % (ref 0–1)
BILIRUB SERPL-MCNC: 0.2 MG/DL (ref 0.2–1)
BILIRUB UR QL: NEGATIVE
BUN SERPL-MCNC: 9 MG/DL (ref 6–20)
BUN/CREAT SERPL: 12 (ref 12–20)
CALCIUM SERPL-MCNC: 9.2 MG/DL (ref 8.5–10.1)
CHLORIDE SERPL-SCNC: 107 MMOL/L (ref 97–108)
CO2 SERPL-SCNC: 28 MMOL/L (ref 21–32)
COLOR UR: ABNORMAL
CREAT SERPL-MCNC: 0.75 MG/DL (ref 0.55–1.02)
DIFFERENTIAL METHOD BLD: ABNORMAL
EOSINOPHIL # BLD: 0.2 K/UL (ref 0–0.4)
EOSINOPHIL NFR BLD: 3 % (ref 0–7)
EPITH CASTS URNS QL MICRO: ABNORMAL /LPF
ERYTHROCYTE [DISTWIDTH] IN BLOOD BY AUTOMATED COUNT: 13.7 % (ref 11.5–14.5)
GLOBULIN SER CALC-MCNC: 3.7 G/DL (ref 2–4)
GLUCOSE SERPL-MCNC: 95 MG/DL (ref 65–100)
GLUCOSE UR STRIP.AUTO-MCNC: NEGATIVE MG/DL
HCT VFR BLD AUTO: 38.1 % (ref 35–47)
HGB BLD-MCNC: 12.9 G/DL (ref 11.5–16)
HGB UR QL STRIP: ABNORMAL
IMM GRANULOCYTES # BLD AUTO: 0.1 K/UL (ref 0–0.04)
IMM GRANULOCYTES NFR BLD AUTO: 1 % (ref 0–0.5)
KETONES UR QL STRIP.AUTO: NEGATIVE MG/DL
LEUKOCYTE ESTERASE UR QL STRIP.AUTO: NEGATIVE
LYMPHOCYTES # BLD: 2.7 K/UL (ref 0.8–3.5)
LYMPHOCYTES NFR BLD: 31 % (ref 12–49)
MCH RBC QN AUTO: 28.2 PG (ref 26–34)
MCHC RBC AUTO-ENTMCNC: 33.9 G/DL (ref 30–36.5)
MCV RBC AUTO: 83.2 FL (ref 80–99)
MONOCYTES # BLD: 0.6 K/UL (ref 0–1)
MONOCYTES NFR BLD: 7 % (ref 5–13)
NEUTS SEG # BLD: 5.2 K/UL (ref 1.8–8)
NEUTS SEG NFR BLD: 57 % (ref 32–75)
NITRITE UR QL STRIP.AUTO: NEGATIVE
NRBC # BLD: 0 K/UL (ref 0–0.01)
NRBC BLD-RTO: 0 PER 100 WBC
PH UR STRIP: 6 (ref 5–8)
PLATELET # BLD AUTO: 236 K/UL (ref 150–400)
PMV BLD AUTO: 11.5 FL (ref 8.9–12.9)
POTASSIUM SERPL-SCNC: 4.5 MMOL/L (ref 3.5–5.1)
PROT SERPL-MCNC: 7.2 G/DL (ref 6.4–8.2)
PROT UR STRIP-MCNC: NEGATIVE MG/DL
RBC # BLD AUTO: 4.58 M/UL (ref 3.8–5.2)
RBC #/AREA URNS HPF: ABNORMAL /HPF (ref 0–5)
SODIUM SERPL-SCNC: 144 MMOL/L (ref 136–145)
SP GR UR REFRACTOMETRY: 1.02 (ref 1–1.03)
URINE CULTURE IF INDICATED: ABNORMAL
UROBILINOGEN UR QL STRIP.AUTO: 0.2 EU/DL (ref 0.2–1)
WBC # BLD AUTO: 8.9 K/UL (ref 3.6–11)
WBC URNS QL MICRO: ABNORMAL /HPF (ref 0–4)

## 2024-08-10 PROCEDURE — 85025 COMPLETE CBC W/AUTO DIFF WBC: CPT

## 2024-08-10 PROCEDURE — 6360000002 HC RX W HCPCS: Performed by: STUDENT IN AN ORGANIZED HEALTH CARE EDUCATION/TRAINING PROGRAM

## 2024-08-10 PROCEDURE — 36415 COLL VENOUS BLD VENIPUNCTURE: CPT

## 2024-08-10 PROCEDURE — 74176 CT ABD & PELVIS W/O CONTRAST: CPT

## 2024-08-10 PROCEDURE — 96374 THER/PROPH/DIAG INJ IV PUSH: CPT

## 2024-08-10 PROCEDURE — 99284 EMERGENCY DEPT VISIT MOD MDM: CPT

## 2024-08-10 PROCEDURE — 81001 URINALYSIS AUTO W/SCOPE: CPT

## 2024-08-10 PROCEDURE — 80053 COMPREHEN METABOLIC PANEL: CPT

## 2024-08-10 RX ORDER — KETOROLAC TROMETHAMINE 10 MG/1
10 TABLET, FILM COATED ORAL EVERY 6 HOURS PRN
Qty: 20 TABLET | Refills: 0 | Status: SHIPPED | OUTPATIENT
Start: 2024-08-10

## 2024-08-10 RX ORDER — KETOROLAC TROMETHAMINE 30 MG/ML
15 INJECTION, SOLUTION INTRAMUSCULAR; INTRAVENOUS
Status: COMPLETED | OUTPATIENT
Start: 2024-08-10 | End: 2024-08-10

## 2024-08-10 RX ADMIN — KETOROLAC TROMETHAMINE 15 MG: 30 INJECTION, SOLUTION INTRAMUSCULAR at 11:31

## 2024-08-10 ASSESSMENT — PAIN DESCRIPTION - PAIN TYPE
TYPE: ACUTE PAIN
TYPE: ACUTE PAIN

## 2024-08-10 ASSESSMENT — PAIN DESCRIPTION - DESCRIPTORS
DESCRIPTORS: ACHING
DESCRIPTORS: DISCOMFORT

## 2024-08-10 ASSESSMENT — LIFESTYLE VARIABLES
HOW MANY STANDARD DRINKS CONTAINING ALCOHOL DO YOU HAVE ON A TYPICAL DAY: PATIENT DOES NOT DRINK
HOW MANY STANDARD DRINKS CONTAINING ALCOHOL DO YOU HAVE ON A TYPICAL DAY: PATIENT DOES NOT DRINK
HOW OFTEN DO YOU HAVE A DRINK CONTAINING ALCOHOL: NEVER
HOW OFTEN DO YOU HAVE A DRINK CONTAINING ALCOHOL: NEVER

## 2024-08-10 ASSESSMENT — PAIN DESCRIPTION - ORIENTATION
ORIENTATION: LEFT;LOWER
ORIENTATION: LEFT;LOWER

## 2024-08-10 ASSESSMENT — PAIN SCALES - GENERAL
PAINLEVEL_OUTOF10: 2
PAINLEVEL_OUTOF10: 4
PAINLEVEL_OUTOF10: 3

## 2024-08-10 ASSESSMENT — PAIN DESCRIPTION - ONSET
ONSET: SUDDEN
ONSET: SUDDEN

## 2024-08-10 ASSESSMENT — PAIN DESCRIPTION - LOCATION
LOCATION: ABDOMEN
LOCATION: ABDOMEN

## 2024-08-10 ASSESSMENT — PAIN DESCRIPTION - FREQUENCY
FREQUENCY: CONTINUOUS
FREQUENCY: CONTINUOUS

## 2024-08-10 NOTE — ED PROVIDER NOTES
[facility] EMERGENCY DEPT  EMERGENCY DEPARTMENT ENCOUNTER      Pt Name: Laurie Becerra  MRN: [id number]  Birthdate 1980  Date of evaluation: 8/10/2024  Provider: Kierra Duenas MD    CHIEF COMPLAINT       Chief Complaint   Patient presents with    Abdominal Pain     Lower left    Dysuria     HISTORY OF PRESENT ILLNESS   (Location/Symptom, Timing/Onset, Context/Setting, Quality, Duration, Modifying Factors, Severity)  Note limiting factors.   HPI  43-year-old female presents to the ER for evaluation of left lower quadrant abdominal pain, left lower flank pain since yesterday associated with urinary urgency and hematuria.  Patient reports initially suspected UTI, for which she went to urgent care for evaluation.  At urgent care, they told the patient and her urine was negative for infection.  Subsequently referred her to the ER for CT imaging as they wanted her to be ruled out for a kidney stone.  Patient denies prior history of kidney stone.  She has no history of diverticulitis.  She denies any fevers or chills.  Reports some mild nausea yesterday night, which was associated with pain.  Patient admits that her back pain could be musculoskeletal in nature as she does have a known history of spinal stenosis status post surgery.  She denies any acute trauma to the back.  No red flag back pain symptoms.    Review of External Medical Records:     Nursing Notes were reviewed.    REVIEW OF SYSTEMS    (2-9 systems for level 4, 10 or more for level 5)     Review of Systems   All other systems reviewed and are negative.      Except as noted above the remainder of the review of systems was reviewed and negative.       PAST MEDICAL HISTORY     Past Medical History:   Diagnosis Date    Anesthesia complication     TACHYCARDIA DURING SURGERY    Anxiety     Asthma     LAST EPISODE 2016    Diabetes (HCC) 2008    NIDDM    Ear infection 11/30/2018    BILATERAL    GERD (gastroesophageal reflux disease)     Hypertension     HX HTN - NO

## 2024-08-10 NOTE — ED TRIAGE NOTES
Pt ambulated to the treatment area with a steady gait. Pt states \"I just came from [email or web address] urgent care this morning I thought I had a UTI because since 6pm last night I had lower left abd pain urgency and frequency and when I go pee its just a little then I saw when I wiped there was some blood on the tissue. They did a urine and said it was negative so I needed to go to the ED for a CT scan to rule out kidney stone or something else.\" Pt denies fevers, endorses some nausea with the pain last night. Denies diarrhea.

## 2024-08-16 RX ORDER — HYDROGEN PEROXIDE 2.65 ML/100ML
81 LIQUID ORAL; TOPICAL DAILY
Qty: 90 TABLET | Refills: 0 | OUTPATIENT
Start: 2024-08-16

## 2024-08-19 ENCOUNTER — OFFICE VISIT (OUTPATIENT)
Age: 44
End: 2024-08-19
Payer: MEDICAID

## 2024-08-19 ENCOUNTER — LAB (OUTPATIENT)
Age: 44
End: 2024-08-19
Payer: MEDICAID

## 2024-08-19 ENCOUNTER — PATIENT MESSAGE (OUTPATIENT)
Age: 44
End: 2024-08-19

## 2024-08-19 VITALS
WEIGHT: 198 LBS | BODY MASS INDEX: 32.99 KG/M2 | HEART RATE: 86 BPM | HEIGHT: 65 IN | SYSTOLIC BLOOD PRESSURE: 106 MMHG | RESPIRATION RATE: 16 BRPM | OXYGEN SATURATION: 98 % | TEMPERATURE: 98 F | DIASTOLIC BLOOD PRESSURE: 62 MMHG

## 2024-08-19 DIAGNOSIS — L81.9 DISCOLORATION OF SKIN OF FOOT: ICD-10-CM

## 2024-08-19 DIAGNOSIS — I10 PRIMARY HYPERTENSION: ICD-10-CM

## 2024-08-19 DIAGNOSIS — E66.09 CLASS 1 OBESITY DUE TO EXCESS CALORIES WITHOUT SERIOUS COMORBIDITY WITH BODY MASS INDEX (BMI) OF 32.0 TO 32.9 IN ADULT: ICD-10-CM

## 2024-08-19 DIAGNOSIS — E11.40 TYPE 2 DIABETES MELLITUS WITH DIABETIC NEUROPATHY, WITHOUT LONG-TERM CURRENT USE OF INSULIN (HCC): Primary | ICD-10-CM

## 2024-08-19 DIAGNOSIS — R20.0 NUMBNESS AND TINGLING IN RIGHT HAND: ICD-10-CM

## 2024-08-19 DIAGNOSIS — E11.40 TYPE 2 DIABETES MELLITUS WITH DIABETIC NEUROPATHY, WITHOUT LONG-TERM CURRENT USE OF INSULIN (HCC): ICD-10-CM

## 2024-08-19 DIAGNOSIS — R20.2 NUMBNESS AND TINGLING IN RIGHT HAND: ICD-10-CM

## 2024-08-19 PROCEDURE — 99215 OFFICE O/P EST HI 40 MIN: CPT | Performed by: FAMILY MEDICINE

## 2024-08-19 PROCEDURE — 3074F SYST BP LT 130 MM HG: CPT | Performed by: FAMILY MEDICINE

## 2024-08-19 PROCEDURE — 3044F HG A1C LEVEL LT 7.0%: CPT | Performed by: FAMILY MEDICINE

## 2024-08-19 PROCEDURE — 3078F DIAST BP <80 MM HG: CPT | Performed by: FAMILY MEDICINE

## 2024-08-19 RX ORDER — GLIPIZIDE 5 MG/1
5 TABLET, FILM COATED, EXTENDED RELEASE ORAL
Qty: 90 TABLET | Refills: 1 | Status: SHIPPED | OUTPATIENT
Start: 2024-08-19

## 2024-08-19 RX ORDER — BLOOD-GLUCOSE SENSOR
EACH MISCELLANEOUS
Qty: 1 EACH | Refills: 0 | Status: SHIPPED | OUTPATIENT
Start: 2024-08-19

## 2024-08-19 RX ORDER — SEMAGLUTIDE 1.34 MG/ML
1 INJECTION, SOLUTION SUBCUTANEOUS
Qty: 3 ADJUSTABLE DOSE PRE-FILLED PEN SYRINGE | Refills: 2 | Status: SHIPPED | OUTPATIENT
Start: 2024-08-19

## 2024-08-19 ASSESSMENT — PATIENT HEALTH QUESTIONNAIRE - PHQ9
2. FEELING DOWN, DEPRESSED OR HOPELESS: NOT AT ALL
SUM OF ALL RESPONSES TO PHQ QUESTIONS 1-9: 0
1. LITTLE INTEREST OR PLEASURE IN DOING THINGS: NOT AT ALL
SUM OF ALL RESPONSES TO PHQ QUESTIONS 1-9: 0
SUM OF ALL RESPONSES TO PHQ9 QUESTIONS 1 & 2: 0

## 2024-08-19 NOTE — PROGRESS NOTES
Identified pt with two pt identifiers(name and ).    Chief Complaint   Patient presents with    Follow-up    Diabetes        Health Maintenance Due   Topic    Hepatitis B vaccine (1 of 3 - 19+ 3-dose series)    DTaP/Tdap/Td vaccine (2 - Td or Tdap)    COVID-19 Vaccine (2 -  season)    Colorectal Cancer Screen     Diabetic Alb to Cr ratio (uACR) test     Flu vaccine (1)       Wt Readings from Last 3 Encounters:   24 89.8 kg (198 lb)   08/10/24 88.5 kg (195 lb)   24 86.6 kg (191 lb)     Temp Readings from Last 3 Encounters:   24 98 °F (36.7 °C) (Temporal)   08/10/24 98.2 °F (36.8 °C) (Oral)   24 97.3 °F (36.3 °C) (Temporal)     BP Readings from Last 3 Encounters:   24 106/62   08/10/24 126/63   24 106/64     Pulse Readings from Last 3 Encounters:   24 86   08/10/24 84   24 84           Depression Screening:  :         2024     8:17 AM 2024     8:18 AM 2024     8:25 AM 2024     8:23 AM 2023     9:13 AM 2023     8:50 AM 2023    10:00 AM   PHQ-9 Questionaire   Little interest or pleasure in doing things 0 0 0 0 0 0 0   Feeling down, depressed, or hopeless 0 0 0 0 0 0 0   PHQ-9 Total Score 0 0 0 0 0 0 0        Fall Risk Assessment:  :          No data to display                 Abuse Screening:  :          No data to display                 Coordination of Care Questionnaire:  :     \"Have you been to the ER, urgent care clinic since your last visit?  Hospitalized since your last visit?\"    Yes ER for lower abd pain. For hernia     “Have you seen or consulted any other [facility] care providers [facility]?”    NO        “Have you had a colorectal cancer screening such as a colonoscopy/FIT/Cologuard?    NO    No colonoscopy on file  No cologuard on file  No FIT/FOBT on file   No flexible sigmoidoscopy on file         Click Here for Release of Records Request

## 2024-08-20 LAB
ALBUMIN/CREAT UR: 5 MG/G CREAT (ref 0–29)
ANA SER QL: NEGATIVE
CREAT UR-MCNC: 173.4 MG/DL
HBA1C MFR BLD: 7.1 % (ref 4.8–5.6)
MICROALBUMIN UR-MCNC: 8.4 UG/ML

## 2024-08-22 ENCOUNTER — PATIENT MESSAGE (OUTPATIENT)
Age: 44
End: 2024-08-22

## 2024-08-22 ENCOUNTER — HOSPITAL ENCOUNTER (EMERGENCY)
Facility: HOSPITAL | Age: 44
Discharge: HOME | End: 2024-08-22
Attending: STUDENT IN AN ORGANIZED HEALTH CARE EDUCATION/TRAINING PROGRAM
Payer: MEDICAID

## 2024-08-22 VITALS
DIASTOLIC BLOOD PRESSURE: 69 MMHG | OXYGEN SATURATION: 100 % | WEIGHT: 199.52 LBS | RESPIRATION RATE: 18 BRPM | SYSTOLIC BLOOD PRESSURE: 140 MMHG | HEART RATE: 85 BPM | TEMPERATURE: 98.1 F | BODY MASS INDEX: 33.2 KG/M2

## 2024-08-22 DIAGNOSIS — R20.2 PARESTHESIA: Primary | ICD-10-CM

## 2024-08-22 DIAGNOSIS — M79.601 RIGHT ARM PAIN: Primary | ICD-10-CM

## 2024-08-22 DIAGNOSIS — M54.13 RADICULOPATHY OF CERVICOTHORACIC REGION: ICD-10-CM

## 2024-08-22 PROCEDURE — 99283 EMERGENCY DEPT VISIT LOW MDM: CPT

## 2024-08-22 RX ORDER — KETOROLAC TROMETHAMINE 10 MG/1
10 TABLET, FILM COATED ORAL EVERY 6 HOURS PRN
Qty: 20 TABLET | Refills: 0 | Status: SHIPPED | OUTPATIENT
Start: 2024-08-22

## 2024-08-22 RX ORDER — METHYLPREDNISOLONE 4 MG/1
TABLET ORAL
Qty: 21 TABLET | Refills: 0 | Status: SHIPPED | OUTPATIENT
Start: 2024-08-22 | End: 2024-08-28

## 2024-08-22 ASSESSMENT — PAIN SCALES - GENERAL: PAINLEVEL_OUTOF10: 4

## 2024-08-22 ASSESSMENT — LIFESTYLE VARIABLES
HOW MANY STANDARD DRINKS CONTAINING ALCOHOL DO YOU HAVE ON A TYPICAL DAY: PATIENT DOES NOT DRINK
HOW OFTEN DO YOU HAVE A DRINK CONTAINING ALCOHOL: NEVER

## 2024-08-22 ASSESSMENT — PAIN DESCRIPTION - FREQUENCY: FREQUENCY: CONTINUOUS

## 2024-08-22 ASSESSMENT — PAIN DESCRIPTION - DESCRIPTORS: DESCRIPTORS: ACHING;NUMBNESS

## 2024-08-22 ASSESSMENT — PAIN - FUNCTIONAL ASSESSMENT
PAIN_FUNCTIONAL_ASSESSMENT: ACTIVITIES ARE NOT PREVENTED
PAIN_FUNCTIONAL_ASSESSMENT: 0-10

## 2024-08-22 ASSESSMENT — PAIN DESCRIPTION - ORIENTATION: ORIENTATION: RIGHT

## 2024-08-22 ASSESSMENT — PAIN DESCRIPTION - LOCATION: LOCATION: ARM

## 2024-08-22 NOTE — ED TRIAGE NOTES
Pt states she saw [facility] [place] today who sent her here for a pinched nerve. She states she has had weeks of 3rd and 4th digit numbness to her right hand that has gotten progressivly worse with shooting pains up her arm and into her under arm, pt has spinal surgery in march.

## 2024-08-22 NOTE — DISCHARGE INSTRUCTIONS
Monitor your blood sugar closely while on steroids.  Discontinue if your blood sugar is out of control or if you are symptomatic from your blood sugar being elevated.

## 2024-08-22 NOTE — ED PROVIDER NOTES
Normal rate.      Pulses: Normal pulses.   Pulmonary:      Effort: Pulmonary effort is normal. No respiratory distress.   Musculoskeletal:         General: Normal range of motion.      Right hand: No bony tenderness. Normal range of motion. Normal strength. Normal capillary refill. Normal pulse.      Cervical back: Normal range of motion. No bony tenderness.        Back:       Comments: Subjective diminished sensation to the right hand in the C6-C8 distribution. Normal strength throughout fingers, hand and wrist   Skin:     General: Skin is warm and dry.      Capillary Refill: Capillary refill takes less than 2 seconds.   Neurological:      General: No focal deficit present.      Mental Status: She is alert.   Psychiatric:         Mood and Affect: Mood normal.         Behavior: Behavior normal.         DIAGNOSTIC RESULTS     EKG: All EKG's are interpreted by the Emergency Department Physician who either signs or Co-signs this chart in the absence of a cardiologist.         RADIOLOGY:   Non-plain film images such as CT, Ultrasound and MRI are read by the radiologist. Plain radiographic images are visualized and preliminarily interpreted by the emergency physician with the below findings:        Interpretation per the Radiologist below, if available at the time of this note:    No orders to display        LABS:  Labs Reviewed - No data to display    All other labs were within normal range or not returned as of this dictation.    EMERGENCY DEPARTMENT COURSE and DIFFERENTIAL DIAGNOSIS/MDM:   Vitals:    Vitals:    08/22/24 1045   BP: (!) 140/69   Pulse: 85   Resp: 18   Temp: 98.1 °F (36.7 °C)   TempSrc: Oral   SpO2: 100%   Weight: 90.5 kg (199 lb 8.3 oz)           Medical Decision Making      DECISION MAKING:  Laurie Becerra is a 43 y.o. female who comes in as above.  Vital signs reviewed, patient afebrile.  Blood pressure 140/69, vital signs otherwise stable.  On my examination, patient is well-appearing and nontoxic.

## 2024-08-23 ENCOUNTER — TELEPHONE (OUTPATIENT)
Age: 44
End: 2024-08-23

## 2024-08-23 RX ORDER — SIMVASTATIN 20 MG
20 TABLET ORAL NIGHTLY
Qty: 90 TABLET | Refills: 1 | Status: SHIPPED | OUTPATIENT
Start: 2024-08-23

## 2024-08-25 RX ORDER — FLUTICASONE PROPIONATE 50 MCG
2 SPRAY, SUSPENSION (ML) NASAL DAILY
Qty: 16 G | Refills: 1 | Status: SHIPPED | OUTPATIENT
Start: 2024-08-25

## 2024-09-03 ENCOUNTER — TELEPHONE (OUTPATIENT)
Age: 44
End: 2024-09-03

## 2024-09-03 NOTE — TELEPHONE ENCOUNTER
RE:EMG      Verified thru automated service   CPT code: 10050,74497,R20.2 DOES NOT require authorization.      [phone]

## 2024-09-17 ENCOUNTER — PATIENT MESSAGE (OUTPATIENT)
Age: 44
End: 2024-09-17

## 2024-09-18 ENCOUNTER — TELEPHONE (OUTPATIENT)
Age: 44
End: 2024-09-18

## 2024-09-30 DIAGNOSIS — G62.9 NEUROPATHY: ICD-10-CM

## 2024-09-30 RX ORDER — GABAPENTIN 400 MG/1
CAPSULE ORAL
Qty: 270 CAPSULE | Refills: 1 | Status: SHIPPED | OUTPATIENT
Start: 2024-09-30 | End: 2025-03-29

## 2024-10-07 ENCOUNTER — TELEMEDICINE (OUTPATIENT)
Age: 44
End: 2024-10-07
Payer: MEDICAID

## 2024-10-07 ENCOUNTER — HOSPITAL ENCOUNTER (OUTPATIENT)
Facility: HOSPITAL | Age: 44
Discharge: HOME | End: 2024-10-10
Payer: MEDICAID

## 2024-10-07 VITALS
DIASTOLIC BLOOD PRESSURE: 74 MMHG | WEIGHT: 199 LBS | TEMPERATURE: 97.4 F | BODY MASS INDEX: 33.15 KG/M2 | SYSTOLIC BLOOD PRESSURE: 141 MMHG | HEIGHT: 65 IN | OXYGEN SATURATION: 100 % | RESPIRATION RATE: 12 BRPM

## 2024-10-07 DIAGNOSIS — E55.9 VITAMIN D DEFICIENCY: ICD-10-CM

## 2024-10-07 DIAGNOSIS — L81.9 DISCOLORATION OF SKIN OF FACE: Primary | ICD-10-CM

## 2024-10-07 LAB
ALBUMIN SERPL-MCNC: 3.8 G/DL (ref 3.5–5)
ALBUMIN/GLOB SERPL: 1.1 (ref 1.1–2.2)
ALP SERPL-CCNC: 74 U/L (ref 45–117)
ALT SERPL-CCNC: 27 U/L (ref 12–78)
ANION GAP SERPL CALC-SCNC: 3 MMOL/L (ref 2–12)
AST SERPL-CCNC: 14 U/L (ref 15–37)
BASOPHILS # BLD: 0.1 K/UL (ref 0–0.1)
BASOPHILS NFR BLD: 1 % (ref 0–1)
BILIRUB SERPL-MCNC: 0.9 MG/DL (ref 0.2–1)
BUN SERPL-MCNC: 10 MG/DL (ref 6–20)
BUN/CREAT SERPL: 15 (ref 12–20)
CALCIUM SERPL-MCNC: 9.6 MG/DL (ref 8.5–10.1)
CHLORIDE SERPL-SCNC: 104 MMOL/L (ref 97–108)
CO2 SERPL-SCNC: 29 MMOL/L (ref 21–32)
CREAT SERPL-MCNC: 0.67 MG/DL (ref 0.55–1.02)
CRP SERPL-MCNC: 0.75 MG/DL (ref 0–0.3)
DIFFERENTIAL METHOD BLD: ABNORMAL
EKG ATRIAL RATE: 81 BPM
EKG DIAGNOSIS: NORMAL
EKG P AXIS: 1 DEGREES
EKG P-R INTERVAL: 140 MS
EKG Q-T INTERVAL: 380 MS
EKG QRS DURATION: 82 MS
EKG QTC CALCULATION (BAZETT): 441 MS
EKG R AXIS: 27 DEGREES
EKG T AXIS: 68 DEGREES
EKG VENTRICULAR RATE: 81 BPM
EOSINOPHIL # BLD: 0.2 K/UL (ref 0–0.4)
EOSINOPHIL NFR BLD: 3 % (ref 0–7)
ERYTHROCYTE [DISTWIDTH] IN BLOOD BY AUTOMATED COUNT: 13.1 % (ref 11.5–14.5)
ERYTHROCYTE [SEDIMENTATION RATE] IN BLOOD: 8 MM/HR (ref 0–20)
GLOBULIN SER CALC-MCNC: 3.6 G/DL (ref 2–4)
GLUCOSE SERPL-MCNC: 93 MG/DL (ref 65–100)
HCT VFR BLD AUTO: 42.1 % (ref 35–47)
HGB BLD-MCNC: 14 G/DL (ref 11.5–16)
IMM GRANULOCYTES # BLD AUTO: 0.1 K/UL (ref 0–0.04)
IMM GRANULOCYTES NFR BLD AUTO: 1 % (ref 0–0.5)
LYMPHOCYTES # BLD: 2.3 K/UL (ref 0.8–3.5)
LYMPHOCYTES NFR BLD: 25 % (ref 12–49)
MCH RBC QN AUTO: 28.4 PG (ref 26–34)
MCHC RBC AUTO-ENTMCNC: 33.3 G/DL (ref 30–36.5)
MCV RBC AUTO: 85.4 FL (ref 80–99)
MONOCYTES # BLD: 0.6 K/UL (ref 0–1)
MONOCYTES NFR BLD: 7 % (ref 5–13)
NEUTS SEG # BLD: 5.9 K/UL (ref 1.8–8)
NEUTS SEG NFR BLD: 63 % (ref 32–75)
NRBC # BLD: 0 K/UL (ref 0–0.01)
NRBC BLD-RTO: 0 PER 100 WBC
PLATELET # BLD AUTO: 246 K/UL (ref 150–400)
PMV BLD AUTO: 11.8 FL (ref 8.9–12.9)
POTASSIUM SERPL-SCNC: 4.7 MMOL/L (ref 3.5–5.1)
PROT SERPL-MCNC: 7.4 G/DL (ref 6.4–8.2)
RBC # BLD AUTO: 4.93 M/UL (ref 3.8–5.2)
SODIUM SERPL-SCNC: 136 MMOL/L (ref 136–145)
WBC # BLD AUTO: 9.2 K/UL (ref 3.6–11)

## 2024-10-07 PROCEDURE — 85025 COMPLETE CBC W/AUTO DIFF WBC: CPT

## 2024-10-07 PROCEDURE — 93010 ELECTROCARDIOGRAM REPORT: CPT | Performed by: STUDENT IN AN ORGANIZED HEALTH CARE EDUCATION/TRAINING PROGRAM

## 2024-10-07 PROCEDURE — 86140 C-REACTIVE PROTEIN: CPT

## 2024-10-07 PROCEDURE — 93005 ELECTROCARDIOGRAM TRACING: CPT | Performed by: SURGERY

## 2024-10-07 PROCEDURE — 80053 COMPREHEN METABOLIC PANEL: CPT

## 2024-10-07 PROCEDURE — 85652 RBC SED RATE AUTOMATED: CPT

## 2024-10-07 PROCEDURE — 99214 OFFICE O/P EST MOD 30 MIN: CPT | Performed by: FAMILY MEDICINE

## 2024-10-07 PROCEDURE — 36415 COLL VENOUS BLD VENIPUNCTURE: CPT

## 2024-10-07 RX ORDER — NALOXONE HYDROCHLORIDE 0.4 MG/ML
INJECTION, SOLUTION INTRAMUSCULAR; INTRAVENOUS; SUBCUTANEOUS PRN
Status: CANCELLED | OUTPATIENT
Start: 2024-10-07

## 2024-10-07 RX ORDER — ERGOCALCIFEROL 1.25 MG/1
1 CAPSULE ORAL
Qty: 24 CAPSULE | Refills: 1 | Status: SHIPPED | OUTPATIENT
Start: 2024-10-07

## 2024-10-07 RX ORDER — SODIUM CHLORIDE, SODIUM LACTATE, POTASSIUM CHLORIDE, CALCIUM CHLORIDE 600; 310; 30; 20 MG/100ML; MG/100ML; MG/100ML; MG/100ML
INJECTION, SOLUTION INTRAVENOUS CONTINUOUS
Status: CANCELLED | OUTPATIENT
Start: 2024-10-07

## 2024-10-07 RX ORDER — ONDANSETRON 2 MG/ML
4 INJECTION INTRAMUSCULAR; INTRAVENOUS
Status: CANCELLED | OUTPATIENT
Start: 2024-10-07 | End: 2024-10-08

## 2024-10-07 RX ORDER — DIPHENHYDRAMINE HYDROCHLORIDE 50 MG/ML
12.5 INJECTION INTRAMUSCULAR; INTRAVENOUS
Status: CANCELLED | OUTPATIENT
Start: 2024-10-07 | End: 2024-10-08

## 2024-10-07 NOTE — DISCHARGE INSTRUCTIONS
[facility]                   [address] [place] [address]  PRE-ADMISSION TESTING    [phone]     Surgery Date:   Wednesday, 10/9    [facility] staff will call you after 3pm the day before your surgery with your arrival time.   Call [phone] after 7pm Tuesday if you did not receive this call.  INSTRUCTIONS BEFORE YOUR SURGERY   When You  Arrive Please take advantage of our free  service when you arrive, they open at 7a.m.    Proceed to the 2nd Floor Admitting Desk on the day of your surgery. Have your insurance card, photo ID, and any copayment (if needed).   Food   and   Drink NO food or drink after midnight the night before surgery. This means NO water, gum, mints, coffee, juice, etc. No alcohol (beer, wine, liquor) 24 hours before or after surgery.   Take all prescription medications as normal every day until the morning of your surgery. Only take these medications the day of surgery:    Gabapentin, levocetirizine, simvastatin, & famotidine      Tylenol products may be taken for pain, if needed.   One Week before surgery, do NOT take any of these medications or supplements. Non-Steroidal Anti-Inflammatory Drugs (NSAIDs):  Ibuprofen (Advil, Motrin), Naproxen (Aleve)  Goody's or BC Powders or other products containing aspirin  Herbal supplements, vitamins, and fish oil  This includes your vitamin B12     Special Instructions Do NOT take lisinopril or metformin day of surgery. Do NOT take glipizide the night before surgery. Do NOT take ozempic until [facility] advises restart after surgery.   Bathing Clothing  Jewelry  Valuables     If you shower the morning of surgery, please do not apply anything to your skin (lotions, powders, deodorant, or makeup, especially mascara).  Do not shave or trim any body part 24 hours before surgery.  Leave money, valuables, and jewelry, including body piercings, at home.  Wear loose, comfortable clothes.  Wear glasses instead of

## 2024-10-07 NOTE — TELEPHONE ENCOUNTER
[facility] [address] [place] [address] [address] - P [phone] - F [phone]     Vitamin D, Ergocalciferol, 33672 units capsule    Qty 24  Take 1 capsule by mouth twice a week

## 2024-10-07 NOTE — PROGRESS NOTES
treatment. Any pending labs and studies will be relayed to patient when they become available.     Pt verbalizes understanding of plan of care and denies further questions or concerns at this time.    Total time spent for this encounter: Not billed by time    Roberto Browning MD, FAAFP

## 2024-10-08 LAB
APPEARANCE UR: CLEAR
BACTERIA #/AREA URNS HPF: NORMAL /[HPF]
BILIRUB UR QL STRIP: NEGATIVE
CASTS URNS QL MICRO: NORMAL /LPF
COLOR UR: YELLOW
EPI CELLS #/AREA URNS HPF: NORMAL /HPF (ref 0–10)
GLUCOSE UR QL STRIP: NEGATIVE
HGB UR QL STRIP: NEGATIVE
KETONES UR QL STRIP: NEGATIVE
LEUKOCYTE ESTERASE UR QL STRIP: NEGATIVE
MICRO URNS: NORMAL
MICRO URNS: NORMAL
NITRITE UR QL STRIP: NEGATIVE
PH UR STRIP: 5 [PH] (ref 5–7.5)
PROT UR QL STRIP: NEGATIVE
RBC #/AREA URNS HPF: NORMAL /HPF (ref 0–2)
SP GR UR STRIP: 1.02 (ref 1–1.03)
TSH SERPL DL<=0.005 MIU/L-ACNC: 1.68 UIU/ML (ref 0.45–4.5)
UROBILINOGEN UR STRIP-MCNC: 0.2 MG/DL (ref 0.2–1)
WBC #/AREA URNS HPF: NORMAL /HPF (ref 0–5)

## 2024-10-09 ENCOUNTER — HOSPITAL ENCOUNTER (OUTPATIENT)
Facility: HOSPITAL | Age: 44
Setting detail: OUTPATIENT SURGERY
Discharge: HOME | End: 2024-10-09
Attending: SURGERY | Admitting: SURGERY
Payer: MEDICAID

## 2024-10-09 VITALS
SYSTOLIC BLOOD PRESSURE: 129 MMHG | TEMPERATURE: 97.6 F | OXYGEN SATURATION: 100 % | WEIGHT: 197.09 LBS | HEIGHT: 65 IN | HEART RATE: 98 BPM | RESPIRATION RATE: 15 BRPM | BODY MASS INDEX: 32.84 KG/M2 | DIASTOLIC BLOOD PRESSURE: 74 MMHG

## 2024-10-09 LAB
A PHAGOCYTOPH IGG TITR SER IF: NEGATIVE {TITER}
A PHAGOCYTOPH IGM TITR SER IF: NEGATIVE {TITER}
ANA SER QL IF: NEGATIVE
B BURGDOR IGG+IGM SER QL IA: NEGATIVE
B MICROTI IGG TITR SER: NORMAL {TITER}
B MICROTI IGM TITR SER: NORMAL {TITER}
COMMENT: NORMAL
COMMENT: NORMAL
E CHAFFEENSIS IGG TITR SER IF: NEGATIVE {TITER}
E CHAFFEENSIS IGM TITR SER IF: NEGATIVE {TITER}
GLUCOSE BLD STRIP.AUTO-MCNC: 132 MG/DL (ref 65–117)
LABORATORY COMMENT REPORT: NORMAL
Lab: NORMAL
PBG UR-MCNC: 1.6 MG/L (ref 0–2)
RICK SF IGG TITR SER: NORMAL {TITER}
RICK SF IGM TITR SER: NORMAL {TITER}
SERVICE CMNT-IMP: ABNORMAL

## 2024-10-09 PROCEDURE — 2580000003 HC RX 258: Performed by: ANESTHESIOLOGY

## 2024-10-09 PROCEDURE — 82962 GLUCOSE BLOOD TEST: CPT

## 2024-10-09 RX ORDER — LIDOCAINE HYDROCHLORIDE 10 MG/ML
1 INJECTION, SOLUTION EPIDURAL; INFILTRATION; INTRACAUDAL; PERINEURAL
Status: DISCONTINUED | OUTPATIENT
Start: 2024-10-09 | End: 2024-10-10 | Stop reason: HOSPADM

## 2024-10-09 RX ORDER — SODIUM CHLORIDE, SODIUM LACTATE, POTASSIUM CHLORIDE, CALCIUM CHLORIDE 600; 310; 30; 20 MG/100ML; MG/100ML; MG/100ML; MG/100ML
INJECTION, SOLUTION INTRAVENOUS CONTINUOUS
Status: DISCONTINUED | OUTPATIENT
Start: 2024-10-09 | End: 2024-10-11 | Stop reason: HOSPADM

## 2024-10-09 RX ORDER — FENTANYL CITRATE 50 UG/ML
100 INJECTION, SOLUTION INTRAMUSCULAR; INTRAVENOUS
Status: DISCONTINUED | OUTPATIENT
Start: 2024-10-09 | End: 2024-10-10 | Stop reason: HOSPADM

## 2024-10-09 RX ORDER — MIDAZOLAM HYDROCHLORIDE 2 MG/2ML
2 INJECTION, SOLUTION INTRAMUSCULAR; INTRAVENOUS
Status: DISCONTINUED | OUTPATIENT
Start: 2024-10-09 | End: 2024-10-10 | Stop reason: HOSPADM

## 2024-10-09 RX ADMIN — SODIUM CHLORIDE, POTASSIUM CHLORIDE, SODIUM LACTATE AND CALCIUM CHLORIDE: 600; 310; 30; 20 INJECTION, SOLUTION INTRAVENOUS at 07:51

## 2024-10-09 ASSESSMENT — PAIN - FUNCTIONAL ASSESSMENT: PAIN_FUNCTIONAL_ASSESSMENT: 0-10

## 2024-10-09 NOTE — PERIOP NOTE
At 1000, OR case cancelled due to pt took her Ozempic 5 days ago, plan to reschedule for Friday. IV dc'd and dressing applied. Pt able to dress herself without any difficulties and pt left.   
Left a VIRGILIO Fernandez at Dr. Shepherd's office requesting that the H&P for surgery be faxed to the Main pre-op.  DOS: 10/9/2024  
Patient Fall Protocol  Yellow arm band applied to patient and yellow non skid socks placed on  Bed in low position, all side rails up, call bell in reach  Pt and Family instructed in \"call- don't fall\" protocol   -use your call bell, wait for assistance, staff not family will assist you to get up and move about   Pt and family verbalize understanding of fall precautions and the \"call don't fall\" Protocol    
Regular

## 2024-10-10 ENCOUNTER — ANESTHESIA EVENT (OUTPATIENT)
Facility: HOSPITAL | Age: 44
End: 2024-10-10
Payer: MEDICAID

## 2024-10-10 DIAGNOSIS — E11.9 TYPE 2 DIABETES MELLITUS WITHOUT COMPLICATION, WITH LONG-TERM CURRENT USE OF INSULIN (HCC): ICD-10-CM

## 2024-10-10 DIAGNOSIS — Z79.4 TYPE 2 DIABETES MELLITUS WITHOUT COMPLICATION, WITH LONG-TERM CURRENT USE OF INSULIN (HCC): ICD-10-CM

## 2024-10-11 ENCOUNTER — ANESTHESIA (OUTPATIENT)
Facility: HOSPITAL | Age: 44
End: 2024-10-11
Payer: MEDICAID

## 2024-10-11 ENCOUNTER — HOSPITAL ENCOUNTER (OUTPATIENT)
Facility: HOSPITAL | Age: 44
Setting detail: OUTPATIENT SURGERY
Discharge: HOME | End: 2024-10-11
Attending: SURGERY | Admitting: SURGERY
Payer: MEDICAID

## 2024-10-11 VITALS
SYSTOLIC BLOOD PRESSURE: 141 MMHG | WEIGHT: 198.19 LBS | HEART RATE: 88 BPM | TEMPERATURE: 98.9 F | OXYGEN SATURATION: 94 % | BODY MASS INDEX: 32.98 KG/M2 | DIASTOLIC BLOOD PRESSURE: 72 MMHG | RESPIRATION RATE: 14 BRPM

## 2024-10-11 DIAGNOSIS — K43.9 HERNIA OF ABDOMINAL WALL: Primary | ICD-10-CM

## 2024-10-11 LAB
GLUCOSE BLD STRIP.AUTO-MCNC: 82 MG/DL (ref 65–117)
GLUCOSE BLD STRIP.AUTO-MCNC: 86 MG/DL (ref 65–117)
PORPHOBILINOGEN DEAMINASE [ENZYMATIC ACTIVITY/MASS] IN RED BLOOD CELLS: 3.23 MU/G HB (ref 2.1–4.3)
SERVICE CMNT-IMP: NORMAL
SERVICE CMNT-IMP: NORMAL

## 2024-10-11 PROCEDURE — 3700000001 HC ADD 15 MINUTES (ANESTHESIA): Performed by: SURGERY

## 2024-10-11 PROCEDURE — 6360000002 HC RX W HCPCS: Performed by: NURSE ANESTHETIST, CERTIFIED REGISTERED

## 2024-10-11 PROCEDURE — 2709999900 HC NON-CHARGEABLE SUPPLY: Performed by: SURGERY

## 2024-10-11 PROCEDURE — S2900 ROBOTIC SURGICAL SYSTEM: HCPCS | Performed by: SURGERY

## 2024-10-11 PROCEDURE — 3600000019 HC SURGERY ROBOT ADDTL 15MIN: Performed by: SURGERY

## 2024-10-11 PROCEDURE — 7100000001 HC PACU RECOVERY - ADDTL 15 MIN: Performed by: SURGERY

## 2024-10-11 PROCEDURE — 6360000002 HC RX W HCPCS: Performed by: SURGERY

## 2024-10-11 PROCEDURE — 6360000002 HC RX W HCPCS: Performed by: ANESTHESIOLOGY

## 2024-10-11 PROCEDURE — 3700000000 HC ANESTHESIA ATTENDED CARE: Performed by: SURGERY

## 2024-10-11 PROCEDURE — 7100000010 HC PHASE II RECOVERY - FIRST 15 MIN: Performed by: SURGERY

## 2024-10-11 PROCEDURE — 2500000003 HC RX 250 WO HCPCS: Performed by: NURSE ANESTHETIST, CERTIFIED REGISTERED

## 2024-10-11 PROCEDURE — C1781 MESH (IMPLANTABLE): HCPCS | Performed by: SURGERY

## 2024-10-11 PROCEDURE — 2580000003 HC RX 258: Performed by: NURSE ANESTHETIST, CERTIFIED REGISTERED

## 2024-10-11 PROCEDURE — 7100000011 HC PHASE II RECOVERY - ADDTL 15 MIN: Performed by: SURGERY

## 2024-10-11 PROCEDURE — 6370000000 HC RX 637 (ALT 250 FOR IP): Performed by: ANESTHESIOLOGY

## 2024-10-11 PROCEDURE — 82962 GLUCOSE BLOOD TEST: CPT

## 2024-10-11 PROCEDURE — 3600000009 HC SURGERY ROBOT BASE: Performed by: SURGERY

## 2024-10-11 PROCEDURE — 2580000003 HC RX 258: Performed by: ANESTHESIOLOGY

## 2024-10-11 PROCEDURE — 7100000000 HC PACU RECOVERY - FIRST 15 MIN: Performed by: SURGERY

## 2024-10-11 DEVICE — VENTRALIGHT ST MESH, 4.5" (11.4 CM), CIRCLE
Type: IMPLANTABLE DEVICE | Site: ABDOMEN | Status: FUNCTIONAL
Brand: VENTRALIGHT

## 2024-10-11 RX ORDER — PROPOFOL 10 MG/ML
INJECTION, EMULSION INTRAVENOUS
Status: DISCONTINUED | OUTPATIENT
Start: 2024-10-11 | End: 2024-10-11 | Stop reason: SDUPTHER

## 2024-10-11 RX ORDER — SODIUM CHLORIDE, SODIUM LACTATE, POTASSIUM CHLORIDE, CALCIUM CHLORIDE 600; 310; 30; 20 MG/100ML; MG/100ML; MG/100ML; MG/100ML
INJECTION, SOLUTION INTRAVENOUS
Status: DISCONTINUED | OUTPATIENT
Start: 2024-10-11 | End: 2024-10-11 | Stop reason: SDUPTHER

## 2024-10-11 RX ORDER — SODIUM CHLORIDE, SODIUM LACTATE, POTASSIUM CHLORIDE, CALCIUM CHLORIDE 600; 310; 30; 20 MG/100ML; MG/100ML; MG/100ML; MG/100ML
INJECTION, SOLUTION INTRAVENOUS CONTINUOUS
Status: DISCONTINUED | OUTPATIENT
Start: 2024-10-11 | End: 2024-10-11 | Stop reason: HOSPADM

## 2024-10-11 RX ORDER — MIDAZOLAM HYDROCHLORIDE 1 MG/ML
INJECTION INTRAMUSCULAR; INTRAVENOUS
Status: DISCONTINUED | OUTPATIENT
Start: 2024-10-11 | End: 2024-10-11 | Stop reason: SDUPTHER

## 2024-10-11 RX ORDER — BLOOD-GLUCOSE SENSOR
EACH MISCELLANEOUS
COMMUNITY
End: 2024-10-11 | Stop reason: SDUPTHER

## 2024-10-11 RX ORDER — ONDANSETRON 2 MG/ML
4 INJECTION INTRAMUSCULAR; INTRAVENOUS
Status: COMPLETED | OUTPATIENT
Start: 2024-10-11 | End: 2024-10-11

## 2024-10-11 RX ORDER — DEXMEDETOMIDINE HYDROCHLORIDE 100 UG/ML
INJECTION, SOLUTION INTRAVENOUS
Status: DISCONTINUED | OUTPATIENT
Start: 2024-10-11 | End: 2024-10-11 | Stop reason: SDUPTHER

## 2024-10-11 RX ORDER — ACETAMINOPHEN 325 MG/1
975 TABLET ORAL ONCE
Status: COMPLETED | OUTPATIENT
Start: 2024-10-11 | End: 2024-10-11

## 2024-10-11 RX ORDER — SUCCINYLCHOLINE CHLORIDE 20 MG/ML
INJECTION INTRAMUSCULAR; INTRAVENOUS
Status: DISCONTINUED | OUTPATIENT
Start: 2024-10-11 | End: 2024-10-11 | Stop reason: SDUPTHER

## 2024-10-11 RX ORDER — ONDANSETRON 2 MG/ML
INJECTION INTRAMUSCULAR; INTRAVENOUS
Status: DISCONTINUED | OUTPATIENT
Start: 2024-10-11 | End: 2024-10-11 | Stop reason: SDUPTHER

## 2024-10-11 RX ORDER — LIDOCAINE HYDROCHLORIDE 20 MG/ML
INJECTION, SOLUTION EPIDURAL; INFILTRATION; INTRACAUDAL; PERINEURAL
Status: DISCONTINUED | OUTPATIENT
Start: 2024-10-11 | End: 2024-10-11 | Stop reason: SDUPTHER

## 2024-10-11 RX ORDER — KETOROLAC TROMETHAMINE 30 MG/ML
INJECTION, SOLUTION INTRAMUSCULAR; INTRAVENOUS
COMMUNITY
End: 2024-10-11 | Stop reason: SDUPTHER

## 2024-10-11 RX ORDER — FENTANYL CITRATE 50 UG/ML
INJECTION, SOLUTION INTRAMUSCULAR; INTRAVENOUS
Status: DISCONTINUED | OUTPATIENT
Start: 2024-10-11 | End: 2024-10-11 | Stop reason: SDUPTHER

## 2024-10-11 RX ORDER — DIPHENHYDRAMINE HYDROCHLORIDE 50 MG/ML
12.5 INJECTION INTRAMUSCULAR; INTRAVENOUS
Status: DISCONTINUED | OUTPATIENT
Start: 2024-10-11 | End: 2024-10-11 | Stop reason: HOSPADM

## 2024-10-11 RX ORDER — OXYCODONE HYDROCHLORIDE 5 MG/1
5 TABLET ORAL EVERY 6 HOURS PRN
Qty: 20 TABLET | Refills: 0 | Status: SHIPPED | OUTPATIENT
Start: 2024-10-11 | End: 2024-10-16

## 2024-10-11 RX ORDER — MIDAZOLAM HYDROCHLORIDE 2 MG/2ML
2 INJECTION, SOLUTION INTRAMUSCULAR; INTRAVENOUS
Status: DISCONTINUED | OUTPATIENT
Start: 2024-10-11 | End: 2024-10-11 | Stop reason: HOSPADM

## 2024-10-11 RX ORDER — BUPIVACAINE HYDROCHLORIDE 5 MG/ML
INJECTION, SOLUTION EPIDURAL; INTRACAUDAL PRN
Status: DISCONTINUED | OUTPATIENT
Start: 2024-10-11 | End: 2024-10-11 | Stop reason: ALTCHOICE

## 2024-10-11 RX ORDER — NALOXONE HYDROCHLORIDE 0.4 MG/ML
INJECTION, SOLUTION INTRAMUSCULAR; INTRAVENOUS; SUBCUTANEOUS PRN
Status: DISCONTINUED | OUTPATIENT
Start: 2024-10-11 | End: 2024-10-11 | Stop reason: HOSPADM

## 2024-10-11 RX ORDER — FENTANYL CITRATE 50 UG/ML
100 INJECTION, SOLUTION INTRAMUSCULAR; INTRAVENOUS
Status: DISCONTINUED | OUTPATIENT
Start: 2024-10-11 | End: 2024-10-11 | Stop reason: HOSPADM

## 2024-10-11 RX ORDER — ROCURONIUM BROMIDE 10 MG/ML
INJECTION, SOLUTION INTRAVENOUS
Status: DISCONTINUED | OUTPATIENT
Start: 2024-10-11 | End: 2024-10-11 | Stop reason: SDUPTHER

## 2024-10-11 RX ORDER — SODIUM CHLORIDE 9 MG/ML
INJECTION, SOLUTION INTRAVENOUS
Status: DISCONTINUED | OUTPATIENT
Start: 2024-10-11 | End: 2024-10-11 | Stop reason: SDUPTHER

## 2024-10-11 RX ORDER — DEXAMETHASONE SODIUM PHOSPHATE 4 MG/ML
INJECTION, SOLUTION INTRA-ARTICULAR; INTRALESIONAL; INTRAMUSCULAR; INTRAVENOUS; SOFT TISSUE
Status: DISCONTINUED | OUTPATIENT
Start: 2024-10-11 | End: 2024-10-11 | Stop reason: SDUPTHER

## 2024-10-11 RX ORDER — LIDOCAINE HYDROCHLORIDE 10 MG/ML
1 INJECTION, SOLUTION EPIDURAL; INFILTRATION; INTRACAUDAL; PERINEURAL
Status: DISCONTINUED | OUTPATIENT
Start: 2024-10-11 | End: 2024-10-11 | Stop reason: HOSPADM

## 2024-10-11 RX ADMIN — SODIUM CHLORIDE: 900 INJECTION, SOLUTION INTRAVENOUS at 16:06

## 2024-10-11 RX ADMIN — FENTANYL CITRATE 50 MCG: 50 INJECTION, SOLUTION INTRAMUSCULAR; INTRAVENOUS at 15:05

## 2024-10-11 RX ADMIN — ROCURONIUM BROMIDE 45 MG: 10 INJECTION INTRAVENOUS at 15:12

## 2024-10-11 RX ADMIN — SODIUM CHLORIDE, POTASSIUM CHLORIDE, SODIUM LACTATE AND CALCIUM CHLORIDE: 600; 310; 30; 20 INJECTION, SOLUTION INTRAVENOUS at 14:58

## 2024-10-11 RX ADMIN — LIDOCAINE HYDROCHLORIDE 80 MG: 20 INJECTION, SOLUTION EPIDURAL; INFILTRATION; INTRACAUDAL; PERINEURAL at 15:05

## 2024-10-11 RX ADMIN — DEXMEDETOMIDINE 8 MCG: 100 INJECTION, SOLUTION INTRAVENOUS at 15:15

## 2024-10-11 RX ADMIN — ROCURONIUM BROMIDE 10 MG: 10 INJECTION INTRAVENOUS at 15:59

## 2024-10-11 RX ADMIN — SUCCINYLCHOLINE CHLORIDE 100 MG: 20 INJECTION, SOLUTION INTRAMUSCULAR; INTRAVENOUS at 15:06

## 2024-10-11 RX ADMIN — ACETAMINOPHEN 975 MG: 325 TABLET ORAL at 13:42

## 2024-10-11 RX ADMIN — DEXAMETHASONE SODIUM PHOSPHATE 4 MG: 4 INJECTION, SOLUTION INTRAMUSCULAR; INTRAVENOUS at 15:40

## 2024-10-11 RX ADMIN — ONDANSETRON HYDROCHLORIDE 4 MG: 2 SOLUTION INTRAMUSCULAR; INTRAVENOUS at 15:40

## 2024-10-11 RX ADMIN — FENTANYL CITRATE 50 MCG: 50 INJECTION, SOLUTION INTRAMUSCULAR; INTRAVENOUS at 14:58

## 2024-10-11 RX ADMIN — DEXMEDETOMIDINE 6 MCG: 100 INJECTION, SOLUTION INTRAVENOUS at 16:00

## 2024-10-11 RX ADMIN — SUGAMMADEX 200 MG: 100 INJECTION, SOLUTION INTRAVENOUS at 16:20

## 2024-10-11 RX ADMIN — HYDROMORPHONE HYDROCHLORIDE 0.5 MG: 1 INJECTION, SOLUTION INTRAMUSCULAR; INTRAVENOUS; SUBCUTANEOUS at 18:18

## 2024-10-11 RX ADMIN — ROCURONIUM BROMIDE 5 MG: 10 INJECTION INTRAVENOUS at 15:05

## 2024-10-11 RX ADMIN — ONDANSETRON 4 MG: 2 INJECTION INTRAMUSCULAR; INTRAVENOUS at 17:08

## 2024-10-11 RX ADMIN — HYDROMORPHONE HYDROCHLORIDE 0.5 MG: 1 INJECTION, SOLUTION INTRAMUSCULAR; INTRAVENOUS; SUBCUTANEOUS at 16:45

## 2024-10-11 RX ADMIN — MIDAZOLAM HYDROCHLORIDE 2 MG: 1 INJECTION, SOLUTION INTRAMUSCULAR; INTRAVENOUS at 14:58

## 2024-10-11 RX ADMIN — PROPOFOL 200 MG: 10 INJECTION, EMULSION INTRAVENOUS at 15:05

## 2024-10-11 RX ADMIN — HYDROMORPHONE HYDROCHLORIDE 0.5 MG: 1 INJECTION, SOLUTION INTRAMUSCULAR; INTRAVENOUS; SUBCUTANEOUS at 17:43

## 2024-10-11 RX ADMIN — PROPOFOL 50 MCG/KG/MIN: 10 INJECTION, EMULSION INTRAVENOUS at 15:40

## 2024-10-11 RX ADMIN — SODIUM CHLORIDE, POTASSIUM CHLORIDE, SODIUM LACTATE AND CALCIUM CHLORIDE: 600; 310; 30; 20 INJECTION, SOLUTION INTRAVENOUS at 13:29

## 2024-10-11 ASSESSMENT — PAIN DESCRIPTION - DESCRIPTORS
DESCRIPTORS: ACHING
DESCRIPTORS: ACHING;SORE
DESCRIPTORS: ACHING;SORE

## 2024-10-11 ASSESSMENT — PAIN DESCRIPTION - ORIENTATION
ORIENTATION: MID

## 2024-10-11 ASSESSMENT — PAIN DESCRIPTION - LOCATION
LOCATION: ABDOMEN

## 2024-10-11 ASSESSMENT — PAIN SCALES - GENERAL
PAINLEVEL_OUTOF10: 6
PAINLEVEL_OUTOF10: 5
PAINLEVEL_OUTOF10: 4

## 2024-10-11 ASSESSMENT — PAIN - FUNCTIONAL ASSESSMENT
PAIN_FUNCTIONAL_ASSESSMENT: 0-10
PAIN_FUNCTIONAL_ASSESSMENT: ACTIVITIES ARE NOT PREVENTED
PAIN_FUNCTIONAL_ASSESSMENT: ACTIVITIES ARE NOT PREVENTED

## 2024-10-11 NOTE — ANESTHESIA PRE PROCEDURE
Department of Anesthesiology  Preprocedure Note       Name:  Laurie Becerra   Age:  43 y.o.  :  1980                                          MRN:  [id number]         Date:  10/11/2024      Surgeon: Surgeon(s):  Mary Shepherd MD    Procedure: Procedure(s):  ROBOTIC UMBILICAL AND INCISIONAL HERNIA REPAIR WITH MESH    Medications prior to admission:   Prior to Admission medications    Medication Sig Start Date End Date Taking? Authorizing Provider   gabapentin (NEURONTIN) 400 MG capsule TAKE 1 CAPSULE BY MOUTH THREE TIMES DAILY . DO NOT EXCEED 3 PER 24 HOURS 9/30/24 3/29/25 Yes Roberto Browning MD   omeprazole (PRILOSEC) 20 MG delayed release capsule Take 1 capsule by mouth daily  Patient taking differently: Take 1 capsule by mouth at bedtime 10/2/23  Yes Nay Torres, APRN - NP   Continuous Glucose  (FREESTYLE GURPREET 3 READER) ZACK by Does not apply route    Marian Lou MD   Continuous Glucose Sensor (FREESTYLE GURPREET 3 SENSOR) MISC by Does not apply route    ProviderMarian MD   ergocalciferol (ERGOCALCIFEROL) 1.25 MG (26139 UT) capsule Take 1 capsule by mouth Twice a Week 10/7/24   Roberto Browning MD   methocarbamol (ROBAXIN) 750 MG tablet TAKE 1 TABLET BY MOUTH TWICE DAILY AS NEEDED FOR  MUSCLE  TIGHTNESS 24   Teresa Cano PA   fluticasone (FLONASE) 50 MCG/ACT nasal spray 2 sprays by Nasal route daily  Patient not taking: Reported on 10/9/2024 8/25/24   Roberto Browning MD   simvastatin (ZOCOR) 20 MG tablet Take 1 tablet by mouth nightly 24   Roberto Browning MD   glipiZIDE (GLUCOTROL XL) 5 MG extended release tablet Take 1 tablet by mouth nightly If blood sugar is above 200 mg/dL 24   Roberto Browning MD   Semaglutide, 1 MG/DOSE, (OZEMPIC, 1 MG/DOSE,) 4 MG/3ML SOPN sc injection Inject 1 mg into the skin every 7 days 24   Roberto Browning MD   levocetirizine (XYZAL ALLERGY 24HR) 5 MG tablet Take 1 tablet by mouth nightly 24   Leann Chun MD

## 2024-10-11 NOTE — H&P
100 % 89.9 kg (198 lb 3.1 oz)       Temp (24hrs), Av.2 °F (36.8 °C), Min:98.2 °F (36.8 °C), Max:98.2 °F (36.8 °C)      Physical Exam:  General:  Alert, cooperative, no distress, appears stated age.   Eyes:  Conjunctivae clear. PERRL, EOMs intact.   Nose: Nares normal. Septum midline. Mucosa normal. No drainage or sinus tenderness.   Mouth/Throat: Lips, mucosa, and tongue normal. Teeth and gums normal.   Neck: Supple, symmetrical, trachea midline   Back:   Symmetric, no curvature. ROM normal. No CVA tenderness.   Lungs:   Clear to auscultation bilaterally.   Heart:  Regular rate and rhythm   Abdomen:   Soft, non-tender. Bowel sounds normal. No masses,  No organomegaly.   Extremities: Extremities normal, atraumatic, no cyanosis or edema.       Skin: Skin color, texture, turgor normal. No rashes or lesions         Lab Results   Component Value Date/Time     10/07/2024 10:30 AM    K 4.7 10/07/2024 10:30 AM     10/07/2024 10:30 AM    CO2 29 10/07/2024 10:30 AM    BUN 10 10/07/2024 10:30 AM    CREATININE 0.67 10/07/2024 10:30 AM    GLUCOSE 93 10/07/2024 10:30 AM    GLUCOSE 145 2024 12:00 AM    CALCIUM 9.6 10/07/2024 10:30 AM    LABGLOM >90 10/07/2024 10:30 AM    LABGLOM >60 2024 03:48 AM    LABGLOM 100 2024 12:00 AM      Lab Results   Component Value Date    WBC 9.2 10/07/2024    HGB 14.0 10/07/2024    HCT 42.1 10/07/2024    MCV 85.4 10/07/2024     10/07/2024     Lab Results   Component Value Date    ALT 27 10/07/2024    AST 14 (L) 10/07/2024    ALKPHOS 74 10/07/2024    BILITOT 0.9 10/07/2024

## 2024-10-11 NOTE — ANESTHESIA POSTPROCEDURE EVALUATION
Department of Anesthesiology  Postprocedure Note    Patient: Laurie Becerra  MRN: [id number]  YOB: 1980  Date of evaluation: 10/11/2024    Procedure Summary       Date: 10/11/24 Room / Location: [facility] MAIN OR  / [facility] MAIN OR    Anesthesia Start: 1458 Anesthesia Stop: 1640    Procedure: ROBOTIC UMBILICAL AND INCISIONAL HERNIA REPAIR WITH MESH (Abdomen) Diagnosis:       Umbilical hernia without obstruction and without gangrene      Incisional hernia, without obstruction or gangrene      (Umbilical hernia without obstruction and without gangrene [K42.9])      (Incisional hernia, without obstruction or gangrene [K43.2])    Surgeons: Mary Shepherd MD Responsible Provider: Artur Price DO    Anesthesia Type: general ASA Status: 2            Anesthesia Type: No value filed.    Sincere Phase I: Sincere Score: 8    Sincere Phase II:      Anesthesia Post Evaluation    Patient location during evaluation: PACU  Patient participation: complete - patient participated  Level of consciousness: awake and sleepy but conscious  Airway patency: patent  Nausea & Vomiting: no vomiting and no nausea  Cardiovascular status: hemodynamically stable  Respiratory status: acceptable  Hydration status: stable  Comments: Patient seen and examined.  Ready for discharge from PACU.  Pain management: adequate    No notable events documented.

## 2024-10-11 NOTE — DISCHARGE INSTRUCTIONS
bandage should become wet, unless otherwise instructed by your doctor  When changing the dressing, do not place tape directly over the glue -  removing the tape later may also remove the glue before the wound has had time to heal    AVOIDING TOPICAL MEDICATIONS  Do not apply any liquid, ointment medication, or any other product to your wound while the glue is in place - these may loosen the glue before your wound is healed    KEEP WOUND DRY AND PROTECTED  You may briefly wet your wound in the shower if permitted by your surgeon.    Do not soak/immerse your wound; do not swim; avoid periods of heavy perspiration until the glue has naturally fallen off   After showering, gently blot your wound dry with a soft towel. If a protective dressing is being used, apply a fresh, dry bandage  Apply a clean, dry bandage over the wound if necessary to protect it  Protect your wound from injury until the skin has had sufficient time to heal  Do not scratch, rub, scrub, or pick at the glue - these may loosen the glue before your wound is healed  Protect the wound from prolonged exposure to sunlight or tanning lamps while the glue is in place     If you have any questions or concerns about this product, please consult your doctor.How to Care for A Wound With Skin Glue    Topical skin glue is a sterile, liquid skin adhesive that holds wound edges together.  The film will usually last 7-10 days, then begin to loosen from your skin.  The glue may go by different names, depending on the maker of the product.    The following may answer some of your questions and provide wound care instructions:    CHECK THE WOUND APPEARANCE  Swelling, redness, and pain are common with all wounds - these normally go away as the wound heals    If swelling, redness, or pain increases, or if the wound feels warm to the touch call your doctor   Contact your doctor if the wound edges reopen or separate    REPLACE BANDAGES  If your wound is bandaged, keep the  bandage dry  Replace the bandage daily until the glue has fallen off, or if the bandage should become wet, unless otherwise instructed by your doctor  When changing the dressing, do not place tape directly over the glue -  removing the tape later may also remove the glue before the wound has had time to heal    AVOIDING TOPICAL MEDICATIONS  Do not apply any liquid, ointment medication, or any other product to your wound while the glue is in place - these may loosen the glue before your wound is healed    KEEP WOUND DRY AND PROTECTED  You may briefly wet your wound in the shower if permitted by your surgeon.    Do not soak/immerse your wound; do not swim; avoid periods of heavy perspiration until the glue has naturally fallen off   After showering, gently blot your wound dry with a soft towel. If a protective dressing is being used, apply a fresh, dry bandage  Apply a clean, dry bandage over the wound if necessary to protect it  Protect your wound from injury until the skin has had sufficient time to heal  Do not scratch, rub, scrub, or pick at the glue - these may loosen the glue before your wound is healed  Protect the wound from prolonged exposure to sunlight or tanning lamps while the glue is in place     If you have any questions or concerns about this product, please consult your doctor.

## 2024-10-11 NOTE — OP NOTE
OPERATIVE REPORT     PREOPERATIVE DIAGNOSIS: Umbilical hernia and incisional hernia.     POSTOPERATIVE DIAGNOSIS: Umbilical hernia and incisional.     PROCEDURES PERFORMED: Robotic repair of umbilical hernia and incisional hernia with mesh.     SURGEON: Mary Shepherd MD.     ANESTHESIA: GETA    INDICATION: As documented in history and physical.     FINDINGS: Umbilical hernia, incisional hernia    DESCRIPTION OF PROCEDURE: Patient was taken to the operating room and was placed supine on the operating table, the abdomen was cleaned, prepped, and draped.  A right upper quadrant incision was made.  Veress needle was passed without difficulty.  Initial intra-abdominal pressure were low.  Pneumoperitoneum of 15 mmHG was reached.  An 8 mm robotic trocar was passed through, then 8 mm 30 degree camera passed through.  Inspection of immediate area was performed for any inadvertent injury and none were seen.  We then placed 2 other 8 mm robotic trocars under direct visualization in the right mid and right lower abdomen.  Robot was then docked.  For my right hand I had monopolar scissors and Cardiere grasper for my left.  I proceeded to reduce omentum in ventral defect.  This was done with tension and sparing use of cautery.  Once reduced the defect was measured at 3 cm. An additional defect was noted in the left lateral abdominal wall which measured 2 cm.   Center of the defects was found and marked on the outside.  Defects was closed with V-Lock suture after intra-abdominal pressure was reduced.  Two separate 11cm ventralight mesh was passed through.  Mesh was grasped with Stortz device via center of defects.  Mesh was then sewn in place with V-Lock absorbable suture.  Once done area was inspected and mesh was laying flat.  All instruments and needle removed.  Pneumoperitoneum released.  All 3 trocar incisions closed with 4-0 Vicryl.  Area cleaned and dried.  Steristrips were used as dressing.  Patient taken to PACU in a  stable condition after abdominal binder placed    ESTIMATED BLOOD LOSS: Minimal.     SPECIMENS REMOVED: None    COUNTS: Correct at the completion of surgery.     Mary Shepherd MD

## 2024-10-13 RX ORDER — KETOROLAC TROMETHAMINE 30 MG/ML
INJECTION, SOLUTION INTRAMUSCULAR; INTRAVENOUS
Qty: 1 EACH | Refills: 0 | Status: SHIPPED | OUTPATIENT
Start: 2024-10-13

## 2024-10-13 RX ORDER — BLOOD-GLUCOSE SENSOR
EACH MISCELLANEOUS
Qty: 1 EACH | Refills: 0 | Status: SHIPPED | OUTPATIENT
Start: 2024-10-13

## 2024-10-16 ENCOUNTER — TELEPHONE (OUTPATIENT)
Age: 44
End: 2024-10-16

## 2024-10-17 ENCOUNTER — TELEPHONE (OUTPATIENT)
Age: 44
End: 2024-10-17

## 2024-10-17 LAB
14-3-3 ETA AG SER IA-MCNC: <0.2 NG/ML
CCP IGA+IGG SERPL IA-ACNC: <20 UNITS
RHEUMATOID FACT SERPL-ACNC: <14 UNITS/ML

## 2024-10-20 DIAGNOSIS — Z79.4 TYPE 2 DIABETES MELLITUS WITHOUT COMPLICATION, WITH LONG-TERM CURRENT USE OF INSULIN (HCC): ICD-10-CM

## 2024-10-20 DIAGNOSIS — Z91.09 ENVIRONMENTAL ALLERGIES: ICD-10-CM

## 2024-10-20 DIAGNOSIS — E11.9 TYPE 2 DIABETES MELLITUS WITHOUT COMPLICATION, WITH LONG-TERM CURRENT USE OF INSULIN (HCC): ICD-10-CM

## 2024-10-21 RX ORDER — LEVOCETIRIZINE DIHYDROCHLORIDE 5 MG/1
5 TABLET, FILM COATED ORAL NIGHTLY
Qty: 90 TABLET | Refills: 1 | Status: SHIPPED | OUTPATIENT
Start: 2024-10-21 | End: 2024-10-25 | Stop reason: SDUPTHER

## 2024-10-21 RX ORDER — BLOOD-GLUCOSE SENSOR
EACH MISCELLANEOUS
Qty: 1 EACH | Refills: 0 | Status: SHIPPED | OUTPATIENT
Start: 2024-10-21 | End: 2024-10-25 | Stop reason: SDUPTHER

## 2024-10-25 DIAGNOSIS — G62.9 NEUROPATHY: ICD-10-CM

## 2024-10-25 DIAGNOSIS — E11.40 TYPE 2 DIABETES MELLITUS WITH DIABETIC NEUROPATHY, WITHOUT LONG-TERM CURRENT USE OF INSULIN (HCC): ICD-10-CM

## 2024-10-25 DIAGNOSIS — Z91.09 ENVIRONMENTAL ALLERGIES: ICD-10-CM

## 2024-10-25 RX ORDER — LEVOCETIRIZINE DIHYDROCHLORIDE 5 MG/1
5 TABLET, FILM COATED ORAL NIGHTLY
Qty: 90 TABLET | Refills: 1 | Status: SHIPPED | OUTPATIENT
Start: 2024-10-25

## 2024-10-25 RX ORDER — SIMVASTATIN 20 MG
20 TABLET ORAL NIGHTLY
Qty: 90 TABLET | Refills: 1 | Status: SHIPPED | OUTPATIENT
Start: 2024-10-25

## 2024-10-25 RX ORDER — SEMAGLUTIDE 1.34 MG/ML
1 INJECTION, SOLUTION SUBCUTANEOUS
Qty: 3 ADJUSTABLE DOSE PRE-FILLED PEN SYRINGE | Refills: 2 | Status: SHIPPED | OUTPATIENT
Start: 2024-10-25

## 2024-10-25 RX ORDER — GABAPENTIN 400 MG/1
CAPSULE ORAL
Qty: 270 CAPSULE | Refills: 1 | Status: SHIPPED | OUTPATIENT
Start: 2024-10-25 | End: 2025-04-23

## 2024-10-25 RX ORDER — BLOOD-GLUCOSE SENSOR
EACH MISCELLANEOUS
Qty: 1 EACH | Refills: 0 | Status: SHIPPED | OUTPATIENT
Start: 2024-10-25

## 2024-10-26 RX ORDER — NORTRIPTYLINE HYDROCHLORIDE 75 MG/1
75 CAPSULE ORAL NIGHTLY
Qty: 30 CAPSULE | Refills: 3 | Status: SHIPPED | OUTPATIENT
Start: 2024-10-26

## 2024-10-26 RX ORDER — NORTRIPTYLINE HYDROCHLORIDE 75 MG/1
75 CAPSULE ORAL NIGHTLY
Qty: 30 CAPSULE | Refills: 0 | Status: SHIPPED | OUTPATIENT
Start: 2024-10-26

## 2024-10-30 NOTE — TELEPHONE ENCOUNTER
Future Appointments   Date Time Provider Anjali Rosa   12/30/2020  1:00 PM Aguila Law NP [facility] AMB   1/4/2021  9:20 AM Maurice Riley MD [facility] AMB   2/24/2021  9:00 AM Maurice Villagomez MD [facility] AMB     Scheduled to see FLOR Gaspar NP on 12/30/20. absent absent absent

## 2024-11-07 NOTE — TELEPHONE ENCOUNTER
omeprazole (PRILOSEC) 20 MG delayed release capsule       [facility] - [place] [address] [address] - P [phone] - F [phone]

## 2024-11-11 RX ORDER — KETOROLAC TROMETHAMINE 30 MG/ML
INJECTION, SOLUTION INTRAMUSCULAR; INTRAVENOUS
Qty: 1 EACH | Refills: 0 | Status: SHIPPED | OUTPATIENT
Start: 2024-11-11

## 2024-11-11 NOTE — TELEPHONE ENCOUNTER
Good evening, I need a new sugar machine, I dropped mine this evening an it broke. Anyway you could send a prescription in for one? Thanks Laurie

## 2024-11-13 ENCOUNTER — TELEPHONE (OUTPATIENT)
Age: 44
End: 2024-11-13

## 2024-11-13 RX ORDER — BLOOD-GLUCOSE SENSOR
EACH MISCELLANEOUS
Qty: 1 EACH | Refills: 5 | Status: SHIPPED | OUTPATIENT
Start: 2024-11-13

## 2024-11-13 NOTE — TELEPHONE ENCOUNTER
Good morning, Pharisees they are having a hard time getting the Michael 3 in. They recommend me getting the Michael 3 plus since they have just upgraded to this. Is there anyway you could send a Michael 3 plus prescription over to the [facility] in [place]? Thanks Laurie

## 2024-11-18 DIAGNOSIS — K21.9 GASTROESOPHAGEAL REFLUX DISEASE WITHOUT ESOPHAGITIS: ICD-10-CM

## 2024-11-18 RX ORDER — FAMOTIDINE 40 MG/1
40 TABLET, FILM COATED ORAL NIGHTLY
Qty: 90 TABLET | Refills: 1 | Status: SHIPPED | OUTPATIENT
Start: 2024-11-18

## 2024-11-19 ENCOUNTER — TELEPHONE (OUTPATIENT)
Age: 44
End: 2024-11-19

## 2024-11-19 ENCOUNTER — OFFICE VISIT (OUTPATIENT)
Age: 44
End: 2024-11-19
Payer: MEDICAID

## 2024-11-19 ENCOUNTER — PATIENT MESSAGE (OUTPATIENT)
Age: 44
End: 2024-11-19

## 2024-11-19 ENCOUNTER — LAB (OUTPATIENT)
Age: 44
End: 2024-11-19

## 2024-11-19 VITALS
BODY MASS INDEX: 32.65 KG/M2 | DIASTOLIC BLOOD PRESSURE: 70 MMHG | WEIGHT: 196 LBS | OXYGEN SATURATION: 98 % | SYSTOLIC BLOOD PRESSURE: 118 MMHG | HEART RATE: 90 BPM | TEMPERATURE: 98 F | RESPIRATION RATE: 16 BRPM | HEIGHT: 65 IN

## 2024-11-19 DIAGNOSIS — Z12.11 SCREENING FOR MALIGNANT NEOPLASM OF COLON: ICD-10-CM

## 2024-11-19 DIAGNOSIS — E11.40 TYPE 2 DIABETES MELLITUS WITH DIABETIC NEUROPATHY, WITH LONG-TERM CURRENT USE OF INSULIN (HCC): Primary | ICD-10-CM

## 2024-11-19 DIAGNOSIS — E66.09 CLASS 1 OBESITY DUE TO EXCESS CALORIES WITHOUT SERIOUS COMORBIDITY WITH BODY MASS INDEX (BMI) OF 32.0 TO 32.9 IN ADULT: ICD-10-CM

## 2024-11-19 DIAGNOSIS — Z79.4 TYPE 2 DIABETES MELLITUS WITH DIABETIC NEUROPATHY, WITH LONG-TERM CURRENT USE OF INSULIN (HCC): Primary | ICD-10-CM

## 2024-11-19 DIAGNOSIS — E11.40 TYPE 2 DIABETES MELLITUS WITH DIABETIC NEUROPATHY, WITHOUT LONG-TERM CURRENT USE OF INSULIN (HCC): Primary | ICD-10-CM

## 2024-11-19 DIAGNOSIS — E66.811 CLASS 1 OBESITY DUE TO EXCESS CALORIES WITHOUT SERIOUS COMORBIDITY WITH BODY MASS INDEX (BMI) OF 32.0 TO 32.9 IN ADULT: ICD-10-CM

## 2024-11-19 DIAGNOSIS — E11.40 TYPE 2 DIABETES MELLITUS WITH DIABETIC NEUROPATHY, WITHOUT LONG-TERM CURRENT USE OF INSULIN (HCC): ICD-10-CM

## 2024-11-19 DIAGNOSIS — M25.50 POLYARTHRALGIA: ICD-10-CM

## 2024-11-19 DIAGNOSIS — Z12.31 ENCOUNTER FOR SCREENING MAMMOGRAM FOR MALIGNANT NEOPLASM OF BREAST: ICD-10-CM

## 2024-11-19 PROCEDURE — 3074F SYST BP LT 130 MM HG: CPT | Performed by: FAMILY MEDICINE

## 2024-11-19 PROCEDURE — 3051F HG A1C>EQUAL 7.0%<8.0%: CPT | Performed by: FAMILY MEDICINE

## 2024-11-19 PROCEDURE — 99214 OFFICE O/P EST MOD 30 MIN: CPT | Performed by: FAMILY MEDICINE

## 2024-11-19 PROCEDURE — 3078F DIAST BP <80 MM HG: CPT | Performed by: FAMILY MEDICINE

## 2024-11-19 ASSESSMENT — PATIENT HEALTH QUESTIONNAIRE - PHQ9
SUM OF ALL RESPONSES TO PHQ QUESTIONS 1-9: 0
2. FEELING DOWN, DEPRESSED OR HOPELESS: NOT AT ALL
SUM OF ALL RESPONSES TO PHQ QUESTIONS 1-9: 0
SUM OF ALL RESPONSES TO PHQ QUESTIONS 1-9: 0
SUM OF ALL RESPONSES TO PHQ9 QUESTIONS 1 & 2: 0
1. LITTLE INTEREST OR PLEASURE IN DOING THINGS: NOT AT ALL
SUM OF ALL RESPONSES TO PHQ QUESTIONS 1-9: 0

## 2024-11-19 NOTE — PROGRESS NOTES
Identified pt with two pt identifiers(name and )    Chief Complaint   Patient presents with    Follow-up    Diabetes     She would like to discuss her freestyle issues she is having.         Health Maintenance Due   Topic    Hepatitis B vaccine (1 of 3 - 19+ 3-dose series)    Colorectal Cancer Screen     Flu vaccine (1)    COVID-19 Vaccine (2 -  season)       Wt Readings from Last 3 Encounters:   24 88.9 kg (196 lb)   10/11/24 89.9 kg (198 lb 3.1 oz)   10/07/24 90.3 kg (199 lb)     Temp Readings from Last 3 Encounters:   24 98 °F (36.7 °C) (Temporal)   10/11/24 98.9 °F (37.2 °C) (Oral)   10/07/24 97.4 °F (36.3 °C) (Temporal)     BP Readings from Last 3 Encounters:   24 118/70   10/11/24 (!) 141/72   10/07/24 (!) 141/74     Pulse Readings from Last 3 Encounters:   24 90   10/11/24 88   10/09/24 98           Depression Screening:  :         2024     8:15 AM 2024     8:17 AM 2024     8:18 AM 2024     8:25 AM 2024     8:23 AM 2023     9:13 AM 2023     8:50 AM   PHQ-9 Questionaire   Little interest or pleasure in doing things 0 0 0 0 0 0 0   Feeling down, depressed, or hopeless 0 0 0 0 0 0 0   PHQ-9 Total Score 0 0 0 0 0 0 0        Fall Risk Assessment:  :          No data to display                 Abuse Screening:  :          No data to display                 Coordination of Care Questionnaire:  :     \"Have you been to the ER, urgent care clinic since your last visit?  Hospitalized since your last visit?\"    NO    “Have you seen or consulted any other health care providers outside our system since your last visit?”    NO      “Have you had a colorectal cancer screening such as a colonoscopy/FIT/Cologuard?    NO    No colonoscopy on file  No cologuard on file  No FIT/FOBT on file   No flexible sigmoidoscopy on file       Click Here for Release of Records Request      
Yes Roberto Browning MD   levocetirizine (XYZAL ALLERGY 24HR) 5 MG tablet Take 1 tablet by mouth nightly Yes Roberto Browning MD   simvastatin (ZOCOR) 20 MG tablet Take 1 tablet by mouth nightly Yes Roberto Browning MD   gabapentin (NEURONTIN) 400 MG capsule TAKE 1 CAPSULE BY MOUTH THREE TIMES DAILY . DO NOT EXCEED 3 PER 24 HOURS Yes Roberto Browning MD   metFORMIN (GLUCOPHAGE) 1000 MG tablet Take 1 tablet by mouth 2 times daily Yes Roberto Browning MD   ergocalciferol (ERGOCALCIFEROL) 1.25 MG (11508 UT) capsule Take 1 capsule by mouth Twice a Week Yes Roberto Browning MD   glipiZIDE (GLUCOTROL XL) 5 MG extended release tablet Take 1 tablet by mouth nightly If blood sugar is above 200 mg/dL Yes Roberto Browning MD   lisinopril (PRINIVIL;ZESTRIL) 10 MG tablet Take 1 tablet by mouth every morning Yes Automatic Reconciliation, Ar       Allergies   Allergen Reactions    Alpha-Gal     Beef-Derived Products Anaphylaxis    Goat-Derived Products Anaphylaxis    Pork-Derived Products Anaphylaxis    Rabbit Protein Anaphylaxis    Green Pepper Hives     Red, Yellow and Orange Peppers also.  OK with Black Pepper         Reviewed PmHx, RxHx, FmHx, SocHx, AllgHx and updated and dated in the chart.      Objective:     Vitals:    11/19/24 0815   BP: 118/70   Site: Right Upper Arm   Position: Sitting   Cuff Size: Large Adult   Pulse: 90   Resp: 16   Temp: 98 °F (36.7 °C)   TempSrc: Temporal   SpO2: 98%   Weight: 88.9 kg (196 lb)   Height: 1.651 m (5' 5\")     Physical Examination:    Physical Exam  Vitals and nursing note reviewed.   Constitutional:       General: She is not in acute distress.     Appearance: Normal appearance. She is obese.   HENT:      Head: Normocephalic and atraumatic.   Cardiovascular:      Rate and Rhythm: Normal rate and regular rhythm.      Heart sounds: Normal heart sounds.   Pulmonary:      Effort: Pulmonary effort is normal.      Breath sounds: Normal breath sounds.   Musculoskeletal:      Cervical back: Normal range of motion.

## 2024-11-20 ENCOUNTER — TELEPHONE (OUTPATIENT)
Age: 44
End: 2024-11-20

## 2024-11-20 LAB — HBA1C MFR BLD: 7.1 % (ref 4.8–5.6)

## 2024-11-20 NOTE — TELEPHONE ENCOUNTER
[facility] called stating we just sent need to submit new clinical information than what was previously sent in  FREESTYLE GURPREET 3 READER   FREEST24/7 Card GURPREET 3 PLUS SENSOR     Pa Phone # [phone]  FAX # [phone]

## 2024-11-21 NOTE — TELEPHONE ENCOUNTER
Bettie called in regards to PA denial.   Peer to Peer is required.     Please call [phone]  Reference Number: [id number]    Explain why the member needs this medication and Bettie will provide more information about why its being denied.     Peer to peer has to be done today, 11/21/2024, or member will have to file an appeal.

## 2024-12-19 ENCOUNTER — HOSPITAL ENCOUNTER (OUTPATIENT)
Facility: HOSPITAL | Age: 44
Discharge: HOME | End: 2024-12-22
Attending: FAMILY MEDICINE
Payer: MEDICAID

## 2024-12-19 VITALS — HEIGHT: 65 IN | BODY MASS INDEX: 32.65 KG/M2 | WEIGHT: 196 LBS

## 2024-12-19 DIAGNOSIS — Z12.31 ENCOUNTER FOR SCREENING MAMMOGRAM FOR MALIGNANT NEOPLASM OF BREAST: ICD-10-CM

## 2024-12-19 PROCEDURE — 77067 SCR MAMMO BI INCL CAD: CPT

## 2024-12-23 ENCOUNTER — OFFICE VISIT (OUTPATIENT)
Age: 44
End: 2024-12-23
Payer: MEDICAID

## 2024-12-23 VITALS
BODY MASS INDEX: 33.29 KG/M2 | DIASTOLIC BLOOD PRESSURE: 76 MMHG | WEIGHT: 199.8 LBS | TEMPERATURE: 98.4 F | HEIGHT: 65 IN | HEART RATE: 85 BPM | OXYGEN SATURATION: 97 % | SYSTOLIC BLOOD PRESSURE: 129 MMHG

## 2024-12-23 DIAGNOSIS — Z79.4 TYPE 2 DIABETES MELLITUS WITHOUT COMPLICATION, WITH LONG-TERM CURRENT USE OF INSULIN (HCC): Primary | ICD-10-CM

## 2024-12-23 DIAGNOSIS — E11.9 TYPE 2 DIABETES MELLITUS WITHOUT COMPLICATION, WITH LONG-TERM CURRENT USE OF INSULIN (HCC): Primary | ICD-10-CM

## 2024-12-23 DIAGNOSIS — E78.2 MIXED HYPERLIPIDEMIA: ICD-10-CM

## 2024-12-23 PROCEDURE — 99204 OFFICE O/P NEW MOD 45 MIN: CPT | Performed by: INTERNAL MEDICINE

## 2024-12-23 PROCEDURE — 3078F DIAST BP <80 MM HG: CPT | Performed by: INTERNAL MEDICINE

## 2024-12-23 PROCEDURE — 3074F SYST BP LT 130 MM HG: CPT | Performed by: INTERNAL MEDICINE

## 2024-12-23 RX ORDER — LANCETS 30 GAUGE
EACH MISCELLANEOUS
Qty: 100 EACH | Refills: 3 | Status: SHIPPED | OUTPATIENT
Start: 2024-12-23

## 2024-12-23 RX ORDER — HYDROCHLOROTHIAZIDE 12.5 MG/1
CAPSULE ORAL
Qty: 6 EACH | Refills: 3 | Status: SHIPPED | OUTPATIENT
Start: 2024-12-23

## 2024-12-23 RX ORDER — GLUCOSAMINE HCL/CHONDROITIN SU 500-400 MG
CAPSULE ORAL
Qty: 100 STRIP | Refills: 3 | Status: SHIPPED | OUTPATIENT
Start: 2024-12-23

## 2024-12-23 RX ORDER — SEMAGLUTIDE 2.68 MG/ML
INJECTION, SOLUTION SUBCUTANEOUS
Qty: 9 ML | Refills: 3 | Status: SHIPPED | OUTPATIENT
Start: 2024-12-23

## 2024-12-23 NOTE — PROGRESS NOTES
Laurie Becerra is a 44 y.o. female here for   Chief Complaint   Patient presents with    New Patient    Diabetes       1. Have you been to the ER, urgent care clinic since your last visit?  Hospitalized since your last visit? -N/A    2. Have you seen or consulted any other health care providers outside of the [facility] since your last visit?  Include any pap smears or colon screening.-N/A      
Regular rate, no LE edema  RESP: Breathing comfortably. No use of accessory muscles.  GI: Soft, nontender, nondistended. No rebound/guarding. No palpable mass.  MSK: no joint swelling hands, no joint swelling or pain of the knee/ankle  NEUROLOGIC: No tremor. Speech coherent, no dysarthria.  PSYCHIATRIC: Pleasant, Normal affect, normal judgment.  SKIN: Normal temperature, no ecchymoses, no striae    Assessment/Plan:     1. Type 2 diabetes mellitus without complication, with long-term current use of insulin (HCC)  -     metFORMIN (GLUCOPHAGE) 1000 MG tablet; Take 1 tablet by mouth 2 times daily, Disp-180 tablet, R-1Normal  -     semaglutide, 2 MG/DOSE, (OZEMPIC, 2 MG/DOSE,) 8 MG/3ML SOPN sc injection; 2 mg SC weekly, Disp-9 mL, R-3Normal  -     Continuous Glucose Sensor (FREESTYLE GURPREET 3 PLUS SENSOR) MISC; Use as directed, Disp-6 each, R-3Normal  -     Lancets MISC; Disp-100 each, R-3, NormalTest once daily--dispense whatever is brand is covered by insurance  -     blood glucose monitor supplies; Test once daily--dispense whatever brand is covered by insurance, Disp-1 each, R-0, Normal  -     blood glucose monitor strips; Test once daily--dispense whatever is brand is covered by insurance, Disp-100 strip, R-3, Normal  2. Mixed hyperlipidemia  Increase Metformin 1000 mg BID   Increase Ozempic 2 mg qweek   Stop glipizide  -goal BS is   -to call if BS is <70 or >300  -bring log and meter to each visit  -I reviewed pertinent lab data, provider notes, imaging and reports in care-everywhere   -discussed diet modifications and to receive 150 min of exercise if possible per week of moderate intensity  -counseled patient on long term disease complications   -DM HM:  Ophtho: exam UTD, no hx of laser tx or injections   renal: fxn NL, c/w aceI  feet: stable, no sores    Discussed with patient: unless any ordered testing results are urgent, they will be reviewed at scheduled follow up visit.     Return in about 3 months

## 2025-01-02 ENCOUNTER — TELEPHONE (OUTPATIENT)
Age: 45
End: 2025-01-02

## 2025-01-02 NOTE — TELEPHONE ENCOUNTER
Pt was called and was informed that PA for Freestyle Michael 3 Plus was denied on 12/31/24, must be on insulin more than once a day or insulin pump. Pt stated she will use regular meter to check BG.

## 2025-01-13 DIAGNOSIS — Z91.09 ENVIRONMENTAL ALLERGIES: ICD-10-CM

## 2025-01-13 DIAGNOSIS — G62.9 NEUROPATHY: ICD-10-CM

## 2025-01-13 DIAGNOSIS — K21.9 GASTROESOPHAGEAL REFLUX DISEASE WITHOUT ESOPHAGITIS: ICD-10-CM

## 2025-01-13 RX ORDER — FAMOTIDINE 40 MG/1
40 TABLET, FILM COATED ORAL NIGHTLY
Qty: 90 TABLET | Refills: 1 | Status: SHIPPED | OUTPATIENT
Start: 2025-01-13

## 2025-01-13 RX ORDER — LEVOCETIRIZINE DIHYDROCHLORIDE 5 MG/1
5 TABLET, FILM COATED ORAL NIGHTLY
Qty: 90 TABLET | Refills: 1 | OUTPATIENT
Start: 2025-01-13

## 2025-01-13 RX ORDER — SIMVASTATIN 20 MG
20 TABLET ORAL NIGHTLY
Qty: 90 TABLET | Refills: 1 | OUTPATIENT
Start: 2025-01-13

## 2025-01-13 RX ORDER — GABAPENTIN 400 MG/1
CAPSULE ORAL
Qty: 270 CAPSULE | Refills: 1 | OUTPATIENT
Start: 2025-01-13 | End: 2025-07-12

## 2025-01-27 ENCOUNTER — PATIENT MESSAGE (OUTPATIENT)
Age: 45
End: 2025-01-27

## 2025-01-27 ENCOUNTER — TELEPHONE (OUTPATIENT)
Age: 45
End: 2025-01-27

## 2025-01-27 DIAGNOSIS — M25.50 POLYARTHRALGIA: Primary | ICD-10-CM

## 2025-01-27 RX ORDER — SEMAGLUTIDE 1.34 MG/ML
INJECTION, SOLUTION SUBCUTANEOUS
Qty: 9 ML | Refills: 5 | Status: SHIPPED | OUTPATIENT
Start: 2025-01-27

## 2025-01-28 ENCOUNTER — COMMUNITY OUTREACH (OUTPATIENT)
Age: 45
End: 2025-01-28

## 2025-02-05 ENCOUNTER — HOSPITAL ENCOUNTER (OUTPATIENT)
Facility: HOSPITAL | Age: 45
Discharge: HOME | End: 2025-02-08
Payer: MEDICAID

## 2025-02-05 DIAGNOSIS — M54.41 CHRONIC BILATERAL LOW BACK PAIN WITH BILATERAL SCIATICA: ICD-10-CM

## 2025-02-05 DIAGNOSIS — M54.42 CHRONIC BILATERAL LOW BACK PAIN WITH BILATERAL SCIATICA: ICD-10-CM

## 2025-02-05 DIAGNOSIS — M54.2 CHRONIC NECK PAIN: ICD-10-CM

## 2025-02-05 DIAGNOSIS — Z98.1 HISTORY OF LUMBAR FUSION: ICD-10-CM

## 2025-02-05 DIAGNOSIS — G89.29 CHRONIC NECK PAIN: ICD-10-CM

## 2025-02-05 DIAGNOSIS — G89.29 CHRONIC BILATERAL LOW BACK PAIN WITH BILATERAL SCIATICA: ICD-10-CM

## 2025-02-05 PROCEDURE — 72131 CT LUMBAR SPINE W/O DYE: CPT

## 2025-02-05 PROCEDURE — 72141 MRI NECK SPINE W/O DYE: CPT

## 2025-02-10 ENCOUNTER — PATIENT MESSAGE (OUTPATIENT)
Age: 45
End: 2025-02-10

## 2025-02-10 DIAGNOSIS — Z79.4 TYPE 2 DIABETES MELLITUS WITHOUT COMPLICATION, WITH LONG-TERM CURRENT USE OF INSULIN (HCC): Primary | ICD-10-CM

## 2025-02-10 DIAGNOSIS — E11.9 TYPE 2 DIABETES MELLITUS WITHOUT COMPLICATION, WITH LONG-TERM CURRENT USE OF INSULIN (HCC): Primary | ICD-10-CM

## 2025-02-16 RX ORDER — CYCLOBENZAPRINE HCL 10 MG
TABLET ORAL
Qty: 30 TABLET | Refills: 0 | OUTPATIENT
Start: 2025-02-16

## 2025-02-18 RX ORDER — SIMVASTATIN 20 MG
20 TABLET ORAL NIGHTLY
Qty: 90 TABLET | Refills: 1 | Status: SHIPPED | OUTPATIENT
Start: 2025-02-18

## 2025-03-13 RX ORDER — NORTRIPTYLINE HYDROCHLORIDE 75 MG/1
75 CAPSULE ORAL NIGHTLY
Qty: 30 CAPSULE | Refills: 0 | Status: SHIPPED | OUTPATIENT
Start: 2025-03-13

## 2025-03-21 ENCOUNTER — TELEPHONE (OUTPATIENT)
Age: 45
End: 2025-03-21

## 2025-03-21 NOTE — TELEPHONE ENCOUNTER
Called pt to schedule a f/u with Dr Liu for neck pain. The pt has a vm box that's not set up so, was unable to leave vm.

## 2025-03-26 ENCOUNTER — OFFICE VISIT (OUTPATIENT)
Age: 45
End: 2025-03-26
Payer: MEDICAID

## 2025-03-26 VITALS
DIASTOLIC BLOOD PRESSURE: 81 MMHG | OXYGEN SATURATION: 99 % | WEIGHT: 187.2 LBS | HEART RATE: 87 BPM | TEMPERATURE: 97.7 F | BODY MASS INDEX: 31.19 KG/M2 | SYSTOLIC BLOOD PRESSURE: 127 MMHG | HEIGHT: 65 IN

## 2025-03-26 DIAGNOSIS — E78.5 DYSLIPIDEMIA (HIGH LDL; LOW HDL): ICD-10-CM

## 2025-03-26 DIAGNOSIS — E11.40 TYPE 2 DIABETES MELLITUS WITH DIABETIC NEUROPATHY, WITHOUT LONG-TERM CURRENT USE OF INSULIN (HCC): Primary | ICD-10-CM

## 2025-03-26 PROCEDURE — 3074F SYST BP LT 130 MM HG: CPT | Performed by: INTERNAL MEDICINE

## 2025-03-26 PROCEDURE — 3079F DIAST BP 80-89 MM HG: CPT | Performed by: INTERNAL MEDICINE

## 2025-03-26 PROCEDURE — 99214 OFFICE O/P EST MOD 30 MIN: CPT | Performed by: INTERNAL MEDICINE

## 2025-03-26 NOTE — PROGRESS NOTES
Laurie Becerra is a 44 y.o. female here for   Chief Complaint   Patient presents with    Diabetes       1. Have you been to the ER, urgent care clinic since your last visit?  Hospitalized since your last visit? -no    2. Have you seen or consulted any other health care providers outside of the [facility] since your last visit?  Include any pap smears or colon screening.-no

## 2025-04-01 ENCOUNTER — TELEPHONE (OUTPATIENT)
Age: 45
End: 2025-04-01

## 2025-04-01 ENCOUNTER — OFFICE VISIT (OUTPATIENT)
Age: 45
End: 2025-04-01
Payer: MEDICAID

## 2025-04-01 VITALS
BODY MASS INDEX: 31.15 KG/M2 | TEMPERATURE: 95.3 F | DIASTOLIC BLOOD PRESSURE: 70 MMHG | OXYGEN SATURATION: 98 % | SYSTOLIC BLOOD PRESSURE: 120 MMHG | HEIGHT: 65 IN | HEART RATE: 88 BPM

## 2025-04-01 DIAGNOSIS — F51.01 PRIMARY INSOMNIA: ICD-10-CM

## 2025-04-01 DIAGNOSIS — R20.2 PARESTHESIA: Primary | ICD-10-CM

## 2025-04-01 DIAGNOSIS — G43.019 INTRACTABLE MIGRAINE WITHOUT AURA AND WITHOUT STATUS MIGRAINOSUS: ICD-10-CM

## 2025-04-01 PROCEDURE — 3074F SYST BP LT 130 MM HG: CPT | Performed by: PSYCHIATRY & NEUROLOGY

## 2025-04-01 PROCEDURE — 99214 OFFICE O/P EST MOD 30 MIN: CPT | Performed by: PSYCHIATRY & NEUROLOGY

## 2025-04-01 PROCEDURE — 3078F DIAST BP <80 MM HG: CPT | Performed by: PSYCHIATRY & NEUROLOGY

## 2025-04-01 RX ORDER — ATOGEPANT 60 MG/1
60 TABLET ORAL DAILY
Qty: 30 TABLET | Refills: 5 | Status: SHIPPED | OUTPATIENT
Start: 2025-04-01

## 2025-04-01 RX ORDER — UBROGEPANT 100 MG/1
100 TABLET ORAL PRN
Qty: 10 TABLET | Refills: 3 | Status: SHIPPED | OUTPATIENT
Start: 2025-04-01

## 2025-04-01 NOTE — PROGRESS NOTES
Neurology Progress Note    Patient ID:  Laurie Becerra  [id number]  44 y.o.  1980      Subjective:   History:  Laurie Becerra is a female who  has a past medical history of lower back surgery 15 yrs ago with residual R lower back pain, Anxiety, Arrhythmia, Asthma, Diabetes (HCC) (2008), GERD (gastroesophageal reflux disease), Headache, Hypertension, Inappropriate sinus tachycardia (5/20/2019), Rash (7/14/2016) who since her late 20's, has been noticing baseline heart rate in the 100s with sudden episodes of increased heart rate (Max 160s) associated with chest tightness and feeling \"drained\" lasting for 1-2 mins. Happening daily, no aggravating/relieving factors. Tilt table negative for POTS. Since June 2020, patient noted burning numbness/discomfort of forefront of both feet, involving all toes and symmetric. Patient was placed on gabapentin 400 mg TID with benefit c/o PCP (-) weakness (-) falls. Tried OTC pain cream with no clear benefit. Patient was found to have Vit B12 deficiency and was placed on Vit B12 1000 mg every day c/o PCP with some benefit. Since 6 yo, patient has migraines occurring 2-3/ week, bitemporal, (+) nausea (+) vomiting (+) photophobia (+) phonophobia . Tried Topamax, Nortriptyline, Imitrex 50 mg, Maxalt.     Last seen Feb 2024.    Had lower back surgery March 2024.    Now on Nortriptyline 75 mg QHS and sleeping well with headaches occurring 5/ month. Takes Ibuprofen     Still on Gabapentin 400 mg TID c/o PCP          ROS:  Per HPI-  Otherwise the remainder of ROS was negative    Social Hx  Social History     Socioeconomic History    Marital status:    Tobacco Use    Smoking status: Never    Smokeless tobacco: Never   Vaping Use    Vaping status: Never Used   Substance and Sexual Activity    Alcohol use: No    Drug use: Never    Sexual activity: Yes     Partners: Male     Birth control/protection: Surgical     Social Drivers of Health     Financial Resource Strain: Low Risk

## 2025-04-03 ENCOUNTER — PATIENT MESSAGE (OUTPATIENT)
Age: 45
End: 2025-04-03

## 2025-04-03 DIAGNOSIS — L81.9 DISCOLORATION OF SKIN OF FOOT: ICD-10-CM

## 2025-04-03 DIAGNOSIS — M25.50 POLYARTHRALGIA: Primary | ICD-10-CM

## 2025-04-03 RX ORDER — GALCANEZUMAB 120 MG/ML
INJECTION, SOLUTION SUBCUTANEOUS
Qty: 1 ADJUSTABLE DOSE PRE-FILLED PEN SYRINGE | Refills: 5 | Status: SHIPPED | OUTPATIENT
Start: 2025-04-03

## 2025-04-03 NOTE — TELEPHONE ENCOUNTER
Called pt. No answer left message on VM to call office. Calling to inform pt that her insurance has denied her Qulipta due to her not having tried any of the inejectables ( Ajovy, Aimovig, Emgality) and Dr. Liu wants to know if she would like to try the emgality?

## 2025-04-04 ENCOUNTER — TELEPHONE (OUTPATIENT)
Dept: PHARMACY | Facility: CLINIC | Age: 45
End: 2025-04-04

## 2025-04-07 RX ORDER — GALCANEZUMAB 120 MG/ML
INJECTION, SOLUTION SUBCUTANEOUS
Qty: 2 ADJUSTABLE DOSE PRE-FILLED PEN SYRINGE | Refills: 5 | Status: SHIPPED | OUTPATIENT
Start: 2025-04-07

## 2025-04-22 ENCOUNTER — LAB (OUTPATIENT)
Age: 45
End: 2025-04-22
Payer: MEDICAID

## 2025-04-22 ENCOUNTER — OFFICE VISIT (OUTPATIENT)
Age: 45
End: 2025-04-22
Payer: MEDICAID

## 2025-04-22 VITALS
DIASTOLIC BLOOD PRESSURE: 60 MMHG | OXYGEN SATURATION: 98 % | BODY MASS INDEX: 30.49 KG/M2 | TEMPERATURE: 98 F | HEIGHT: 65 IN | WEIGHT: 183 LBS | HEART RATE: 96 BPM | SYSTOLIC BLOOD PRESSURE: 110 MMHG | RESPIRATION RATE: 16 BRPM

## 2025-04-22 DIAGNOSIS — K90.49 FOOD INTOLERANCE: ICD-10-CM

## 2025-04-22 DIAGNOSIS — E11.40 TYPE 2 DIABETES MELLITUS WITH DIABETIC NEUROPATHY, WITH LONG-TERM CURRENT USE OF INSULIN (HCC): Primary | ICD-10-CM

## 2025-04-22 DIAGNOSIS — Z79.4 TYPE 2 DIABETES MELLITUS WITH DIABETIC NEUROPATHY, WITH LONG-TERM CURRENT USE OF INSULIN (HCC): Primary | ICD-10-CM

## 2025-04-22 DIAGNOSIS — Z12.11 SCREENING FOR MALIGNANT NEOPLASM OF COLON: ICD-10-CM

## 2025-04-22 DIAGNOSIS — E11.40 TYPE 2 DIABETES MELLITUS WITH DIABETIC NEUROPATHY, WITH LONG-TERM CURRENT USE OF INSULIN (HCC): ICD-10-CM

## 2025-04-22 DIAGNOSIS — Z79.4 TYPE 2 DIABETES MELLITUS WITH DIABETIC NEUROPATHY, WITH LONG-TERM CURRENT USE OF INSULIN (HCC): ICD-10-CM

## 2025-04-22 DIAGNOSIS — I10 PRIMARY HYPERTENSION: ICD-10-CM

## 2025-04-22 DIAGNOSIS — E78.5 HYPERLIPIDEMIA, UNSPECIFIED HYPERLIPIDEMIA TYPE: ICD-10-CM

## 2025-04-22 DIAGNOSIS — L55.9 SUNBURN: ICD-10-CM

## 2025-04-22 PROCEDURE — 99214 OFFICE O/P EST MOD 30 MIN: CPT | Performed by: FAMILY MEDICINE

## 2025-04-22 PROCEDURE — 3074F SYST BP LT 130 MM HG: CPT | Performed by: FAMILY MEDICINE

## 2025-04-22 PROCEDURE — 3078F DIAST BP <80 MM HG: CPT | Performed by: FAMILY MEDICINE

## 2025-04-22 SDOH — ECONOMIC STABILITY: FOOD INSECURITY: WITHIN THE PAST 12 MONTHS, YOU WORRIED THAT YOUR FOOD WOULD RUN OUT BEFORE YOU GOT MONEY TO BUY MORE.: NEVER TRUE

## 2025-04-22 SDOH — ECONOMIC STABILITY: FOOD INSECURITY: WITHIN THE PAST 12 MONTHS, THE FOOD YOU BOUGHT JUST DIDN'T LAST AND YOU DIDN'T HAVE MONEY TO GET MORE.: NEVER TRUE

## 2025-04-22 ASSESSMENT — PATIENT HEALTH QUESTIONNAIRE - PHQ9
SUM OF ALL RESPONSES TO PHQ QUESTIONS 1-9: 0
SUM OF ALL RESPONSES TO PHQ QUESTIONS 1-9: 0
2. FEELING DOWN, DEPRESSED OR HOPELESS: NOT AT ALL
SUM OF ALL RESPONSES TO PHQ QUESTIONS 1-9: 0
SUM OF ALL RESPONSES TO PHQ QUESTIONS 1-9: 0
1. LITTLE INTEREST OR PLEASURE IN DOING THINGS: NOT AT ALL

## 2025-04-22 NOTE — PROGRESS NOTES
Identified pt with two pt identifiers(name and )    Chief Complaint   Patient presents with    Follow-up     She would like alpha gal testing If possible.     Diabetes        Health Maintenance Due   Topic    Hepatitis B vaccine (1 of 3 - 19+ 3-dose series)    Colorectal Cancer Screen     COVID-19 Vaccine ( season)    Lipids     Diabetic retinal exam     Pneumococcal 0-49 years Vaccine (2 of 2 - PCV)       Wt Readings from Last 3 Encounters:   25 83 kg (183 lb)   25 84.9 kg (187 lb 3.2 oz)   25 86.2 kg (190 lb)     Temp Readings from Last 3 Encounters:   25 98 °F (36.7 °C) (Temporal)   25 (!) 95.3 °F (35.2 °C)   25 97.7 °F (36.5 °C) (Temporal)     BP Readings from Last 3 Encounters:   25 110/60   25 120/70   25 127/81     Pulse Readings from Last 3 Encounters:   25 96   25 88   25 87           Depression Screening:  :         2025     9:23 AM 2024     8:15 AM 2024     8:17 AM 2024     8:18 AM 2024     8:25 AM 2024     8:23 AM 2023     9:13 AM   PHQ-9 Questionaire   Little interest or pleasure in doing things 0 0 0 0 0 0 0   Feeling down, depressed, or hopeless 0 0 0 0 0 0 0   PHQ-9 Total Score 0 0 0 0 0 0 0        Fall Risk Assessment:  :          No data to display                 Abuse Screening:  :          No data to display                 Coordination of Care Questionnaire:  :     \"Have you been to the ER, urgent care clinic since your last visit?  Hospitalized since your last visit?\"    NO    “Have you seen or consulted any other health care providers outside our system since your last visit?”    NO      “Have you had a colorectal cancer screening such as a colonoscopy/FIT/Cologuard?    NO    No colonoscopy on file  No cologuard on file  No FIT/FOBT on file   No flexible sigmoidoscopy on file     “Have you had a diabetic eye exam?”    NO   Nurse/CMA to request most recent records if not in

## 2025-04-22 NOTE — PROGRESS NOTES
[facility]  [facility] [place] [address]  Phone: [phone]  Fax: [phone]        Chief Complaint   Patient presents with    Follow-up     She would like alpha gal testing If possible.     Diabetes     She is a 44 y.o. female who presents for follow up visit.    Presents for DM follow up. Taking all medications as directed with no side effects.  Following DM diet as best as possible.  Denies polyuria, polydipsia, polyphagia.  Blood sugar checks: concurrently following with endocrinology.    Presents for HTN follow up.  Taking medications as prescribed and reports no side effects.  Denies chest pain, headache, visual disturbances.  Does not check BP at home.    Asking to have her alpha gal levels checked today.    Prior to Visit Medications    Medication Sig Taking? Authorizing Provider   Galcanezumab-gnlm (EMGALITY) 120 MG/ML SOAJ Start 240 mg for initial loading dose, then 120 mg SC monthly Yes Eddie Liu MD   Atogepant (QULIPTA) 60 MG TABS Take 60 mg by mouth daily Yes Eddie Liu MD   Ubrogepant (UBRELVY) 100 MG TABS Take 100 mg by mouth as needed (onset of headache. may repeat dose x1 after 2h) Yes Eddie Liu MD   methocarbamol (ROBAXIN) 750 MG tablet TAKE 1 TABLET BY MOUTH IN THE MORNING AND AT BEDTIME Yes Teresa Cano PA   nortriptyline (PAMELOR) 75 MG capsule TAKE 1 CAPSULE BY MOUTH NIGHTLY Yes Eddie Liu MD   simvastatin (ZOCOR) 20 MG tablet Take 1 tablet by mouth nightly Yes Roberto Browning MD   semaglutide, 2 MG/DOSE, (OZEMPIC) 8 MG/3ML SOPN sc injection Inject 2 mg into the skin every 7 days Yes Opal Dahl MD   diclofenac (VOLTAREN) 75 MG EC tablet Take 1 tablet by mouth 2 times daily Yes Teresa Cano PA   famotidine (PEPCID) 40 MG tablet Take 1 tablet by mouth nightly Yes Leann Chun MD   metFORMIN (GLUCOPHAGE) 1000 MG tablet Take 1 tablet by mouth 2 times daily Yes Opal Dahl MD   Lancets MISC Test once

## 2025-04-23 ENCOUNTER — RESULTS FOLLOW-UP (OUTPATIENT)
Age: 45
End: 2025-04-23

## 2025-04-23 LAB
CHOLEST SERPL-MCNC: 166 MG/DL (ref 100–199)
HBA1C MFR BLD: 6.3 % (ref 4.8–5.6)
HDLC SERPL-MCNC: 42 MG/DL
IMP & REVIEW OF LAB RESULTS: NORMAL
LDLC SERPL CALC-MCNC: 97 MG/DL (ref 0–99)
Lab: NORMAL
TRIGL SERPL-MCNC: 157 MG/DL (ref 0–149)
VLDLC SERPL CALC-MCNC: 27 MG/DL (ref 5–40)

## 2025-04-27 LAB
ALPHA-GAL IGE QN: 1.02 KU/L
BEEF IGE QN: 0.8 KU/L
IGE SERPL-ACNC: 27 IU/ML (ref 6–495)
LAMB IGE QN: 0.49 KU/L
Lab: ABNORMAL
PORK IGE QN: 0.41 KU/L

## 2025-04-30 DIAGNOSIS — E55.9 VITAMIN D DEFICIENCY: ICD-10-CM

## 2025-04-30 RX ORDER — NORTRIPTYLINE HYDROCHLORIDE 75 MG/1
CAPSULE ORAL
Qty: 30 CAPSULE | Refills: 0 | Status: SHIPPED | OUTPATIENT
Start: 2025-04-30

## 2025-04-30 RX ORDER — ERGOCALCIFEROL 1.25 MG/1
50000 CAPSULE, LIQUID FILLED ORAL
Qty: 24 CAPSULE | Refills: 0 | Status: SHIPPED | OUTPATIENT
Start: 2025-05-01

## 2025-05-04 DIAGNOSIS — K21.9 GASTROESOPHAGEAL REFLUX DISEASE WITHOUT ESOPHAGITIS: ICD-10-CM

## 2025-05-04 DIAGNOSIS — G62.9 NEUROPATHY: ICD-10-CM

## 2025-05-05 RX ORDER — SIMVASTATIN 20 MG
20 TABLET ORAL NIGHTLY
Qty: 90 TABLET | Refills: 1 | Status: SHIPPED | OUTPATIENT
Start: 2025-05-05

## 2025-05-05 RX ORDER — GABAPENTIN 400 MG/1
CAPSULE ORAL
Qty: 270 CAPSULE | Refills: 1 | Status: SHIPPED | OUTPATIENT
Start: 2025-05-05 | End: 2025-10-31

## 2025-05-05 RX ORDER — FAMOTIDINE 40 MG/1
40 TABLET, FILM COATED ORAL NIGHTLY
Qty: 90 TABLET | Refills: 1 | Status: SHIPPED | OUTPATIENT
Start: 2025-05-05

## 2025-05-05 RX ORDER — GABAPENTIN 400 MG/1
CAPSULE ORAL
Qty: 90 CAPSULE | Refills: 0 | OUTPATIENT
Start: 2025-05-05

## 2025-05-15 ENCOUNTER — PATIENT MESSAGE (OUTPATIENT)
Age: 45
End: 2025-05-15

## 2025-05-15 DIAGNOSIS — M25.50 POLYARTHRALGIA: Primary | ICD-10-CM

## 2025-05-26 ENCOUNTER — PATIENT MESSAGE (OUTPATIENT)
Age: 45
End: 2025-05-26

## 2025-05-28 RX ORDER — ATOGEPANT 60 MG/1
60 TABLET ORAL DAILY
Qty: 30 TABLET | Refills: 5 | Status: ACTIVE | OUTPATIENT
Start: 2025-05-28

## 2025-05-30 RX ORDER — NORTRIPTYLINE HYDROCHLORIDE 75 MG/1
CAPSULE ORAL
Qty: 30 CAPSULE | Refills: 0 | Status: SHIPPED | OUTPATIENT
Start: 2025-05-30

## 2025-06-05 DIAGNOSIS — E78.01 FAMILIAL HYPERCHOLESTEROLEMIA: Primary | ICD-10-CM

## 2025-06-05 RX ORDER — SIMVASTATIN 20 MG
20 TABLET ORAL NIGHTLY
Qty: 90 TABLET | Refills: 1 | Status: SHIPPED | OUTPATIENT
Start: 2025-06-05

## 2025-06-20 RX ORDER — OMEPRAZOLE 20 MG/1
20 CAPSULE, DELAYED RELEASE ORAL DAILY
Qty: 90 CAPSULE | Refills: 1 | Status: SHIPPED | OUTPATIENT
Start: 2025-06-20

## 2025-06-20 RX ORDER — OMEPRAZOLE 20 MG/1
20 CAPSULE, DELAYED RELEASE ORAL DAILY
COMMUNITY
Start: 2025-05-10 | End: 2025-06-20 | Stop reason: SDUPTHER

## 2025-06-24 RX ORDER — NORTRIPTYLINE HYDROCHLORIDE 75 MG/1
CAPSULE ORAL
Qty: 30 CAPSULE | Refills: 2 | Status: SHIPPED | OUTPATIENT
Start: 2025-06-24

## 2025-07-07 ENCOUNTER — TELEMEDICINE (OUTPATIENT)
Age: 45
End: 2025-07-07
Payer: MEDICAID

## 2025-07-07 DIAGNOSIS — J01.00 ACUTE NON-RECURRENT MAXILLARY SINUSITIS: Primary | ICD-10-CM

## 2025-07-07 PROCEDURE — 99213 OFFICE O/P EST LOW 20 MIN: CPT | Performed by: FAMILY MEDICINE

## 2025-07-07 RX ORDER — FLUTICASONE PROPIONATE 50 MCG
1 SPRAY, SUSPENSION (ML) NASAL DAILY
Qty: 1 EACH | Refills: 0 | Status: SHIPPED | OUTPATIENT
Start: 2025-07-07

## 2025-07-07 NOTE — PROGRESS NOTES
[facility]  [address] [place] [address]  Phone: [phone]  Fax: [phone]      Laurie Becerra, was evaluated through a synchronous (real-time) audio-video encounter. The patient (or guardian if applicable) is aware that this is a billable service, which includes applicable co-pays. This Virtual Visit was conducted with patient's (and/or legal guardian's) consent. Patient identification was verified, and a caregiver was present when appropriate.   The patient was located at Home: [address] [place] [id number]  Provider was located at Facility (Appt Dept): [facility] [place] [address]  Confirm you are appropriately licensed, registered, or certified to deliver care in the state where the patient is located as indicated above. If you are not or unsure, please re-schedule the visit: Yes, I confirm.       Chief Complaint   Patient presents with    Sinus Problem     She is a 44 y.o. female who presents for acute virtual visit. She is complaining of 1 week of increased facial/forehead pressue, ears feel clogged, sore throat.  Fever up to 100.5 yesterday. Has been tylenol and OTC cough and cold medications--nothing helping.  She has a history of allergies.  She has been taking her allergy medications.    Reviewed PmHx, RxHx, FmHx, SocHx, AllgHx and updated and dated in the chart.      Objective:   There were no vitals filed for this visit.  Physical Examination:    Physical Exam  Nursing note reviewed.   Constitutional:       General: She is not in acute distress.     Appearance: Normal appearance.   HENT:      Head: Normocephalic.      Nose: Congestion present.   Pulmonary:      Effort: Pulmonary effort is normal. No respiratory distress.   Musculoskeletal:      Cervical back: Normal range of motion.   Neurological:      Mental Status: She is alert and oriented to person, place, and time.   Psychiatric:         Mood and Affect: Mood normal.

## 2025-07-30 RX ORDER — UBROGEPANT 100 MG/1
TABLET ORAL
Qty: 10 TABLET | Refills: 5 | Status: ACTIVE | OUTPATIENT
Start: 2025-07-30

## 2025-08-07 ENCOUNTER — PATIENT MESSAGE (OUTPATIENT)
Age: 45
End: 2025-08-07

## 2025-08-07 DIAGNOSIS — E78.01 FAMILIAL HYPERCHOLESTEROLEMIA: ICD-10-CM

## 2025-08-07 DIAGNOSIS — K21.9 GASTROESOPHAGEAL REFLUX DISEASE WITHOUT ESOPHAGITIS: ICD-10-CM

## 2025-08-07 DIAGNOSIS — M25.50 POLYARTHRALGIA: Primary | ICD-10-CM

## 2025-08-07 RX ORDER — FAMOTIDINE 40 MG/1
40 TABLET, FILM COATED ORAL NIGHTLY
Qty: 30 TABLET | Refills: 0 | Status: SHIPPED | OUTPATIENT
Start: 2025-08-07

## 2025-08-07 RX ORDER — SIMVASTATIN 20 MG
20 TABLET ORAL NIGHTLY
Qty: 90 TABLET | Refills: 1 | Status: SHIPPED | OUTPATIENT
Start: 2025-08-07

## 2025-08-11 ENCOUNTER — OFFICE VISIT (OUTPATIENT)
Age: 45
End: 2025-08-11
Payer: MEDICAID

## 2025-08-11 ENCOUNTER — TELEPHONE (OUTPATIENT)
Age: 45
End: 2025-08-11

## 2025-08-11 VITALS
TEMPERATURE: 97 F | DIASTOLIC BLOOD PRESSURE: 70 MMHG | HEART RATE: 70 BPM | OXYGEN SATURATION: 98 % | HEIGHT: 65 IN | SYSTOLIC BLOOD PRESSURE: 120 MMHG | BODY MASS INDEX: 30.45 KG/M2

## 2025-08-11 DIAGNOSIS — G43.019 INTRACTABLE MIGRAINE WITHOUT AURA AND WITHOUT STATUS MIGRAINOSUS: Primary | ICD-10-CM

## 2025-08-11 DIAGNOSIS — G89.29 CHRONIC RIGHT-SIDED LOW BACK PAIN, UNSPECIFIED WHETHER SCIATICA PRESENT: ICD-10-CM

## 2025-08-11 DIAGNOSIS — M54.50 CHRONIC RIGHT-SIDED LOW BACK PAIN, UNSPECIFIED WHETHER SCIATICA PRESENT: ICD-10-CM

## 2025-08-11 PROCEDURE — 3078F DIAST BP <80 MM HG: CPT | Performed by: PSYCHIATRY & NEUROLOGY

## 2025-08-11 PROCEDURE — 99214 OFFICE O/P EST MOD 30 MIN: CPT | Performed by: PSYCHIATRY & NEUROLOGY

## 2025-08-11 PROCEDURE — 3074F SYST BP LT 130 MM HG: CPT | Performed by: PSYCHIATRY & NEUROLOGY

## 2025-08-28 ASSESSMENT — PATIENT HEALTH QUESTIONNAIRE - PHQ9
SUM OF ALL RESPONSES TO PHQ QUESTIONS 1-9: 3
7. TROUBLE CONCENTRATING ON THINGS, SUCH AS READING THE NEWSPAPER OR WATCHING TELEVISION: NOT AT ALL
9. THOUGHTS THAT YOU WOULD BE BETTER OFF DEAD, OR OF HURTING YOURSELF: NOT AT ALL
SUM OF ALL RESPONSES TO PHQ QUESTIONS 1-9: 3
3. TROUBLE FALLING OR STAYING ASLEEP: MORE THAN HALF THE DAYS
10. IF YOU CHECKED OFF ANY PROBLEMS, HOW DIFFICULT HAVE THESE PROBLEMS MADE IT FOR YOU TO DO YOUR WORK, TAKE CARE OF THINGS AT HOME, OR GET ALONG WITH OTHER PEOPLE: SOMEWHAT DIFFICULT
SUM OF ALL RESPONSES TO PHQ QUESTIONS 1-9: 3
7. TROUBLE CONCENTRATING ON THINGS, SUCH AS READING THE NEWSPAPER OR WATCHING TELEVISION: NOT AT ALL
1. LITTLE INTEREST OR PLEASURE IN DOING THINGS: NOT AT ALL
8. MOVING OR SPEAKING SO SLOWLY THAT OTHER PEOPLE COULD HAVE NOTICED. OR THE OPPOSITE, BEING SO FIGETY OR RESTLESS THAT YOU HAVE BEEN MOVING AROUND A LOT MORE THAN USUAL: NOT AT ALL
SUM OF ALL RESPONSES TO PHQ QUESTIONS 1-9: 3
3. TROUBLE FALLING OR STAYING ASLEEP: MORE THAN HALF THE DAYS
5. POOR APPETITE OR OVEREATING: NOT AT ALL
9. THOUGHTS THAT YOU WOULD BE BETTER OFF DEAD, OR OF HURTING YOURSELF: NOT AT ALL
6. FEELING BAD ABOUT YOURSELF - OR THAT YOU ARE A FAILURE OR HAVE LET YOURSELF OR YOUR FAMILY DOWN: NOT AT ALL
4. FEELING TIRED OR HAVING LITTLE ENERGY: SEVERAL DAYS
10. IF YOU CHECKED OFF ANY PROBLEMS, HOW DIFFICULT HAVE THESE PROBLEMS MADE IT FOR YOU TO DO YOUR WORK, TAKE CARE OF THINGS AT HOME, OR GET ALONG WITH OTHER PEOPLE: SOMEWHAT DIFFICULT
8. MOVING OR SPEAKING SO SLOWLY THAT OTHER PEOPLE COULD HAVE NOTICED. OR THE OPPOSITE - BEING SO FIDGETY OR RESTLESS THAT YOU HAVE BEEN MOVING AROUND A LOT MORE THAN USUAL: NOT AT ALL
4. FEELING TIRED OR HAVING LITTLE ENERGY: SEVERAL DAYS
6. FEELING BAD ABOUT YOURSELF - OR THAT YOU ARE A FAILURE OR HAVE LET YOURSELF OR YOUR FAMILY DOWN: NOT AT ALL
1. LITTLE INTEREST OR PLEASURE IN DOING THINGS: NOT AT ALL
2. FEELING DOWN, DEPRESSED OR HOPELESS: NOT AT ALL
SUM OF ALL RESPONSES TO PHQ QUESTIONS 1-9: 3
5. POOR APPETITE OR OVEREATING: NOT AT ALL
2. FEELING DOWN, DEPRESSED OR HOPELESS: NOT AT ALL

## 2025-08-29 ENCOUNTER — OFFICE VISIT (OUTPATIENT)
Age: 45
End: 2025-08-29
Payer: MEDICAID

## 2025-08-29 ENCOUNTER — LAB (OUTPATIENT)
Age: 45
End: 2025-08-29
Payer: MEDICAID

## 2025-08-29 VITALS
TEMPERATURE: 98.1 F | HEART RATE: 73 BPM | WEIGHT: 159 LBS | OXYGEN SATURATION: 98 % | RESPIRATION RATE: 16 BRPM | SYSTOLIC BLOOD PRESSURE: 104 MMHG | BODY MASS INDEX: 26.49 KG/M2 | HEIGHT: 65 IN | DIASTOLIC BLOOD PRESSURE: 62 MMHG

## 2025-08-29 DIAGNOSIS — E53.8 VITAMIN B12 DEFICIENCY: ICD-10-CM

## 2025-08-29 DIAGNOSIS — Z23 ENCOUNTER FOR IMMUNIZATION: ICD-10-CM

## 2025-08-29 DIAGNOSIS — E78.5 HYPERLIPIDEMIA, UNSPECIFIED HYPERLIPIDEMIA TYPE: ICD-10-CM

## 2025-08-29 DIAGNOSIS — E11.40 TYPE 2 DIABETES MELLITUS WITH DIABETIC NEUROPATHY, WITHOUT LONG-TERM CURRENT USE OF INSULIN (HCC): ICD-10-CM

## 2025-08-29 DIAGNOSIS — Z12.11 SCREENING FOR MALIGNANT NEOPLASM OF COLON: ICD-10-CM

## 2025-08-29 DIAGNOSIS — G43.909 MIGRAINE WITHOUT STATUS MIGRAINOSUS, NOT INTRACTABLE, UNSPECIFIED MIGRAINE TYPE: ICD-10-CM

## 2025-08-29 DIAGNOSIS — E11.40 TYPE 2 DIABETES MELLITUS WITH DIABETIC NEUROPATHY, WITHOUT LONG-TERM CURRENT USE OF INSULIN (HCC): Primary | ICD-10-CM

## 2025-08-29 DIAGNOSIS — M25.50 POLYARTHRALGIA: ICD-10-CM

## 2025-08-29 DIAGNOSIS — E66.3 OVERWEIGHT (BMI 25.0-29.9): ICD-10-CM

## 2025-08-29 PROCEDURE — 99214 OFFICE O/P EST MOD 30 MIN: CPT | Performed by: FAMILY MEDICINE

## 2025-08-29 PROCEDURE — PBSHW PNEUMOCOCCAL, PCV20, PREVNAR 20, (AGE 6W+), IM, PF: Performed by: FAMILY MEDICINE

## 2025-08-29 PROCEDURE — 90677 PCV20 VACCINE IM: CPT | Performed by: FAMILY MEDICINE

## 2025-08-29 PROCEDURE — 3044F HG A1C LEVEL LT 7.0%: CPT | Performed by: FAMILY MEDICINE

## 2025-08-29 PROCEDURE — 3074F SYST BP LT 130 MM HG: CPT | Performed by: FAMILY MEDICINE

## 2025-08-29 PROCEDURE — 3078F DIAST BP <80 MM HG: CPT | Performed by: FAMILY MEDICINE

## 2025-08-30 LAB
ALBUMIN SERPL-MCNC: 4.5 G/DL (ref 3.9–4.9)
ALP SERPL-CCNC: 48 IU/L (ref 44–121)
ALT SERPL-CCNC: 11 IU/L (ref 0–32)
AST SERPL-CCNC: 12 IU/L (ref 0–40)
BASOPHILS # BLD AUTO: 0.1 X10E3/UL (ref 0–0.2)
BASOPHILS NFR BLD AUTO: 1 %
BILIRUB SERPL-MCNC: 0.6 MG/DL (ref 0–1.2)
BUN SERPL-MCNC: 5 MG/DL (ref 6–24)
BUN/CREAT SERPL: 8 (ref 9–23)
CALCIUM SERPL-MCNC: 9.9 MG/DL (ref 8.7–10.2)
CHLORIDE SERPL-SCNC: 102 MMOL/L (ref 96–106)
CHOLEST SERPL-MCNC: 175 MG/DL (ref 100–199)
CO2 SERPL-SCNC: 22 MMOL/L (ref 20–29)
CREAT SERPL-MCNC: 0.66 MG/DL (ref 0.57–1)
EGFRCR SERPLBLD CKD-EPI 2021: 109 ML/MIN/1.73
EOSINOPHIL # BLD AUTO: 0.1 X10E3/UL (ref 0–0.4)
EOSINOPHIL NFR BLD AUTO: 1 %
ERYTHROCYTE [DISTWIDTH] IN BLOOD BY AUTOMATED COUNT: 13.1 % (ref 11.7–15.4)
EST. AVERAGE GLUCOSE BLD GHB EST-MCNC: 114 MG/DL
GLOBULIN SER CALC-MCNC: 2.5 G/DL (ref 1.5–4.5)
GLUCOSE SERPL-MCNC: 90 MG/DL (ref 70–99)
HBA1C MFR BLD: 5.6 % (ref 4.8–5.6)
HCT VFR BLD AUTO: 47.9 % (ref 34–46.6)
HDLC SERPL-MCNC: 49 MG/DL
HGB BLD-MCNC: 15.7 G/DL (ref 11.1–15.9)
IMM GRANULOCYTES # BLD AUTO: 0 X10E3/UL (ref 0–0.1)
IMM GRANULOCYTES NFR BLD AUTO: 0 %
LDLC SERPL CALC-MCNC: 101 MG/DL (ref 0–99)
LYMPHOCYTES # BLD AUTO: 2.7 X10E3/UL (ref 0.7–3.1)
LYMPHOCYTES NFR BLD AUTO: 28 %
MCH RBC QN AUTO: 29 PG (ref 26.6–33)
MCHC RBC AUTO-ENTMCNC: 32.8 G/DL (ref 31.5–35.7)
MCV RBC AUTO: 88 FL (ref 79–97)
MONOCYTES # BLD AUTO: 0.6 X10E3/UL (ref 0.1–0.9)
MONOCYTES NFR BLD AUTO: 7 %
NEUTROPHILS # BLD AUTO: 6.1 X10E3/UL (ref 1.4–7)
NEUTROPHILS NFR BLD AUTO: 63 %
PLATELET # BLD AUTO: 302 X10E3/UL (ref 150–450)
POTASSIUM SERPL-SCNC: 4.9 MMOL/L (ref 3.5–5.2)
PROT SERPL-MCNC: 7 G/DL (ref 6–8.5)
RBC # BLD AUTO: 5.42 X10E6/UL (ref 3.77–5.28)
SODIUM SERPL-SCNC: 142 MMOL/L (ref 134–144)
SPECIMEN STATUS REPORT: NORMAL
TRIGL SERPL-MCNC: 143 MG/DL (ref 0–149)
VLDLC SERPL CALC-MCNC: 25 MG/DL (ref 5–40)
WBC # BLD AUTO: 9.7 X10E3/UL (ref 3.4–10.8)

## 2025-08-31 LAB
ALBUMIN/CREAT UR: 23 MG/G CREAT (ref 0–29)
CREAT UR-MCNC: 437.2 MG/DL
IMP & REVIEW OF LAB RESULTS: NORMAL
Lab: NORMAL
MICROALBUMIN UR-MCNC: 98.9 UG/ML

## 2025-09-01 LAB — VIT B12 SERPL-MCNC: 380 PG/ML (ref 232–1245)

## (undated) DEVICE — SOLUTION IRRIG 1000ML STRL H2O USP PLAS POUR BTL

## (undated) DEVICE — STRIP,CLOSURE,WOUND,MEDI-STRIP,1/2X4: Brand: MEDLINE

## (undated) DEVICE — INSUFFLATION NEEDLE TO ESTABLISH PNEUMOPERITONEUM.: Brand: INSUFFLATION NEEDLE

## (undated) DEVICE — AIRSEAL BIFURCATED SMOKE EVAC FILTERED TUBE SET: Brand: AIRSEAL

## (undated) DEVICE — SPECIMEN RETRIEVAL POUCH: Brand: ENDO CATCH GOLD

## (undated) DEVICE — COVER,MAYO STAND,STERILE: Brand: MEDLINE

## (undated) DEVICE — SOLIDIFIER MEDC 1200ML -- CONVERT TO 356117

## (undated) DEVICE — SURGIFOAM SPNG SZ 100

## (undated) DEVICE — TUBE SET IRR PUMP THERMALCOOL -- SMARTABLATE

## (undated) DEVICE — PAD,SANITARY,11 IN,MAXI,N-STRL,IND WRAP: Brand: MEDLINE

## (undated) DEVICE — PATCH CARTO 3 EXT REF --

## (undated) DEVICE — BITEBLOCK ENDOSCP 60FR MAXI WHT POLYETH STURDY W/ VELC WVN

## (undated) DEVICE — CABLE CATH L10FT RED PIN CONN 34-34 FOR THERMOCOOL

## (undated) DEVICE — BAG TRASH WST 122 CM 183 CM 1400 CC FEMALE LUER PVC DEPOT

## (undated) DEVICE — DBD-PACK,LAPAROTOMY,2 REINFORCED GOWNS: Brand: MEDLINE

## (undated) DEVICE — SUTURE PDS II SZ 0 L27IN ABSRB VLT L26MM CT-2 1/2 CIR Z334H

## (undated) DEVICE — NEEDLE SPNL L3.5IN PNK HUB S STL REG WALL FIT STYL W/ QNCKE

## (undated) DEVICE — TRANSFER SET 3": Brand: MEDLINE INDUSTRIES, INC.

## (undated) DEVICE — SUTURE N ABSRB MONOFILAMENT 0 GS-22 9 IN BLU V-LOC PBT VLOCN2146

## (undated) DEVICE — SUTURE ETHBND EXCEL SZ 0 L18IN NONABSORBABLE GRN L36MM CT-1 CX21D

## (undated) DEVICE — SUTURE STRATAFIX SYMMETRIC PDS + SZ 1 L18IN ABSRB VLT L48MM SXPP1A400

## (undated) DEVICE — CANN NASAL O2 CAPNOGRAPHY AD -- FILTERLINE

## (undated) DEVICE — Device

## (undated) DEVICE — PAD BD MATTRESS 73X32 IN STD CONVOLUTED FOAM LTX FREE

## (undated) DEVICE — BONE WAX WHITE: Brand: BONE WAX WHITE

## (undated) DEVICE — HEMO INTRO 8.5F 60CM SR0 --

## (undated) DEVICE — KIT ROBOTIC ORTHOPAEDIC X-SCAN PLAN DISP MAZORX

## (undated) DEVICE — SUTURE SZ 0 27IN 5/8 CIR UR-6  TAPER PT VIOLET ABSRB VICRYL J603H

## (undated) DEVICE — CATHETER EP 6FR L92CM 2-5-2MM SPC TIP 1MM 4 ELECTRD D CRV

## (undated) DEVICE — CABLE CATH L10FT YEL CONN 10-12 PIN ELECTROGRAM CONDUCTION

## (undated) DEVICE — KIT COLON W/ 1.1OZ LUB AND 2 END

## (undated) DEVICE — SUTURE MCRYL SZ 4-0 L27IN ABSRB UD L19MM PS-2 1/2 CIR PRIM Y426H

## (undated) DEVICE — REM POLYHESIVE ADULT PATIENT RETURN ELECTRODE: Brand: VALLEYLAB

## (undated) DEVICE — HANDPIECE SET WITH BONE CLEANING TIP AND SUCTION TUBE: Brand: INTERPULSE

## (undated) DEVICE — REINFORCED INTELLIGENT RELOAD, FOR USE WITH SIGNIA STAPLING SYSTEM: Brand: TRI-STAPLE 2.0

## (undated) DEVICE — CATHETER ELECTROPHYSIOLOGY D 2-5-2 MM 1 MM 6 FRX115 CM 4 FIX

## (undated) DEVICE — ROBOTIC GENERAL-SFMC: Brand: MEDLINE INDUSTRIES, INC.

## (undated) DEVICE — SOLUTION IRRIG 500ML 0.9% SOD CHLO USP POUR PLAS BTL

## (undated) DEVICE — CLICKLINE SCISSORS INSERT: Brand: CLICKLINE

## (undated) DEVICE — SUTURE VICRYL + SZ 0 L27IN ABSRB VLT L26MM UR-6 5/8 CIR VCP603H

## (undated) DEVICE — BLADELESS OBTURATOR: Brand: WECK VISTA

## (undated) DEVICE — AGENT HEMSTAT 3GM OXIDIZED REGENERATED CELOS ABSRB FOR CONT (ORDER MULTIPLES OF 5EA)

## (undated) DEVICE — SUTURE STRATAFIX SPRL MCRYL + SZ 3-0 L24IN ABSRB UD PS-2 SXMP1B108

## (undated) DEVICE — BLADE ASSEMB CLP HAIR FINE --

## (undated) DEVICE — ADHESIVE LIQ 2OZ ADJUNCT FOR DSG MASTISOL

## (undated) DEVICE — ROCKER SWITCH PENCIL BLADE ELECTRODE, HOLSTER: Brand: EDGE

## (undated) DEVICE — BAG SPEC BIOHZRD 10 X 10 IN --

## (undated) DEVICE — Device: Brand: PADPRO

## (undated) DEVICE — CABLE CATH L10FT RED PIN CONN 25-34 FOR NAVISTAR CARTO 3

## (undated) DEVICE — PINNACLE INTRODUCER SHEATH: Brand: PINNACLE

## (undated) DEVICE — ARM DRAPE

## (undated) DEVICE — 4-PORT MANIFOLD: Brand: NEPTUNE 2

## (undated) DEVICE — POSITIONER HD REST FOAM CMFRT TCH

## (undated) DEVICE — SOLUTION IV 1000ML 0.9% SOD CHL

## (undated) DEVICE — CATH IV AUTOGRD BC PNK 20GA 25 -- INSYTE

## (undated) DEVICE — SURGICAL PROCEDURE PACK GYN LAPAROSCOPY CUST SMH LF

## (undated) DEVICE — LAMINECTOMY-SMH: Brand: MEDLINE INDUSTRIES, INC.

## (undated) DEVICE — TOOL 14BA50 LEGEND 14CM 5MM BA: Brand: MIDAS REX ™

## (undated) DEVICE — NEEDLE HYPO 25GA L1.5IN BVL ORIENTED ECLIPSE

## (undated) DEVICE — SUTURE DEV SZ 2-0 WND CLSR ABSRB GS-22 VLOC COVIDIEN VLOCM2145

## (undated) DEVICE — SEAL

## (undated) DEVICE — BANDAGE ADH W2XL4IN NITRL FAB STRP CURAD

## (undated) DEVICE — TIP COVER ACCESSORY

## (undated) DEVICE — STERILE POLYISOPRENE POWDER-FREE SURGICAL GLOVES WITH EMOLLIENT COATING: Brand: PROTEXIS

## (undated) DEVICE — SYR 10ML LUER LOK 1/5ML GRAD --

## (undated) DEVICE — MEDI-TRACE CADENCE ADULT, DEFIBRILLATION ELECTRODE -RTS  (10 PR/PK) - PHYSIO-CONTROL: Brand: MEDI-TRACE CADENCE

## (undated) DEVICE — STERILE POLYISOPRENE POWDER-FREE SURGICAL GLOVES: Brand: PROTEXIS

## (undated) DEVICE — 3M™ CUROS™ DISINFECTING CAP FOR NEEDLELESS CONNECTORS 270/CARTON 20 CARTONS/CASE CFF1-270: Brand: CUROS™

## (undated) DEVICE — INFECTION CONTROL KIT SYS

## (undated) DEVICE — CODMAN® SURGICAL PATTIES 1/2" X 1/2" (1.27CM X 1.27CM): Brand: CODMAN®

## (undated) DEVICE — APPLICATOR BNDG 1MM ADH PREMIERPRO EXOFIN

## (undated) DEVICE — TRAY PREP DRY W/ PREM GLV 2 APPL 6 SPNG 2 UNDPD 1 OVERWRAP

## (undated) DEVICE — BINDER ABD M/L H12IN FOR 46-62IN WHT 4 SLD PNL DSGN HOOP

## (undated) DEVICE — DRESSING HEMOSTATIC SFT INTVENT W/O SLT DBL WRP QUIKCLOT LF

## (undated) DEVICE — DEVON™ KNEE AND BODY STRAP 60" X 3" (1.5 M X 7.6 CM): Brand: DEVON

## (undated) DEVICE — SUTURE VLOC 90 2/0 VL 6 GS-22 VLOCM2105

## (undated) DEVICE — GLOVE SURG SZ 65 THK91MIL LTX FREE SYN POLYISOPRENE

## (undated) DEVICE — SUTURE VICRYL COAT SZ 4-0 L18IN ABSRB UD L19MM PS-2 1/2 CIR J496G

## (undated) DEVICE — BAG BELONG PT PERS CLEAR HANDL

## (undated) DEVICE — KENDALL RADIOLUCENT FOAM MONITORING ELECTRODE -RECTANGULAR SHAPE: Brand: KENDALL

## (undated) DEVICE — 1200 GUARD II KIT W/5MM TUBE W/O VAC TUBE: Brand: GUARDIAN

## (undated) DEVICE — SUTURE VCRL SZ 2-0 L27IN ABSRB UD L26MM CT-2 1/2 CIR J269H

## (undated) DEVICE — SOLUTION IRRIG 3000ML 0.9% SOD CHL USP UROMATIC PLAS CONT

## (undated) DEVICE — TRAY CATH OD16FR SIL URIN M STATLOK STBL DEV SURSTP

## (undated) DEVICE — LIGHT HANDLE: Brand: DEVON

## (undated) DEVICE — TUBING ADMIN SET INTRAV ARTERI -- CONVERT TO ITEM 340436

## (undated) DEVICE — PRESSURE MONITORING SET: Brand: TRUWAVE

## (undated) DEVICE — CATH 4MM NAVIGATIONAL BI-DIREC --

## (undated) DEVICE — COVER,TABLE,HEAVY DUTY,60"X90",STRL: Brand: MEDLINE

## (undated) DEVICE — SOLUTION IV 250ML 0.9% SOD CHL CLR INJ FLX BG CONT PRT CLSR

## (undated) DEVICE — INSTRMT SET WND CLSR SUT PASS --

## (undated) DEVICE — CABLE DIAG C3 SNSR 34/34 PIN --

## (undated) DEVICE — CATH BI DIR 7FR DEFL CS NON --

## (undated) DEVICE — APPLICATOR SEAL FLOSEAL 5MM+ --

## (undated) DEVICE — FORCEPS BX L240CM JAW DIA2.8MM L CAP W/ NDL MIC MESH TOOTH

## (undated) DEVICE — UNIVERSAL FIXATION CANNULA: Brand: VERSAONE

## (undated) DEVICE — SUTURE VCRL SZ 2-0 L27IN ABSRB UD L26MM SH 1/2 CIR J417H

## (undated) DEVICE — C-ARM: Brand: UNBRANDED

## (undated) DEVICE — VASCADE MVP

## (undated) DEVICE — GLOVE SURG SZ 8 L12IN FNGR THK79MIL GRN LTX FREE

## (undated) DEVICE — 3000CC GUARDIAN II: Brand: GUARDIAN

## (undated) DEVICE — BLADELESS OPTICAL TROCAR WITH FIXATION CANNULA: Brand: VERSAONE

## (undated) DEVICE — GOWN,SIRUS,FABRNF,XL,20/CS: Brand: MEDLINE

## (undated) DEVICE — CABLE CONN TO CATH TO CARTO 3 --

## (undated) DEVICE — CABLE CATH L10FT BLU CONN 10-34 PIN ELECTROGRAM CONDUCTION

## (undated) DEVICE — SYSTEM SKIN CLSR 22CM DERMBND PRINEO

## (undated) DEVICE — GLOVE SURG SZ 75 L12IN FNGR THK94MIL STD WHT LTX FREE

## (undated) DEVICE — KENDALL SCD EXPRESS SLEEVES, KNEE LENGTH, MEDIUM: Brand: KENDALL SCD

## (undated) DEVICE — SHTH GUID 8.5F 17MM SM CRV -- CARTO VIZIGO

## (undated) DEVICE — BLADELESS OPTICAL TROCAR WITH FIXATION CANNULA: Brand: VERSAPORT

## (undated) DEVICE — PAD,ABDOMINAL,5"X9",ST,LF,25/BX: Brand: MEDLINE INDUSTRIES, INC.

## (undated) DEVICE — CATH IV AUTOGRD BC BLU 22GA 25 -- INSYTE

## (undated) DEVICE — PACK PROCEDURE SURG HRT CATH

## (undated) DEVICE — MARYLAND JAW LAPAROSCOPIC SEALER/DIVIDER COATED: Brand: LIGASURE

## (undated) DEVICE — SURGICAL PROCEDURE PACK BASIN MAJ SET CUST NO CAUT

## (undated) DEVICE — BIPOLAR IRRIGATOR INTEGRATED TUBING AND BIPOLAR CORD SET, DISPOSABLE

## (undated) DEVICE — CONTAINER SPEC 20 ML LID NEUT BUFF FORMALIN 10 % POLYPR STS

## (undated) DEVICE — STERILE-Z SURGICAL PATIENT COVERS CLEAR POLYETHYLENE STERILE UNIVERSAL FIT 10 PER CASE: Brand: STERILE-Z

## (undated) DEVICE — CATHETER ABLAT 8FR L115CM 1-6-2MM SPC TIP 3.5MM FJ CRV

## (undated) DEVICE — NEEDLE HYPO 22GA L1.5IN BLK S STL HUB POLYPR SHLD REG BVL

## (undated) DEVICE — (D)PREP SKN CHLRAPRP APPL 26ML -- CONVERT TO ITEM 371833

## (undated) DEVICE — PREP PAD BNS: Brand: CONVERTORS

## (undated) DEVICE — CATH DECANAV EP F CURVE 7FR --